# Patient Record
Sex: MALE | Race: BLACK OR AFRICAN AMERICAN | NOT HISPANIC OR LATINO | Employment: OTHER | URBAN - METROPOLITAN AREA
[De-identification: names, ages, dates, MRNs, and addresses within clinical notes are randomized per-mention and may not be internally consistent; named-entity substitution may affect disease eponyms.]

---

## 2017-01-30 ENCOUNTER — HOSPITAL ENCOUNTER (EMERGENCY)
Facility: OTHER | Age: 82
Discharge: HOME | End: 2017-01-30
Attending: EMERGENCY MEDICINE
Payer: MEDICARE

## 2017-01-30 VITALS
OXYGEN SATURATION: 98 % | RESPIRATION RATE: 18 BRPM | HEIGHT: 64 IN | WEIGHT: 156 LBS | HEART RATE: 89 BPM | TEMPERATURE: 99 F | BODY MASS INDEX: 26.63 KG/M2 | SYSTOLIC BLOOD PRESSURE: 144 MMHG | DIASTOLIC BLOOD PRESSURE: 72 MMHG

## 2017-01-30 DIAGNOSIS — J10.1 INFLUENZA A: Primary | ICD-10-CM

## 2017-01-30 LAB
INFLUENZA A ANTIGEN, POC: POSITIVE
INFLUENZA B ANTIGEN, POC: NEGATIVE
POCT GLUCOSE: 135 MG/DL (ref 70–110)

## 2017-01-30 PROCEDURE — 82947 ASSAY GLUCOSE BLOOD QUANT: CPT

## 2017-01-30 PROCEDURE — 87804 INFLUENZA ASSAY W/OPTIC: CPT

## 2017-01-30 PROCEDURE — 99283 EMERGENCY DEPT VISIT LOW MDM: CPT

## 2017-01-30 RX ORDER — OSELTAMIVIR PHOSPHATE 75 MG/1
75 CAPSULE ORAL 2 TIMES DAILY
Qty: 10 CAPSULE | Refills: 0 | Status: ON HOLD | OUTPATIENT
Start: 2017-01-30 | End: 2017-02-05 | Stop reason: HOSPADM

## 2017-01-30 NOTE — ED AVS SNAPSHOT
[facility] EMERGENCY DEPARTMENT  [address] [facility] [address]               Destin Barber   2017  9:41 AM   ED    Description:  Male : 1934   Department:  [facility] Emergency Department           Your Care was Coordinated By:     Provider Role From To    Rachel Howard MD Attending Provider 17 1005 --      Reason for Visit     URI           Diagnoses this Visit        Comments    Influenza A    -  Primary       ED Disposition     None           To Do List           Follow-up Information     Schedule an appointment as soon as possible for a visit with Piter Bernardo MD.    Specialty:  Internal Medicine    Contact information:    [address]  [address]  [place] [address]  [phone]          Follow up with [facility] Emergency Department.    Specialty:  Emergency Medicine    Why:  If symptoms worsen    Contact information:    [address] [facility] [address]  [phone]       These Medications        Disp Refills Start End    oseltamivir (TAMIFLU) 75 MG capsule 10 capsule 0 2017    Take 1 capsule (75 mg total) by mouth 2 (two) times daily. - Oral    Pharmacy: [facility] - [address] Ph #: [phone]         [facility] On Call     [facility] On Call Nurse Care Line -  Assistance  Registered nurses in the [facility] On Call Center provide clinical advisement, health education, appointment booking, and other advisory services.  Call for this free service at [phone].             Medications           Message regarding Medications     Verify the changes and/or additions to your medication regime listed below are the same as discussed with your clinician today.  If any of these changes or additions are incorrect, please notify your healthcare provider.        START taking these NEW medications        Refills    oseltamivir (TAMIFLU) 75 MG capsule 0    Sig: Take 1 capsule (75 mg total) by  mouth 2 (two) times daily.    Class: Print    Route: Oral           Verify that the below list of medications is an accurate representation of the medications you are currently taking.  If none reported, the list may be blank. If incorrect, please contact your healthcare provider. Carry this list with you in case of emergency.           Current Medications     allopurinol (ZYLOPRIM) 100 MG tablet Take 1 tablet (100 mg total) by mouth 2 (two) times daily.    amlodipine (NORVASC) 10 MG tablet Take 1 tablet (10 mg total) by mouth once daily.    ASPIR-LOW 81 mg EC tablet TAKE 1 TABLET BY MOUTH DAILY    atorvastatin (LIPITOR) 80 MG tablet Take 1 tablet (80 mg total) by mouth once daily.    blood sugar diagnostic (TRUE METRIX GLUCOSE TEST STRIP) Strp To test once daily    blood-glucose meter (TRUE METRIX GLUCOSE METER) Misc To test once daily    clopidogrel (PLAVIX) 75 mg tablet Take 1 tablet (75 mg total) by mouth once daily.    diclofenac (VOLTAREN) 50 MG EC tablet One tab twice per day for three days then once per day until pain has resolved    docusate sodium (COLACE) 100 MG capsule Take 1 capsule (100 mg total) by mouth 2 (two) times daily as needed for Constipation.    lancets Misc To test once daily TRUE METRIX METER    metformin (GLUCOPHAGE-XR) 500 MG 24 hr tablet Take 1 tablet (500 mg total) by mouth daily with breakfast.    metoprolol tartrate (LOPRESSOR) 25 MG tablet Take 1 tablet (25 mg total) by mouth 2 (two) times daily.    oseltamivir (TAMIFLU) 75 MG capsule Take 1 capsule (75 mg total) by mouth 2 (two) times daily.    pantoprazole (PROTONIX) 40 MG tablet Take 1 tablet (40 mg total) by mouth once daily.    tamsulosin (FLOMAX) 0.4 mg Cp24 Take 1 capsule (0.4 mg total) by mouth once daily.    trazodone (DESYREL) 100 MG tablet Take 1 tablet (100 mg total) by mouth every evening.    TRUEPLUS LANCETS 30 gauge Misc test EVERY DAY           Clinical Reference Information           Your Vitals Were     BP Pulse  "Temp Resp Height Weight    144/72 89 98.6 °F (37 °C) (Temporal) 18 5' 4" (1.626 m) 70.8 kg (156 lb)    SpO2 BMI             98% 26.78 kg/m2         Allergies as of 1/30/2017     No Known Allergies      Immunizations Administered on Date of Encounter - 1/30/2017     None      ED Micro, Lab, POCT     Start Ordered       Status Ordering Provider    01/30/17 1034 01/30/17 1034  POCT Influenza A/B  Once      Final result     01/30/17 1012 01/30/17 1012  POCT glucose  Once      Final result     01/30/17 1007 01/30/17 1006  POCT glucose  Once      Completed     01/30/17 1007 01/30/17 1006  POCT Influenza A/B  Once      Acknowledged       ED Imaging Orders     None        Discharge Instructions         Influenza  Influenza (the flu) is an infection that affects your respiratory tract (the mouth, nose, and lungs, and the passages between them). Unlike a cold, the flu can make you very ill. And it can lead to pneumonia, a serious lung infection. For some people, especially older adults, young children, and people with certain chronic conditions, the flu can have serious complications and even be fatal.  What Are the Risk Factors for the Flu?     Viruses that cause influenza spread through the air in droplets when someone who has the flu coughs, sneezes, laughs, or talks.   Anyone can get the flu. But youre more likely to become infected if you:  · Have a weakened immune system.  · Work in a health care setting where you may be exposed to flu germs.  · Live or work with someone who has the flu.  · Havent received an annual flu shot.  How Does the Flu Spread?  The flu is caused by viruses. The viruses spread through the air in droplets when someone who has the flu coughs, sneezes, laughs, or talks. You can become infected when you inhale these viruses directly. You can also become infected when you touch a surface on which the droplets have landed and then transfer the germs to your eyes, nose, or mouth. Touching used " tissues, or sharing utensils, drinking glasses, or a toothbrush with an infected person can expose you to flu viruses, too.  What Are the Symptoms of the Flu?  Flu symptoms tend to come on quickly and may last a few days to a few weeks. They include:  · Fever usually higher than 101°F  (38.3°C) and chills  · Sore throat and headache  · Dry cough  · Runny nose  · Tiredness and weakness  · Muscle aches  Factors That Can Make Flu Worse  For some people, the flu can be very serious. The risk of complications is greater for:  · Children under age 5.  · Adults 65 years of age and older.  · People with a chronic illness, such as diabetes or heart, kidney, or lung disease.  · People who live in a nursing home or long-term care facility.   How Is the Flu Treated?  Influenza usually improves after 7 days or so. In some cases, your health care provider may prescribe an antiviral medication. This may help you get well sooner. For the medication to help, you need to take it as soon as possible (ideally within 48 hours) after your symptoms start. If you develop pneumonia or other serious illness, hospital care may be needed.  Easing Flu Symptoms  · Drink lots of fluids such as water, juice, and warm soup. A good rule is to drink enough so that you urinate your normal amount.  · Get plenty of rest.  · Ask your health care provider what to take for fever and pain.  · Call your provider if your fever rises over 101°F (38.3°C) or you become dizzy, lightheaded, or short of breath.  Taking Steps to Protect Others  · Wash your hands often, especially after coughing or sneezing. Or, clean your hands with an alcohol-based hand  containing at least 60 percent alcohol.  · Cough or sneeze into a tissue. Then throw the tissue away and wash your hands. If you dont have a tissue, cough and sneeze into the crook of your elbow.  · Stay home until at least 24 hours after you no longer have a fever or chills. Be sure the fever isnt being  hidden by fever-reducing medication.  · Dont share food, utensils, drinking glasses, or a toothbrush with others.  · Ask your health care provider if others in your household should receive antiviral medication to help them avoid infection.  How Can the Flu Be Prevented?  · One of the best ways to avoid the flu is to get a flu vaccination each year. Viruses that cause the flu change from year to year. For that reason, doctors recommend getting the flu vaccine each year, as soon as it's available in your area. The vaccine may be given as a shot or as a nasal spray. Your health care provider can tell you which vaccine is right for you.  · Wash your hands often. Frequent handwashing is a proven way to help prevent infection.  · Carry an alcohol-based hand gel containing at least 60 percent alcohol. Use it when you dont have access to soap and water. Then wash your hands as soon as you can.  · Avoid touching your eyes, nose, and mouth.  · At home and work, clean phones, computer keyboards, and toys often with disinfectant wipes.  · If possible, avoid close contact with others who have the flu or symptoms of the flu.  Handwashing Tips  Handwashing is one of the best ways to prevent many common infections. If youre caring for or visiting someone with the flu, wash your hands each time you enter and leave the room. Follow these steps:  · Use warm water and plenty of soap. Rub your hands together well.  · Clean the whole hand, under your nails, between your fingers, and up the wrists.  · Wash for at least 15 seconds.  · Rinse, letting the water run down your fingers, not up your wrists.  · Dry your hands well. Use a paper towel to turn off the faucet and open the door.  Using Alcohol-Based Hand   Alcohol-based hand  are also a good choice. Use them when you dont have access to soap and water. Follow these steps:  · Squeeze about a tablespoon of gel into the palm of one hand.  · Rub your hands together  briskly, cleaning the backs of your hands, the palms, between your fingers, and up the wrists.  · Rub until the gel is gone and your hands are completely dry.  Preventing Influenza in Healthcare Settings  The flu is a special concern for people in hospitals and long-term care facilities. To help prevent the spread of flu, many hospitals and nursing homes take these steps:  · Health care providers wash their hands or use an alcohol-based hand  before and after treating each patient.  · People with the flu have private rooms and bathrooms or share a room with someone with the same infection.  · High-risk patients who dont have the flu are encouraged to get the flu and pneumonia vaccines.  · All health care workers are encouraged or required to get flu shots.      © 9147-9184 [employer]. [address] [place] [address], [place] [address]. All rights reserved. This information is not intended as a substitute for professional medical care. Always follow your healthcare professional's instructions.          [facility] Sign-Up     Activating your [facility] account is as easy as 1-2-3!     1) Visit [email or web address], select Sign Up Now, enter this activation code and your date of birth, then select Next.  [email or web address]  Expires: 3/16/2017 10:51 AM      2) Create a username and password to use when you visit [facility] in the future and select a security question in case you lose your password and select Next.    3) Enter your e-mail address and click Sign Up!    Additional Information  If you have questions, please e-mail [email or web address] or call [phone] to talk to our [facility] staff. Remember, [facility] is NOT to be used for urgent needs. For medical emergencies, dial 911.         Smoking Cessation     If you would like to quit smoking:   You may be eligible for free services if you are a [place] resident and started smoking cigarettes before September 1, 1988.  Call the Smoking Cessation Trust  (SCT) toll free at (082) 729-7140 or (919) 247-1041.   Call 1-800-QUIT-NOW if you do not meet the above criteria.             [facility] complies with applicable Federal civil rights laws and does not discriminate on the basis of race, color, national origin, age, disability, or sex.        Language Assistance Services     ATTENTION: Language assistance services are available, free of charge. Please call [phone].      ATENCIÓN: Si habla kerline, tiene a ro disposición servicios gratuitos de asistencia lingüística. Llame al [phone].     CHÚ Ý: N?u b?n nói Ti?ng Vi?t, có các d?ch v? h? tr? ngôn ng? mi?n phí dành cho b?n. G?i s? [phone].

## 2017-01-30 NOTE — DISCHARGE INSTRUCTIONS
Influenza  Influenza (the flu) is an infection that affects your respiratory tract (the mouth, nose, and lungs, and the passages between them). Unlike a cold, the flu can make you very ill. And it can lead to pneumonia, a serious lung infection. For some people, especially older adults, young children, and people with certain chronic conditions, the flu can have serious complications and even be fatal.  What Are the Risk Factors for the Flu?     Viruses that cause influenza spread through the air in droplets when someone who has the flu coughs, sneezes, laughs, or talks.   Anyone can get the flu. But youre more likely to become infected if you:  · Have a weakened immune system.  · Work in a health care setting where you may be exposed to flu germs.  · Live or work with someone who has the flu.  · Havent received an annual flu shot.  How Does the Flu Spread?  The flu is caused by viruses. The viruses spread through the air in droplets when someone who has the flu coughs, sneezes, laughs, or talks. You can become infected when you inhale these viruses directly. You can also become infected when you touch a surface on which the droplets have landed and then transfer the germs to your eyes, nose, or mouth. Touching used tissues, or sharing utensils, drinking glasses, or a toothbrush with an infected person can expose you to flu viruses, too.  What Are the Symptoms of the Flu?  Flu symptoms tend to come on quickly and may last a few days to a few weeks. They include:  · Fever usually higher than 101°F  (38.3°C) and chills  · Sore throat and headache  · Dry cough  · Runny nose  · Tiredness and weakness  · Muscle aches  Factors That Can Make Flu Worse  For some people, the flu can be very serious. The risk of complications is greater for:  · Children under age 5.  · Adults 65 years of age and older.  · People with a chronic illness, such as diabetes or heart, kidney, or lung disease.  · People who live in a nursing  home or long-term care facility.   How Is the Flu Treated?  Influenza usually improves after 7 days or so. In some cases, your health care provider may prescribe an antiviral medication. This may help you get well sooner. For the medication to help, you need to take it as soon as possible (ideally within 48 hours) after your symptoms start. If you develop pneumonia or other serious illness, hospital care may be needed.  Easing Flu Symptoms  · Drink lots of fluids such as water, juice, and warm soup. A good rule is to drink enough so that you urinate your normal amount.  · Get plenty of rest.  · Ask your health care provider what to take for fever and pain.  · Call your provider if your fever rises over 101°F (38.3°C) or you become dizzy, lightheaded, or short of breath.  Taking Steps to Protect Others  · Wash your hands often, especially after coughing or sneezing. Or, clean your hands with an alcohol-based hand  containing at least 60 percent alcohol.  · Cough or sneeze into a tissue. Then throw the tissue away and wash your hands. If you dont have a tissue, cough and sneeze into the crook of your elbow.  · Stay home until at least 24 hours after you no longer have a fever or chills. Be sure the fever isnt being hidden by fever-reducing medication.  · Dont share food, utensils, drinking glasses, or a toothbrush with others.  · Ask your health care provider if others in your household should receive antiviral medication to help them avoid infection.  How Can the Flu Be Prevented?  · One of the best ways to avoid the flu is to get a flu vaccination each year. Viruses that cause the flu change from year to year. For that reason, doctors recommend getting the flu vaccine each year, as soon as it's available in your area. The vaccine may be given as a shot or as a nasal spray. Your health care provider can tell you which vaccine is right for you.  · Wash your hands often. Frequent handwashing is a proven way  to help prevent infection.  · Carry an alcohol-based hand gel containing at least 60 percent alcohol. Use it when you dont have access to soap and water. Then wash your hands as soon as you can.  · Avoid touching your eyes, nose, and mouth.  · At home and work, clean phones, computer keyboards, and toys often with disinfectant wipes.  · If possible, avoid close contact with others who have the flu or symptoms of the flu.  Handwashing Tips  Handwashing is one of the best ways to prevent many common infections. If youre caring for or visiting someone with the flu, wash your hands each time you enter and leave the room. Follow these steps:  · Use warm water and plenty of soap. Rub your hands together well.  · Clean the whole hand, under your nails, between your fingers, and up the wrists.  · Wash for at least 15 seconds.  · Rinse, letting the water run down your fingers, not up your wrists.  · Dry your hands well. Use a paper towel to turn off the faucet and open the door.  Using Alcohol-Based Hand   Alcohol-based hand  are also a good choice. Use them when you dont have access to soap and water. Follow these steps:  · Squeeze about a tablespoon of gel into the palm of one hand.  · Rub your hands together briskly, cleaning the backs of your hands, the palms, between your fingers, and up the wrists.  · Rub until the gel is gone and your hands are completely dry.  Preventing Influenza in Healthcare Settings  The flu is a special concern for people in hospitals and long-term care facilities. To help prevent the spread of flu, many hospitals and nursing homes take these steps:  · Health care providers wash their hands or use an alcohol-based hand  before and after treating each patient.  · People with the flu have private rooms and bathrooms or share a room with someone with the same infection.  · High-risk patients who dont have the flu are encouraged to get the flu and pneumonia  vaccines.  · All health care workers are encouraged or required to get flu shots.      © 1068-8426 [employer]. [address] [place] [address], [place] [address]. All rights reserved. This information is not intended as a substitute for professional medical care. Always follow your healthcare professional's instructions.

## 2017-01-30 NOTE — ED PROVIDER NOTES
Encounter Date: 1/30/2017       History     Chief Complaint   Patient presents with    URI     non productive cough onset three days     Review of patient's allergies indicates:  No Known Allergies  The history is provided by the patient.    an 82-year-old who complains of a cough.  Patient has had a cough productive of white sputum.  No fever.  He has had nasal congestion as well.He denies shortness of breath.  Patient's wife is ill with similar symptoms.  He has not taken anything for the symptoms.  No chest pain.  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  Past Surgical History   Procedure Laterality Date    Cataract extraction w/ anterior vitrectomy      Hemorrhoid surgery      Coronary artery bypass graft  07/06/2016     PAIZ-LAD, SVG-Ramus, SVG-PDA     Family History   Problem Relation Age of Onset    Cancer Father      colon    Hypertension Mother     Heart disease Mother     Heart disease Sister      Social History   Substance Use Topics    Smoking status: Former Smoker     Types: Cigarettes     Quit date: 6/23/1985    Smokeless tobacco: None    Alcohol use No     Review of Systems   Constitutional: Negative for fever.   HENT: Positive for congestion. Negative for sore throat.    Eyes: Negative for pain.   Respiratory: Positive for cough. Negative for shortness of breath.    Cardiovascular: Negative for chest pain.   Gastrointestinal: Negative for abdominal pain.   Genitourinary: Negative for dysuria.   Musculoskeletal: Negative for back pain.   Skin: Negative for rash.   Neurological: Negative for headaches.       Physical Exam   Initial Vitals   BP Pulse Resp Temp SpO2   01/30/17 0949 01/30/17 0949 01/30/17 0949 01/30/17  0949 01/30/17 0949   144/72 89 18 98.6 °F (37 °C) 98 %     Physical Exam    Nursing note and vitals reviewed.  Constitutional: He appears well-developed and well-nourished.   HENT:   Head: Normocephalic and atraumatic.   Right Ear: Tympanic membrane normal.   Left Ear: Tympanic membrane normal.   Nose: Mucosal edema present.   Eyes: EOM are normal. Pupils are equal, round, and reactive to light.   Neck: Normal range of motion. Neck supple.   Cardiovascular: Normal rate and regular rhythm.   Pulmonary/Chest: Breath sounds normal. No respiratory distress.   Abdominal: Soft. There is no rebound and no guarding.   Musculoskeletal: Normal range of motion. He exhibits no edema or tenderness.   Neurological: He is alert and oriented to person, place, and time. No cranial nerve deficit.   Skin: Skin is warm and dry.   Psychiatric: He has a normal mood and affect.         ED Course   Procedures  Labs Reviewed   POCT GLUCOSE - Abnormal; Notable for the following:        Result Value    POCT Glucose 135 (*)     All other components within normal limits   POCT INFLUENZA A/B   POCT GLUCOSE MONITORING CONTINUOUS             Medical Decision Making:   Initial Assessment:   82-year-old who complains of URI type symptoms.  Patient's lungs are clear.  He is afebrile and nontoxic appearing  ED Management:  Patient will be checked for influenza.  Accu-Chek 135.  Patient was positive for influenza A.  He will be discharged on Tamiflu.  He appears well                   ED Course     Clinical Impression:   The encounter diagnosis was Influenza A.          Rachel Howard MD  01/30/17 1211

## 2017-02-02 ENCOUNTER — OFFICE VISIT (OUTPATIENT)
Dept: FAMILY MEDICINE | Facility: CLINIC | Age: 82
DRG: 871 | End: 2017-02-02
Payer: MEDICARE

## 2017-02-02 ENCOUNTER — TELEPHONE (OUTPATIENT)
Dept: FAMILY MEDICINE | Facility: CLINIC | Age: 82
End: 2017-02-02

## 2017-02-02 ENCOUNTER — HOSPITAL ENCOUNTER (INPATIENT)
Facility: HOSPITAL | Age: 82
LOS: 3 days | Discharge: HOME | DRG: 871 | End: 2017-02-05
Attending: EMERGENCY MEDICINE | Admitting: INTERNAL MEDICINE
Payer: MEDICARE

## 2017-02-02 VITALS
BODY MASS INDEX: 23.18 KG/M2 | RESPIRATION RATE: 16 BRPM | HEIGHT: 67 IN | HEART RATE: 126 BPM | OXYGEN SATURATION: 88 % | TEMPERATURE: 100 F | SYSTOLIC BLOOD PRESSURE: 160 MMHG | DIASTOLIC BLOOD PRESSURE: 60 MMHG | WEIGHT: 147.69 LBS

## 2017-02-02 DIAGNOSIS — E11.9 TYPE II OR UNSPECIFIED TYPE DIABETES MELLITUS WITHOUT MENTION OF COMPLICATION, NOT STATED AS UNCONTROLLED: Primary | ICD-10-CM

## 2017-02-02 DIAGNOSIS — A41.9 SEPSIS, DUE TO UNSPECIFIED ORGANISM: Primary | ICD-10-CM

## 2017-02-02 DIAGNOSIS — J10.1 INFLUENZA A: ICD-10-CM

## 2017-02-02 DIAGNOSIS — R06.03 RESPIRATORY DISTRESS: Primary | ICD-10-CM

## 2017-02-02 DIAGNOSIS — I25.810 CORONARY ARTERY DISEASE INVOLVING CORONARY BYPASS GRAFT WITHOUT ANGINA PECTORIS, UNSPECIFIED WHETHER NATIVE OR TRANSPLANTED HEART: ICD-10-CM

## 2017-02-02 DIAGNOSIS — Z95.1 S/P CABG (CORONARY ARTERY BYPASS GRAFT): ICD-10-CM

## 2017-02-02 DIAGNOSIS — I25.810 CORONARY ARTERY DISEASE INVOLVING CORONARY BYPASS GRAFT OF NATIVE HEART WITHOUT ANGINA PECTORIS: ICD-10-CM

## 2017-02-02 DIAGNOSIS — J18.9 PNEUMONIA DUE TO INFECTIOUS ORGANISM, UNSPECIFIED LATERALITY, UNSPECIFIED PART OF LUNG: ICD-10-CM

## 2017-02-02 DIAGNOSIS — R06.02 SHORTNESS OF BREATH: ICD-10-CM

## 2017-02-02 LAB
ALBUMIN SERPL BCP-MCNC: 2.9 G/DL
ALP SERPL-CCNC: 77 U/L
ALT SERPL W/O P-5'-P-CCNC: 12 U/L
ANION GAP SERPL CALC-SCNC: 8 MMOL/L
ANISOCYTOSIS BLD QL SMEAR: SLIGHT
AST SERPL-CCNC: 23 U/L
BASOPHILS # BLD AUTO: 0.01 K/UL
BASOPHILS NFR BLD: 0.1 %
BILIRUB SERPL-MCNC: 1 MG/DL
BNP SERPL-MCNC: 113 PG/ML
BUN SERPL-MCNC: 14 MG/DL
BURR CELLS BLD QL SMEAR: ABNORMAL
CALCIUM SERPL-MCNC: 8.3 MG/DL
CHLORIDE SERPL-SCNC: 105 MMOL/L
CO2 SERPL-SCNC: 20 MMOL/L
CREAT SERPL-MCNC: 1.1 MG/DL
DIFFERENTIAL METHOD: ABNORMAL
EOSINOPHIL # BLD AUTO: 0 K/UL
EOSINOPHIL NFR BLD: 0 %
ERYTHROCYTE [DISTWIDTH] IN BLOOD BY AUTOMATED COUNT: 13.7 %
EST. GFR  (AFRICAN AMERICAN): >60 ML/MIN/1.73 M^2
EST. GFR  (NON AFRICAN AMERICAN): >60 ML/MIN/1.73 M^2
GLUCOSE SERPL-MCNC: 158 MG/DL
HCT VFR BLD AUTO: 28.3 %
HGB BLD-MCNC: 10 G/DL
LACTATE SERPL-SCNC: 1.1 MMOL/L
LYMPHOCYTES # BLD AUTO: 0.8 K/UL
LYMPHOCYTES NFR BLD: 11.8 %
MCH RBC QN AUTO: 31.1 PG
MCHC RBC AUTO-ENTMCNC: 35.3 %
MCV RBC AUTO: 88 FL
MONOCYTES # BLD AUTO: 0.4 K/UL
MONOCYTES NFR BLD: 5.1 %
NEUTROPHILS # BLD AUTO: 5.9 K/UL
NEUTROPHILS NFR BLD: 83.4 %
OVALOCYTES BLD QL SMEAR: ABNORMAL
PLATELET # BLD AUTO: 137 K/UL
PMV BLD AUTO: 9.1 FL
POCT GLUCOSE: 121 MG/DL (ref 70–110)
POTASSIUM SERPL-SCNC: 2.9 MMOL/L
PROT SERPL-MCNC: 6.4 G/DL
RBC # BLD AUTO: 3.22 M/UL
SODIUM SERPL-SCNC: 133 MMOL/L
TROPONIN I SERPL DL<=0.01 NG/ML-MCNC: 0.03 NG/ML
WBC # BLD AUTO: 7.09 K/UL

## 2017-02-02 PROCEDURE — 93005 ELECTROCARDIOGRAM TRACING: CPT

## 2017-02-02 PROCEDURE — 96361 HYDRATE IV INFUSION ADD-ON: CPT

## 2017-02-02 PROCEDURE — 63600175 PHARM REV CODE 636 W HCPCS: Performed by: EMERGENCY MEDICINE

## 2017-02-02 PROCEDURE — 80053 COMPREHEN METABOLIC PANEL: CPT

## 2017-02-02 PROCEDURE — 99285 EMERGENCY DEPT VISIT HI MDM: CPT | Mod: 25,27

## 2017-02-02 PROCEDURE — 83880 ASSAY OF NATRIURETIC PEPTIDE: CPT

## 2017-02-02 PROCEDURE — 25000003 PHARM REV CODE 250: Performed by: EMERGENCY MEDICINE

## 2017-02-02 PROCEDURE — 99215 OFFICE O/P EST HI 40 MIN: CPT | Mod: S$PBB,,, | Performed by: NURSE PRACTITIONER

## 2017-02-02 PROCEDURE — 96365 THER/PROPH/DIAG IV INF INIT: CPT

## 2017-02-02 PROCEDURE — 87040 BLOOD CULTURE FOR BACTERIA: CPT | Mod: 59

## 2017-02-02 PROCEDURE — 84484 ASSAY OF TROPONIN QUANT: CPT

## 2017-02-02 PROCEDURE — 20000000 HC ICU ROOM

## 2017-02-02 PROCEDURE — 99999 PR PBB SHADOW E&M-EST. PATIENT-LVL III: CPT | Mod: PBBFAC,,, | Performed by: NURSE PRACTITIONER

## 2017-02-02 PROCEDURE — 82962 GLUCOSE BLOOD TEST: CPT

## 2017-02-02 PROCEDURE — 96367 TX/PROPH/DG ADDL SEQ IV INF: CPT

## 2017-02-02 PROCEDURE — 83605 ASSAY OF LACTIC ACID: CPT

## 2017-02-02 PROCEDURE — 85025 COMPLETE CBC W/AUTO DIFF WBC: CPT

## 2017-02-02 RX ORDER — TAMSULOSIN HYDROCHLORIDE 0.4 MG/1
0.4 CAPSULE ORAL DAILY
Status: DISCONTINUED | OUTPATIENT
Start: 2017-02-03 | End: 2017-02-05 | Stop reason: HOSPADM

## 2017-02-02 RX ORDER — SODIUM CHLORIDE 9 MG/ML
500 INJECTION, SOLUTION INTRAVENOUS
Status: COMPLETED | OUTPATIENT
Start: 2017-02-02 | End: 2017-02-02

## 2017-02-02 RX ORDER — ACETAMINOPHEN 325 MG/1
650 TABLET ORAL EVERY 6 HOURS PRN
Status: DISCONTINUED | OUTPATIENT
Start: 2017-02-02 | End: 2017-02-05 | Stop reason: HOSPADM

## 2017-02-02 RX ORDER — SODIUM CHLORIDE 9 MG/ML
INJECTION, SOLUTION INTRAVENOUS CONTINUOUS
Status: DISCONTINUED | OUTPATIENT
Start: 2017-02-02 | End: 2017-02-04

## 2017-02-02 RX ORDER — ONDANSETRON 2 MG/ML
4 INJECTION INTRAMUSCULAR; INTRAVENOUS EVERY 12 HOURS PRN
Status: DISCONTINUED | OUTPATIENT
Start: 2017-02-02 | End: 2017-02-03

## 2017-02-02 RX ORDER — ASPIRIN 81 MG/1
81 TABLET ORAL DAILY
Status: DISCONTINUED | OUTPATIENT
Start: 2017-02-03 | End: 2017-02-05 | Stop reason: HOSPADM

## 2017-02-02 RX ORDER — ACETAMINOPHEN 500 MG
1000 TABLET ORAL
Status: COMPLETED | OUTPATIENT
Start: 2017-02-02 | End: 2017-02-02

## 2017-02-02 RX ORDER — ATORVASTATIN CALCIUM 40 MG/1
80 TABLET, FILM COATED ORAL DAILY
Status: DISCONTINUED | OUTPATIENT
Start: 2017-02-03 | End: 2017-02-05 | Stop reason: HOSPADM

## 2017-02-02 RX ORDER — SODIUM CHLORIDE 9 MG/ML
1000 INJECTION, SOLUTION INTRAVENOUS
Status: DISCONTINUED | OUTPATIENT
Start: 2017-02-02 | End: 2017-02-02

## 2017-02-02 RX ORDER — ALLOPURINOL 100 MG/1
100 TABLET ORAL 2 TIMES DAILY
Status: DISCONTINUED | OUTPATIENT
Start: 2017-02-02 | End: 2017-02-05 | Stop reason: HOSPADM

## 2017-02-02 RX ORDER — IBUPROFEN 600 MG/1
600 TABLET ORAL
Status: COMPLETED | OUTPATIENT
Start: 2017-02-02 | End: 2017-02-02

## 2017-02-02 RX ORDER — TRAZODONE HYDROCHLORIDE 50 MG/1
100 TABLET ORAL NIGHTLY
Status: DISCONTINUED | OUTPATIENT
Start: 2017-02-02 | End: 2017-02-05 | Stop reason: HOSPADM

## 2017-02-02 RX ORDER — PANTOPRAZOLE SODIUM 40 MG/1
40 TABLET, DELAYED RELEASE ORAL DAILY
Status: DISCONTINUED | OUTPATIENT
Start: 2017-02-03 | End: 2017-02-05 | Stop reason: HOSPADM

## 2017-02-02 RX ORDER — DOCUSATE SODIUM 100 MG/1
100 CAPSULE, LIQUID FILLED ORAL 2 TIMES DAILY PRN
Status: DISCONTINUED | OUTPATIENT
Start: 2017-02-02 | End: 2017-02-05 | Stop reason: HOSPADM

## 2017-02-02 RX ORDER — METOPROLOL TARTRATE 25 MG/1
25 TABLET, FILM COATED ORAL 2 TIMES DAILY
Status: DISCONTINUED | OUTPATIENT
Start: 2017-02-02 | End: 2017-02-03

## 2017-02-02 RX ORDER — AMLODIPINE BESYLATE 5 MG/1
10 TABLET ORAL DAILY
Status: DISCONTINUED | OUTPATIENT
Start: 2017-02-03 | End: 2017-02-03

## 2017-02-02 RX ORDER — CLOPIDOGREL BISULFATE 75 MG/1
75 TABLET ORAL DAILY
Status: DISCONTINUED | OUTPATIENT
Start: 2017-02-03 | End: 2017-02-05 | Stop reason: HOSPADM

## 2017-02-02 RX ORDER — ENOXAPARIN SODIUM 100 MG/ML
40 INJECTION SUBCUTANEOUS EVERY 24 HOURS
Status: DISCONTINUED | OUTPATIENT
Start: 2017-02-03 | End: 2017-02-05 | Stop reason: HOSPADM

## 2017-02-02 RX ADMIN — SODIUM CHLORIDE 500 ML: 0.9 INJECTION, SOLUTION INTRAVENOUS at 06:02

## 2017-02-02 RX ADMIN — SODIUM CHLORIDE: 0.9 INJECTION, SOLUTION INTRAVENOUS at 08:02

## 2017-02-02 RX ADMIN — ALLOPURINOL 100 MG: 100 TABLET ORAL at 09:02

## 2017-02-02 RX ADMIN — ACETAMINOPHEN 1000 MG: 500 TABLET ORAL at 05:02

## 2017-02-02 RX ADMIN — TRAZODONE HYDROCHLORIDE 100 MG: 50 TABLET ORAL at 09:02

## 2017-02-02 RX ADMIN — IBUPROFEN 600 MG: 600 TABLET, FILM COATED ORAL at 06:02

## 2017-02-02 RX ADMIN — METOPROLOL TARTRATE 25 MG: 25 TABLET ORAL at 09:02

## 2017-02-02 RX ADMIN — CEFTRIAXONE 1 G: 1 INJECTION, SOLUTION INTRAVENOUS at 05:02

## 2017-02-02 RX ADMIN — AZITHROMYCIN MONOHYDRATE 500 MG: 500 INJECTION, POWDER, LYOPHILIZED, FOR SOLUTION INTRAVENOUS at 07:02

## 2017-02-02 RX ADMIN — SODIUM CHLORIDE 500 ML: 0.9 INJECTION, SOLUTION INTRAVENOUS at 05:02

## 2017-02-02 NOTE — PROGRESS NOTES
Subjective:       Patient ID: Destin Barber is a 82 y.o. male.    Chief Complaint: Diabetes and Follow-up    HPI Mr Barber is here for URI, upon getting into the room, he is tachypneic, he looks uncomfortable. He states he is SOB.  He was seen in [place] ED 5 days ago and treated with tamiflu.  His daughter is here with him.  He states he does not smoke, but people smoke around him.   Review of Systems   Constitutional: Positive for fever.   Respiratory: Positive for cough and shortness of breath.        Objective:      Physical Exam   Pulmonary/Chest: He is in respiratory distress. He has decreased breath sounds in the right lower field and the left lower field.   2LNC placed, O2 sat up to 96%       Assessment:       No diagnosis found.    Plan:       Report called to [facility] at [facility], 911 called, EMS dispatched.

## 2017-02-02 NOTE — ED NOTES
Pt taken to radiology for imaging, awaiting pt's return to medicate and draw his blood, family present and aware, will continue to monitor.

## 2017-02-02 NOTE — PROGRESS NOTES
Subjective:       Patient ID: Destin Barber is a 82 y.o. male.    Chief Complaint: Diabetes and Follow-up    HPI  Review of Systems    Objective:     Vitals:    02/02/17 1454   Resp: 16   Weight: 67 kg (147 lb 11.3 oz)          Physical Exam    Assessment:       No diagnosis found.    Plan:       ***

## 2017-02-02 NOTE — IP AVS SNAPSHOT
[place] [address] [place] [address]  Phone: [phone]           Patient Discharge Instructions     Our goal is to set you up for success. This packet includes information on your condition, medications, and your home care. It will help you to care for yourself so you don't get sicker and need to go back to the hospital.     Please ask your nurse if you have any questions.        There are many details to remember when preparing to leave the hospital. Here is what you will need to do:    1. Take your medicine. If you are prescribed medications, review your Medication List in the following pages. You may have new medications to  at the pharmacy and others that you'll need to stop taking. Review the instructions for how and when to take your medications. Talk with your doctor or nurses if you are unsure of what to do.     2. Go to your follow-up appointments. Specific follow-up information is listed in the following pages. Your may be contacted by a transition nurse or clinical provider about future appointments. Be sure we have all of the phone numbers to reach you, if needed. Please contact your provider's office if you are unable to make an appointment.     3. Watch for warning signs. Your doctor or nurse will give you detailed warning signs to watch for and when to call for assistance. These instructions may also include educational information about your condition. If you experience any of warning signs to your health, call your doctor.               ** Verify the list of medication(s) below is accurate and up to date. Carry this with you in case of emergency. If your medications have changed, please notify your healthcare provider.             Medication List      START taking these medications        Additional Info                      azithromycin 250 MG tablet   Commonly known as:  Z-CISCO   Quantity:  3 tablet   Refills:  0   Dose:  250 mg   Indications:   Pneumonia    Last time this was given:  250 mg on 2/5/2017  8:07 AM   Instructions:  Take 1 tablet (250 mg total) by mouth once daily.     Begin Date    AM    Noon    PM    Bedtime         CONTINUE taking these medications        Additional Info                      allopurinol 100 MG tablet   Commonly known as:  ZYLOPRIM   Quantity:  90 tablet   Refills:  3   Dose:  100 mg    Last time this was given:  100 mg on 2/5/2017  8:07 AM   Instructions:  Take 1 tablet (100 mg total) by mouth 2 (two) times daily.     Begin Date    AM    Noon    PM    Bedtime       amlodipine 10 MG tablet   Commonly known as:  NORVASC   Quantity:  90 tablet   Refills:  3   Dose:  10 mg    Instructions:  Take 1 tablet (10 mg total) by mouth once daily.     Begin Date    AM    Noon    PM    Bedtime       ASPIR-LOW 81 MG EC tablet   Quantity:  30 tablet   Refills:  5   Generic drug:  aspirin    Last time this was given:  81 mg on 2/5/2017  8:06 AM   Instructions:  TAKE 1 TABLET BY MOUTH DAILY     Begin Date    AM    Noon    PM    Bedtime       atorvastatin 80 MG tablet   Commonly known as:  LIPITOR   Quantity:  30 tablet   Refills:  5   Dose:  80 mg    Last time this was given:  80 mg on 2/5/2017  8:06 AM   Instructions:  Take 1 tablet (80 mg total) by mouth once daily.     Begin Date    AM    Noon    PM    Bedtime       clopidogrel 75 mg tablet   Commonly known as:  PLAVIX   Quantity:  30 tablet   Refills:  5   Dose:  75 mg    Last time this was given:  75 mg on 2/5/2017  8:06 AM   Instructions:  Take 1 tablet (75 mg total) by mouth once daily.     Begin Date    AM    Noon    PM    Bedtime       diclofenac 50 MG EC tablet   Commonly known as:  VOLTAREN   Quantity:  30 tablet   Refills:  2    Instructions:  One tab twice per day for three days then once per day until pain has resolved     Begin Date    AM    Noon    PM    Bedtime       docusate sodium 100 MG capsule   Commonly known as:  COLACE   Refills:  0   Dose:  100 mg    Instructions:   Take 1 capsule (100 mg total) by mouth 2 (two) times daily as needed for Constipation.     Begin Date    AM    Noon    PM    Bedtime       metformin 500 MG 24 hr tablet   Commonly known as:  GLUCOPHAGE-XR   Quantity:  30 tablet   Refills:  5   Dose:  500 mg    Instructions:  Take 1 tablet (500 mg total) by mouth daily with breakfast.     Begin Date    AM    Noon    PM    Bedtime       metoprolol tartrate 25 MG tablet   Commonly known as:  LOPRESSOR   Quantity:  60 tablet   Refills:  5   Dose:  25 mg    Last time this was given:  25 mg on 2/2/2017  9:28 PM   Instructions:  Take 1 tablet (25 mg total) by mouth 2 (two) times daily.     Begin Date    AM    Noon    PM    Bedtime       pantoprazole 40 MG tablet   Commonly known as:  PROTONIX   Quantity:  30 tablet   Refills:  5   Dose:  40 mg    Last time this was given:  40 mg on 2/5/2017  8:06 AM   Instructions:  Take 1 tablet (40 mg total) by mouth once daily.     Begin Date    AM    Noon    PM    Bedtime       tamsulosin 0.4 mg Cp24   Commonly known as:  FLOMAX   Quantity:  30 capsule   Refills:  5   Dose:  0.4 mg    Last time this was given:  0.4 mg on 2/5/2017  8:06 AM   Instructions:  Take 1 capsule (0.4 mg total) by mouth once daily.     Begin Date    AM    Noon    PM    Bedtime       trazodone 100 MG tablet   Commonly known as:  DESYREL   Quantity:  30 tablet   Refills:  5   Dose:  100 mg    Last time this was given:  100 mg on 2/4/2017  9:01 PM   Instructions:  Take 1 tablet (100 mg total) by mouth every evening.     Begin Date    AM    Noon    PM    Bedtime         STOP taking these medications     blood sugar diagnostic Strp   Commonly known as:  TRUE METRIX GLUCOSE TEST STRIP       blood-glucose meter Misc   Commonly known as:  TRUE METRIX GLUCOSE METER       lancets Misc       oseltamivir 75 MG capsule   Commonly known as:  TAMIFLU       TRUEPLUS LANCETS 30 gauge Misc   Generic drug:  lancets            Where to Get Your Medications      These medications  "were sent to [facility] [place], [place] - [address] [facility]  [address] [facility] [address]     Phone:  [phone]     azithromycin 250 MG tablet                  Please bring to all follow up appointments:    1. A copy of your discharge instructions.  2. All medicines you are currently taking in their original bottles.  3. Identification and insurance card.    Please arrive 15 minutes ahead of scheduled appointment time.    Please call 24 hours in advance if you must reschedule your appointment and/or time.        Follow-up Information     Follow up with Piter Bernardo MD In 3 days.    Specialty:  Internal Medicine    Contact information:    [address] [place], [place] [id number] [phone]          Discharge Instructions     Future Orders    Activity as tolerated     Call MD for:  difficulty breathing or increased cough     Call MD for:  increased confusion or weakness     Call MD for:  persistent dizziness, light-headedness, or visual disturbances     Call MD for:  persistent nausea and vomiting or diarrhea     Call MD for:  redness, tenderness, or signs of infection (pain, swelling, redness, odor or green/yellow discharge around incision site)     Call MD for:  severe persistent headache     Call MD for:  severe uncontrolled pain     Call MD for:  temperature >100.4     Call MD for:  worsening rash     Diet Cardiac         Primary Diagnosis     Your primary diagnosis was:  Infection In Bloodstream      Admission Information     Date & Time Provider Department [facility]    2/2/2017  7:12 PM Melisa Brown MD [facility] [id number]      Care Providers     Provider Role Specialty Primary office phone    Melisa Brown MD Attending Provider Hospitalist [phone]    Zoya Infante MD Consulting Physician  Infectious Diseases [phone]      Your Vitals Were     BP Pulse Temp Resp Height Weight    150/82 68 98.8 °F (37.1 °C) (Oral) 18 5' 6" (1.676 m) 65.2 kg (143 " lb 11.8 oz)    SpO2 BMI             94% 23.2 kg/m2         Recent Lab Values        4/4/2011 8/1/2011 12/22/2011 6/25/2016 7/5/2016               4:45 AM  7:59 AM  9:47 AM  5:52 AM  1:05 PM       A1C 6.5 (H) 6.1 6.0 6.0 5.9               Pending Labs     Order Current Status    Blood Culture #1 **CANNOT BE ORDERED STAT** Preliminary result    Blood Culture #2 **CANNOT BE ORDERED STAT** Preliminary result      Allergies as of 2/5/2017        Reactions    Penicillins Nausea And Vomiting      [facility]     [facility] Nurse Care Line - 24/7 Assistance  Unless otherwise directed by your provider, please contact [facility], our nurse care line that is available for 24/7 assistance.     Registered nurses in the [facility] provide clinical advisement, health education, appointment booking, and other advisory services.  Call for this free service at [phone].        Advance Directives     An advance directive is a document which, in the event you are no longer able to make decisions for yourself, tells your healthcare team what kind of treatment you do or do not want to receive, or who you would like to make those decisions for you.  If you do not currently have an advance directive, [facility] encourages you to create one.  For more information call:  [phone]), [phone] ([phone]),  or log on to [email or web address].        Smoking Cessation     If you would like to quit smoking:   You may be eligible for free services if you are a [place] resident and started smoking cigarettes before September 1, 1988.  Call the Smoking Cessation Trust (SCT) toll free at (064) 630-0542 or (151) 154-8725.   Call 5-439-QUIT-NOW if you do not meet the above criteria.            Language Assistance Services     ATTENTION: Language assistance services are available, free of charge. Please call [phone].      ATENCIÓN: Si habla español, tiene a ro disposición servicios  reyos de asistencia lingüística. Kvng graham [phone].     TRES Ý: N?u b?n nói Ti?ng Vi?t, có các d?ch v? h? tr? ngôn ng? mi?n phí dành cho b?n. G?i s? [phone].        Diabetes Discharge Instructions                                    [facility] complies with applicable Federal civil rights laws and does not discriminate on the basis of race, color, national origin, age, disability, or sex.

## 2017-02-02 NOTE — MR AVS SNAPSHOT
[facility]  [address] [place] [address]  Phone: [phone]                  Destin Barber   2017 3:40 PM   Office Visit    Description:  Male : 1934   Provider:  Stephanie Maria NP-C   Department:  [facility]           Reason for Visit     Diabetes     Follow-up           Diagnoses this Visit        Comments    Respiratory distress    -  Primary     Coronary artery disease involving coronary bypass graft of native heart without angina pectoris         S/P CABG (coronary artery bypass graft)                To Do List           Future Appointments        Provider Department Dept Phone    2017 3:40 PM Stephanie Maria NP-C [facility] [phone]      Goals (5 Years of Data)     None      [facility]     [facility] Care Line -  Assistance  Registered nurses in the [facility] provide clinical advisement, health education, appointment booking, and other advisory services.  Call for this free service at [phone].             Medications           Message regarding Medications     Verify the changes and/or additions to your medication regime listed below are the same as discussed with your clinician today.  If any of these changes or additions are incorrect, please notify your healthcare provider.             Verify that the below list of medications is an accurate representation of the medications you are currently taking.  If none reported, the list may be blank. If incorrect, please contact your healthcare provider. Carry this list with you in case of emergency.           Current Medications     allopurinol (ZYLOPRIM) 100 MG tablet Take 1 tablet (100 mg total) by mouth 2 (two) times daily.    amlodipine (NORVASC) 10 MG tablet Take 1 tablet (10 mg total) by mouth once daily.    ASPIR-LOW 81 mg EC tablet TAKE 1 TABLET BY MOUTH DAILY    atorvastatin (LIPITOR) 80 MG tablet Take 1 tablet (80 mg  "total) by mouth once daily.    blood sugar diagnostic (TRUE METRIX GLUCOSE TEST STRIP) Strp To test once daily    blood-glucose meter (TRUE METRIX GLUCOSE METER) Misc To test once daily    clopidogrel (PLAVIX) 75 mg tablet Take 1 tablet (75 mg total) by mouth once daily.    diclofenac (VOLTAREN) 50 MG EC tablet One tab twice per day for three days then once per day until pain has resolved    docusate sodium (COLACE) 100 MG capsule Take 1 capsule (100 mg total) by mouth 2 (two) times daily as needed for Constipation.    lancets Misc To test once daily TRUE METRIX METER    metformin (GLUCOPHAGE-XR) 500 MG 24 hr tablet Take 1 tablet (500 mg total) by mouth daily with breakfast.    metoprolol tartrate (LOPRESSOR) 25 MG tablet Take 1 tablet (25 mg total) by mouth 2 (two) times daily.    oseltamivir (TAMIFLU) 75 MG capsule Take 1 capsule (75 mg total) by mouth 2 (two) times daily.    pantoprazole (PROTONIX) 40 MG tablet Take 1 tablet (40 mg total) by mouth once daily.    tamsulosin (FLOMAX) 0.4 mg Cp24 Take 1 capsule (0.4 mg total) by mouth once daily.    trazodone (DESYREL) 100 MG tablet Take 1 tablet (100 mg total) by mouth every evening.    TRUEPLUS LANCETS 30 gauge Misc test EVERY DAY           Clinical Reference Information           Your Vitals Were     BP Pulse Temp Resp Height Weight    160/60 (BP Location: Left arm, Patient Position: Sitting, BP Method: Manual) 126 100.2 °F (37.9 °C) (Oral) 16 5' 6.5" (1.689 m) 67 kg (147 lb 11.3 oz)    SpO2 BMI             88% 23.48 kg/m2         Blood Pressure          Most Recent Value    BP  (!)  160/60      Allergies as of 2/2/2017     No Known Allergies      Immunizations Administered on Date of Encounter - 2/2/2017     None      Appthoritycandace Sign-Up     Activating your [facility] account is as easy as 1-2-3!     1) Visit [email or web address], select Sign Up Now, enter this activation code and your date of birth, then select Next.  [email or web address]  Expires: 3/16/2017 10:51 AM  "     2) Create a username and password to use when you visit [facility] in the future and select a security question in case you lose your password and select Next.    3) Enter your e-mail address and click Sign Up!    Additional Information  If you have questions, please e-mail [email or web address] or call [phone] to talk to our [facility] staff. Remember, [facility] is NOT to be used for urgent needs. For medical emergencies, dial 911.         Language Assistance Services     ATTENTION: Language assistance services are available, free of charge. Please call [phone].      ATENCIÓN: Si habla español, tiene a ro disposición servicios gratuitos de asistencia lingüística. Llame al [phone].     CHÚ Ý: N?u b?n nói Ti?ng Vi?t, có các d?ch v? h? tr? ngôn ng? mi?n phí dành cho b?n. G?i s? [phone].         [facility] complies with applicable Federal civil rights laws and does not discriminate on the basis of race, color, national origin, age, disability, or sex.

## 2017-02-03 PROBLEM — R06.02 SOB (SHORTNESS OF BREATH): Status: ACTIVE | Noted: 2017-02-03

## 2017-02-03 PROBLEM — J10.1 INFLUENZA A: Status: ACTIVE | Noted: 2017-02-03

## 2017-02-03 PROBLEM — E87.6 HYPOKALEMIA: Status: ACTIVE | Noted: 2017-02-03

## 2017-02-03 PROBLEM — E44.0 MALNUTRITION OF MODERATE DEGREE: Status: ACTIVE | Noted: 2017-02-03

## 2017-02-03 PROBLEM — E11.9 DIABETES MELLITUS TYPE II, CONTROLLED: Status: ACTIVE | Noted: 2017-02-03

## 2017-02-03 LAB
ANION GAP SERPL CALC-SCNC: 9 MMOL/L
BASOPHILS # BLD AUTO: 0.01 K/UL
BASOPHILS NFR BLD: 0.1 %
BUN SERPL-MCNC: 13 MG/DL
CALCIUM SERPL-MCNC: 7.8 MG/DL
CHLORIDE SERPL-SCNC: 108 MMOL/L
CO2 SERPL-SCNC: 20 MMOL/L
CREAT SERPL-MCNC: 1 MG/DL
DIFFERENTIAL METHOD: ABNORMAL
EOSINOPHIL # BLD AUTO: 0 K/UL
EOSINOPHIL NFR BLD: 0 %
ERYTHROCYTE [DISTWIDTH] IN BLOOD BY AUTOMATED COUNT: 13.7 %
EST. GFR  (AFRICAN AMERICAN): >60 ML/MIN/1.73 M^2
EST. GFR  (NON AFRICAN AMERICAN): >60 ML/MIN/1.73 M^2
GLUCOSE SERPL-MCNC: 129 MG/DL
HCT VFR BLD AUTO: 26.4 %
HGB BLD-MCNC: 9.1 G/DL
LYMPHOCYTES # BLD AUTO: 1.2 K/UL
LYMPHOCYTES NFR BLD: 14.9 %
MCH RBC QN AUTO: 30.3 PG
MCHC RBC AUTO-ENTMCNC: 34.5 %
MCV RBC AUTO: 88 FL
MONOCYTES # BLD AUTO: 0.6 K/UL
MONOCYTES NFR BLD: 7 %
NEUTROPHILS # BLD AUTO: 6.2 K/UL
NEUTROPHILS NFR BLD: 78 %
PLATELET # BLD AUTO: 130 K/UL
PMV BLD AUTO: 9.7 FL
POCT GLUCOSE: 112 MG/DL (ref 70–110)
POCT GLUCOSE: 143 MG/DL (ref 70–110)
POCT GLUCOSE: 146 MG/DL (ref 70–110)
POCT GLUCOSE: 149 MG/DL (ref 70–110)
POTASSIUM SERPL-SCNC: 3.3 MMOL/L
RBC # BLD AUTO: 3 M/UL
SODIUM SERPL-SCNC: 137 MMOL/L
WBC # BLD AUTO: 7.9 K/UL

## 2017-02-03 PROCEDURE — 36415 COLL VENOUS BLD VENIPUNCTURE: CPT

## 2017-02-03 PROCEDURE — 25000003 PHARM REV CODE 250: Performed by: EMERGENCY MEDICINE

## 2017-02-03 PROCEDURE — 27000221 HC OXYGEN, UP TO 24 HOURS

## 2017-02-03 PROCEDURE — 85025 COMPLETE CBC W/AUTO DIFF WBC: CPT

## 2017-02-03 PROCEDURE — 25000003 PHARM REV CODE 250: Performed by: INTERNAL MEDICINE

## 2017-02-03 PROCEDURE — 11000001 HC ACUTE MED/SURG PRIVATE ROOM

## 2017-02-03 PROCEDURE — 80048 BASIC METABOLIC PNL TOTAL CA: CPT

## 2017-02-03 PROCEDURE — 63600175 PHARM REV CODE 636 W HCPCS: Performed by: EMERGENCY MEDICINE

## 2017-02-03 RX ORDER — INSULIN ASPART 100 [IU]/ML
1-10 INJECTION, SOLUTION INTRAVENOUS; SUBCUTANEOUS EVERY 6 HOURS PRN
Status: DISCONTINUED | OUTPATIENT
Start: 2017-02-03 | End: 2017-02-05 | Stop reason: HOSPADM

## 2017-02-03 RX ORDER — ONDANSETRON 2 MG/ML
8 INJECTION INTRAMUSCULAR; INTRAVENOUS EVERY 8 HOURS PRN
Status: DISCONTINUED | OUTPATIENT
Start: 2017-02-03 | End: 2017-02-05 | Stop reason: HOSPADM

## 2017-02-03 RX ORDER — GLUCAGON 1 MG
1 KIT INJECTION
Status: DISCONTINUED | OUTPATIENT
Start: 2017-02-03 | End: 2017-02-05 | Stop reason: HOSPADM

## 2017-02-03 RX ORDER — OSELTAMIVIR PHOSPHATE 75 MG/1
75 CAPSULE ORAL 2 TIMES DAILY
Status: DISCONTINUED | OUTPATIENT
Start: 2017-02-03 | End: 2017-02-05 | Stop reason: HOSPADM

## 2017-02-03 RX ADMIN — ALLOPURINOL 100 MG: 100 TABLET ORAL at 08:02

## 2017-02-03 RX ADMIN — TRAZODONE HYDROCHLORIDE 100 MG: 50 TABLET ORAL at 09:02

## 2017-02-03 RX ADMIN — ALLOPURINOL 100 MG: 100 TABLET ORAL at 09:02

## 2017-02-03 RX ADMIN — CLOPIDOGREL 75 MG: 75 TABLET, FILM COATED ORAL at 08:02

## 2017-02-03 RX ADMIN — OSELTAMIVIR PHOSPHATE 75 MG: 75 CAPSULE ORAL at 08:02

## 2017-02-03 RX ADMIN — PANTOPRAZOLE SODIUM 40 MG: 40 TABLET, DELAYED RELEASE ORAL at 08:02

## 2017-02-03 RX ADMIN — ATORVASTATIN CALCIUM 80 MG: 40 TABLET, FILM COATED ORAL at 08:02

## 2017-02-03 RX ADMIN — AZITHROMYCIN MONOHYDRATE 500 MG: 500 INJECTION, POWDER, LYOPHILIZED, FOR SOLUTION INTRAVENOUS at 04:02

## 2017-02-03 RX ADMIN — TAMSULOSIN HYDROCHLORIDE 0.4 MG: 0.4 CAPSULE ORAL at 08:02

## 2017-02-03 RX ADMIN — ASPIRIN 81 MG: 81 TABLET, COATED ORAL at 08:02

## 2017-02-03 RX ADMIN — CEFTRIAXONE 1 G: 1 INJECTION, SOLUTION INTRAVENOUS at 04:02

## 2017-02-03 RX ADMIN — OSELTAMIVIR PHOSPHATE 75 MG: 75 CAPSULE ORAL at 09:02

## 2017-02-03 RX ADMIN — ENOXAPARIN SODIUM 40 MG: 100 INJECTION SUBCUTANEOUS at 12:02

## 2017-02-03 NOTE — H&P
"[facility] Hospital Medicine  History & Physical    Patient Name: Destin Barber  MRN: [id number]  Admission Date: 2/2/2017  Attending Physician: Mauricio Ramirez MD   Primary Care Provider: Piter Bernardo MD         Patient information was obtained from patient and ER records.     Subjective:     Principal Problem:Sepsis    Chief Complaint: cough/sob   Chief Complaint   Patient presents with    Shortness of Breath     pt c/o cough and congestion for the past couple of weeks. SOB is exertional.         HPI: Mr. Barber is an 83 yo  M from home.  He was just diagnosed with influenza A on 1/30 at an outside clinic. He presents with a CC of coughing and some associated SOB. The patient notes that he has been coughing up white "cold."  He reports temps at home and was found to have a temp of 102.9. The patient currently is resting very comfortably in the room. He is non-ill appearing and is not on 02 and is very comfortable.  The patient was admitted to the hospital with sepsis and concerns of secondary bacterial lung infection. The patient currently is on an anti-viral, Rocephin and Zithromax. The patient lives at home with his wife and states he does not use a walker or cane. He states he does not have H/H.     Past Medical History   Diagnosis Date    Acute coronary syndrome 06/24/2016     s/p 3V CABG 7/2016    Coronary artery disease     Diastolic dysfunction     Gout, chronic     HTN (hypertension)     Hyperlipidemia     Type 2 diabetes mellitus      diet controlled       Past Surgical History   Procedure Laterality Date    Cataract extraction w/ anterior vitrectomy      Hemorrhoid surgery      Coronary artery bypass graft  07/06/2016     PAIZ-LAD, SVG-Ramus, SVG-PDA       Review of patient's allergies indicates:   Allergen Reactions    Penicillins Nausea And Vomiting       No current facility-administered medications on file prior to encounter.      Current Outpatient Prescriptions " on File Prior to Encounter   Medication Sig    allopurinol (ZYLOPRIM) 100 MG tablet Take 1 tablet (100 mg total) by mouth 2 (two) times daily.    amlodipine (NORVASC) 10 MG tablet Take 1 tablet (10 mg total) by mouth once daily.    ASPIR-LOW 81 mg EC tablet TAKE 1 TABLET BY MOUTH DAILY    atorvastatin (LIPITOR) 80 MG tablet Take 1 tablet (80 mg total) by mouth once daily.    clopidogrel (PLAVIX) 75 mg tablet Take 1 tablet (75 mg total) by mouth once daily.    diclofenac (VOLTAREN) 50 MG EC tablet One tab twice per day for three days then once per day until pain has resolved    docusate sodium (COLACE) 100 MG capsule Take 1 capsule (100 mg total) by mouth 2 (two) times daily as needed for Constipation.    metformin (GLUCOPHAGE-XR) 500 MG 24 hr tablet Take 1 tablet (500 mg total) by mouth daily with breakfast.    metoprolol tartrate (LOPRESSOR) 25 MG tablet Take 1 tablet (25 mg total) by mouth 2 (two) times daily.    pantoprazole (PROTONIX) 40 MG tablet Take 1 tablet (40 mg total) by mouth once daily.    tamsulosin (FLOMAX) 0.4 mg Cp24 Take 1 capsule (0.4 mg total) by mouth once daily.    trazodone (DESYREL) 100 MG tablet Take 1 tablet (100 mg total) by mouth every evening.    blood sugar diagnostic (TRUE METRIX GLUCOSE TEST STRIP) Strp To test once daily    blood-glucose meter (TRUE METRIX GLUCOSE METER) Misc To test once daily    lancets Misc To test once daily TRUE METRIX METER    oseltamivir (TAMIFLU) 75 MG capsule Take 1 capsule (75 mg total) by mouth 2 (two) times daily.    TRUEPLUS LANCETS 30 gauge Misc test EVERY DAY     Family History     Problem Relation (Age of Onset)    Cancer Father    Heart disease Mother, Sister    Hypertension Mother        Social History Main Topics    Smoking status: Former Smoker     Types: Cigarettes     Quit date: 6/23/1985    Smokeless tobacco: Not on file    Alcohol use No    Drug use: No    Sexual activity: Not on file     Review of Systems    Constitutional: Positive for fever. Negative for activity change, appetite change and chills.   HENT: Positive for congestion.    Eyes: Negative for discharge.   Respiratory: Positive for cough and shortness of breath. Negative for apnea, choking, chest tightness and stridor.    Cardiovascular: Negative for chest pain, palpitations and leg swelling.   Gastrointestinal: Positive for diarrhea. Negative for abdominal distention and abdominal pain.   Genitourinary: Negative for dysuria.   Musculoskeletal: Negative for arthralgias.   Skin: Negative for color change.   Neurological: Negative for light-headedness.   Psychiatric/Behavioral: Negative for agitation and behavioral problems.     Objective:     Vital Signs (Most Recent):  Temp: 97.6 °F (36.4 °C) (02/03/17 0308)  Pulse: 90 (02/02/17 2128)  Resp: 18 (02/02/17 2033)  BP: 119/63 (02/03/17 0308)  SpO2: 96 % (02/03/17 0715) Vital Signs (24h Range):  Temp:  [97.6 °F (36.4 °C)-103 °F (39.4 °C)] 97.6 °F (36.4 °C)  Pulse:  [] 90  Resp:  [16-20] 18  SpO2:  [88 %-96 %] 96 %  BP: ()/(55-80) 119/63     Weight: 65.2 kg (143 lb 11.8 oz)  Body mass index is 23.2 kg/(m^2).    Physical Exam   Constitutional: He is oriented to person, place, and time. He appears well-developed and well-nourished.   HENT:   Head: Normocephalic and atraumatic.   Cardiovascular: Normal rate and regular rhythm.  Exam reveals no gallop and no friction rub.    Pulmonary/Chest: Effort normal and breath sounds normal. No respiratory distress. He has no wheezes. He has no rales.   Abdominal: Soft. Bowel sounds are normal. He exhibits no distension. There is no tenderness. There is no rebound and no guarding.   Neurological: He is alert and oriented to person, place, and time.   Skin: Skin is warm and dry.   Psychiatric: He has a normal mood and affect. His behavior is normal.   Nursing note and vitals reviewed.       Significant Labs:   BMP:   Recent Labs  Lab 02/03/17  9345   *   NA  137   K 3.3*      CO2 20*   BUN 13   CREATININE 1.0   CALCIUM 7.8*     CBC:   Recent Labs  Lab 02/02/17  1715 02/03/17  0427   WBC 7.09 7.90   HGB 10.0* 9.1*   HCT 28.3* 26.4*   * 130*       Significant Imaging:  CXR:   Increased attenuation of the pulmonary parenchyma.  The findings may represent pulmonary edema or viral pneumonitis    Assessment/Plan:     * Sepsis  From influenza A and possible secondary bacterial infection.  Resolved now. Continue anti-viral and Rocephin and Zithro for now. Blood cultures pending. Will consult ID. Clinically very stable.       Essential hypertension  Stable.       Gout, chronic  Continue home meds.       Hyperlipidemia  Continue home meds.       Diastolic dysfunction- chronic   Stable. No acute issues.   (negative stress test 11/16 with preserved LV function)     Anemia of chronic disease  Stable       Coronary artery disease involving coronary bypass graft of native heart without angina pectoris  Stable. No acute issues.       Malnutrition of moderate degree  Will discuss with patient.       Hypokalemia  Will replace.       Diabetes mellitus type II, controlled  Well controlled. No other noted manifestations       Influenza A  On Anti-viral.  Will consult ID to follow.       SOB (shortness of breath)  Very minimal from my interview. He is resting comfortably in his room on RA.  May have viral/bacterial lung infection.  No obvious pneumonia on CXR. No respiratory distress or failure.       VTE Risk Mitigation         Ordered     enoxaparin injection 40 mg  Daily     Route:  Subcutaneous        02/02/17 2026     Medium Risk of VTE  Once      02/02/17 2026     Place sequential compression device  Until discontinued      02/02/17 2026     Place IGOR hose  Until discontinued      02/02/17 2026          ID consult. Follow blood cultures.  Likely home in next 1-2 days.       Mauricio Morgan MD  Department of Hospital Medicine   [facility]

## 2017-02-03 NOTE — SUBJECTIVE & OBJECTIVE
Past Medical History   Diagnosis Date    Acute coronary syndrome 06/24/2016     s/p 3V CABG 7/2016    Coronary artery disease     Diastolic dysfunction     Gout, chronic     HTN (hypertension)     Hyperlipidemia     Type 2 diabetes mellitus      diet controlled       Past Surgical History   Procedure Laterality Date    Cataract extraction w/ anterior vitrectomy      Hemorrhoid surgery      Coronary artery bypass graft  07/06/2016     PAIZ-LAD, SVG-Ramus, SVG-PDA       Review of patient's allergies indicates:   Allergen Reactions    Penicillins Nausea And Vomiting       No current facility-administered medications on file prior to encounter.      Current Outpatient Prescriptions on File Prior to Encounter   Medication Sig    allopurinol (ZYLOPRIM) 100 MG tablet Take 1 tablet (100 mg total) by mouth 2 (two) times daily.    amlodipine (NORVASC) 10 MG tablet Take 1 tablet (10 mg total) by mouth once daily.    ASPIR-LOW 81 mg EC tablet TAKE 1 TABLET BY MOUTH DAILY    atorvastatin (LIPITOR) 80 MG tablet Take 1 tablet (80 mg total) by mouth once daily.    clopidogrel (PLAVIX) 75 mg tablet Take 1 tablet (75 mg total) by mouth once daily.    diclofenac (VOLTAREN) 50 MG EC tablet One tab twice per day for three days then once per day until pain has resolved    docusate sodium (COLACE) 100 MG capsule Take 1 capsule (100 mg total) by mouth 2 (two) times daily as needed for Constipation.    metformin (GLUCOPHAGE-XR) 500 MG 24 hr tablet Take 1 tablet (500 mg total) by mouth daily with breakfast.    metoprolol tartrate (LOPRESSOR) 25 MG tablet Take 1 tablet (25 mg total) by mouth 2 (two) times daily.    pantoprazole (PROTONIX) 40 MG tablet Take 1 tablet (40 mg total) by mouth once daily.    tamsulosin (FLOMAX) 0.4 mg Cp24 Take 1 capsule (0.4 mg total) by mouth once daily.    trazodone (DESYREL) 100 MG tablet Take 1 tablet (100 mg total) by mouth every evening.    blood sugar diagnostic (TRUE METRIX GLUCOSE  TEST STRIP) Strp To test once daily    blood-glucose meter (TRUE METRIX GLUCOSE METER) Misc To test once daily    lancets Misc To test once daily TRUE METRIX METER    oseltamivir (TAMIFLU) 75 MG capsule Take 1 capsule (75 mg total) by mouth 2 (two) times daily.    TRUEPLUS LANCETS 30 gauge Misc test EVERY DAY     Family History     Problem Relation (Age of Onset)    Cancer Father    Heart disease Mother, Sister    Hypertension Mother        Social History Main Topics    Smoking status: Former Smoker     Types: Cigarettes     Quit date: 6/23/1985    Smokeless tobacco: Not on file    Alcohol use No    Drug use: No    Sexual activity: Not on file     Review of Systems   Constitutional: Positive for fever. Negative for activity change, appetite change and chills.   HENT: Positive for congestion.    Eyes: Negative for discharge.   Respiratory: Positive for cough and shortness of breath. Negative for apnea, choking, chest tightness and stridor.    Cardiovascular: Negative for chest pain, palpitations and leg swelling.   Gastrointestinal: Positive for diarrhea. Negative for abdominal distention and abdominal pain.   Genitourinary: Negative for dysuria.   Musculoskeletal: Negative for arthralgias.   Skin: Negative for color change.   Neurological: Negative for light-headedness.   Psychiatric/Behavioral: Negative for agitation and behavioral problems.     Objective:     Vital Signs (Most Recent):  Temp: 97.6 °F (36.4 °C) (02/03/17 0308)  Pulse: 90 (02/02/17 2128)  Resp: 18 (02/02/17 2033)  BP: 119/63 (02/03/17 0308)  SpO2: 96 % (02/03/17 0715) Vital Signs (24h Range):  Temp:  [97.6 °F (36.4 °C)-103 °F (39.4 °C)] 97.6 °F (36.4 °C)  Pulse:  [] 90  Resp:  [16-20] 18  SpO2:  [88 %-96 %] 96 %  BP: ()/(55-80) 119/63     Weight: 65.2 kg (143 lb 11.8 oz)  Body mass index is 23.2 kg/(m^2).    Physical Exam   Constitutional: He is oriented to person, place, and time. He appears well-developed and well-nourished.    HENT:   Head: Normocephalic and atraumatic.   Cardiovascular: Normal rate and regular rhythm.  Exam reveals no gallop and no friction rub.    Pulmonary/Chest: Effort normal and breath sounds normal. No respiratory distress. He has no wheezes. He has no rales.   Abdominal: Soft. Bowel sounds are normal. He exhibits no distension. There is no tenderness. There is no rebound and no guarding.   Neurological: He is alert and oriented to person, place, and time.   Skin: Skin is warm and dry.   Psychiatric: He has a normal mood and affect. His behavior is normal.   Nursing note and vitals reviewed.       Significant Labs:   BMP:   Recent Labs  Lab 02/03/17  0427   *      K 3.3*      CO2 20*   BUN 13   CREATININE 1.0   CALCIUM 7.8*     CBC:   Recent Labs  Lab 02/02/17  1715 02/03/17  0427   WBC 7.09 7.90   HGB 10.0* 9.1*   HCT 28.3* 26.4*   * 130*       Significant Imaging:  CXR:   Increased attenuation of the pulmonary parenchyma.  The findings may represent pulmonary edema or viral pneumonitis

## 2017-02-03 NOTE — PLAN OF CARE
Problem: Patient Care Overview  Goal: Plan of Care Review  Outcome: Ongoing (interventions implemented as appropriate)  Consult placed for ID r/t influenza A & lung infection, bg monitoring and SSI prn, IVF & IV abx per order, able to address needs, contact precautions maintained, ambulates per self, safety maintained, bed low locked and in position, will cont plan of care

## 2017-02-03 NOTE — ASSESSMENT & PLAN NOTE
Very minimal from my interview. He is resting comfortably in his room on RA.  May have viral/bacterial lung infection.  No obvious pneumonia on CXR.

## 2017-02-03 NOTE — ASSESSMENT & PLAN NOTE
From influenza A and possible secondary bacterial infection.  Resolved now. Continue anti-viral and Rocephin and Zithro for now. Blood cultures pending. Will consult ID. Clinically very stable.

## 2017-02-03 NOTE — PROGRESS NOTES
Pt arrived to [facility] rm 414 w/IVF infusing, contact precautions maintained, pt  AAO VS see doc flow sheet, orders reviewed and resumed, will cont care

## 2017-02-04 LAB
ANION GAP SERPL CALC-SCNC: 8 MMOL/L
BASOPHILS # BLD AUTO: 0.01 K/UL
BASOPHILS NFR BLD: 0.2 %
BUN SERPL-MCNC: 10 MG/DL
CALCIUM SERPL-MCNC: 7.9 MG/DL
CHLORIDE SERPL-SCNC: 112 MMOL/L
CO2 SERPL-SCNC: 21 MMOL/L
CREAT SERPL-MCNC: 0.9 MG/DL
DIFFERENTIAL METHOD: ABNORMAL
EOSINOPHIL # BLD AUTO: 0 K/UL
EOSINOPHIL NFR BLD: 0.3 %
ERYTHROCYTE [DISTWIDTH] IN BLOOD BY AUTOMATED COUNT: 13.2 %
EST. GFR  (AFRICAN AMERICAN): >60 ML/MIN/1.73 M^2
EST. GFR  (NON AFRICAN AMERICAN): >60 ML/MIN/1.73 M^2
GLUCOSE SERPL-MCNC: 112 MG/DL
HCT VFR BLD AUTO: 25.2 %
HGB BLD-MCNC: 8.6 G/DL
LYMPHOCYTES # BLD AUTO: 1.6 K/UL
LYMPHOCYTES NFR BLD: 24.7 %
MCH RBC QN AUTO: 30.5 PG
MCHC RBC AUTO-ENTMCNC: 34.1 %
MCV RBC AUTO: 89 FL
MONOCYTES # BLD AUTO: 0.9 K/UL
MONOCYTES NFR BLD: 13.8 %
NEUTROPHILS # BLD AUTO: 3.8 K/UL
NEUTROPHILS NFR BLD: 60.7 %
PLATELET # BLD AUTO: 188 K/UL
PMV BLD AUTO: 9.9 FL
POCT GLUCOSE: 110 MG/DL (ref 70–110)
POCT GLUCOSE: 137 MG/DL (ref 70–110)
POCT GLUCOSE: 142 MG/DL (ref 70–110)
POCT GLUCOSE: 143 MG/DL (ref 70–110)
POTASSIUM SERPL-SCNC: 3.1 MMOL/L
RBC # BLD AUTO: 2.82 M/UL
SODIUM SERPL-SCNC: 141 MMOL/L
WBC # BLD AUTO: 6.31 K/UL

## 2017-02-04 PROCEDURE — G8987 SELF CARE CURRENT STATUS: HCPCS | Mod: CH | Performed by: SPECIALIST

## 2017-02-04 PROCEDURE — 25000003 PHARM REV CODE 250: Performed by: EMERGENCY MEDICINE

## 2017-02-04 PROCEDURE — 80048 BASIC METABOLIC PNL TOTAL CA: CPT

## 2017-02-04 PROCEDURE — G8980 MOBILITY D/C STATUS: HCPCS | Mod: CH

## 2017-02-04 PROCEDURE — 11000001 HC ACUTE MED/SURG PRIVATE ROOM

## 2017-02-04 PROCEDURE — G8978 MOBILITY CURRENT STATUS: HCPCS | Mod: CH

## 2017-02-04 PROCEDURE — 97165 OT EVAL LOW COMPLEX 30 MIN: CPT | Performed by: SPECIALIST

## 2017-02-04 PROCEDURE — 97162 PT EVAL MOD COMPLEX 30 MIN: CPT

## 2017-02-04 PROCEDURE — G8988 SELF CARE GOAL STATUS: HCPCS | Mod: CH | Performed by: SPECIALIST

## 2017-02-04 PROCEDURE — G8989 SELF CARE D/C STATUS: HCPCS | Mod: CH | Performed by: SPECIALIST

## 2017-02-04 PROCEDURE — 36415 COLL VENOUS BLD VENIPUNCTURE: CPT

## 2017-02-04 PROCEDURE — 94761 N-INVAS EAR/PLS OXIMETRY MLT: CPT

## 2017-02-04 PROCEDURE — 27000221 HC OXYGEN, UP TO 24 HOURS

## 2017-02-04 PROCEDURE — 25000003 PHARM REV CODE 250: Performed by: INTERNAL MEDICINE

## 2017-02-04 PROCEDURE — 63600175 PHARM REV CODE 636 W HCPCS: Performed by: EMERGENCY MEDICINE

## 2017-02-04 PROCEDURE — G8979 MOBILITY GOAL STATUS: HCPCS | Mod: CH

## 2017-02-04 PROCEDURE — 85025 COMPLETE CBC W/AUTO DIFF WBC: CPT

## 2017-02-04 RX ORDER — LANOLIN ALCOHOL/MO/W.PET/CERES
400 CREAM (GRAM) TOPICAL DAILY
Status: DISCONTINUED | OUTPATIENT
Start: 2017-02-04 | End: 2017-02-05 | Stop reason: HOSPADM

## 2017-02-04 RX ORDER — AZITHROMYCIN 250 MG/1
250 TABLET, FILM COATED ORAL DAILY
Status: DISCONTINUED | OUTPATIENT
Start: 2017-02-04 | End: 2017-02-05 | Stop reason: HOSPADM

## 2017-02-04 RX ORDER — POTASSIUM CHLORIDE 20 MEQ/1
40 TABLET, EXTENDED RELEASE ORAL EVERY 4 HOURS
Status: COMPLETED | OUTPATIENT
Start: 2017-02-04 | End: 2017-02-04

## 2017-02-04 RX ADMIN — OSELTAMIVIR PHOSPHATE 75 MG: 75 CAPSULE ORAL at 08:02

## 2017-02-04 RX ADMIN — PANTOPRAZOLE SODIUM 40 MG: 40 TABLET, DELAYED RELEASE ORAL at 08:02

## 2017-02-04 RX ADMIN — TRAZODONE HYDROCHLORIDE 100 MG: 50 TABLET ORAL at 09:02

## 2017-02-04 RX ADMIN — ASPIRIN 81 MG: 81 TABLET, COATED ORAL at 08:02

## 2017-02-04 RX ADMIN — CLOPIDOGREL 75 MG: 75 TABLET, FILM COATED ORAL at 08:02

## 2017-02-04 RX ADMIN — AZITHROMYCIN 250 MG: 250 TABLET, FILM COATED ORAL at 09:02

## 2017-02-04 RX ADMIN — POTASSIUM CHLORIDE 40 MEQ: 1500 TABLET, EXTENDED RELEASE ORAL at 05:02

## 2017-02-04 RX ADMIN — ALLOPURINOL 100 MG: 100 TABLET ORAL at 09:02

## 2017-02-04 RX ADMIN — SODIUM CHLORIDE: 0.9 INJECTION, SOLUTION INTRAVENOUS at 02:02

## 2017-02-04 RX ADMIN — ENOXAPARIN SODIUM 40 MG: 100 INJECTION SUBCUTANEOUS at 01:02

## 2017-02-04 RX ADMIN — OSELTAMIVIR PHOSPHATE 75 MG: 75 CAPSULE ORAL at 09:02

## 2017-02-04 RX ADMIN — MAGNESIUM OXIDE TAB 400 MG (241.3 MG ELEMENTAL MG) 400 MG: 400 (241.3 MG) TAB at 09:02

## 2017-02-04 RX ADMIN — TAMSULOSIN HYDROCHLORIDE 0.4 MG: 0.4 CAPSULE ORAL at 08:02

## 2017-02-04 RX ADMIN — ALLOPURINOL 100 MG: 100 TABLET ORAL at 08:02

## 2017-02-04 RX ADMIN — POTASSIUM CHLORIDE 40 MEQ: 1500 TABLET, EXTENDED RELEASE ORAL at 09:02

## 2017-02-04 RX ADMIN — ATORVASTATIN CALCIUM 80 MG: 40 TABLET, FILM COATED ORAL at 08:02

## 2017-02-04 RX ADMIN — POTASSIUM CHLORIDE 40 MEQ: 1500 TABLET, EXTENDED RELEASE ORAL at 01:02

## 2017-02-04 NOTE — PLAN OF CARE
[facility]  HOME  HEALTH ORDERS     02/04/2017    Admit to Home Health    Diagnoses:  Active Hospital Problems    Diagnosis  POA    *Sepsis [A41.9]  Yes     Priority: 1 - High    Influenza A [J10.1]  Yes     Priority: 2     SOB (shortness of breath) [R06.02]  Yes     Priority: 2     Malnutrition of moderate degree [E44.0]  Yes    Hypokalemia [E87.6]  Yes    Diabetes mellitus type II, controlled [E11.9]  Yes    Coronary artery disease involving coronary bypass graft of native heart without angina pectoris [I25.810]  Yes    Anemia of chronic disease [D63.8]  Yes    Hyperlipidemia [E78.5]  Yes    Gout, chronic [M1A.9XX0]  Yes    Essential hypertension [I10]  Yes    Diastolic dysfunction [I51.9]  Yes      Resolved Hospital Problems    Diagnosis Date Resolved POA    Hyperglycemia [R73.9] 02/03/2017 Yes       Patient is homebound due to:  Sepsis    Allergies:  Review of patient's allergies indicates:   Allergen Reactions    Penicillins Nausea And Vomiting       Diet: Heart Healthy    Acitivities: As tolerated    Nursing:   SN to complete comprehensive assessment including routine vital signs. Instruct on disease process and s/s of complications to report to MD. Review/verify medication list sent home with the patient at time of discharge  and instruct patient/caregiver as needed. Frequency may be adjusted depending on start of care date.    Notify MD if SBP > 160 or < 90; DBP > 90 or < 50; HR > 120 or < 50; Temp > 101; O2 sat <88%        _________________________________  Melisa Brown MD  02/04/2017

## 2017-02-04 NOTE — PT/OT/SLP EVAL
Physical Therapy  Evaluation/Discharge    Destin Barber   MRN: [id number]   Admitting Diagnosis: Sepsis    PT Received On: 17  PT Start Time: 1149     PT Stop Time: 1210    PT Total Time (min): 21 min       Billable Minutes:  Evaluation 10 and Therapeutic Activity 11    Diagnosis: Sepsis      Past Medical History   Diagnosis Date    Acute coronary syndrome 2016     s/p 3V CABG 2016    Coronary artery disease     Diastolic dysfunction     Gout, chronic     HTN (hypertension)     Hyperlipidemia     Type 2 diabetes mellitus      diet controlled      Past Surgical History   Procedure Laterality Date    Cataract extraction w/ anterior vitrectomy      Hemorrhoid surgery      Coronary artery bypass graft  2016     PAIZ-LAD, SVG-Ramus, SVG-PDA           General Precautions: Standard, fall  Orthopedic Precautions: N/A   Braces: N/A       Do you have any cultural, spiritual, Evangelical conflicts, given your current situation?: none    Patient History:  Lives With: spouse  Living Arrangements: house  Home Accessibility:  (no concerns)  Transportation Available: family or friend will provide, car  Equipment Currently Used at Home: none  DME owned (not currently used): rolling walker    Previous Level of Function:  Ambulation Skills: independent  Transfer Skills: independent  ADL Skills: independent  Work/Leisure Activity: independent    Subjective:  Communicated with nsg prior to session.  Pt agreeable to eval  Chief Complaint: SOB  Patient goals: to go home    Pain Ratin/10               Pain Rating Post-Intervention: 0/10    Objective:   Patient found with: oxygen     Cognitive Exam:  Oriented to: Person, Place, Time and Situation    Follows Commands/attention: Follows one-step commands  Communication: clear/fluent  Safety awareness/insight to disability: impaired    Physical Exam:  Postural examination/scapula alignment: Rounded shoulder and Head forward    Skin integrity: Visible skin  intact  Edema: None noted     Sensation:   Intact  light/touch B LE's    Upper Extremity Range of Motion:  Right Upper Extremity: WFL  Left Upper Extremity: WFL    Upper Extremity Strength:  Right Upper Extremity: WFL  Left Upper Extremity: WFL    Lower Extremity Range of Motion:  Right Lower Extremity: WFL  Left Lower Extremity: WFL    Lower Extremity Strength:  Right Lower Extremity: WFL  Left Lower Extremity: WFL     Fine motor coordination:  Intact    Gross motor coordination: WFL    Functional Mobility:  Bed Mobility:  Rolling/Turning to Left:  (Did not occur)    Transfers:  Sit <> Stand Assistance: Modified Independent  Sit <> Stand Assistive Device: No Assistive Device  Bed <> Chair Assistance: Modified Independent  Bed <> Chair Assistive Device: No Assistive Device  Toilet Transfer Technique: Stand Pivot  Toilet Transfer Assistance: Modified Independent  Toilet Transfer Assistive Device: No Assistive Device    Gait:   Gait Distance: 80'   Assistance 1: Modified Independent  Gait Assistive Device: No device    Stairs:  N/T    Balance:   Static Sit: GOOD+: Takes MAXIMAL challenges from all directions.    Dynamic Sit: GOOD+: Maintains balance through MAXIMAL excursions of active trunk motion  Static Stand: GOOD+: Takes MAXIMAL challenges from all directions  Dynamic stand: GOOD+: Independent gait (with or without assistive device)    Therapeutic Activities and Exercises:  SPO2 on 2L O2 96%, on RA, 92%, on RA with exertion, 86%, on 2LO2 with exertion 92%.  Provided O2 tubing extension and nstructed pt to leave O2 on at all times.  Pt verbalized/demosntrted understanding.  Pt performed toilet t/f and toileting Mod Indep and stood at sink Mod Indep for hand hygiene.     AM-PAC 6 CLICK MOBILITY  How much help from another person does this patient currently need?   1 = Unable, Total/Dependent Assistance  2 = A lot, Maximum/Moderate Assistance  3 = A little, Minimum/Contact Guard/Supervision  4 = None, Modified  [place]/Independent    Turning over in bed (including adjusting bedclothes, sheets and blankets)?: 4  Sitting down on and standing up from a chair with arms (e.g., wheelchair, bedside commode, etc.): 4  Moving from lying on back to sitting on the side of the bed?: 4  Moving to and from a bed to a chair (including a wheelchair)?: 4  Need to walk in hospital room?: 4  Climbing 3-5 steps with a railing?: 4  Total Score: 24     AM-PAC Raw Score CMS G-Code Modifier Level of Impairment Assistance   6 % Total / Unable   7 - 9 CM 80 - 100% Maximal Assist   10 - 14 CL 60 - 80% Moderate Assist   15 - 19 CK 40 - 60% Moderate Assist   20 - 22 CJ 20 - 40% Minimal Assist   23 CI 1-20% SBA / CGA   24 CH 0% Independent/ Mod I     Patient left up in chair with all lines intact, call button in reach and nsg notified.    Assessment:   Destin Barber is a 82 y.o. male with a medical diagnosis of Sepsis and presents with no significant deficits warranting skilled Pt services at this level of care.    Rehab identified problem list/impairments: Rehab identified problem list/impairments: impaired cardiopulmonary response to activity, impaired endurance    Rehab potential is good.    Activity tolerance: Good    Discharge recommendations: Discharge Facility/Level Of Care Needs: home     Barriers to discharge:  none    Equipment recommendations: Equipment Needed After Discharge: none     GOALS:   Physical Therapy Goals        Problem: Physical Therapy Goal    Goal Priority Disciplines Outcome Goal Variances Interventions   Physical Therapy Goal     PT/OT, PT Ongoing (interventions implemented as appropriate)               PLAN:  D/C PT, up ad [place] in room with O2   Patient to be seen  (N/A) to address the above listed problems via  (N/A)  Plan of Care expires:  (N/A)  Plan of Care reviewed with: patient    Functional Assessment Tool Used: AM-PAC  Score: 24  Functional Limitation: Mobility: Walking and moving around  Mobility:  Walking and Moving Around Current Status ():   Mobility: Walking and Moving Around Goal Status ():   Mobility: Walking and Moving Around Discharge Status ():      Evelyn Alvarado, PT  02/04/2017

## 2017-02-04 NOTE — PLAN OF CARE
Problem: Fall Risk (Adult)  Goal: Identify Related Risk Factors and Signs and Symptoms  Related risk factors and signs and symptoms are identified upon initiation of Human Response Clinical Practice Guideline (CPG)   Outcome: Ongoing (interventions implemented as appropriate)  Pt remains free of falls/ injury this shift. Educated on fall risk, instructed to call for asistance if needed.

## 2017-02-04 NOTE — PLAN OF CARE
Problem: Physical Therapy Goal  Goal: Physical Therapy Goal  Outcome: Ongoing (interventions implemented as appropriate)  Pt OK for D/C home from PT standpoint.  No skilled PT or DME needs.  May need O2.

## 2017-02-04 NOTE — SUBJECTIVE & OBJECTIVE
Review of Systems   Constitutional: Positive for fever. Negative for activity change, appetite change and chills.   HENT: Positive for congestion.    Eyes: Negative for discharge.   Respiratory: Positive for cough and shortness of breath. Negative for apnea, choking, chest tightness and stridor.    Cardiovascular: Negative for chest pain, palpitations and leg swelling.   Gastrointestinal: Positive for diarrhea. Negative for abdominal distention and abdominal pain.   Genitourinary: Negative for dysuria.   Musculoskeletal: Negative for arthralgias.   Skin: Negative for color change.   Neurological: Negative for light-headedness.   Psychiatric/Behavioral: Negative for agitation and behavioral problems.     Objective:     Vital Signs (Most Recent):  Temp: 98 °F (36.7 °C) (02/04/17 0715)  Pulse: 77 (02/04/17 0715)  Resp: 17 (02/04/17 0715)  BP: (!) 154/77 (02/04/17 0715)  SpO2: 98 % (02/04/17 0715) Vital Signs (24h Range):  Temp:  [97.9 °F (36.6 °C)-99 °F (37.2 °C)] 98 °F (36.7 °C)  Pulse:  [77-91] 77  Resp:  [17-20] 17  SpO2:  [95 %-98 %] 98 %  BP: (114-154)/(58-80) 154/77     Weight: 65.2 kg (143 lb 11.8 oz)  Body mass index is 23.2 kg/(m^2).    Intake/Output Summary (Last 24 hours) at 02/04/17 0819  Last data filed at 02/04/17 0600   Gross per 24 hour   Intake             1140 ml   Output             1150 ml   Net              -10 ml      Physical Exam   Constitutional: He is oriented to person, place, and time. He appears well-developed and well-nourished. No distress.   HENT:   Head: Normocephalic and atraumatic.   Mouth/Throat: Oropharynx is clear and moist.   Neck: Normal range of motion. Neck supple.   Cardiovascular: Normal rate and regular rhythm.  Exam reveals no gallop and no friction rub.    Pulmonary/Chest: Effort normal and breath sounds normal. No respiratory distress. He has no wheezes. He has no rales.   Abdominal: Soft. Bowel sounds are normal. He exhibits no distension. There is no tenderness. There  is no rebound and no guarding.   Lymphadenopathy:     He has no cervical adenopathy.   Neurological: He is alert and oriented to person, place, and time.   Skin: Skin is warm and dry. No rash noted.   Psychiatric: He has a normal mood and affect. His behavior is normal.   Vitals reviewed.      Significant Labs:   Blood Culture:   Recent Labs  Lab 02/02/17 1715 02/02/17  1730   LABBLOO No growth to date  No Growth to date No growth to date  No Growth to date     CBC:   Recent Labs  Lab 02/02/17 1715 02/03/17 0427 02/04/17  0512   WBC 7.09 7.90 6.31   HGB 10.0* 9.1* 8.6*   HCT 28.3* 26.4* 25.2*   * 130* 188     CMP:   Recent Labs  Lab 02/02/17 1715 02/03/17 0427 02/04/17  0512   * 137 141   K 2.9* 3.3* 3.1*    108 112*   CO2 20* 20* 21*   * 129* 112*   BUN 14 13 10   CREATININE 1.1 1.0 0.9   CALCIUM 8.3* 7.8* 7.9*   PROT 6.4  --   --    ALBUMIN 2.9*  --   --    BILITOT 1.0  --   --    ALKPHOS 77  --   --    AST 23  --   --    ALT 12  --   --    ANIONGAP 8 9 8   EGFRNONAA >60 >60 >60     Lactic Acid:   Recent Labs  Lab 02/02/17  1715   LACTATE 1.1     POCT Glucose:   Recent Labs  Lab 02/03/17  1711 02/03/17  2059 02/04/17  0718   POCTGLUCOSE 112* 149* 110       Significant Imaging: CXR reviewed

## 2017-02-04 NOTE — CONSULTS
Consult Note  Infectious Disease    Consult Requested By: Melisa Brown MD    Reason for Consult: influenza a     SUBJECTIVE:     History of Present Illness:  Patient is a 82 y.o. male presents with fever , cold and a cough. He never gets the flu shot. He and his 51 year old wife fell illl and visited the er 1/30/17. Apparently he was discharged with tamiflu after a diagnosis of influenza a was made. i am unclear as to whether he took all his pills. He returned because he was no better. He was febrile on admit and is now better. cxr shows bilateral pvc but no focal infiltrate. Wbc is normal.    Past Medical History   Diagnosis Date    Acute coronary syndrome 06/24/2016     s/p 3V CABG 7/2016    Coronary artery disease     Diastolic dysfunction     Gout, chronic     HTN (hypertension)     Hyperlipidemia     Type 2 diabetes mellitus      diet controlled     Past Surgical History   Procedure Laterality Date    Cataract extraction w/ anterior vitrectomy      Hemorrhoid surgery      Coronary artery bypass graft  07/06/2016     PAIZ-LAD, SVG-Ramus, SVG-PDA     Family History   Problem Relation Age of Onset    Cancer Father      colon    Hypertension Mother     Heart disease Mother     Heart disease Sister      Social History   Substance Use Topics    Smoking status: Former Smoker     Types: Cigarettes     Quit date: 6/23/1985    Smokeless tobacco: None    Alcohol use No       Review of patient's allergies indicates:   Allergen Reactions    Penicillins Nausea And Vomiting        Antibiotics     Start     Stop Route Frequency Ordered    02/02/17 1730  cefTRIAXone (ROCEPHIN) 1 g in dextrose 5 % 50 mL IVPB      -- IV Every 24 hours (non-standard times) 02/02/17 1727    02/04/17 0930  azithromycin tablet 250 mg      02/08 0859 Oral Daily 02/04/17 0825          Review of Systems:  Constitutional: positive for fatigue and fevers  Eyes: no visual changes  ENT: positive for nasal congestion  Respiratory:  positive for cough  Cardiovascular: no chest pain or palpitations  Gastrointestinal: no nausea or vomiting, no abdominal pain or change in bowel habits  Genitourinary: no hematuria or dysuria  Hematologic/Lymphatic: no easy bruising or lymphadenopathy  Musculoskeletal: no arthralgias or myalgias    OBJECTIVE:     Vital Signs (Most Recent)  Temp: 98 °F (36.7 °C) (02/04/17 0715)  Pulse: 77 (02/04/17 0715)  Resp: 17 (02/04/17 0715)  BP: (!) 154/77 (02/04/17 0715)  SpO2: 98 % (02/04/17 0715)    Temperature Range Min/Max (Last 24H):  Temp:  [97.9 °F (36.6 °C)-99 °F (37.2 °C)]     Physical Exam:  General: well developed, well nourished  HENT: Head:normocephalic, atraumatic. Ears:not examined. Nose: Nares normal. Septum midline. Mucosa normal. No drainage or sinus tenderness., no discharge. Throat: lips, mucosa, and tongue normal; teeth and gums normal and no throat erythema.  Eyes: conjunctivae/corneas clear. PERRL.   Neck: supple, symmetrical, trachea midline, no JVD and thyroid not enlarged, symmetric, no tenderness/mass/nodules  Lungs:  clear to auscultation bilaterally and normal respiratory effort  Cardiovascular: Heart: regular rate and rhythm, S1, S2 normal, no murmur, click, rub or gallop. Chest Wall: no tenderness. Extremities: no cyanosis or edema, or clubbing. Pulses: 2+ and symmetric.  Abdomen/Rectal: Abdomen: soft, non-tender non-distented; bowel sounds normal; no masses,  no organomegaly. Rectal: not examined  Skin: Skin color, texture, turgor normal. No rashes or lesions  Musculoskeletal:no clubbing, cyanosis    Laboratory:  CBC    Recent Labs  Lab 02/04/17 0512   WBC 6.31   RBC 2.82*   HGB 8.6*   HCT 25.2*        BMP    Recent Labs  Lab 02/04/17 0512   CO2 21*   BUN 10   CREATININE 0.9   CALCIUM 7.9*     No results for input(s): COLORU, CLARITYU, SPECGRAV, PHUR, PROTEINUA, GLUCOSEU, BILIRUBINCON, BLOODU, WBCU, RBCU, BACTERIA, MUCUS, NITRITE, LEUKOCYTESUR, UROBILINOGEN, HYALINECASTS in the last 168  hours.  Microbiology Results (last 7 days)     Procedure Component Value Units Date/Time    Blood Culture #2 **CANNOT BE ORDERED STAT** [[id number]] Collected:  02/02/17 1730    Order Status:  Completed Specimen:  Blood from Peripheral, Antecubital, Right Updated:  02/03/17 1903     Blood Culture, Routine No growth to date     Blood Culture, Routine No Growth to date    Blood Culture #1 **CANNOT BE ORDERED STAT** [[id number]] Collected:  02/02/17 1715    Order Status:  Completed Specimen:  Blood from Peripheral, Antecubital, Right Updated:  02/03/17 1903     Blood Culture, Routine No growth to date     Blood Culture, Routine No Growth to date          Diagnostic Results:  Labs: Reviewed  X-Ray: Reviewed    ASSESSMENT/PLAN:     Active Hospital Problems    Diagnosis  POA    *Sepsis [A41.9]  Yes    Malnutrition of moderate degree [E44.0]  Yes    Hypokalemia [E87.6]  Yes    Diabetes mellitus type II, controlled [E11.9]  Yes    Influenza A [J10.1]  Yes    SOB (shortness of breath) [R06.02]  Yes    Coronary artery disease involving coronary bypass graft of native heart without angina pectoris [I25.810]  Yes    Anemia of chronic disease [D63.8]  Yes    Hyperlipidemia [E78.5]  Yes    Gout, chronic [M1A.9XX0]  Yes    Essential hypertension [I10]  Yes    Diastolic dysfunction [I51.9]  Yes      Resolved Hospital Problems    Diagnosis Date Resolved POA    Hyperglycemia [R73.9] 02/03/2017 Yes       1.influenza a  tamiflu to finish 5 day course  Empirical z pack  ? Dc home

## 2017-02-04 NOTE — PLAN OF CARE
02/03/17 1802   Discharge Assessment   Assessment Type Discharge Planning Assessment   Confirmed/corrected address and phone number on facesheet? Yes   Assessment information obtained from? Patient   Prior to hospitilization cognitive status: Alert/Oriented   Prior to hospitalization functional status: Independent   Current cognitive status: Alert/Oriented   Current Functional Status: Independent   Arrived From admitted as an inpatient;home or self-care   Lives With spouse   Able to Return to Prior Arrangements yes   Is patient able to care for self after discharge? Unable to determine at this time (comments)   How many people do you have in your home that can help with your care after discharge? 1   Who are your caregiver(s) and their phone number(s)? Spouse   Patient's perception of discharge disposition admitted as an inpatient;home or selfcare   Readmission Within The Last 30 Days no previous admission in last 30 days   Patient currently being followed by outpatient case management? No   Patient currently receives home health services? No   Does the patient currently use HME? No   Patient currently receives private duty nursing? No   Patient currently receives any other outside agency services? No   Equipment Currently Used at Home none   Do you have any problems affording any of your prescribed medications? No   Is the patient taking medications as prescribed? yes   Do you have any financial concerns preventing you from receiving the healthcare you need? No   Does the patient have transportation to healthcare appointments? Yes   Transportation Available car;family or friend will provide   On Dialysis? No   Does the patient receive services at the [facility]? No   Are there any open cases? No   Discharge Plan A Home with family   Patient/Family In Agreement With Plan yes

## 2017-02-04 NOTE — PLAN OF CARE
Problem: Patient Care Overview  Goal: Plan of Care Review  Outcome: Ongoing (interventions implemented as appropriate)  ID following pt  r/t influenza A & lung infection, bg monitoring and SSI prn, IVF & IV abx per order, able to address needs, contact precautions maintained, ambulates per self, safety maintained, bed low locked and in position, will cont plan of care

## 2017-02-04 NOTE — PLAN OF CARE
Problem: Sepsis/Septic Shock (Adult)  Goal: Signs and Symptoms of Listed Potential Problems Will be Absent, Minimized or Managed (Sepsis/Septic Shock)  Signs and symptoms of listed potential problems will be absent, minimized or managed by discharge/transition of care (reference Sepsis/Septic Shock (Adult) CPG).   Outcome: Ongoing (interventions implemented as appropriate)  Pt improving, IV abx administered per orders. Will cont to monitor.

## 2017-02-04 NOTE — PLAN OF CARE
Problem: Occupational Therapy Goal  Goal: Occupational Therapy Goal  Outcome: Outcome(s) achieved Date Met:  02/04/17  OT evaluation complete.  Pt independent with ADLs.  No skilled OT services recommended.

## 2017-02-04 NOTE — ASSESSMENT & PLAN NOTE
From influenza A and possible secondary bacterial infection.  Resolved now. Continue anti-viral and Rocephin and Zithro for now. Blood cultures NGTD. ID consulted. Clinically very stable.

## 2017-02-04 NOTE — ASSESSMENT & PLAN NOTE
Very minimal. He is resting comfortably in his room on RA.  May have viral/bacterial lung infection.  No obvious pneumonia on CXR.

## 2017-02-04 NOTE — PT/OT/SLP EVAL
Occupational Therapy  Evaluation    Destin Barber   MRN: [id number]   Admitting Diagnosis: Sepsis    OT Date of Treatment: 17   OT Start Time: 1604  OT Stop Time: 1612  OT Total Time (min): 8 min    Billable Minutes:  Evaluation 8    Diagnosis: Sepsis       Past Medical History   Diagnosis Date    Acute coronary syndrome 2016     s/p 3V CABG 2016    Coronary artery disease     Diastolic dysfunction     Gout, chronic     HTN (hypertension)     Hyperlipidemia     Type 2 diabetes mellitus      diet controlled      Past Surgical History   Procedure Laterality Date    Cataract extraction w/ anterior vitrectomy      Hemorrhoid surgery      Coronary artery bypass graft  2016     PAIZ-LAD, SVG-Ramus, SVG-PDA       Referring physician: Gayatrix  Date referred to OT: 17    General Precautions: Standard,    Orthopedic Precautions: N/A  Braces: N/A          Patient History:  Living Environment  Lives With: spouse  Living Arrangements: house  Home Accessibility:  (no concerns)  Equipment Currently Used at Home: none    Prior level of function:   Bed Mobility/Transfers: independent  Grooming: independent  Bathing: independent  Upper Body Dressing: independent  Lower Body Dressing: independent  Toileting: independent  Home Management Skills: independent        Dominant hand: right    Subjective:  Communicated with RN prior to session.    Chief Complaint: SOB  Patient/Family stated goals: To go home    Pain Ratin/10              Pain Rating Post-Intervention: 0/10    Objective:  Patient found with: oxygen    Cognitive Exam:  Oriented to: Person, Place, Time and Situation  Follows Commands/attention: Follows multistep  commands  Communication: clear/fluent  Memory:  No Deficits noted  Safety awareness/insight to disability: intact  Coping skills/emotional control: Appropriate to situation    Visual/perceptual:  Intact    Physical Exam:  Postural examination/scapula alignment: No postural  abnormalities identified      Sensation:   Intact    Upper Extremity Range of Motion:  Right Upper Extremity: WFL  Left Upper Extremity: WFL    Upper Extremity Strength:  Right Upper Extremity: WFL  Left Upper Extremity: WFL      Fine motor coordination:   Intact    Gross motor coordination: WFL    Functional Mobility:  Bed Mobility:  Supine to Sit: Independent  Sit to Supine: Independent    Transfers:  Sit <> Stand Assistance: Independent  Sit <> Stand Assistive Device: No Assistive Device  Toilet Transfer Assistance: Independent  Toilet Transfer Assistive Device: No Assistive Device    Functional Ambulation: Pt ambulated bed > toilet > sink > bed independently.     Activities of Daily Living:  Feeding Level of Assistance: Activity did not occur    UE Dressing Level of Assistance: Independent    LE Dressing Level of Assistance: Independent    Grooming Position: Standing at sink  Grooming Level of Assistance: Independent  Toileting Where Assessed: Toilet  Toileting Level of Assistance: Independent     Bathing Level of Assistance: Activity did not occur      Balance:   Static Sit: GOOD+: Takes MAXIMAL challenges from all directions.    Dynamic Sit: GOOD+: Maintains balance through MAXIMAL excursions of active trunk motion  Static Stand: GOOD+: Takes MAXIMAL challenges from all directions  Dynamic stand: GOOD+: Independent gait (with or without assistive device)        AM-PAC 6 CLICK ADL  How much help from another person does this patient currently need?  1 = Unable, Total/Dependent Assistance  2 = A lot, Maximum/Moderate Assistance  3 = A little, Minimum/Contact Guard/Supervision  4 = None, Modified Jewell/Independent    Putting on and taking off regular lower body clothing? : 4  Bathing (including washing, rinsing, drying)?: 4  Toileting, which includes using toilet, bedpan, or urinal? : 4  Putting on and taking off regular upper body clothing?: 4  Taking care of personal grooming such as brushing teeth?:  "4  Eating meals?: 4  Total Score: 24    AM-PAC Raw Score CMS "G-Code Modifier Level of Impairment Assistance   6 % Total / Unable   7 - 9 CM 80 - 100% Maximal Assist   10 - 14 CL 60 - 80% Moderate Assist   15 - 19 CK 40 - 60% Moderate Assist   20 - 22 CJ 20 - 40% Minimal Assist   23 CI 1-20% SBA / CGA   24 CH 0% Independent/ Mod I       Patient left sitting EOB with all lines intact, call button in reach and RN (Alexandra) notified    Assessment:  Destin Barber is a 82 y.o. male with a medical diagnosis of Sepsis and presents with mild endurance deficits.  Pt is independent with ADLs.  No skilled OT services recommended.    Rehab identified problem list/impairments: Rehab identified problem list/impairments: impaired endurance, impaired cardiopulmonary response to activity      Discharge recommendations: Discharge Facility/Level Of Care Needs: home     Barriers to discharge: Barriers to Discharge: None    Equipment recommendations: none     GOALS:   Occupational Therapy Goals     Not on file          PLAN:  No skilled OT services recommended.   Plan of Care reviewed with: patient    OT G-codes  Functional Assessment Tool Used: AM-PAC  Score: 24  Functional Limitation: Self care  Self Care Current Status ():   Self Care Goal Status ():   Self Care Discharge Status (): KATHERINE Saavedra OT  02/04/2017  "

## 2017-02-04 NOTE — PROGRESS NOTES
[facility] Hospital Medicine  Progress Note    Patient Name: Destin Barber  MRN: [id number]  Patient Class: IP- Inpatient   Admission Date: 2/2/2017  Length of Stay: 2 days  Attending Physician: Melisa Brown MD  Primary Care Provider: Piter Bernardo MD        Subjective:     Principal Problem:Sepsis    HPI:  Mr. Barber is an 83 yo man with CAD s/p CABG 7/7/16 (LIMA-LAD, SVG-Ramus, SVG-PDA), diet-controlled DM, HTN, HLD, and gout who presented 2/2/2017 for fever and the flu. He was diagnosed with influenza A on 1/30 by his PCP but felt stable for treatment at home. He continued to deteriorate and thus presented to the ER. He was admitted to the hospital with sepsis and concerns of secondary bacterial lung infection and placed on an anti-viral, Rocephin, and Zithromax.     Hospital Course:  Improved on antibiotics. CXR with increased attenuation of the pulmonary parenchyma (possible PNA?). BCx NGTD.    Review of Systems   Constitutional: Positive for fever. Negative for activity change, appetite change and chills.   HENT: Positive for congestion.    Eyes: Negative for discharge.   Respiratory: Positive for cough and shortness of breath. Negative for apnea, choking, chest tightness and stridor.    Cardiovascular: Negative for chest pain, palpitations and leg swelling.   Gastrointestinal: Positive for diarrhea. Negative for abdominal distention and abdominal pain.   Genitourinary: Negative for dysuria.   Musculoskeletal: Negative for arthralgias.   Skin: Negative for color change.   Neurological: Negative for light-headedness.   Psychiatric/Behavioral: Negative for agitation and behavioral problems.     Objective:     Vital Signs (Most Recent):  Temp: 98 °F (36.7 °C) (02/04/17 0715)  Pulse: 77 (02/04/17 0715)  Resp: 17 (02/04/17 0715)  BP: (!) 154/77 (02/04/17 0715)  SpO2: 98 % (02/04/17 0715) Vital Signs (24h Range):  Temp:  [97.9 °F (36.6 °C)-99 °F (37.2 °C)] 98 °F (36.7 °C)  Pulse:  [77-91]  77  Resp:  [17-20] 17  SpO2:  [95 %-98 %] 98 %  BP: (114-154)/(58-80) 154/77     Weight: 65.2 kg (143 lb 11.8 oz)  Body mass index is 23.2 kg/(m^2).    Intake/Output Summary (Last 24 hours) at 02/04/17 0819  Last data filed at 02/04/17 0600   Gross per 24 hour   Intake             1140 ml   Output             1150 ml   Net              -10 ml      Physical Exam   Constitutional: He is oriented to person, place, and time. He appears well-developed and well-nourished. No distress.   HENT:   Head: Normocephalic and atraumatic.   Mouth/Throat: Oropharynx is clear and moist.   Neck: Normal range of motion. Neck supple.   Cardiovascular: Normal rate and regular rhythm.  Exam reveals no gallop and no friction rub.    Pulmonary/Chest: Effort normal and breath sounds normal. No respiratory distress. He has no wheezes. He has no rales.   Abdominal: Soft. Bowel sounds are normal. He exhibits no distension. There is no tenderness. There is no rebound and no guarding.   Lymphadenopathy:     He has no cervical adenopathy.   Neurological: He is alert and oriented to person, place, and time.   Skin: Skin is warm and dry. No rash noted.   Psychiatric: He has a normal mood and affect. His behavior is normal.   Vitals reviewed.      Significant Labs:   Blood Culture:   Recent Labs  Lab 02/02/17 1715 02/02/17  1730   LABBLOO No growth to date  No Growth to date No growth to date  No Growth to date     CBC:   Recent Labs  Lab 02/02/17 1715 02/03/17 0427 02/04/17  0512   WBC 7.09 7.90 6.31   HGB 10.0* 9.1* 8.6*   HCT 28.3* 26.4* 25.2*   * 130* 188     CMP:   Recent Labs  Lab 02/02/17 1715 02/03/17  0427 02/04/17  0512   * 137 141   K 2.9* 3.3* 3.1*    108 112*   CO2 20* 20* 21*   * 129* 112*   BUN 14 13 10   CREATININE 1.1 1.0 0.9   CALCIUM 8.3* 7.8* 7.9*   PROT 6.4  --   --    ALBUMIN 2.9*  --   --    BILITOT 1.0  --   --    ALKPHOS 77  --   --    AST 23  --   --    ALT 12  --   --    ANIONGAP 8 9 8    EGFRNONAA >60 >60 >60     Lactic Acid:   Recent Labs  Lab 02/02/17  1715   LACTATE 1.1     POCT Glucose:   Recent Labs  Lab 02/03/17  1711 02/03/17  2059 02/04/17  0718   POCTGLUCOSE 112* 149* 110       Significant Imaging: CXR reviewed    Assessment/Plan:      * Sepsis  From influenza A and possible secondary bacterial infection.  Resolved now. Continue anti-viral and Rocephin and Zithro for now. Blood cultures NGTD. ID consulted. Clinically very stable.     Influenza A  On Anti-viral.  ID consulted.    SOB (shortness of breath)  Very minimal. He is resting comfortably in his room on RA.  May have viral/bacterial lung infection.  No obvious pneumonia on CXR.     Essential hypertension  Stable. Controlled on home meds.    Gout, chronic  Continue home meds.     Hyperlipidemia  Continue home meds.     Diastolic dysfunction  Stable. No acute issues.     Anemia of chronic disease  Stable     Coronary artery disease involving coronary bypass graft of native heart without angina pectoris  Stable. No acute issues.     Malnutrition of moderate degree  Glucerna with meals    Hypokalemia  Replete and monitor    Diabetes mellitus type II, controlled  Well controlled. No other noted manifestations     VTE Risk Mitigation         Ordered     enoxaparin injection 40 mg  Daily     Route:  Subcutaneous        02/02/17 2026     Medium Risk of VTE  Once      02/02/17 2026     Place sequential compression device  Until discontinued      02/02/17 2026     Place IGOR hose  Until discontinued      02/02/17 2026          Melisa Brown MD  Department of Hospital Medicine   [facility]

## 2017-02-05 VITALS
SYSTOLIC BLOOD PRESSURE: 150 MMHG | HEIGHT: 66 IN | DIASTOLIC BLOOD PRESSURE: 82 MMHG | WEIGHT: 143.75 LBS | BODY MASS INDEX: 23.1 KG/M2 | OXYGEN SATURATION: 94 % | RESPIRATION RATE: 18 BRPM | TEMPERATURE: 99 F | HEART RATE: 68 BPM

## 2017-02-05 PROBLEM — R06.02 SOB (SHORTNESS OF BREATH): Status: RESOLVED | Noted: 2017-02-03 | Resolved: 2017-02-05

## 2017-02-05 PROBLEM — A41.9 SEPSIS: Status: RESOLVED | Noted: 2017-02-02 | Resolved: 2017-02-05

## 2017-02-05 LAB
ANION GAP SERPL CALC-SCNC: 8 MMOL/L
BASOPHILS # BLD AUTO: 0.01 K/UL
BASOPHILS NFR BLD: 0.2 %
BUN SERPL-MCNC: 12 MG/DL
CALCIUM SERPL-MCNC: 8.3 MG/DL
CHLORIDE SERPL-SCNC: 111 MMOL/L
CO2 SERPL-SCNC: 21 MMOL/L
CREAT SERPL-MCNC: 0.9 MG/DL
DIFFERENTIAL METHOD: ABNORMAL
EOSINOPHIL # BLD AUTO: 0.1 K/UL
EOSINOPHIL NFR BLD: 0.9 %
ERYTHROCYTE [DISTWIDTH] IN BLOOD BY AUTOMATED COUNT: 13.6 %
EST. GFR  (AFRICAN AMERICAN): >60 ML/MIN/1.73 M^2
EST. GFR  (NON AFRICAN AMERICAN): >60 ML/MIN/1.73 M^2
GLUCOSE SERPL-MCNC: 110 MG/DL
HCT VFR BLD AUTO: 26.9 %
HGB BLD-MCNC: 9.2 G/DL
LYMPHOCYTES # BLD AUTO: 1.8 K/UL
LYMPHOCYTES NFR BLD: 27.8 %
MAGNESIUM SERPL-MCNC: 1.9 MG/DL
MCH RBC QN AUTO: 30.7 PG
MCHC RBC AUTO-ENTMCNC: 34.2 %
MCV RBC AUTO: 90 FL
MONOCYTES # BLD AUTO: 1 K/UL
MONOCYTES NFR BLD: 15 %
NEUTROPHILS # BLD AUTO: 3.6 K/UL
NEUTROPHILS NFR BLD: 56.1 %
PLATELET # BLD AUTO: 211 K/UL
PMV BLD AUTO: 9.4 FL
POCT GLUCOSE: 119 MG/DL (ref 70–110)
POTASSIUM SERPL-SCNC: 3.9 MMOL/L
RBC # BLD AUTO: 3 M/UL
SODIUM SERPL-SCNC: 140 MMOL/L
WBC # BLD AUTO: 6.4 K/UL

## 2017-02-05 PROCEDURE — 36415 COLL VENOUS BLD VENIPUNCTURE: CPT

## 2017-02-05 PROCEDURE — 25000003 PHARM REV CODE 250: Performed by: INTERNAL MEDICINE

## 2017-02-05 PROCEDURE — 85025 COMPLETE CBC W/AUTO DIFF WBC: CPT

## 2017-02-05 PROCEDURE — 25000003 PHARM REV CODE 250: Performed by: EMERGENCY MEDICINE

## 2017-02-05 PROCEDURE — 83735 ASSAY OF MAGNESIUM: CPT

## 2017-02-05 PROCEDURE — 80048 BASIC METABOLIC PNL TOTAL CA: CPT

## 2017-02-05 RX ORDER — AZITHROMYCIN 250 MG/1
250 TABLET, FILM COATED ORAL DAILY
Qty: 3 TABLET | Refills: 0 | Status: SHIPPED | OUTPATIENT
Start: 2017-02-05 | End: 2017-02-08

## 2017-02-05 RX ADMIN — CLOPIDOGREL 75 MG: 75 TABLET, FILM COATED ORAL at 08:02

## 2017-02-05 RX ADMIN — PANTOPRAZOLE SODIUM 40 MG: 40 TABLET, DELAYED RELEASE ORAL at 08:02

## 2017-02-05 RX ADMIN — OSELTAMIVIR PHOSPHATE 75 MG: 75 CAPSULE ORAL at 08:02

## 2017-02-05 RX ADMIN — TAMSULOSIN HYDROCHLORIDE 0.4 MG: 0.4 CAPSULE ORAL at 08:02

## 2017-02-05 RX ADMIN — ASPIRIN 81 MG: 81 TABLET, COATED ORAL at 08:02

## 2017-02-05 RX ADMIN — AZITHROMYCIN 250 MG: 250 TABLET, FILM COATED ORAL at 08:02

## 2017-02-05 RX ADMIN — MAGNESIUM OXIDE TAB 400 MG (241.3 MG ELEMENTAL MG) 400 MG: 400 (241.3 MG) TAB at 08:02

## 2017-02-05 RX ADMIN — ALLOPURINOL 100 MG: 100 TABLET ORAL at 08:02

## 2017-02-05 RX ADMIN — ATORVASTATIN CALCIUM 80 MG: 40 TABLET, FILM COATED ORAL at 08:02

## 2017-02-05 NOTE — PLAN OF CARE
Problem: Patient Care Overview  Goal: Plan of Care Review  Outcome: Ongoing (interventions implemented as appropriate)  Pt remains free of falls and trauma, ambulates and turns in bed independently. Pt has no c/o pain overnight. Pt remains afebrile. Contact and droplet precautions maintained. , no sliding scale coverage required. Pt on 2L NC with o2 sats >95%. Pt sleeping well overnight.

## 2017-02-05 NOTE — PROGRESS NOTES
Pt stating he does not need HH and that he is ready to be discharged, spoke w/Dr Brown and she stated pt clear to discharge w/o HH set after pt refused

## 2017-02-05 NOTE — DISCHARGE SUMMARY
[facility] Hospital Medicine  Discharge Summary      Patient Name: Destin Barber  MRN: [id number]  Admission Date: 2/2/2017  Hospital Length of Stay: 3 days  Discharge Date and Time: No discharge date for patient encounter.  Attending Physician: Melisa Brown MD   Discharging Provider: Melisa Brown MD  Primary Care Provider: Piter Bernardo MD      HPI:   Mr. Barber is an 81 yo man with CAD s/p CABG 7/7/16 (LIMA-LAD, SVG-Ramus, SVG-PDA), diet-controlled DM, HTN, HLD, and gout who presented 2/2/2017 for fever and the flu. He was diagnosed with influenza A on 1/30 by his PCP but felt stable for treatment at home. He continued to deteriorate and thus presented to the ER. He was admitted to the hospital with sepsis and concerns of secondary bacterial lung infection and placed on an anti-viral, Rocephin, and Zithromax.     Hospital Course:   Improved on antibiotics.  CXR with increased attenuation of the pulmonary parenchyma (possible PNA?).  BCx NGTD. De-escalated to Zpack to complete after discharge.  Complete course of Tamiflu on 2/4/17. PT/OT evaluated and not requiring home.  Planned to discharg with  skilled nurse but he refused.     Consults:   Consults         Status Ordering Provider     Inpatient consult to Infectious Diseases  Once     Provider:  Zoya Infante MD    Completed KAYDEN OSEI        Final Active Diagnoses:    Diagnosis Date Noted POA    Influenza A [J10.1] 02/03/2017 Yes    Malnutrition of moderate degree [E44.0] 02/03/2017 Yes    Hypokalemia [E87.6] 02/03/2017 Yes    Diabetes mellitus type II, controlled [E11.9] 02/03/2017 Yes    Coronary artery disease involving coronary bypass graft of native heart without angina pectoris [I25.810]  Yes    Anemia of chronic disease [D63.8] 06/23/2016 Yes    Hyperlipidemia [E78.5]  Yes    Gout, chronic [M1A.9XX0]  Yes    Essential hypertension [I10]  Yes    Diastolic dysfunction [I51.9]  Yes      Problems Resolved  During this Admission:    Diagnosis Date Noted Date Resolved POA    PRINCIPAL PROBLEM:  Sepsis [A41.9] 02/02/2017 02/05/2017 Yes    SOB (shortness of breath) [R06.02] 02/03/2017 02/05/2017 Yes    Hyperglycemia [R73.9] 07/07/2016 02/03/2017 Yes      Discharged Condition: stable    Disposition: Home-Health Care [facility]    Follow Up:  Follow-up Information     Follow up with Piter Bernardo MD In 3 days.    Specialty:  Internal Medicine    Contact information:    [address] [place] [address] [id number] [phone]          Patient Instructions:     Diet Cardiac     Activity as tolerated     Call MD for:  temperature >100.4     Call MD for:  persistent nausea and vomiting or diarrhea     Call MD for:  redness, tenderness, or signs of infection (pain, swelling, redness, odor or green/yellow discharge around incision site)     Call MD for:  severe uncontrolled pain     Call MD for:  difficulty breathing or increased cough     Call MD for:  severe persistent headache     Call MD for:  worsening rash     Call MD for:  persistent dizziness, light-headedness, or visual disturbances     Call MD for:  increased confusion or weakness       Medications:  Reconciled Home Medications:   Current Discharge Medication List      START taking these medications    Details   azithromycin (Z-CISCO) 250 MG tablet Take 1 tablet (250 mg total) by mouth once daily.  Qty: 3 tablet, Refills: 0         CONTINUE these medications which have NOT CHANGED    Details   allopurinol (ZYLOPRIM) 100 MG tablet Take 1 tablet (100 mg total) by mouth 2 (two) times daily.  Qty: 90 tablet, Refills: 3    Associated Diagnoses: Idiopathic chronic gout of right foot without tophus      amlodipine (NORVASC) 10 MG tablet Take 1 tablet (10 mg total) by mouth once daily.  Qty: 90 tablet, Refills: 3    Associated Diagnoses: Essential hypertension      ASPIR-LOW 81 mg EC tablet TAKE 1 TABLET BY MOUTH DAILY  Qty: 30 tablet, Refills: 5      atorvastatin (LIPITOR) 80  MG tablet Take 1 tablet (80 mg total) by mouth once daily.  Qty: 30 tablet, Refills: 5    Associated Diagnoses: Coronary artery disease involving native coronary artery of native heart without angina pectoris      clopidogrel (PLAVIX) 75 mg tablet Take 1 tablet (75 mg total) by mouth once daily.  Qty: 30 tablet, Refills: 5    Associated Diagnoses: Coronary artery disease involving native coronary artery of native heart without angina pectoris      diclofenac (VOLTAREN) 50 MG EC tablet One tab twice per day for three days then once per day until pain has resolved  Qty: 30 tablet, Refills: 2    Associated Diagnoses: Acute gout of right foot, unspecified cause      docusate sodium (COLACE) 100 MG capsule Take 1 capsule (100 mg total) by mouth 2 (two) times daily as needed for Constipation.  Refills: 0      metformin (GLUCOPHAGE-XR) 500 MG 24 hr tablet Take 1 tablet (500 mg total) by mouth daily with breakfast.  Qty: 30 tablet, Refills: 5    Associated Diagnoses: Type 2 diabetes mellitus with diabetic polyneuropathy      metoprolol tartrate (LOPRESSOR) 25 MG tablet Take 1 tablet (25 mg total) by mouth 2 (two) times daily.  Qty: 60 tablet, Refills: 5    Associated Diagnoses: Coronary artery disease involving native coronary artery of native heart without angina pectoris; Essential hypertension      pantoprazole (PROTONIX) 40 MG tablet Take 1 tablet (40 mg total) by mouth once daily.  Qty: 30 tablet, Refills: 5    Associated Diagnoses: Gastroesophageal reflux disease without esophagitis      tamsulosin (FLOMAX) 0.4 mg Cp24 Take 1 capsule (0.4 mg total) by mouth once daily.  Qty: 30 capsule, Refills: 5      trazodone (DESYREL) 100 MG tablet Take 1 tablet (100 mg total) by mouth every evening.  Qty: 30 tablet, Refills: 5    Associated Diagnoses: Other insomnia         STOP taking these medications       blood sugar diagnostic (TRUE METRIX GLUCOSE TEST STRIP) Strp Comments:   Reason for Stopping:         blood-glucose meter  (TRUE METRIX GLUCOSE METER) Misc Comments:   Reason for Stopping:         lancets Misc Comments:   Reason for Stopping:         oseltamivir (TAMIFLU) 75 MG capsule Comments:   Reason for Stopping:         TRUEPLUS LANCETS 30 gauge Misc Comments:   Reason for Stopping:             Time spent on the discharge of patient: 50 minutes    Melisa Brown MD  Department of Hospital Medicine  [facility]

## 2017-02-05 NOTE — PROGRESS NOTES
Pt IV d/c w/tip intact 2x2 & tape applied, pt given discharge f/u & prescription info, pt verbalized understanding and all questions addressed, pt getting dressed and awaiting ride home

## 2017-02-05 NOTE — PLAN OF CARE
02/05/17 1125   Final Note   Assessment Type Final Discharge Note   Discharge planning education complete? Yes   What phone number can be called within the next 1-3 days to see how you are doing after discharge? ([phone])   Hospital Follow Up  Appt(s) scheduled? No  (Unable to schedule, Sunday.  patient instructed to call for appointment.)   Discharge plans and expectations educations in teach back method with documentation complete? Yes   Offered [facility] -- Bedside Delivery? n/a   Discharge/Hospital Encounter Summary to ([facility]) PCP n/a   Referral to Outpatient Case Management complete? n/a   Referral to / orders for Home Health Complete? n/a   30 day supply of medicines given at discharge, if documented non-compliance / non-adherence? n/a   Any social issues identified prior to discharge? n/a   Did you assess the readiness or willingness of the family or caregiver to support self management of care? Yes   Right Care Referral Info   Post Acute Recommendation No Care   Taylor CROWE notified that all CM needs are met.

## 2017-02-07 ENCOUNTER — TELEPHONE (OUTPATIENT)
Dept: FAMILY MEDICINE | Facility: CLINIC | Age: 82
End: 2017-02-07

## 2017-02-07 LAB
BACTERIA BLD CULT: NORMAL
BACTERIA BLD CULT: NORMAL

## 2017-02-09 ENCOUNTER — OFFICE VISIT (OUTPATIENT)
Dept: FAMILY MEDICINE | Facility: CLINIC | Age: 82
End: 2017-02-09
Payer: MEDICARE

## 2017-02-09 VITALS
HEIGHT: 66 IN | BODY MASS INDEX: 23.49 KG/M2 | RESPIRATION RATE: 18 BRPM | DIASTOLIC BLOOD PRESSURE: 76 MMHG | TEMPERATURE: 98 F | SYSTOLIC BLOOD PRESSURE: 124 MMHG | HEART RATE: 92 BPM | OXYGEN SATURATION: 96 % | WEIGHT: 146.19 LBS

## 2017-02-09 DIAGNOSIS — E11.9 CONTROLLED TYPE 2 DIABETES MELLITUS WITHOUT COMPLICATION, WITHOUT LONG-TERM CURRENT USE OF INSULIN: ICD-10-CM

## 2017-02-09 DIAGNOSIS — J30.9 ALLERGIC RHINITIS, UNSPECIFIED ALLERGIC RHINITIS TRIGGER, UNSPECIFIED RHINITIS SEASONALITY: Primary | ICD-10-CM

## 2017-02-09 PROCEDURE — 99213 OFFICE O/P EST LOW 20 MIN: CPT | Mod: PBBFAC,PN | Performed by: NURSE PRACTITIONER

## 2017-02-09 PROCEDURE — 99214 OFFICE O/P EST MOD 30 MIN: CPT | Mod: S$PBB,,, | Performed by: NURSE PRACTITIONER

## 2017-02-09 PROCEDURE — 99999 PR PBB SHADOW E&M-EST. PATIENT-LVL III: CPT | Mod: PBBFAC,,, | Performed by: NURSE PRACTITIONER

## 2017-02-09 RX ORDER — FLUTICASONE PROPIONATE 50 MCG
2 SPRAY, SUSPENSION (ML) NASAL DAILY
Qty: 1 BOTTLE | Refills: 2 | Status: SHIPPED | OUTPATIENT
Start: 2017-02-09 | End: 2017-03-11

## 2017-02-09 RX ORDER — OSELTAMIVIR PHOSPHATE 75 MG/1
CAPSULE ORAL
Refills: 0 | COMMUNITY
Start: 2017-01-30 | End: 2017-02-09 | Stop reason: ALTCHOICE

## 2017-02-09 NOTE — PATIENT INSTRUCTIONS
Follow up in 6 months  Go to ER for new worse or concerning symptoms    Understanding Nasal Allergies  Nasal allergies (also called allergic rhinitis) are a common health problem. They may be seasonal. This means they cause symptoms only at certain times of the year. Or they may be perennial. This means they cause symptoms all year long. Other health problems, such as asthma, often occur along with allergies as well.    What is an allergic reaction?  An allergy is a reaction to a substance called an allergen. Common allergens include:  · Wind-borne pollen  · Mold  · Dust mites  · Furry and feathered animals  · Cockroaches  Normally, allergens are harmless. But when a person has allergies, the body thinks they are harmful. The body then attacks allergens with antibodies. Antibodies are attached to special cells called mast cells. Allergens stick to the antibodies. This makes the mast cells release histamine and other chemicals. This is an allergic reaction. The chemicals irritate nearby nasal tissue. This causes nasal allergy symptoms.  Common nasal allergy symptoms  Allergies can cause nasal tissue to swell. This makes the air passages smaller. The nose may feel stuffed up. The nose may also make extra mucus, which can plug the nasal passages or drip out of the nose. Mucus can drip down the back of the throat (postnasal drip) as well. Sinus tissue can swell. This may cause pain and headache. Common allergy symptoms include:  · Runny nose with clear, watery discharge  · Stuffy nose (nasal congestion)  · Drainage down your throat (postnasal drip)  · Sneezing  · Red, watery eyes  · Itchy nose, eyes, ears, and throat  · Plugged-up ears (ear congestion)  · Sore throat  · Coughing  · Sinus pain and swelling  · Headache  It may not be allergies  Other health problems can cause symptoms like those of nasal allergies. These include:  · Nonallergic rhinitis and viruses such as colds  · Irritants and pollutants, such as strong  odors or smoke  · Certain medicines  · Changes in the weather   Treatment  Your healthcare provider will evaluate you to find the cause of your symptoms then recommend treatment. If your symptoms are due to nasal allergies, your healthcare provider may prescribe nasal steroid sprays or oral antihistamines to help reduce symptoms. Avoidance of the allergen will also be suggested. You may also be referred to an allergist.   Date Last Reviewed: 10/1/2016  © 6374-0903 [employer]. [address] [place] [address], [place] [address]. All rights reserved. This information is not intended as a substitute for professional medical care. Always follow your healthcare professional's instructions.

## 2017-02-09 NOTE — PROGRESS NOTES
"Subjective:       Patient ID: Destin Barber is a 82 y.o. male.    Chief Complaint: Hospital Follow Up and Gout (left foot )    HPI Mr Barber is here for a follow up for a hospitalization for sepsis, he feels well today, complains of a "cold" but no fever, sore throat, only a mild productive cough. Denies CP, SOB.  Review of Systems   Constitutional: Negative for fever.   Cardiovascular: Negative.        Objective:      Physical Exam   Constitutional: He is oriented to person, place, and time. He appears well-developed and well-nourished. He does not appear ill. No distress.   HENT:   Right Ear: A middle ear effusion is present.   Left Ear: A middle ear effusion is present.   Nose: No mucosal edema or rhinorrhea. Right sinus exhibits no maxillary sinus tenderness and no frontal sinus tenderness. Left sinus exhibits no maxillary sinus tenderness and no frontal sinus tenderness.   Mouth/Throat: Uvula is midline, oropharynx is clear and moist and mucous membranes are normal.   Cardiovascular: Normal rate, regular rhythm and normal heart sounds.  Exam reveals no friction rub.    No murmur heard.  Pulmonary/Chest: Effort normal. No respiratory distress. He has decreased breath sounds in the right lower field and the left lower field. He has no wheezes. He has no rhonchi. He has no rales.   Musculoskeletal: Normal range of motion. He exhibits no edema.   Neurological: He is alert and oriented to person, place, and time.   Skin: Skin is warm and dry. No erythema.   Psychiatric: He has a normal mood and affect. His behavior is normal.   Vitals reviewed.      Assessment:       1. Allergic rhinitis, unspecified allergic rhinitis trigger, unspecified rhinitis seasonality    2. Controlled type 2 diabetes mellitus without complication, without long-term current use of insulin        Plan:       Allergic rhinitis, unspecified allergic rhinitis trigger, unspecified rhinitis seasonality  -     fluticasone (FLONASE) 50 " mcg/actuation nasal spray; 2 sprays by Each Nare route once daily.  Dispense: 1 Bottle; Refill: 2    Controlled type 2 diabetes mellitus without complication, without long-term current use of insulin    We reviewed his labs, he will have his lipids and A1c drawn tomorrow because he is not fasting today.  F/u 6 months, sooner if necessary  Er for new worse or concerning symptoms    Verbalized understanding

## 2017-02-09 NOTE — MR AVS SNAPSHOT
[facility]  [address] [facility]  [place] [address]  Phone: [phone]                  Destin Barber   2017 10:00 AM   Office Visit    Description:  Male : 1934   Provider:  Stephanie Maria, NP-C   Department:  [facility]           Reason for Visit     Hospital Follow Up     Gout           Diagnoses this Visit        Comments    Allergic rhinitis, unspecified allergic rhinitis trigger, unspecified rhinitis seasonality    -  Primary     Controlled type 2 diabetes mellitus without complication, without long-term current use of insulin                To Do List           Goals (5 Years of Data)     None       These Medications        Disp Refills Start End    fluticasone (FLONASE) 50 mcg/actuation nasal spray 1 Bottle 2 2017 3/11/2017    2 sprays by Each Nare route once daily. - Each Nare    Pharmacy: [facility] - [address] Ph #: [phone]         [facility]     [facility] Care [facility] -  Assistance  Registered nurses in the [facility] Center provide clinical advisement, health education, appointment booking, and other advisory services.  Call for this free service at [phone].             Medications           Message regarding Medications     Verify the changes and/or additions to your medication regime listed below are the same as discussed with your clinician today.  If any of these changes or additions are incorrect, please notify your healthcare provider.        START taking these NEW medications        Refills    fluticasone (FLONASE) 50 mcg/actuation nasal spray 2    Si sprays by Each Nare route once daily.    Class: Normal    Route: Each Nare      STOP taking these medications     oseltamivir (TAMIFLU) 75 MG capsule TAKE 1 CAPSULE BY MOUTH TWICE DAILY           Verify that the below list of medications is an accurate representation of the medications you are currently  "taking.  If none reported, the list may be blank. If incorrect, please contact your healthcare provider. Carry this list with you in case of emergency.           Current Medications     allopurinol (ZYLOPRIM) 100 MG tablet Take 1 tablet (100 mg total) by mouth 2 (two) times daily.    ASPIR-LOW 81 mg EC tablet TAKE 1 TABLET BY MOUTH DAILY    atorvastatin (LIPITOR) 80 MG tablet Take 1 tablet (80 mg total) by mouth once daily.    clopidogrel (PLAVIX) 75 mg tablet Take 1 tablet (75 mg total) by mouth once daily.    docusate sodium (COLACE) 100 MG capsule Take 1 capsule (100 mg total) by mouth 2 (two) times daily as needed for Constipation.    metformin (GLUCOPHAGE-XR) 500 MG 24 hr tablet Take 1 tablet (500 mg total) by mouth daily with breakfast.    metoprolol tartrate (LOPRESSOR) 25 MG tablet Take 1 tablet (25 mg total) by mouth 2 (two) times daily.    pantoprazole (PROTONIX) 40 MG tablet Take 1 tablet (40 mg total) by mouth once daily.    tamsulosin (FLOMAX) 0.4 mg Cp24 Take 1 capsule (0.4 mg total) by mouth once daily.    trazodone (DESYREL) 100 MG tablet Take 1 tablet (100 mg total) by mouth every evening.    amlodipine (NORVASC) 10 MG tablet Take 1 tablet (10 mg total) by mouth once daily.    diclofenac (VOLTAREN) 50 MG EC tablet One tab twice per day for three days then once per day until pain has resolved    fluticasone (FLONASE) 50 mcg/actuation nasal spray 2 sprays by Each Nare route once daily.           Clinical Reference Information           Your Vitals Were     BP Pulse Temp Resp Height Weight    124/76 (BP Location: Left arm, Patient Position: Sitting, BP Method: Manual) 92 97.8 °F (36.6 °C) (Oral) 18 5' 6" (1.676 m) 66.3 kg (146 lb 2.6 oz)    SpO2 BMI             96% 23.59 kg/m2         Blood Pressure          Most Recent Value    BP  124/76      Allergies as of 2/9/2017     Penicillins      Immunizations Administered on Date of Encounter - 2/9/2017     None      Instructions    Follow up in 6 months  Go " to ER for new worse or concerning symptoms    Understanding Nasal Allergies  Nasal allergies (also called allergic rhinitis) are a common health problem. They may be seasonal. This means they cause symptoms only at certain times of the year. Or they may be perennial. This means they cause symptoms all year long. Other health problems, such as asthma, often occur along with allergies as well.    What is an allergic reaction?  An allergy is a reaction to a substance called an allergen. Common allergens include:  · Wind-borne pollen  · Mold  · Dust mites  · Furry and feathered animals  · Cockroaches  Normally, allergens are harmless. But when a person has allergies, the body thinks they are harmful. The body then attacks allergens with antibodies. Antibodies are attached to special cells called mast cells. Allergens stick to the antibodies. This makes the mast cells release histamine and other chemicals. This is an allergic reaction. The chemicals irritate nearby nasal tissue. This causes nasal allergy symptoms.  Common nasal allergy symptoms  Allergies can cause nasal tissue to swell. This makes the air passages smaller. The nose may feel stuffed up. The nose may also make extra mucus, which can plug the nasal passages or drip out of the nose. Mucus can drip down the back of the throat (postnasal drip) as well. Sinus tissue can swell. This may cause pain and headache. Common allergy symptoms include:  · Runny nose with clear, watery discharge  · Stuffy nose (nasal congestion)  · Drainage down your throat (postnasal drip)  · Sneezing  · Red, watery eyes  · Itchy nose, eyes, ears, and throat  · Plugged-up ears (ear congestion)  · Sore throat  · Coughing  · Sinus pain and swelling  · Headache  It may not be allergies  Other health problems can cause symptoms like those of nasal allergies. These include:  · Nonallergic rhinitis and viruses such as colds  · Irritants and pollutants, such as strong odors or smoke  · Certain  medicines  · Changes in the weather   Treatment  Your healthcare provider will evaluate you to find the cause of your symptoms then recommend treatment. If your symptoms are due to nasal allergies, your healthcare provider may prescribe nasal steroid sprays or oral antihistamines to help reduce symptoms. Avoidance of the allergen will also be suggested. You may also be referred to an allergist.   Date Last Reviewed: 10/1/2016  © 0558-5518 [employer]. [address] [place] [address], [place] [address]. All rights reserved. This information is not intended as a substitute for professional medical care. Always follow your healthcare professional's instructions.             Language Assistance Services     ATTENTION: Language assistance services are available, free of charge. Please call [phone].      ATENCIÓN: Si bhavesh churchill, tiene a ro disposición servicios gratuitos de asistencia lingüística. Llame al [phone].     CHÚ Ý: N?u b?n nói Ti?ng Vi?t, có các d?ch v? h? tr? ngôn ng? mi?n phí dành cho b?n. G?i s? [phone].         [facility] complies with applicable Federal civil rights laws and does not discriminate on the basis of race, color, national origin, age, disability, or sex.

## 2017-02-21 ENCOUNTER — LAB VISIT (OUTPATIENT)
Dept: LAB | Facility: HOSPITAL | Age: 82
End: 2017-02-21
Payer: MEDICARE

## 2017-02-21 DIAGNOSIS — E11.9 TYPE II OR UNSPECIFIED TYPE DIABETES MELLITUS WITHOUT MENTION OF COMPLICATION, NOT STATED AS UNCONTROLLED: ICD-10-CM

## 2017-02-21 DIAGNOSIS — I25.810 CORONARY ARTERY DISEASE INVOLVING CORONARY BYPASS GRAFT WITHOUT ANGINA PECTORIS, UNSPECIFIED WHETHER NATIVE OR TRANSPLANTED HEART: ICD-10-CM

## 2017-02-21 LAB
ALBUMIN SERPL BCP-MCNC: 3 G/DL
ALP SERPL-CCNC: 101 U/L
ALT SERPL W/O P-5'-P-CCNC: 18 U/L
ANION GAP SERPL CALC-SCNC: 9 MMOL/L
AST SERPL-CCNC: 15 U/L
BASOPHILS # BLD AUTO: 0.01 K/UL
BASOPHILS NFR BLD: 0.2 %
BILIRUB SERPL-MCNC: 0.2 MG/DL
BUN SERPL-MCNC: 23 MG/DL
CALCIUM SERPL-MCNC: 8.8 MG/DL
CHLORIDE SERPL-SCNC: 108 MMOL/L
CHOLEST/HDLC SERPL: 4.1 {RATIO}
CO2 SERPL-SCNC: 24 MMOL/L
CREAT SERPL-MCNC: 1 MG/DL
DIFFERENTIAL METHOD: ABNORMAL
EOSINOPHIL # BLD AUTO: 0.1 K/UL
EOSINOPHIL NFR BLD: 2.7 %
ERYTHROCYTE [DISTWIDTH] IN BLOOD BY AUTOMATED COUNT: 13.5 %
EST. GFR  (AFRICAN AMERICAN): >60 ML/MIN/1.73 M^2
EST. GFR  (NON AFRICAN AMERICAN): >60 ML/MIN/1.73 M^2
GLUCOSE SERPL-MCNC: 91 MG/DL
HCT VFR BLD AUTO: 31.7 %
HDL/CHOLESTEROL RATIO: 24.6 %
HDLC SERPL-MCNC: 126 MG/DL
HDLC SERPL-MCNC: 31 MG/DL
HGB BLD-MCNC: 10.6 G/DL
LDLC SERPL CALC-MCNC: 67.2 MG/DL
LYMPHOCYTES # BLD AUTO: 2.5 K/UL
LYMPHOCYTES NFR BLD: 62.2 %
MCH RBC QN AUTO: 30.8 PG
MCHC RBC AUTO-ENTMCNC: 33.4 %
MCV RBC AUTO: 92 FL
MONOCYTES # BLD AUTO: 0.6 K/UL
MONOCYTES NFR BLD: 13.6 %
NEUTROPHILS # BLD AUTO: 0.9 K/UL
NEUTROPHILS NFR BLD: 21.3 %
NONHDLC SERPL-MCNC: 95 MG/DL
PLATELET # BLD AUTO: 299 K/UL
PMV BLD AUTO: 9.3 FL
POTASSIUM SERPL-SCNC: 4.1 MMOL/L
PROT SERPL-MCNC: 7.1 G/DL
RBC # BLD AUTO: 3.44 M/UL
SODIUM SERPL-SCNC: 141 MMOL/L
TRIGL SERPL-MCNC: 139 MG/DL
WBC # BLD AUTO: 4.05 K/UL

## 2017-02-21 PROCEDURE — 83036 HEMOGLOBIN GLYCOSYLATED A1C: CPT

## 2017-02-21 PROCEDURE — 80061 LIPID PANEL: CPT

## 2017-02-21 PROCEDURE — 80053 COMPREHEN METABOLIC PANEL: CPT

## 2017-02-21 PROCEDURE — 85025 COMPLETE CBC W/AUTO DIFF WBC: CPT

## 2017-02-21 PROCEDURE — 36415 COLL VENOUS BLD VENIPUNCTURE: CPT

## 2017-02-21 RX ORDER — METFORMIN HYDROCHLORIDE 500 MG/1
500 TABLET, EXTENDED RELEASE ORAL
Qty: 30 TABLET | Refills: 5 | Status: SHIPPED | OUTPATIENT
Start: 2017-02-21 | End: 2017-05-15 | Stop reason: SDUPTHER

## 2017-02-22 LAB
ESTIMATED AVG GLUCOSE: 137 MG/DL
HBA1C MFR BLD HPLC: 6.4 %

## 2017-02-27 DIAGNOSIS — K21.9 GASTROESOPHAGEAL REFLUX DISEASE WITHOUT ESOPHAGITIS: ICD-10-CM

## 2017-02-27 RX ORDER — PANTOPRAZOLE SODIUM 40 MG/1
40 TABLET, DELAYED RELEASE ORAL DAILY
Qty: 30 TABLET | Refills: 5 | Status: SHIPPED | OUTPATIENT
Start: 2017-02-27 | End: 2017-05-15 | Stop reason: SDUPTHER

## 2017-03-13 RX ORDER — TRAZODONE HYDROCHLORIDE 100 MG/1
100 TABLET ORAL NIGHTLY
Qty: 30 TABLET | Refills: 5 | Status: SHIPPED | OUTPATIENT
Start: 2017-03-13 | End: 2017-07-14 | Stop reason: SDUPTHER

## 2017-04-18 DIAGNOSIS — M10.9 ACUTE GOUT OF RIGHT FOOT, UNSPECIFIED CAUSE: ICD-10-CM

## 2017-04-18 RX ORDER — DICLOFENAC SODIUM 50 MG/1
50 TABLET, DELAYED RELEASE ORAL DAILY
Qty: 30 TABLET | Refills: 0 | Status: SHIPPED | OUTPATIENT
Start: 2017-04-18 | End: 2017-08-24 | Stop reason: SDUPTHER

## 2017-04-18 NOTE — TELEPHONE ENCOUNTER
Informed pt if having the same issues he would have to come to the office for a follow-up appointment. Pt understands

## 2017-04-28 ENCOUNTER — HOSPITAL ENCOUNTER (EMERGENCY)
Facility: OTHER | Age: 82
Discharge: HOME | End: 2017-04-28
Attending: EMERGENCY MEDICINE
Payer: MEDICARE

## 2017-04-28 VITALS
DIASTOLIC BLOOD PRESSURE: 84 MMHG | RESPIRATION RATE: 20 BRPM | HEART RATE: 70 BPM | BODY MASS INDEX: 23.73 KG/M2 | WEIGHT: 147 LBS | TEMPERATURE: 98 F | SYSTOLIC BLOOD PRESSURE: 159 MMHG | OXYGEN SATURATION: 99 %

## 2017-04-28 DIAGNOSIS — J40 BRONCHITIS: Primary | ICD-10-CM

## 2017-04-28 LAB
BILIRUBIN, POC UA: NEGATIVE
BLOOD, POC UA: NEGATIVE
CLARITY, POC UA: CLEAR
COLOR, POC UA: YELLOW
GLUCOSE, POC UA: NEGATIVE
KETONES, POC UA: NEGATIVE
LEUKOCYTE EST, POC UA: NEGATIVE
NITRITE, POC UA: NEGATIVE
PH UR STRIP: 6 [PH]
POCT GLUCOSE: 136 MG/DL (ref 70–110)
PROTEIN, POC UA: ABNORMAL
SPECIFIC GRAVITY, POC UA: 1.02
UROBILINOGEN, POC UA: 0.2 E.U./DL

## 2017-04-28 PROCEDURE — 99284 EMERGENCY DEPT VISIT MOD MDM: CPT

## 2017-04-28 RX ORDER — GUAIFENESIN/DEXTROMETHORPHAN 100-10MG/5
5 SYRUP ORAL EVERY 4 HOURS PRN
Qty: 120 ML | Refills: 0
Start: 2017-04-28 | End: 2017-05-08

## 2017-04-28 RX ORDER — AZITHROMYCIN 250 MG/1
250 TABLET, FILM COATED ORAL DAILY
Qty: 6 TABLET | Refills: 0 | Status: SHIPPED | OUTPATIENT
Start: 2017-04-28 | End: 2017-05-15

## 2017-04-28 NOTE — ED PROVIDER NOTES
Encounter Date: 4/28/2017       History     Chief Complaint   Patient presents with    URI     + cough, sore throat and hoarseness since last week that has progressively gotten worse.      Review of patient's allergies indicates:   Allergen Reactions    Penicillins Nausea And Vomiting     Patient is a 83 y.o. male presenting with the following complaint: cough. The history is provided by the patient.   Cough   This is a new problem. The current episode started several days ago. The problem occurs constantly. The problem has been unchanged. The cough is productive of sputum. The maximum temperature recorded prior to his arrival was 100 - 100.9 F. The fever has been present for less than 1 day. Associated symptoms include wheezing (Sometimes). Pertinent negatives include no chest pain, no chills, no sweats, no weight loss, no sore throat and no shortness of breath. He has tried nothing for the symptoms. He is not a smoker. His past medical history is significant for bronchitis.     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    Coronary artery disease     Diastolic dysfunction     Gout, chronic     HTN (hypertension)     Hyperlipidemia     Type 2 diabetes mellitus     diet controlled     Past Surgical History:   Procedure Laterality Date    CATARACT EXTRACTION W/ ANTERIOR VITRECTOMY      CORONARY ARTERY BYPASS GRAFT  07/06/2016    PAIZ-LAD, SVG-Ramus, SVG-PDA    HEMORRHOID SURGERY       Family History   Problem Relation Age of Onset    Cancer Father      colon    Hypertension Mother     Heart disease Mother     Heart disease Sister      Social History   Substance Use Topics    Smoking status: Former Smoker     Types: Cigarettes     Quit date: 6/23/1985    Smokeless tobacco: None    Alcohol use No     Review of Systems   Constitutional: Negative for chills and weight loss.   HENT: Negative for sore throat.    Respiratory: Positive for cough and  wheezing (Sometimes). Negative for shortness of breath.    Cardiovascular: Negative for chest pain.   Genitourinary: Positive for frequency (at night).       Physical Exam   Initial Vitals   BP Pulse Resp Temp SpO2   04/28/17 1050 04/28/17 1050 04/28/17 1050 04/28/17 1050 04/28/17 1050   160/85 80 18 98.2 °F (36.8 °C) 98 %     Physical Exam    Nursing note and vitals reviewed.  Constitutional: Vital signs are normal. He appears well-developed. He is active and cooperative.   HENT:   Head: Normocephalic and atraumatic.   Nose: Mucosal edema and rhinorrhea present.   Eyes: Conjunctivae, EOM and lids are normal. Pupils are equal, round, and reactive to light.   Neck: Trachea normal and full passive range of motion without pain. Neck supple. No thyroid mass present.   Cardiovascular: Normal rate, regular rhythm, S1 normal, S2 normal, normal heart sounds, intact distal pulses and normal pulses.   Pulmonary/Chest:           Abdominal: Soft. Normal appearance, normal aorta and bowel sounds are normal.   Musculoskeletal: Normal range of motion.   Lymphadenopathy:     He has no axillary adenopathy.   Neurological: He is alert and oriented to person, place, and time.   Skin: Skin is warm, dry and intact.   Psychiatric: He has a normal mood and affect. His speech is normal and behavior is normal. Judgment and thought content normal. Cognition and memory are normal.         ED Course   Procedures  Labs Reviewed   POCT GLUCOSE   POCT URINALYSIS W/O SCOPE                    Admission on 04/28/2017   Component Date Value Ref Range Status    POCT Glucose 04/28/2017 136* 70 - 110 mg/dL Final    Glucose, UA 04/28/2017 Negative*  Final    Bilirubin, UA 04/28/2017 Negative*  Final    Ketones, UA 04/28/2017 Negative*  Final    Spec Grav UA 04/28/2017 1.020   Final    Blood, UA 04/28/2017 Negative*  Final    PH, UA 04/28/2017 6.0   Final    Protein, UA 04/28/2017 Trace   Final    Urobilinogen, UA 04/28/2017 0.2  E.U./dL Final     Nitrite, UA 04/28/2017 Negative*  Final    Leukocytes, UA 04/28/2017 Negative*  Final    Color, UA 04/28/2017 Yellow   Final    Clarity, UA 04/28/2017 Clear   Final                   ED Course     Clinical Impression:   The encounter diagnosis was Bronchitis.          Carrillo Mclean MD  04/28/17 6048

## 2017-04-28 NOTE — ED AVS SNAPSHOT
[facility] EMERGENCY DEPARTMENT  [address] [place] [address]               Destin Barber   2017 10:50 AM   ED    Description:  Male : 1934   Department:  [facility] Emergency Department           Your Care was Coordinated By:     Provider Role From To    Carrillo Mclean MD Attending Provider 17 1104 --      Reason for Visit     URI           Diagnoses this Visit        Comments    Bronchitis    -  Primary       ED Disposition     ED Disposition Condition Comment    Discharge             To Do List           Follow-up Information     Follow up with Piter Bernardo MD In 3 days.    Specialty:  Internal Medicine    Contact information:    [address] [place] [address]  [phone]         These Medications        Disp Refills Start End    dextromethorphan-guaifenesin  mg/5 ml (ROBITUSSIN-DM)  mg/5 mL liquid 120 mL 0 2017    Take 5 mLs by mouth every 4 (four) hours as needed. - Oral    Pharmacy: [facility] - [facility] [address] Ph #: [phone]       albuterol sulfate 90 mcg/actuation AePB 1 each 1 2017     Inhale 1 puff into the lungs every 6 (six) hours as needed. Rescue - Inhalation    Pharmacy: [facility] - [address] Ph #: [phone]       azithromycin (ZITHROMAX Z-CISCO) 250 MG tablet 6 tablet 0 2017     Take 1 tablet (250 mg total) by mouth once daily. Take 500 mg initially then 250 mg daily. - Oral    Pharmacy: [facility] - [address] Ph #: [phone]         [facility]     [facility] Nurse Care Line -  Assistance  Unless otherwise directed by your provider, please contact [facility], our nurse care line that is available for  assistance.     Registered nurses in the [facility] provide: appointment scheduling, clinical advisement, health education, and other advisory  services.  Call: [phone] (toll free)               Medications           Message regarding Medications     Verify the changes and/or additions to your medication regime listed below are the same as discussed with your clinician today.  If any of these changes or additions are incorrect, please notify your healthcare provider.        START taking these NEW medications        Refills    dextromethorphan-guaifenesin  mg/5 ml (ROBITUSSIN-DM)  mg/5 mL liquid 0    Sig: Take 5 mLs by mouth every 4 (four) hours as needed.    Class: No Print    Route: Oral    albuterol sulfate 90 mcg/actuation AePB 1    Sig: Inhale 1 puff into the lungs every 6 (six) hours as needed. Rescue    Class: Print    Route: Inhalation    azithromycin (ZITHROMAX Z-CISCO) 250 MG tablet 0    Sig: Take 1 tablet (250 mg total) by mouth once daily. Take 500 mg initially then 250 mg daily.    Class: Print    Route: Oral           Verify that the below list of medications is an accurate representation of the medications you are currently taking.  If none reported, the list may be blank. If incorrect, please contact your healthcare provider. Carry this list with you in case of emergency.           Current Medications     albuterol sulfate 90 mcg/actuation AePB Inhale 1 puff into the lungs every 6 (six) hours as needed. Rescue    allopurinol (ZYLOPRIM) 100 MG tablet Take 1 tablet (100 mg total) by mouth 2 (two) times daily.    amlodipine (NORVASC) 10 MG tablet Take 1 tablet (10 mg total) by mouth once daily.    ASPIR-LOW 81 mg EC tablet TAKE 1 TABLET BY MOUTH DAILY    atorvastatin (LIPITOR) 80 MG tablet Take 1 tablet (80 mg total) by mouth once daily.    azithromycin (ZITHROMAX Z-CISCO) 250 MG tablet Take 1 tablet (250 mg total) by mouth once daily. Take 500 mg initially then 250 mg daily.    clopidogrel (PLAVIX) 75 mg tablet Take 1 tablet (75 mg total) by mouth once daily.    dextromethorphan-guaifenesin  mg/5 ml (ROBITUSSIN-DM)   mg/5 mL liquid Take 5 mLs by mouth every 4 (four) hours as needed.    diclofenac (VOLTAREN) 50 MG EC tablet Take 1 tablet (50 mg total) by mouth once daily. One tab twice per day for three days then once per day until pain has resolved    docusate sodium (COLACE) 100 MG capsule Take 1 capsule (100 mg total) by mouth 2 (two) times daily as needed for Constipation.    metformin (GLUCOPHAGE-XR) 500 MG 24 hr tablet Take 1 tablet (500 mg total) by mouth daily with breakfast.    metoprolol tartrate (LOPRESSOR) 25 MG tablet Take 1 tablet (25 mg total) by mouth 2 (two) times daily.    pantoprazole (PROTONIX) 40 MG tablet Take 1 tablet (40 mg total) by mouth once daily.    tamsulosin (FLOMAX) 0.4 mg Cp24 Take 1 capsule (0.4 mg total) by mouth once daily.    trazodone (DESYREL) 100 MG tablet Take 1 tablet (100 mg total) by mouth every evening.           Clinical Reference Information           Your Vitals Were     BP Pulse Temp Resp Weight SpO2    160/85 (BP Location: Left arm, Patient Position: Sitting, BP Method: Automatic) 80 98.2 °F (36.8 °C) (Tympanic) 18 66.7 kg (147 lb) 98%    BMI                23.73 kg/m2          Allergies as of 4/28/2017        Reactions    Penicillins Nausea And Vomiting      Immunizations Administered on Date of Encounter - 4/28/2017     None      ED Micro, Lab, POCT     Start Ordered       Status Ordering Provider    04/28/17 1222 04/28/17 1222  POCT URINALYSIS W/O SCOPE  Once      Final result     04/28/17 1112 04/28/17 1112  POCT glucose  Once      Final result     04/28/17 1108 04/28/17 1107  POCT glucose  Once      Acknowledged     04/28/17 1108 04/28/17 1107  POCT URINALYSIS W/O SCOPE  Once      Completed       ED Imaging Orders     None        Discharge Instructions           Acute Bronchitis  Your healthcare provider has told you that you have acute bronchitis. Bronchitis is infection or inflammation of the bronchial tubes (airways in the lungs). Normally, air moves easily in and out of  the airways. Bronchitis narrows the airways, making it harder for air to flow in and out of the lungs. This causes symptoms such as shortness of breath, coughing, and wheezing. Bronchitis can be acute or chronic. Acute means the condition comes on quickly and goes away in a short time. Chronic means a condition lasts a long time and often comes back. Read on to learn more about acute bronchitis.    What causes acute bronchitis?  Acute bronchitis almost always starts as a viral respiratory infection, such as a cold or the flu. Certain factors make it more likely for a cold or flu to turn into bronchitis. These include being very young or very old or having a heart or lung problem. Cigarette smoking also makes bronchitis more likely.  When bronchitis develops, the airways become swollen. The airways may also become infected with bacteria. This is known as a secondary infection.  Diagnosing acute bronchitis  Your healthcare provider will examine you and ask about your symptoms and health history. You may also have a sputum culture to test the fluid in your lungs. Chest X-rays may be done to look for infection in the lungs.  Treating acute bronchitis  Bronchitis usually clears up as the cold or flu goes away. You can help feel better faster by doing the following:  · Take medicine as directed. You may be told to take ibuprofen or other over-the-counter medicines. These help relieve inflammation in your bronchial tubes. Your doctor may prescribe an inhaler to help open up the bronchial tubes. Most of the time, acute bronchitis is caused by a viral infection. Antibiotics are usually not prescribed for viral infections.  · Drink plenty of fluids, such as water, juice, or warm soup. Fluids loosen mucus so that you can cough it up. This helps you breathe more easily. Fluids also prevent dehydration.  · Make sure you get plenty of rest.  · Do not smoke. Do not allow anyone else to smoke in your home.  Recovery and  follow-up  Follow up with your doctor as you are told. You will likely feel better in a week or two. But a dry cough can linger beyond that time. Let your doctor know if you still have symptoms (other than a dry cough) after 2 weeks. If youre prone to getting bronchial infections, let your doctor know. And take steps to protect yourself from future infections. These steps include stopping smoking and avoiding tobacco smoke, washing your hands often, and getting a yearly flu shot.  When to call the doctor  Call the doctor if you have any of the following:  · Fever of 100.4°F (38.0°C) higher  · Symptoms that get worse, or new symptoms  · Trouble breathing  · Symptoms that dont start to improve within a week, or within 3 days of taking antibiotics   Date Last Reviewed: 8/4/2014  © 3068-2404 [employer]. [address] [place] [address]. All rights reserved. This information is not intended as a substitute for professional medical care. Always follow your healthcare professional's instructions.          Bronchitis, Viral (Adult)    You have a viral bronchitis. Bronchitis is inflammation and swelling of the lining of the lungs. This is often caused by an infection. Symptoms include a dry, hacking cough that is worse at night. The cough may bring up yellow-green mucus. You may also feel short of breath or wheeze. Other symptoms may include tiredness, chest discomfort, and chills.  Bronchitis that is caused by a virus is not treated with antibiotics. Instead, medicines may be given to help relieve symptoms. Symptoms can last up to 2 weeks, although the cough may last much longer.  This illness is contagious during the first few days and is spread through the air by coughing and sneezing, or by direct contact (touching the sick person and then touching your own eyes, nose, or mouth).  Most viral illnesses resolve within 10 to 14 days with rest and simple home remedies, although they may sometimes  last for several weeks.  Home care  · If symptoms are severe, rest at home for the first 2 to 3 days. When you go back to your usual activities, don't let yourself get too tired.  · Do not smoke. Also avoid being exposed to secondhand smoke.  · You may use over-the-counter medicine to control fever or pain, unless another pain medicine was prescribed. (Note: If you have chronic liver or kidney disease or have ever had a stomach ulcer or gastrointestinal bleeding, talk with your healthcare provider before using these medicines. Also talk to your provider if you are taking medicine to prevent blood clots.) Aspirin should never be given to anyone younger than 18 years of age who is ill with a viral infection or fever. It may cause severe liver or brain damage.  · Your appetite may be poor, so a light diet is fine. Avoid dehydration by drinking 6 to 8 glasses of fluids per day (such as water, soft drinks, sports drinks, juices, tea, or soup). Extra fluids will help loosen secretions in the nose and lungs.  · Over-the-counter cough, cold, and sore-throat medicines will not shorten the length of the illness, but they may help to reduce symptoms. (Note: Do not use decongestants if you have high blood pressure.)  Follow-up care  Follow up with your healthcare provider, or as advised. If you had an X-ray or ECG (electrocardiogram), a specialist will review it. You will be notified of any new findings that may affect your care.  Note: If you are age 65 or older, or if you have a chronic lung disease or condition that affects your immune system, or you smoke, talk to your healthcare provider about having pneumococcal vaccinations and a yearly influenza vaccination (flu shot).  When to seek medical advice  Call your healthcare provider right away if any of these occur:  · Fever of 100.4°F (38°C) or higher  · Coughing up increased amounts of colored sputum  · Weakness, drowsiness, headache, facial pain, ear pain, or a stiff  neck  Call 911, or get immediate medical care  Contact emergency services right away if any of these occur:  · Coughing up blood  · Worsening weakness, drowsiness, headache, or stiff neck  · Trouble breathing, wheezing, or pain with breathing  Date Last Reviewed: 9/13/2015 © 2000-2016 [facility]. [address] [place] [address], [place] [address]. All rights reserved. This information is not intended as a substitute for professional medical care. Always follow your healthcare professional's instructions.          Smoking Cessation     If you would like to quit smoking:   You may be eligible for free services if you are a [place] resident and started smoking cigarettes before September 1, 1988.  Call the Smoking Cessation Trust ([facility]) toll free at (850) 435-2652 or (240) 401-8484.   Call 0-800-QUIT-NOW if you do not meet the above criteria.   Contact us via email: [email or web address]   View our website for more information: [email or web address]         [facility] complies with applicable Federal civil rights laws and does not discriminate on the basis of race, color, national origin, age, disability, or sex.        Language Assistance Services     ATTENTION: Language assistance services are available, free of charge. Please call [phone].      ATENCIÓN: Si habla español, tiene a ro disposición servicios gratuitos de asistencia lingüística. Llame al [phone].     CHÚ Ý: N?u b?n nói Ti?ng Vi?t, có các d?ch v? h? tr? ngôn ng? mi?n phí dành cho b?n. G?i s? [phone].

## 2017-04-28 NOTE — DISCHARGE INSTRUCTIONS
Acute Bronchitis  Your healthcare provider has told you that you have acute bronchitis. Bronchitis is infection or inflammation of the bronchial tubes (airways in the lungs). Normally, air moves easily in and out of the airways. Bronchitis narrows the airways, making it harder for air to flow in and out of the lungs. This causes symptoms such as shortness of breath, coughing, and wheezing. Bronchitis can be acute or chronic. Acute means the condition comes on quickly and goes away in a short time. Chronic means a condition lasts a long time and often comes back. Read on to learn more about acute bronchitis.    What causes acute bronchitis?  Acute bronchitis almost always starts as a viral respiratory infection, such as a cold or the flu. Certain factors make it more likely for a cold or flu to turn into bronchitis. These include being very young or very old or having a heart or lung problem. Cigarette smoking also makes bronchitis more likely.  When bronchitis develops, the airways become swollen. The airways may also become infected with bacteria. This is known as a secondary infection.  Diagnosing acute bronchitis  Your healthcare provider will examine you and ask about your symptoms and health history. You may also have a sputum culture to test the fluid in your lungs. Chest X-rays may be done to look for infection in the lungs.  Treating acute bronchitis  Bronchitis usually clears up as the cold or flu goes away. You can help feel better faster by doing the following:  · Take medicine as directed. You may be told to take ibuprofen or other over-the-counter medicines. These help relieve inflammation in your bronchial tubes. Your doctor may prescribe an inhaler to help open up the bronchial tubes. Most of the time, acute bronchitis is caused by a viral infection. Antibiotics are usually not prescribed for viral infections.  · Drink plenty of fluids, such as water, juice, or warm soup. Fluids loosen mucus  so that you can cough it up. This helps you breathe more easily. Fluids also prevent dehydration.  · Make sure you get plenty of rest.  · Do not smoke. Do not allow anyone else to smoke in your home.  Recovery and follow-up  Follow up with your doctor as you are told. You will likely feel better in a week or two. But a dry cough can linger beyond that time. Let your doctor know if you still have symptoms (other than a dry cough) after 2 weeks. If youre prone to getting bronchial infections, let your doctor know. And take steps to protect yourself from future infections. These steps include stopping smoking and avoiding tobacco smoke, washing your hands often, and getting a yearly flu shot.  When to call the doctor  Call the doctor if you have any of the following:  · Fever of 100.4°F (38.0°C) higher  · Symptoms that get worse, or new symptoms  · Trouble breathing  · Symptoms that dont start to improve within a week, or within 3 days of taking antibiotics   Date Last Reviewed: 8/4/2014  © 8104-1617 [employer]. [address] [place] [address]. All rights reserved. This information is not intended as a substitute for professional medical care. Always follow your healthcare professional's instructions.          Bronchitis, Viral (Adult)    You have a viral bronchitis. Bronchitis is inflammation and swelling of the lining of the lungs. This is often caused by an infection. Symptoms include a dry, hacking cough that is worse at night. The cough may bring up yellow-green mucus. You may also feel short of breath or wheeze. Other symptoms may include tiredness, chest discomfort, and chills.  Bronchitis that is caused by a virus is not treated with antibiotics. Instead, medicines may be given to help relieve symptoms. Symptoms can last up to 2 weeks, although the cough may last much longer.  This illness is contagious during the first few days and is spread through the air by coughing and sneezing,  or by direct contact (touching the sick person and then touching your own eyes, nose, or mouth).  Most viral illnesses resolve within 10 to 14 days with rest and simple home remedies, although they may sometimes last for several weeks.  Home care  · If symptoms are severe, rest at home for the first 2 to 3 days. When you go back to your usual activities, don't let yourself get too tired.  · Do not smoke. Also avoid being exposed to secondhand smoke.  · You may use over-the-counter medicine to control fever or pain, unless another pain medicine was prescribed. (Note: If you have chronic liver or kidney disease or have ever had a stomach ulcer or gastrointestinal bleeding, talk with your healthcare provider before using these medicines. Also talk to your provider if you are taking medicine to prevent blood clots.) Aspirin should never be given to anyone younger than 18 years of age who is ill with a viral infection or fever. It may cause severe liver or brain damage.  · Your appetite may be poor, so a light diet is fine. Avoid dehydration by drinking 6 to 8 glasses of fluids per day (such as water, soft drinks, sports drinks, juices, tea, or soup). Extra fluids will help loosen secretions in the nose and lungs.  · Over-the-counter cough, cold, and sore-throat medicines will not shorten the length of the illness, but they may help to reduce symptoms. (Note: Do not use decongestants if you have high blood pressure.)  Follow-up care  Follow up with your healthcare provider, or as advised. If you had an X-ray or ECG (electrocardiogram), a specialist will review it. You will be notified of any new findings that may affect your care.  Note: If you are age 65 or older, or if you have a chronic lung disease or condition that affects your immune system, or you smoke, talk to your healthcare provider about having pneumococcal vaccinations and a yearly influenza vaccination (flu shot).  When to seek medical advice  Call your  healthcare provider right away if any of these occur:  · Fever of 100.4°F (38°C) or higher  · Coughing up increased amounts of colored sputum  · Weakness, drowsiness, headache, facial pain, ear pain, or a stiff neck  Call 911, or get immediate medical care  Contact emergency services right away if any of these occur:  · Coughing up blood  · Worsening weakness, drowsiness, headache, or stiff neck  · Trouble breathing, wheezing, or pain with breathing  Date Last Reviewed: 9/13/2015 © 2000-2016 [employer]. [address] [place] [address]. All rights reserved. This information is not intended as a substitute for professional medical care. Always follow your healthcare professional's instructions.

## 2017-04-28 NOTE — ED NOTES
84 y/o male presents to the ER with the cc of cold cough and congestion since last week that has progressively gotten worse. Patient reports he has now sore throat and hoarseness and a productive sputum.

## 2017-05-15 ENCOUNTER — OFFICE VISIT (OUTPATIENT)
Dept: FAMILY MEDICINE | Facility: CLINIC | Age: 82
End: 2017-05-15
Payer: MEDICARE

## 2017-05-15 VITALS
TEMPERATURE: 98 F | SYSTOLIC BLOOD PRESSURE: 148 MMHG | HEART RATE: 59 BPM | HEIGHT: 66 IN | DIASTOLIC BLOOD PRESSURE: 90 MMHG | BODY MASS INDEX: 24.03 KG/M2 | WEIGHT: 149.5 LBS | OXYGEN SATURATION: 97 %

## 2017-05-15 DIAGNOSIS — I25.10 CORONARY ARTERY DISEASE INVOLVING NATIVE CORONARY ARTERY OF NATIVE HEART WITHOUT ANGINA PECTORIS: ICD-10-CM

## 2017-05-15 DIAGNOSIS — K21.9 GASTROESOPHAGEAL REFLUX DISEASE WITHOUT ESOPHAGITIS: ICD-10-CM

## 2017-05-15 DIAGNOSIS — J40 BRONCHITIS: ICD-10-CM

## 2017-05-15 DIAGNOSIS — I10 ESSENTIAL HYPERTENSION: Primary | ICD-10-CM

## 2017-05-15 DIAGNOSIS — M1A.0710 IDIOPATHIC CHRONIC GOUT OF RIGHT FOOT WITHOUT TOPHUS: ICD-10-CM

## 2017-05-15 DIAGNOSIS — E11.9 CONTROLLED TYPE 2 DIABETES MELLITUS WITHOUT COMPLICATION, WITHOUT LONG-TERM CURRENT USE OF INSULIN: ICD-10-CM

## 2017-05-15 DIAGNOSIS — Z23 NEED FOR PNEUMOCOCCAL VACCINATION: ICD-10-CM

## 2017-05-15 DIAGNOSIS — M10.9 ACUTE GOUT OF RIGHT FOOT, UNSPECIFIED CAUSE: ICD-10-CM

## 2017-05-15 PROCEDURE — 99214 OFFICE O/P EST MOD 30 MIN: CPT | Mod: S$PBB,,, | Performed by: INTERNAL MEDICINE

## 2017-05-15 PROCEDURE — 82570 ASSAY OF URINE CREATININE: CPT

## 2017-05-15 PROCEDURE — 99213 OFFICE O/P EST LOW 20 MIN: CPT | Mod: PBBFAC,PN,25 | Performed by: INTERNAL MEDICINE

## 2017-05-15 PROCEDURE — 90670 PCV13 VACCINE IM: CPT | Mod: PBBFAC,PN | Performed by: INTERNAL MEDICINE

## 2017-05-15 PROCEDURE — 99999 PR PBB SHADOW E&M-EST. PATIENT-LVL III: CPT | Mod: PBBFAC,,, | Performed by: INTERNAL MEDICINE

## 2017-05-15 RX ORDER — METOPROLOL TARTRATE 25 MG/1
25 TABLET, FILM COATED ORAL 2 TIMES DAILY
Qty: 60 TABLET | Refills: 5 | Status: SHIPPED | OUTPATIENT
Start: 2017-05-15 | End: 2017-09-13 | Stop reason: SDUPTHER

## 2017-05-15 RX ORDER — TRAZODONE HYDROCHLORIDE 50 MG/1
TABLET ORAL
COMMUNITY
Start: 2017-02-20 | End: 2017-07-14 | Stop reason: DRUGHIGH

## 2017-05-15 RX ORDER — METFORMIN HYDROCHLORIDE 500 MG/1
500 TABLET, EXTENDED RELEASE ORAL
Qty: 30 TABLET | Refills: 5 | Status: SHIPPED | OUTPATIENT
Start: 2017-05-15 | End: 2018-02-09 | Stop reason: SDUPTHER

## 2017-05-15 RX ORDER — AMLODIPINE BESYLATE 10 MG/1
10 TABLET ORAL DAILY
Qty: 90 TABLET | Refills: 3 | Status: SHIPPED | OUTPATIENT
Start: 2017-05-15 | End: 2017-09-13 | Stop reason: SDUPTHER

## 2017-05-15 RX ORDER — ATORVASTATIN CALCIUM 80 MG/1
80 TABLET, FILM COATED ORAL DAILY
Qty: 30 TABLET | Refills: 5 | Status: SHIPPED | OUTPATIENT
Start: 2017-05-15 | End: 2017-09-13 | Stop reason: SDUPTHER

## 2017-05-15 RX ORDER — CLOPIDOGREL BISULFATE 75 MG/1
75 TABLET ORAL DAILY
Qty: 30 TABLET | Refills: 5 | Status: SHIPPED | OUTPATIENT
Start: 2017-05-15 | End: 2017-09-13 | Stop reason: SDUPTHER

## 2017-05-15 RX ORDER — TRAZODONE HYDROCHLORIDE 100 MG/1
100 TABLET ORAL NIGHTLY
Qty: 30 TABLET | Refills: 5 | Status: CANCELLED | OUTPATIENT
Start: 2017-05-15

## 2017-05-15 RX ORDER — DICLOFENAC SODIUM 50 MG/1
50 TABLET, DELAYED RELEASE ORAL DAILY
Qty: 30 TABLET | Refills: 0 | Status: CANCELLED | OUTPATIENT
Start: 2017-05-15

## 2017-05-15 RX ORDER — PANTOPRAZOLE SODIUM 40 MG/1
40 TABLET, DELAYED RELEASE ORAL DAILY
Qty: 30 TABLET | Refills: 5 | Status: SHIPPED | OUTPATIENT
Start: 2017-05-15 | End: 2017-09-27 | Stop reason: ALTCHOICE

## 2017-05-15 RX ORDER — ALBUTEROL SULFATE 90 UG/1
AEROSOL, METERED RESPIRATORY (INHALATION)
Refills: 1 | Status: CANCELLED | OUTPATIENT
Start: 2017-05-15

## 2017-05-15 RX ORDER — TAMSULOSIN HYDROCHLORIDE 0.4 MG/1
0.4 CAPSULE ORAL DAILY
Qty: 30 CAPSULE | Refills: 5 | Status: SHIPPED | OUTPATIENT
Start: 2017-05-15 | End: 2018-02-09 | Stop reason: SDUPTHER

## 2017-05-15 RX ORDER — ALBUTEROL SULFATE 90 UG/1
AEROSOL, METERED RESPIRATORY (INHALATION)
Refills: 1 | COMMUNITY
Start: 2017-04-28 | End: 2019-05-01 | Stop reason: SDUPTHER

## 2017-05-15 RX ORDER — DOCUSATE SODIUM 100 MG/1
100 CAPSULE, LIQUID FILLED ORAL 2 TIMES DAILY PRN
Refills: 0 | Status: CANCELLED | COMMUNITY
Start: 2017-05-15

## 2017-05-15 RX ORDER — ALLOPURINOL 100 MG/1
100 TABLET ORAL 2 TIMES DAILY
Qty: 90 TABLET | Refills: 3 | Status: SHIPPED | OUTPATIENT
Start: 2017-05-15 | End: 2017-09-13 | Stop reason: SDUPTHER

## 2017-05-15 NOTE — PROGRESS NOTES
Subjective:       Patient ID: Destin Barber is a 83 y.o. male.    Chief Complaint: Medication Refill    HPI Comments: He presents for refills.  He is out of all of his medications.  Fills well otherwise with the exception of cough and hoarseness.     Review of Systems   Constitutional: Negative for fever.   HENT: Positive for voice change.    Respiratory: Positive for cough. Negative for shortness of breath.        Objective:      Physical Exam   Constitutional: He is oriented to person, place, and time. He appears well-developed and well-nourished. No distress.   HENT:   Head: Normocephalic and atraumatic.   Right Ear: External ear normal.   Left Ear: External ear normal.   Mouth/Throat: Oropharynx is clear and moist. No oropharyngeal exudate.   Eyes: Conjunctivae and EOM are normal. Pupils are equal, round, and reactive to light. No scleral icterus.   Cardiovascular: Normal rate, regular rhythm and normal heart sounds.  Exam reveals no gallop and no friction rub.    No murmur heard.  Pulmonary/Chest: Effort normal and breath sounds normal. No respiratory distress. He has no wheezes. He has no rales.   Musculoskeletal: He exhibits no edema or tenderness.   Neurological: He is alert and oriented to person, place, and time. No cranial nerve deficit.   Skin: Skin is warm and dry. No rash noted.   Psychiatric: He has a normal mood and affect.   Vitals reviewed.      Assessment:       1. Essential hypertension    2. Bronchitis    3. Idiopathic chronic gout of right foot without tophus    4. Coronary artery disease involving native coronary artery of native heart without angina pectoris    5. Acute gout of right foot, unspecified cause    6. Gastroesophageal reflux disease without esophagitis    7. Need for pneumococcal vaccination    8. Controlled type 2 diabetes mellitus without complication, without long-term current use of insulin        Plan:       Destin was seen today for medication refill.    Diagnoses and all  orders for this visit:    Essential hypertension - BP slightly elevated.  He has been out of medication.  Reassess next visit  -     amlodipine (NORVASC) 10 MG tablet; Take 1 tablet (10 mg total) by mouth once daily.  -     metoprolol tartrate (LOPRESSOR) 25 MG tablet; Take 1 tablet (25 mg total) by mouth 2 (two) times daily.    Bronchitis - c/o continued cough.  Clear on exam.  Continue inhaler prn and follow up if symptoms worsen  -     albuterol sulfate 90 mcg/actuation AePB; Inhale 1 puff into the lungs every 6 (six) hours as needed. Rescue    Idiopathic chronic gout of right foot without tophus  -     allopurinol (ZYLOPRIM) 100 MG tablet; Take 1 tablet (100 mg total) by mouth 2 (two) times daily.    Coronary artery disease involving native coronary artery of native heart without angina pectoris - Due for follow up with cardiology.  No acute symptoms today  -     atorvastatin (LIPITOR) 80 MG tablet; Take 1 tablet (80 mg total) by mouth once daily.  -     clopidogrel (PLAVIX) 75 mg tablet; Take 1 tablet (75 mg total) by mouth once daily.  -     metoprolol tartrate (LOPRESSOR) 25 MG tablet; Take 1 tablet (25 mg total) by mouth 2 (two) times daily.    Acute gout of right foot, unspecified cause    Gastroesophageal reflux disease without esophagitis  -     pantoprazole (PROTONIX) 40 MG tablet; Take 1 tablet (40 mg total) by mouth once daily.    Need for pneumococcal vaccination  -     Pneumococcal Conjugate Vaccine (13 Valent) (IM)    Controlled type 2 diabetes mellitus without complication, without long-term current use of insulin  -     metformin (GLUCOPHAGE-XR) 500 MG 24 hr tablet; Take 1 tablet (500 mg total) by mouth daily with breakfast.  -     Microalbumin/creatinine urine ratio; Future    Other orders  -     tamsulosin (FLOMAX) 0.4 mg Cp24; Take 1 capsule (0.4 mg total) by mouth once daily.       F/u 3 months.

## 2017-05-16 ENCOUNTER — TELEPHONE (OUTPATIENT)
Dept: FAMILY MEDICINE | Facility: CLINIC | Age: 82
End: 2017-05-16

## 2017-05-16 DIAGNOSIS — E11.29 MICROALBUMINURIA DUE TO TYPE 2 DIABETES MELLITUS: Primary | ICD-10-CM

## 2017-05-16 DIAGNOSIS — R80.9 MICROALBUMINURIA DUE TO TYPE 2 DIABETES MELLITUS: Primary | ICD-10-CM

## 2017-05-16 LAB
CREAT UR-MCNC: 70 MG/DL
MICROALBUMIN UR DL<=1MG/L-MCNC: 218 UG/ML
MICROALBUMIN/CREATININE RATIO: 311.4 UG/MG

## 2017-05-16 RX ORDER — LISINOPRIL 10 MG/1
10 TABLET ORAL DAILY
Qty: 90 TABLET | Refills: 0 | Status: SHIPPED | OUTPATIENT
Start: 2017-05-16 | End: 2017-09-13 | Stop reason: SDUPTHER

## 2017-05-16 NOTE — TELEPHONE ENCOUNTER
Noted elevated microalbumin.  Per discharge summary 7/12/2016, lisinopril discontinued due to concern for hypotension.  This is no longer a concern.  No other contraindication noted on review of chart.  Will restart at 10 mg and increase if needed.      Please notify the patient.  BMP 1 week after starting.

## 2017-07-14 ENCOUNTER — OFFICE VISIT (OUTPATIENT)
Dept: FAMILY MEDICINE | Facility: CLINIC | Age: 82
End: 2017-07-14
Payer: MEDICARE

## 2017-07-14 VITALS
WEIGHT: 146.81 LBS | HEIGHT: 66 IN | RESPIRATION RATE: 17 BRPM | SYSTOLIC BLOOD PRESSURE: 148 MMHG | OXYGEN SATURATION: 98 % | TEMPERATURE: 98 F | BODY MASS INDEX: 23.59 KG/M2 | HEART RATE: 68 BPM | DIASTOLIC BLOOD PRESSURE: 78 MMHG

## 2017-07-14 DIAGNOSIS — E11.9 CONTROLLED TYPE 2 DIABETES MELLITUS WITHOUT COMPLICATION, WITHOUT LONG-TERM CURRENT USE OF INSULIN: ICD-10-CM

## 2017-07-14 DIAGNOSIS — G47.00 INSOMNIA, UNSPECIFIED TYPE: ICD-10-CM

## 2017-07-14 DIAGNOSIS — I25.810 CORONARY ARTERY DISEASE INVOLVING CORONARY BYPASS GRAFT OF NATIVE HEART WITHOUT ANGINA PECTORIS: ICD-10-CM

## 2017-07-14 DIAGNOSIS — I10 ESSENTIAL HYPERTENSION: ICD-10-CM

## 2017-07-14 DIAGNOSIS — I51.89 DIASTOLIC DYSFUNCTION: Primary | ICD-10-CM

## 2017-07-14 PROCEDURE — 1157F ADVNC CARE PLAN IN RCRD: CPT | Mod: ,,, | Performed by: INTERNAL MEDICINE

## 2017-07-14 PROCEDURE — 1126F AMNT PAIN NOTED NONE PRSNT: CPT | Mod: ,,, | Performed by: INTERNAL MEDICINE

## 2017-07-14 PROCEDURE — 99999 PR PBB SHADOW E&M-EST. PATIENT-LVL IV: CPT | Mod: PBBFAC,,, | Performed by: INTERNAL MEDICINE

## 2017-07-14 PROCEDURE — 1159F MED LIST DOCD IN RCRD: CPT | Mod: ,,, | Performed by: INTERNAL MEDICINE

## 2017-07-14 PROCEDURE — 99214 OFFICE O/P EST MOD 30 MIN: CPT | Mod: S$PBB,,, | Performed by: INTERNAL MEDICINE

## 2017-07-14 PROCEDURE — 99214 OFFICE O/P EST MOD 30 MIN: CPT | Mod: PBBFAC,PN | Performed by: INTERNAL MEDICINE

## 2017-07-14 RX ORDER — TRAZODONE HYDROCHLORIDE 100 MG/1
200 TABLET ORAL NIGHTLY
Qty: 60 TABLET | Refills: 3 | Status: SHIPPED | OUTPATIENT
Start: 2017-07-14 | End: 2017-12-27 | Stop reason: SDUPTHER

## 2017-07-14 NOTE — PROGRESS NOTES
"Subjective:       Patient ID: Destin Barber is a 83 y.o. male.    Chief Complaint: Hypertension    F/u chronic conditions    HPI :84 y/o w/ HTN CAD s/p CABG, diastolic HF here for scheduled follow up. Reports feeling well no pain, sleeping "okay" will take second trazodone in middle of night if awakens early. No CP orhtopnea or PND no LE swelling.       Review of Systems   Constitutional: Negative for activity change, appetite change, fatigue, fever and unexpected weight change.   HENT: Negative for ear pain, rhinorrhea and sore throat.    Eyes: Negative for discharge and visual disturbance.   Respiratory: Negative for chest tightness, shortness of breath and wheezing.    Cardiovascular: Negative for chest pain, palpitations and leg swelling.   Gastrointestinal: Negative for abdominal pain, constipation and diarrhea.   Endocrine: Negative for cold intolerance and heat intolerance.   Genitourinary: Negative for dysuria and hematuria.   Musculoskeletal: Negative for joint swelling and neck stiffness.   Skin: Negative for rash.   Neurological: Negative for dizziness, syncope, weakness and headaches.   Psychiatric/Behavioral: Negative for suicidal ideas.       Objective:     Vitals:    07/14/17 1004   BP: (!) 148/78   BP Location: Left arm   Patient Position: Sitting   BP Method: Manual   Pulse: 68   Resp: 17   Temp: 98.4 °F (36.9 °C)   TempSrc: Oral   SpO2: 98%   Weight: 66.6 kg (146 lb 13.2 oz)   Height: 5' 6" (1.676 m)          Physical Exam   Constitutional: He is oriented to person, place, and time. He appears well-developed and well-nourished.   HENT:   Head: Normocephalic and atraumatic.   Eyes: Conjunctivae are normal. Pupils are equal, round, and reactive to light.   Neck: Normal range of motion.   Cardiovascular: Normal rate and regular rhythm.  Exam reveals no gallop and no friction rub.    No murmur heard.  No LE edema   Pulmonary/Chest: Effort normal and breath sounds normal. He has no wheezes. He has no " rales.   Abdominal: Soft. Bowel sounds are normal. There is no tenderness. There is no rebound and no guarding.   Musculoskeletal: Normal range of motion. He exhibits no edema or tenderness.   Neurological: He is alert and oriented to person, place, and time. No cranial nerve deficit.   Protective Sensation (w/ 10 gram monofilament):  Right: Intact  Left: Intact    Visual Inspection:  Normal -  Bilateral    Pedal Pulses:   Right: Present  Left: Present    Posterior tibialis:   Right:Present  Left: Present     Skin: Skin is warm and dry.   Psychiatric: He has a normal mood and affect.       Assessment:       1. Diastolic dysfunction    2. Controlled type 2 diabetes mellitus without complication, without long-term current use of insulin    3. Coronary artery disease involving coronary bypass graft of native heart without angina pectoris    4. Essential hypertension    5. Insomnia, unspecified type        Plan:    1/3/4. BP just above goal will check renal function and potassium if in normal range increase lisinopril. contineu high dose statin and DAPT.     2. Repeat a1c today. Continue metformin other labs as above    5. Increase trazodone to 200mg nightly

## 2017-08-24 DIAGNOSIS — M10.9 ACUTE GOUT OF RIGHT FOOT, UNSPECIFIED CAUSE: ICD-10-CM

## 2017-08-24 RX ORDER — DICLOFENAC SODIUM 50 MG/1
50 TABLET, DELAYED RELEASE ORAL DAILY
Qty: 30 TABLET | Refills: 1 | Status: SHIPPED | OUTPATIENT
Start: 2017-08-24 | End: 2017-09-27 | Stop reason: ALTCHOICE

## 2017-09-13 ENCOUNTER — LAB VISIT (OUTPATIENT)
Dept: LAB | Facility: HOSPITAL | Age: 82
End: 2017-09-13
Attending: INTERNAL MEDICINE
Payer: MEDICARE

## 2017-09-13 ENCOUNTER — OFFICE VISIT (OUTPATIENT)
Dept: FAMILY MEDICINE | Facility: CLINIC | Age: 82
End: 2017-09-13
Payer: MEDICARE

## 2017-09-13 VITALS
RESPIRATION RATE: 16 BRPM | TEMPERATURE: 98 F | HEIGHT: 66 IN | DIASTOLIC BLOOD PRESSURE: 98 MMHG | SYSTOLIC BLOOD PRESSURE: 184 MMHG | WEIGHT: 147.25 LBS | HEART RATE: 65 BPM | BODY MASS INDEX: 23.66 KG/M2 | OXYGEN SATURATION: 98 %

## 2017-09-13 DIAGNOSIS — I10 ESSENTIAL HYPERTENSION: ICD-10-CM

## 2017-09-13 DIAGNOSIS — M1A.0710 IDIOPATHIC CHRONIC GOUT OF RIGHT FOOT WITHOUT TOPHUS: ICD-10-CM

## 2017-09-13 DIAGNOSIS — E11.29 MICROALBUMINURIA DUE TO TYPE 2 DIABETES MELLITUS: ICD-10-CM

## 2017-09-13 DIAGNOSIS — I25.10 CORONARY ARTERY DISEASE INVOLVING NATIVE CORONARY ARTERY OF NATIVE HEART WITHOUT ANGINA PECTORIS: ICD-10-CM

## 2017-09-13 DIAGNOSIS — I51.89 DIASTOLIC DYSFUNCTION: ICD-10-CM

## 2017-09-13 DIAGNOSIS — R80.9 MICROALBUMINURIA DUE TO TYPE 2 DIABETES MELLITUS: ICD-10-CM

## 2017-09-13 DIAGNOSIS — E11.9 CONTROLLED TYPE 2 DIABETES MELLITUS WITHOUT COMPLICATION, WITHOUT LONG-TERM CURRENT USE OF INSULIN: ICD-10-CM

## 2017-09-13 DIAGNOSIS — H81.11 BPPV (BENIGN PAROXYSMAL POSITIONAL VERTIGO), RIGHT: Primary | ICD-10-CM

## 2017-09-13 LAB
ALBUMIN SERPL BCP-MCNC: 3.4 G/DL
ALP SERPL-CCNC: 74 U/L
ALT SERPL W/O P-5'-P-CCNC: 10 U/L
ANION GAP SERPL CALC-SCNC: 6 MMOL/L
AST SERPL-CCNC: 15 U/L
BASOPHILS # BLD AUTO: 0.02 K/UL
BASOPHILS NFR BLD: 0.4 %
BILIRUB SERPL-MCNC: 0.3 MG/DL
BUN SERPL-MCNC: 15 MG/DL
CALCIUM SERPL-MCNC: 8.7 MG/DL
CHLORIDE SERPL-SCNC: 108 MMOL/L
CO2 SERPL-SCNC: 28 MMOL/L
CREAT SERPL-MCNC: 1.2 MG/DL
DIFFERENTIAL METHOD: ABNORMAL
EOSINOPHIL # BLD AUTO: 0.2 K/UL
EOSINOPHIL NFR BLD: 2.6 %
ERYTHROCYTE [DISTWIDTH] IN BLOOD BY AUTOMATED COUNT: 13 %
EST. GFR  (AFRICAN AMERICAN): >60 ML/MIN/1.73 M^2
EST. GFR  (NON AFRICAN AMERICAN): 56 ML/MIN/1.73 M^2
GLUCOSE SERPL-MCNC: 99 MG/DL
HCT VFR BLD AUTO: 35.2 %
HGB BLD-MCNC: 12 G/DL
LYMPHOCYTES # BLD AUTO: 2.8 K/UL
LYMPHOCYTES NFR BLD: 50 %
MCH RBC QN AUTO: 30.8 PG
MCHC RBC AUTO-ENTMCNC: 34.1 G/DL
MCV RBC AUTO: 91 FL
MONOCYTES # BLD AUTO: 0.6 K/UL
MONOCYTES NFR BLD: 10.7 %
NEUTROPHILS # BLD AUTO: 2.1 K/UL
NEUTROPHILS NFR BLD: 36.3 %
PLATELET # BLD AUTO: 214 K/UL
PMV BLD AUTO: 9.4 FL
POTASSIUM SERPL-SCNC: 4 MMOL/L
PROT SERPL-MCNC: 6.8 G/DL
RBC # BLD AUTO: 3.89 M/UL
SODIUM SERPL-SCNC: 142 MMOL/L
WBC # BLD AUTO: 5.68 K/UL

## 2017-09-13 PROCEDURE — 99999 PR PBB SHADOW E&M-EST. PATIENT-LVL III: CPT | Mod: PBBFAC,,, | Performed by: INTERNAL MEDICINE

## 2017-09-13 PROCEDURE — 83036 HEMOGLOBIN GLYCOSYLATED A1C: CPT

## 2017-09-13 PROCEDURE — 36415 COLL VENOUS BLD VENIPUNCTURE: CPT | Mod: PN

## 2017-09-13 PROCEDURE — 99213 OFFICE O/P EST LOW 20 MIN: CPT | Mod: PBBFAC,PN | Performed by: INTERNAL MEDICINE

## 2017-09-13 PROCEDURE — 99214 OFFICE O/P EST MOD 30 MIN: CPT | Mod: S$PBB,,, | Performed by: INTERNAL MEDICINE

## 2017-09-13 PROCEDURE — 3080F DIAST BP >= 90 MM HG: CPT | Mod: ,,, | Performed by: INTERNAL MEDICINE

## 2017-09-13 PROCEDURE — 1126F AMNT PAIN NOTED NONE PRSNT: CPT | Mod: ,,, | Performed by: INTERNAL MEDICINE

## 2017-09-13 PROCEDURE — 80053 COMPREHEN METABOLIC PANEL: CPT

## 2017-09-13 PROCEDURE — 85025 COMPLETE CBC W/AUTO DIFF WBC: CPT

## 2017-09-13 PROCEDURE — 1157F ADVNC CARE PLAN IN RCRD: CPT | Mod: ,,, | Performed by: INTERNAL MEDICINE

## 2017-09-13 PROCEDURE — 1159F MED LIST DOCD IN RCRD: CPT | Mod: ,,, | Performed by: INTERNAL MEDICINE

## 2017-09-13 PROCEDURE — 3077F SYST BP >= 140 MM HG: CPT | Mod: ,,, | Performed by: INTERNAL MEDICINE

## 2017-09-13 RX ORDER — ASPIRIN 81 MG/1
81 TABLET ORAL DAILY
Qty: 30 TABLET | Refills: 5 | Status: SHIPPED | OUTPATIENT
Start: 2017-09-13 | End: 2018-05-30 | Stop reason: SDUPTHER

## 2017-09-13 RX ORDER — ALLOPURINOL 100 MG/1
100 TABLET ORAL 2 TIMES DAILY
Qty: 60 TABLET | Refills: 5 | Status: SHIPPED | OUTPATIENT
Start: 2017-09-13 | End: 2019-04-23 | Stop reason: SDUPTHER

## 2017-09-13 RX ORDER — METOPROLOL TARTRATE 25 MG/1
25 TABLET, FILM COATED ORAL 2 TIMES DAILY
Qty: 60 TABLET | Refills: 5 | Status: SHIPPED | OUTPATIENT
Start: 2017-09-13 | End: 2018-06-27 | Stop reason: SDUPTHER

## 2017-09-13 RX ORDER — ALBUTEROL SULFATE 90 UG/1
AEROSOL, METERED RESPIRATORY (INHALATION)
Refills: 1 | Status: CANCELLED | OUTPATIENT
Start: 2017-09-13

## 2017-09-13 RX ORDER — AMLODIPINE BESYLATE 10 MG/1
10 TABLET ORAL DAILY
Qty: 30 TABLET | Refills: 5 | Status: SHIPPED | OUTPATIENT
Start: 2017-09-13 | End: 2018-06-27 | Stop reason: SDUPTHER

## 2017-09-13 RX ORDER — MECLIZINE HCL 12.5 MG 12.5 MG/1
12.5 TABLET ORAL 3 TIMES DAILY PRN
Qty: 30 TABLET | Refills: 0 | Status: SHIPPED | OUTPATIENT
Start: 2017-09-13 | End: 2018-06-27

## 2017-09-13 RX ORDER — LISINOPRIL 10 MG/1
10 TABLET ORAL DAILY
Qty: 30 TABLET | Refills: 5 | Status: SHIPPED | OUTPATIENT
Start: 2017-09-13 | End: 2018-02-27 | Stop reason: SDUPTHER

## 2017-09-13 RX ORDER — ATORVASTATIN CALCIUM 80 MG/1
80 TABLET, FILM COATED ORAL DAILY
Qty: 30 TABLET | Refills: 5 | Status: SHIPPED | OUTPATIENT
Start: 2017-09-13 | End: 2018-05-30 | Stop reason: SDUPTHER

## 2017-09-13 RX ORDER — CLOPIDOGREL BISULFATE 75 MG/1
75 TABLET ORAL DAILY
Qty: 30 TABLET | Refills: 5 | Status: SHIPPED | OUTPATIENT
Start: 2017-09-13 | End: 2018-05-30 | Stop reason: SDUPTHER

## 2017-09-13 NOTE — PROGRESS NOTES
"Subjective:       Patient ID: Destin Barber is a 83 y.o. male.    Chief Complaint: Extremity Weakness    Dizziness x one year    HPI: 84 y/o w/ HTN CAD s/p CABG presents to discuss chronic dizziness. Symptoms have been intermittent for at least the last year. Reports sensation of room spinning when turning in bed or going from lying to standing. No palpitations no loss of consciousness. Symptoms last for few seconds resolve with standing still     He also reports being out of blood pressure medications x two weeks. No CP orhtopnea or PND      Review of Systems   Constitutional: Negative for activity change, appetite change, fatigue, fever and unexpected weight change.   HENT: Negative for ear pain, rhinorrhea and sore throat.    Eyes: Negative for discharge and visual disturbance.   Respiratory: Negative for chest tightness, shortness of breath and wheezing.    Cardiovascular: Negative for chest pain, palpitations and leg swelling.   Gastrointestinal: Negative for abdominal pain, constipation and diarrhea.   Endocrine: Negative for cold intolerance and heat intolerance.   Genitourinary: Negative for dysuria and hematuria.   Musculoskeletal: Negative for joint swelling and neck stiffness.   Skin: Negative for rash.   Neurological: Negative for dizziness, syncope, weakness and headaches.   Psychiatric/Behavioral: Negative for suicidal ideas.       Objective:     Vitals:    09/13/17 1439   BP: (!) 184/98   BP Location: Left arm   Patient Position: Sitting   BP Method: Small (Manual)   Pulse: 65   Resp: 16   Temp: 97.9 °F (36.6 °C)   TempSrc: Oral   SpO2: 98%   Weight: 66.8 kg (147 lb 4.3 oz)   Height: 5' 6" (1.676 m)          Physical Exam   Constitutional: He is oriented to person, place, and time. He appears well-developed and well-nourished.   HENT:   Head: Normocephalic and atraumatic.   Eyes: Conjunctivae are normal. Pupils are equal, round, and reactive to light.   Neck: Normal range of motion. "   Cardiovascular: Normal rate and regular rhythm.  Exam reveals no gallop and no friction rub.    No murmur heard.  Pulmonary/Chest: Effort normal and breath sounds normal. He has no wheezes. He has no rales.   Abdominal: Soft. Bowel sounds are normal. There is no tenderness. There is no rebound and no guarding.   Musculoskeletal: Normal range of motion. He exhibits no edema or tenderness.   Neurological: He is alert and oriented to person, place, and time. No cranial nerve deficit.   Right side carlita-hallpike shows five beats of horizontal nystagmus   Skin: Skin is warm and dry.   Psychiatric: He has a normal mood and affect.       Assessment:       1. BPPV (benign paroxysmal positional vertigo), right    2. Essential hypertension    3. Coronary artery disease involving native coronary artery of native heart without angina pectoris    4. Microalbuminuria due to type 2 diabetes mellitus    5. Idiopathic chronic gout of right foot without tophus        Plan:    1. epley manuver handout given to perform exercise twice per day, meclizine prior to first attempt to limit symptoms    2. Above goal off medications new scripts sent labs today    3. Continue DAPT stressed importance of medication compliance    4. Repeat a1c today continue metformin    5. Stable on allopurinol    F/u two weeks to monitor symptoms and check blood pressur dalia medication

## 2017-09-14 LAB
ESTIMATED AVG GLUCOSE: 123 MG/DL
HBA1C MFR BLD HPLC: 5.9 %

## 2017-09-27 ENCOUNTER — OFFICE VISIT (OUTPATIENT)
Dept: FAMILY MEDICINE | Facility: CLINIC | Age: 82
End: 2017-09-27
Payer: MEDICARE

## 2017-09-27 VITALS
HEART RATE: 72 BPM | SYSTOLIC BLOOD PRESSURE: 144 MMHG | RESPIRATION RATE: 17 BRPM | WEIGHT: 151.44 LBS | OXYGEN SATURATION: 99 % | HEIGHT: 66 IN | TEMPERATURE: 97 F | DIASTOLIC BLOOD PRESSURE: 82 MMHG | BODY MASS INDEX: 24.34 KG/M2

## 2017-09-27 DIAGNOSIS — E11.22 TYPE 2 DIABETES MELLITUS WITH STAGE 3 CHRONIC KIDNEY DISEASE, WITHOUT LONG-TERM CURRENT USE OF INSULIN: Primary | ICD-10-CM

## 2017-09-27 DIAGNOSIS — N18.30 CKD (CHRONIC KIDNEY DISEASE), STAGE III: ICD-10-CM

## 2017-09-27 DIAGNOSIS — I10 ESSENTIAL HYPERTENSION: ICD-10-CM

## 2017-09-27 DIAGNOSIS — I25.810 CORONARY ARTERY DISEASE INVOLVING CORONARY BYPASS GRAFT OF NATIVE HEART WITHOUT ANGINA PECTORIS: ICD-10-CM

## 2017-09-27 DIAGNOSIS — Z23 NEED FOR INFLUENZA VACCINATION: ICD-10-CM

## 2017-09-27 DIAGNOSIS — N18.30 TYPE 2 DIABETES MELLITUS WITH STAGE 3 CHRONIC KIDNEY DISEASE, WITHOUT LONG-TERM CURRENT USE OF INSULIN: Primary | ICD-10-CM

## 2017-09-27 PROCEDURE — 99215 OFFICE O/P EST HI 40 MIN: CPT | Mod: PBBFAC,PN | Performed by: INTERNAL MEDICINE

## 2017-09-27 PROCEDURE — 99214 OFFICE O/P EST MOD 30 MIN: CPT | Mod: S$PBB,,, | Performed by: INTERNAL MEDICINE

## 2017-09-27 PROCEDURE — 3079F DIAST BP 80-89 MM HG: CPT | Mod: ,,, | Performed by: INTERNAL MEDICINE

## 2017-09-27 PROCEDURE — 3077F SYST BP >= 140 MM HG: CPT | Mod: ,,, | Performed by: INTERNAL MEDICINE

## 2017-09-27 PROCEDURE — 1157F ADVNC CARE PLAN IN RCRD: CPT | Mod: ,,, | Performed by: INTERNAL MEDICINE

## 2017-09-27 PROCEDURE — G0008 ADMIN INFLUENZA VIRUS VAC: HCPCS | Mod: PBBFAC,PN

## 2017-09-27 PROCEDURE — 1125F AMNT PAIN NOTED PAIN PRSNT: CPT | Mod: ,,, | Performed by: INTERNAL MEDICINE

## 2017-09-27 PROCEDURE — 99999 PR PBB SHADOW E&M-EST. PATIENT-LVL V: CPT | Mod: PBBFAC,,, | Performed by: INTERNAL MEDICINE

## 2017-09-27 PROCEDURE — 1159F MED LIST DOCD IN RCRD: CPT | Mod: ,,, | Performed by: INTERNAL MEDICINE

## 2017-09-27 NOTE — PROGRESS NOTES
Influenza vaccine Given to the pt as ordered by the MD. The patient tolerated well, I advised the patient to wait 15 minuets to observe for any vaccine reactions. The pt. Expressed an understanding.

## 2017-09-27 NOTE — PROGRESS NOTES
"Subjective:       Patient ID: Destin Barber is a 83 y.o. male.    Chief Complaint: Hypertension; Follow-up; and Flu Vaccine    F/u dizziness and labs    HPI: 82 y/o w/ HTN, DM CAD s/p CABG on DAPT presents for follow up for positional vertigo. Reports complete resolution of vertigo symptoms with exercises still doing them once per day but no longer feels dizzy when doing them. Denies CP, dyspnea LE swelling or orthopnea. Overall feels well. Has resumed BP medications since last visit as well       Review of Systems   Constitutional: Negative for activity change, fever and unexpected weight change.   HENT: Negative for congestion, rhinorrhea, sore throat and trouble swallowing.    Eyes: Negative for photophobia and redness.   Respiratory: Negative for cough, chest tightness, shortness of breath and wheezing.    Cardiovascular: Negative for chest pain, palpitations and leg swelling.   Gastrointestinal: Negative for abdominal pain, blood in stool, constipation, diarrhea, nausea and vomiting.   Endocrine: Negative for cold intolerance, heat intolerance and polyuria.   Genitourinary: Negative for decreased urine volume, difficulty urinating, dysuria and urgency.   Musculoskeletal: Negative for arthralgias and back pain.   Skin: Negative for rash.   Neurological: Negative for dizziness, syncope, weakness and headaches.   Psychiatric/Behavioral: Negative for dysphoric mood, sleep disturbance and suicidal ideas.       Objective:     Vitals:    09/27/17 0813 09/27/17 0833   BP: (!) 148/78 (!) 144/82   BP Location: Right arm    Patient Position: Sitting    BP Method: Medium (Manual)    Pulse: 81 72   Resp: 17    Temp: 97.4 °F (36.3 °C)    TempSrc: Oral    SpO2: 99%    Weight: 68.7 kg (151 lb 7.3 oz)    Height: 5' 6" (1.676 m)           Physical Exam   Constitutional: He is oriented to person, place, and time. He appears well-developed and well-nourished.   HENT:   Head: Normocephalic and atraumatic.   Eyes: Conjunctivae are " normal.   Neck: Normal range of motion.   Cardiovascular: Normal rate and regular rhythm.  Exam reveals no gallop and no friction rub.    No murmur heard.  No LE edema   Pulmonary/Chest: Effort normal and breath sounds normal. He has no wheezes. He has no rales.   Abdominal: Soft. Bowel sounds are normal. There is no tenderness. There is no rebound and no guarding.   Musculoskeletal: Normal range of motion. He exhibits no edema or tenderness.   Neurological: He is alert and oriented to person, place, and time. No cranial nerve deficit.   Skin: Skin is warm and dry.   Psychiatric: He has a normal mood and affect.       Assessment:       1. Type 2 diabetes mellitus with stage 3 chronic kidney disease, without long-term current use of insulin    2. Essential hypertension    3. CKD (chronic kidney disease), stage III    4. Need for influenza vaccination    5. Coronary artery disease involving coronary bypass graft of native heart without angina pectoris        Plan:     1.  a1c at goal renal dose of metformin referal fro DFE    2. bp significatnly improved continue current medicatiosn    3. Likely related to #1 and 2 along with age related declined stressed importance of avoiding NSAIDs, not taking PPI (taken off medication list). Okay to use APAP for pain. Recommend good oral hydration    4. Flu vaccine today    5. On DAPT overdue for cards follow up without exertional symptoms

## 2017-10-17 ENCOUNTER — OFFICE VISIT (OUTPATIENT)
Dept: CARDIOLOGY | Facility: CLINIC | Age: 82
End: 2017-10-17
Payer: MEDICARE

## 2017-10-17 VITALS
SYSTOLIC BLOOD PRESSURE: 158 MMHG | OXYGEN SATURATION: 96 % | WEIGHT: 152.13 LBS | HEIGHT: 66 IN | HEART RATE: 95 BPM | DIASTOLIC BLOOD PRESSURE: 8 MMHG | BODY MASS INDEX: 24.45 KG/M2

## 2017-10-17 DIAGNOSIS — E78.5 HYPERLIPIDEMIA, UNSPECIFIED HYPERLIPIDEMIA TYPE: ICD-10-CM

## 2017-10-17 DIAGNOSIS — Z95.1 S/P CABG (CORONARY ARTERY BYPASS GRAFT): Primary | ICD-10-CM

## 2017-10-17 DIAGNOSIS — I10 ESSENTIAL HYPERTENSION: ICD-10-CM

## 2017-10-17 DIAGNOSIS — I10 HTN (HYPERTENSION): ICD-10-CM

## 2017-10-17 DIAGNOSIS — E11.9 CONTROLLED TYPE 2 DIABETES MELLITUS WITHOUT COMPLICATION, WITHOUT LONG-TERM CURRENT USE OF INSULIN: ICD-10-CM

## 2017-10-17 PROCEDURE — 99999 PR PBB SHADOW E&M-EST. PATIENT-LVL IV: CPT | Mod: PBBFAC,,, | Performed by: INTERNAL MEDICINE

## 2017-10-17 PROCEDURE — 99214 OFFICE O/P EST MOD 30 MIN: CPT | Mod: PBBFAC,25 | Performed by: INTERNAL MEDICINE

## 2017-10-17 PROCEDURE — 93005 ELECTROCARDIOGRAM TRACING: CPT | Mod: PBBFAC | Performed by: INTERNAL MEDICINE

## 2017-10-17 PROCEDURE — 99214 OFFICE O/P EST MOD 30 MIN: CPT | Mod: S$PBB,,, | Performed by: INTERNAL MEDICINE

## 2017-10-17 PROCEDURE — 93010 ELECTROCARDIOGRAM REPORT: CPT | Mod: S$PBB,,, | Performed by: INTERNAL MEDICINE

## 2017-10-17 RX ORDER — PANTOPRAZOLE SODIUM 40 MG/1
TABLET, DELAYED RELEASE ORAL
COMMUNITY
Start: 2017-10-09 | End: 2018-03-22

## 2017-10-17 RX ORDER — DOXYCYCLINE HYCLATE 100 MG
100 TABLET ORAL 2 TIMES DAILY
Qty: 14 TABLET | Refills: 0 | Status: SHIPPED | OUTPATIENT
Start: 2017-10-17 | End: 2017-12-27 | Stop reason: ALTCHOICE

## 2017-10-17 NOTE — LETTER
October 17, 2017      Harry Velazco MD  [address]  [place] [address]           [facility] [place] [facility]  [address] [facility] [address]  [facility] [address]  Phone: [phone]          Patient: Destin Barber   MR Number: [id number]   YOB: 1934   Date of Visit: 10/17/2017       Dear Dr. Harry Velazco:    Thank you for referring Destin Barber to me for evaluation. Attached you will find relevant portions of my assessment and plan of care.    If you have questions, please do not hesitate to call me. I look forward to following Destin Barber along with you.    Sincerely,    Sreedhar Candelaria MD    Enclosure  CC:  No Recipients    If you would like to receive this communication electronically, please contact [email or web address] or [phone] to request more information on [facility] Link access.    For providers and/or their staff who would like to refer a patient to [facility], please contact us through our one-stop-shop provider referral line, [facility], at [phone].    If you feel you have received this communication in error or would no longer like to receive these types of communications, please e-mail [email or web address]

## 2017-10-17 NOTE — PROGRESS NOTES
Subjective:    Patient ID:  Destin Barber is a 83 y.o. male who presents for follow-up of Coronary Artery Disease      HPI     Previously saw Dr Piedra 12/2016  # CAD/ACS s/p 3V CABG 7/2016.  Appears his LCx/OM was not bypassed due to poor available vein conduit.  No LCx ischemia noted on MPI.  # HTN, uncontrolled in the setting of med noncompliance  # HLP  # DM  # abnl EKG      CAD/ACS s/p CABG 7/7/16: LIMA-LAD, SVG-Ramus, SVG-PDA (Dr. Hallman).  LCx/OM branch not bypassed due to poor quality venous conduit available    Ex MPI 11/29/16 (images pers rev)  The patient exercised for 4.45 minutes on a High Ramp protocol, achieving a peak heart rate of 121 bpm, which is 86% of the age predicted maximum heart rate. -CP/EKG.   Nuclear Quantitative Functional Analysis:   LVEF: 60 %  Impression: ABNORMAL MYOCARDIAL PERFUSION  1. The perfusion scan is free of evidence for myocardial ischemia.   2. There is mild intensity fixed defect in the anteroapical wall of the left ventricle, consistent with myocardial injury.   3. There is a mild intensity fixed defect in the inferior wall of the left ventricle, secondary to diaphragm attenuation.   4. Resting wall motion is physiologic.   5. Resting LV function is normal.   6. The ventricular volumes are normal at rest and stress.   7. The extracardiac distribution of radioactivity is normal.      Echo 6/25/16    1 - Normal left ventricular systolic function (EF 60-65%).     2 - Mild left atrial enlargement.     3 - Left ventricular diastolic dysfunction.     4 - Normal right ventricular systolic function .     5 - Trivial to mild aortic regurgitation.     6 - Trivial to mild mitral regurgitation.     7 - Trivial to mild tricuspid regurgitation.     8 - Pulmonary hypertension. The estimated PA systolic pressure is 44 mmHg.      Cath 6/24/16  LVEDP: 7mmHg  LVEF: 55%  Wall Motion: normal  Dominance: Right  LM: normal  LAD: ost/prox eccentric 70-80%, prox 50%, excellent mid LAD  "target.  Ramus: prox 50-70%, mid 90%, bifurcating distal vessel (both excellent targets)  LCx: prox 95% at bifurcation of OM1.  OM1 ost/prox 80%, bifurcating vessel, excellent target.  RCA: dominant, anterior takeoff with "blandon's crook" prox vessel.Prox 70%, mid 70%. Excellent PDA target.  Hemostasis:  R Radial band  Impression:  NSTEMI  4V CAD, normal LV fxn  R radial vasband for hemostasis     Carotid US 7/6/16  RIGHT SIDE:   1 - 39 % right ICA stenosis.   Heterogeneous plaque in the right internal carotid artery.   Antegrade flow in the right vertebral artery.   LEFT SIDE:  1 - 39% left ICA stenosis.   Homogeneous plaque in the left internal carotid artery.   Antegrade flow in the left vertebral artery.     Overall doing well. Denies CP or SOB  EKG NSR TWI V1 and V2 - similar to prior EKG  Suffering with an URI      Review of Systems   Constitution: Negative for decreased appetite.   HENT: Negative for ear discharge.    Eyes: Negative for blurred vision.   Endocrine: Negative for polyphagia.   Skin: Negative for nail changes.   Neurological: Negative for aphonia.   Psychiatric/Behavioral: Negative for hallucinations.        Objective:    Physical Exam   Constitutional: He is oriented to person, place, and time. He appears well-developed and well-nourished.   HENT:   Head: Normocephalic and atraumatic.   Eyes: Conjunctivae are normal. Pupils are equal, round, and reactive to light.   Neck: Normal range of motion. Neck supple.   Cardiovascular: Normal rate, normal heart sounds and intact distal pulses.    Pulmonary/Chest: Effort normal and breath sounds normal.   Abdominal: Soft. Bowel sounds are normal.   Musculoskeletal: Normal range of motion.   Neurological: He is alert and oriented to person, place, and time.   Skin: Skin is warm and dry.         Assessment:       1. S/P CABG (coronary artery bypass graft)    2. Hyperlipidemia, unspecified hyperlipidemia type    3. Essential hypertension    4. Controlled " type 2 diabetes mellitus without complication, without long-term current use of insulin         Plan:       Cardiac stable  Doxycycline for URI  OV 6 months with Dr Piedra

## 2017-12-27 ENCOUNTER — LAB VISIT (OUTPATIENT)
Dept: LAB | Facility: HOSPITAL | Age: 82
End: 2017-12-27
Attending: INTERNAL MEDICINE
Payer: MEDICARE

## 2017-12-27 ENCOUNTER — OFFICE VISIT (OUTPATIENT)
Dept: FAMILY MEDICINE | Facility: CLINIC | Age: 82
End: 2017-12-27
Payer: MEDICARE

## 2017-12-27 VITALS
SYSTOLIC BLOOD PRESSURE: 144 MMHG | BODY MASS INDEX: 24.73 KG/M2 | DIASTOLIC BLOOD PRESSURE: 82 MMHG | HEIGHT: 66 IN | HEART RATE: 80 BPM | RESPIRATION RATE: 17 BRPM | WEIGHT: 153.88 LBS | OXYGEN SATURATION: 98 % | TEMPERATURE: 98 F

## 2017-12-27 DIAGNOSIS — I10 ESSENTIAL HYPERTENSION: Primary | ICD-10-CM

## 2017-12-27 DIAGNOSIS — M79.622 PAIN IN LEFT AXILLA: ICD-10-CM

## 2017-12-27 DIAGNOSIS — G47.00 INSOMNIA, UNSPECIFIED TYPE: ICD-10-CM

## 2017-12-27 DIAGNOSIS — I51.89 DIASTOLIC DYSFUNCTION: ICD-10-CM

## 2017-12-27 DIAGNOSIS — N18.30 CONTROLLED TYPE 2 DIABETES MELLITUS WITH STAGE 3 CHRONIC KIDNEY DISEASE, WITHOUT LONG-TERM CURRENT USE OF INSULIN: ICD-10-CM

## 2017-12-27 DIAGNOSIS — E11.22 CONTROLLED TYPE 2 DIABETES MELLITUS WITH STAGE 3 CHRONIC KIDNEY DISEASE, WITHOUT LONG-TERM CURRENT USE OF INSULIN: ICD-10-CM

## 2017-12-27 LAB
ALBUMIN SERPL BCP-MCNC: 3.4 G/DL
ALP SERPL-CCNC: 84 U/L
ALT SERPL W/O P-5'-P-CCNC: 7 U/L
ANION GAP SERPL CALC-SCNC: 7 MMOL/L
AST SERPL-CCNC: 16 U/L
BASOPHILS # BLD AUTO: 0.01 K/UL
BASOPHILS NFR BLD: 0.2 %
BILIRUB SERPL-MCNC: 0.3 MG/DL
BUN SERPL-MCNC: 28 MG/DL
CALCIUM SERPL-MCNC: 9.1 MG/DL
CHLORIDE SERPL-SCNC: 108 MMOL/L
CHOLEST SERPL-MCNC: 190 MG/DL
CHOLEST/HDLC SERPL: 5.9 {RATIO}
CO2 SERPL-SCNC: 24 MMOL/L
CREAT SERPL-MCNC: 1.4 MG/DL
DIFFERENTIAL METHOD: ABNORMAL
EOSINOPHIL # BLD AUTO: 0.1 K/UL
EOSINOPHIL NFR BLD: 2.1 %
ERYTHROCYTE [DISTWIDTH] IN BLOOD BY AUTOMATED COUNT: 13.5 %
EST. GFR  (AFRICAN AMERICAN): 54 ML/MIN/1.73 M^2
EST. GFR  (NON AFRICAN AMERICAN): 47 ML/MIN/1.73 M^2
ESTIMATED AVG GLUCOSE: 117 MG/DL
GLUCOSE SERPL-MCNC: 100 MG/DL
HBA1C MFR BLD HPLC: 5.7 %
HCT VFR BLD AUTO: 35.3 %
HDLC SERPL-MCNC: 32 MG/DL
HDLC SERPL: 16.8 %
HGB BLD-MCNC: 12.2 G/DL
LDLC SERPL CALC-MCNC: 96.2 MG/DL
LYMPHOCYTES # BLD AUTO: 2.6 K/UL
LYMPHOCYTES NFR BLD: 48.8 %
MCH RBC QN AUTO: 31 PG
MCHC RBC AUTO-ENTMCNC: 34.6 G/DL
MCV RBC AUTO: 90 FL
MONOCYTES # BLD AUTO: 0.5 K/UL
MONOCYTES NFR BLD: 9.6 %
NEUTROPHILS # BLD AUTO: 2.1 K/UL
NEUTROPHILS NFR BLD: 39.3 %
NONHDLC SERPL-MCNC: 158 MG/DL
PLATELET # BLD AUTO: 193 K/UL
PMV BLD AUTO: 9.6 FL
POTASSIUM SERPL-SCNC: 3.7 MMOL/L
PROT SERPL-MCNC: 6.9 G/DL
RBC # BLD AUTO: 3.94 M/UL
SODIUM SERPL-SCNC: 139 MMOL/L
TRIGL SERPL-MCNC: 309 MG/DL
WBC # BLD AUTO: 5.31 K/UL

## 2017-12-27 PROCEDURE — 80053 COMPREHEN METABOLIC PANEL: CPT

## 2017-12-27 PROCEDURE — 83036 HEMOGLOBIN GLYCOSYLATED A1C: CPT

## 2017-12-27 PROCEDURE — 36415 COLL VENOUS BLD VENIPUNCTURE: CPT | Mod: PN

## 2017-12-27 PROCEDURE — 99999 PR PBB SHADOW E&M-EST. PATIENT-LVL IV: CPT | Mod: PBBFAC,,, | Performed by: INTERNAL MEDICINE

## 2017-12-27 PROCEDURE — 85025 COMPLETE CBC W/AUTO DIFF WBC: CPT

## 2017-12-27 PROCEDURE — 80061 LIPID PANEL: CPT

## 2017-12-27 PROCEDURE — 99214 OFFICE O/P EST MOD 30 MIN: CPT | Mod: S$PBB,,, | Performed by: INTERNAL MEDICINE

## 2017-12-27 PROCEDURE — 99214 OFFICE O/P EST MOD 30 MIN: CPT | Mod: PBBFAC,25,PN | Performed by: INTERNAL MEDICINE

## 2017-12-27 RX ORDER — TIZANIDINE 4 MG/1
4 TABLET ORAL EVERY 8 HOURS PRN
Qty: 30 TABLET | Refills: 0 | Status: SHIPPED | OUTPATIENT
Start: 2017-12-27 | End: 2018-01-06

## 2017-12-27 RX ORDER — TRAZODONE HYDROCHLORIDE 100 MG/1
200 TABLET ORAL NIGHTLY
Qty: 60 TABLET | Refills: 5 | Status: SHIPPED | OUTPATIENT
Start: 2017-12-27 | End: 2018-06-27 | Stop reason: SDUPTHER

## 2017-12-27 RX ORDER — DICLOFENAC SODIUM 50 MG/1
50 TABLET, DELAYED RELEASE ORAL DAILY
COMMUNITY
Start: 2017-11-27 | End: 2019-04-29 | Stop reason: SDUPTHER

## 2017-12-27 NOTE — PROGRESS NOTES
"Subjective:       Patient ID: Destin Barber is a 83 y.o. male.    Chief Complaint: Hypertension; Hyperlipidemia; and Follow-up (3 month)    F/u chronic conditions    HPI: 84 y/o w/ HTN CAD s/p CABG, DM, CKD III presents for scheduled follow up. Primary complaint today is left flank pain for a couple of months. Pain is intermittent exacerbated by cold weather no change with deep inspiration or PO intake. Some "tightness with overhead activities. From under left costal angle to left axilla feels "tight" and "sharp" no numbness no overlying skin changes no motor weakness. Has not been taking any medication for pain      Review of Systems   Constitutional: Negative for activity change, appetite change, fatigue, fever and unexpected weight change.   HENT: Negative for ear pain, rhinorrhea and sore throat.    Eyes: Negative for discharge and visual disturbance.   Respiratory: Negative for chest tightness, shortness of breath and wheezing.    Cardiovascular: Negative for chest pain, palpitations and leg swelling.   Gastrointestinal: Negative for abdominal pain, constipation and diarrhea.   Endocrine: Negative for cold intolerance and heat intolerance.   Genitourinary: Negative for dysuria and hematuria.   Musculoskeletal: Negative for joint swelling and neck stiffness.   Skin: Negative for rash.   Neurological: Negative for dizziness, syncope, weakness and headaches.   Psychiatric/Behavioral: Negative for suicidal ideas.       Objective:     Vitals:    12/27/17 0756 12/27/17 1246   BP: (!) 150/80 (!) 144/82   BP Location: Left arm    Patient Position: Sitting    BP Method: Medium (Manual)    Pulse: 82 80   Resp: 17    Temp: 97.9 °F (36.6 °C)    TempSrc: Oral    SpO2: 98%    Weight: 69.8 kg (153 lb 14.1 oz)    Height: 5' 6" (1.676 m)           Physical Exam   Constitutional: He is oriented to person, place, and time. He appears well-developed and well-nourished.   HENT:   Head: Normocephalic and atraumatic.   Eyes: " Conjunctivae are normal. Pupils are equal, round, and reactive to light.   Neck: Normal range of motion.   Cardiovascular: Normal rate and regular rhythm.  Exam reveals no gallop and no friction rub.    No murmur heard.  No LE edema   Pulmonary/Chest: Effort normal and breath sounds normal. He has no wheezes. He has no rales.   Good air movement to bases bilaterally no crackles   Abdominal: Soft. Bowel sounds are normal. There is no tenderness. There is no rebound and no guarding.   No CVA tenderness   Musculoskeletal: Normal range of motion. He exhibits no edema or tenderness.   Neurological: He is alert and oriented to person, place, and time. No cranial nerve deficit.   Skin: Skin is warm and dry.   Left axilla and entire back examined no rashes or vesicles no tenderness to palaption of area no crepitus   Psychiatric: He has a normal mood and affect.       Assessment:       1. Essential hypertension    2. Diastolic dysfunction    3. Controlled type 2 diabetes mellitus with stage 3 chronic kidney disease, without long-term current use of insulin    4. Insomnia, unspecified type    5. Pain in left axilla        Plan:    1/2. bp at goal for age continue current medications clinically euvolemic check renal function in light of known ckd conintue limiting nsaids (see bleow)    3. Repeat a1c utd on eye exam continue metformin    4. Improved with trazodone continue    5. Check chest xray to eval for pulmonary parachymal abnormality trial muscle relaxer tid

## 2018-01-04 RX ORDER — DICLOFENAC SODIUM 50 MG/1
50 TABLET, DELAYED RELEASE ORAL DAILY
OUTPATIENT
Start: 2018-01-04

## 2018-01-09 ENCOUNTER — OFFICE VISIT (OUTPATIENT)
Dept: FAMILY MEDICINE | Facility: CLINIC | Age: 83
End: 2018-01-09
Payer: MEDICARE

## 2018-01-09 ENCOUNTER — LAB VISIT (OUTPATIENT)
Dept: LAB | Facility: HOSPITAL | Age: 83
End: 2018-01-09
Attending: FAMILY MEDICINE
Payer: MEDICARE

## 2018-01-09 VITALS
HEART RATE: 77 BPM | SYSTOLIC BLOOD PRESSURE: 128 MMHG | HEIGHT: 65 IN | DIASTOLIC BLOOD PRESSURE: 60 MMHG | BODY MASS INDEX: 25.75 KG/M2 | WEIGHT: 154.56 LBS | OXYGEN SATURATION: 99 % | TEMPERATURE: 98 F

## 2018-01-09 DIAGNOSIS — R20.2 PARESTHESIA: ICD-10-CM

## 2018-01-09 DIAGNOSIS — I10 ESSENTIAL HYPERTENSION: Primary | ICD-10-CM

## 2018-01-09 DIAGNOSIS — R79.9 ABNORMAL FINDING OF BLOOD CHEMISTRY: ICD-10-CM

## 2018-01-09 DIAGNOSIS — E11.22 CONTROLLED TYPE 2 DIABETES MELLITUS WITH STAGE 3 CHRONIC KIDNEY DISEASE, WITHOUT LONG-TERM CURRENT USE OF INSULIN: ICD-10-CM

## 2018-01-09 DIAGNOSIS — M1A.9XX0 CHRONIC GOUT WITHOUT TOPHUS, UNSPECIFIED CAUSE, UNSPECIFIED SITE: ICD-10-CM

## 2018-01-09 DIAGNOSIS — D64.9 ANEMIA, UNSPECIFIED TYPE: ICD-10-CM

## 2018-01-09 DIAGNOSIS — E87.6 HYPOKALEMIA: ICD-10-CM

## 2018-01-09 DIAGNOSIS — R07.81 RIB PAIN ON LEFT SIDE: ICD-10-CM

## 2018-01-09 DIAGNOSIS — N18.30 CHRONIC KIDNEY DISEASE, STAGE III (MODERATE): ICD-10-CM

## 2018-01-09 DIAGNOSIS — N18.30 CONTROLLED TYPE 2 DIABETES MELLITUS WITH STAGE 3 CHRONIC KIDNEY DISEASE, WITHOUT LONG-TERM CURRENT USE OF INSULIN: ICD-10-CM

## 2018-01-09 LAB
25(OH)D3+25(OH)D2 SERPL-MCNC: 30 NG/ML
ALBUMIN SERPL BCP-MCNC: 3.7 G/DL
ALP SERPL-CCNC: 99 U/L
ALT SERPL W/O P-5'-P-CCNC: 9 U/L
ANION GAP SERPL CALC-SCNC: 7 MMOL/L
AST SERPL-CCNC: 17 U/L
BASOPHILS # BLD AUTO: 0.02 K/UL
BASOPHILS NFR BLD: 0.4 %
BILIRUB SERPL-MCNC: 0.2 MG/DL
BUN SERPL-MCNC: 17 MG/DL
CALCIUM SERPL-MCNC: 9.1 MG/DL
CHLORIDE SERPL-SCNC: 107 MMOL/L
CO2 SERPL-SCNC: 25 MMOL/L
CREAT SERPL-MCNC: 1.2 MG/DL
DIFFERENTIAL METHOD: ABNORMAL
EOSINOPHIL # BLD AUTO: 0.1 K/UL
EOSINOPHIL NFR BLD: 2 %
ERYTHROCYTE [DISTWIDTH] IN BLOOD BY AUTOMATED COUNT: 12.9 %
EST. GFR  (AFRICAN AMERICAN): >60 ML/MIN/1.73 M^2
EST. GFR  (NON AFRICAN AMERICAN): 56.4 ML/MIN/1.73 M^2
FERRITIN SERPL-MCNC: 221 NG/ML
GLUCOSE SERPL-MCNC: 106 MG/DL
HCT VFR BLD AUTO: 34.2 %
HGB BLD-MCNC: 12 G/DL
IMM GRANULOCYTES # BLD AUTO: 0 K/UL
IMM GRANULOCYTES NFR BLD AUTO: 0 %
IRON SERPL-MCNC: 57 UG/DL
LYMPHOCYTES # BLD AUTO: 2.8 K/UL
LYMPHOCYTES NFR BLD: 54.1 %
MCH RBC QN AUTO: 31.3 PG
MCHC RBC AUTO-ENTMCNC: 35.1 G/DL
MCV RBC AUTO: 89 FL
MONOCYTES # BLD AUTO: 0.5 K/UL
MONOCYTES NFR BLD: 9.8 %
NEUTROPHILS # BLD AUTO: 1.7 K/UL
NEUTROPHILS NFR BLD: 33.7 %
NRBC BLD-RTO: 0 /100 WBC
PLATELET # BLD AUTO: 202 K/UL
PMV BLD AUTO: 10.2 FL
POTASSIUM SERPL-SCNC: 3.9 MMOL/L
PROT SERPL-MCNC: 7.3 G/DL
RBC # BLD AUTO: 3.84 M/UL
SATURATED IRON: 21 %
SODIUM SERPL-SCNC: 139 MMOL/L
TOTAL IRON BINDING CAPACITY: 271 UG/DL
TRANSFERRIN SERPL-MCNC: 183 MG/DL
WBC # BLD AUTO: 5.08 K/UL

## 2018-01-09 PROCEDURE — 99214 OFFICE O/P EST MOD 30 MIN: CPT | Mod: S$PBB,,, | Performed by: FAMILY MEDICINE

## 2018-01-09 PROCEDURE — 99999 PR PBB SHADOW E&M-EST. PATIENT-LVL III: CPT | Mod: PBBFAC,,, | Performed by: FAMILY MEDICINE

## 2018-01-09 PROCEDURE — 86787 VARICELLA-ZOSTER ANTIBODY: CPT

## 2018-01-09 PROCEDURE — 99213 OFFICE O/P EST LOW 20 MIN: CPT | Mod: PBBFAC,PO | Performed by: FAMILY MEDICINE

## 2018-01-09 PROCEDURE — 83540 ASSAY OF IRON: CPT

## 2018-01-09 PROCEDURE — 82306 VITAMIN D 25 HYDROXY: CPT

## 2018-01-09 PROCEDURE — 36415 COLL VENOUS BLD VENIPUNCTURE: CPT | Mod: PO

## 2018-01-09 PROCEDURE — 82728 ASSAY OF FERRITIN: CPT

## 2018-01-09 PROCEDURE — 80053 COMPREHEN METABOLIC PANEL: CPT

## 2018-01-09 PROCEDURE — 85025 COMPLETE CBC W/AUTO DIFF WBC: CPT

## 2018-01-09 PROCEDURE — 86787 VARICELLA-ZOSTER ANTIBODY: CPT | Mod: 91

## 2018-01-09 RX ORDER — GABAPENTIN 100 MG/1
100 CAPSULE ORAL 3 TIMES DAILY
Qty: 90 CAPSULE | Refills: 0 | Status: SHIPPED | OUTPATIENT
Start: 2018-01-09 | End: 2018-01-16

## 2018-01-09 NOTE — PROGRESS NOTES
Chief Complaint   Patient presents with    Back Pain    Rib Injury     rib pain       HPI  Destin Barber is a 83 y.o. male with multiple medical diagnoses as listed in the medical history and problem list that presents for evaluation for one month of pain in his left lower back radiating to his rib. No pain with respiration. Described as ants biting him. He has pain when laying on it at night. No numbness or tingling, but does itch. Pain scale is 10, no improvement with muscle relaxers. He was seen on 12/27 and given this medication . No known injury. He has not had any recent illness or sick contacts.     He is diabetic and his sugars have run around 130 fasting.     PAST MEDICAL HISTORY:  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    Coronary artery disease     Diastolic dysfunction     Gout, chronic     HTN (hypertension)     Hyperlipidemia     Type 2 diabetes mellitus     diet controlled       PAST SURGICAL HISTORY:  Past Surgical History:   Procedure Laterality Date    CATARACT EXTRACTION W/ ANTERIOR VITRECTOMY      CORONARY ARTERY BYPASS GRAFT  07/06/2016    PAIZ-LAD, SVG-Ramus, SVG-PDA    HEMORRHOID SURGERY         SOCIAL HISTORY:  Social History     Social History    Marital status:      Spouse name: N/A    Number of children: N/A    Years of education: N/A     Occupational History    Not on file.     Social History Main Topics    Smoking status: Former Smoker     Types: Cigarettes     Quit date: 6/23/1985    Smokeless tobacco: Never Used    Alcohol use No    Drug use: No    Sexual activity: Not on file     Other Topics Concern    Not on file     Social History Narrative    No narrative on file       FAMILY HISTORY:  Family History   Problem Relation Age of Onset    Cancer Father      colon    Hypertension Mother     Heart disease Mother     Heart disease Sister        ALLERGIES AND MEDICATIONS: updated and  reviewed.  Review of patient's allergies indicates:   Allergen Reactions    Penicillins Nausea And Vomiting     Current Outpatient Prescriptions   Medication Sig Dispense Refill    albuterol sulfate 90 mcg/actuation AePB Inhale 1 puff into the lungs every 6 (six) hours as needed. Rescue 1 each 5    allopurinol (ZYLOPRIM) 100 MG tablet Take 1 tablet (100 mg total) by mouth 2 (two) times daily. 60 tablet 5    amlodipine (NORVASC) 10 MG tablet Take 1 tablet (10 mg total) by mouth once daily. 30 tablet 5    aspirin (ASPIR-LOW) 81 MG EC tablet Take 1 tablet (81 mg total) by mouth once daily. 30 tablet 5    atorvastatin (LIPITOR) 80 MG tablet Take 1 tablet (80 mg total) by mouth once daily. 30 tablet 5    clopidogrel (PLAVIX) 75 mg tablet Take 1 tablet (75 mg total) by mouth once daily. 30 tablet 5    diclofenac (VOLTAREN) 50 MG EC tablet Take 50 mg by mouth once daily. Until pain is resolved      docusate sodium (COLACE) 100 MG capsule Take 1 capsule (100 mg total) by mouth 2 (two) times daily as needed for Constipation.  0    lisinopril 10 MG tablet Take 1 tablet (10 mg total) by mouth once daily. 30 tablet 5    meclizine (ANTIVERT) 12.5 mg tablet Take 1 tablet (12.5 mg total) by mouth 3 (three) times daily as needed. 30 tablet 0    metformin (GLUCOPHAGE-XR) 500 MG 24 hr tablet Take 1 tablet (500 mg total) by mouth daily with breakfast. 30 tablet 5    metoprolol tartrate (LOPRESSOR) 25 MG tablet Take 1 tablet (25 mg total) by mouth 2 (two) times daily. 60 tablet 5    pantoprazole (PROTONIX) 40 MG tablet       tamsulosin (FLOMAX) 0.4 mg Cp24 Take 1 capsule (0.4 mg total) by mouth once daily. 30 capsule 5    traZODone (DESYREL) 100 MG tablet Take 2 tablets (200 mg total) by mouth every evening. 60 tablet 5    VENTOLIN HFA 90 mcg/actuation inhaler inahle ONE puff EVERY 6 HOURS AS NEEDED  1    gabapentin (NEURONTIN) 100 MG capsule Take 1 capsule (100 mg total) by mouth 3 (three) times daily. 90 capsule 0  "    No current facility-administered medications for this visit.        ROS  Review of Systems   Constitutional: Negative for chills, fatigue, fever and unexpected weight change.   HENT: Negative for ear pain, postnasal drip, rhinorrhea, sinus pressure and sore throat.    Eyes: Negative for photophobia and visual disturbance.   Respiratory: Negative for apnea, cough, chest tightness, shortness of breath and wheezing.    Cardiovascular: Negative for chest pain and palpitations.   Gastrointestinal: Negative for abdominal pain, blood in stool, constipation, diarrhea, nausea and vomiting.   Genitourinary: Negative for difficulty urinating.   Musculoskeletal: Positive for arthralgias and back pain. Negative for joint swelling.   Skin: Negative for rash.   Neurological: Negative for facial asymmetry, speech difficulty, weakness, numbness and headaches.   Psychiatric/Behavioral: Negative for dysphoric mood.       Physical Exam  Vitals:    01/09/18 1134   BP: 128/60   Pulse: 77   Temp: 98.3 °F (36.8 °C)   TempSrc: Oral   SpO2: 99%   Weight: 70.1 kg (154 lb 8.7 oz)   Height: 5' 5" (1.651 m)    Body mass index is 25.72 kg/m².  Weight: 70.1 kg (154 lb 8.7 oz)   Height: 5' 5" (165.1 cm)     Physical Exam   Constitutional: He is oriented to person, place, and time. He appears well-developed and well-nourished.   HENT:   Head: Normocephalic and atraumatic.   Mouth/Throat: No oropharyngeal exudate.   Eyes: EOM are normal.   Cardiovascular: Normal rate, regular rhythm and normal heart sounds.  Exam reveals no gallop and no friction rub.    No murmur heard.  Pulmonary/Chest: Effort normal and breath sounds normal. No respiratory distress. He has no wheezes. He has no rales. He exhibits no tenderness.   Musculoskeletal:        Arms:  No vesicles present in area of pain    Negative straight leg raise bilaterally   Lymphadenopathy:     He has no cervical adenopathy.   Neurological: He is alert and oriented to person, place, and time. "   Reflex Scores:       Bicep reflexes are 2+ on the right side and 2+ on the left side.       Brachioradialis reflexes are 2+ on the right side and 2+ on the left side.  Skin: Skin is warm and dry.   Psychiatric: He has a normal mood and affect. His behavior is normal.   Nursing note and vitals reviewed.      Health Maintenance       Date Due Completion Date    TETANUS VACCINE 02/21/1954 ---    Zoster Vaccine 02/21/1996 ---    Eye Exam 09/20/2017 9/20/2016    Pneumococcal (65+) (2 of 2 - PPSV23) 05/15/2018 5/15/2017    Hemoglobin A1c 06/27/2018 12/27/2017    Foot Exam 07/14/2018 7/14/2017    Lipid Panel 12/27/2018 12/27/2017            ASSESSMENT     1. Essential hypertension    2. Paresthesia    3. Chronic gout without tophus, unspecified cause, unspecified site    4. Hypokalemia    5. Controlled type 2 diabetes mellitus with stage 3 chronic kidney disease, without long-term current use of insulin    6. Rib pain on left side    7. Anemia, unspecified type    8. Chronic kidney disease, stage III (moderate)     9. Abnormal finding of blood chemistry         PLAN:     Problem List Items Addressed This Visit        Cardiac/Vascular    Essential hypertension - Primary  -This is a chronic medical condition that is stable under the current regimen. Continue to monitor and we will make medication adjustments as needed.          Renal/    Hypokalemia  -will check lytes to make sure this is not an issue       Endocrine    Diabetes mellitus type II, controlled  -This is a chronic medical condition that is stable under the current regimen. Continue to monitor and we will make medication adjustments as needed.          Orthopedic    Gout, chronic  -This is a chronic medical condition that is stable under the current regimen. Continue to monitor and we will make medication adjustments as needed.         Other Visit Diagnoses     Paresthesia      -shingles, vs lyte issue, vs vitamin deficiency  -did not improve with muscle  relaxers, states he is having 10/10 pain  -consider neuro consult  -discussed side effects of neurontin which includes drowsiness, may take up to three pills    Relevant Medications    gabapentin (NEURONTIN) 100 MG capsule    Other Relevant Orders    Comprehensive metabolic panel    Vitamin D    Iron and TIBC    Ferritin    VARICELLA ZOSTER ANTIBODY, IGG    VARICELLA ZOSTER ANTIBODY, IGM    Rib pain on left side  -rule out rib fracture        Relevant Orders    X-Ray Ribs 2 View Left    Anemia, unspecified type      -check for anemia and iron deficiency    Relevant Orders    CBC auto differential    Chronic kidney disease, stage III (moderate)       This is a chronic medical condition that is stable under the current regimen. Continue to monitor and we will make medication adjustments as needed.       Relevant Orders    Vitamin D    Abnormal finding of blood chemistry       -check iron levels and vitamin D    Relevant Orders    Iron and TIBC    Ferritin            Renetta Costa MD  01/09/2018 11:57 AM        Return in about 1 week (around 1/16/2018) for Follow up.

## 2018-01-09 NOTE — MEDICAL/APP STUDENT
"Office Visit Note  LUZ Baca Student note    Chief Complaint   Patient presents with    Back Pain    Rib Injury     rib pain       HPI  Destin Barber is a 83 y.o. male with multiple medical diagnoses as listed in the medical history and problem list that presents for evaluation of back pain. Pt was recently evaluated (12/27/17) for the same complaint and a trial of muscle relaxer was started. This resulted in no relief for the pt.    Pt describes the pain as a waxing/waning pain that feels like "ants biting him" and is associated with pruritis. Pt says that the pain starts as high as the left lower costovertebral angle and radiates around the front to midline. The pain is worst in the middle of the day but it has occasionally woke him up from sleep. The pt has not identified any aggravating or alleviating factors. It is not constantly tender to touch, but it becomes very tender when it's at it's worst.     Pain is not pleuritic or associated with any additional symptoms. Pt denies a h/o trauma.     PAST MEDICAL HISTORY:  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    Coronary artery disease     Diastolic dysfunction     Gout, chronic     HTN (hypertension)     Hyperlipidemia     Type 2 diabetes mellitus     diet controlled       PAST SURGICAL HISTORY:  Past Surgical History:   Procedure Laterality Date    CATARACT EXTRACTION W/ ANTERIOR VITRECTOMY      CORONARY ARTERY BYPASS GRAFT  07/06/2016    PAIZ-LAD, SVG-Ramus, SVG-PDA    HEMORRHOID SURGERY         SOCIAL HISTORY:  Social History     Social History    Marital status:      Spouse name: N/A    Number of children: N/A    Years of education: N/A     Occupational History    Not on file.     Social History Main Topics    Smoking status: Former Smoker     Types: Cigarettes     Quit date: 6/23/1985    Smokeless tobacco: Never Used    Alcohol use No    Drug use: No    Sexual activity: " Not on file     Other Topics Concern    Not on file     Social History Narrative    No narrative on file       FAMILY HISTORY:  Family History   Problem Relation Age of Onset    Cancer Father      colon    Hypertension Mother     Heart disease Mother     Heart disease Sister        ALLERGIES AND MEDICATIONS: updated and reviewed.  Review of patient's allergies indicates:   Allergen Reactions    Penicillins Nausea And Vomiting     Current Outpatient Prescriptions   Medication Sig Dispense Refill    albuterol sulfate 90 mcg/actuation AePB Inhale 1 puff into the lungs every 6 (six) hours as needed. Rescue 1 each 5    allopurinol (ZYLOPRIM) 100 MG tablet Take 1 tablet (100 mg total) by mouth 2 (two) times daily. 60 tablet 5    amlodipine (NORVASC) 10 MG tablet Take 1 tablet (10 mg total) by mouth once daily. 30 tablet 5    aspirin (ASPIR-LOW) 81 MG EC tablet Take 1 tablet (81 mg total) by mouth once daily. 30 tablet 5    atorvastatin (LIPITOR) 80 MG tablet Take 1 tablet (80 mg total) by mouth once daily. 30 tablet 5    clopidogrel (PLAVIX) 75 mg tablet Take 1 tablet (75 mg total) by mouth once daily. 30 tablet 5    diclofenac (VOLTAREN) 50 MG EC tablet Take 50 mg by mouth once daily. Until pain is resolved      docusate sodium (COLACE) 100 MG capsule Take 1 capsule (100 mg total) by mouth 2 (two) times daily as needed for Constipation.  0    lisinopril 10 MG tablet Take 1 tablet (10 mg total) by mouth once daily. 30 tablet 5    meclizine (ANTIVERT) 12.5 mg tablet Take 1 tablet (12.5 mg total) by mouth 3 (three) times daily as needed. 30 tablet 0    metformin (GLUCOPHAGE-XR) 500 MG 24 hr tablet Take 1 tablet (500 mg total) by mouth daily with breakfast. 30 tablet 5    metoprolol tartrate (LOPRESSOR) 25 MG tablet Take 1 tablet (25 mg total) by mouth 2 (two) times daily. 60 tablet 5    pantoprazole (PROTONIX) 40 MG tablet       tamsulosin (FLOMAX) 0.4 mg Cp24 Take 1 capsule (0.4 mg total) by mouth once  "daily. 30 capsule 5    traZODone (DESYREL) 100 MG tablet Take 2 tablets (200 mg total) by mouth every evening. 60 tablet 5    VENTOLIN HFA 90 mcg/actuation inhaler inahle ONE puff EVERY 6 HOURS AS NEEDED  1    gabapentin (NEURONTIN) 100 MG capsule Take 1 capsule (100 mg total) by mouth 3 (three) times daily. 90 capsule 0     No current facility-administered medications for this visit.        ROS  Review of Systems   Constitutional: Negative for fatigue and fever.   Respiratory: Negative for cough, chest tightness and shortness of breath.    Cardiovascular: Negative for chest pain, palpitations and leg swelling.   Gastrointestinal: Negative for abdominal distention, abdominal pain, blood in stool, constipation, diarrhea, nausea and vomiting.   Genitourinary: Positive for flank pain. Negative for decreased urine volume, difficulty urinating, dysuria, frequency and urgency.   Musculoskeletal: Positive for back pain.   Skin: Negative for color change and rash.   Neurological: Negative for dizziness (but reports he has recently run out of his medication that he takes for dizziness)), light-headedness, numbness and headaches.       Physical Exam  Vitals:    01/09/18 1134   BP: 128/60   Pulse: 77   Temp: 98.3 °F (36.8 °C)   TempSrc: Oral   SpO2: 99%   Weight: 70.1 kg (154 lb 8.7 oz)   Height: 5' 5" (1.651 m)    Body mass index is 25.72 kg/m².  Weight: 70.1 kg (154 lb 8.7 oz)   Height: 5' 5" (165.1 cm)     Physical Exam   Constitutional: He is oriented to person, place, and time. Vital signs are normal. No distress.   Pt appears mildly malnourished   Cardiovascular: Normal rate, regular rhythm and normal heart sounds.  Exam reveals no gallop and no friction rub.    No murmur heard.  Pulmonary/Chest: Effort normal and breath sounds normal. No respiratory distress. He has no wheezes. He has no rales.   Abdominal: Soft. Bowel sounds are normal. He exhibits no distension and no mass. There is no tenderness. There is no " guarding.   Musculoskeletal: He exhibits no edema or tenderness.   Neurological: He is alert and oriented to person, place, and time.   Skin: Skin is warm and dry. No rash noted. He is not diaphoretic. No erythema.       Health Maintenance       Date Due Completion Date    TETANUS VACCINE [birth year]/21/1954 ---    Zoster Vaccine 02/21/1996 ---    Eye Exam 09/20/2017 9/20/2016    Pneumococcal (65+) (2 of 2 - PPSV23) 05/15/2018 5/15/2017    Hemoglobin A1c 06/27/2018 12/27/2017    Foot Exam 07/14/2018 7/14/2017    Lipid Panel 12/27/2018 12/27/2017          ASSESSMENT     1. Essential hypertension    2. Paresthesia    3. Chronic gout without tophus, unspecified cause, unspecified site    4. Hypokalemia    5. Controlled type 2 diabetes mellitus with stage 3 chronic kidney disease, without long-term current use of insulin    6. Rib pain on left side    7. Anemia, unspecified type    8. Chronic kidney disease, stage III (moderate)     9. Abnormal finding of blood chemistry         PLAN:     2/6. -Trial gabapentin   - Rib Xray   - VZV IgG/IgM     7. - CMP/CBC     F/u in 1 week    Jv Baca, MS4  [facility]  01/09/2018 12:15 PM

## 2018-01-10 ENCOUNTER — HOSPITAL ENCOUNTER (OUTPATIENT)
Dept: RADIOLOGY | Facility: HOSPITAL | Age: 83
Discharge: HOME | End: 2018-01-10
Attending: FAMILY MEDICINE
Payer: MEDICARE

## 2018-01-10 DIAGNOSIS — R07.81 RIB PAIN ON LEFT SIDE: ICD-10-CM

## 2018-01-10 PROCEDURE — 71100 X-RAY EXAM RIBS UNI 2 VIEWS: CPT | Mod: 26,,, | Performed by: RADIOLOGY

## 2018-01-10 PROCEDURE — 71100 X-RAY EXAM RIBS UNI 2 VIEWS: CPT | Mod: TC,FY,PO

## 2018-01-11 LAB
VARICELLA INTERPRETATION: POSITIVE
VARICELLA ZOSTER IGG: 2.83 ISR

## 2018-01-12 ENCOUNTER — TELEPHONE (OUTPATIENT)
Dept: FAMILY MEDICINE | Facility: CLINIC | Age: 83
End: 2018-01-12

## 2018-01-16 ENCOUNTER — OFFICE VISIT (OUTPATIENT)
Dept: FAMILY MEDICINE | Facility: CLINIC | Age: 83
End: 2018-01-16
Payer: MEDICARE

## 2018-01-16 VITALS
OXYGEN SATURATION: 99 % | SYSTOLIC BLOOD PRESSURE: 126 MMHG | DIASTOLIC BLOOD PRESSURE: 66 MMHG | BODY MASS INDEX: 25.22 KG/M2 | WEIGHT: 156.94 LBS | HEIGHT: 66 IN | TEMPERATURE: 97 F | RESPIRATION RATE: 14 BRPM | HEART RATE: 68 BPM

## 2018-01-16 DIAGNOSIS — E11.22 CONTROLLED TYPE 2 DIABETES MELLITUS WITH STAGE 3 CHRONIC KIDNEY DISEASE, WITHOUT LONG-TERM CURRENT USE OF INSULIN: ICD-10-CM

## 2018-01-16 DIAGNOSIS — R20.2 PARESTHESIA: Primary | ICD-10-CM

## 2018-01-16 DIAGNOSIS — E44.0 MALNUTRITION OF MODERATE DEGREE: ICD-10-CM

## 2018-01-16 DIAGNOSIS — I51.89 DIASTOLIC DYSFUNCTION: ICD-10-CM

## 2018-01-16 DIAGNOSIS — N18.30 CONTROLLED TYPE 2 DIABETES MELLITUS WITH STAGE 3 CHRONIC KIDNEY DISEASE, WITHOUT LONG-TERM CURRENT USE OF INSULIN: ICD-10-CM

## 2018-01-16 LAB — VZV IGG SER IA-ACNC: <0.91 INDEX

## 2018-01-16 PROCEDURE — 99214 OFFICE O/P EST MOD 30 MIN: CPT | Mod: PBBFAC,PO | Performed by: FAMILY MEDICINE

## 2018-01-16 PROCEDURE — 99999 PR PBB SHADOW E&M-EST. PATIENT-LVL IV: CPT | Mod: PBBFAC,,, | Performed by: FAMILY MEDICINE

## 2018-01-16 PROCEDURE — 99214 OFFICE O/P EST MOD 30 MIN: CPT | Mod: S$PBB,,, | Performed by: FAMILY MEDICINE

## 2018-01-16 RX ORDER — GABAPENTIN 300 MG/1
300 CAPSULE ORAL 3 TIMES DAILY
Qty: 90 CAPSULE | Refills: 1 | Status: SHIPPED | OUTPATIENT
Start: 2018-01-16 | End: 2018-02-27 | Stop reason: SDUPTHER

## 2018-01-16 NOTE — PROGRESS NOTES
Chief Complaint   Patient presents with    Chronic Pain    Follow-up       HPI  Destin Barber is a 83 y.o. male with multiple medical diagnoses as listed in the medical history and problem list that presents for follow-up for left rib pain described as pins and needles feeling in his mid back. His wife is with him today. She reports he has had nerve issues all his life and has a hx of back pains and pins and needles sensations. He has had some improvement with the neurontin but it is not long lasting. He feels the pain is more intense now.    PAST MEDICAL HISTORY:  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    Coronary artery disease     Diastolic dysfunction     Gout, chronic     HTN (hypertension)     Hyperlipidemia     Type 2 diabetes mellitus     diet controlled       PAST SURGICAL HISTORY:  Past Surgical History:   Procedure Laterality Date    CATARACT EXTRACTION W/ ANTERIOR VITRECTOMY      CORONARY ARTERY BYPASS GRAFT  07/06/2016    PAIZ-LAD, SVG-Ramus, SVG-PDA    HEMORRHOID SURGERY         SOCIAL HISTORY:  Social History     Social History    Marital status:      Spouse name: N/A    Number of children: N/A    Years of education: N/A     Occupational History    Not on file.     Social History Main Topics    Smoking status: Former Smoker     Types: Cigarettes     Quit date: 6/23/1985    Smokeless tobacco: Never Used    Alcohol use No    Drug use: No    Sexual activity: Not on file     Other Topics Concern    Not on file     Social History Narrative    No narrative on file       FAMILY HISTORY:  Family History   Problem Relation Age of Onset    Cancer Father      colon    Hypertension Mother     Heart disease Mother     Heart disease Sister        ALLERGIES AND MEDICATIONS: updated and reviewed.  Review of patient's allergies indicates:   Allergen Reactions    Penicillins Nausea And Vomiting     Current Outpatient  Prescriptions   Medication Sig Dispense Refill    albuterol sulfate 90 mcg/actuation AePB Inhale 1 puff into the lungs every 6 (six) hours as needed. Rescue 1 each 5    allopurinol (ZYLOPRIM) 100 MG tablet Take 1 tablet (100 mg total) by mouth 2 (two) times daily. 60 tablet 5    amlodipine (NORVASC) 10 MG tablet Take 1 tablet (10 mg total) by mouth once daily. 30 tablet 5    aspirin (ASPIR-LOW) 81 MG EC tablet Take 1 tablet (81 mg total) by mouth once daily. 30 tablet 5    atorvastatin (LIPITOR) 80 MG tablet Take 1 tablet (80 mg total) by mouth once daily. 30 tablet 5    clopidogrel (PLAVIX) 75 mg tablet Take 1 tablet (75 mg total) by mouth once daily. 30 tablet 5    diclofenac (VOLTAREN) 50 MG EC tablet Take 50 mg by mouth once daily. Until pain is resolved      docusate sodium (COLACE) 100 MG capsule Take 1 capsule (100 mg total) by mouth 2 (two) times daily as needed for Constipation.  0    lisinopril 10 MG tablet Take 1 tablet (10 mg total) by mouth once daily. 30 tablet 5    meclizine (ANTIVERT) 12.5 mg tablet Take 1 tablet (12.5 mg total) by mouth 3 (three) times daily as needed. 30 tablet 0    metformin (GLUCOPHAGE-XR) 500 MG 24 hr tablet Take 1 tablet (500 mg total) by mouth daily with breakfast. 30 tablet 5    metoprolol tartrate (LOPRESSOR) 25 MG tablet Take 1 tablet (25 mg total) by mouth 2 (two) times daily. 60 tablet 5    pantoprazole (PROTONIX) 40 MG tablet       tamsulosin (FLOMAX) 0.4 mg Cp24 Take 1 capsule (0.4 mg total) by mouth once daily. 30 capsule 5    traZODone (DESYREL) 100 MG tablet Take 2 tablets (200 mg total) by mouth every evening. 60 tablet 5    VENTOLIN HFA 90 mcg/actuation inhaler inahle ONE puff EVERY 6 HOURS AS NEEDED  1    gabapentin (NEURONTIN) 300 MG capsule Take 1 capsule (300 mg total) by mouth 3 (three) times daily. 90 capsule 1     No current facility-administered medications for this visit.        ROS  Review of Systems   Constitutional: Negative for chills,  "fatigue, fever and unexpected weight change.   HENT: Negative for ear pain, postnasal drip, rhinorrhea, sinus pressure and sore throat.    Eyes: Negative for photophobia and visual disturbance.   Respiratory: Negative for apnea, cough, chest tightness, shortness of breath and wheezing.    Cardiovascular: Negative for chest pain and palpitations.   Gastrointestinal: Negative for abdominal pain, blood in stool, constipation, diarrhea, nausea and vomiting.   Genitourinary: Negative for difficulty urinating.   Musculoskeletal: Negative for arthralgias and joint swelling.   Skin: Negative for rash.   Neurological: Positive for numbness. Negative for facial asymmetry, speech difficulty, weakness and headaches.   Psychiatric/Behavioral: Negative for dysphoric mood.       Physical Exam  Vitals:    01/16/18 1236   BP: 126/66   Pulse: 68   Resp: 14   Temp: 97.3 °F (36.3 °C)   TempSrc: Oral   SpO2: 99%   Weight: 71.2 kg (156 lb 15.5 oz)   Height: 5' 6" (1.676 m)    Body mass index is 25.34 kg/m².  Weight: 71.2 kg (156 lb 15.5 oz)   Height: 5' 6" (167.6 cm)     Physical Exam   Constitutional: He is oriented to person, place, and time. He appears well-developed and well-nourished.   HENT:   Head: Normocephalic and atraumatic.   Eyes: EOM are normal.   Neurological: He is alert and oriented to person, place, and time.   Skin: Skin is warm and dry. No rash noted.   Psychiatric: He has a normal mood and affect. His behavior is normal.   Nursing note and vitals reviewed.      Health Maintenance       Date Due Completion Date    Eye Exam 09/20/2017 9/20/2016    Zoster Vaccine 01/31/2025 (Originally 2/21/1996) ---    TETANUS VACCINE 01/31/2025 (Originally 2/21/1952) ---    Pneumococcal (65+) (2 of 2 - PPSV23) 05/15/2018 5/15/2017    Hemoglobin A1c 06/27/2018 12/27/2017    Foot Exam 07/14/2018 7/14/2017    Lipid Panel 12/27/2018 12/27/2017            ASSESSMENT     1. Paresthesia    2. Controlled type 2 diabetes mellitus with stage 3 " chronic kidney disease, without long-term current use of insulin    3. Malnutrition of moderate degree    4. Diastolic dysfunction        PLAN:     Problem List Items Addressed This Visit        Cardiac/Vascular    Diastolic dysfunction  -This is a chronic medical condition that is stable under the current regimen. Continue to monitor and we will make medication adjustments as needed.          Endocrine    Malnutrition of moderate degree  -This is a chronic medical condition that is stable under the current regimen. Continue to monitor and we will make medication adjustments as needed.       Controlled type 2 diabetes mellitus with stage 3 chronic kidney disease, without long-term current use of insulin  -due for his eye exam, has hx of polyneuropathy    Relevant Orders    Ambulatory referral to Optometry      Other Visit Diagnoses     Paresthesia    -  Primary  -increase neurontin, negative work up for fracture, no iron deficiency, no acute shingles  -consult neuro as he may benefit from EMG    Relevant Medications    gabapentin (NEURONTIN) 300 MG capsule    Other Relevant Orders    Ambulatory consult to Neurology            Renetta Costa MD  01/16/2018 2:16 PM        Follow-up in about 6 weeks (around 2/27/2018) for Follow up.

## 2018-01-17 ENCOUNTER — TELEPHONE (OUTPATIENT)
Dept: OPTOMETRY | Facility: CLINIC | Age: 83
End: 2018-01-17

## 2018-01-18 ENCOUNTER — TELEPHONE (OUTPATIENT)
Dept: OPTOMETRY | Facility: CLINIC | Age: 83
End: 2018-01-18

## 2018-02-05 ENCOUNTER — OFFICE VISIT (OUTPATIENT)
Dept: OPTOMETRY | Facility: CLINIC | Age: 83
End: 2018-02-05
Payer: MEDICARE

## 2018-02-05 DIAGNOSIS — I10 ESSENTIAL HYPERTENSION: ICD-10-CM

## 2018-02-05 DIAGNOSIS — H43.811 POSTERIOR VITREOUS DETACHMENT OF RIGHT EYE: ICD-10-CM

## 2018-02-05 DIAGNOSIS — E11.9 TYPE 2 DIABETES MELLITUS WITHOUT RETINOPATHY: Primary | ICD-10-CM

## 2018-02-05 DIAGNOSIS — Z13.5 GLAUCOMA SCREENING: ICD-10-CM

## 2018-02-05 DIAGNOSIS — H52.7 REFRACTIVE ERROR: ICD-10-CM

## 2018-02-05 DIAGNOSIS — Z96.1 PSEUDOPHAKIA OF BOTH EYES: ICD-10-CM

## 2018-02-05 PROCEDURE — 92014 COMPRE OPH EXAM EST PT 1/>: CPT | Mod: S$PBB,,, | Performed by: OPTOMETRIST

## 2018-02-05 PROCEDURE — 92015 DETERMINE REFRACTIVE STATE: CPT | Mod: ,,, | Performed by: OPTOMETRIST

## 2018-02-05 PROCEDURE — 99999 PR PBB SHADOW E&M-EST. PATIENT-LVL I: CPT | Mod: PBBFAC,,, | Performed by: OPTOMETRIST

## 2018-02-05 PROCEDURE — 99211 OFF/OP EST MAY X REQ PHY/QHP: CPT | Mod: PBBFAC,PO | Performed by: OPTOMETRIST

## 2018-02-05 NOTE — PROGRESS NOTES
Subjective:       Patient ID: Destin Barber is a 83 y.o. male      Chief Complaint   Patient presents with    Hypertensive Eye Exam    Diabetic Eye Exam     History of Present Illness  Dls: 9/20/16 Dr. Paula     Pt here for diabetic eye exam.  Pt states no changes in vision. Pt does not wear any glasses. Pt states no tearing no itching no burning no pain no ha's no floaters.     Eye meds:  None    Hemoglobin A1C       Date                     Value               Ref Range           Status                12/27/2017               5.7 (H)             4.0 - 5.6 %         Final                  09/13/2017               5.9 (H)             4.0 - 5.6 %         Final                 02/21/2017               6.4 (H)             4.5 - 6.2 %         Final             ----------     Assessment/Plan:     1. Type 2 diabetes mellitus without retinopathy  No diabetic retinopathy. Educated patient on importance of good blood sugar control, compliance with meds, and follow up care with PCP. Return in 1 year for dilated eye exam, sooner PRN.    2. Essential hypertension  No hypertensive retinopathy. Continue BP control. RTC 1 year for DFE.    3. Posterior vitreous detachment of right eye  Stable. Monitor.    4. Pseudophakia of both eyes  Well-centered. Clear.     5. Glaucoma screening  No changes related to glaucoma. Monitor yearly.    6. Refractive error  Educated patient on refractive error and discussed lens options. Dispensed updated spectacle Rx. Educated about adaptation period to new specs.    Eyeglass Final Rx     Eyeglass Final Rx       Sphere Cylinder Axis Add    Right -1.00 +0.75 140 +2.50    Left -0.50 Sphere  +2.50    Expiration Date:  2/6/2019                Follow-up in about 1 year (around 2/5/2019) for Diabetic Eye Exam.

## 2018-02-09 DIAGNOSIS — E11.9 CONTROLLED TYPE 2 DIABETES MELLITUS WITHOUT COMPLICATION, WITHOUT LONG-TERM CURRENT USE OF INSULIN: ICD-10-CM

## 2018-02-09 RX ORDER — TAMSULOSIN HYDROCHLORIDE 0.4 MG/1
0.4 CAPSULE ORAL DAILY
Qty: 30 CAPSULE | Refills: 5 | Status: SHIPPED | OUTPATIENT
Start: 2018-02-09 | End: 2018-09-11 | Stop reason: SDUPTHER

## 2018-02-09 RX ORDER — METFORMIN HYDROCHLORIDE 500 MG/1
500 TABLET, EXTENDED RELEASE ORAL
Qty: 30 TABLET | Refills: 5 | Status: SHIPPED | OUTPATIENT
Start: 2018-02-09 | End: 2018-06-27 | Stop reason: SDUPTHER

## 2018-02-09 RX ORDER — DICLOFENAC SODIUM 50 MG/1
50 TABLET, DELAYED RELEASE ORAL DAILY
OUTPATIENT
Start: 2018-02-09

## 2018-02-09 NOTE — TELEPHONE ENCOUNTER
Last office visit 1/16/18 with Dr Costa. Has follow up appointment with primary doctor on 2/27/18.

## 2018-02-20 RX ORDER — TRAMADOL HYDROCHLORIDE 50 MG/1
TABLET ORAL
Qty: 20 TABLET | OUTPATIENT
Start: 2018-02-20

## 2018-02-20 NOTE — TELEPHONE ENCOUNTER
Request for tramadol. I have never prescribed this for him. He needs appointment prior to starting this medication

## 2018-02-20 NOTE — TELEPHONE ENCOUNTER
Left message on answering machine to return call to clinic. Need to know which medications need to be refilled.

## 2018-02-27 ENCOUNTER — OFFICE VISIT (OUTPATIENT)
Dept: FAMILY MEDICINE | Facility: CLINIC | Age: 83
End: 2018-02-27
Payer: MEDICARE

## 2018-02-27 VITALS
BODY MASS INDEX: 25.15 KG/M2 | DIASTOLIC BLOOD PRESSURE: 74 MMHG | SYSTOLIC BLOOD PRESSURE: 124 MMHG | RESPIRATION RATE: 16 BRPM | HEIGHT: 66 IN | WEIGHT: 156.5 LBS | TEMPERATURE: 98 F | OXYGEN SATURATION: 98 % | HEART RATE: 65 BPM

## 2018-02-27 DIAGNOSIS — N18.30 CKD (CHRONIC KIDNEY DISEASE), STAGE III: ICD-10-CM

## 2018-02-27 DIAGNOSIS — E11.29 MICROALBUMINURIA DUE TO TYPE 2 DIABETES MELLITUS: ICD-10-CM

## 2018-02-27 DIAGNOSIS — R20.2 PARESTHESIA: Primary | ICD-10-CM

## 2018-02-27 DIAGNOSIS — R80.9 MICROALBUMINURIA DUE TO TYPE 2 DIABETES MELLITUS: ICD-10-CM

## 2018-02-27 PROCEDURE — 1125F AMNT PAIN NOTED PAIN PRSNT: CPT | Mod: ,,, | Performed by: INTERNAL MEDICINE

## 2018-02-27 PROCEDURE — 99999 PR PBB SHADOW E&M-EST. PATIENT-LVL III: CPT | Mod: PBBFAC,,, | Performed by: INTERNAL MEDICINE

## 2018-02-27 PROCEDURE — 99214 OFFICE O/P EST MOD 30 MIN: CPT | Mod: S$PBB,,, | Performed by: INTERNAL MEDICINE

## 2018-02-27 PROCEDURE — 1159F MED LIST DOCD IN RCRD: CPT | Mod: ,,, | Performed by: INTERNAL MEDICINE

## 2018-02-27 PROCEDURE — 99213 OFFICE O/P EST LOW 20 MIN: CPT | Mod: PBBFAC,PN | Performed by: INTERNAL MEDICINE

## 2018-02-27 RX ORDER — GABAPENTIN 300 MG/1
300 CAPSULE ORAL 3 TIMES DAILY
Qty: 90 CAPSULE | Refills: 1 | Status: SHIPPED | OUTPATIENT
Start: 2018-02-27 | End: 2018-06-27 | Stop reason: SDUPTHER

## 2018-02-27 RX ORDER — LISINOPRIL 10 MG/1
10 TABLET ORAL DAILY
Qty: 90 TABLET | Refills: 2 | Status: SHIPPED | OUTPATIENT
Start: 2018-02-27 | End: 2018-06-27 | Stop reason: SDUPTHER

## 2018-02-27 RX ORDER — TRAMADOL HYDROCHLORIDE 50 MG/1
TABLET ORAL
Refills: 0 | Status: CANCELLED | OUTPATIENT
Start: 2018-02-27

## 2018-02-27 RX ORDER — TRAMADOL HYDROCHLORIDE 50 MG/1
TABLET ORAL
Refills: 0 | COMMUNITY
Start: 2018-01-24 | End: 2018-03-27

## 2018-02-27 NOTE — PROGRESS NOTES
"Subjective:       Patient ID: Destin Barber is a 84 y.o. male.    Chief Complaint: Back Pain and Medication Refill    F/u left sided pain    HPI: 83 y/o w/ CAD s/p CABG, DM presents for follow up of left side "tingling" and pain. Has started gabapentin with significant relief. Symptoms exacerbated by cold weather. No motor weakness no falls. No right sided symptoms did not feel muscle relaxer was helpful. Not using any topical medications or heat/ice      Review of Systems   Constitutional: Negative for activity change, fever and unexpected weight change.   HENT: Negative for congestion, rhinorrhea, sore throat and trouble swallowing.    Eyes: Negative for photophobia and redness.   Respiratory: Negative for cough, chest tightness, shortness of breath and wheezing.    Cardiovascular: Negative for chest pain, palpitations and leg swelling.   Gastrointestinal: Negative for abdominal pain, blood in stool, constipation, diarrhea, nausea and vomiting.   Endocrine: Negative for cold intolerance, heat intolerance and polyuria.   Genitourinary: Negative for decreased urine volume, difficulty urinating, dysuria and urgency.   Musculoskeletal: Positive for back pain. Negative for arthralgias.   Skin: Negative for rash.   Neurological: Negative for dizziness, syncope, weakness and headaches.   Psychiatric/Behavioral: Negative for dysphoric mood, sleep disturbance and suicidal ideas.       Objective:     Vitals:    02/27/18 0905   BP: 124/74   BP Location: Left arm   Patient Position: Sitting   BP Method: Medium (Manual)   Pulse: 65   Resp: 16   Temp: 98.1 °F (36.7 °C)   TempSrc: Oral   SpO2: 98%   Weight: 71 kg (156 lb 8.4 oz)   Height: 5' 6" (1.676 m)          Physical Exam   Constitutional: He is oriented to person, place, and time. He appears well-developed and well-nourished.   HENT:   Head: Normocephalic and atraumatic.   Eyes: Conjunctivae are normal. No scleral icterus.   Neck: Normal range of motion. "   Cardiovascular: Normal rate and regular rhythm.  Exam reveals no gallop and no friction rub.    No murmur heard.  Pulmonary/Chest: Effort normal and breath sounds normal. He has no wheezes. He has no rales.   Abdominal: Soft. Bowel sounds are normal. There is no tenderness. There is no rebound and no guarding.   Musculoskeletal: Normal range of motion. He exhibits no edema or tenderness.   Full AROM bilateral shoulders, normal gait observed   Neurological: He is alert and oriented to person, place, and time. No cranial nerve deficit.   Skin: Skin is warm and dry.   Psychiatric: He has a normal mood and affect.       Assessment:       1. Paresthesia    2. Microalbuminuria due to type 2 diabetes mellitus    3. CKD (chronic kidney disease), stage III        Plan:    1. Improved with gabapentin trial topical heat 10 mintues at a time twice per day  2/3. BP and blood glucose at goal on ACEi discussed importance of avoidance of all NSAIDs with patient and his daughter, okay to use APAP as needed for minor aches/pains.

## 2018-03-12 ENCOUNTER — OFFICE VISIT (OUTPATIENT)
Dept: NEUROLOGY | Facility: CLINIC | Age: 83
End: 2018-03-12
Payer: MEDICARE

## 2018-03-12 VITALS
HEART RATE: 77 BPM | SYSTOLIC BLOOD PRESSURE: 130 MMHG | HEIGHT: 66 IN | DIASTOLIC BLOOD PRESSURE: 78 MMHG | BODY MASS INDEX: 24.24 KG/M2 | WEIGHT: 150.81 LBS

## 2018-03-12 DIAGNOSIS — R20.2 PARESTHESIA: Primary | ICD-10-CM

## 2018-03-12 DIAGNOSIS — R29.818 NEUROLOGICAL DEFICIT PRESENT: ICD-10-CM

## 2018-03-12 PROCEDURE — 99204 OFFICE O/P NEW MOD 45 MIN: CPT | Mod: S$PBB,,, | Performed by: NEUROLOGICAL SURGERY

## 2018-03-12 PROCEDURE — 99999 PR PBB SHADOW E&M-EST. PATIENT-LVL III: CPT | Mod: PBBFAC,,, | Performed by: NEUROLOGICAL SURGERY

## 2018-03-12 PROCEDURE — 99213 OFFICE O/P EST LOW 20 MIN: CPT | Mod: PBBFAC,PO | Performed by: NEUROLOGICAL SURGERY

## 2018-03-12 NOTE — LETTER
March 12, 2018      Renetta Costa MD  [address]  [place] [address]           [facility]  [address] [place] [address]  Phone: [phone]  Fax: [phone]          Patient: Destin Barber   MR Number: [id number]   YOB: 1934   Date of Visit: 3/12/2018       Dear Dr. Renetta Costa:    Thank you for referring Destin Barber to me for evaluation. Attached you will find relevant portions of my assessment and plan of care.    If you have questions, please do not hesitate to call me. I look forward to following Destin Barber along with you.    Sincerely,    José Banegas MD    Enclosure  CC:  No Recipients    If you would like to receive this communication electronically, please contact [email or web address] or [phone] to request more information on [facility] access.    For providers and/or their staff who would like to refer a patient to [facility], please contact us through our one-stop-shop provider referral line, [facility], at [phone].    If you feel you have received this communication in error or would no longer like to receive these types of communications, please e-mail [email or web address]

## 2018-03-12 NOTE — PROGRESS NOTES
Chief Complaint   Patient presents with    Numbness        Destin Barber is a 84 y.o. male with a history of multiple medical diagnoses as listed below that presents for evaluation of numbness and tingling.  He says that he has been having numbness along the chest wall on the left side from about the level of the umbilicus to the midline of his back.  He says that the symptoms began suddenly and he denies any trauma to the area or to the back.  He denies any rashes or any lesions.  He says that the tingling sensation occurs constantly throughout the day.  He has not had any symptoms on the right side of his body.  He has seen his primary care doctor and has tried various treatments without much relief until he began to take gabapentin.  Since she has began to take gabapentin he has noticed that the symptoms are much less intense and the paresthesias associated with the area have began to mack.    PAST MEDICAL HISTORY:  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    Coronary artery disease     Diastolic dysfunction     Gout, chronic     HTN (hypertension)     Hyperlipidemia     Type 2 diabetes mellitus     diet controlled       PAST SURGICAL HISTORY:  Past Surgical History:   Procedure Laterality Date    CATARACT EXTRACTION Bilateral     CATARACT EXTRACTION W/ ANTERIOR VITRECTOMY      CORONARY ARTERY BYPASS GRAFT  07/06/2016    PAIZ-LAD, SVG-Ramus, SVG-PDA    HEMORRHOID SURGERY         SOCIAL HISTORY:  Social History     Social History    Marital status:      Spouse name: N/A    Number of children: N/A    Years of education: N/A     Occupational History    Not on file.     Social History Main Topics    Smoking status: Former Smoker     Types: Cigarettes     Quit date: 6/23/1985    Smokeless tobacco: Never Used    Alcohol use No    Drug use: No    Sexual activity: Not on file     Other Topics Concern    Not on file     Social  History Narrative    No narrative on file       FAMILY HISTORY:  Family History   Problem Relation Age of Onset    Cancer Father      colon    Hypertension Mother     Heart disease Mother     Heart disease Sister     No Known Problems Brother     No Known Problems Maternal Aunt     No Known Problems Maternal Uncle     No Known Problems Paternal Aunt     No Known Problems Paternal Uncle     No Known Problems Maternal Grandmother     No Known Problems Maternal Grandfather     No Known Problems Paternal Grandmother     No Known Problems Paternal Grandfather     Amblyopia Neg Hx     Blindness Neg Hx     Cataracts Neg Hx     Diabetes Neg Hx     Glaucoma Neg Hx     Macular degeneration Neg Hx     Retinal detachment Neg Hx     Strabismus Neg Hx     Stroke Neg Hx     Thyroid disease Neg Hx        ALLERGIES AND MEDICATIONS: updated and reviewed.  Review of patient's allergies indicates:   Allergen Reactions    Penicillins Nausea And Vomiting     Current Outpatient Prescriptions   Medication Sig Dispense Refill    albuterol sulfate 90 mcg/actuation AePB Inhale 1 puff into the lungs every 6 (six) hours as needed. Rescue 1 each 5    allopurinol (ZYLOPRIM) 100 MG tablet Take 1 tablet (100 mg total) by mouth 2 (two) times daily. 60 tablet 5    amlodipine (NORVASC) 10 MG tablet Take 1 tablet (10 mg total) by mouth once daily. 30 tablet 5    aspirin (ASPIR-LOW) 81 MG EC tablet Take 1 tablet (81 mg total) by mouth once daily. 30 tablet 5    atorvastatin (LIPITOR) 80 MG tablet Take 1 tablet (80 mg total) by mouth once daily. 30 tablet 5    clopidogrel (PLAVIX) 75 mg tablet Take 1 tablet (75 mg total) by mouth once daily. 30 tablet 5    diclofenac (VOLTAREN) 50 MG EC tablet Take 50 mg by mouth once daily. Until pain is resolved      docusate sodium (COLACE) 100 MG capsule Take 1 capsule (100 mg total) by mouth 2 (two) times daily as needed for Constipation.  0    gabapentin (NEURONTIN) 300 MG capsule  Take 1 capsule (300 mg total) by mouth 3 (three) times daily. 90 capsule 1    lisinopril 10 MG tablet Take 1 tablet (10 mg total) by mouth once daily. 90 tablet 2    meclizine (ANTIVERT) 12.5 mg tablet Take 1 tablet (12.5 mg total) by mouth 3 (three) times daily as needed. 30 tablet 0    metFORMIN (GLUCOPHAGE-XR) 500 MG 24 hr tablet Take 1 tablet (500 mg total) by mouth daily with breakfast. 30 tablet 5    metoprolol tartrate (LOPRESSOR) 25 MG tablet Take 1 tablet (25 mg total) by mouth 2 (two) times daily. 60 tablet 5    pantoprazole (PROTONIX) 40 MG tablet       tamsulosin (FLOMAX) 0.4 mg Cp24 Take 1 capsule (0.4 mg total) by mouth once daily. 30 capsule 5    traMADol (ULTRAM) 50 mg tablet 1 TABLET BY MOUTH EVERY 6 HOURS AS NEEDED FOR PAIN  0    traZODone (DESYREL) 100 MG tablet Take 2 tablets (200 mg total) by mouth every evening. 60 tablet 5    VENTOLIN HFA 90 mcg/actuation inhaler inahle ONE puff EVERY 6 HOURS AS NEEDED  1     No current facility-administered medications for this visit.        Review of Systems   Neurological: Positive for numbness.       Neurologic Exam     Mental Status   Oriented to person, place, and time.   Registration: recalls 3 of 3 objects.   Attention: normal. Concentration: normal.   Speech: speech is normal   Level of consciousness: alert  Knowledge: good.     Cranial Nerves     CN II   Visual fields full to confrontation.   Right visual field deficit: none  Left visual field deficit: none     CN III, IV, VI   Pupils are equal, round, and reactive to light.  Extraocular motions are normal.   Right pupil: Size: 3 mm. Shape: regular. Accommodation: intact.   Left pupil: Size: 3 mm. Shape: regular. Accommodation: intact.   CN III: no CN III palsy  CN VI: no CN VI palsy  Nystagmus: none   Diplopia: none  Ophthalmoparesis: none  Upgaze: normal  Downgaze: normal  Conjugate gaze: present    CN V   Facial sensation intact.   Right facial sensation deficit: none  Left facial  sensation deficit: none    CN VII   Facial expression full, symmetric.   Right facial weakness: none  Left facial weakness: none    CN VIII   CN VIII normal.     CN IX, X   CN IX normal.   CN X normal.   Palate: symmetric    CN XI   CN XI normal.   Right sternocleidomastoid strength: normal  Left sternocleidomastoid strength: normal  Right trapezius strength: normal  Left trapezius strength: normal    CN XII   CN XII normal.   Tongue deviation: none    Motor Exam   Muscle bulk: normal  Overall muscle tone: normal  Right arm tone: normal  Left arm tone: normal  Right leg tone: normal  Left leg tone: normal    Strength   Strength 5/5 throughout.     Sensory Exam   Right arm light touch: normal  Left arm light touch: normal  Right leg light touch: normal  Left leg light touch: normal  Right arm vibration: normal  Left arm vibration: normal  Right leg vibration: normal  Left leg vibration: normal  Right arm proprioception: normal  Left arm proprioception: normal  Right leg proprioception: normal  Left leg proprioception: normal  Right arm pinprick: normal  Left arm pinprick: normal  Right leg pinprick: normal  Left leg pinprick: normal  Lowest sensation to pinprick on left: T8    Gait, Coordination, and Reflexes     Gait  Gait: normal    Coordination   Romberg: negative  Finger to nose coordination: normal  Heel to shin coordination: normal  Tandem walking coordination: normal    Tremor   Resting tremor: absent    Reflexes   Right brachioradialis: 2+  Left brachioradialis: 2+  Right biceps: 2+  Left biceps: 2+  Right triceps: 2+  Left triceps: 2+  Right patellar: 2+  Left patellar: 2+  Right achilles: 2+  Left achilles: 2+  Right plantar: normal  Left plantar: normal      Physical Exam   Constitutional: He is oriented to person, place, and time. He appears well-developed and well-nourished.   HENT:   Head: Normocephalic and atraumatic.   Eyes: EOM are normal. Pupils are equal, round, and reactive to light.  "  Cardiovascular: Intact distal pulses.    Pulmonary/Chest: Effort normal. No respiratory distress.   Musculoskeletal: Normal range of motion.   Neurological: He is alert and oriented to person, place, and time. He has normal strength. He has a normal Finger-Nose-Finger Test, a normal Heel to Shin Test, a normal Romberg Test and a normal Tandem Gait Test. Gait normal.   Reflex Scores:       Tricep reflexes are 2+ on the right side and 2+ on the left side.       Bicep reflexes are 2+ on the right side and 2+ on the left side.       Brachioradialis reflexes are 2+ on the right side and 2+ on the left side.       Patellar reflexes are 2+ on the right side and 2+ on the left side.       Achilles reflexes are 2+ on the right side and 2+ on the left side.  Skin: Skin is warm and dry.   Psychiatric: He has a normal mood and affect. His speech is normal and behavior is normal.       Vitals:    03/12/18 0832   BP: 130/78   BP Location: Right arm   Patient Position: Sitting   BP Method: Large (Automatic)   Pulse: 77   Weight: 68.4 kg (150 lb 12.7 oz)   Height: 5' 6" (1.676 m)       Assessment & Plan:    Problem List Items Addressed This Visit     Paresthesia - Primary    Relevant Orders    MRI Brain W WO Contrast    MRI Thoracic Spine W WO Cont    Creatinine, serum      Other Visit Diagnoses     Neurological deficit present        Relevant Orders    MRI Brain W WO Contrast          Follow-up: Follow-up in about 1 month (around 4/12/2018).  More than 50% of this 45 minute encounter was spent in counseling and coordinating care.    "

## 2018-03-19 ENCOUNTER — HOSPITAL ENCOUNTER (OUTPATIENT)
Dept: RADIOLOGY | Facility: HOSPITAL | Age: 83
Discharge: HOME | End: 2018-03-19
Attending: NEUROLOGICAL SURGERY
Payer: MEDICARE

## 2018-03-19 DIAGNOSIS — R29.818 NEUROLOGICAL DEFICIT PRESENT: ICD-10-CM

## 2018-03-19 DIAGNOSIS — R20.2 PARESTHESIA: ICD-10-CM

## 2018-03-19 PROCEDURE — 70553 MRI BRAIN STEM W/O & W/DYE: CPT | Mod: TC

## 2018-03-19 PROCEDURE — A9585 GADOBUTROL INJECTION: HCPCS | Performed by: NEUROLOGICAL SURGERY

## 2018-03-19 PROCEDURE — 70553 MRI BRAIN STEM W/O & W/DYE: CPT | Mod: 26,,, | Performed by: INTERNAL MEDICINE

## 2018-03-19 PROCEDURE — 72157 MRI CHEST SPINE W/O & W/DYE: CPT | Mod: TC

## 2018-03-19 PROCEDURE — 25500020 PHARM REV CODE 255: Performed by: NEUROLOGICAL SURGERY

## 2018-03-19 PROCEDURE — 72157 MRI CHEST SPINE W/O & W/DYE: CPT | Mod: 26,,, | Performed by: INTERNAL MEDICINE

## 2018-03-19 RX ORDER — GADOBUTROL 604.72 MG/ML
7 INJECTION INTRAVENOUS
Status: COMPLETED | OUTPATIENT
Start: 2018-03-19 | End: 2018-03-19

## 2018-03-19 RX ADMIN — GADOBUTROL 7 ML: 604.72 INJECTION INTRAVENOUS at 10:03

## 2018-03-22 DIAGNOSIS — K21.9 GASTROESOPHAGEAL REFLUX DISEASE WITHOUT ESOPHAGITIS: ICD-10-CM

## 2018-03-22 RX ORDER — PANTOPRAZOLE SODIUM 40 MG/1
TABLET, DELAYED RELEASE ORAL
Qty: 30 TABLET | Refills: 2 | Status: SHIPPED | OUTPATIENT
Start: 2018-03-22 | End: 2018-07-10 | Stop reason: SDUPTHER

## 2018-03-27 ENCOUNTER — OFFICE VISIT (OUTPATIENT)
Dept: FAMILY MEDICINE | Facility: CLINIC | Age: 83
End: 2018-03-27
Payer: MEDICARE

## 2018-03-27 VITALS
DIASTOLIC BLOOD PRESSURE: 78 MMHG | RESPIRATION RATE: 20 BRPM | BODY MASS INDEX: 22.58 KG/M2 | HEART RATE: 53 BPM | OXYGEN SATURATION: 97 % | WEIGHT: 149 LBS | HEIGHT: 68 IN | TEMPERATURE: 98 F | SYSTOLIC BLOOD PRESSURE: 122 MMHG

## 2018-03-27 DIAGNOSIS — N18.30 CONTROLLED TYPE 2 DIABETES MELLITUS WITH STAGE 3 CHRONIC KIDNEY DISEASE, WITHOUT LONG-TERM CURRENT USE OF INSULIN: Primary | ICD-10-CM

## 2018-03-27 DIAGNOSIS — E11.22 CONTROLLED TYPE 2 DIABETES MELLITUS WITH STAGE 3 CHRONIC KIDNEY DISEASE, WITHOUT LONG-TERM CURRENT USE OF INSULIN: Primary | ICD-10-CM

## 2018-03-27 DIAGNOSIS — I25.810 CORONARY ARTERY DISEASE INVOLVING CORONARY BYPASS GRAFT OF NATIVE HEART WITHOUT ANGINA PECTORIS: ICD-10-CM

## 2018-03-27 PROBLEM — R20.2 PARESTHESIA: Status: RESOLVED | Noted: 2018-03-12 | Resolved: 2018-03-27

## 2018-03-27 PROCEDURE — 99213 OFFICE O/P EST LOW 20 MIN: CPT | Mod: PBBFAC,PN | Performed by: INTERNAL MEDICINE

## 2018-03-27 PROCEDURE — 99999 PR PBB SHADOW E&M-EST. PATIENT-LVL III: CPT | Mod: PBBFAC,,, | Performed by: INTERNAL MEDICINE

## 2018-03-27 PROCEDURE — 99214 OFFICE O/P EST MOD 30 MIN: CPT | Mod: S$PBB,,, | Performed by: INTERNAL MEDICINE

## 2018-03-27 NOTE — PROGRESS NOTES
"Subjective:       Patient ID: Destin Barber is a 84 y.o. male.    Chief Complaint: Nephropathy (3 months f/u for left side pain)    F/u chronic conditions    HPI: 83 y/o w/ HTN CKD III and DM presents for scheduled follow up. Feels well no longer taking gabapentin daily (only as needed for left sided "tingling") no CP orthopnea or PND. Was evaluated by neurology for these prior parathesia had MRI of brain showing chronic microvascular disease and MRI of thoracic spine with stable left renal cyst (since June 2016). Denies LE swelling not using NSAIDs      Review of Systems   Constitutional: Negative for activity change, fever and unexpected weight change.   HENT: Negative for congestion, rhinorrhea, sore throat and trouble swallowing.    Eyes: Negative for photophobia and redness.   Respiratory: Negative for cough, chest tightness, shortness of breath and wheezing.    Cardiovascular: Negative for chest pain, palpitations and leg swelling.   Gastrointestinal: Negative for abdominal pain, blood in stool, constipation, diarrhea, nausea and vomiting.   Endocrine: Negative for cold intolerance, heat intolerance and polyuria.   Genitourinary: Negative for decreased urine volume, difficulty urinating, dysuria and urgency.   Musculoskeletal: Negative for arthralgias and back pain.   Skin: Negative for rash.   Neurological: Negative for dizziness, syncope, weakness and headaches.   Psychiatric/Behavioral: Negative for dysphoric mood, sleep disturbance and suicidal ideas.       Objective:     Vitals:    03/27/18 0853   BP: 122/78   BP Location: Right arm   Patient Position: Sitting   BP Method: Medium (Manual)   Pulse: (!) 53   Resp: 20   Temp: 97.6 °F (36.4 °C)   SpO2: 97%   Weight: 67.6 kg (149 lb)   Height: 5' 8" (1.727 m)          Physical Exam   Constitutional: He is oriented to person, place, and time. He appears well-developed and well-nourished.   HENT:   Head: Normocephalic and atraumatic.   Eyes: Conjunctivae are " normal. No scleral icterus.   Neck: Normal range of motion.   Cardiovascular: Normal rate and regular rhythm.  Exam reveals no gallop and no friction rub.    No murmur heard.  No LE edema   Pulmonary/Chest: Effort normal and breath sounds normal. He has no wheezes. He has no rales.   Abdominal: Soft. Bowel sounds are normal. There is no tenderness. There is no rebound and no guarding.   Musculoskeletal: Normal range of motion. He exhibits no edema or tenderness.   Neurological: He is alert and oriented to person, place, and time. No cranial nerve deficit.   Skin: Skin is warm and dry.   Psychiatric: He has a normal mood and affect.       Assessment:       1. Controlled type 2 diabetes mellitus with stage 3 chronic kidney disease, without long-term current use of insulin    2. Coronary artery disease involving coronary bypass graft of native heart without angina pectoris        Plan:    1. bp at goal continue current medications again reviewed avoidance of NSAIDs and good oral hydration    2. Stable on statin and DAPT

## 2018-04-20 ENCOUNTER — HOSPITAL ENCOUNTER (EMERGENCY)
Facility: HOSPITAL | Age: 83
Discharge: HOME | End: 2018-04-20
Attending: EMERGENCY MEDICINE
Payer: MEDICARE

## 2018-04-20 VITALS
BODY MASS INDEX: 27.48 KG/M2 | WEIGHT: 140 LBS | RESPIRATION RATE: 17 BRPM | OXYGEN SATURATION: 98 % | SYSTOLIC BLOOD PRESSURE: 141 MMHG | DIASTOLIC BLOOD PRESSURE: 80 MMHG | HEART RATE: 81 BPM | TEMPERATURE: 98 F | HEIGHT: 60 IN

## 2018-04-20 DIAGNOSIS — H05.232 PERIORBITAL HEMATOMA OF LEFT EYE: ICD-10-CM

## 2018-04-20 DIAGNOSIS — S00.81XA ABRASION OF PERIORBITAL REGION OF FACE, INITIAL ENCOUNTER: ICD-10-CM

## 2018-04-20 DIAGNOSIS — S09.93XA FACIAL INJURY, INITIAL ENCOUNTER: ICD-10-CM

## 2018-04-20 DIAGNOSIS — S06.0X0A CONCUSSION WITHOUT LOSS OF CONSCIOUSNESS, INITIAL ENCOUNTER: Primary | ICD-10-CM
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
## 2018-04-20 LAB — POCT GLUCOSE: 117 MG/DL (ref 70–110)

## 2018-04-20 PROCEDURE — 90471 IMMUNIZATION ADMIN: CPT | Performed by: EMERGENCY MEDICINE

## 2018-04-20 PROCEDURE — 82962 GLUCOSE BLOOD TEST: CPT

## 2018-04-20 PROCEDURE — 99284 EMERGENCY DEPT VISIT MOD MDM: CPT | Mod: 25

## 2018-04-20 PROCEDURE — 90715 TDAP VACCINE 7 YRS/> IM: CPT | Performed by: EMERGENCY MEDICINE

## 2018-04-20 PROCEDURE — 63600175 PHARM REV CODE 636 W HCPCS: Performed by: EMERGENCY MEDICINE

## 2018-04-20 RX ADMIN — CLOSTRIDIUM TETANI TOXOID ANTIGEN (FORMALDEHYDE INACTIVATED), CORYNEBACTERIUM DIPHTHERIAE TOXOID ANTIGEN (FORMALDEHYDE INACTIVATED), BORDETELLA PERTUSSIS TOXOID ANTIGEN (GLUTARALDEHYDE INACTIVATED), BORDETELLA PERTUSSIS FILAMENTOUS HEMAGGLUTININ ANTIGEN (FORMALDEHYDE INACTIVATED), BORDETELLA PERTUSSIS PERTACTIN ANTIGEN, AND BORDETELLA PERTUSSIS FIMBRIAE 2/3 ANTIGEN 0.5 ML: 5; 2; 2.5; 5; 3; 5 INJECTION, SUSPENSION INTRAMUSCULAR at 03:04

## 2018-04-20 NOTE — LETTER
[address] [facility]  [place] [address]  Phone: [phone]  Fax: [phone] April 20, 2018     Harry Velazco MD  [address]  [place] [address]    Patient: Destin Barber   Patient ID: [id number]   YOB: 1934   Date of Visit: 4/20/2018        Dear Harry Velazco:    Your patient, Destin Barber, was recently seen and treated in our emergency department.  You're patient tripped and fell and came to our emergency department for evaluation.  Imaging was nondiagnostic although the patient does have a large amount of abrasion and soft tissue swelling to the left side of the face.  We've given him head injury precautions and have asked him to follow up with you early next week.  Attached to this letter is a summary of that visit.    Sincerely,        Demetrio Tinoco MD     Enclosure

## 2018-04-20 NOTE — ED PROVIDER NOTES
EM PHYSICIAN NOTE    HPI  This patient presents with a complaint of   Chief Complaint   Patient presents with    Eye Injury     reports falling and injuring L eye this morning.  PT unable to open L eye for acuity.     History of present illness: This is a 84-year-old gentleman who tripped and fell this morning catching himself on his left arm but striking his left face.  There was no loss of consciousness.  Patient is taking aspirin daily and presented to the emergency department due to marked soft tissue swelling in the periorbital region of the left eye.  Last tetanus is unknown.    REVIEW of PMH, SOC History and Family History:  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    Coronary artery disease     Diastolic dysfunction     Gout, chronic     HTN (hypertension)     Hyperlipidemia     Type 2 diabetes mellitus     diet controlled     Past Surgical History:   Procedure Laterality Date    CATARACT EXTRACTION Bilateral     CATARACT EXTRACTION W/ ANTERIOR VITRECTOMY      CORONARY ARTERY BYPASS GRAFT  07/06/2016    PAIZ-LAD, SVG-Ramus, SVG-PDA    HEMORRHOID SURGERY       Social History     Social History    Marital status:      Spouse name: N/A    Number of children: N/A    Years of education: N/A     Social History Main Topics    Smoking status: Former Smoker     Types: Cigarettes     Quit date: 6/23/1985    Smokeless tobacco: Never Used    Alcohol use No    Drug use: No    Sexual activity: Not Asked     Other Topics Concern    None     Social History Narrative    None     Patient Active Problem List   Diagnosis    Essential hypertension    Gout, chronic    Hyperlipidemia    Diastolic dysfunction    Anemia of chronic disease    Coronary artery disease involving coronary bypass graft of native heart without angina pectoris    Pre-operative cardiovascular examination    S/P CABG (coronary artery bypass graft)    Chronic gout  without tophus    Malnutrition of moderate degree    Hypokalemia    Controlled type 2 diabetes mellitus with stage 3 chronic kidney disease, without long-term current use of insulin    Influenza A     No current facility-administered medications for this encounter.      Current Outpatient Prescriptions   Medication Sig Dispense Refill    albuterol sulfate 90 mcg/actuation AePB Inhale 1 puff into the lungs every 6 (six) hours as needed. Rescue 1 each 5    allopurinol (ZYLOPRIM) 100 MG tablet Take 1 tablet (100 mg total) by mouth 2 (two) times daily. 60 tablet 5    amlodipine (NORVASC) 10 MG tablet Take 1 tablet (10 mg total) by mouth once daily. 30 tablet 5    aspirin (ASPIR-LOW) 81 MG EC tablet Take 1 tablet (81 mg total) by mouth once daily. 30 tablet 5    atorvastatin (LIPITOR) 80 MG tablet Take 1 tablet (80 mg total) by mouth once daily. 30 tablet 5    clopidogrel (PLAVIX) 75 mg tablet Take 1 tablet (75 mg total) by mouth once daily. 30 tablet 5    diclofenac (VOLTAREN) 50 MG EC tablet Take 50 mg by mouth once daily. Until pain is resolved      docusate sodium (COLACE) 100 MG capsule Take 1 capsule (100 mg total) by mouth 2 (two) times daily as needed for Constipation.  0    gabapentin (NEURONTIN) 300 MG capsule Take 1 capsule (300 mg total) by mouth 3 (three) times daily. 90 capsule 1    lisinopril 10 MG tablet Take 1 tablet (10 mg total) by mouth once daily. 90 tablet 2    meclizine (ANTIVERT) 12.5 mg tablet Take 1 tablet (12.5 mg total) by mouth 3 (three) times daily as needed. 30 tablet 0    metFORMIN (GLUCOPHAGE-XR) 500 MG 24 hr tablet Take 1 tablet (500 mg total) by mouth daily with breakfast. 30 tablet 5    metoprolol tartrate (LOPRESSOR) 25 MG tablet Take 1 tablet (25 mg total) by mouth 2 (two) times daily. 60 tablet 5    pantoprazole (PROTONIX) 40 MG tablet TAKE 1 TABLET BY MOUTH EVERY DAY 30 tablet 2    tamsulosin (FLOMAX) 0.4 mg Cp24 Take 1 capsule (0.4 mg total) by mouth once daily.  30 capsule 5    traZODone (DESYREL) 100 MG tablet Take 2 tablets (200 mg total) by mouth every evening. 60 tablet 5    VENTOLIN HFA 90 mcg/actuation inhaler inahle ONE puff EVERY 6 HOURS AS NEEDED  1     Review of patient's allergies indicates:   Allergen Reactions    Penicillins Nausea And Vomiting     Immunization History   Administered Date(s) Administered    Influenza - High Dose 09/27/2017    Pneumococcal Conjugate - 13 Valent 05/15/2017     Family History   Problem Relation Age of Onset    Cancer Father      colon    Hypertension Mother     Heart disease Mother     Heart disease Sister     No Known Problems Brother     No Known Problems Maternal Aunt     No Known Problems Maternal Uncle     No Known Problems Paternal Aunt     No Known Problems Paternal Uncle     No Known Problems Maternal Grandmother     No Known Problems Maternal Grandfather     No Known Problems Paternal Grandmother     No Known Problems Paternal Grandfather     Amblyopia Neg Hx     Blindness Neg Hx     Cataracts Neg Hx     Diabetes Neg Hx     Glaucoma Neg Hx     Macular degeneration Neg Hx     Retinal detachment Neg Hx     Strabismus Neg Hx     Stroke Neg Hx     Thyroid disease Neg Hx        REVIEW of SYSTEMS  Source: patient and relative(s)  GENERAL/CONSTITUTIONAL: There is no report of fever, fatigue, weakness, or unexplained weight loss.  CARDIOVASCULAR: There is no report of chest pain   RESPIRATORY: There is no report of cough or SOB  GASTROINTESTINAL: There is no report of nausea, vomiting, diarrhea  MUSCULOSKELETAL: There is no report of joint or muscle pain. No muscle weakness or tenderness.  SKIN AND BREASTS: See history of present illness   HEMATOLOGIC/LYMPHATIC: There is no report of anemia, bleeding or clotting defects. There is no report of anticoagulant use.  The remainder of the ROS is negative.    PHYSICAL EXAMINATION    ED Triage Vitals [04/20/18 1354]   Enc Vitals Group      BP (!) 141/80       Pulse 81      Resp 17      Temp 97.5 °F (36.4 °C)      Temp src       SpO2 98 %      Weight 140 lb      Height 5'      Head Circumference       Peak Flow       Pain Score       Pain Loc       Pain Edu?       Excl. in GC?      Vital signs and Pulse Ox reviewed in clinical context. Abnormalities noted: Hypertension noted and the patient will be referred back to her primary care physician for follow-up within the next week.    Pt's level of consciousness is alert, and the patient is in mild distress.  Skin: warm, pink and dry  Mucosa:moist  Head and Neck: WNL; neck is supple, no midline tenderness.  The patient denies pain with range of motion.  There is significant soft tissue swelling of the periorbital region of the left eye, without laceration.  Patient has normal extraocular motion there does not appear to be any entrapment.  Patient has bilateral cataracts.  Cardiac exam: RRR  Pulmonary exam: unlabored, clear  Abd Exam: Soft and nontender  Musculoskeletal: no joint tenderness, deformity or swelling   Neurologic: 5 over 5 strength, normal gait, cranial nerves intact, neck supple     Medical decision making: Nursing notes reviewed and incorporated  Impression: face and head trauma in elderly patient  Plan: Td; Ct; reassess  Demetrio Tinoco MD, 2:24 PM 4/20/2018    This note was created using Dragon Dictation Software.  This program may occasionally misinterpret certain words and phrases.      Demetrio Tinoco MD  04/20/18 1525      3:44 PM    ED Course:    Labs Reviewed   POCT GLUCOSE - Abnormal; Notable for the following:        Result Value    POCT Glucose 117 (*)     All other components within normal limits   POCT GLUCOSE       Imaging Results          CT Maxillofacial Without Contrast (Final result)  Result time 04/20/18 15:16:30    Final result by Jose Salazar MD (04/20/18 15:16:30)                 Impression:      1. Markedly limited examination secondary to extensive patient motion, particularly  involving the images of the maxillofacial region.  No definite acute displaced fracture or dislocation however correlation for any focal tenderness is advised.  2. Soft tissue induration/hematoma overlying the left frontal region and left periorbital soft tissues, without convincing postseptal involvement.  3. Grossly stable sequela of chronic microvascular ischemic change and senescent change, no convincing acute intracranial abnormality.      Electronically signed by: Jose Salazar MD  Date:    04/20/2018  Time:    15:16             Narrative:    EXAMINATION:  CT HEAD WITHOUT CONTRAST; CT MAXILLOFACIAL WITHOUT CONTRAST    CLINICAL HISTORY:  Head trauma, minor, GCS>=13, NOC/NEXUS/CCR plosive, first study;; Facial fracture(s);    TECHNIQUE:  Low dose axial images were obtained through the head.  Coronal and sagittal reformations were also performed. Contrast was not administered.  Axial images of the maxillofacial region were obtained at 2.5 mm intervals without administration of IV contrast.  Coronal and sagittal reformatted images were reviewed.    COMPARISON:  03/19/2018    FINDINGS:  Please note, examination is markedly limited secondary to extensive patient motion.    Macro old brain there is no evidence of acute major vascular territory infarct, hemorrhage, or mass.  There is no hydrocephalus.  There are no abnormal extra-axial fluid collections.  No convincing displaced calvarial fracture.    Images of the maxillofacial region are severely limited by motion artifact.  No convincing acute displaced fracture of the maxillofacial region.  There is soft tissue induration/hematoma overlying the left frontal calvarium and left periorbital soft tissues.  The globes and orbital content are grossly unremarkable, severely limited by motion.  The bilateral mandibular condyles appear to be in grossly appropriate orientation.  No significant sinus disease.  Multilevel degenerative changes are noted of the cervical  spine, no convincing acute displaced fracture..                               CT Head Without Contrast (Final result)  Result time 04/20/18 15:16:30    Final result by Jose Salazar MD (04/20/18 15:16:30)                 Impression:      1. Markedly limited examination secondary to extensive patient motion, particularly involving the images of the maxillofacial region.  No definite acute displaced fracture or dislocation however correlation for any focal tenderness is advised.  2. Soft tissue induration/hematoma overlying the left frontal region and left periorbital soft tissues, without convincing postseptal involvement.  3. Grossly stable sequela of chronic microvascular ischemic change and senescent change, no convincing acute intracranial abnormality.      Electronically signed by: Jose Salazar MD  Date:    04/20/2018  Time:    15:16             Narrative:    EXAMINATION:  CT HEAD WITHOUT CONTRAST; CT MAXILLOFACIAL WITHOUT CONTRAST    CLINICAL HISTORY:  Head trauma, minor, GCS>=13, NOC/NEXUS/CCR plosive, first study;; Facial fracture(s);    TECHNIQUE:  Low dose axial images were obtained through the head.  Coronal and sagittal reformations were also performed. Contrast was not administered.  Axial images of the maxillofacial region were obtained at 2.5 mm intervals without administration of IV contrast.  Coronal and sagittal reformatted images were reviewed.    COMPARISON:  03/19/2018    FINDINGS:  Please note, examination is markedly limited secondary to extensive patient motion.    Macro old brain there is no evidence of acute major vascular territory infarct, hemorrhage, or mass.  There is no hydrocephalus.  There are no abnormal extra-axial fluid collections.  No convincing displaced calvarial fracture.    Images of the maxillofacial region are severely limited by motion artifact.  No convincing acute displaced fracture of the maxillofacial region.  There is soft tissue induration/hematoma overlying  the left frontal calvarium and left periorbital soft tissues.  The globes and orbital content are grossly unremarkable, severely limited by motion.  The bilateral mandibular condyles appear to be in grossly appropriate orientation.  No significant sinus disease.  Multilevel degenerative changes are noted of the cervical spine, no convincing acute displaced fracture..                                  Medications   Tdap vaccine injection 0.5 mL (0.5 mLs Intramuscular Given 4/20/18 1531)       Temp:  [97.5 °F (36.4 °C)] 97.5 °F (36.4 °C)  Pulse:  [81] 81  Resp:  [17] 17  SpO2:  [98 %] 98 %  BP: (141)/(80) 141/80    REASSESSMENT:  Improved    IMP: blunt trauma to face; periorbital ecchymosis      PLAN: At injury precautions, Tylenol, follow-up with PCP         Demetrio Tinoco MD  04/20/18 5147

## 2018-04-23 ENCOUNTER — TELEPHONE (OUTPATIENT)
Dept: FAMILY MEDICINE | Facility: CLINIC | Age: 83
End: 2018-04-23

## 2018-04-23 NOTE — TELEPHONE ENCOUNTER
----- Message from Harry Velazco MD sent at 4/20/2018  4:04 PM CDT -----  ED follow up next week any provider

## 2018-04-27 ENCOUNTER — OFFICE VISIT (OUTPATIENT)
Dept: FAMILY MEDICINE | Facility: CLINIC | Age: 83
End: 2018-04-27
Payer: MEDICARE

## 2018-04-27 VITALS
RESPIRATION RATE: 17 BRPM | BODY MASS INDEX: 29.26 KG/M2 | WEIGHT: 149.06 LBS | OXYGEN SATURATION: 98 % | HEART RATE: 60 BPM | HEIGHT: 60 IN | DIASTOLIC BLOOD PRESSURE: 70 MMHG | TEMPERATURE: 98 F | SYSTOLIC BLOOD PRESSURE: 128 MMHG

## 2018-04-27 DIAGNOSIS — J06.9 VIRAL UPPER RESPIRATORY INFECTION: ICD-10-CM
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
## 2018-04-27 PROCEDURE — 99999 PR PBB SHADOW E&M-EST. PATIENT-LVL III: CPT | Mod: PBBFAC,,, | Performed by: FAMILY MEDICINE

## 2018-04-27 PROCEDURE — 99214 OFFICE O/P EST MOD 30 MIN: CPT | Mod: S$PBB,,, | Performed by: FAMILY MEDICINE

## 2018-04-27 PROCEDURE — 99213 OFFICE O/P EST LOW 20 MIN: CPT | Mod: PBBFAC,PN | Performed by: FAMILY MEDICINE

## 2018-04-28 NOTE — PROGRESS NOTES
Routine Office Visit    Patient Name: Destin Barber    : 1934  MRN: [id number]    Subjective:  Destin is a 84 y.o. male who presents today for:   Chief Complaint   Patient presents with    [facility] f/u     84 year old male comes in for follow up from emergency room visit after falling on an uneven pavement on 18. He had imaging studies done in the emergency room and states that he was told everything was fine and he was discharged home. He states that since then he feels ok other than some cold symptoms. He reports a runny nose, nasal congestion and a cough. No sneezing. No chest pain or shortness of breath. No fever or chills.      Past Medical History  Past Medical History:   Diagnosis Date    Acute coronary syndrome 2016    s/p 3V CABG 2016    Coronary artery disease     Diastolic dysfunction     Gout, chronic     HTN (hypertension)     Hyperlipidemia     Type 2 diabetes mellitus     diet controlled       Past Surgical History  Past Surgical History:   Procedure Laterality Date    CATARACT EXTRACTION Bilateral     CATARACT EXTRACTION W/ ANTERIOR VITRECTOMY      CORONARY ARTERY BYPASS GRAFT  2016    PAIZ-LAD, SVG-Ramus, SVG-PDA    HEMORRHOID SURGERY          Family History  Family History   Problem Relation Age of Onset    Cancer Father      colon    Hypertension Mother     Heart disease Mother     Heart disease Sister     No Known Problems Brother     No Known Problems Maternal Aunt     No Known Problems Maternal Uncle     No Known Problems Paternal Aunt     No Known Problems Paternal Uncle     No Known Problems Maternal Grandmother     No Known Problems Maternal Grandfather     No Known Problems Paternal Grandmother     No Known Problems Paternal Grandfather     Amblyopia Neg Hx     Blindness Neg Hx     Cataracts Neg Hx     Diabetes Neg Hx     Glaucoma Neg Hx     Macular degeneration Neg Hx     Retinal detachment Neg Hx     Strabismus Neg Hx      Stroke Neg Hx     Thyroid disease Neg Hx        Social History  Social History     Social History    Marital status:      Spouse name: N/A    Number of children: N/A    Years of education: N/A     Occupational History    Not on file.     Social History Main Topics    Smoking status: Former Smoker     Types: Cigarettes     Quit date: 6/23/1985    Smokeless tobacco: Never Used    Alcohol use No    Drug use: No    Sexual activity: Not on file     Other Topics Concern    Not on file     Social History Narrative    No narrative on file       Current Medications  Current Outpatient Prescriptions on File Prior to Visit   Medication Sig Dispense Refill    albuterol sulfate 90 mcg/actuation AePB Inhale 1 puff into the lungs every 6 (six) hours as needed. Rescue 1 each 5    allopurinol (ZYLOPRIM) 100 MG tablet Take 1 tablet (100 mg total) by mouth 2 (two) times daily. 60 tablet 5    amlodipine (NORVASC) 10 MG tablet Take 1 tablet (10 mg total) by mouth once daily. 30 tablet 5    aspirin (ASPIR-LOW) 81 MG EC tablet Take 1 tablet (81 mg total) by mouth once daily. 30 tablet 5    atorvastatin (LIPITOR) 80 MG tablet Take 1 tablet (80 mg total) by mouth once daily. 30 tablet 5    clopidogrel (PLAVIX) 75 mg tablet Take 1 tablet (75 mg total) by mouth once daily. 30 tablet 5    docusate sodium (COLACE) 100 MG capsule Take 1 capsule (100 mg total) by mouth 2 (two) times daily as needed for Constipation.  0    gabapentin (NEURONTIN) 300 MG capsule Take 1 capsule (300 mg total) by mouth 3 (three) times daily. 90 capsule 1    lisinopril 10 MG tablet Take 1 tablet (10 mg total) by mouth once daily. 90 tablet 2    meclizine (ANTIVERT) 12.5 mg tablet Take 1 tablet (12.5 mg total) by mouth 3 (three) times daily as needed. 30 tablet 0    metFORMIN (GLUCOPHAGE-XR) 500 MG 24 hr tablet Take 1 tablet (500 mg total) by mouth daily with breakfast. 30 tablet 5    metoprolol tartrate (LOPRESSOR) 25 MG tablet Take 1  tablet (25 mg total) by mouth 2 (two) times daily. 60 tablet 5    pantoprazole (PROTONIX) 40 MG tablet TAKE 1 TABLET BY MOUTH EVERY DAY 30 tablet 2    tamsulosin (FLOMAX) 0.4 mg Cp24 Take 1 capsule (0.4 mg total) by mouth once daily. 30 capsule 5    traZODone (DESYREL) 100 MG tablet Take 2 tablets (200 mg total) by mouth every evening. 60 tablet 5    VENTOLIN HFA 90 mcg/actuation inhaler inahle ONE puff EVERY 6 HOURS AS NEEDED  1    diclofenac (VOLTAREN) 50 MG EC tablet Take 50 mg by mouth once daily. Until pain is resolved       No current facility-administered medications on file prior to visit.        Allergies   Review of patient's allergies indicates:   Allergen Reactions    Penicillins Nausea And Vomiting     Review of Systems   Constitutional: Negative for chills, fatigue and fever.   HENT: Positive for congestion and rhinorrhea. Negative for facial swelling, sinus pressure and sneezing.    Respiratory: Positive for cough. Negative for shortness of breath and wheezing.    Cardiovascular: Negative for chest pain.   Neurological: Negative for tremors, seizures, speech difficulty, light-headedness and headaches.       /70 (BP Location: Left arm, Patient Position: Sitting, BP Method: Medium (Manual))   Pulse 60   Temp 98.2 °F (36.8 °C) (Oral)   Resp 17   Ht 5' (1.524 m)   Wt 67.6 kg (149 lb 0.5 oz)   SpO2 98%   BMI 29.11 kg/m²     Physical Exam   Constitutional: He appears well-developed. He appears ill. No distress.   HENT:   Head: Normocephalic. Head is without right periorbital erythema and without left periorbital erythema.       Right Ear: Tympanic membrane, external ear and ear canal normal.   Left Ear: Tympanic membrane, external ear and ear canal normal.   Nose: Mucosal edema and rhinorrhea present.  No foreign bodies. Right sinus exhibits no maxillary sinus tenderness. Left sinus exhibits no maxillary sinus tenderness.   Mouth/Throat: Posterior oropharyngeal erythema present.   Eyes:  EOM and lids are normal. Pupils are equal, round, and reactive to light.   Neck: Trachea normal and normal range of motion. No tracheal tenderness present. No thyroid mass present.   Cardiovascular: Normal rate, regular rhythm, normal heart sounds and intact distal pulses.    Pulmonary/Chest: Effort normal and breath sounds normal. No respiratory distress. He has no wheezes. He has no rales.   Lymphadenopathy:     He has no cervical adenopathy.   Neurological: He has normal strength. No cranial nerve deficit or sensory deficit.   Psychiatric: He has a normal mood and affect. His behavior is normal.   Vitals reviewed.      Assessment/Plan:  Destin was seen today for fall.    Diagnoses and all orders for this visit:    Fall (on)(from) sidewalk curb, subsequent encounter  Patient doing well. No concerning symptoms/signs at present.    Viral upper respiratory infection  -     dextromethorphan-guaifenesin (CORICIDIN HBP)  mg Cap; Take 2 capsules by mouth every 6 (six) hours as needed.  Advised good hydration and rest.  Coricidin HBP for symptom relief, since patient has HTN.

## 2018-05-30 DIAGNOSIS — I25.10 CORONARY ARTERY DISEASE INVOLVING NATIVE CORONARY ARTERY OF NATIVE HEART WITHOUT ANGINA PECTORIS: ICD-10-CM

## 2018-05-30 RX ORDER — ASPIRIN 81 MG/1
81 TABLET ORAL DAILY
Qty: 30 TABLET | Refills: 1 | Status: SHIPPED | OUTPATIENT
Start: 2018-05-30 | End: 2018-10-24 | Stop reason: SDUPTHER

## 2018-05-30 RX ORDER — CLOPIDOGREL BISULFATE 75 MG/1
75 TABLET ORAL DAILY
Qty: 30 TABLET | Refills: 1 | Status: SHIPPED | OUTPATIENT
Start: 2018-05-30 | End: 2018-06-27 | Stop reason: SDUPTHER

## 2018-05-30 RX ORDER — ATORVASTATIN CALCIUM 80 MG/1
80 TABLET, FILM COATED ORAL DAILY
Qty: 30 TABLET | Refills: 1 | Status: SHIPPED | OUTPATIENT
Start: 2018-05-30 | End: 2018-06-27 | Stop reason: SDUPTHER

## 2018-06-27 ENCOUNTER — OFFICE VISIT (OUTPATIENT)
Dept: FAMILY MEDICINE | Facility: CLINIC | Age: 83
End: 2018-06-27
Payer: MEDICARE

## 2018-06-27 ENCOUNTER — LAB VISIT (OUTPATIENT)
Dept: LAB | Facility: HOSPITAL | Age: 83
End: 2018-06-27
Attending: INTERNAL MEDICINE
Payer: MEDICARE

## 2018-06-27 VITALS
BODY MASS INDEX: 23.08 KG/M2 | RESPIRATION RATE: 17 BRPM | DIASTOLIC BLOOD PRESSURE: 72 MMHG | WEIGHT: 147.06 LBS | TEMPERATURE: 98 F | HEART RATE: 70 BPM | OXYGEN SATURATION: 98 % | HEIGHT: 67 IN | SYSTOLIC BLOOD PRESSURE: 122 MMHG

## 2018-06-27 DIAGNOSIS — E11.9 CONTROLLED TYPE 2 DIABETES MELLITUS WITHOUT COMPLICATION, WITHOUT LONG-TERM CURRENT USE OF INSULIN: ICD-10-CM

## 2018-06-27 DIAGNOSIS — I25.10 CORONARY ARTERY DISEASE INVOLVING NATIVE CORONARY ARTERY OF NATIVE HEART WITHOUT ANGINA PECTORIS: ICD-10-CM

## 2018-06-27 DIAGNOSIS — E11.22 CONTROLLED TYPE 2 DIABETES MELLITUS WITH STAGE 3 CHRONIC KIDNEY DISEASE, WITHOUT LONG-TERM CURRENT USE OF INSULIN: Primary | ICD-10-CM

## 2018-06-27 DIAGNOSIS — E11.40 TYPE 2 DIABETES MELLITUS WITH DIABETIC NEUROPATHY, WITHOUT LONG-TERM CURRENT USE OF INSULIN: ICD-10-CM

## 2018-06-27 DIAGNOSIS — J84.10 LUNG GRANULOMA: ICD-10-CM

## 2018-06-27 DIAGNOSIS — R35.0 URINARY FREQUENCY: ICD-10-CM

## 2018-06-27 DIAGNOSIS — G47.00 INSOMNIA, UNSPECIFIED TYPE: ICD-10-CM

## 2018-06-27 DIAGNOSIS — N18.30 CONTROLLED TYPE 2 DIABETES MELLITUS WITH STAGE 3 CHRONIC KIDNEY DISEASE, WITHOUT LONG-TERM CURRENT USE OF INSULIN: ICD-10-CM

## 2018-06-27 DIAGNOSIS — N18.30 CONTROLLED TYPE 2 DIABETES MELLITUS WITH STAGE 3 CHRONIC KIDNEY DISEASE, WITHOUT LONG-TERM CURRENT USE OF INSULIN: Primary | ICD-10-CM

## 2018-06-27 DIAGNOSIS — E11.22 CONTROLLED TYPE 2 DIABETES MELLITUS WITH STAGE 3 CHRONIC KIDNEY DISEASE, WITHOUT LONG-TERM CURRENT USE OF INSULIN: ICD-10-CM

## 2018-06-27 DIAGNOSIS — I10 ESSENTIAL HYPERTENSION: ICD-10-CM

## 2018-06-27 LAB
ALBUMIN SERPL BCP-MCNC: 4 G/DL
ALP SERPL-CCNC: 81 U/L
ALT SERPL W/O P-5'-P-CCNC: 11 U/L
ANION GAP SERPL CALC-SCNC: 5 MMOL/L
AST SERPL-CCNC: 16 U/L
BASOPHILS # BLD AUTO: 0.01 K/UL
BASOPHILS NFR BLD: 0.2 %
BILIRUB SERPL-MCNC: 0.3 MG/DL
BILIRUB SERPL-MCNC: NORMAL MG/DL
BLOOD URINE, POC: NORMAL
BUN SERPL-MCNC: 19 MG/DL
CALCIUM SERPL-MCNC: 9.8 MG/DL
CHLORIDE SERPL-SCNC: 106 MMOL/L
CO2 SERPL-SCNC: 28 MMOL/L
COLOR, POC UA: CLEAR
CREAT SERPL-MCNC: 1.3 MG/DL
DIFFERENTIAL METHOD: ABNORMAL
EOSINOPHIL # BLD AUTO: 0.1 K/UL
EOSINOPHIL NFR BLD: 2.2 %
ERYTHROCYTE [DISTWIDTH] IN BLOOD BY AUTOMATED COUNT: 12.5 %
EST. GFR  (AFRICAN AMERICAN): 59 ML/MIN/1.73 M^2
EST. GFR  (NON AFRICAN AMERICAN): 51 ML/MIN/1.73 M^2
ESTIMATED AVG GLUCOSE: 123 MG/DL
GLUCOSE SERPL-MCNC: 96 MG/DL
GLUCOSE UR QL STRIP: NORMAL
HBA1C MFR BLD HPLC: 5.9 %
HCT VFR BLD AUTO: 36.7 %
HGB BLD-MCNC: 13.2 G/DL
KETONES UR QL STRIP: NORMAL
LEUKOCYTE ESTERASE URINE, POC: NORMAL
LYMPHOCYTES # BLD AUTO: 2.7 K/UL
LYMPHOCYTES NFR BLD: 49.8 %
MCH RBC QN AUTO: 31.4 PG
MCHC RBC AUTO-ENTMCNC: 36 G/DL
MCV RBC AUTO: 87 FL
MONOCYTES # BLD AUTO: 0.5 K/UL
MONOCYTES NFR BLD: 9.1 %
NEUTROPHILS # BLD AUTO: 2.1 K/UL
NEUTROPHILS NFR BLD: 38.5 %
NITRITE, POC UA: NORMAL
PH, POC UA: 5
PLATELET # BLD AUTO: 230 K/UL
PMV BLD AUTO: 9.7 FL
POTASSIUM SERPL-SCNC: 4.2 MMOL/L
PROT SERPL-MCNC: 7.3 G/DL
PROTEIN, POC: NORMAL
RBC # BLD AUTO: 4.2 M/UL
SODIUM SERPL-SCNC: 139 MMOL/L
SPECIFIC GRAVITY, POC UA: 1015
UROBILINOGEN, POC UA: NORMAL
WBC # BLD AUTO: 5.48 K/UL

## 2018-06-27 PROCEDURE — 80053 COMPREHEN METABOLIC PANEL: CPT

## 2018-06-27 PROCEDURE — 83036 HEMOGLOBIN GLYCOSYLATED A1C: CPT

## 2018-06-27 PROCEDURE — 85025 COMPLETE CBC W/AUTO DIFF WBC: CPT

## 2018-06-27 PROCEDURE — 36415 COLL VENOUS BLD VENIPUNCTURE: CPT | Mod: PN

## 2018-06-27 PROCEDURE — 99213 OFFICE O/P EST LOW 20 MIN: CPT | Mod: PBBFAC,PN | Performed by: INTERNAL MEDICINE

## 2018-06-27 PROCEDURE — 81002 URINALYSIS NONAUTO W/O SCOPE: CPT | Mod: PBBFAC,PN | Performed by: INTERNAL MEDICINE

## 2018-06-27 PROCEDURE — 99999 PR PBB SHADOW E&M-EST. PATIENT-LVL III: CPT | Mod: PBBFAC,,, | Performed by: INTERNAL MEDICINE

## 2018-06-27 PROCEDURE — 99214 OFFICE O/P EST MOD 30 MIN: CPT | Mod: S$PBB,,, | Performed by: INTERNAL MEDICINE

## 2018-06-27 RX ORDER — METFORMIN HYDROCHLORIDE 500 MG/1
500 TABLET, EXTENDED RELEASE ORAL
Qty: 90 TABLET | Refills: 3 | Status: SHIPPED | OUTPATIENT
Start: 2018-06-27 | End: 2019-04-23 | Stop reason: SDUPTHER

## 2018-06-27 RX ORDER — LISINOPRIL 10 MG/1
10 TABLET ORAL DAILY
Qty: 90 TABLET | Refills: 2 | Status: SHIPPED | OUTPATIENT
Start: 2018-06-27 | End: 2019-04-23 | Stop reason: SDUPTHER

## 2018-06-27 RX ORDER — TRAZODONE HYDROCHLORIDE 100 MG/1
100 TABLET ORAL NIGHTLY
Qty: 90 TABLET | Refills: 3 | Status: SHIPPED | OUTPATIENT
Start: 2018-06-27 | End: 2018-12-28

## 2018-06-27 RX ORDER — AMLODIPINE BESYLATE 10 MG/1
10 TABLET ORAL DAILY
Qty: 90 TABLET | Refills: 3 | Status: SHIPPED | OUTPATIENT
Start: 2018-06-27 | End: 2019-04-23 | Stop reason: SDUPTHER

## 2018-06-27 RX ORDER — CLOPIDOGREL BISULFATE 75 MG/1
75 TABLET ORAL DAILY
Qty: 90 TABLET | Refills: 3 | Status: SHIPPED | OUTPATIENT
Start: 2018-06-27 | End: 2019-04-23 | Stop reason: SDUPTHER

## 2018-06-27 RX ORDER — GABAPENTIN 300 MG/1
300 CAPSULE ORAL NIGHTLY
Qty: 90 CAPSULE | Refills: 3 | Status: SHIPPED | OUTPATIENT
Start: 2018-06-27 | End: 2019-05-01

## 2018-06-27 RX ORDER — ATORVASTATIN CALCIUM 80 MG/1
80 TABLET, FILM COATED ORAL DAILY
Qty: 90 TABLET | Refills: 3 | Status: SHIPPED | OUTPATIENT
Start: 2018-06-27 | End: 2018-12-28 | Stop reason: SINTOL

## 2018-06-27 RX ORDER — METOPROLOL TARTRATE 25 MG/1
25 TABLET, FILM COATED ORAL 2 TIMES DAILY
Qty: 180 TABLET | Refills: 3 | Status: SHIPPED | OUTPATIENT
Start: 2018-06-27 | End: 2019-04-23 | Stop reason: SDUPTHER

## 2018-07-10 DIAGNOSIS — K21.9 GASTROESOPHAGEAL REFLUX DISEASE WITHOUT ESOPHAGITIS: ICD-10-CM

## 2018-07-10 RX ORDER — PANTOPRAZOLE SODIUM 40 MG/1
40 TABLET, DELAYED RELEASE ORAL DAILY
Qty: 30 TABLET | Refills: 2 | Status: SHIPPED | OUTPATIENT
Start: 2018-07-10 | End: 2018-10-24 | Stop reason: SDUPTHER

## 2018-09-11 RX ORDER — TAMSULOSIN HYDROCHLORIDE 0.4 MG/1
0.4 CAPSULE ORAL DAILY
Qty: 30 CAPSULE | Refills: 5 | Status: SHIPPED | OUTPATIENT
Start: 2018-09-11 | End: 2019-04-23 | Stop reason: SDUPTHER

## 2018-09-28 ENCOUNTER — OFFICE VISIT (OUTPATIENT)
Dept: FAMILY MEDICINE | Facility: CLINIC | Age: 83
End: 2018-09-28
Payer: MEDICARE

## 2018-09-28 VITALS
DIASTOLIC BLOOD PRESSURE: 78 MMHG | SYSTOLIC BLOOD PRESSURE: 138 MMHG | TEMPERATURE: 99 F | HEIGHT: 67 IN | OXYGEN SATURATION: 99 % | BODY MASS INDEX: 22.94 KG/M2 | WEIGHT: 146.19 LBS | HEART RATE: 63 BPM | RESPIRATION RATE: 17 BRPM

## 2018-09-28 DIAGNOSIS — Z23 NEED FOR INFLUENZA VACCINATION: ICD-10-CM

## 2018-09-28 DIAGNOSIS — I25.810 CORONARY ARTERY DISEASE INVOLVING CORONARY BYPASS GRAFT OF NATIVE HEART WITHOUT ANGINA PECTORIS: ICD-10-CM

## 2018-09-28 DIAGNOSIS — Z23 NEED FOR PNEUMOCOCCAL VACCINATION: ICD-10-CM

## 2018-09-28 DIAGNOSIS — E11.22 CONTROLLED TYPE 2 DIABETES MELLITUS WITH STAGE 3 CHRONIC KIDNEY DISEASE, WITHOUT LONG-TERM CURRENT USE OF INSULIN: ICD-10-CM

## 2018-09-28 DIAGNOSIS — I10 ESSENTIAL HYPERTENSION: Primary | ICD-10-CM

## 2018-09-28 DIAGNOSIS — N18.30 CONTROLLED TYPE 2 DIABETES MELLITUS WITH STAGE 3 CHRONIC KIDNEY DISEASE, WITHOUT LONG-TERM CURRENT USE OF INSULIN: ICD-10-CM

## 2018-09-28 PROCEDURE — G0009 ADMIN PNEUMOCOCCAL VACCINE: HCPCS | Mod: PBBFAC,PN

## 2018-09-28 PROCEDURE — 99214 OFFICE O/P EST MOD 30 MIN: CPT | Mod: S$PBB,,, | Performed by: INTERNAL MEDICINE

## 2018-09-28 PROCEDURE — 99999 PR PBB SHADOW E&M-EST. PATIENT-LVL III: CPT | Mod: PBBFAC,,, | Performed by: INTERNAL MEDICINE

## 2018-09-28 PROCEDURE — 99213 OFFICE O/P EST LOW 20 MIN: CPT | Mod: PBBFAC,PN,25 | Performed by: INTERNAL MEDICINE

## 2018-09-28 PROCEDURE — 90662 IIV NO PRSV INCREASED AG IM: CPT | Mod: PBBFAC,PN

## 2018-09-28 NOTE — PROGRESS NOTES
"Subjective:       Patient ID: Destin Barber is a 84 y.o. male.    Chief Complaint: Diabetes; Hypertension; and Follow-up (3 month)    F/u chronic conditions    HPI: 85 y/o w/ HTN CAD (s/p CABG) DM presents alone for scheduled follow up. Reports feeling well cutting grass without exertional symptoms denies LE swelling orthopnea or PND. Reports compliance with medications he is due for flu vaccine and pneumococcal #2. At last visit was complaining of bilateral night time foot pain which sounded like neuropathy he is taking gabapentin at night and feels this has helped significantly      Review of Systems   Constitutional: Negative for activity change, appetite change, fatigue, fever and unexpected weight change.   HENT: Negative for ear pain, rhinorrhea and sore throat.    Eyes: Negative for discharge and visual disturbance.   Respiratory: Negative for chest tightness, shortness of breath and wheezing.    Cardiovascular: Negative for chest pain, palpitations and leg swelling.   Gastrointestinal: Negative for abdominal pain, constipation and diarrhea.   Endocrine: Negative for cold intolerance and heat intolerance.   Genitourinary: Negative for dysuria and hematuria.   Musculoskeletal: Negative for joint swelling and neck stiffness.   Skin: Negative for rash.   Neurological: Negative for dizziness, syncope, weakness and headaches.   Psychiatric/Behavioral: Negative for suicidal ideas.       Objective:     Vitals:    09/28/18 0840   BP: 138/78   BP Location: Left arm   Patient Position: Sitting   BP Method: Medium (Manual)   Pulse: 63   Resp: 17   Temp: 99.4 °F (37.4 °C)   TempSrc: Oral   SpO2: 99%   Weight: 66.3 kg (146 lb 2.6 oz)   Height: 5' 7" (1.702 m)          Physical Exam   Constitutional: He is oriented to person, place, and time. He appears well-developed and well-nourished.   HENT:   Head: Normocephalic and atraumatic.   Eyes: Conjunctivae are normal.   Neck: Normal range of motion.   Cardiovascular: Normal " rate and regular rhythm. Exam reveals no gallop and no friction rub.   No murmur heard.  No LE edema   Pulmonary/Chest: Effort normal and breath sounds normal. He has no wheezes. He has no rales.   Abdominal: Soft. Bowel sounds are normal. There is no tenderness. There is no rebound and no guarding.   Musculoskeletal: Normal range of motion. He exhibits no edema or tenderness.   Neurological: He is alert and oriented to person, place, and time. No cranial nerve deficit.   Skin: Skin is warm and dry.   Psychiatric: He has a normal mood and affect.       Assessment and Plan   1. Need for pneumococcal vaccination  pcv23 #2 today  - (In Office Administered) Pneumococcal Polysaccharide Vaccine (23 Valent) (SQ/IM)    2. Essential hypertension  At goal continue current medications labs prior to followu  - CBC auto differential; Future  - Comprehensive metabolic panel; Future    3. Coronary artery disease involving coronary bypass graft of native heart without angina pectoris  On DAPT and statin continue  - Lipid panel; Future    4. Controlled type 2 diabetes mellitus with stage 3 chronic kidney disease, without long-term current use of insulin  Last two a1c at goal for age plan to repeat at follow up  - Comprehensive metabolic panel; Future  - Hemoglobin A1c; Future  - Lipid panel; Future    5. Need for influenza vaccination  Flu vaccine today  - Influenza - High Dose (65+) (PF) (IM)

## 2018-10-24 DIAGNOSIS — I25.810 CORONARY ARTERY DISEASE INVOLVING CORONARY BYPASS GRAFT OF NATIVE HEART WITHOUT ANGINA PECTORIS: Primary | ICD-10-CM

## 2018-10-24 DIAGNOSIS — K21.9 GASTROESOPHAGEAL REFLUX DISEASE WITHOUT ESOPHAGITIS: ICD-10-CM

## 2018-10-24 RX ORDER — PANTOPRAZOLE SODIUM 40 MG/1
40 TABLET, DELAYED RELEASE ORAL DAILY
Qty: 30 TABLET | Refills: 2 | Status: SHIPPED | OUTPATIENT
Start: 2018-10-24 | End: 2019-03-15 | Stop reason: SDUPTHER

## 2018-10-24 RX ORDER — ASPIRIN 81 MG/1
81 TABLET ORAL DAILY
Qty: 30 TABLET | Refills: 11 | Status: SHIPPED | OUTPATIENT
Start: 2018-10-24 | End: 2019-05-01

## 2018-12-07 ENCOUNTER — LAB VISIT (OUTPATIENT)
Dept: LAB | Facility: HOSPITAL | Age: 83
End: 2018-12-07
Attending: NURSE PRACTITIONER
Payer: MEDICARE

## 2018-12-07 ENCOUNTER — OFFICE VISIT (OUTPATIENT)
Dept: FAMILY MEDICINE | Facility: CLINIC | Age: 83
End: 2018-12-07
Payer: MEDICARE

## 2018-12-07 VITALS
DIASTOLIC BLOOD PRESSURE: 88 MMHG | TEMPERATURE: 97 F | HEIGHT: 67 IN | SYSTOLIC BLOOD PRESSURE: 142 MMHG | HEART RATE: 76 BPM | OXYGEN SATURATION: 99 % | WEIGHT: 147.06 LBS | BODY MASS INDEX: 23.08 KG/M2

## 2018-12-07 DIAGNOSIS — B34.9 VIRAL SYNDROME: ICD-10-CM

## 2018-12-07 DIAGNOSIS — M79.10 MYALGIA: ICD-10-CM

## 2018-12-07 DIAGNOSIS — R49.0 HOARSENESS OF VOICE: ICD-10-CM

## 2018-12-07 DIAGNOSIS — J18.9 PNEUMONIA OF LEFT LOWER LOBE DUE TO INFECTIOUS ORGANISM: Primary | ICD-10-CM

## 2018-12-07 DIAGNOSIS — R06.02 SHORTNESS OF BREATH: ICD-10-CM

## 2018-12-07 LAB
ALBUMIN SERPL BCP-MCNC: 3.7 G/DL
ALP SERPL-CCNC: 83 U/L
ALT SERPL W/O P-5'-P-CCNC: 14 U/L
ANION GAP SERPL CALC-SCNC: 9 MMOL/L
AST SERPL-CCNC: 20 U/L
BASOPHILS # BLD AUTO: 0.01 K/UL
BASOPHILS NFR BLD: 0.2 %
BILIRUB SERPL-MCNC: 0.3 MG/DL
BUN SERPL-MCNC: 21 MG/DL
CALCIUM SERPL-MCNC: 8.9 MG/DL
CHLORIDE SERPL-SCNC: 104 MMOL/L
CK SERPL-CCNC: 868 U/L
CO2 SERPL-SCNC: 25 MMOL/L
CREAT SERPL-MCNC: 1.2 MG/DL
DIFFERENTIAL METHOD: ABNORMAL
EOSINOPHIL # BLD AUTO: 0.1 K/UL
EOSINOPHIL NFR BLD: 2.5 %
ERYTHROCYTE [DISTWIDTH] IN BLOOD BY AUTOMATED COUNT: 13.1 %
EST. GFR  (AFRICAN AMERICAN): >60 ML/MIN/1.73 M^2
EST. GFR  (NON AFRICAN AMERICAN): 56 ML/MIN/1.73 M^2
GLUCOSE SERPL-MCNC: 93 MG/DL
HCT VFR BLD AUTO: 34 %
HGB BLD-MCNC: 11.7 G/DL
LYMPHOCYTES # BLD AUTO: 2.8 K/UL
LYMPHOCYTES NFR BLD: 52.7 %
MCH RBC QN AUTO: 31.4 PG
MCHC RBC AUTO-ENTMCNC: 34.4 G/DL
MCV RBC AUTO: 91 FL
MONOCYTES # BLD AUTO: 0.5 K/UL
MONOCYTES NFR BLD: 9.5 %
NEUTROPHILS # BLD AUTO: 1.9 K/UL
NEUTROPHILS NFR BLD: 35.1 %
PLATELET # BLD AUTO: 179 K/UL
PMV BLD AUTO: 10.3 FL
POTASSIUM SERPL-SCNC: 4 MMOL/L
PROT SERPL-MCNC: 6.5 G/DL
RBC # BLD AUTO: 3.73 M/UL
SODIUM SERPL-SCNC: 138 MMOL/L
WBC # BLD AUTO: 5.29 K/UL

## 2018-12-07 PROCEDURE — 99215 OFFICE O/P EST HI 40 MIN: CPT | Mod: PBBFAC,PN | Performed by: NURSE PRACTITIONER

## 2018-12-07 PROCEDURE — 99999 PR PBB SHADOW E&M-EST. PATIENT-LVL V: CPT | Mod: PBBFAC,,, | Performed by: NURSE PRACTITIONER

## 2018-12-07 PROCEDURE — 99214 OFFICE O/P EST MOD 30 MIN: CPT | Mod: S$PBB,,, | Performed by: NURSE PRACTITIONER

## 2018-12-07 PROCEDURE — 85025 COMPLETE CBC W/AUTO DIFF WBC: CPT

## 2018-12-07 PROCEDURE — 36415 COLL VENOUS BLD VENIPUNCTURE: CPT | Mod: PN

## 2018-12-07 PROCEDURE — 80053 COMPREHEN METABOLIC PANEL: CPT

## 2018-12-07 PROCEDURE — 82550 ASSAY OF CK (CPK): CPT

## 2018-12-07 RX ORDER — DOXYCYCLINE HYCLATE 100 MG
100 TABLET ORAL 2 TIMES DAILY
Qty: 20 TABLET | Refills: 0 | Status: SHIPPED | OUTPATIENT
Start: 2018-12-07 | End: 2018-12-28 | Stop reason: ALTCHOICE

## 2018-12-07 RX ORDER — CEFDINIR 300 MG/1
300 CAPSULE ORAL 2 TIMES DAILY
Qty: 20 CAPSULE | Refills: 0 | Status: SHIPPED | OUTPATIENT
Start: 2018-12-07 | End: 2018-12-17

## 2018-12-07 RX ORDER — FLUTICASONE PROPIONATE 50 MCG
2 SPRAY, SUSPENSION (ML) NASAL DAILY
Qty: 15.8 ML | Refills: 0 | Status: SHIPPED | OUTPATIENT
Start: 2018-12-07 | End: 2018-12-21 | Stop reason: SDUPTHER

## 2018-12-07 NOTE — PROGRESS NOTES
Patient's CPK resulted in >800. I called the aptient verified by name and  - instructed him to hold the lipitor over the weekend, rest, and drink plenty of fluids. I educated him about the possible muscular effects of the medication. I asked the patient to call me on Monday to see how he is feeling. Patient agreeable to this plan.

## 2018-12-07 NOTE — PROGRESS NOTES
"Routine Office Visit    Patient Name: Destin Barber    : 1934  MRN: [id number]    Chief Complaint:  Body Aches    Subjective:  Destin is a 84 y.o. male who presents today for:    1. Three days of vague body aches on bilateral arms and legs and lower back. He states that the body aches are accompanied by SOB "when getting up." He does not describe shortness of breath with any other exertional activities. He also complains of some hoarseness but denies cough, congestion, f/c/n/v/d. He has tried nothing for the symptoms. He has a history of CAD with a CABG and has not seen a cardiologist in > 1 year. He states that his aches/pains and SOB happen "every time the weather gets cold." No sick contacts. He has gotten the flu shot. He has not taken his BP medications today.    Past Medical History  Past Medical History:   Diagnosis Date    Acute coronary syndrome 2016    s/p 3V CABG 2016    Coronary artery disease     Diastolic dysfunction     Gout, chronic     HTN (hypertension)     Hyperlipidemia     Type 2 diabetes mellitus     diet controlled       Past Surgical History  Past Surgical History:   Procedure Laterality Date    AORTOCORONARY BYPASS-CABG N/A 2016    Performed by En [facility]man [facility] at [facility] OR [facility]    CATARACT EXTRACTION Bilateral     CATARACT EXTRACTION W/ ANTERIOR VITRECTOMY      CORONARY ARTERY BYPASS GRAFT  2016    PAIZ-LAD, SVG-Ramus, SVG-PDA    HEMORRHOID SURGERY         Family History  Family History   Problem Relation Age of Onset    Cancer Father         colon    Hypertension Mother     Heart disease Mother     Heart disease Sister     No Known Problems Brother     No Known Problems Maternal Aunt     No Known Problems Maternal Uncle     No Known Problems Paternal Aunt     No Known Problems Paternal Uncle     No Known Problems Maternal Grandmother     No Known Problems Maternal Grandfather     No Known Problems Paternal Grandmother     No Known Problems " Paternal Grandfather     Amblyopia Neg Hx     Blindness Neg Hx     Cataracts Neg Hx     Diabetes Neg Hx     Glaucoma Neg Hx     Macular degeneration Neg Hx     Retinal detachment Neg Hx     Strabismus Neg Hx     Stroke Neg Hx     Thyroid disease Neg Hx        Social History  Social History     Socioeconomic History    Marital status:      Spouse name: Not on file    Number of children: Not on file    Years of education: Not on file    Highest education level: Not on file   Social Needs    Financial resource strain: Not on file    Food insecurity - worry: Not on file    Food insecurity - inability: Not on file    Transportation needs - medical: Not on file    Transportation needs - non-medical: Not on file   Occupational History    Not on file   Tobacco Use    Smoking status: Former Smoker     Types: Cigarettes     Last attempt to quit: 1985     Years since quittin.4    Smokeless tobacco: Never Used   Substance and Sexual Activity    Alcohol use: No    Drug use: No    Sexual activity: Not on file   Other Topics Concern    Not on file   Social History Narrative    Not on file       Current Medications  Current Outpatient Medications on File Prior to Visit   Medication Sig Dispense Refill    allopurinol (ZYLOPRIM) 100 MG tablet Take 1 tablet (100 mg total) by mouth 2 (two) times daily. 60 tablet 5    amLODIPine (NORVASC) 10 MG tablet Take 1 tablet (10 mg total) by mouth once daily. 90 tablet 3    aspirin (ASPIR-LOW) 81 MG EC tablet Take 1 tablet (81 mg total) by mouth once daily. 30 tablet 11    atorvastatin (LIPITOR) 80 MG tablet Take 1 tablet (80 mg total) by mouth once daily. 90 tablet 3    clopidogrel (PLAVIX) 75 mg tablet Take 1 tablet (75 mg total) by mouth once daily. 90 tablet 3    diclofenac (VOLTAREN) 50 MG EC tablet Take 50 mg by mouth once daily. Until pain is resolved      docusate sodium (COLACE) 100 MG capsule Take 1 capsule (100 mg total) by mouth 2  (two) times daily as needed for Constipation.  0    gabapentin (NEURONTIN) 300 MG capsule Take 1 capsule (300 mg total) by mouth every evening. 90 capsule 3    lisinopril 10 MG tablet Take 1 tablet (10 mg total) by mouth once daily. 90 tablet 2    metFORMIN (GLUCOPHAGE-XR) 500 MG 24 hr tablet Take 1 tablet (500 mg total) by mouth daily with breakfast. 90 tablet 3    metoprolol tartrate (LOPRESSOR) 25 MG tablet Take 1 tablet (25 mg total) by mouth 2 (two) times daily. 180 tablet 3    pantoprazole (PROTONIX) 40 MG tablet Take 1 tablet (40 mg total) by mouth once daily. 30 tablet 2    tamsulosin (FLOMAX) 0.4 mg Cap Take 1 capsule (0.4 mg total) by mouth once daily. 30 capsule 5    traZODone (DESYREL) 100 MG tablet Take 1 tablet (100 mg total) by mouth every evening. 90 tablet 3    VENTOLIN HFA 90 mcg/actuation inhaler inahle ONE puff EVERY 6 HOURS AS NEEDED  1    albuterol sulfate 90 mcg/actuation AePB Inhale 1 puff into the lungs every 6 (six) hours as needed. Rescue 1 each 5     No current facility-administered medications on file prior to visit.        Allergies   Review of patient's allergies indicates:   Allergen Reactions    Penicillins Nausea And Vomiting       Review of Systems (Pertinent positives)  Review of Systems   Constitutional: Positive for malaise/fatigue. Negative for chills, diaphoresis, fever and weight loss.   HENT: Negative for congestion, hearing loss, nosebleeds, sinus pain and sore throat.    Eyes: Negative for blurred vision, double vision, photophobia, pain, discharge and redness.   Respiratory: Positive for shortness of breath. Negative for cough, hemoptysis, sputum production, wheezing and stridor.    Cardiovascular: Negative for chest pain, palpitations, orthopnea, claudication, leg swelling and PND.   Gastrointestinal: Negative for abdominal pain, diarrhea, heartburn, nausea and vomiting.   Genitourinary: Negative.    Musculoskeletal: Positive for myalgias. Negative for back  "pain, falls, joint pain and neck pain.   Skin: Negative for itching and rash.   Neurological: Negative for dizziness, tingling, tremors, sensory change, weakness and headaches.   Endo/Heme/Allergies: Negative.    Psychiatric/Behavioral: Negative.      BP (!) 142/88 (BP Location: Left arm, Patient Position: Sitting, BP Method: Medium (Manual))   Pulse 76   Temp 97.4 °F (36.3 °C) (Oral)   Ht 5' 7" (1.702 m)   Wt 66.7 kg (147 lb 0.8 oz)   SpO2 99%   BMI 23.03 kg/m²     Physical Exam   Constitutional: He is oriented to person, place, and time. He appears well-developed and well-nourished. No distress.   HENT:   Head: Normocephalic and atraumatic.   Right Ear: Tympanic membrane, external ear and ear canal normal.   Left Ear: Tympanic membrane, external ear and ear canal normal.   Nose: Nose normal. No mucosal edema or rhinorrhea. Right sinus exhibits no maxillary sinus tenderness and no frontal sinus tenderness. Left sinus exhibits no maxillary sinus tenderness and no frontal sinus tenderness.   Mouth/Throat: Uvula is midline, oropharynx is clear and moist and mucous membranes are normal.   Eyes: Conjunctivae are normal. Pupils are equal, round, and reactive to light.   Neck: Normal range of motion. Neck supple.   Cardiovascular: Normal rate, regular rhythm, normal heart sounds and intact distal pulses.   Pulmonary/Chest: Effort normal. He has decreased breath sounds in the left upper field and the left middle field.   Abdominal: Soft. Bowel sounds are normal.   Musculoskeletal: Normal range of motion.   Neurological: He is alert and oriented to person, place, and time.   Skin: Skin is warm and dry. Capillary refill takes less than 2 seconds. He is not diaphoretic.         Assessment/Plan:  Destin Barber is a 84 y.o. male who presents today for :    Destin was seen today for generalized body aches.    Diagnoses and all orders for this visit:    Shortness of breath  - CXR    Pneumonia of left lower lobe due to " infectious organism  -     CBC auto differential; Future  -     Comprehensive metabolic panel; Future  -     CK; Future  -     fluticasone (FLONASE) 50 mcg/actuation nasal spray; 2 sprays (100 mcg total) by Each Nare route once daily.    Hoarseness of voice  -     fluticasone (FLONASE) 50 mcg/actuation nasal spray; 2 sprays (100 mcg total) by Each Nare route once daily.    - Will draw CBC to check blood count, CMP to evaluate kidney function, and CK to evaluate for statin-induced myopathy.  - Will check CXR to evaluate decreased air movement on L lung.   - CXR positive for LLL infiltrate/atelectasis - will treat for PNA and schedule prompt f/u after abx regimen. Patient is to f/u sooner if abx don't start to help in 4-5 days.  - Will also set up patient to see cardiologist to evaluate post-CABG.  - Instructed him to take his BP medications when he gets home.  - Will refer to ENT if hoarseness persists over 2 weeks.         My collaborating physician is Dr. Kenny Stephens.

## 2018-12-10 NOTE — PROGRESS NOTES
Patient called and ID verified by name and .  I asked the patient how his muscles were feeling since I last saw him. He states that he is feeling a little better. I explained to him the possible effects of lipitor on his muscles.  I instructed him to not take the lipitor for the next two weeks until I see him again in clinic on , and that we need to draw the Ck level again on  before seeing me again in clinic.  Patient agreeable to this plan.  Lab scheduled for .  Will f/u with result.

## 2018-12-20 ENCOUNTER — LAB VISIT (OUTPATIENT)
Dept: LAB | Facility: HOSPITAL | Age: 83
End: 2018-12-20
Payer: MEDICARE

## 2018-12-20 DIAGNOSIS — M79.10 MYALGIA: ICD-10-CM

## 2018-12-20 LAB — CK SERPL-CCNC: 751 U/L

## 2018-12-20 PROCEDURE — 36415 COLL VENOUS BLD VENIPUNCTURE: CPT | Mod: PN

## 2018-12-20 PROCEDURE — 82550 ASSAY OF CK (CPK): CPT

## 2018-12-21 ENCOUNTER — OFFICE VISIT (OUTPATIENT)
Dept: FAMILY MEDICINE | Facility: CLINIC | Age: 83
End: 2018-12-21
Payer: MEDICARE

## 2018-12-21 ENCOUNTER — LAB VISIT (OUTPATIENT)
Dept: LAB | Facility: HOSPITAL | Age: 83
End: 2018-12-21
Attending: INTERNAL MEDICINE
Payer: MEDICARE

## 2018-12-21 VITALS
HEIGHT: 67 IN | WEIGHT: 146.63 LBS | SYSTOLIC BLOOD PRESSURE: 138 MMHG | BODY MASS INDEX: 23.01 KG/M2 | HEART RATE: 66 BPM | TEMPERATURE: 99 F | RESPIRATION RATE: 17 BRPM | DIASTOLIC BLOOD PRESSURE: 70 MMHG | OXYGEN SATURATION: 98 %

## 2018-12-21 DIAGNOSIS — M54.2 NECK PAIN: ICD-10-CM

## 2018-12-21 DIAGNOSIS — E11.22 CONTROLLED TYPE 2 DIABETES MELLITUS WITH STAGE 3 CHRONIC KIDNEY DISEASE, WITHOUT LONG-TERM CURRENT USE OF INSULIN: ICD-10-CM

## 2018-12-21 DIAGNOSIS — I10 ESSENTIAL HYPERTENSION: ICD-10-CM

## 2018-12-21 DIAGNOSIS — I25.810 CORONARY ARTERY DISEASE INVOLVING CORONARY BYPASS GRAFT OF NATIVE HEART WITHOUT ANGINA PECTORIS: ICD-10-CM

## 2018-12-21 DIAGNOSIS — R74.8 ELEVATED CK: Primary | ICD-10-CM

## 2018-12-21 DIAGNOSIS — N18.30 CONTROLLED TYPE 2 DIABETES MELLITUS WITH STAGE 3 CHRONIC KIDNEY DISEASE, WITHOUT LONG-TERM CURRENT USE OF INSULIN: ICD-10-CM

## 2018-12-21 DIAGNOSIS — B34.9 VIRAL SYNDROME: ICD-10-CM

## 2018-12-21 LAB
ALBUMIN SERPL BCP-MCNC: 3.6 G/DL
ALP SERPL-CCNC: 83 U/L
ALT SERPL W/O P-5'-P-CCNC: 14 U/L
ANION GAP SERPL CALC-SCNC: 7 MMOL/L
AST SERPL-CCNC: 17 U/L
BASOPHILS # BLD AUTO: 0.01 K/UL
BASOPHILS NFR BLD: 0.2 %
BILIRUB SERPL-MCNC: 0.3 MG/DL
BUN SERPL-MCNC: 16 MG/DL
CALCIUM SERPL-MCNC: 9 MG/DL
CHLORIDE SERPL-SCNC: 108 MMOL/L
CHOLEST SERPL-MCNC: 99 MG/DL
CHOLEST/HDLC SERPL: 3 {RATIO}
CO2 SERPL-SCNC: 25 MMOL/L
CREAT SERPL-MCNC: 1.2 MG/DL
DIFFERENTIAL METHOD: ABNORMAL
EOSINOPHIL # BLD AUTO: 0.1 K/UL
EOSINOPHIL NFR BLD: 2.7 %
ERYTHROCYTE [DISTWIDTH] IN BLOOD BY AUTOMATED COUNT: 13.1 %
EST. GFR  (AFRICAN AMERICAN): >60 ML/MIN/1.73 M^2
EST. GFR  (NON AFRICAN AMERICAN): 56 ML/MIN/1.73 M^2
ESTIMATED AVG GLUCOSE: 126 MG/DL
GLUCOSE SERPL-MCNC: 82 MG/DL
HBA1C MFR BLD HPLC: 6 %
HCT VFR BLD AUTO: 34.2 %
HDLC SERPL-MCNC: 33 MG/DL
HDLC SERPL: 33.3 %
HGB BLD-MCNC: 12.1 G/DL
LDLC SERPL CALC-MCNC: 48.8 MG/DL
LYMPHOCYTES # BLD AUTO: 2.4 K/UL
LYMPHOCYTES NFR BLD: 46.8 %
MCH RBC QN AUTO: 31.8 PG
MCHC RBC AUTO-ENTMCNC: 35.4 G/DL
MCV RBC AUTO: 90 FL
MONOCYTES # BLD AUTO: 0.5 K/UL
MONOCYTES NFR BLD: 9.1 %
NEUTROPHILS # BLD AUTO: 2.1 K/UL
NEUTROPHILS NFR BLD: 41.2 %
NONHDLC SERPL-MCNC: 66 MG/DL
PLATELET # BLD AUTO: 184 K/UL
PMV BLD AUTO: 9.9 FL
POTASSIUM SERPL-SCNC: 3.8 MMOL/L
PROT SERPL-MCNC: 6.6 G/DL
RBC # BLD AUTO: 3.8 M/UL
SODIUM SERPL-SCNC: 140 MMOL/L
TRIGL SERPL-MCNC: 86 MG/DL
WBC # BLD AUTO: 5.17 K/UL

## 2018-12-21 PROCEDURE — 99214 OFFICE O/P EST MOD 30 MIN: CPT | Mod: PBBFAC,PN | Performed by: NURSE PRACTITIONER

## 2018-12-21 PROCEDURE — 36415 COLL VENOUS BLD VENIPUNCTURE: CPT | Mod: PN

## 2018-12-21 PROCEDURE — 99214 OFFICE O/P EST MOD 30 MIN: CPT | Mod: S$PBB,,, | Performed by: NURSE PRACTITIONER

## 2018-12-21 PROCEDURE — 99999 PR PBB SHADOW E&M-EST. PATIENT-LVL IV: CPT | Mod: PBBFAC,,, | Performed by: NURSE PRACTITIONER

## 2018-12-21 PROCEDURE — 80053 COMPREHEN METABOLIC PANEL: CPT

## 2018-12-21 PROCEDURE — 85025 COMPLETE CBC W/AUTO DIFF WBC: CPT

## 2018-12-21 PROCEDURE — 80061 LIPID PANEL: CPT

## 2018-12-21 PROCEDURE — 83036 HEMOGLOBIN GLYCOSYLATED A1C: CPT

## 2018-12-21 RX ORDER — FLUTICASONE PROPIONATE 50 MCG
2 SPRAY, SUSPENSION (ML) NASAL DAILY
Qty: 15.8 ML | Refills: 0 | Status: SHIPPED | OUTPATIENT
Start: 2018-12-21 | End: 2019-05-01 | Stop reason: SDUPTHER

## 2018-12-21 NOTE — PROGRESS NOTES
Routine Office Visit    Patient Name: Destin Barber    : 1934  MRN: [id number]    Chief Complaint:  F/U    Subjective:  Destin is a 84 y.o. male who presents today for:    1. F/u of shortness of breath and elevated CK.     SOB - CXR last visit showed possible LLL pneumonia - was treated with cefdinir and doxy- SOB is improved, he denies any SOB at rest or with activities. He finished all antibiotics without any issues. Denies f/c/wheezing/chest pain/palpitations. He would like a refill of his flonase today because he states that helped him.     Elevated CK -  Repeat CK after holding lipitor for 2 weeks was > 700. He has not taken his lipitor in the past 2 weeks per my recommendation - he states his muscle aches are improved and he is not having any more in his legs. He does have some L sided neck pain that radiates down to his L hand. This pain happens at certain times of the day and when he is lying down. Denies numbness, tingling, loss of strength or loss of sensation to the left hand. He has tried nothing for these symptoms.    Past Medical History  Past Medical History:   Diagnosis Date    Acute coronary syndrome 2016    s/p 3V CABG 2016    Coronary artery disease     Diastolic dysfunction     Gout, chronic     HTN (hypertension)     Hyperlipidemia     Type 2 diabetes mellitus     diet controlled       Past Surgical History  Past Surgical History:   Procedure Laterality Date    AORTOCORONARY BYPASS-CABG N/A 2016    Performed by [facility] at [facility]    CATARACT EXTRACTION Bilateral     CATARACT EXTRACTION W/ ANTERIOR VITRECTOMY      CORONARY ARTERY BYPASS GRAFT  2016    PAIZ-LAD, SVG-Ramus, SVG-PDA    HEMORRHOID SURGERY         Family History  Family History   Problem Relation Age of Onset    Cancer Father         colon    Hypertension Mother     Heart disease Mother     Heart disease Sister     No Known Problems Brother     No Known Problems Maternal Aunt      No Known Problems Maternal Uncle     No Known Problems Paternal Aunt     No Known Problems Paternal Uncle     No Known Problems Maternal Grandmother     No Known Problems Maternal Grandfather     No Known Problems Paternal Grandmother     No Known Problems Paternal Grandfather     Amblyopia Neg Hx     Blindness Neg Hx     Cataracts Neg Hx     Diabetes Neg Hx     Glaucoma Neg Hx     Macular degeneration Neg Hx     Retinal detachment Neg Hx     Strabismus Neg Hx     Stroke Neg Hx     Thyroid disease Neg Hx        Social History  Social History     Socioeconomic History    Marital status:      Spouse name: Not on file    Number of children: Not on file    Years of education: Not on file    Highest education level: Not on file   Social Needs    Financial resource strain: Not on file    Food insecurity - worry: Not on file    Food insecurity - inability: Not on file    Transportation needs - medical: Not on file    Transportation needs - non-medical: Not on file   Occupational History    Not on file   Tobacco Use    Smoking status: Former Smoker     Types: Cigarettes     Last attempt to quit: 1985     Years since quittin.5    Smokeless tobacco: Never Used   Substance and Sexual Activity    Alcohol use: No    Drug use: No    Sexual activity: Not on file   Other Topics Concern    Not on file   Social History Narrative    Not on file       Current Medications  Current Outpatient Medications on File Prior to Visit   Medication Sig Dispense Refill    albuterol sulfate 90 mcg/actuation AePB Inhale 1 puff into the lungs every 6 (six) hours as needed. Rescue 1 each 5    allopurinol (ZYLOPRIM) 100 MG tablet Take 1 tablet (100 mg total) by mouth 2 (two) times daily. 60 tablet 5    amLODIPine (NORVASC) 10 MG tablet Take 1 tablet (10 mg total) by mouth once daily. 90 tablet 3    aspirin (ASPIR-LOW) 81 MG EC tablet Take 1 tablet (81 mg total) by mouth once daily. 30 tablet 11     atorvastatin (LIPITOR) 80 MG tablet Take 1 tablet (80 mg total) by mouth once daily. 90 tablet 3    clopidogrel (PLAVIX) 75 mg tablet Take 1 tablet (75 mg total) by mouth once daily. 90 tablet 3    diclofenac (VOLTAREN) 50 MG EC tablet Take 50 mg by mouth once daily. Until pain is resolved      docusate sodium (COLACE) 100 MG capsule Take 1 capsule (100 mg total) by mouth 2 (two) times daily as needed for Constipation.  0    gabapentin (NEURONTIN) 300 MG capsule Take 1 capsule (300 mg total) by mouth every evening. 90 capsule 3    lisinopril 10 MG tablet Take 1 tablet (10 mg total) by mouth once daily. 90 tablet 2    metFORMIN (GLUCOPHAGE-XR) 500 MG 24 hr tablet Take 1 tablet (500 mg total) by mouth daily with breakfast. 90 tablet 3    metoprolol tartrate (LOPRESSOR) 25 MG tablet Take 1 tablet (25 mg total) by mouth 2 (two) times daily. 180 tablet 3    pantoprazole (PROTONIX) 40 MG tablet Take 1 tablet (40 mg total) by mouth once daily. 30 tablet 2    tamsulosin (FLOMAX) 0.4 mg Cap Take 1 capsule (0.4 mg total) by mouth once daily. 30 capsule 5    traZODone (DESYREL) 100 MG tablet Take 1 tablet (100 mg total) by mouth every evening. 90 tablet 3    VENTOLIN HFA 90 mcg/actuation inhaler inahle ONE puff EVERY 6 HOURS AS NEEDED  1    [DISCONTINUED] fluticasone (FLONASE) 50 mcg/actuation nasal spray 2 sprays (100 mcg total) by Each Nare route once daily. 15.8 mL 0    doxycycline (VIBRA-TABS) 100 MG tablet Take 1 tablet (100 mg total) by mouth 2 (two) times daily. 20 tablet 0     No current facility-administered medications on file prior to visit.        Allergies   Review of patient's allergies indicates:   Allergen Reactions    Penicillins Nausea And Vomiting       Review of Systems (Pertinent positives)  Review of Systems   Constitutional: Negative for chills, diaphoresis, fever, malaise/fatigue and weight loss.   HENT: Negative.    Eyes: Negative.    Respiratory: Negative for cough, hemoptysis,  "sputum production, shortness of breath and wheezing.    Cardiovascular: Negative for chest pain, palpitations, orthopnea, claudication, leg swelling and PND.   Gastrointestinal: Negative.    Genitourinary: Negative.    Musculoskeletal: Positive for neck pain. Negative for back pain, falls, joint pain and myalgias.        L neck pain radiating to L arm   Skin: Negative for itching and rash.   Neurological: Negative.  Negative for weakness.   Endo/Heme/Allergies: Negative.    Psychiatric/Behavioral: Negative.        /70 (BP Location: Right arm, Patient Position: Sitting, BP Method: Medium (Manual))   Pulse 66   Temp 98.8 °F (37.1 °C) (Oral)   Resp 17   Ht 5' 7" (1.702 m)   Wt 66.5 kg (146 lb 9.7 oz)   SpO2 98%   BMI 22.96 kg/m²     Physical Exam   Constitutional: He appears well-developed and well-nourished. No distress.   HENT:   Head: Normocephalic and atraumatic.   Eyes: Conjunctivae and EOM are normal. Pupils are equal, round, and reactive to light.   Neck: Normal range of motion. Neck supple.   Cardiovascular: Normal rate, regular rhythm, normal heart sounds and intact distal pulses. Exam reveals no gallop and no friction rub.   No murmur heard.  Pulmonary/Chest: Effort normal and breath sounds normal. No stridor. No respiratory distress. He has no wheezes. He has no rales. He exhibits no tenderness.   Abdominal: Soft. Bowel sounds are normal.   Musculoskeletal: Normal range of motion.        Arms:  Normal sensation and strength to LUE and all joints of LUE.   Skin: He is not diaphoretic.          Assessment/Plan:  Destin Barber is a 84 y.o. male who presents today for :    Destin was seen today for follow-up and fatigue.    Diagnoses and all orders for this visit:    Elevated CK    Neck pain  -     X-Ray Cervical Spine AP And Lateral; Future    Viral syndrome  -     fluticasone (FLONASE) 50 mcg/actuation nasal spray; 2 sprays (100 mcg total) by Each Nare route once daily.    PNA is likely resolved " given improvement of symptoms and lack of fever/chills/etc.  Will get x-ray of cervical spine to check for cervical OA. I emphasized the need for conservative treatment with tylenol at this time. Patient in agreement with this plan.   Will draw aldolase, ABBY, and CCP with scheduled labs today to further evaluate elevated CK. I educated him regarding the muscular effects of lipitor and told him to hold the medicine further since his muscles are feeling better off the medication.  He is to f/u with his PCP and with cardiology next week as scheduled to discuss the risks/benefits of statin therapy and possibly try a statin with less MSK effects.  Patient in agreement with this plan.         My collaborating physician is Dr. Kenny Stephens.

## 2018-12-28 ENCOUNTER — OFFICE VISIT (OUTPATIENT)
Dept: FAMILY MEDICINE | Facility: CLINIC | Age: 83
End: 2018-12-28
Payer: MEDICARE

## 2018-12-28 ENCOUNTER — PES CALL (OUTPATIENT)
Dept: ADMINISTRATIVE | Facility: CLINIC | Age: 83
End: 2018-12-28

## 2018-12-28 VITALS
HEIGHT: 67 IN | SYSTOLIC BLOOD PRESSURE: 142 MMHG | TEMPERATURE: 96 F | BODY MASS INDEX: 23.53 KG/M2 | RESPIRATION RATE: 17 BRPM | OXYGEN SATURATION: 99 % | DIASTOLIC BLOOD PRESSURE: 88 MMHG | WEIGHT: 149.94 LBS | HEART RATE: 66 BPM

## 2018-12-28 DIAGNOSIS — E78.5 HYPERLIPIDEMIA, UNSPECIFIED HYPERLIPIDEMIA TYPE: ICD-10-CM

## 2018-12-28 DIAGNOSIS — I25.810 CORONARY ARTERY DISEASE INVOLVING CORONARY BYPASS GRAFT OF NATIVE HEART WITHOUT ANGINA PECTORIS: Primary | ICD-10-CM

## 2018-12-28 DIAGNOSIS — G47.00 INSOMNIA, UNSPECIFIED TYPE: ICD-10-CM

## 2018-12-28 DIAGNOSIS — I10 ESSENTIAL HYPERTENSION: ICD-10-CM

## 2018-12-28 DIAGNOSIS — R74.8 ELEVATED CK: ICD-10-CM

## 2018-12-28 PROCEDURE — 99214 OFFICE O/P EST MOD 30 MIN: CPT | Mod: PBBFAC,PN | Performed by: INTERNAL MEDICINE

## 2018-12-28 PROCEDURE — 99214 OFFICE O/P EST MOD 30 MIN: CPT | Mod: S$PBB,,, | Performed by: INTERNAL MEDICINE

## 2018-12-28 PROCEDURE — 99999 PR PBB SHADOW E&M-EST. PATIENT-LVL IV: CPT | Mod: PBBFAC,,, | Performed by: INTERNAL MEDICINE

## 2018-12-28 RX ORDER — TRAZODONE HYDROCHLORIDE 150 MG/1
150 TABLET ORAL NIGHTLY
Qty: 90 TABLET | Refills: 0 | Status: SHIPPED | OUTPATIENT
Start: 2018-12-28 | End: 2019-04-23 | Stop reason: SDUPTHER

## 2018-12-28 RX ORDER — PRAVASTATIN SODIUM 40 MG/1
40 TABLET ORAL DAILY
Qty: 90 TABLET | Refills: 3 | Status: SHIPPED | OUTPATIENT
Start: 2018-12-28 | End: 2019-04-23 | Stop reason: SDUPTHER

## 2018-12-28 NOTE — PROGRESS NOTES
"Subjective:       Patient ID: Destin Barber is a 84 y.o. male.    Chief Complaint: Follow-up and Results (labs)    F/u labs    HPI: 83 y/o w/ CAD (s/p CABG and ROCKY) presents with daughter for follow up of recent labs. Daughter organizes his medications and tells me she has still been giving him atorvastatin in his pill organizer. He was complaining of joint and neck pains prompting CK check and noted to be elevated. He was also recently treated for pneumonia. His cough has resolved since that therapy. Otherwise he feels well       Review of Systems   Constitutional: Negative for activity change, fever and unexpected weight change.   HENT: Negative for congestion, rhinorrhea, sore throat and trouble swallowing.    Eyes: Negative for photophobia and redness.   Respiratory: Negative for cough, chest tightness, shortness of breath and wheezing.    Cardiovascular: Negative for chest pain, palpitations and leg swelling.   Gastrointestinal: Negative for abdominal pain, blood in stool, constipation, diarrhea, nausea and vomiting.   Endocrine: Negative for cold intolerance, heat intolerance and polyuria.   Genitourinary: Negative for decreased urine volume, difficulty urinating, dysuria and urgency.   Musculoskeletal: Positive for myalgias. Negative for arthralgias and back pain.   Skin: Negative for rash.   Neurological: Negative for dizziness, syncope, weakness and headaches.   Psychiatric/Behavioral: Negative for dysphoric mood, sleep disturbance and suicidal ideas.       Objective:     Vitals:    12/28/18 0901 12/28/18 0911   BP: (!) 160/97 (!) 142/88   BP Location: Right arm    Patient Position: Sitting    Pulse: 69 66   Resp: 17    Temp: 96 °F (35.6 °C)    TempSrc: Oral    SpO2: 99%    Weight: 68 kg (149 lb 14.6 oz)    Height: 5' 7" (1.702 m)           Physical Exam   Constitutional: He is oriented to person, place, and time. He appears well-developed and well-nourished.   HENT:   Head: Normocephalic and atraumatic. "   Eyes: Conjunctivae are normal. Pupils are equal, round, and reactive to light.   Neck: Normal range of motion.   Cardiovascular: Normal rate and regular rhythm. Exam reveals no gallop and no friction rub.   No murmur heard.  Pulmonary/Chest: Effort normal and breath sounds normal. He has no wheezes. He has no rales.   Abdominal: Soft. Bowel sounds are normal. There is no tenderness. There is no rebound and no guarding.   Musculoskeletal: Normal range of motion. He exhibits tenderness. He exhibits no edema.   Pain with compressio no bilateral upper arms no pain with compression of thighs   Neurological: He is alert and oriented to person, place, and time. No cranial nerve deficit.   Skin: Skin is warm and dry.   Psychiatric: He has a normal mood and affect.       Assessment and Plan   1. Coronary artery disease involving coronary bypass graft of native heart without angina pectoris  Given risk factor would benefit from statint herapy swithc to pravastatin repeat lfts' lipid and ck in three months print out with medication name given to daughter  - pravastatin (PRAVACHOL) 40 MG tablet; Take 1 tablet (40 mg total) by mouth once daily.  Dispense: 90 tablet; Refill: 3    2. Insomnia, unspecified type  Increase trazodone as below  - traZODone (DESYREL) 150 MG tablet; Take 1 tablet (150 mg total) by mouth every evening.  Dispense: 90 tablet; Refill: 0    3. Essential hypertension  Just at goal continue current medications    4. Hyperlipidemia, unspecified hyperlipidemia type  As above  - pravastatin (PRAVACHOL) 40 MG tablet; Take 1 tablet (40 mg total) by mouth once daily.  Dispense: 90 tablet; Refill: 3  - CK; Future  - Comprehensive metabolic panel; Future  - Lipid panel; Future    5. Elevated CK  Will repeat ck in three months off high intensity statin

## 2018-12-28 NOTE — PATIENT INSTRUCTIONS
Stop atorvastatin    Start pravastatin take at night    Will get repeat cholesterol blood work in March 2018    For sleep can increase trazodone to 150mg at night (one and half tablets of 100mg tablets) I've sent new prescription to pharmacy for 150mg tablets when getting this filled only take one at night

## 2019-01-21 ENCOUNTER — HOSPITAL ENCOUNTER (EMERGENCY)
Facility: HOSPITAL | Age: 84
Discharge: HOME | End: 2019-01-21
Attending: EMERGENCY MEDICINE
Payer: MEDICARE

## 2019-01-21 VITALS
HEIGHT: 67 IN | HEART RATE: 75 BPM | OXYGEN SATURATION: 99 % | TEMPERATURE: 98 F | RESPIRATION RATE: 16 BRPM | WEIGHT: 150 LBS | SYSTOLIC BLOOD PRESSURE: 185 MMHG | BODY MASS INDEX: 23.54 KG/M2 | DIASTOLIC BLOOD PRESSURE: 96 MMHG

## 2019-01-21 DIAGNOSIS — M1A.9XX0 CHRONIC GOUT INVOLVING TOE OF LEFT FOOT WITHOUT TOPHUS, UNSPECIFIED CAUSE: ICD-10-CM

## 2019-01-21 DIAGNOSIS — J06.9 UPPER RESPIRATORY TRACT INFECTION, UNSPECIFIED TYPE: ICD-10-CM

## 2019-01-21 DIAGNOSIS — I10 HYPERTENSION, UNSPECIFIED TYPE: Primary | ICD-10-CM

## 2019-01-21 PROCEDURE — 25000003 PHARM REV CODE 250: Mod: ER | Performed by: EMERGENCY MEDICINE

## 2019-01-21 PROCEDURE — 99283 EMERGENCY DEPT VISIT LOW MDM: CPT | Mod: 25,ER

## 2019-01-21 PROCEDURE — 82962 GLUCOSE BLOOD TEST: CPT | Mod: ER

## 2019-01-21 RX ORDER — FLUTICASONE PROPIONATE 50 MCG
2 SPRAY, SUSPENSION (ML) NASAL DAILY
Qty: 15 G | Refills: 0 | Status: SHIPPED | OUTPATIENT
Start: 2019-01-21 | End: 2019-05-01

## 2019-01-21 RX ORDER — ALLOPURINOL 100 MG/1
100 TABLET ORAL 2 TIMES DAILY
Qty: 60 TABLET | Refills: 0 | Status: SHIPPED | OUTPATIENT
Start: 2019-01-21 | End: 2019-04-23 | Stop reason: SDUPTHER

## 2019-01-21 RX ORDER — ACETAMINOPHEN 325 MG/1
650 TABLET ORAL
Status: COMPLETED | OUTPATIENT
Start: 2019-01-21 | End: 2019-01-21

## 2019-01-21 RX ORDER — BENZONATATE 100 MG/1
100 CAPSULE ORAL 3 TIMES DAILY PRN
Qty: 20 CAPSULE | Refills: 0 | Status: SHIPPED | OUTPATIENT
Start: 2019-01-21 | End: 2019-01-31

## 2019-01-21 RX ADMIN — ACETAMINOPHEN 650 MG: 325 TABLET ORAL at 09:01

## 2019-01-21 NOTE — ED PROVIDER NOTES
Encounter Date: 1/21/2019    SCRIBE #1 NOTE: I, Stephie Johnson, am scribing for, and in the presence of,  Dr. Howard. I have scribed the following portions of the note - Other sections scribed: HPI,ROS,PE .       History     Chief Complaint   Patient presents with    Gout     Left foot: onset x 2 days ago.  Patient says he ran out of his medication for his gout    Nasal Congestion     onset x 2 weeks: Pt    Sore Throat     onset x 1 weeks     Shortness of Breath     off and on for 2 weeks; Pt reports he ran out of his inhaler  for his sob     85 y/o/m with a hx of GOUT and HTN presents with left 1st toe gout for 3 days. He states he ran out of his medication,allopurinol . He states right now his gout is not painful. Also complaining of mild white productive cough and sore throat.  He ran out of his inhaler. Denies CP, SOB, headache, abdominal pain, visual changes, N/V/D or fever. He states he was late taking his blood pressure medicine around 730 today.       The history is provided by the patient. No  was used.     Review of patient's allergies indicates:   Allergen Reactions    Penicillins Nausea And Vomiting     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    Coronary artery disease     Diastolic dysfunction     Gout, chronic     HTN (hypertension)     Hyperlipidemia     Type 2 diabetes mellitus     diet controlled     Past Surgical History:   Procedure Laterality Date    AORTOCORONARY BYPASS-CABG N/A 7/7/2016    Performed by En [facility]man [facility] at [facility]    CATARACT EXTRACTION Bilateral     CATARACT EXTRACTION W/ ANTERIOR VITRECTOMY      CORONARY ARTERY BYPASS GRAFT  07/06/2016    PAIZ-LAD, SVG-Ramus, SVG-PDA    HEMORRHOID SURGERY       Family History   Problem Relation Age of Onset    Cancer Father         colon    Hypertension Mother     Heart disease Mother     Heart disease Sister     No Known Problems  Brother     No Known Problems Maternal Aunt     No Known Problems Maternal Uncle     No Known Problems Paternal Aunt     No Known Problems Paternal Uncle     No Known Problems Maternal Grandmother     No Known Problems Maternal Grandfather     No Known Problems Paternal Grandmother     No Known Problems Paternal Grandfather     Amblyopia Neg Hx     Blindness Neg Hx     Cataracts Neg Hx     Diabetes Neg Hx     Glaucoma Neg Hx     Macular degeneration Neg Hx     Retinal detachment Neg Hx     Strabismus Neg Hx     Stroke Neg Hx     Thyroid disease Neg Hx      Social History     Tobacco Use    Smoking status: Former Smoker     Types: Cigarettes     Last attempt to quit: 1985     Years since quittin.6    Smokeless tobacco: Never Used   Substance Use Topics    Alcohol use: No    Drug use: No     Review of Systems   Constitutional: Positive for fever.   HENT: Positive for sore throat.    Respiratory: Positive for cough. Negative for shortness of breath.    Cardiovascular: Negative for chest pain.   Gastrointestinal: Negative for abdominal pain, diarrhea, nausea and vomiting.   Musculoskeletal:        1st toe gout pain.   Neurological: Negative for headaches.       Physical Exam     Initial Vitals [19 0858]   BP Pulse Resp Temp SpO2   (!) 204/106 74 16 97.6 °F (36.4 °C) 99 %      MAP       --         Physical Exam    Nursing note and vitals reviewed.  Constitutional: He appears well-developed and well-nourished.   HENT:   Head: Normocephalic and atraumatic.   Mouth/Throat: Oropharynx is clear and moist. No oropharyngeal exudate.   Eyes: Conjunctivae are normal.   Neck: Normal range of motion. Neck supple.   Cardiovascular: Normal rate, regular rhythm, normal heart sounds and intact distal pulses. Exam reveals no gallop and no friction rub.    No murmur heard.  Pulmonary/Chest: No respiratory distress. He has no wheezes. He has no rhonchi. He has no rales.   Musculoskeletal: Normal range  of motion. He exhibits no edema or tenderness.   No warmth, erythema or edema to toe   Neurological: He is alert and oriented to person, place, and time.   Skin: Skin is warm and dry. No erythema.   Psychiatric: He has a normal mood and affect. His behavior is normal.         ED Course   Procedures  Labs Reviewed   POCT GLUCOSE MONITORING CONTINUOUS          Imaging Results    None          Medical Decision Making:   Initial Assessment:   85 y/o/m presents with left foot gout pain, nasal congestion and cough for 2 days. Denies fever. Exam benign.  Clinical Tests:   Lab Tests: Reviewed and Ordered  ED Management:  Treating with tylenol. Accucheck ordered.  Patient will be given a prescription for allopurinol and colchicine.            Scribe Attestation:   Scribe #1: I performed the above scribed service and the documentation accurately describes the services I performed. I attest to the accuracy of the note.       I, Dr. Rachel Howard, personally performed the services described in this documentation. All medical record entries made by the scribe were at my direction and in my presence.  I have reviewed the chart and agree that the record reflects my personal performance and is accurate and complete. Rachel Howard MD.  11:13 AM 01/21/2019             Clinical Impression:     1. Hypertension, unspecified type    2. Chronic gout involving toe of left foot without tophus, unspecified cause    3. Upper respiratory tract infection, unspecified type                                   Rachel Howard MD  01/21/19 1113

## 2019-01-22 LAB — POCT GLUCOSE: 124 MG/DL (ref 70–110)

## 2019-03-15 DIAGNOSIS — K21.9 GASTROESOPHAGEAL REFLUX DISEASE WITHOUT ESOPHAGITIS: ICD-10-CM

## 2019-03-15 RX ORDER — PANTOPRAZOLE SODIUM 40 MG/1
40 TABLET, DELAYED RELEASE ORAL DAILY
Qty: 30 TABLET | Refills: 2 | Status: SHIPPED | OUTPATIENT
Start: 2019-03-15 | End: 2019-05-01

## 2019-03-22 ENCOUNTER — PATIENT OUTREACH (OUTPATIENT)
Dept: ADMINISTRATIVE | Facility: HOSPITAL | Age: 84
End: 2019-03-22

## 2019-03-22 DIAGNOSIS — N18.30 CONTROLLED TYPE 2 DIABETES MELLITUS WITH STAGE 3 CHRONIC KIDNEY DISEASE, WITHOUT LONG-TERM CURRENT USE OF INSULIN: Primary | ICD-10-CM

## 2019-03-22 DIAGNOSIS — E11.22 CONTROLLED TYPE 2 DIABETES MELLITUS WITH STAGE 3 CHRONIC KIDNEY DISEASE, WITHOUT LONG-TERM CURRENT USE OF INSULIN: Primary | ICD-10-CM

## 2019-03-29 ENCOUNTER — LAB VISIT (OUTPATIENT)
Dept: LAB | Facility: HOSPITAL | Age: 84
End: 2019-03-29
Attending: INTERNAL MEDICINE
Payer: MEDICARE

## 2019-03-29 DIAGNOSIS — N18.30 CONTROLLED TYPE 2 DIABETES MELLITUS WITH STAGE 3 CHRONIC KIDNEY DISEASE, WITHOUT LONG-TERM CURRENT USE OF INSULIN: ICD-10-CM

## 2019-03-29 DIAGNOSIS — E78.5 HYPERLIPIDEMIA, UNSPECIFIED HYPERLIPIDEMIA TYPE: ICD-10-CM

## 2019-03-29 DIAGNOSIS — E11.22 CONTROLLED TYPE 2 DIABETES MELLITUS WITH STAGE 3 CHRONIC KIDNEY DISEASE, WITHOUT LONG-TERM CURRENT USE OF INSULIN: ICD-10-CM

## 2019-03-29 LAB
ALBUMIN SERPL BCP-MCNC: 3.9 G/DL (ref 3.5–5.2)
ALP SERPL-CCNC: 80 U/L (ref 55–135)
ALT SERPL W/O P-5'-P-CCNC: 8 U/L (ref 10–44)
ANION GAP SERPL CALC-SCNC: 7 MMOL/L (ref 8–16)
AST SERPL-CCNC: 17 U/L (ref 10–40)
BILIRUB SERPL-MCNC: 0.3 MG/DL (ref 0.1–1)
BUN SERPL-MCNC: 14 MG/DL (ref 8–23)
CALCIUM SERPL-MCNC: 9.4 MG/DL (ref 8.7–10.5)
CHLORIDE SERPL-SCNC: 106 MMOL/L (ref 95–110)
CHOLEST SERPL-MCNC: 195 MG/DL (ref 120–199)
CHOLEST/HDLC SERPL: 6.1 {RATIO} (ref 2–5)
CK SERPL-CCNC: 626 U/L (ref 20–200)
CO2 SERPL-SCNC: 28 MMOL/L (ref 23–29)
CREAT SERPL-MCNC: 1.3 MG/DL (ref 0.5–1.4)
EST. GFR  (AFRICAN AMERICAN): 58 ML/MIN/1.73 M^2
EST. GFR  (NON AFRICAN AMERICAN): 50 ML/MIN/1.73 M^2
ESTIMATED AVG GLUCOSE: 126 MG/DL (ref 68–131)
GLUCOSE SERPL-MCNC: 117 MG/DL (ref 70–110)
HBA1C MFR BLD HPLC: 6 % (ref 4–5.6)
HDLC SERPL-MCNC: 32 MG/DL (ref 40–75)
HDLC SERPL: 16.4 % (ref 20–50)
LDLC SERPL CALC-MCNC: 114.6 MG/DL (ref 63–159)
NONHDLC SERPL-MCNC: 163 MG/DL
POTASSIUM SERPL-SCNC: 3.4 MMOL/L (ref 3.5–5.1)
PROT SERPL-MCNC: 7 G/DL (ref 6–8.4)
SODIUM SERPL-SCNC: 141 MMOL/L (ref 136–145)
TRIGL SERPL-MCNC: 242 MG/DL (ref 30–150)

## 2019-03-29 PROCEDURE — 82550 ASSAY OF CK (CPK): CPT

## 2019-03-29 PROCEDURE — 36415 COLL VENOUS BLD VENIPUNCTURE: CPT | Mod: PN

## 2019-03-29 PROCEDURE — 80053 COMPREHEN METABOLIC PANEL: CPT

## 2019-03-29 PROCEDURE — 83036 HEMOGLOBIN GLYCOSYLATED A1C: CPT

## 2019-03-29 PROCEDURE — 80061 LIPID PANEL: CPT

## 2019-04-23 ENCOUNTER — OFFICE VISIT (OUTPATIENT)
Dept: FAMILY MEDICINE | Facility: CLINIC | Age: 84
End: 2019-04-23
Payer: MEDICARE

## 2019-04-23 VITALS
TEMPERATURE: 97 F | BODY MASS INDEX: 23.91 KG/M2 | OXYGEN SATURATION: 99 % | HEIGHT: 67 IN | HEART RATE: 61 BPM | WEIGHT: 152.31 LBS | RESPIRATION RATE: 17 BRPM | SYSTOLIC BLOOD PRESSURE: 139 MMHG | DIASTOLIC BLOOD PRESSURE: 98 MMHG

## 2019-04-23 DIAGNOSIS — N18.30 CONTROLLED TYPE 2 DIABETES MELLITUS WITH STAGE 3 CHRONIC KIDNEY DISEASE, WITHOUT LONG-TERM CURRENT USE OF INSULIN: ICD-10-CM

## 2019-04-23 DIAGNOSIS — E78.5 HYPERLIPIDEMIA, UNSPECIFIED HYPERLIPIDEMIA TYPE: ICD-10-CM

## 2019-04-23 DIAGNOSIS — M75.82 TENDINITIS OF LEFT ROTATOR CUFF: ICD-10-CM

## 2019-04-23 DIAGNOSIS — G47.00 INSOMNIA, UNSPECIFIED TYPE: ICD-10-CM

## 2019-04-23 DIAGNOSIS — I10 ESSENTIAL HYPERTENSION: ICD-10-CM

## 2019-04-23 DIAGNOSIS — I25.810 CORONARY ARTERY DISEASE INVOLVING CORONARY BYPASS GRAFT OF NATIVE HEART WITHOUT ANGINA PECTORIS: Primary | ICD-10-CM

## 2019-04-23 DIAGNOSIS — I25.10 CORONARY ARTERY DISEASE INVOLVING NATIVE CORONARY ARTERY OF NATIVE HEART WITHOUT ANGINA PECTORIS: ICD-10-CM

## 2019-04-23 DIAGNOSIS — E11.40 TYPE 2 DIABETES MELLITUS WITH DIABETIC NEUROPATHY, WITHOUT LONG-TERM CURRENT USE OF INSULIN: ICD-10-CM

## 2019-04-23 DIAGNOSIS — E11.22 CONTROLLED TYPE 2 DIABETES MELLITUS WITH STAGE 3 CHRONIC KIDNEY DISEASE, WITHOUT LONG-TERM CURRENT USE OF INSULIN: ICD-10-CM

## 2019-04-23 PROCEDURE — 99999 PR PBB SHADOW E&M-EST. PATIENT-LVL III: ICD-10-PCS | Mod: PBBFAC,,, | Performed by: INTERNAL MEDICINE

## 2019-04-23 PROCEDURE — 99213 OFFICE O/P EST LOW 20 MIN: CPT | Mod: PBBFAC,PN | Performed by: INTERNAL MEDICINE

## 2019-04-23 PROCEDURE — 99214 OFFICE O/P EST MOD 30 MIN: CPT | Mod: S$PBB,,, | Performed by: INTERNAL MEDICINE

## 2019-04-23 PROCEDURE — 99214 PR OFFICE/OUTPT VISIT, EST, LEVL IV, 30-39 MIN: ICD-10-PCS | Mod: S$PBB,,, | Performed by: INTERNAL MEDICINE

## 2019-04-23 PROCEDURE — 99999 PR PBB SHADOW E&M-EST. PATIENT-LVL III: CPT | Mod: PBBFAC,,, | Performed by: INTERNAL MEDICINE

## 2019-04-23 RX ORDER — PRAVASTATIN SODIUM 40 MG/1
40 TABLET ORAL DAILY
Qty: 90 TABLET | Refills: 3 | Status: SHIPPED | OUTPATIENT
Start: 2019-04-23 | End: 2019-05-01

## 2019-04-23 RX ORDER — LISINOPRIL 10 MG/1
10 TABLET ORAL DAILY
Qty: 90 TABLET | Refills: 3 | Status: SHIPPED | OUTPATIENT
Start: 2019-04-23 | End: 2019-05-01

## 2019-04-23 RX ORDER — CLOPIDOGREL BISULFATE 75 MG/1
75 TABLET ORAL DAILY
Qty: 90 TABLET | Refills: 3 | Status: SHIPPED | OUTPATIENT
Start: 2019-04-23 | End: 2019-05-01

## 2019-04-23 RX ORDER — ALLOPURINOL 100 MG/1
100 TABLET ORAL 2 TIMES DAILY
Qty: 180 TABLET | Refills: 0 | Status: SHIPPED | OUTPATIENT
Start: 2019-04-23 | End: 2019-05-01

## 2019-04-23 RX ORDER — TAMSULOSIN HYDROCHLORIDE 0.4 MG/1
0.4 CAPSULE ORAL DAILY
Qty: 90 CAPSULE | Refills: 3 | Status: SHIPPED | OUTPATIENT
Start: 2019-04-23 | End: 2019-05-01

## 2019-04-23 RX ORDER — TIZANIDINE 4 MG/1
4 TABLET ORAL EVERY 8 HOURS PRN
Qty: 60 TABLET | Refills: 0 | Status: SHIPPED | OUTPATIENT
Start: 2019-04-23 | End: 2019-04-29 | Stop reason: SDUPTHER

## 2019-04-23 RX ORDER — METFORMIN HYDROCHLORIDE 500 MG/1
500 TABLET, EXTENDED RELEASE ORAL
Qty: 90 TABLET | Refills: 3 | Status: SHIPPED | OUTPATIENT
Start: 2019-04-23 | End: 2019-05-01

## 2019-04-23 RX ORDER — AMLODIPINE BESYLATE 10 MG/1
10 TABLET ORAL DAILY
Qty: 90 TABLET | Refills: 3 | Status: SHIPPED | OUTPATIENT
Start: 2019-04-23 | End: 2019-05-01

## 2019-04-23 RX ORDER — TRAZODONE HYDROCHLORIDE 150 MG/1
150 TABLET ORAL NIGHTLY
Qty: 90 TABLET | Refills: 0 | Status: SHIPPED | OUTPATIENT
Start: 2019-04-23 | End: 2019-05-01

## 2019-04-23 RX ORDER — METOPROLOL TARTRATE 25 MG/1
25 TABLET, FILM COATED ORAL 2 TIMES DAILY
Qty: 180 TABLET | Refills: 3 | Status: SHIPPED | OUTPATIENT
Start: 2019-04-23 | End: 2019-05-01

## 2019-04-23 NOTE — PROGRESS NOTES
"Subjective:       Patient ID: Destin Barber is a 85 y.o. male.    Chief Complaint: Establish Care; Follow-up; Shoulder Pain (R side shoulder / 2 weeks ); and Hand Pain (R hand pain /2 weeks )    Shoulder pain    HPI: 86 y/o w/ HTN HDL CAD (s/p CABG) DM presents with daughter to discuss intermittent shoulder pain. Pain worse when turning steering wheel. No pain at rest no pain to palpation occasionally when turning steering wheel will have radiation to anterior forearm. Not taking gabapentin regular out of some of his blood pressure medications. No dyspnea no LE swelling has tried APAP with minimal relief.     Review of Systems   Constitutional: Negative for activity change, appetite change, fatigue, fever and unexpected weight change.   HENT: Negative for ear pain, rhinorrhea and sore throat.    Eyes: Negative for discharge and visual disturbance.   Respiratory: Negative for chest tightness, shortness of breath and wheezing.    Cardiovascular: Negative for chest pain, palpitations and leg swelling.   Gastrointestinal: Negative for abdominal pain, constipation and diarrhea.   Endocrine: Negative for cold intolerance and heat intolerance.   Genitourinary: Negative for dysuria and hematuria.   Musculoskeletal: Positive for arthralgias and neck pain. Negative for joint swelling and neck stiffness.   Skin: Negative for rash.   Neurological: Negative for dizziness, syncope, weakness and headaches.   Psychiatric/Behavioral: Negative for suicidal ideas.       Objective:     Vitals:    04/23/19 1459   BP: (!) 139/98   BP Location: Right arm   Patient Position: Sitting   Pulse: 61   Resp: 17   Temp: 97 °F (36.1 °C)   TempSrc: Oral   SpO2: 99%   Weight: 69.1 kg (152 lb 5.4 oz)   Height: 5' 7" (1.702 m)          Physical Exam   Constitutional: He is oriented to person, place, and time. He appears well-developed and well-nourished.   HENT:   Head: Normocephalic and atraumatic.   Eyes: Conjunctivae are normal. No scleral " icterus.   Neck: Normal range of motion.   Cardiovascular: Normal rate and regular rhythm. Exam reveals no gallop and no friction rub.   No murmur heard.  Pulmonary/Chest: Effort normal and breath sounds normal. He has no wheezes. He has no rales.   Abdominal: Soft. Bowel sounds are normal. There is no tenderness. There is no rebound and no guarding.   Musculoskeletal: Normal range of motion. He exhibits no edema or tenderness.   Abduction to 175 degrees on right no tenderness to bicep tendon/humeral head no posterior tenderness no tenderness to compression of thighs bilaterally no skin changes of fingers or nail beds.    Neurological: He is alert and oriented to person, place, and time. No cranial nerve deficit.   5/5 distal  strength bilaterally   Skin: Skin is warm and dry.   Psychiatric: He has a normal mood and affect.       Assessment and Plan   1. Coronary artery disease involving coronary bypass graft of native heart without angina pectoris  Continue DAPT and statin therapy  - pravastatin (PRAVACHOL) 40 MG tablet; Take 1 tablet (40 mg total) by mouth once daily.  Dispense: 90 tablet; Refill: 3    2. Essential hypertension  Just above goal out of amlodipine resume  - amLODIPine (NORVASC) 10 MG tablet; Take 1 tablet (10 mg total) by mouth once daily.  Dispense: 90 tablet; Refill: 3  - metoprolol tartrate (LOPRESSOR) 25 MG tablet; Take 1 tablet (25 mg total) by mouth 2 (two) times daily.  Dispense: 180 tablet; Refill: 3    3. Controlled type 2 diabetes mellitus with stage 3 chronic kidney disease, without long-term current use of insulin  At goal for age continue metformin  - lisinopril 10 MG tablet; Take 1 tablet (10 mg total) by mouth once daily.  Dispense: 90 tablet; Refill: 3  - metFORMIN (GLUCOPHAGE-XR) 500 MG 24 hr tablet; Take 1 tablet (500 mg total) by mouth daily with breakfast.  Dispense: 90 tablet; Refill: 3    4. Tendinitis of left rotator cuff  Trial topical heat bid and muscle relaxer qhs  -  tiZANidine (ZANAFLEX) 4 MG tablet; Take 1 tablet (4 mg total) by mouth every 8 (eight) hours as needed (neck/shoulder pain).  Dispense: 60 tablet; Refill: 0    5. Insomnia, unspecified type  Stable on trazodone continue  - traZODone (DESYREL) 150 MG tablet; Take 1 tablet (150 mg total) by mouth every evening.  Dispense: 90 tablet; Refill: 0    6. Coronary artery disease involving native coronary artery of native heart without angina pectoris  As above  - metoprolol tartrate (LOPRESSOR) 25 MG tablet; Take 1 tablet (25 mg total) by mouth 2 (two) times daily.  Dispense: 180 tablet; Refill: 3  - clopidogrel (PLAVIX) 75 mg tablet; Take 1 tablet (75 mg total) by mouth once daily.  Dispense: 90 tablet; Refill: 3    7. Hyperlipidemia, unspecified hyperlipidemia type  As above in light of decreasing CK and no appreciable muscle tenderness continue statin  - pravastatin (PRAVACHOL) 40 MG tablet; Take 1 tablet (40 mg total) by mouth once daily.  Dispense: 90 tablet; Refill: 3    8. Type 2 diabetes mellitus with diabetic neuropathy, without long-term current use of insulin  As above  - metFORMIN (GLUCOPHAGE-XR) 500 MG 24 hr tablet; Take 1 tablet (500 mg total) by mouth daily with breakfast.  Dispense: 90 tablet; Refill: 3

## 2019-04-29 DIAGNOSIS — E11.22 CONTROLLED TYPE 2 DIABETES MELLITUS WITH STAGE 3 CHRONIC KIDNEY DISEASE, WITHOUT LONG-TERM CURRENT USE OF INSULIN: ICD-10-CM

## 2019-04-29 DIAGNOSIS — N18.30 CONTROLLED TYPE 2 DIABETES MELLITUS WITH STAGE 3 CHRONIC KIDNEY DISEASE, WITHOUT LONG-TERM CURRENT USE OF INSULIN: ICD-10-CM

## 2019-04-29 DIAGNOSIS — E11.40 TYPE 2 DIABETES MELLITUS WITH DIABETIC NEUROPATHY, WITHOUT LONG-TERM CURRENT USE OF INSULIN: ICD-10-CM

## 2019-04-29 DIAGNOSIS — G47.00 INSOMNIA, UNSPECIFIED TYPE: ICD-10-CM

## 2019-04-29 DIAGNOSIS — I10 ESSENTIAL HYPERTENSION: ICD-10-CM

## 2019-04-29 DIAGNOSIS — I25.10 CORONARY ARTERY DISEASE INVOLVING NATIVE CORONARY ARTERY OF NATIVE HEART WITHOUT ANGINA PECTORIS: ICD-10-CM

## 2019-04-29 DIAGNOSIS — E78.5 HYPERLIPIDEMIA, UNSPECIFIED HYPERLIPIDEMIA TYPE: ICD-10-CM

## 2019-04-29 DIAGNOSIS — M75.82 TENDINITIS OF LEFT ROTATOR CUFF: ICD-10-CM

## 2019-04-29 DIAGNOSIS — K21.9 GASTROESOPHAGEAL REFLUX DISEASE WITHOUT ESOPHAGITIS: ICD-10-CM

## 2019-04-29 DIAGNOSIS — I25.810 CORONARY ARTERY DISEASE INVOLVING CORONARY BYPASS GRAFT OF NATIVE HEART WITHOUT ANGINA PECTORIS: ICD-10-CM

## 2019-04-29 RX ORDER — TAMSULOSIN HYDROCHLORIDE 0.4 MG/1
0.4 CAPSULE ORAL DAILY
Qty: 90 CAPSULE | Refills: 3 | Status: CANCELLED | OUTPATIENT
Start: 2019-04-29 | End: 2020-04-28

## 2019-04-29 RX ORDER — METFORMIN HYDROCHLORIDE 500 MG/1
500 TABLET, EXTENDED RELEASE ORAL
Qty: 90 TABLET | Refills: 3 | Status: CANCELLED | OUTPATIENT
Start: 2019-04-29 | End: 2020-04-28

## 2019-04-29 RX ORDER — ASPIRIN 81 MG/1
81 TABLET ORAL DAILY
Qty: 30 TABLET | Refills: 11 | Status: CANCELLED | OUTPATIENT
Start: 2019-04-29

## 2019-04-29 RX ORDER — PRAVASTATIN SODIUM 40 MG/1
40 TABLET ORAL DAILY
Qty: 90 TABLET | Refills: 3 | Status: CANCELLED | OUTPATIENT
Start: 2019-04-29 | End: 2020-04-28

## 2019-04-29 RX ORDER — GABAPENTIN 300 MG/1
300 CAPSULE ORAL NIGHTLY
Qty: 90 CAPSULE | Refills: 3 | Status: CANCELLED | OUTPATIENT
Start: 2019-04-29 | End: 2020-04-28

## 2019-04-29 RX ORDER — LISINOPRIL 10 MG/1
10 TABLET ORAL DAILY
Qty: 90 TABLET | Refills: 3 | Status: CANCELLED | OUTPATIENT
Start: 2019-04-29 | End: 2020-04-28

## 2019-04-29 RX ORDER — TRAZODONE HYDROCHLORIDE 150 MG/1
150 TABLET ORAL NIGHTLY
Qty: 90 TABLET | Refills: 0 | Status: CANCELLED | OUTPATIENT
Start: 2019-04-29

## 2019-04-29 RX ORDER — METOPROLOL TARTRATE 25 MG/1
25 TABLET, FILM COATED ORAL 2 TIMES DAILY
Qty: 180 TABLET | Refills: 3 | Status: CANCELLED | OUTPATIENT
Start: 2019-04-29 | End: 2020-04-28

## 2019-04-29 RX ORDER — AMLODIPINE BESYLATE 10 MG/1
10 TABLET ORAL DAILY
Qty: 90 TABLET | Refills: 3 | Status: CANCELLED | OUTPATIENT
Start: 2019-04-29

## 2019-04-29 RX ORDER — PANTOPRAZOLE SODIUM 40 MG/1
40 TABLET, DELAYED RELEASE ORAL DAILY
Qty: 30 TABLET | Refills: 2 | Status: CANCELLED | OUTPATIENT
Start: 2019-04-29

## 2019-04-29 NOTE — TELEPHONE ENCOUNTER
----- Message from Rosa Ellsadajean sent at 4/29/2019  2:42 PM CDT -----  Contact: Wife-    Type: RX Refill Request    Who Called: Self     Refill or New Rx:Refill     RX Name and Strength: All medications     How is the patient currently taking it? (ex. 1XDay):     Is this a 30 day or 90 day RX: 90    Preferred Pharmacy with phone number: [facility] [place] [address] [phone] (Phone)  [phone] (Fax)            Local or Mail Order:Local     Ordering Provider: Juan A     Would the patient rather a call back or a response via [facility]?  Call     Best Call Back Number: [phone]    Additional Information: Patientwould like all medication to be sent to the new pharmacy due to pricing

## 2019-05-01 ENCOUNTER — TELEPHONE (OUTPATIENT)
Dept: FAMILY MEDICINE | Facility: CLINIC | Age: 84
End: 2019-05-01

## 2019-05-01 DIAGNOSIS — I10 ESSENTIAL HYPERTENSION: ICD-10-CM

## 2019-05-01 DIAGNOSIS — E11.40 TYPE 2 DIABETES MELLITUS WITH DIABETIC NEUROPATHY, WITHOUT LONG-TERM CURRENT USE OF INSULIN: ICD-10-CM

## 2019-05-01 DIAGNOSIS — I25.10 CORONARY ARTERY DISEASE INVOLVING NATIVE CORONARY ARTERY OF NATIVE HEART WITHOUT ANGINA PECTORIS: ICD-10-CM

## 2019-05-01 DIAGNOSIS — N18.30 CONTROLLED TYPE 2 DIABETES MELLITUS WITH STAGE 3 CHRONIC KIDNEY DISEASE, WITHOUT LONG-TERM CURRENT USE OF INSULIN: ICD-10-CM

## 2019-05-01 DIAGNOSIS — E11.22 CONTROLLED TYPE 2 DIABETES MELLITUS WITH STAGE 3 CHRONIC KIDNEY DISEASE, WITHOUT LONG-TERM CURRENT USE OF INSULIN: ICD-10-CM

## 2019-05-01 DIAGNOSIS — I25.810 CORONARY ARTERY DISEASE INVOLVING CORONARY BYPASS GRAFT OF NATIVE HEART WITHOUT ANGINA PECTORIS: ICD-10-CM

## 2019-05-01 DIAGNOSIS — K21.9 GASTROESOPHAGEAL REFLUX DISEASE WITHOUT ESOPHAGITIS: ICD-10-CM

## 2019-05-01 DIAGNOSIS — E78.5 HYPERLIPIDEMIA, UNSPECIFIED HYPERLIPIDEMIA TYPE: ICD-10-CM

## 2019-05-01 DIAGNOSIS — G47.00 INSOMNIA, UNSPECIFIED TYPE: ICD-10-CM

## 2019-05-01 RX ORDER — PRAVASTATIN SODIUM 40 MG/1
40 TABLET ORAL DAILY
Qty: 90 TABLET | Refills: 3 | Status: SHIPPED | OUTPATIENT
Start: 2019-05-01 | End: 2020-05-26 | Stop reason: SDUPTHER

## 2019-05-01 RX ORDER — CLOPIDOGREL BISULFATE 75 MG/1
75 TABLET ORAL DAILY
Qty: 90 TABLET | Refills: 3 | Status: SHIPPED | OUTPATIENT
Start: 2019-05-01 | End: 2021-01-25 | Stop reason: ALTCHOICE

## 2019-05-01 RX ORDER — TRAZODONE HYDROCHLORIDE 150 MG/1
150 TABLET ORAL NIGHTLY
Qty: 90 TABLET | Refills: 3 | Status: SHIPPED | OUTPATIENT
Start: 2019-05-01 | End: 2020-06-09 | Stop reason: SDUPTHER

## 2019-05-01 RX ORDER — AMLODIPINE BESYLATE 10 MG/1
10 TABLET ORAL DAILY
Qty: 90 TABLET | Refills: 3 | Status: SHIPPED | OUTPATIENT
Start: 2019-05-01 | End: 2020-05-26 | Stop reason: SDUPTHER

## 2019-05-01 RX ORDER — FLUTICASONE PROPIONATE 50 MCG
2 SPRAY, SUSPENSION (ML) NASAL DAILY
Qty: 15 G | Refills: 3 | OUTPATIENT
Start: 2019-05-01 | End: 2020-03-21

## 2019-05-01 RX ORDER — METOPROLOL TARTRATE 25 MG/1
25 TABLET, FILM COATED ORAL 2 TIMES DAILY
Qty: 180 TABLET | Refills: 3 | Status: SHIPPED | OUTPATIENT
Start: 2019-05-01 | End: 2020-05-26 | Stop reason: SDUPTHER

## 2019-05-01 RX ORDER — GABAPENTIN 300 MG/1
300 CAPSULE ORAL NIGHTLY
Qty: 90 CAPSULE | Refills: 3 | Status: SHIPPED | OUTPATIENT
Start: 2019-05-01 | End: 2020-09-25

## 2019-05-01 RX ORDER — METFORMIN HYDROCHLORIDE 500 MG/1
500 TABLET, EXTENDED RELEASE ORAL
Qty: 90 TABLET | Refills: 3 | Status: SHIPPED | OUTPATIENT
Start: 2019-05-01 | End: 2020-05-26 | Stop reason: SDUPTHER

## 2019-05-01 RX ORDER — ASPIRIN 81 MG/1
81 TABLET ORAL DAILY
Qty: 90 TABLET | Refills: 3 | Status: SHIPPED | OUTPATIENT
Start: 2019-05-01 | End: 2020-05-26 | Stop reason: SDUPTHER

## 2019-05-01 RX ORDER — ALLOPURINOL 100 MG/1
100 TABLET ORAL 2 TIMES DAILY
Qty: 180 TABLET | Refills: 3 | Status: SHIPPED | OUTPATIENT
Start: 2019-05-01 | End: 2019-07-02

## 2019-05-01 RX ORDER — TAMSULOSIN HYDROCHLORIDE 0.4 MG/1
0.4 CAPSULE ORAL DAILY
Qty: 90 CAPSULE | Refills: 3 | Status: SHIPPED | OUTPATIENT
Start: 2019-05-01 | End: 2020-05-26 | Stop reason: SDUPTHER

## 2019-05-01 RX ORDER — PANTOPRAZOLE SODIUM 40 MG/1
40 TABLET, DELAYED RELEASE ORAL DAILY
Qty: 90 TABLET | Refills: 3 | Status: SHIPPED | OUTPATIENT
Start: 2019-05-01 | End: 2020-09-25

## 2019-05-01 RX ORDER — LISINOPRIL 10 MG/1
10 TABLET ORAL DAILY
Qty: 90 TABLET | Refills: 3 | Status: SHIPPED | OUTPATIENT
Start: 2019-05-01 | End: 2019-11-06

## 2019-05-01 NOTE — TELEPHONE ENCOUNTER
"----- Message from Nichole Silas sent at 5/1/2019  9:59 AM CDT -----  Contact: Nory nolan [phone]  .Type: Patient Call Back    Who called: "wife" Nory    What is the request in detail: Pt is requesting to have all Rx's changed over to [facility].    Can the clinic reply by [facility]? Call back     Would the patient rather a call back or a response via [facility]? Call back     Best call back number: [phone]    Additional Information:      "

## 2019-05-03 RX ORDER — DICLOFENAC SODIUM 50 MG/1
50 TABLET, DELAYED RELEASE ORAL DAILY
Qty: 90 TABLET | Refills: 0 | Status: SHIPPED | OUTPATIENT
Start: 2019-05-03 | End: 2019-07-02 | Stop reason: ALTCHOICE

## 2019-05-03 RX ORDER — TIZANIDINE 4 MG/1
4 TABLET ORAL EVERY 8 HOURS PRN
Qty: 60 TABLET | Refills: 2 | Status: SHIPPED | OUTPATIENT
Start: 2019-05-03 | End: 2019-05-13

## 2019-05-11 ENCOUNTER — HOSPITAL ENCOUNTER (EMERGENCY)
Facility: HOSPITAL | Age: 84
Discharge: HOME | End: 2019-05-11
Attending: EMERGENCY MEDICINE
Payer: MEDICARE

## 2019-05-11 VITALS
SYSTOLIC BLOOD PRESSURE: 223 MMHG | BODY MASS INDEX: 25.61 KG/M2 | HEART RATE: 65 BPM | OXYGEN SATURATION: 100 % | WEIGHT: 150 LBS | TEMPERATURE: 98 F | RESPIRATION RATE: 20 BRPM | DIASTOLIC BLOOD PRESSURE: 112 MMHG | HEIGHT: 64 IN

## 2019-05-11 DIAGNOSIS — S09.90XA CLOSED HEAD INJURY, INITIAL ENCOUNTER: ICD-10-CM
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
## 2019-05-11 DIAGNOSIS — M25.512 LEFT SHOULDER PAIN: ICD-10-CM

## 2019-05-11 PROCEDURE — 25000003 PHARM REV CODE 250: Performed by: PHYSICIAN ASSISTANT

## 2019-05-11 PROCEDURE — 99285 EMERGENCY DEPT VISIT HI MDM: CPT

## 2019-05-11 RX ORDER — ACETAMINOPHEN 325 MG/1
650 TABLET ORAL
Status: COMPLETED | OUTPATIENT
Start: 2019-05-11 | End: 2019-05-11

## 2019-05-11 RX ADMIN — ACETAMINOPHEN 650 MG: 325 TABLET, FILM COATED ORAL at 02:05

## 2019-05-11 NOTE — ED PROVIDER NOTES
Encounter Date: 5/11/2019    SCRIBE #1 NOTE: ILaura, am scribing for, and in the presence of,  Amparo Cabral PA-C . I have scribed the following portions of the note - Other sections scribed: HPI, ROS.       History     Chief Complaint   Patient presents with    Fall     fell on escalator after tripping over wife who fell in front of him. c/ left shoulder pain and left sided head pain, no bleeding or deformities noted     CC: Fall    HPI: This 85 y/o male with a medical history of Acute coronary syndrome, CAD, Diastolic dysfunction, Gout, chronic, HTN, HLD, and DM presents to the ED via EMS for emergent evaluation after fall onto L side at 1030AM today. Patient reports he fell on escalator after tripping over wife who fell in front of him and now complains of L sided headache, L arm pain, and L shoulder pain, with no LOC. Patient is currently on Aspirin and Plavix. Patient denies chest pain, abdomnal pain, leg pain, fever, chills, congestion, or N/V/D. No alleviating or exacerbating factors reported.     The history is provided by the patient. No  was used.     Review of patient's allergies indicates:   Allergen Reactions    Penicillins Nausea And Vomiting     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    Coronary artery disease     Diastolic dysfunction     Gout, chronic     HTN (hypertension)     Hyperlipidemia     Type 2 diabetes mellitus     diet controlled     Past Surgical History:   Procedure Laterality Date    AORTOCORONARY BYPASS-CABG N/A 7/7/2016    Performed by En Hallman MD at [facility] OR [address] [place] [address]    CATARACT EXTRACTION Bilateral     CATARACT EXTRACTION W/ ANTERIOR VITRECTOMY      CORONARY ARTERY BYPASS GRAFT  07/06/2016    PAIZ-LAD, SVG-Ramus, SVG-PDA    HEMORRHOID SURGERY       Family History   Problem Relation Age of Onset    Cancer Father         colon    Hypertension Mother     Heart disease  Mother     Heart disease Sister     No Known Problems Brother     No Known Problems Maternal Aunt     No Known Problems Maternal Uncle     No Known Problems Paternal Aunt     No Known Problems Paternal Uncle     No Known Problems Maternal Grandmother     No Known Problems Maternal Grandfather     No Known Problems Paternal Grandmother     No Known Problems Paternal Grandfather     Amblyopia Neg Hx     Blindness Neg Hx     Cataracts Neg Hx     Diabetes Neg Hx     Glaucoma Neg Hx     Macular degeneration Neg Hx     Retinal detachment Neg Hx     Strabismus Neg Hx     Stroke Neg Hx     Thyroid disease Neg Hx      Social History     Tobacco Use    Smoking status: Former Smoker     Types: Cigarettes     Last attempt to quit: 1985     Years since quittin.9    Smokeless tobacco: Never Used   Substance Use Topics    Alcohol use: No    Drug use: No     Review of Systems   Constitutional: Negative for chills and fever.   HENT: Negative for congestion, ear discharge, ear pain, nosebleeds, rhinorrhea and sore throat.    Eyes: Negative for pain.   Respiratory: Negative for cough and chest tightness.    Cardiovascular: Negative for chest pain.   Gastrointestinal: Negative for abdominal pain, diarrhea, nausea and vomiting.   Genitourinary: Negative for dysuria, flank pain, hematuria and urgency.   Musculoskeletal: Negative for back pain, myalgias and neck pain.        (+) L shoulder pain  (+) L arm pain     Skin: Negative for rash.   Neurological: Positive for headaches (L sided). Negative for dizziness, weakness, light-headedness and numbness.   Psychiatric/Behavioral: Negative for agitation.       Physical Exam     Initial Vitals [19 1220]   BP Pulse Resp Temp SpO2   (!) 128/94 69 16 98.3 °F (36.8 °C) 95 %      MAP       --         Physical Exam    Nursing note and vitals reviewed.  Constitutional: Vital signs are normal. He appears well-developed and well-nourished. He is not diaphoretic.  He is cooperative.  Non-toxic appearance. He does not have a sickly appearance. He does not appear ill. No distress.   HENT:   Head: Normocephalic. Head is with contusion. Head is without raccoon's eyes, without Barber's sign, without abrasion, without laceration, without right periorbital erythema and without left periorbital erythema. Hair is normal.       Right Ear: Tympanic membrane, external ear and ear canal normal.   Left Ear: Tympanic membrane, external ear and ear canal normal.   Nose: Nose normal.   Mouth/Throat: Uvula is midline, oropharynx is clear and moist and mucous membranes are normal.   There is a contusion of the left parietal scalp. No hematoma. No laceration. No bony instability.    Eyes: Conjunctivae, EOM and lids are normal. Pupils are equal, round, and reactive to light.   Neck: Trachea normal, normal range of motion, full passive range of motion without pain and phonation normal. Neck supple. No spinous process tenderness and no muscular tenderness present. Normal range of motion present. No neck rigidity.   Cardiovascular: Normal rate, regular rhythm, normal heart sounds and intact distal pulses. Exam reveals no gallop and no friction rub.    No murmur heard.  Pulmonary/Chest: Effort normal and breath sounds normal. No respiratory distress. He has no decreased breath sounds. He has no wheezes. He has no rhonchi. He has no rales.   Abdominal: Soft. Normal appearance and bowel sounds are normal. He exhibits no distension. There is no tenderness. There is no rigidity, no rebound, no guarding and no CVA tenderness.   Musculoskeletal: Normal range of motion.        Right shoulder: Normal.        Left shoulder: He exhibits pain. He exhibits normal range of motion, no tenderness, no bony tenderness, no swelling, no effusion, no crepitus, no deformity, no laceration, no spasm, normal pulse and normal strength.        Right hip: Normal.        Left hip: Normal.        Cervical back: Normal.         Thoracic back: Normal.        Lumbar back: Normal.        Right upper arm: Normal.        Left upper arm: Normal.        Right upper leg: Normal.        Left upper leg: Normal.   There is pain of the left shoulder.  No tenderness to palpation or obvious deformity.  Full range of motion of all extremities and spine.  Peripheral pulses intact.  Peripheral strength and sensation intact. No midline tenderness of the spine.  No obvious deformity or step-off of the spine.   Neurological: He is alert and oriented to person, place, and time. He has normal strength. No cranial nerve deficit or sensory deficit. He exhibits normal muscle tone. He displays a negative Romberg sign. Coordination and gait normal. GCS eye subscore is 4. GCS verbal subscore is 5. GCS motor subscore is 6.   Skin: Skin is warm and dry. Capillary refill takes less than 2 seconds. No rash noted.   Psychiatric: He has a normal mood and affect. His speech is normal and behavior is normal. Judgment and thought content normal. Cognition and memory are normal.         ED Course   Procedures  Labs Reviewed - No data to display       Imaging Results           CT Head Without Contrast (Final result)  Result time 05/11/19 14:08:54    Final result by Jason Gutierrez MD (05/11/19 14:08:54)                 Impression:      Examination mildly degraded by patient motion artifact.    No evidence of acute intracranial pathology.    Chronic microvascular ischemic change in the supratentorial white matter.    No evidence of acute fracture or traumatic malalignment within the cervical spine.    Moderate cervical spondylosis with multilevel neural foraminal narrowing.    5 mm rounded peripherally calcified focus in the right ventral aspect of the spinal canal just below the C4-5 level.  Lesion could reflect eccentric disc protrusion, but unusual configuration and other etiology (such as meningioma) not excluded.  Correlation with a contrast enhanced MR suggested on  nonemergent basis.    Asymmetric soft tissue swelling about the partially visualized left sternoclavicular joint.  Inflammatory arthritis be considered.  Clinical correlation advised.    Fibrotic changes in the partially visualized lung apices.    This report was flagged in Epic as abnormal.      Electronically signed by: Jason Gutierrez MD  Date:    05/11/2019  Time:    14:08             Narrative:    EXAMINATION:  CT CERVICAL SPINE WITHOUT CONTRAST; CT HEAD WITHOUT CONTRAST    CLINICAL HISTORY:  C-spine trauma, NEXUS/CCR negative, low risk;C-spine trauma, NEXUS/CCR positive, neg initially, collar for pain, f/u eval;84 y.o. male fall c/o headache and neck pain, on asa and plavix;; Headache, acute, norm neuro exam;84 y.o. male who fell with head injury, on ASA and plavix;    TECHNIQUE:  Low dose axial CT images obtained throughout the head and cervical spine without intravenous contrast.  Axial, sagittal and coronal reconstructions were performed.    Examination mildly degraded by patient motion artifact.    COMPARISON:  Head CT 04/20/2018    FINDINGS:  Head:    Ventricles and sulci are normal in size for age without evidence of hydrocephalus.    No extra-axial blood or fluid collections.    Chronic microvascular ischemic change in the supratentorial white matter, similar to prior exam.  No new parenchymal mass, hemorrhage, edema or major vascular distribution infarct.    No fracture. Mastoid air cells and paranasal sinuses are essentially clear.    Spine:    The vertebral bodies are normal in height and morphology without evidence of fracture.    Normal sagittal alignment.  No spondylolisthesis.    Moderate degenerative change with loss of disc height at C2-3, C3-4, feet C5-6 and C6-7.  No overt bony spinal canal stenosis but there is multilevel neural foraminal narrowing from uncovertebral and facet hypertrophy.    5 mm rounded peripherally calcified focus in the right ventral aspect of the spinal canal just below  the C4-5 level.    Asymmetric soft tissue swelling about the partially visualized left sternoclavicular joint.    Scattered vascular calcification.    The paraspinal soft tissue structures exhibit no acute abnormalities.    Fibrotic changes in the partially visualized lung apices.                                CT Cervical Spine Without Contrast (Final result)  Result time 05/11/19 14:08:54    Final result by Jason Gutierrez MD (05/11/19 14:08:54)                 Impression:      Examination mildly degraded by patient motion artifact.    No evidence of acute intracranial pathology.    Chronic microvascular ischemic change in the supratentorial white matter.    No evidence of acute fracture or traumatic malalignment within the cervical spine.    Moderate cervical spondylosis with multilevel neural foraminal narrowing.    5 mm rounded peripherally calcified focus in the right ventral aspect of the spinal canal just below the C4-5 level.  Lesion could reflect eccentric disc protrusion, but unusual configuration and other etiology (such as meningioma) not excluded.  Correlation with a contrast enhanced MR suggested on nonemergent basis.    Asymmetric soft tissue swelling about the partially visualized left sternoclavicular joint.  Inflammatory arthritis be considered.  Clinical correlation advised.    Fibrotic changes in the partially visualized lung apices.    This report was flagged in Epic as abnormal.      Electronically signed by: Jason Gutierrez MD  Date:    05/11/2019  Time:    14:08             Narrative:    EXAMINATION:  CT CERVICAL SPINE WITHOUT CONTRAST; CT HEAD WITHOUT CONTRAST    CLINICAL HISTORY:  C-spine trauma, NEXUS/CCR negative, low risk;C-spine trauma, NEXUS/CCR positive, neg initially, collar for pain, f/u eval;84 y.o. male fall c/o headache and neck pain, on asa and plavix;; Headache, acute, norm neuro exam;84 y.o. male who fell with head injury, on ASA and plavix;    TECHNIQUE:  Low dose axial CT  images obtained throughout the head and cervical spine without intravenous contrast.  Axial, sagittal and coronal reconstructions were performed.    Examination mildly degraded by patient motion artifact.    COMPARISON:  Head CT 04/20/2018    FINDINGS:  Head:    Ventricles and sulci are normal in size for age without evidence of hydrocephalus.    No extra-axial blood or fluid collections.    Chronic microvascular ischemic change in the supratentorial white matter, similar to prior exam.  No new parenchymal mass, hemorrhage, edema or major vascular distribution infarct.    No fracture. Mastoid air cells and paranasal sinuses are essentially clear.    Spine:    The vertebral bodies are normal in height and morphology without evidence of fracture.    Normal sagittal alignment.  No spondylolisthesis.    Moderate degenerative change with loss of disc height at C2-3, C3-4, feet C5-6 and C6-7.  No overt bony spinal canal stenosis but there is multilevel neural foraminal narrowing from uncovertebral and facet hypertrophy.    5 mm rounded peripherally calcified focus in the right ventral aspect of the spinal canal just below the C4-5 level.    Asymmetric soft tissue swelling about the partially visualized left sternoclavicular joint.    Scattered vascular calcification.    The paraspinal soft tissue structures exhibit no acute abnormalities.    Fibrotic changes in the partially visualized lung apices.                               X-Ray Shoulder Trauma Left (Final result)  Result time 05/11/19 14:03:25    Final result by Nandini Zambrano MD (05/11/19 14:03:25)                 Impression:      1.  No evidence of acute traumatic injury    2.  Small well-corticated ossific fragments projecting lateral to the humeral head on internal rotation may reflect intra-articular loose bodies.      Electronically signed by: Nandini Zambrano MD  Date:    05/11/2019  Time:    14:03             Narrative:    EXAMINATION:  XR SHOULDER  TRAUMA 3 VIEW LEFT    CLINICAL HISTORY:  Pain in left shoulder    TECHNIQUE:  Three views of the left shoulder were performed.    COMPARISON  None    FINDINGS:  There is no evidence of acute fracture, dislocation, or bone destruction.  There are degenerative changes of the glenohumeral and acromioclavicular joints.  2 small ossific fragments projecting lateral to the humeral head on internal rotation may reflect intra-articular loose bodies.    Median sternotomy wires are partially seen.                                        APC / Resident Notes:   This is an evaluation of a 84 y.o. male with PMHx Acute coronary syndrome, CAD, Diastolic dysfunction, Gout, chronic, HTN, HLD, and DM that presents to the Emergency Department for fall. Patient tripped while stepping off of elevator today at his grand daughter's graduation ceremony, causing him to hit his left frontal scalp on the tile floor. He also reports left shoulder pain since the fall. He denies LOC, confusion, nausea, emesis, headache or further symptoms. He takes ASA and Plavix daily. He was seen by medic at the [facility] and placed in a c-collar. He admits to not taking any of his home medications, including his HTN medications.     Exam findings: Patient is non-toxic, afebrile and well appearing.  Normocephalic. There is a contusion of the left parietal scalp. No hematoma. No laceration. No bony instability.  No Barber sign or raccoon eyes.  There is pain of the left shoulder.  No tenderness to palpation or obvious deformity.  Full range of motion of all extremities and spine.  Peripheral pulses intact.  Peripheral strength and sensation intact. No midline tenderness of the spine.  No obvious deformity or step-off of the spine. GCS 15. No neuro deficits.    [place] Head CT rule indicates for head CT due to visible trauma above clavicle.    If available, past records have been reviewed.  Vitals are reassuring.  Results:   CT head without contrast  unrevealing for acute intracranial abnormality.  CT cervical spine unrevealing for acute fracture or dislocation.  Incidental findings:  Degenerative changes and 5 mm rounded peripherally calcified focus in the right ventral aspect of the spinal canal just below the C4 disc 5 level.   Xray left shoulder shows degenerative changes without evidence of acute fracture or dislocation.    My overall impression: fall, closed head injury, left shoulder pain  DDx: fall, contusion, fracture, dislocation, strain, sprain, other    ED course:  Ice pack and Tylenol given for symptomatic care in the emergency department.  I will encourage patient to go home and take his hypertension medications due to his elevated blood pressure 2/2 not taking his meds today. He is accompanied by his wife. He denies chest pain, SOB, confusion, headache or further symptoms. Patient remained stable throughout ED course. I will also encourage patient to follow-up with primary care or neurosurgery regarding abnormal CT findings to consider MRI. I feel this patient is stable for discharge.  ED return precautions given.    The diagnosis and treatment plan have been discussed with the patient. All questions and concerns have been addressed. Patient expressed understanding. An educational information sheet was given to the patient prior to discharge.     This case has been discussed with Dr. Malin and he is in agreement of the diagnosis and treatment plan.       Amparo Potter PA-C         Scribe Attestation:   Scribe #1: I performed the above scribed service and the documentation accurately describes the services I performed. I attest to the accuracy of the note.    Attending Attestation:     Physician Attestation Statement for NP/PA:   I discussed this assessment and plan of this patient with the NP/PA, but I did not personally examine the patient. The face to face encounter was performed by the NP/PA.    Other NP/PA Attestation Additions:       Medical Decision Making: Agree with assessment and plan       Physician Attestation for Scribe:  Physician Attestation Statement for Scribe #1: I, Amparo Cabral PA-C , reviewed documentation, as scribed by Laura Diego in my presence, and it is both accurate and complete.                    Clinical Impression:       ICD-10-CM ICD-9-CM   1. Fall, initial encounter W19.XXXA E888.9   2. Left shoulder pain M25.512 719.41   3. Closed head injury, initial encounter S09.90XA 959.01         Disposition:   Disposition: Discharged  Condition: Stable                        Amparo Cabral PA-C  05/11/19 1927       Amparo Cabral PA-C  05/11/19 1928       Amparo Cabral PA-C  05/11/19 1930       Prudencio Malin MD  05/11/19 1932

## 2019-05-11 NOTE — ED TRIAGE NOTES
Reports tripping and falling onto left arm, and hitting head. Reports taking ASA. Neck collar in place. Denies LOC

## 2019-05-11 NOTE — DISCHARGE INSTRUCTIONS
You can take Tylenol every 4 hours for pain.    Rest and apply ice or heating pads to your left shoulder intermittently to reduce the pain.    You will need to follow up with primary care or neuro surgery regarding the abnormalities found in your neck CT.  You may need an MRI.    Return to the emergency room for any concerns.

## 2019-05-11 NOTE — ED NOTES
Provider informed of blood pressure reading of 240/115 right arm and 223/112 in right arm, patient informed nurse and provider that blood pressure medications was not taken prior to ER visit, provide spoke with spouse to informed to make sure patient take blood pressure medication once home patient Navi s/s dizziness, blurred vision, chest pain or headache some symptoms associated with elevated blood pressure

## 2019-06-18 ENCOUNTER — PATIENT OUTREACH (OUTPATIENT)
Dept: ADMINISTRATIVE | Facility: HOSPITAL | Age: 84
End: 2019-06-18

## 2019-06-18 NOTE — PROGRESS NOTES
Reached out to pt in regards to overdue HM pt unavailable, left voicemail. Portal message also sent, will follow up in 1 week if message unread. Letter also mailed out in regards to overdue HM. Please discuss overdue eye exam.

## 2019-07-02 ENCOUNTER — OFFICE VISIT (OUTPATIENT)
Dept: FAMILY MEDICINE | Facility: CLINIC | Age: 84
End: 2019-07-02
Payer: MEDICARE

## 2019-07-02 VITALS
BODY MASS INDEX: 16.14 KG/M2 | WEIGHT: 94.56 LBS | OXYGEN SATURATION: 99 % | HEIGHT: 64 IN | SYSTOLIC BLOOD PRESSURE: 120 MMHG | RESPIRATION RATE: 17 BRPM | HEART RATE: 61 BPM | TEMPERATURE: 98 F | DIASTOLIC BLOOD PRESSURE: 74 MMHG

## 2019-07-02 DIAGNOSIS — S46.812D STRAIN OF LEFT TRAPEZIUS MUSCLE, SUBSEQUENT ENCOUNTER: Primary | ICD-10-CM

## 2019-07-02 DIAGNOSIS — E11.22 CONTROLLED TYPE 2 DIABETES MELLITUS WITH STAGE 3 CHRONIC KIDNEY DISEASE, WITHOUT LONG-TERM CURRENT USE OF INSULIN: ICD-10-CM

## 2019-07-02 DIAGNOSIS — N18.30 CONTROLLED TYPE 2 DIABETES MELLITUS WITH STAGE 3 CHRONIC KIDNEY DISEASE, WITHOUT LONG-TERM CURRENT USE OF INSULIN: ICD-10-CM

## 2019-07-02 DIAGNOSIS — I25.810 CORONARY ARTERY DISEASE INVOLVING CORONARY BYPASS GRAFT OF NATIVE HEART WITHOUT ANGINA PECTORIS: ICD-10-CM

## 2019-07-02 PROCEDURE — 99999 PR PBB SHADOW E&M-EST. PATIENT-LVL V: ICD-10-PCS | Mod: PBBFAC,,, | Performed by: INTERNAL MEDICINE

## 2019-07-02 PROCEDURE — 99999 PR PBB SHADOW E&M-EST. PATIENT-LVL V: CPT | Mod: PBBFAC,,, | Performed by: INTERNAL MEDICINE

## 2019-07-02 PROCEDURE — 99214 PR OFFICE/OUTPT VISIT, EST, LEVL IV, 30-39 MIN: ICD-10-PCS | Mod: S$PBB,,, | Performed by: INTERNAL MEDICINE

## 2019-07-02 PROCEDURE — 99215 OFFICE O/P EST HI 40 MIN: CPT | Mod: PBBFAC,PN | Performed by: INTERNAL MEDICINE

## 2019-07-02 PROCEDURE — 99214 OFFICE O/P EST MOD 30 MIN: CPT | Mod: S$PBB,,, | Performed by: INTERNAL MEDICINE

## 2019-07-02 RX ORDER — ALLOPURINOL 100 MG/1
100 TABLET ORAL 2 TIMES DAILY
Qty: 180 TABLET | Refills: 3 | Status: SHIPPED | OUTPATIENT
Start: 2019-07-02 | End: 2020-05-26 | Stop reason: SDUPTHER

## 2019-07-02 RX ORDER — TIZANIDINE 4 MG/1
4 TABLET ORAL EVERY 6 HOURS PRN
Qty: 90 TABLET | Refills: 1 | Status: SHIPPED | OUTPATIENT
Start: 2019-07-02 | End: 2019-07-12

## 2019-07-02 NOTE — PROGRESS NOTES
"Subjective:       Patient ID: Destin Barber is a 84 y.o. male.    Chief Complaint: Establish Care and Shoulder Pain (R side pain 2 months ago)    F/u chronic conditions    HPI: 85 yo w/ HTN CAD CKD III presents for follow up. Two months ago had mechanical fall onto left side. Still with left lateral shoulder raghu xacerbated by abduction no radiation below elbow no parathesia no difficulty with fine motor activies. No further falls.     No LE swelling breathign "fine"     Review of Systems   Constitutional: Negative for activity change, appetite change, fatigue, fever and unexpected weight change.   HENT: Negative for ear pain, rhinorrhea and sore throat.    Eyes: Negative for discharge and visual disturbance.   Respiratory: Negative for chest tightness, shortness of breath and wheezing.    Cardiovascular: Negative for chest pain, palpitations and leg swelling.   Gastrointestinal: Negative for abdominal pain, constipation and diarrhea.   Endocrine: Negative for cold intolerance and heat intolerance.   Genitourinary: Negative for dysuria and hematuria.   Musculoskeletal: Positive for arthralgias. Negative for joint swelling and neck stiffness.   Skin: Negative for rash.   Neurological: Negative for dizziness, syncope, weakness and headaches.   Psychiatric/Behavioral: Negative for suicidal ideas.       Objective:     Vitals:    07/02/19 1519   BP: 120/74   BP Location: Right arm   Patient Position: Sitting   Pulse: 61   Resp: 17   Temp: 97.7 °F (36.5 °C)   TempSrc: Oral   SpO2: 99%   Weight: 42.9 kg (94 lb 9.2 oz)   Height: 5' 4" (1.626 m)          Physical Exam   Constitutional: He is oriented to person, place, and time. He appears well-developed and well-nourished.   HENT:   Head: Normocephalic and atraumatic.   Eyes: Conjunctivae are normal. No scleral icterus.   Neck: Normal range of motion.   Cardiovascular: Normal rate and regular rhythm. Exam reveals no gallop and no friction rub.   No murmur " heard.  Pulmonary/Chest: Effort normal and breath sounds normal. He has no wheezes. He has no rales.   Abdominal: Soft. Bowel sounds are normal. There is no tenderness. There is no rebound and no guarding.   Musculoskeletal: He exhibits tenderness. He exhibits no edema.   Left shoulder AROM abduction to 160 degrees decrease external rotation 5/5 bicep/tricep and  strength bilaterally   Neurological: He is alert and oriented to person, place, and time. No cranial nerve deficit.   Protective Sensation (w/ 10 gram monofilament):  Right: Decreased  Left: Decreased    Visual Inspection:  Dry Skin -  Bilateral    Pedal Pulses:   Right: Present  Left: Present    Posterior tibialis:   Right:Present  Left: Present     Skin: Skin is warm and dry.   Psychiatric: He has a normal mood and affect.       Assessment and Plan   1. Strain of left trapezius muscle, subsequent encounter  Muscle relaxer prn topical heating cream daily  - tiZANidine (ZANAFLEX) 4 MG tablet; Take 1 tablet (4 mg total) by mouth every 6 (six) hours as needed.  Dispense: 90 tablet; Refill: 1    2. Controlled type 2 diabetes mellitus with stage 3 chronic kidney disease, without long-term current use of insulin  Referral for DFE repeat labs in three more months  - Ambulatory referral to Optometry  - Comprehensive metabolic panel; Future  - Hemoglobin A1c; Future  - Lipid panel; Future    3. Coronary artery disease involving coronary bypass graft of native heart without angina pectoris  Continue DAPT and statin   - CBC auto differential; Future

## 2019-07-09 ENCOUNTER — OFFICE VISIT (OUTPATIENT)
Dept: FAMILY MEDICINE | Facility: CLINIC | Age: 84
End: 2019-07-09
Payer: MEDICARE

## 2019-07-09 VITALS
SYSTOLIC BLOOD PRESSURE: 146 MMHG | HEIGHT: 64 IN | BODY MASS INDEX: 25.41 KG/M2 | DIASTOLIC BLOOD PRESSURE: 88 MMHG | TEMPERATURE: 98 F | OXYGEN SATURATION: 99 % | HEART RATE: 64 BPM | RESPIRATION RATE: 17 BRPM | WEIGHT: 148.81 LBS

## 2019-07-09 DIAGNOSIS — M25.512 CHRONIC LEFT SHOULDER PAIN: Primary | ICD-10-CM

## 2019-07-09 DIAGNOSIS — G89.29 CHRONIC LEFT SHOULDER PAIN: Primary | ICD-10-CM

## 2019-07-09 PROCEDURE — 99999 PR PBB SHADOW E&M-EST. PATIENT-LVL V: ICD-10-PCS | Mod: PBBFAC,,, | Performed by: INTERNAL MEDICINE

## 2019-07-09 PROCEDURE — 99214 OFFICE O/P EST MOD 30 MIN: CPT | Mod: S$PBB,,, | Performed by: INTERNAL MEDICINE

## 2019-07-09 PROCEDURE — 99214 PR OFFICE/OUTPT VISIT, EST, LEVL IV, 30-39 MIN: ICD-10-PCS | Mod: S$PBB,,, | Performed by: INTERNAL MEDICINE

## 2019-07-09 PROCEDURE — 99215 OFFICE O/P EST HI 40 MIN: CPT | Mod: PBBFAC,PN | Performed by: INTERNAL MEDICINE

## 2019-07-09 PROCEDURE — 99999 PR PBB SHADOW E&M-EST. PATIENT-LVL V: CPT | Mod: PBBFAC,,, | Performed by: INTERNAL MEDICINE

## 2019-07-09 NOTE — PROGRESS NOTES
"Subjective:       Patient ID: Destin Barber is a 84 y.o. male.    Chief Complaint: Establish Care and Follow-up    F/u left shoulder pain    HPI: 83 y/o HTN CKD III DM presents for follow up of posterior/lateral left shoulder pain. Pain present since fall May 2019. Had negative xrays at that time. Pain exacerbate by abduction > 90 degrees no radiation down arm. Seen last week prescribed muscle relaxer and recommended topical heating cream. reprots helped with sleep but no change in day time pain no difficulty with distal motor strength no parathesia    Review of Systems   Constitutional: Negative for activity change, appetite change, fatigue, fever and unexpected weight change.   HENT: Negative for ear pain, rhinorrhea and sore throat.    Eyes: Negative for discharge and visual disturbance.   Respiratory: Negative for chest tightness, shortness of breath and wheezing.    Cardiovascular: Negative for chest pain, palpitations and leg swelling.   Gastrointestinal: Negative for abdominal pain, constipation and diarrhea.   Endocrine: Negative for cold intolerance and heat intolerance.   Genitourinary: Negative for dysuria and hematuria.   Musculoskeletal: Positive for arthralgias. Negative for joint swelling and neck stiffness.   Skin: Negative for rash.   Neurological: Negative for dizziness, syncope, weakness and headaches.   Psychiatric/Behavioral: Negative for suicidal ideas.       Objective:     Vitals:    07/09/19 1545 07/09/19 1601   BP: (!) 152/92 (!) 146/88   BP Location: Right arm    Patient Position: Sitting    Pulse: 65 64   Resp: 17    Temp: 97.5 °F (36.4 °C)    TempSrc: Oral    SpO2: 99%    Weight: 67.5 kg (148 lb 13 oz)    Height: 5' 4" (1.626 m)           Physical Exam   Constitutional: He is oriented to person, place, and time. He appears well-developed and well-nourished.   HENT:   Head: Normocephalic and atraumatic.   Neck: Normal range of motion.   Cardiovascular: Normal rate and regular rhythm. " Exam reveals no gallop and no friction rub.   No murmur heard.  Pulmonary/Chest: Effort normal and breath sounds normal. He has no wheezes. He has no rales.   Abdominal: Soft. Bowel sounds are normal. There is no tenderness. There is no rebound and no guarding.   Musculoskeletal: He exhibits no edema or tenderness.   AROM of left shoulder abduction to 150 degrees no bicep tendon tenderness   Neurological: He is alert and oriented to person, place, and time. No cranial nerve deficit.   Skin: Skin is warm and dry.   Psychiatric: He has a normal mood and affect.       Assessment and Plan   1. Chronic left shoulder pain  Ortho evaluation for considration for advanced imaging  - Ambulatory referral to Orthopedics

## 2019-07-22 ENCOUNTER — PATIENT OUTREACH (OUTPATIENT)
Dept: ADMINISTRATIVE | Facility: OTHER | Age: 84
End: 2019-07-22

## 2019-07-25 ENCOUNTER — OFFICE VISIT (OUTPATIENT)
Dept: ORTHOPEDICS | Facility: CLINIC | Age: 84
End: 2019-07-25
Payer: MEDICARE

## 2019-07-25 VITALS
HEART RATE: 81 BPM | WEIGHT: 147.69 LBS | DIASTOLIC BLOOD PRESSURE: 80 MMHG | BODY MASS INDEX: 25.21 KG/M2 | SYSTOLIC BLOOD PRESSURE: 150 MMHG | HEIGHT: 64 IN

## 2019-07-25 DIAGNOSIS — M19.012 PRIMARY OSTEOARTHRITIS OF LEFT SHOULDER: Primary | ICD-10-CM

## 2019-07-25 PROCEDURE — 99999 PR PBB SHADOW E&M-EST. PATIENT-LVL IV: ICD-10-PCS | Mod: PBBFAC,,, | Performed by: ORTHOPAEDIC SURGERY

## 2019-07-25 PROCEDURE — 99999 PR PBB SHADOW E&M-EST. PATIENT-LVL IV: CPT | Mod: PBBFAC,,, | Performed by: ORTHOPAEDIC SURGERY

## 2019-07-25 PROCEDURE — 99214 OFFICE O/P EST MOD 30 MIN: CPT | Mod: PBBFAC,PN | Performed by: ORTHOPAEDIC SURGERY

## 2019-07-25 PROCEDURE — 99204 PR OFFICE/OUTPT VISIT, NEW, LEVL IV, 45-59 MIN: ICD-10-PCS | Mod: 25,S$PBB,, | Performed by: ORTHOPAEDIC SURGERY

## 2019-07-25 PROCEDURE — 99204 OFFICE O/P NEW MOD 45 MIN: CPT | Mod: 25,S$PBB,, | Performed by: ORTHOPAEDIC SURGERY

## 2019-07-25 PROCEDURE — 20610 DRAIN/INJ JOINT/BURSA W/O US: CPT | Mod: PBBFAC,PN | Performed by: ORTHOPAEDIC SURGERY

## 2019-07-25 PROCEDURE — 20610 LARGE JOINT ASPIRATION/INJECTION: L GLENOHUMERAL: ICD-10-PCS | Mod: S$PBB,LT,, | Performed by: ORTHOPAEDIC SURGERY

## 2019-07-25 RX ORDER — TRIAMCINOLONE ACETONIDE 40 MG/ML
40 INJECTION, SUSPENSION INTRA-ARTICULAR; INTRAMUSCULAR
Status: DISCONTINUED | OUTPATIENT
Start: 2019-07-25 | End: 2019-07-25 | Stop reason: HOSPADM

## 2019-07-25 RX ADMIN — TRIAMCINOLONE ACETONIDE 40 MG: 40 INJECTION, SUSPENSION INTRA-ARTICULAR; INTRAMUSCULAR at 10:07

## 2019-07-25 NOTE — PROGRESS NOTES
Chief Complaint   Patient presents with    Left Shoulder - Injury, Pain       This patient was seen in consultation at the request of Dr. Harry Velazco     HPI: Destin Barber is a 84 y.o. male who presents today complaining of left shoulder pain since a fall in may 2019   Pain is constant   Night pain: Yes, wakes him up  Aggravating factors: lifting, reaching overhead/behind  Relieving factors: rest   Radicular symptoms: no numbness, paresthesias, + neck pain   Associated symptoms:  limited range of motion.    Prior treatment:  tylenol  without improvement in pain.   Cannot take NSAIDs due to CKD3  Pain does interfere with sleep and activities of daily living .    This is the extent of the patient's complaints at this time.     Hand dominance: Right     Occupation: Retired, worked in shipyard    Review of Systems   Constitutional: Negative.    HENT: Negative.    Eyes: Negative.    Respiratory: Positive for shortness of breath.    Cardiovascular: Negative.    Gastrointestinal: Negative.    Genitourinary: Negative.    Skin: Negative.    Neurological: Negative.    Endo/Heme/Allergies: Negative.    Psychiatric/Behavioral: Negative.          Review of patient's allergies indicates:   Allergen Reactions    Penicillins Nausea And Vomiting         Current Outpatient Medications:     albuterol sulfate (PROAIR RESPICLICK) 90 mcg/actuation AePB, Inhale 1 puff into the lungs every 6 (six) hours as needed. Rescue, Disp: 1 each, Rfl: 5    allopurinol (ZYLOPRIM) 100 MG tablet, Take 1 tablet (100 mg total) by mouth 2 (two) times daily., Disp: 180 tablet, Rfl: 3    amLODIPine (NORVASC) 10 MG tablet, Take 1 tablet (10 mg total) by mouth once daily., Disp: 90 tablet, Rfl: 3    aspirin (ASPIR-LOW) 81 MG EC tablet, Take 1 tablet (81 mg total) by mouth once daily., Disp: 90 tablet, Rfl: 3    clopidogrel (PLAVIX) 75 mg tablet, Take 1 tablet (75 mg total) by mouth once daily., Disp: 90 tablet, Rfl: 3    docusate sodium  (COLACE) 100 MG capsule, Take 1 capsule (100 mg total) by mouth 2 (two) times daily as needed for Constipation., Disp: , Rfl: 0    fluticasone propionate (FLONASE) 50 mcg/actuation nasal spray, 2 sprays (100 mcg total) by Each Nare route once daily., Disp: 15 g, Rfl: 3    gabapentin (NEURONTIN) 300 MG capsule, Take 1 capsule (300 mg total) by mouth every evening., Disp: 90 capsule, Rfl: 3    lisinopril 10 MG tablet, Take 1 tablet (10 mg total) by mouth once daily., Disp: 90 tablet, Rfl: 3    metFORMIN (GLUCOPHAGE-XR) 500 MG 24 hr tablet, Take 1 tablet (500 mg total) by mouth daily with breakfast., Disp: 90 tablet, Rfl: 3    metoprolol tartrate (LOPRESSOR) 25 MG tablet, Take 1 tablet (25 mg total) by mouth 2 (two) times daily., Disp: 180 tablet, Rfl: 3    pantoprazole (PROTONIX) 40 MG tablet, Take 1 tablet (40 mg total) by mouth once daily., Disp: 90 tablet, Rfl: 3    pravastatin (PRAVACHOL) 40 MG tablet, Take 1 tablet (40 mg total) by mouth once daily., Disp: 90 tablet, Rfl: 3    tamsulosin (FLOMAX) 0.4 mg Cap, Take 1 capsule (0.4 mg total) by mouth once daily., Disp: 90 capsule, Rfl: 3    traZODone (DESYREL) 150 MG tablet, Take 1 tablet (150 mg total) by mouth every evening., Disp: 90 tablet, Rfl: 3    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    Coronary artery disease     Diastolic dysfunction     Gout, chronic     HTN (hypertension)     Hyperlipidemia     Type 2 diabetes mellitus     diet controlled       Patient Active Problem List   Diagnosis    Essential hypertension    Gout, chronic    Hyperlipidemia    Diastolic dysfunction    Anemia of chronic disease    Coronary artery disease involving coronary bypass graft of native heart without angina pectoris    Pre-operative cardiovascular examination    S/P CABG (coronary artery bypass graft)    Chronic gout without tophus    Malnutrition of moderate degree    Hypokalemia     Controlled type 2 diabetes mellitus with stage 3 chronic kidney disease, without long-term current use of insulin    Influenza A       Past Surgical History:   Procedure Laterality Date    AORTOCORONARY BYPASS-CABG N/A 2016    [facility]    CATARACT EXTRACTION Bilateral     CATARACT EXTRACTION W/ ANTERIOR VITRECTOMY      CORONARY ARTERY BYPASS GRAFT  2016    PAIZ-LAD, SVG-Ramus, SVG-PDA    HEMORRHOID SURGERY         Social History     Tobacco Use    Smoking status: Former Smoker     Types: Cigarettes     Last attempt to quit: 1985     Years since quittin.1    Smokeless tobacco: Never Used   Substance Use Topics    Alcohol use: No    Drug use: No       Family History   Problem Relation Age of Onset    Cancer Father         colon    Hypertension Mother     Heart disease Mother     Heart disease Sister     No Known Problems Brother     No Known Problems Maternal Aunt     No Known Problems Maternal Uncle     No Known Problems Paternal Aunt     No Known Problems Paternal Uncle     No Known Problems Maternal Grandmother     No Known Problems Maternal Grandfather     No Known Problems Paternal Grandmother     No Known Problems Paternal Grandfather     Amblyopia Neg Hx     Blindness Neg Hx     Cataracts Neg Hx     Diabetes Neg Hx     Glaucoma Neg Hx     Macular degeneration Neg Hx     Retinal detachment Neg Hx     Strabismus Neg Hx     Stroke Neg Hx     Thyroid disease Neg Hx        Physical Exam:     Vitals:    19 1003   BP: (!) 150/80   Pulse: 81           General: Weight: 67 kg (147 lb 11.3 oz) Body mass index is 25.35 kg/m².  Patient is alert, awake and oriented to time, place and person. Mood and affect are appropriate.  Patient does not appear to be in any distress, denies any constitutional symptoms and appears stated age.   HEENT: Pupils are equal and round, sclera are not injected. External examination of ears and nose  reveals no abnormalities. Cranial nerves II-X are grossly intact  Neck: examination demonstrates painless limited active range of motion. Spurling's sign is negative  Skin: no rashes, abrasions or open wounds on the affected extremity   Resp: No respiratory distress or audible wheezing   CV: 2+  pulses, all extremities warm and well perfused   Left Shoulder     Shoulder Range of Motion    Right     Left   (Active/Passive)       Forward Elevation     140/145            130/140  External rotation (arm at side)  30/30             30/30   Internal rotation behind the back  L5             L5     Range of motion is painful     Scapular winging no   Scapular dyskinesia yes    Tender over lateral border of scapula on left, non tender on right     Examination of the back shows no atrophy     Acromioclavicular joint is not tender  Crossbody test: negative    Neer's positive  Hawkin's positive    Ramses's positive - pain no weakness   Drop arm negative  Belly press negative    Cuff Strength     Right     Left   Supraspinatus        5/5    5/5  Infraspinatus     5/5    5/5  Subscapularis     5/5    5/5    Deltoid testing            5/5    5/5    Speeds negative  Yergasons negative    Elbow examination demonstrates no tenderness to palpation and has normal range of motion.     ltsi C5-T1  + epl, io, fds, fdp   2+ RP      Imaging: 3 views of the left shoulder:  decreased glenohumeral joint space, humeral head osteophytes, subchondral sclerosis and cyst formation. The acromiohumeral interval is normal. The humeral head is centered on the AP view.  No axillary view available for review      Assessment: 84 y.o. male with left glenohumeral arthritis associated scapular dyskinesia with pain over the lateral border of the scapula    I explained my diagnostic impression and the reasoning behind it in detail, using layman's terms.  Models and/or pictures were used to help in the explanation.      Plan:   - Injection to left glenohumeral   joint performed, please see procedure note for details.  We discussed the risks and benefits of injection including pain, infection, bleeding, skin color changes, fat atrophy at injection site, failure to improve pain, temporary increase in pain, steroid flare, and damage to surrounding structures including nerves, blood vessels, tendons and muscles. I discussed the risk of increased blood glucose following steroid injection in the setting of diabetes- the patient will monitor closely for the next 24 hours and call her primary care physician with any questions or concerns regarding blood glucose control.  His last A1c was 6 and I do not anticipate any significant problems  - physical therapy referral sent  - Return to clinic in 3 months. Return sooner if symptoms worsen or fail to improve.    All questions were answered in detail. The patient is in full agreement with the treatment plan and will proceed accordingly.    A note notifying Dr. Harry Velazco of my findings was sent via the electronic medical record     This note was created by combination of typed  and M-Modal dictation. Transcription and phonetic errors may be present.  If there are any questions, please contact me.

## 2019-07-25 NOTE — LETTER
July 25, 2019      Harry Velazco MD  [address]  [place] [address]           [facility]  [address]  [place] [address]  Phone: [phone]          Patient: Destin Barber   MR Number: [id number]   YOB: 1935   Date of Visit: 7/25/2019       Dear Dr. Harry Velazco:    Thank you for referring Destin Barber to me for evaluation. Attached you will find relevant portions of my assessment and plan of care.    If you have questions, please do not hesitate to call me. I look forward to following Destin Barber along with you.    Sincerely,    Christal Todd MD    Enclosure  CC:  No Recipients    If you would like to receive this communication electronically, please contact [email or web address] or [phone] to request more information on [facility] [facility] Link access.    For providers and/or their staff who would like to refer a patient to [facility], please contact us through our one-stop-shop provider referral line, [facility], at [phone].    If you feel you have received this communication in error or would no longer like to receive these types of communications, please e-mail [email or web address]

## 2019-07-25 NOTE — PROCEDURES
Large Joint Aspiration/Injection: L glenohumeral  Date/Time: 7/25/2019 10:45 AM  Performed by: Christal Todd MD  Authorized by: Christal Todd MD     Consent Done?:  Yes (Verbal)  Indications:  Pain  Timeout: Prior to procedure the correct patient, procedure, and site was verified      Location:  Shoulder  Site:  L glenohumeral  Prep: Patient was prepped and draped in usual sterile fashion    Needle size:  22 G  Medications:  40 mg triamcinolone acetonide 40 mg/mL  Patient tolerance:  Patient tolerated the procedure well with no immediate complications

## 2019-07-31 ENCOUNTER — OFFICE VISIT (OUTPATIENT)
Dept: FAMILY MEDICINE | Facility: CLINIC | Age: 84
End: 2019-07-31
Payer: MEDICARE

## 2019-07-31 VITALS
TEMPERATURE: 98 F | OXYGEN SATURATION: 98 % | WEIGHT: 143.06 LBS | HEIGHT: 64 IN | DIASTOLIC BLOOD PRESSURE: 90 MMHG | SYSTOLIC BLOOD PRESSURE: 130 MMHG | BODY MASS INDEX: 24.42 KG/M2 | RESPIRATION RATE: 16 BRPM | HEART RATE: 56 BPM

## 2019-07-31 DIAGNOSIS — H69.93 EUSTACHIAN TUBE DYSFUNCTION, BILATERAL: ICD-10-CM

## 2019-07-31 DIAGNOSIS — H61.23 BILATERAL IMPACTED CERUMEN: Primary | ICD-10-CM

## 2019-07-31 PROCEDURE — 99214 PR OFFICE/OUTPT VISIT, EST, LEVL IV, 30-39 MIN: ICD-10-PCS | Mod: S$PBB,,, | Performed by: NURSE PRACTITIONER

## 2019-07-31 PROCEDURE — 99999 PR PBB SHADOW E&M-EST. PATIENT-LVL III: CPT | Mod: PBBFAC,,, | Performed by: NURSE PRACTITIONER

## 2019-07-31 PROCEDURE — 99999 PR PBB SHADOW E&M-EST. PATIENT-LVL III: ICD-10-PCS | Mod: PBBFAC,,, | Performed by: NURSE PRACTITIONER

## 2019-07-31 PROCEDURE — 99213 OFFICE O/P EST LOW 20 MIN: CPT | Mod: PBBFAC,PN | Performed by: NURSE PRACTITIONER

## 2019-07-31 PROCEDURE — 99214 OFFICE O/P EST MOD 30 MIN: CPT | Mod: S$PBB,,, | Performed by: NURSE PRACTITIONER

## 2019-07-31 RX ORDER — CETIRIZINE HYDROCHLORIDE 5 MG/1
5 TABLET ORAL DAILY
Qty: 30 TABLET | Refills: 2 | COMMUNITY
Start: 2019-07-31 | End: 2020-03-21

## 2019-07-31 RX ORDER — ALBUTEROL SULFATE 90 UG/1
AEROSOL, METERED RESPIRATORY (INHALATION)
Refills: 5 | COMMUNITY
Start: 2019-05-01 | End: 2023-03-07 | Stop reason: SDUPTHER

## 2019-07-31 NOTE — PROGRESS NOTES
Routine Office Visit    Patient Name: Destin Barber    : 1935  MRN: [id number]    Chief Complaint:  Ear pain    Subjective:  Destin is a 84 y.o. male who presents today for:    1.  Ear pain - patient reports today for bilateral ear pain and fullness.  He states that overall the symptoms have been going on for the last 20+ years.  Denies any associated nasal congestion or runny nose but does endorse a postnasal drip as well as coughing up some mucus.  He also endorses tinnitus in his ears and Hearing a sensation of paper crumbling in his ears.  He thinks his productive cough is from the ear drainage. Denies any recent sick contacts.  Bilateral ears are painful but denies any discharge from the ears denies any fevers or chills.  Denies any chest pain, wheezing, palpitations, or worsening shortness of breath from baseline.  He has tried nothing for the symptoms.    Past Medical History  Past Medical History:   Diagnosis Date    Acute coronary syndrome 2016    s/p 3V CABG 2016    Coronary artery disease     Diastolic dysfunction     Gout, chronic     HTN (hypertension)     Hyperlipidemia     Type 2 diabetes mellitus     diet controlled       Past Surgical History  Past Surgical History:   Procedure Laterality Date    AORTOCORONARY BYPASS-CABG N/A 2016    Performed by [facility] at [facility] OR [address] [place] [address]    CATARACT EXTRACTION Bilateral     CATARACT EXTRACTION W/ ANTERIOR VITRECTOMY      CORONARY ARTERY BYPASS GRAFT  2016    PAIZ-LAD, SVG-Ramus, SVG-PDA    HEMORRHOID SURGERY         Family History  Family History   Problem Relation Age of Onset    Cancer Father         colon    Hypertension Mother     Heart disease Mother     Heart disease Sister     No Known Problems Brother     No Known Problems Maternal Aunt     No Known Problems Maternal Uncle     No Known Problems Paternal Aunt     No Known Problems Paternal Uncle     No Known Problems Maternal Grandmother     No Known  Problems Maternal Grandfather     No Known Problems Paternal Grandmother     No Known Problems Paternal Grandfather     Amblyopia Neg Hx     Blindness Neg Hx     Cataracts Neg Hx     Diabetes Neg Hx     Glaucoma Neg Hx     Macular degeneration Neg Hx     Retinal detachment Neg Hx     Strabismus Neg Hx     Stroke Neg Hx     Thyroid disease Neg Hx        Social History  Social History     Socioeconomic History    Marital status:      Spouse name: Not on file    Number of children: Not on file    Years of education: Not on file    Highest education level: Not on file   Occupational History    Not on file   Social Needs    Financial resource strain: Not on file    Food insecurity:     Worry: Not on file     Inability: Not on file    Transportation needs:     Medical: Not on file     Non-medical: Not on file   Tobacco Use    Smoking status: Former Smoker     Types: Cigarettes     Last attempt to quit: 1985     Years since quittin.1    Smokeless tobacco: Never Used   Substance and Sexual Activity    Alcohol use: No    Drug use: No    Sexual activity: Not on file   Lifestyle    Physical activity:     Days per week: Not on file     Minutes per session: Not on file    Stress: Not on file   Relationships    Social connections:     Talks on phone: Not on file     Gets together: Not on file     Attends Dru[employer] service: Not on file     Active member of club or organization: Not on file     Attends meetings of clubs or organizations: Not on file     Relationship status: Not on file   Other Topics Concern    Not on file   Social History Narrative    Not on file       Current Medications  Current Outpatient Medications on File Prior to Visit   Medication Sig Dispense Refill    albuterol (PROVENTIL/VENTOLIN HFA) 90 mcg/actuation inhaler Inhale 1 puff into the lungs every 6 (six) hours as needed. Rescue  5    albuterol sulfate (PROAIR RESPICLICK) 90 mcg/actuation AePB Inhale 1 puff  into the lungs every 6 (six) hours as needed. Rescue 1 each 5    allopurinol (ZYLOPRIM) 100 MG tablet Take 1 tablet (100 mg total) by mouth 2 (two) times daily. 180 tablet 3    amLODIPine (NORVASC) 10 MG tablet Take 1 tablet (10 mg total) by mouth once daily. 90 tablet 3    aspirin (ASPIR-LOW) 81 MG EC tablet Take 1 tablet (81 mg total) by mouth once daily. 90 tablet 3    clopidogrel (PLAVIX) 75 mg tablet Take 1 tablet (75 mg total) by mouth once daily. 90 tablet 3    docusate sodium (COLACE) 100 MG capsule Take 1 capsule (100 mg total) by mouth 2 (two) times daily as needed for Constipation.  0    fluticasone propionate (FLONASE) 50 mcg/actuation nasal spray 2 sprays (100 mcg total) by Each Nare route once daily. 15 g 3    gabapentin (NEURONTIN) 300 MG capsule Take 1 capsule (300 mg total) by mouth every evening. 90 capsule 3    lisinopril 10 MG tablet Take 1 tablet (10 mg total) by mouth once daily. 90 tablet 3    metFORMIN (GLUCOPHAGE-XR) 500 MG 24 hr tablet Take 1 tablet (500 mg total) by mouth daily with breakfast. 90 tablet 3    metoprolol tartrate (LOPRESSOR) 25 MG tablet Take 1 tablet (25 mg total) by mouth 2 (two) times daily. 180 tablet 3    pantoprazole (PROTONIX) 40 MG tablet Take 1 tablet (40 mg total) by mouth once daily. 90 tablet 3    pravastatin (PRAVACHOL) 40 MG tablet Take 1 tablet (40 mg total) by mouth once daily. 90 tablet 3    tamsulosin (FLOMAX) 0.4 mg Cap Take 1 capsule (0.4 mg total) by mouth once daily. 90 capsule 3    traZODone (DESYREL) 150 MG tablet Take 1 tablet (150 mg total) by mouth every evening. 90 tablet 3     No current facility-administered medications on file prior to visit.        Allergies   Review of patient's allergies indicates:   Allergen Reactions    Penicillins Nausea And Vomiting       Review of Systems (Pertinent positives)  Review of Systems   Constitutional: Negative for chills, diaphoresis, fever, malaise/fatigue and weight loss.   HENT: Positive  "for ear pain. Negative for congestion, ear discharge, nosebleeds, sinus pain and sore throat.         Postnasal drip   Eyes: Negative for blurred vision, double vision, photophobia, pain, discharge and redness.   Respiratory: Positive for cough. Negative for hemoptysis, sputum production, shortness of breath, wheezing and stridor.    Cardiovascular: Negative for chest pain, palpitations, orthopnea, claudication, leg swelling and PND.   Gastrointestinal: Negative.    Genitourinary: Negative.    Musculoskeletal: Negative.    Neurological: Negative.    Endo/Heme/Allergies: Negative.    Psychiatric/Behavioral: Negative.        BP (!) 130/90 (BP Location: Left arm, Patient Position: Sitting, BP Method: Small (Manual))   Pulse (!) 56   Temp 98.1 °F (36.7 °C) (Oral)   Resp 16   Ht 5' 4" (1.626 m)   Wt 64.9 kg (143 lb 1.3 oz)   SpO2 98%   BMI 24.56 kg/m²     Physical Exam   Constitutional: He is oriented to person, place, and time. He appears well-developed and well-nourished. No distress.   HENT:   Head: Normocephalic and atraumatic.   Right Ear: External ear and ear canal normal.   Left Ear: External ear and ear canal normal.   Ears:    Nose: Mucosal edema present. No rhinorrhea. Right sinus exhibits no maxillary sinus tenderness and no frontal sinus tenderness. Left sinus exhibits no maxillary sinus tenderness and no frontal sinus tenderness.   Eyes: Pupils are equal, round, and reactive to light. Conjunctivae and EOM are normal.   Cardiovascular: Normal rate, regular rhythm, normal heart sounds and intact distal pulses. Exam reveals no gallop and no friction rub.   No murmur heard.  Pulmonary/Chest: Effort normal and breath sounds normal. No stridor. No respiratory distress. He has no wheezes. He has no rales. He exhibits no tenderness.   Abdominal: Soft. Bowel sounds are normal. He exhibits no distension and no mass. There is no tenderness. There is no rebound and no guarding. No hernia.   Musculoskeletal: " Normal range of motion.   Neurological: He is alert and oriented to person, place, and time.   Skin: Skin is warm and dry. Capillary refill takes less than 2 seconds. He is not diaphoretic.        Assessment/Plan:  Destin Barber is a 84 y.o. male who presents today for :    Destin was seen today for shoulder pain, cough, headache and ear fullness.    Diagnoses and all orders for this visit:    Bilateral impacted cerumen  -     cetirizine (ZYRTEC) 5 MG tablet; Take 1 tablet (5 mg total) by mouth once daily.  -     Ambulatory referral to ENT  -     Ambulatory referral to ENT    Eustachian tube dysfunction, bilateral  -     cetirizine (ZYRTEC) 5 MG tablet; Take 1 tablet (5 mg total) by mouth once daily.  -     Ambulatory referral to ENT  -     Ambulatory referral to ENT     The patient's right ear was flushed with a mixture of warm water and peroxide and the cerumen impaction was removed from the right ear.  The right tympanic membrane was exhibited and was normal without bulging, erythema, or effusion.  The patient's left ear was flushed as well, however after a few flushes the patient reported worsened pain in the left ear.  Some wax was expelled from the left ear but there was a small amount of bleeding noted from the friable ear canal after the initial flush so I stopped flushing at that point.  He did still have wax obstructing view of than tympanic membrane, but I did not proceed with the flush because of the blood.  Overall, the bleeding did stop for the patient left the clinic.  I did refer the patient to ENT with a stat internal ENT referral.  I also referred him to an outside ENT and encouraged the patient to follow up with whoever he can see him sooner.  Given chronic ear pain he likely has some component of eustachian tube dysfunction.  I will refer him to ENT for this as well.  He already takes Flonase every day so I will add Zyrtec on to this to be taken daily.  Patient is to follow up or go to the  emergency room if symptoms worsen or bleeding continues and does not stop.  Patient verbalized understanding of instructions.        This office note has been dictated.  This dictation has been generated using M-Modal Fluency Direct dictation; some phonetic errors may occur.   My collaborating physician is Dr. Kenny Stephens.

## 2019-08-07 ENCOUNTER — PES CALL (OUTPATIENT)
Dept: ADMINISTRATIVE | Facility: CLINIC | Age: 84
End: 2019-08-07

## 2019-08-09 ENCOUNTER — PATIENT OUTREACH (OUTPATIENT)
Dept: ADMINISTRATIVE | Facility: OTHER | Age: 84
End: 2019-08-09

## 2019-10-23 ENCOUNTER — PATIENT OUTREACH (OUTPATIENT)
Dept: ADMINISTRATIVE | Facility: HOSPITAL | Age: 84
End: 2019-10-23

## 2019-11-06 ENCOUNTER — OFFICE VISIT (OUTPATIENT)
Dept: FAMILY MEDICINE | Facility: CLINIC | Age: 84
End: 2019-11-06
Payer: MEDICARE

## 2019-11-06 VITALS
SYSTOLIC BLOOD PRESSURE: 170 MMHG | RESPIRATION RATE: 17 BRPM | DIASTOLIC BLOOD PRESSURE: 90 MMHG | BODY MASS INDEX: 24.88 KG/M2 | OXYGEN SATURATION: 100 % | HEART RATE: 68 BPM | HEIGHT: 64 IN | TEMPERATURE: 98 F | WEIGHT: 145.75 LBS

## 2019-11-06 DIAGNOSIS — E11.22 CONTROLLED TYPE 2 DIABETES MELLITUS WITH STAGE 3 CHRONIC KIDNEY DISEASE, WITHOUT LONG-TERM CURRENT USE OF INSULIN: ICD-10-CM

## 2019-11-06 DIAGNOSIS — N18.30 CONTROLLED TYPE 2 DIABETES MELLITUS WITH STAGE 3 CHRONIC KIDNEY DISEASE, WITHOUT LONG-TERM CURRENT USE OF INSULIN: ICD-10-CM

## 2019-11-06 DIAGNOSIS — Z23 NEED FOR INFLUENZA VACCINATION: Primary | ICD-10-CM

## 2019-11-06 PROCEDURE — 99999 PR PBB SHADOW E&M-EST. PATIENT-LVL IV: CPT | Mod: PBBFAC,,, | Performed by: INTERNAL MEDICINE

## 2019-11-06 PROCEDURE — 99214 PR OFFICE/OUTPT VISIT, EST, LEVL IV, 30-39 MIN: ICD-10-PCS | Mod: S$PBB,,, | Performed by: INTERNAL MEDICINE

## 2019-11-06 PROCEDURE — 99999 PR PBB SHADOW E&M-EST. PATIENT-LVL IV: ICD-10-PCS | Mod: PBBFAC,,, | Performed by: INTERNAL MEDICINE

## 2019-11-06 PROCEDURE — 99214 OFFICE O/P EST MOD 30 MIN: CPT | Mod: PBBFAC,PN | Performed by: INTERNAL MEDICINE

## 2019-11-06 PROCEDURE — 99214 OFFICE O/P EST MOD 30 MIN: CPT | Mod: S$PBB,,, | Performed by: INTERNAL MEDICINE

## 2019-11-06 RX ORDER — LISINOPRIL 20 MG/1
20 TABLET ORAL DAILY
Qty: 90 TABLET | Refills: 3 | Status: SHIPPED | OUTPATIENT
Start: 2019-11-06 | End: 2020-05-26 | Stop reason: SDUPTHER

## 2019-11-06 NOTE — PROGRESS NOTES
"Subjective:       Patient ID: Destin Barber is a 84 y.o. male.    Chief Complaint: Establish Care; Follow-up (BP ); and Foot Pain (one week ago)    F/u chronic conditions    HPI: 85 y/o w/ HTN CAD presents for scheduled follow up by himself. Reports feeling well reports one week ago right plantar foot pain x two days spontaneously resolved no LE swelling breathing "fine" reports taking one of his blood pressure medications this morning (unsure of which one). No chest pain no orthopnea or PND.     Review of Systems   Constitutional: Negative for activity change, appetite change, fatigue, fever and unexpected weight change.   HENT: Negative for ear pain, rhinorrhea and sore throat.    Eyes: Negative for discharge and visual disturbance.   Respiratory: Negative for chest tightness, shortness of breath and wheezing.    Cardiovascular: Negative for chest pain, palpitations and leg swelling.   Gastrointestinal: Negative for abdominal pain, constipation and diarrhea.   Endocrine: Negative for cold intolerance and heat intolerance.   Genitourinary: Negative for dysuria and hematuria.   Musculoskeletal: Negative for joint swelling and neck stiffness.   Skin: Negative for rash.   Neurological: Negative for dizziness, syncope, weakness and headaches.   Psychiatric/Behavioral: Negative for suicidal ideas.       Objective:     Vitals:    11/06/19 0816 11/06/19 0821   BP: (!) 172/98 (!) 170/90   BP Location: Right arm    Patient Position: Sitting    Pulse: 68    Resp: 17    Temp: 97.9 °F (36.6 °C)    TempSrc: Oral    SpO2: 100%    Weight: 66.1 kg (145 lb 11.6 oz)    Height: 5' 4" (1.626 m)           Physical Exam   Constitutional: He is oriented to person, place, and time. He appears well-developed and well-nourished.   HENT:   Head: Normocephalic and atraumatic.   Neck: Normal range of motion.   Cardiovascular: Normal rate and regular rhythm. Exam reveals no gallop and no friction rub.   No murmur heard.  No LE edema "   Pulmonary/Chest: Effort normal and breath sounds normal. He has no wheezes. He has no rales.   Abdominal: Soft. Bowel sounds are normal. There is no tenderness. There is no rebound and no guarding.   Musculoskeletal: Normal range of motion. He exhibits no edema or tenderness.   Neurological: He is alert and oriented to person, place, and time. No cranial nerve deficit.   Skin: Skin is warm and dry.   Psychiatric: He has a normal mood and affect.       Assessment and Plan   1. Controlled type 2 diabetes mellitus with stage 3 chronic kidney disease, without long-term current use of insulin  Increase acei to 20mg daily labs next week with BP check  - lisinopril (PRINIVIL,ZESTRIL) 20 MG tablet; Take 1 tablet (20 mg total) by mouth once daily.  Dispense: 90 tablet; Refill: 3    2. Need for influenza vaccination  Unable to stay today for vaccine will get flu shot next week with blood pressure check  - Influenza - High Dose (65+) (PF) (IM); Future

## 2019-11-06 NOTE — PATIENT INSTRUCTIONS
Take two 10mg lisinopril (blood pressure pills once per day) a new prescription for 20mg pills was sent to [facility]. Once you get these just take one of the 20mg pills    Blood pressure check, labs and flu shot in one week

## 2020-01-06 ENCOUNTER — OFFICE VISIT (OUTPATIENT)
Dept: FAMILY MEDICINE | Facility: CLINIC | Age: 85
End: 2020-01-06
Payer: MEDICARE

## 2020-01-06 VITALS
SYSTOLIC BLOOD PRESSURE: 167 MMHG | BODY MASS INDEX: 26.24 KG/M2 | TEMPERATURE: 97 F | DIASTOLIC BLOOD PRESSURE: 87 MMHG | WEIGHT: 153.69 LBS | HEART RATE: 61 BPM | HEIGHT: 64 IN | RESPIRATION RATE: 17 BRPM | OXYGEN SATURATION: 99 %

## 2020-01-06 DIAGNOSIS — I10 HYPERTENSION, ESSENTIAL: ICD-10-CM

## 2020-01-06 DIAGNOSIS — I25.810 CORONARY ARTERY DISEASE INVOLVING CORONARY BYPASS GRAFT OF NATIVE HEART WITHOUT ANGINA PECTORIS: Primary | ICD-10-CM

## 2020-01-06 DIAGNOSIS — H90.5 SENSORINEURAL HEARING LOSS (SNHL) OF LEFT EAR, UNSPECIFIED HEARING STATUS ON CONTRALATERAL SIDE: ICD-10-CM

## 2020-01-06 DIAGNOSIS — E11.22 CONTROLLED TYPE 2 DIABETES MELLITUS WITH STAGE 3 CHRONIC KIDNEY DISEASE, WITHOUT LONG-TERM CURRENT USE OF INSULIN: ICD-10-CM

## 2020-01-06 DIAGNOSIS — Z23 NEED FOR INFLUENZA VACCINATION: ICD-10-CM

## 2020-01-06 DIAGNOSIS — N18.30 CONTROLLED TYPE 2 DIABETES MELLITUS WITH STAGE 3 CHRONIC KIDNEY DISEASE, WITHOUT LONG-TERM CURRENT USE OF INSULIN: ICD-10-CM

## 2020-01-06 PROBLEM — J10.1 INFLUENZA A: Status: RESOLVED | Noted: 2017-02-03 | Resolved: 2020-01-06

## 2020-01-06 PROCEDURE — 99214 OFFICE O/P EST MOD 30 MIN: CPT | Mod: PBBFAC,PN,25 | Performed by: INTERNAL MEDICINE

## 2020-01-06 PROCEDURE — G0008 ADMIN INFLUENZA VIRUS VAC: HCPCS | Mod: PBBFAC,PN

## 2020-01-06 PROCEDURE — 1159F MED LIST DOCD IN RCRD: CPT | Mod: ,,, | Performed by: INTERNAL MEDICINE

## 2020-01-06 PROCEDURE — 1126F AMNT PAIN NOTED NONE PRSNT: CPT | Mod: ,,, | Performed by: INTERNAL MEDICINE

## 2020-01-06 PROCEDURE — 99999 PR PBB SHADOW E&M-EST. PATIENT-LVL IV: ICD-10-PCS | Mod: PBBFAC,,, | Performed by: INTERNAL MEDICINE

## 2020-01-06 PROCEDURE — 1159F PR MEDICATION LIST DOCUMENTED IN MEDICAL RECORD: ICD-10-PCS | Mod: ,,, | Performed by: INTERNAL MEDICINE

## 2020-01-06 PROCEDURE — 1126F PR PAIN SEVERITY QUANTIFIED, NO PAIN PRESENT: ICD-10-PCS | Mod: ,,, | Performed by: INTERNAL MEDICINE

## 2020-01-06 PROCEDURE — 99214 OFFICE O/P EST MOD 30 MIN: CPT | Mod: S$PBB,,, | Performed by: INTERNAL MEDICINE

## 2020-01-06 PROCEDURE — 99999 PR PBB SHADOW E&M-EST. PATIENT-LVL IV: CPT | Mod: PBBFAC,,, | Performed by: INTERNAL MEDICINE

## 2020-01-06 PROCEDURE — 99214 PR OFFICE/OUTPT VISIT, EST, LEVL IV, 30-39 MIN: ICD-10-PCS | Mod: S$PBB,,, | Performed by: INTERNAL MEDICINE

## 2020-01-06 NOTE — PROGRESS NOTES
Pt tolerated Flu injection well. Instructed to wait in the clinic for 15 minutes and report any adverse effects immediately to the nurse. Verbalized understanding.   ambulatory

## 2020-01-06 NOTE — PROGRESS NOTES
"Subjective:       Patient ID: Destin Barber is a 84 y.o. male.    Chief Complaint: Establish Care; Follow-up; Flu Vaccine; and Medication Refill    F/u chronic conditions    HPI: 85 y/o w/ HTN CAD DM presents with daughter for follow up. Missed BP check last month. Reports taking medications "as they are given to me" not sure of dosages. No medication taken this monring. Denies chest pain or dyspnea. Admits to increase dietary indiscretion eating more sweets over the holiday season. No LE swelling     Review of Systems   Constitutional: Negative for activity change, appetite change, fatigue, fever and unexpected weight change.   HENT: Negative for ear pain, rhinorrhea and sore throat.    Eyes: Negative for discharge and visual disturbance.   Respiratory: Negative for chest tightness, shortness of breath and wheezing.    Cardiovascular: Negative for chest pain, palpitations and leg swelling.   Gastrointestinal: Negative for abdominal pain, constipation and diarrhea.   Endocrine: Negative for cold intolerance and heat intolerance.   Genitourinary: Negative for dysuria and hematuria.   Musculoskeletal: Negative for joint swelling and neck stiffness.   Skin: Negative for rash.   Neurological: Negative for dizziness, syncope, weakness and headaches.   Psychiatric/Behavioral: Negative for suicidal ideas.       Objective:     Vitals:    01/06/20 0906   BP: (!) 167/87   BP Location: Right arm   Patient Position: Sitting   Pulse: 61   Resp: 17   Temp: 97 °F (36.1 °C)   TempSrc: Oral   SpO2: 99%   Weight: 69.7 kg (153 lb 10.6 oz)   Height: 5' 4" (1.626 m)          Physical Exam   Constitutional: He is oriented to person, place, and time. He appears well-developed and well-nourished.   HENT:   Head: Normocephalic and atraumatic.   Eyes: Conjunctivae are normal. No scleral icterus.   Neck: Normal range of motion.   Cardiovascular: Normal rate and regular rhythm. Exam reveals no gallop and no friction rub.   No murmur " heard.  No LE edema   Pulmonary/Chest: Effort normal and breath sounds normal. He has no wheezes. He has no rales.   Abdominal: Soft. Bowel sounds are normal. There is no tenderness. There is no rebound and no guarding.   Musculoskeletal: Normal range of motion. He exhibits no edema or tenderness.   Neurological: He is alert and oriented to person, place, and time. No cranial nerve deficit.   Skin: Skin is warm and dry.   Psychiatric: He has a normal mood and affect.       Assessment and Plan   1. Coronary artery disease involving coronary bypass graft of native heart without angina pectoris  Given risk factors life time DAPT, on statin     2. Controlled type 2 diabetes mellitus with stage 3 chronic kidney disease, without long-term current use of insulin  On metformin repeat a1c and renal function today utd on DFE    3. Hypertension, essential  Above goal off meds, printed medication list given to daughter to confirm home lisinopril doseage    4. Need for influenza vaccination  Flu vaccine   - Influenza - High Dose (65+) (PF) (IM)    5. Sensorineural hearing loss (SNHL) of left ear, unspecified hearing status on contralateral side  Daughter requesting audiometric screening due to difficulty in hearing conversations at home referral for ENT evaluation entered  - Ambulatory referral to ENT

## 2020-02-15 ENCOUNTER — HOSPITAL ENCOUNTER (EMERGENCY)
Facility: HOSPITAL | Age: 85
Discharge: HOME | End: 2020-02-15
Attending: EMERGENCY MEDICINE
Payer: MEDICARE

## 2020-02-15 VITALS
RESPIRATION RATE: 20 BRPM | BODY MASS INDEX: 22.5 KG/M2 | DIASTOLIC BLOOD PRESSURE: 101 MMHG | OXYGEN SATURATION: 98 % | HEART RATE: 99 BPM | WEIGHT: 140 LBS | TEMPERATURE: 99 F | HEIGHT: 66 IN | SYSTOLIC BLOOD PRESSURE: 197 MMHG

## 2020-02-15 DIAGNOSIS — M79.604 RIGHT LEG PAIN: Primary | ICD-10-CM
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
## 2020-02-15 PROCEDURE — 99285 EMERGENCY DEPT VISIT HI MDM: CPT | Mod: 25,ER

## 2020-02-15 PROCEDURE — 63600175 PHARM REV CODE 636 W HCPCS: Mod: ER | Performed by: EMERGENCY MEDICINE

## 2020-02-15 PROCEDURE — 96372 THER/PROPH/DIAG INJ SC/IM: CPT | Mod: ER

## 2020-02-15 RX ORDER — FAMOTIDINE 20 MG/1
20 TABLET, FILM COATED ORAL 2 TIMES DAILY
Qty: 60 TABLET | Refills: 0 | Status: SHIPPED | OUTPATIENT
Start: 2020-02-15 | End: 2020-09-25

## 2020-02-15 RX ORDER — KETOROLAC TROMETHAMINE 30 MG/ML
30 INJECTION, SOLUTION INTRAMUSCULAR; INTRAVENOUS
Status: COMPLETED | OUTPATIENT
Start: 2020-02-15 | End: 2020-02-15

## 2020-02-15 RX ORDER — BACLOFEN 10 MG/1
10 TABLET ORAL 3 TIMES DAILY
Qty: 30 TABLET | Refills: 0 | Status: SHIPPED | OUTPATIENT
Start: 2020-02-15 | End: 2020-09-25 | Stop reason: ALTCHOICE

## 2020-02-15 RX ORDER — MELOXICAM 15 MG/1
15 TABLET ORAL DAILY
Qty: 30 TABLET | Refills: 0 | Status: SHIPPED | OUTPATIENT
Start: 2020-02-15 | End: 2020-05-06

## 2020-02-15 RX ADMIN — KETOROLAC TROMETHAMINE 30 MG: 30 INJECTION, SOLUTION INTRAMUSCULAR at 10:02

## 2020-02-15 NOTE — ED PROVIDER NOTES
Encounter Date: 2/15/2020       History     Chief Complaint   Patient presents with    Leg Pain     Right hip pain that radiates to right leg after trip and fall x3 days ago.     84 y.o. male Past Medical History:  06/24/2016: Acute coronary syndrome      Comment:  s/p 3V CABG 7/2016  No date: Coronary artery disease  No date: Diastolic dysfunction  No date: Gout, chronic  No date: HTN (hypertension)  No date: Hyperlipidemia  No date: Type 2 diabetes mellitus      Comment:  diet controlled     Notes R hip pain after fall 3 days ago. Pt notes mechanical slip/fall, did not hit head, no loc, notes that he ambulates at baseline with cane. Notes R hip pain, pain with ambulation since the fall.         Review of patient's allergies indicates:   Allergen Reactions    Penicillins Nausea And Vomiting     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    Coronary artery disease     Diastolic dysfunction     Gout, chronic     HTN (hypertension)     Hyperlipidemia     Type 2 diabetes mellitus     diet controlled     Past Surgical History:   Procedure Laterality Date    CATARACT EXTRACTION Bilateral     CATARACT EXTRACTION W/ ANTERIOR VITRECTOMY      CORONARY ARTERY BYPASS GRAFT  07/06/2016    PAIZ-LAD, SVG-Ramus, SVG-PDA    HEMORRHOID SURGERY       Family History   Problem Relation Age of Onset    Cancer Father         colon    Hypertension Mother     Heart disease Mother     Heart disease Sister     No Known Problems Brother     No Known Problems Maternal Aunt     No Known Problems Maternal Uncle     No Known Problems Paternal Aunt     No Known Problems Paternal Uncle     No Known Problems Maternal Grandmother     No Known Problems Maternal Grandfather     No Known Problems Paternal Grandmother     No Known Problems Paternal Grandfather     Amblyopia Neg Hx     Blindness Neg Hx     Cataracts Neg Hx     Diabetes Neg Hx     Glaucoma Neg Hx      Macular degeneration Neg Hx     Retinal detachment Neg Hx     Strabismus Neg Hx     Stroke Neg Hx     Thyroid disease Neg Hx      Social History     Tobacco Use    Smoking status: Former Smoker     Types: Cigarettes     Last attempt to quit: 1985     Years since quittin.6    Smokeless tobacco: Never Used   Substance Use Topics    Alcohol use: No    Drug use: No     Review of Systems   Constitutional: Negative for fever.   HENT: Negative for sore throat.    Respiratory: Negative for shortness of breath.    Cardiovascular: Negative for chest pain.   Gastrointestinal: Negative for nausea.   Genitourinary: Negative for dysuria.   Musculoskeletal: Negative for back pain.   Skin: Negative for rash.   Neurological: Negative for weakness.   Hematological: Does not bruise/bleed easily.   All other systems reviewed and are negative.      Physical Exam     Initial Vitals [02/15/20 0911]   BP Pulse Resp Temp SpO2   (!) 197/101 99 20 98.8 °F (37.1 °C) 98 %      MAP       --         Physical Exam    Nursing note and vitals reviewed.  Constitutional: He appears well-developed and well-nourished.   HENT:   Head: Normocephalic and atraumatic.   Eyes: EOM are normal. Pupils are equal, round, and reactive to light.   Cardiovascular: Normal rate and regular rhythm.   Pulmonary/Chest: Effort normal.   Abdominal: He exhibits no distension.   Musculoskeletal: He exhibits no edema or tenderness.   Neurological: He is alert and oriented to person, place, and time. No cranial nerve deficit.   Skin: Skin is warm and dry.   Psychiatric: He has a normal mood and affect.     R hip ttp    ED Course   Procedures  Labs Reviewed - No data to display       Imaging Results    None                                      I have ordered xrays of R hip, pelvis and lumbar spine.    X-Ray Hips Bilateral 2 View Incl AP Pelvis   Final Result      1. No acute fracture.   2. Degenerative change of the hips and knees.   3. Hemostasis clips in the  soft tissues of the left thigh.         Electronically signed by: Nandini Zambrano MD   Date:    02/15/2020   Time:    10:01      X-Ray Femur Ap/Lat Bilateral   Final Result      1. No acute fracture.   2. Degenerative change of the hips and knees.   3. Hemostasis clips in the soft tissues of the left thigh.         Electronically signed by: Nandini Zambrano MD   Date:    02/15/2020   Time:    10:01      X-Ray Lumbar Spine Ap And Lateral   Final Result      Degenerative changes.  No acute fracture.         Electronically signed by: Javier Pelayo MD   Date:    02/15/2020   Time:    09:59          Bp noted. Pt on meds. Will have him f/u with pmd.  Clinical Impression:       ICD-10-CM ICD-9-CM   1. Right leg pain M79.604 729.5   2. Fall W19.XXXA E888.9                             Soraya Sung MD  02/15/20 1012

## 2020-02-15 NOTE — DISCHARGE INSTRUCTIONS
If no improvement you may need an MRI.    Thank you for coming to our Emergency Department today. It is important to remember that some problems are difficult to diagnose and may not be found during your first visit. Be sure to follow up with your primary care doctor and review any labs/imaging that was performed with them. If you do not have a primary care doctor, you may contact the one listed on your discharge paperwork or you may also call the [facility] Appointment Desk at [phone] to schedule an appointment with one.     All medications may potentially have side effects and it is impossible to predict which medications may give you side effects. If you feel that you are having a negative effect of any medication you should immediately stop taking them and seek medical attention.    Return to the ER with any questions/concerns, new/concerning symptoms, worsening or failure to improve. Do not drive or make any important decisions for 24 hours if you have received any pain medications, sedatives or mood altering drugs during your ER visit.

## 2020-03-21 ENCOUNTER — HOSPITAL ENCOUNTER (EMERGENCY)
Facility: HOSPITAL | Age: 85
Discharge: HOME | End: 2020-03-21
Attending: EMERGENCY MEDICINE
Payer: MEDICARE

## 2020-03-21 VITALS
SYSTOLIC BLOOD PRESSURE: 162 MMHG | RESPIRATION RATE: 18 BRPM | TEMPERATURE: 99 F | HEART RATE: 94 BPM | OXYGEN SATURATION: 99 % | BODY MASS INDEX: 27.44 KG/M2 | WEIGHT: 170 LBS | DIASTOLIC BLOOD PRESSURE: 96 MMHG

## 2020-03-21 DIAGNOSIS — J06.9 UPPER RESPIRATORY TRACT INFECTION, UNSPECIFIED TYPE: Primary | ICD-10-CM

## 2020-03-21 DIAGNOSIS — R05.9 COUGH: ICD-10-CM

## 2020-03-21 PROCEDURE — 99284 EMERGENCY DEPT VISIT MOD MDM: CPT | Mod: 25,ER

## 2020-03-21 RX ORDER — LEVOCETIRIZINE DIHYDROCHLORIDE 5 MG/1
5 TABLET, FILM COATED ORAL NIGHTLY
Qty: 30 TABLET | Refills: 0 | Status: SHIPPED | OUTPATIENT
Start: 2020-03-21 | End: 2020-09-25

## 2020-03-21 RX ORDER — AZELASTINE 1 MG/ML
1 SPRAY, METERED NASAL 2 TIMES DAILY PRN
Qty: 30 ML | Refills: 0 | Status: SHIPPED | OUTPATIENT
Start: 2020-03-21 | End: 2020-09-25

## 2020-03-22 NOTE — ED PROVIDER NOTES
"Encounter Date: 3/21/2020    SCRIBE #1 NOTE: I, Anton Marsh, am scribing for, and in the presence of,  Toussaint Battley, FNP. I have scribed the following portions of the note - Other sections scribed: HPI, ROS, PE.       History     Chief Complaint   Patient presents with    Otalgia     Pt states, "I have a cold and my left ear hurts me sometimes."     85 year old male with recurrent intermittent pain to left ear onset a couple day. Patient also complains of having a cold. States he is supposed to have surgery soon for his left ear. Denies any coughing.    The history is provided by the patient. No  was used.   Otalgia   This is a recurrent problem. There is pain in the left ear. The problem occurs intermittently. The problem has been unchanged. The pain is at a severity of 5/10. Associated symptoms include rhinorrhea and cough. Pertinent negatives include no headaches, no sore throat, no abdominal pain, no diarrhea, no vomiting, no neck pain and no rash. His past medical history does not include hearing loss.     Review of patient's allergies indicates:   Allergen Reactions    Penicillins Nausea And Vomiting     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    Coronary artery disease     Diastolic dysfunction     Gout, chronic     HTN (hypertension)     Hyperlipidemia     Type 2 diabetes mellitus     diet controlled     Past Surgical History:   Procedure Laterality Date    CATARACT EXTRACTION Bilateral     CATARACT EXTRACTION W/ ANTERIOR VITRECTOMY      CORONARY ARTERY BYPASS GRAFT  07/06/2016    PAIZ-LAD, SVG-Ramus, SVG-PDA    HEMORRHOID SURGERY       Family History   Problem Relation Age of Onset    Cancer Father         colon    Hypertension Mother     Heart disease Mother     Heart disease Sister     No Known Problems Brother     No Known Problems Maternal Aunt     No Known Problems Maternal Uncle     No Known " Problems Paternal Aunt     No Known Problems Paternal Uncle     No Known Problems Maternal Grandmother     No Known Problems Maternal Grandfather     No Known Problems Paternal Grandmother     No Known Problems Paternal Grandfather     Amblyopia Neg Hx     Blindness Neg Hx     Cataracts Neg Hx     Diabetes Neg Hx     Glaucoma Neg Hx     Macular degeneration Neg Hx     Retinal detachment Neg Hx     Strabismus Neg Hx     Stroke Neg Hx     Thyroid disease Neg Hx      Social History     Tobacco Use    Smoking status: Former Smoker     Types: Cigarettes     Last attempt to quit: 1985     Years since quittin.7    Smokeless tobacco: Never Used   Substance Use Topics    Alcohol use: No    Drug use: No     Review of Systems   Constitutional: Negative.  Negative for activity change, chills, diaphoresis and fever.   HENT: Positive for ear pain and rhinorrhea. Negative for congestion, nosebleeds, sinus pressure, sinus pain, sore throat and trouble swallowing.    Eyes: Negative.  Negative for pain, discharge, redness and itching.   Respiratory: Positive for cough. Negative for chest tightness, shortness of breath, wheezing and stridor.    Cardiovascular: Negative.  Negative for chest pain, palpitations and leg swelling.   Gastrointestinal: Negative.  Negative for abdominal pain, constipation, diarrhea, nausea and vomiting.   Endocrine: Negative.  Negative for cold intolerance, heat intolerance, polydipsia, polyphagia and polyuria.   Genitourinary: Negative.  Negative for difficulty urinating, discharge, dysuria, frequency, genital sores, penile pain, scrotal swelling and testicular pain.   Musculoskeletal: Negative.  Negative for back pain, gait problem, joint swelling and neck pain.   Skin: Negative.  Negative for color change, rash and wound.   Allergic/Immunologic: Negative.    Neurological: Negative.  Negative for dizziness, tremors, seizures, weakness, light-headedness and headaches.    Hematological: Negative.    Psychiatric/Behavioral: Negative.  Negative for agitation, behavioral problems, confusion, hallucinations, self-injury and suicidal ideas. The patient is not nervous/anxious and is not hyperactive.    All other systems reviewed and are negative.      Physical Exam     Initial Vitals [03/21/20 1834]   BP Pulse Resp Temp SpO2   (!) 179/99 100 16 99.3 °F (37.4 °C) 98 %      MAP       --         Physical Exam    Nursing note and vitals reviewed.  Constitutional: He appears well-developed and well-nourished.   HENT:   Head: Normocephalic and atraumatic.   Right Ear: Hearing, tympanic membrane, external ear and ear canal normal. No drainage, swelling or tenderness. No mastoid tenderness. Tympanic membrane is not erythematous. No middle ear effusion.   Left Ear: Hearing, tympanic membrane, external ear and ear canal normal. No drainage, swelling or tenderness. No mastoid tenderness. Tympanic membrane is not erythematous.  No middle ear effusion.   Nose: Mucosal edema and rhinorrhea present.   Mouth/Throat: Oropharynx is clear and moist. No oropharyngeal exudate.   Eyes: Conjunctivae and EOM are normal. Pupils are equal, round, and reactive to light.   Neck: Normal range of motion. Neck supple.   Cardiovascular: Normal rate, regular rhythm, normal heart sounds and intact distal pulses. Exam reveals no gallop and no friction rub.    No murmur heard.  Pulmonary/Chest: Breath sounds normal. No stridor. No respiratory distress. He has no wheezes. He has no rhonchi. He has no rales. He exhibits no tenderness.   Abdominal: There is no rigidity.   Musculoskeletal: Normal range of motion.   Neurological: He is alert and oriented to person, place, and time. He displays normal reflexes. No cranial nerve deficit or sensory deficit. GCS score is 15. GCS eye subscore is 4. GCS verbal subscore is 5. GCS motor subscore is 6.   Skin: Skin is warm and dry. Capillary refill takes less than 2 seconds. No rash  noted.   Psychiatric: He has a normal mood and affect. His behavior is normal.         ED Course   Procedures  Labs Reviewed - No data to display       Imaging Results          X-Ray Chest AP Portable (Final result)  Result time 03/21/20 19:01:10    Final result by Christopher Tim MD (03/21/20 19:01:10)                 Impression:      No acute process.      Electronically signed by: Christopher Tim MD  Date:    03/21/2020  Time:    19:01             Narrative:    EXAMINATION:  XR CHEST AP PORTABLE    CLINICAL HISTORY:  Cough    TECHNIQUE:  Single frontal view of the chest was performed.    COMPARISON:  12/07/2018.    FINDINGS:  There are postoperative changes of median sternotomy.  The trachea is unremarkable.  The cardiomediastinal silhouette is within normal limits.  The hilar structures are unremarkable.  The hemidiaphragms are within normal limits.  There is no evidence of free air beneath the hemidiaphragms.  There are no pleural effusions.  There is no evidence of a pneumothorax.  There is no evidence of pneumomediastinum.  No airspace opacity is present.  There is a calcified granuloma in the right midlung zone.  There are degenerative changes in the osseous structures.                                 Medical Decision Making:   History:   Old Medical Records: I decided to obtain old medical records.  Initial Assessment:   URI, cough  Differential Diagnosis:   Pneumonia  Independently Interpreted Test(s):   I have ordered and independently interpreted X-rays - see prior notes.  Clinical Tests:   Radiological Study: Ordered and Reviewed  ED Management:  Chest x-ray unremarkable.    1) Pt will be discharged home on Astelin nasal spray and Xyzal  2) Pt instructed to drink plenty of fluids to loosen secretions  3) Pt instructed that he may take over-the-counter Mucinex (NOT DM) as needed  4) Pt instructed to take over-the-counter Tylenol or Motrin for fever/body aches   5) Pt instructed to return to ER as needed if  symptoms worsen/fail to improve  6) Pt instructed to follow-up with primary care provider tomorrow  7) Pt verbalized understanding of discharge instructions and treatment plan            Scribe Attestation:   Scribe #1: I performed the above scribed service and the documentation accurately describes the services I performed. I attest to the accuracy of the note.                          Clinical Impression:     1. Upper respiratory tract infection, unspecified type    2. Cough                ED Disposition Condition    Discharge Stable        ED Prescriptions     Medication Sig Dispense Start Date End Date Auth. Provider    levocetirizine (XYZAL) 5 MG tablet Take 1 tablet (5 mg total) by mouth every evening. 30 tablet 3/21/2020  Toussaint Battley III, FNP    azelastine (ASTELIN) 137 mcg (0.1 %) nasal spray 1 spray (137 mcg total) by Nasal route 2 (two) times daily as needed for Rhinitis. 30 mL 3/21/2020  Toussaint Battley III, FNP        Follow-up Information     Follow up With Specialties Details Why Contact Info    Harry Velazco MD Internal Medicine, Wound Care Go in 2 days for follow-up [address] [facility] [address]  [phone]      [facility] Emergency Department Emergency Medicine Go to  As needed, If symptoms worsen [address] [facility] [address] [phone]                                     Toussaint Battley III, FNP  03/21/20 8530

## 2020-03-30 ENCOUNTER — TELEPHONE (OUTPATIENT)
Dept: OTOLARYNGOLOGY | Facility: CLINIC | Age: 85
End: 2020-03-30

## 2020-03-30 NOTE — TELEPHONE ENCOUNTER
Tried calling Patient's Son and no answer, having to cancel his appointment that is scheduled in April due to Covid.

## 2020-04-08 RX ORDER — ALBUTEROL SULFATE 90 UG/1
AEROSOL, METERED RESPIRATORY (INHALATION)
Qty: 18 G | Refills: 5 | Status: SHIPPED | OUTPATIENT
Start: 2020-04-08 | End: 2020-09-25

## 2020-05-06 ENCOUNTER — OFFICE VISIT (OUTPATIENT)
Dept: FAMILY MEDICINE | Facility: CLINIC | Age: 85
End: 2020-05-06
Payer: MEDICARE

## 2020-05-06 DIAGNOSIS — E44.0 MALNUTRITION OF MODERATE DEGREE: ICD-10-CM

## 2020-05-06 DIAGNOSIS — M25.421 PAIN AND SWELLING OF RIGHT ELBOW: Primary | ICD-10-CM

## 2020-05-06 DIAGNOSIS — M25.521 PAIN AND SWELLING OF RIGHT ELBOW: Primary | ICD-10-CM

## 2020-05-06 DIAGNOSIS — N18.30 CKD (CHRONIC KIDNEY DISEASE) STAGE 3, GFR 30-59 ML/MIN: ICD-10-CM

## 2020-05-06 PROCEDURE — 99441 PR PHYSICIAN TELEPHONE EVALUATION 5-10 MIN: ICD-10-PCS | Mod: 95,,, | Performed by: FAMILY MEDICINE

## 2020-05-06 PROCEDURE — 99441 PR PHYSICIAN TELEPHONE EVALUATION 5-10 MIN: CPT | Mod: 95,,, | Performed by: FAMILY MEDICINE

## 2020-05-06 RX ORDER — MELOXICAM 7.5 MG/1
7.5 TABLET ORAL DAILY
Qty: 30 TABLET | Refills: 0 | Status: SHIPPED | OUTPATIENT
Start: 2020-05-06 | End: 2020-09-25

## 2020-05-06 NOTE — PROGRESS NOTES
Established Patient - Audio Only Telehealth Visit     The patient location is: [place]  The chief complaint leading to consultation is: elbow pain/swelling  Visit type: Virtual visit with audio only (telephone)  Total time spent with patient: 8 minutes       The reason for the audio only service rather than synchronous audio and video virtual visit was related to technical difficulties or patient preference/necessity.     Each patient to whom I provide medical services by telemedicine is:  (1) informed of the relationship between the physician and patient and the respective role of any other health care provider with respect to management of the patient; and (2) notified that they may decline to receive medical services by telemedicine and may withdraw from such care at any time. Patient verbally consented to receive this service via voice-only telephone call.    Routine Office Visit    Patient Name: Destin Barber    : 1935  MRN: [id number]    Subjective:  Destin is a 85 y.o. male who presents today for:   Chief Complaint   Patient presents with    Joint Swelling       Elbow Pain: Patient complains of right elbow pain. Onset of the symptoms was several years ago, but gradual recent worsening with swelling over last few days. Inciting event: none known. Current symptoms include swelling. Pain is aggravated by: grasping, supination/pronation as when opening doors. Patient's overall course: gradually worsening. Patient has had prior elbow problems. Evaluation to date: none reported by patient. Treatment to date: nothing specific. He reports no redness of tenderness to touch. He reports no increased warmth.    Past Medical History  Past Medical History:   Diagnosis Date    Acute coronary syndrome 2016    s/p 3V CABG 2016    Coronary artery disease     Diastolic dysfunction     Gout, chronic     HTN (hypertension)     Hyperlipidemia     Type 2 diabetes mellitus     diet controlled       Past  Surgical History  Past Surgical History:   Procedure Laterality Date    CATARACT EXTRACTION Bilateral     CATARACT EXTRACTION W/ ANTERIOR VITRECTOMY      CORONARY ARTERY BYPASS GRAFT  2016    PAIZ-LAD, SVG-Ramus, SVG-PDA    HEMORRHOID SURGERY          Family History  Family History   Problem Relation Age of Onset    Cancer Father         colon    Hypertension Mother     Heart disease Mother     Heart disease Sister     No Known Problems Brother     No Known Problems Maternal Aunt     No Known Problems Maternal Uncle     No Known Problems Paternal Aunt     No Known Problems Paternal Uncle     No Known Problems Maternal Grandmother     No Known Problems Maternal Grandfather     No Known Problems Paternal Grandmother     No Known Problems Paternal Grandfather     Amblyopia Neg Hx     Blindness Neg Hx     Cataracts Neg Hx     Diabetes Neg Hx     Glaucoma Neg Hx     Macular degeneration Neg Hx     Retinal detachment Neg Hx     Strabismus Neg Hx     Stroke Neg Hx     Thyroid disease Neg Hx        Social History  Social History     Socioeconomic History    Marital status:      Spouse name: Not on file    Number of children: Not on file    Years of education: Not on file    Highest education level: Not on file   Occupational History    Not on file   Social Needs    Financial resource strain: Not on file    Food insecurity:     Worry: Not on file     Inability: Not on file    Transportation needs:     Medical: Not on file     Non-medical: Not on file   Tobacco Use    Smoking status: Former Smoker     Types: Cigarettes     Last attempt to quit: 1985     Years since quittin.8    Smokeless tobacco: Never Used   Substance and Sexual Activity    Alcohol use: No    Drug use: No    Sexual activity: Not on file   Lifestyle    Physical activity:     Days per week: Not on file     Minutes per session: Not on file    Stress: Not on file   Relationships    Social  connections:     Talks on phone: Not on file     Gets together: Not on file     Attends Protestant service: Not on file     Active member of club or organization: Not on file     Attends meetings of clubs or organizations: Not on file     Relationship status: Not on file   Other Topics Concern    Not on file   Social History Narrative    Not on file       Current Medications  Current Outpatient Medications on File Prior to Visit   Medication Sig Dispense Refill    albuterol (PROVENTIL/VENTOLIN HFA) 90 mcg/actuation inhaler Inhale 1 puff into the lungs every 6 (six) hours as needed. Rescue  5    albuterol (PROVENTIL/VENTOLIN HFA) 90 mcg/actuation inhaler Inhale 1 puff into the lungs every 6 (six) hours as needed. Rescue 18 g 5    allopurinol (ZYLOPRIM) 100 MG tablet Take 1 tablet (100 mg total) by mouth 2 (two) times daily. 180 tablet 3    amLODIPine (NORVASC) 10 MG tablet Take 1 tablet (10 mg total) by mouth once daily. 90 tablet 3    aspirin (ASPIR-LOW) 81 MG EC tablet Take 1 tablet (81 mg total) by mouth once daily. 90 tablet 3    azelastine (ASTELIN) 137 mcg (0.1 %) nasal spray 1 spray (137 mcg total) by Nasal route 2 (two) times daily as needed for Rhinitis. 30 mL 0    baclofen (LIORESAL) 10 MG tablet Take 1 tablet (10 mg total) by mouth 3 (three) times daily. 30 tablet 0    clopidogrel (PLAVIX) 75 mg tablet Take 1 tablet (75 mg total) by mouth once daily. 90 tablet 3    docusate sodium (COLACE) 100 MG capsule Take 1 capsule (100 mg total) by mouth 2 (two) times daily as needed for Constipation.  0    famotidine (PEPCID) 20 MG tablet Take 1 tablet (20 mg total) by mouth 2 (two) times daily. 60 tablet 0    gabapentin (NEURONTIN) 300 MG capsule Take 1 capsule (300 mg total) by mouth every evening. 90 capsule 3    levocetirizine (XYZAL) 5 MG tablet Take 1 tablet (5 mg total) by mouth every evening. 30 tablet 0    lisinopril (PRINIVIL,ZESTRIL) 20 MG tablet Take 1 tablet (20 mg total) by mouth once  daily. 90 tablet 3    metFORMIN (GLUCOPHAGE-XR) 500 MG 24 hr tablet Take 1 tablet (500 mg total) by mouth daily with breakfast. 90 tablet 3    metoprolol tartrate (LOPRESSOR) 25 MG tablet Take 1 tablet (25 mg total) by mouth 2 (two) times daily. 180 tablet 3    pantoprazole (PROTONIX) 40 MG tablet Take 1 tablet (40 mg total) by mouth once daily. 90 tablet 3    pravastatin (PRAVACHOL) 40 MG tablet Take 1 tablet (40 mg total) by mouth once daily. 90 tablet 3    tamsulosin (FLOMAX) 0.4 mg Cap Take 1 capsule (0.4 mg total) by mouth once daily. 90 capsule 3    traZODone (DESYREL) 150 MG tablet Take 1 tablet (150 mg total) by mouth every evening. 90 tablet 3    [DISCONTINUED] meloxicam (MOBIC) 15 MG tablet Take 1 tablet (15 mg total) by mouth once daily. 30 tablet 0     No current facility-administered medications on file prior to visit.        Allergies   Review of patient's allergies indicates:   Allergen Reactions    Penicillins Nausea And Vomiting       Review of Systems   Constitutional: Negative for chills and fever.   Musculoskeletal: Positive for arthralgias and joint swelling.   Skin: Negative for color change and rash.     There were no vitals taken for this visit.    PHYS    Assessment/Plan:  Destin was seen today for joint swelling.    Diagnoses and all orders for this visit:    Pain and swelling of right elbow  -     meloxicam (MOBIC) 7.5 MG tablet; Take 1 tablet (7.5 mg total) by mouth once daily.  Patient with DM and CKD-3 having acute worsening of right elbow pain and swelling. Advised icing multiple times a day.  Advised short course of NSAID. Will do low dose of meloxicam given CKD-3.  Revaluate within 2 weeks, sooner if any worsening or new symptoms.     Malnutrition of moderate degree  CMP checked this year and albumin improved    CKD (chronic kidney disease) stage 3, GFR 30-59 ml/min  Kidney function stable on CMP done earlier this year compared to last year               -Melquiades Kennedy Jr.,  MD, AAHIVS          This office note has been dictated.  This dictation has been generated using M-Modal Fluency Direct dictation; some phonetic errors may occur.             This service was not originating from a related E/M service provided within the previous 7 days nor will  to an E/M service or procedure within the next 24 hours or my soonest available appointment.  Prevailing standard of care was able to be met in this audio-only visit.

## 2020-05-26 ENCOUNTER — OFFICE VISIT (OUTPATIENT)
Dept: FAMILY MEDICINE | Facility: CLINIC | Age: 85
End: 2020-05-26
Payer: MEDICARE

## 2020-05-26 ENCOUNTER — LAB VISIT (OUTPATIENT)
Dept: LAB | Facility: HOSPITAL | Age: 85
End: 2020-05-26
Attending: INTERNAL MEDICINE
Payer: MEDICARE

## 2020-05-26 VITALS
HEART RATE: 66 BPM | BODY MASS INDEX: 24.59 KG/M2 | OXYGEN SATURATION: 98 % | WEIGHT: 153 LBS | HEIGHT: 66 IN | SYSTOLIC BLOOD PRESSURE: 125 MMHG | RESPIRATION RATE: 17 BRPM | DIASTOLIC BLOOD PRESSURE: 78 MMHG | TEMPERATURE: 98 F

## 2020-05-26 DIAGNOSIS — Z12.5 ENCOUNTER FOR SCREENING FOR MALIGNANT NEOPLASM OF PROSTATE: ICD-10-CM

## 2020-05-26 DIAGNOSIS — N40.1 BENIGN PROSTATIC HYPERPLASIA WITH NOCTURIA: ICD-10-CM

## 2020-05-26 DIAGNOSIS — E78.5 HYPERLIPIDEMIA, UNSPECIFIED HYPERLIPIDEMIA TYPE: ICD-10-CM

## 2020-05-26 DIAGNOSIS — N18.30 CONTROLLED TYPE 2 DIABETES MELLITUS WITH STAGE 3 CHRONIC KIDNEY DISEASE, WITHOUT LONG-TERM CURRENT USE OF INSULIN: ICD-10-CM

## 2020-05-26 DIAGNOSIS — R35.1 BENIGN PROSTATIC HYPERPLASIA WITH NOCTURIA: ICD-10-CM

## 2020-05-26 DIAGNOSIS — E11.40 TYPE 2 DIABETES MELLITUS WITH DIABETIC NEUROPATHY, WITHOUT LONG-TERM CURRENT USE OF INSULIN: ICD-10-CM

## 2020-05-26 DIAGNOSIS — I10 ESSENTIAL HYPERTENSION: ICD-10-CM

## 2020-05-26 DIAGNOSIS — R30.0 DYSURIA: Primary | ICD-10-CM

## 2020-05-26 DIAGNOSIS — I25.10 CORONARY ARTERY DISEASE INVOLVING NATIVE CORONARY ARTERY OF NATIVE HEART WITHOUT ANGINA PECTORIS: ICD-10-CM

## 2020-05-26 DIAGNOSIS — E11.22 CONTROLLED TYPE 2 DIABETES MELLITUS WITH STAGE 3 CHRONIC KIDNEY DISEASE, WITHOUT LONG-TERM CURRENT USE OF INSULIN: ICD-10-CM

## 2020-05-26 DIAGNOSIS — I25.810 CORONARY ARTERY DISEASE INVOLVING CORONARY BYPASS GRAFT OF NATIVE HEART WITHOUT ANGINA PECTORIS: ICD-10-CM

## 2020-05-26 LAB
ALBUMIN SERPL BCP-MCNC: 3.8 G/DL (ref 3.5–5.2)
ALP SERPL-CCNC: 77 U/L (ref 55–135)
ALT SERPL W/O P-5'-P-CCNC: 9 U/L (ref 10–44)
ANION GAP SERPL CALC-SCNC: 12 MMOL/L (ref 8–16)
AST SERPL-CCNC: 18 U/L (ref 10–40)
BACTERIA #/AREA URNS HPF: ABNORMAL /HPF
BASOPHILS # BLD AUTO: 0.02 K/UL (ref 0–0.2)
BASOPHILS NFR BLD: 0.4 % (ref 0–1.9)
BILIRUB SERPL-MCNC: 0.2 MG/DL (ref 0.1–1)
BILIRUB UR QL STRIP: NEGATIVE
BUN SERPL-MCNC: 29 MG/DL (ref 8–23)
CALCIUM SERPL-MCNC: 9.1 MG/DL (ref 8.7–10.5)
CHLORIDE SERPL-SCNC: 105 MMOL/L (ref 95–110)
CLARITY UR: ABNORMAL
CO2 SERPL-SCNC: 21 MMOL/L (ref 23–29)
COLOR UR: YELLOW
CREAT SERPL-MCNC: 1.5 MG/DL (ref 0.5–1.4)
DIFFERENTIAL METHOD: ABNORMAL
EOSINOPHIL # BLD AUTO: 0.1 K/UL (ref 0–0.5)
EOSINOPHIL NFR BLD: 2.4 % (ref 0–8)
ERYTHROCYTE [DISTWIDTH] IN BLOOD BY AUTOMATED COUNT: 12.7 % (ref 11.5–14.5)
EST. GFR  (AFRICAN AMERICAN): 49 ML/MIN/1.73 M^2
EST. GFR  (NON AFRICAN AMERICAN): 42 ML/MIN/1.73 M^2
ESTIMATED AVG GLUCOSE: 137 MG/DL (ref 68–131)
GLUCOSE SERPL-MCNC: 93 MG/DL (ref 70–110)
GLUCOSE UR QL STRIP: NEGATIVE
HBA1C MFR BLD HPLC: 6.4 % (ref 4–5.6)
HCT VFR BLD AUTO: 35.2 % (ref 40–54)
HGB BLD-MCNC: 11.8 G/DL (ref 14–18)
HGB UR QL STRIP: ABNORMAL
HYALINE CASTS #/AREA URNS LPF: 0 /LPF
IMM GRANULOCYTES # BLD AUTO: 0.02 K/UL (ref 0–0.04)
IMM GRANULOCYTES NFR BLD AUTO: 0.4 % (ref 0–0.5)
KETONES UR QL STRIP: NEGATIVE
LEUKOCYTE ESTERASE UR QL STRIP: ABNORMAL
LYMPHOCYTES # BLD AUTO: 2.1 K/UL (ref 1–4.8)
LYMPHOCYTES NFR BLD: 39.5 % (ref 18–48)
MCH RBC QN AUTO: 30.3 PG (ref 27–31)
MCHC RBC AUTO-ENTMCNC: 33.5 G/DL (ref 32–36)
MCV RBC AUTO: 91 FL (ref 82–98)
MICROSCOPIC COMMENT: ABNORMAL
MONOCYTES # BLD AUTO: 0.6 K/UL (ref 0.3–1)
MONOCYTES NFR BLD: 12 % (ref 4–15)
NEUTROPHILS # BLD AUTO: 2.4 K/UL (ref 1.8–7.7)
NEUTROPHILS NFR BLD: 45.3 % (ref 38–73)
NITRITE UR QL STRIP: NEGATIVE
NRBC BLD-RTO: 0 /100 WBC
PH UR STRIP: 5 [PH] (ref 5–8)
PLATELET # BLD AUTO: 192 K/UL (ref 150–350)
PMV BLD AUTO: 9.3 FL (ref 9.2–12.9)
POTASSIUM SERPL-SCNC: 4.1 MMOL/L (ref 3.5–5.1)
PROT SERPL-MCNC: 6.9 G/DL (ref 6–8.4)
PROT UR QL STRIP: ABNORMAL
RBC # BLD AUTO: 3.89 M/UL (ref 4.6–6.2)
RBC #/AREA URNS HPF: 0 /HPF (ref 0–4)
SODIUM SERPL-SCNC: 138 MMOL/L (ref 136–145)
SP GR UR STRIP: 1.01 (ref 1–1.03)
URN SPEC COLLECT METH UR: ABNORMAL
UROBILINOGEN UR STRIP-ACNC: NEGATIVE EU/DL
WBC # BLD AUTO: 5.34 K/UL (ref 3.9–12.7)
WBC #/AREA URNS HPF: 70 /HPF (ref 0–5)

## 2020-05-26 PROCEDURE — 80053 COMPREHEN METABOLIC PANEL: CPT

## 2020-05-26 PROCEDURE — 99214 OFFICE O/P EST MOD 30 MIN: CPT | Mod: S$PBB,,, | Performed by: INTERNAL MEDICINE

## 2020-05-26 PROCEDURE — 99214 OFFICE O/P EST MOD 30 MIN: CPT | Mod: PBBFAC,PN | Performed by: INTERNAL MEDICINE

## 2020-05-26 PROCEDURE — 83036 HEMOGLOBIN GLYCOSYLATED A1C: CPT

## 2020-05-26 PROCEDURE — 84153 ASSAY OF PSA TOTAL: CPT

## 2020-05-26 PROCEDURE — 99999 PR PBB SHADOW E&M-EST. PATIENT-LVL IV: ICD-10-PCS | Mod: PBBFAC,,, | Performed by: INTERNAL MEDICINE

## 2020-05-26 PROCEDURE — 36415 COLL VENOUS BLD VENIPUNCTURE: CPT | Mod: PN

## 2020-05-26 PROCEDURE — 99214 PR OFFICE/OUTPT VISIT, EST, LEVL IV, 30-39 MIN: ICD-10-PCS | Mod: S$PBB,,, | Performed by: INTERNAL MEDICINE

## 2020-05-26 PROCEDURE — 87086 URINE CULTURE/COLONY COUNT: CPT

## 2020-05-26 PROCEDURE — 85025 COMPLETE CBC W/AUTO DIFF WBC: CPT

## 2020-05-26 PROCEDURE — 81000 URINALYSIS NONAUTO W/SCOPE: CPT

## 2020-05-26 PROCEDURE — 99999 PR PBB SHADOW E&M-EST. PATIENT-LVL IV: CPT | Mod: PBBFAC,,, | Performed by: INTERNAL MEDICINE

## 2020-05-26 RX ORDER — ALLOPURINOL 100 MG/1
100 TABLET ORAL 2 TIMES DAILY
Qty: 180 TABLET | Refills: 3 | Status: SHIPPED | OUTPATIENT
Start: 2020-05-26 | End: 2020-09-25

## 2020-05-26 RX ORDER — TAMSULOSIN HYDROCHLORIDE 0.4 MG/1
0.4 CAPSULE ORAL DAILY
Qty: 90 CAPSULE | Refills: 3 | Status: SHIPPED | OUTPATIENT
Start: 2020-05-26 | End: 2020-07-27 | Stop reason: SDUPTHER

## 2020-05-26 RX ORDER — METOPROLOL TARTRATE 25 MG/1
25 TABLET, FILM COATED ORAL 2 TIMES DAILY
Qty: 180 TABLET | Refills: 3 | Status: SHIPPED | OUTPATIENT
Start: 2020-05-26 | End: 2021-04-28 | Stop reason: SDUPTHER

## 2020-05-26 RX ORDER — ASPIRIN 81 MG/1
81 TABLET ORAL DAILY
Qty: 90 TABLET | Refills: 3 | Status: SHIPPED | OUTPATIENT
Start: 2020-05-26 | End: 2021-07-30 | Stop reason: SDUPTHER

## 2020-05-26 RX ORDER — AMLODIPINE BESYLATE 10 MG/1
10 TABLET ORAL DAILY
Qty: 90 TABLET | Refills: 3 | Status: SHIPPED | OUTPATIENT
Start: 2020-05-26 | End: 2021-04-28 | Stop reason: SDUPTHER

## 2020-05-26 RX ORDER — LISINOPRIL 20 MG/1
20 TABLET ORAL DAILY
Qty: 90 TABLET | Refills: 3 | Status: SHIPPED | OUTPATIENT
Start: 2020-05-26 | End: 2020-09-25 | Stop reason: SDUPTHER

## 2020-05-26 RX ORDER — PRAVASTATIN SODIUM 40 MG/1
40 TABLET ORAL DAILY
Qty: 90 TABLET | Refills: 3 | Status: SHIPPED | OUTPATIENT
Start: 2020-05-26 | End: 2021-04-28 | Stop reason: SDUPTHER

## 2020-05-26 RX ORDER — METFORMIN HYDROCHLORIDE 500 MG/1
500 TABLET, EXTENDED RELEASE ORAL
Qty: 90 TABLET | Refills: 3 | Status: SHIPPED | OUTPATIENT
Start: 2020-05-26 | End: 2021-06-01 | Stop reason: SDUPTHER

## 2020-05-26 NOTE — PROGRESS NOTES
"Subjective:       Patient ID: Destin Barber is a 85 y.o. male.    Chief Complaint: Annual Exam; Establish Care; and Joint Swelling (L side / 3 weeks )    Well exam    HPI: 86 y/o w/ HTN DM CAD s/p CABG BPH presents for well exam. Two weeks ago developed swelling to left elbow initially painful some relief with NSAID no further pain still feels swollen to him not limiting phyiscal activity. Reports taking asa daily no LE swelling no chest pain no dyspnea. Reports one week ago noted red blood in urine no clots no dysuria no chagne in urinary frequency. Breathing "fine"    Review of Systems   Constitutional: Negative for activity change, appetite change, fatigue, fever and unexpected weight change.   HENT: Negative for ear pain, rhinorrhea and sore throat.    Eyes: Negative for discharge and visual disturbance.   Respiratory: Negative for chest tightness, shortness of breath and wheezing.    Cardiovascular: Negative for chest pain, palpitations and leg swelling.   Gastrointestinal: Negative for abdominal pain, constipation and diarrhea.   Endocrine: Negative for cold intolerance and heat intolerance.   Genitourinary: Positive for hematuria (one week ago). Negative for dysuria.   Musculoskeletal: Negative for joint swelling and neck stiffness.   Skin: Negative for rash.   Neurological: Negative for dizziness, syncope, weakness and headaches.   Psychiatric/Behavioral: Negative for suicidal ideas.       Objective:     Vitals:    05/26/20 0850   BP: 125/78   BP Location: Right arm   Patient Position: Sitting   BP Method: Medium (Automatic)   Pulse: 66   Resp: 17   Temp: 97.6 °F (36.4 °C)   TempSrc: Oral   SpO2: 98%   Weight: 69.4 kg (153 lb)   Height: 5' 6" (1.676 m)          Physical Exam   Constitutional: He is oriented to person, place, and time. He appears well-developed and well-nourished.   HENT:   Head: Normocephalic and atraumatic.   Eyes: Conjunctivae are normal. No scleral icterus.   Neck: Normal range of " motion. No JVD present.   Cardiovascular: Normal rate and regular rhythm. Exam reveals no gallop and no friction rub.   No murmur heard.  No LE edema   Pulmonary/Chest: Effort normal and breath sounds normal. He has no wheezes. He has no rales.   Abdominal: Soft. Bowel sounds are normal. There is no tenderness. There is no rebound and no guarding.   No CVA tenderness   Musculoskeletal: Normal range of motion. He exhibits no edema or tenderness.   Neurological: He is alert and oriented to person, place, and time. No cranial nerve deficit.   Skin: Skin is warm and dry.   Psychiatric: He has a normal mood and affect.       Assessment and Plan   1. Essential hypertension  At goal continue current medications labs for end organ damage  - CBC auto differential; Future  - Comprehensive metabolic panel; Future  - amLODIPine (NORVASC) 10 MG tablet; Take 1 tablet (10 mg total) by mouth once daily.  Dispense: 90 tablet; Refill: 3  - metoprolol tartrate (LOPRESSOR) 25 MG tablet; Take 1 tablet (25 mg total) by mouth 2 (two) times daily.  Dispense: 180 tablet; Refill: 3    2. Controlled type 2 diabetes mellitus with stage 3 chronic kidney disease, without long-term current use of insulin  On metformin and statin repeat a1c today for quality of control   - CBC auto differential; Future  - Comprehensive metabolic panel; Future  - Hemoglobin A1C; Future  - lisinopriL (PRINIVIL,ZESTRIL) 20 MG tablet; Take 1 tablet (20 mg total) by mouth once daily.  Dispense: 90 tablet; Refill: 3  - metFORMIN (GLUCOPHAGE-XR) 500 MG XR 24hr tablet; Take 1 tablet (500 mg total) by mouth daily with breakfast.  Dispense: 90 tablet; Refill: 3    3. Coronary artery disease involving native coronary artery of native heart without angina pectoris  Life long asa   - metoprolol tartrate (LOPRESSOR) 25 MG tablet; Take 1 tablet (25 mg total) by mouth 2 (two) times daily.  Dispense: 180 tablet; Refill: 3    4. Coronary artery disease involving coronary bypass  graft of native heart without angina pectoris  As above  - pravastatin (PRAVACHOL) 40 MG tablet; Take 1 tablet (40 mg total) by mouth once daily.  Dispense: 90 tablet; Refill: 3  - aspirin (ASPIR-LOW) 81 MG EC tablet; Take 1 tablet (81 mg total) by mouth once daily.  Dispense: 90 tablet; Refill: 3    5. Hyperlipidemia, unspecified hyperlipidemia type  As above  - pravastatin (PRAVACHOL) 40 MG tablet; Take 1 tablet (40 mg total) by mouth once daily.  Dispense: 90 tablet; Refill: 3    6. Type 2 diabetes mellitus with diabetic neuropathy, without long-term current use of insulin  Minimal symptoms of neuropathy continue to monitor  - metFORMIN (GLUCOPHAGE-XR) 500 MG XR 24hr tablet; Take 1 tablet (500 mg total) by mouth daily with breakfast.  Dispense: 90 tablet; Refill: 3    7. Dysuria  U/a with culture today   - Urinalysis  - Urine culture    8. Benign prostatic hyperplasia with nocturia  psa screening  - PSA, Screening; Future    9. Encounter for screening for malignant neoplasm of prostate   As above  - PSA, Screening; Future

## 2020-05-27 LAB — COMPLEXED PSA SERPL-MCNC: 4.5 NG/ML (ref 0–4)

## 2020-05-28 ENCOUNTER — TELEPHONE (OUTPATIENT)
Dept: FAMILY MEDICINE | Facility: CLINIC | Age: 85
End: 2020-05-28

## 2020-05-28 DIAGNOSIS — N40.1 BENIGN PROSTATIC HYPERPLASIA WITH NOCTURIA: Primary | ICD-10-CM

## 2020-05-28 DIAGNOSIS — R31.29 MICROSCOPIC HEMATURIA: ICD-10-CM

## 2020-05-28 DIAGNOSIS — R35.1 BENIGN PROSTATIC HYPERPLASIA WITH NOCTURIA: Primary | ICD-10-CM

## 2020-05-28 LAB — BACTERIA UR CULT: NORMAL

## 2020-05-28 NOTE — TELEPHONE ENCOUNTER
I called and spoke to the pt and advised of results from PCP message. I advised he will be contacted by our referral team to assist him with his referral to Urology. He verbalizes understanding.

## 2020-06-01 ENCOUNTER — TELEPHONE (OUTPATIENT)
Dept: ADMINISTRATIVE | Facility: HOSPITAL | Age: 85
End: 2020-06-01

## 2020-06-09 DIAGNOSIS — G47.00 INSOMNIA, UNSPECIFIED TYPE: ICD-10-CM

## 2020-06-09 RX ORDER — TRAZODONE HYDROCHLORIDE 150 MG/1
150 TABLET ORAL NIGHTLY
Qty: 90 TABLET | Refills: 3 | Status: SHIPPED | OUTPATIENT
Start: 2020-06-09 | End: 2020-09-25

## 2020-06-09 NOTE — TELEPHONE ENCOUNTER
----- Message from Carly marypromisethea sent at 6/9/2020  3:49 PM CDT -----  Type: RX Refill Request    Who Called: spouse/ Gelandra    Have you contacted your pharmacy: yes    Refill or New Rx: refill     RX Name and Strength: traZODone (DESYREL) 150 MG tablet    How is the patient currently taking it? (ex. 1XDay):    Is this a 30 day or 90 day RX:    Preferred Pharmacy with phone number: [facility] [place]  [place] - [address] [facility] [address]  Phone: [phone] Fax: [phone]    [facility] - [place] - [address]  [address]  [facility] [address]  Phone: [phone] Fax: [phone]    Local or Mail Order: local    Ordering Provider:    Would the patient rather a call back or a response via My [facility]? Call back     Best Call Back Number: [phone]    Additional Information:  Pt is out of meds

## 2020-06-17 ENCOUNTER — PES CALL (OUTPATIENT)
Dept: ADMINISTRATIVE | Facility: CLINIC | Age: 85
End: 2020-06-17

## 2020-07-15 DIAGNOSIS — Z71.89 COMPLEX CARE COORDINATION: ICD-10-CM

## 2020-07-27 ENCOUNTER — OFFICE VISIT (OUTPATIENT)
Dept: FAMILY MEDICINE | Facility: CLINIC | Age: 85
End: 2020-07-27
Payer: MEDICARE

## 2020-07-27 VITALS
BODY MASS INDEX: 24.45 KG/M2 | HEIGHT: 66 IN | TEMPERATURE: 97 F | RESPIRATION RATE: 18 BRPM | OXYGEN SATURATION: 97 % | DIASTOLIC BLOOD PRESSURE: 78 MMHG | HEART RATE: 91 BPM | SYSTOLIC BLOOD PRESSURE: 122 MMHG | WEIGHT: 152.13 LBS

## 2020-07-27 DIAGNOSIS — R31.29 MICROSCOPIC HEMATURIA: ICD-10-CM

## 2020-07-27 DIAGNOSIS — R35.0 URINARY FREQUENCY: Primary | ICD-10-CM

## 2020-07-27 PROCEDURE — 99214 OFFICE O/P EST MOD 30 MIN: CPT | Mod: S$PBB,,, | Performed by: INTERNAL MEDICINE

## 2020-07-27 PROCEDURE — 99999 PR PBB SHADOW E&M-EST. PATIENT-LVL III: ICD-10-PCS | Mod: PBBFAC,,, | Performed by: INTERNAL MEDICINE

## 2020-07-27 PROCEDURE — 99999 PR PBB SHADOW E&M-EST. PATIENT-LVL III: CPT | Mod: PBBFAC,,, | Performed by: INTERNAL MEDICINE

## 2020-07-27 PROCEDURE — 99213 OFFICE O/P EST LOW 20 MIN: CPT | Mod: PBBFAC,PN | Performed by: INTERNAL MEDICINE

## 2020-07-27 PROCEDURE — 99214 PR OFFICE/OUTPT VISIT, EST, LEVL IV, 30-39 MIN: ICD-10-PCS | Mod: S$PBB,,, | Performed by: INTERNAL MEDICINE

## 2020-07-27 RX ORDER — TAMSULOSIN HYDROCHLORIDE 0.4 MG/1
0.4 CAPSULE ORAL DAILY
Qty: 90 CAPSULE | Refills: 3 | Status: SHIPPED | OUTPATIENT
Start: 2020-07-27 | End: 2020-10-27

## 2020-07-27 NOTE — PROGRESS NOTES
"Subjective:       Patient ID: Destin Barber is a 85 y.o. male.    Chief Complaint: Urinary Retention (onset a week ago ) and Medication Refill (Inhaler )    F/u urinary frequency    HPI: 86 y/o w/ HTN CAD seen two months ago because of urinary frequency. At taht time noted u/a with microscopic hematuria w/o bacteria. Symptoms unchanged awakens three to four times per night to urinate. Not using tamsulosin. Does endorse sensation of incomplete bladder emptying no flank pain no nausea no LE swelling.     Review of Systems   Constitutional: Negative for activity change, appetite change, fatigue, fever and unexpected weight change.   HENT: Negative for ear pain, rhinorrhea and sore throat.    Eyes: Negative for discharge and visual disturbance.   Respiratory: Negative for chest tightness, shortness of breath and wheezing.    Cardiovascular: Negative for chest pain, palpitations and leg swelling.   Gastrointestinal: Negative for abdominal pain, constipation and diarrhea.   Endocrine: Negative for cold intolerance and heat intolerance.   Genitourinary: Positive for frequency and urgency. Negative for decreased urine volume, dysuria, hematuria, scrotal swelling and testicular pain.   Musculoskeletal: Negative for joint swelling and neck stiffness.   Skin: Negative for rash.   Neurological: Negative for dizziness, syncope, weakness and headaches.   Psychiatric/Behavioral: Negative for suicidal ideas.       Objective:     Vitals:    07/27/20 1500   BP: 122/78   BP Location: Right arm   Patient Position: Sitting   BP Method: Medium (Manual)   Pulse: 91   Resp: 18   Temp: 97.1 °F (36.2 °C)   TempSrc: Oral   SpO2: 97%   Weight: 69 kg (152 lb 1.9 oz)   Height: 5' 6" (1.676 m)          Physical Exam  Constitutional:       Appearance: He is well-developed.   HENT:      Head: Normocephalic and atraumatic.   Eyes:      Conjunctiva/sclera: Conjunctivae normal.      Pupils: Pupils are equal, round, and reactive to light.   Neck:     "  Musculoskeletal: Normal range of motion.   Cardiovascular:      Rate and Rhythm: Normal rate and regular rhythm.      Heart sounds: No murmur. No friction rub. No gallop.    Pulmonary:      Effort: Pulmonary effort is normal.      Breath sounds: Normal breath sounds. No wheezing or rales.   Abdominal:      Palpations: Abdomen is soft.      Tenderness: There is no abdominal tenderness. There is no right CVA tenderness, left CVA tenderness, guarding or rebound.   Musculoskeletal: Normal range of motion.         General: No tenderness.      Right lower leg: No edema.      Left lower leg: No edema.   Skin:     General: Skin is warm and dry.   Neurological:      Mental Status: He is alert and oriented to person, place, and time.      Cranial Nerves: No cranial nerve deficit.   Psychiatric:         Mood and Affect: Mood normal.         Behavior: Behavior normal.         Assessment and Plan   1. Urinary frequency  Resume flomax  - tamsulosin (FLOMAX) 0.4 mg Cap; Take 1 capsule (0.4 mg total) by mouth once daily.  Dispense: 90 capsule; Refill: 3    2. Microscopic hematuria  Pt to schedule with urologist to discuss advanced imaging +/- cystoscopy. Unable to provide urine sample today due to just urinating

## 2020-07-31 ENCOUNTER — OFFICE VISIT (OUTPATIENT)
Dept: UROLOGY | Facility: CLINIC | Age: 85
End: 2020-07-31
Payer: MEDICARE

## 2020-07-31 VITALS — WEIGHT: 150 LBS | HEIGHT: 66 IN | BODY MASS INDEX: 24.11 KG/M2

## 2020-07-31 DIAGNOSIS — R30.0 DYSURIA: ICD-10-CM

## 2020-07-31 DIAGNOSIS — R31.0 GROSS HEMATURIA: ICD-10-CM

## 2020-07-31 DIAGNOSIS — N13.8 BPH WITH OBSTRUCTION/LOWER URINARY TRACT SYMPTOMS: Primary | ICD-10-CM

## 2020-07-31 DIAGNOSIS — N40.1 BPH WITH OBSTRUCTION/LOWER URINARY TRACT SYMPTOMS: Primary | ICD-10-CM

## 2020-07-31 DIAGNOSIS — R33.9 URINARY RETENTION: ICD-10-CM

## 2020-07-31 DIAGNOSIS — R35.1 NOCTURIA: ICD-10-CM

## 2020-07-31 LAB
BILIRUB SERPL-MCNC: ABNORMAL MG/DL
BLOOD URINE, POC: ABNORMAL
COLOR, POC UA: YELLOW
GLUCOSE UR QL STRIP: 100
KETONES UR QL STRIP: ABNORMAL
LEUKOCYTE ESTERASE URINE, POC: ABNORMAL
NITRITE, POC UA: ABNORMAL
PH, POC UA: 6
POC RESIDUAL URINE VOLUME: 349 ML (ref 0–100)
PROTEIN, POC: ABNORMAL
SPECIFIC GRAVITY, POC UA: 1010
UROBILINOGEN, POC UA: ABNORMAL

## 2020-07-31 PROCEDURE — 99214 OFFICE O/P EST MOD 30 MIN: CPT | Mod: PBBFAC | Performed by: NURSE PRACTITIONER

## 2020-07-31 PROCEDURE — 99999 PR PBB SHADOW E&M-EST. PATIENT-LVL IV: ICD-10-PCS | Mod: PBBFAC,,, | Performed by: NURSE PRACTITIONER

## 2020-07-31 PROCEDURE — 87086 URINE CULTURE/COLONY COUNT: CPT

## 2020-07-31 PROCEDURE — 99214 PR OFFICE/OUTPT VISIT, EST, LEVL IV, 30-39 MIN: ICD-10-PCS | Mod: S$PBB,25,ICN, | Performed by: NURSE PRACTITIONER

## 2020-07-31 PROCEDURE — 81001 URINALYSIS AUTO W/SCOPE: CPT | Mod: PBBFAC | Performed by: NURSE PRACTITIONER

## 2020-07-31 PROCEDURE — 51798 US URINE CAPACITY MEASURE: CPT | Mod: PBBFAC | Performed by: NURSE PRACTITIONER

## 2020-07-31 PROCEDURE — 51700 IRRIGATION OF BLADDER: CPT | Mod: PBBFAC | Performed by: NURSE PRACTITIONER

## 2020-07-31 PROCEDURE — 51700 IRRIGATION OF BLADDER: CPT | Mod: S$PBB,ICN,, | Performed by: NURSE PRACTITIONER

## 2020-07-31 PROCEDURE — 51700 PR IRRIGATION, BLADDER: ICD-10-PCS | Mod: S$PBB,ICN,, | Performed by: NURSE PRACTITIONER

## 2020-07-31 PROCEDURE — 51702 INSERT TEMP BLADDER CATH: CPT | Mod: PBBFAC | Performed by: NURSE PRACTITIONER

## 2020-07-31 PROCEDURE — 99214 OFFICE O/P EST MOD 30 MIN: CPT | Mod: S$PBB,25,ICN, | Performed by: NURSE PRACTITIONER

## 2020-07-31 PROCEDURE — 99999 PR PBB SHADOW E&M-EST. PATIENT-LVL IV: CPT | Mod: PBBFAC,,, | Performed by: NURSE PRACTITIONER

## 2020-07-31 RX ORDER — CIPROFLOXACIN 500 MG/1
500 TABLET ORAL 2 TIMES DAILY
Qty: 14 TABLET | Refills: 0 | Status: SHIPPED | OUTPATIENT
Start: 2020-07-31 | End: 2020-08-07

## 2020-07-31 NOTE — LETTER
July 31, 2020      Harry Velazco MD  [address] [facility]  [place] [address]           [facility]  [address]  [facility] [place] [address]  Phone: [phone]  Fax: [phone]          Patient: Destin Barber   MR Number: [id number]   YOB: 1935   Date of Visit: 7/31/2020       Dear Dr. Harry Velazco:    Thank you for referring Destin Barber to me for evaluation. Attached you will find relevant portions of my assessment and plan of care.    If you have questions, please do not hesitate to call me. I look forward to following Destin Barber along with you.    Sincerely,    Chayo Montiel, SHIVA    Enclosure  CC:  No Recipients    If you would like to receive this communication electronically, please contact [email or web address] or [phone] to request more information on [employer] Link access.    For providers and/or their staff who would like to refer a patient to [facility], please contact us through our one-stop-shop provider referral line, [facility], at [phone].    If you feel you have received this communication in error or would no longer like to receive these types of communications, please e-mail [email or web address]

## 2020-07-31 NOTE — PROGRESS NOTES
"Subjective:       Patient ID: Destin Barber is a 85 y.o. male who is a new patient was referred by Harry Velazco MD for BPH/nocturia and microscopic hematuria    Chief Complaint:   Chief Complaint   Patient presents with    Urinary Frequency     new pt referred by Dr. Velazco, pt states that he cannot hold his urine, pt has to urinate about every 20 minutes, experiencing leakage        Benign Prostatic Hypertrophy  Patient complains of lower urinary tract symptoms. He reports frequency, incomplete emptying, nocturia three times a night and urgency. He denies intermittency, straining and weak stream. Patient states symptoms are of moderate severity. Onset of symptoms was a few weeks ago and was gradual in onset.  He has no personal history and no family history of prostate cancer. He reports a history of no complicating symptoms. He denies flank pain, gross hematuria, kidney stones and recurrent UTI.    Recommended to resume Flomax 4 days ago by PCP which he is currently taking    PVR (bladder scan) today - 349 ml    Hematuria  Patient also referred to urology for "microscopic hematuria" though PCP note on 5/26/20 mentions gross hematuria. Patient does report previous episode of gross hematuria x 1 week in 5/20. There is not a history of nephrolithiasis. There is not a history of urologic trauma. Other urologic symptoms include sense of residual urine, dysuria, urinary frequency. Patient admits to history of tobacco use. Former smoker--quit about 20+ years ago. Patient denies history of Agent Orange exposure, chronic Lee catheter,  surgeries, sexually transmitted diseases, trauma and urolithiasis. Prior workup has been UA'S, UCx. Denies known history of  malignancy    Micro UA 5/26/20 --0 RBCs  UCX 5/26/20--negative      ACTIVE MEDICAL ISSUES:  Patient Active Problem List   Diagnosis    Essential hypertension    Gout, chronic    Hyperlipidemia    Diastolic dysfunction    Anemia of chronic disease "    Coronary artery disease involving coronary bypass graft of native heart without angina pectoris    Pre-operative cardiovascular examination    S/P CABG (coronary artery bypass graft)    Chronic gout without tophus    Malnutrition of moderate degree    Hypokalemia    Controlled type 2 diabetes mellitus with stage 3 chronic kidney disease, without long-term current use of insulin       PAST MEDICAL HISTORY  Past Medical History:   Diagnosis Date    Acute coronary syndrome 2016    s/p 3V CABG 2016    Coronary artery disease     Diastolic dysfunction     Gout, chronic     HTN (hypertension)     Hyperlipidemia     Type 2 diabetes mellitus     diet controlled       PAST SURGICAL HISTORY:  Past Surgical History:   Procedure Laterality Date    CATARACT EXTRACTION Bilateral     CATARACT EXTRACTION W/ ANTERIOR VITRECTOMY      CORONARY ARTERY BYPASS GRAFT  2016    PAIZ-LAD, SVG-Ramus, SVG-PDA    HEMORRHOID SURGERY         SOCIAL HISTORY:  Social History     Tobacco Use    Smoking status: Former Smoker     Types: Cigarettes     Quit date: 1985     Years since quittin.1    Smokeless tobacco: Never Used   Substance Use Topics    Alcohol use: No    Drug use: No       FAMILY HISTORY:  Family History   Problem Relation Age of Onset    Cancer Father         colon    Hypertension Mother     Heart disease Mother     Heart disease Sister     No Known Problems Brother     No Known Problems Maternal Aunt     No Known Problems Maternal Uncle     No Known Problems Paternal Aunt     No Known Problems Paternal Uncle     No Known Problems Maternal Grandmother     No Known Problems Maternal Grandfather     No Known Problems Paternal Grandmother     No Known Problems Paternal Grandfather     Amblyopia Neg Hx     Blindness Neg Hx     Cataracts Neg Hx     Diabetes Neg Hx     Glaucoma Neg Hx     Macular degeneration Neg Hx     Retinal detachment Neg Hx     Strabismus Neg Hx      Stroke Neg Hx     Thyroid disease Neg Hx        ALLERGIES AND MEDICATIONS: updated and reviewed.  Review of patient's allergies indicates:   Allergen Reactions    Penicillins Nausea And Vomiting     Current Outpatient Medications   Medication Sig    albuterol (PROVENTIL/VENTOLIN HFA) 90 mcg/actuation inhaler Inhale 1 puff into the lungs every 6 (six) hours as needed. Rescue    albuterol (PROVENTIL/VENTOLIN HFA) 90 mcg/actuation inhaler Inhale 1 puff into the lungs every 6 (six) hours as needed. Rescue    allopurinoL (ZYLOPRIM) 100 MG tablet Take 1 tablet (100 mg total) by mouth 2 (two) times daily.    amLODIPine (NORVASC) 10 MG tablet Take 1 tablet (10 mg total) by mouth once daily.    aspirin (ASPIR-LOW) 81 MG EC tablet Take 1 tablet (81 mg total) by mouth once daily.    azelastine (ASTELIN) 137 mcg (0.1 %) nasal spray 1 spray (137 mcg total) by Nasal route 2 (two) times daily as needed for Rhinitis.    baclofen (LIORESAL) 10 MG tablet Take 1 tablet (10 mg total) by mouth 3 (three) times daily.    docusate sodium (COLACE) 100 MG capsule Take 1 capsule (100 mg total) by mouth 2 (two) times daily as needed for Constipation.    famotidine (PEPCID) 20 MG tablet Take 1 tablet (20 mg total) by mouth 2 (two) times daily.    levocetirizine (XYZAL) 5 MG tablet Take 1 tablet (5 mg total) by mouth every evening.    lisinopriL (PRINIVIL,ZESTRIL) 20 MG tablet Take 1 tablet (20 mg total) by mouth once daily.    meloxicam (MOBIC) 7.5 MG tablet Take 1 tablet (7.5 mg total) by mouth once daily.    metFORMIN (GLUCOPHAGE-XR) 500 MG XR 24hr tablet Take 1 tablet (500 mg total) by mouth daily with breakfast.    metoprolol tartrate (LOPRESSOR) 25 MG tablet Take 1 tablet (25 mg total) by mouth 2 (two) times daily.    pantoprazole (PROTONIX) 40 MG tablet Take 1 tablet (40 mg total) by mouth once daily.    pravastatin (PRAVACHOL) 40 MG tablet Take 1 tablet (40 mg total) by mouth once daily.    tamsulosin (FLOMAX) 0.4 mg  "Cap Take 1 capsule (0.4 mg total) by mouth once daily.    traZODone (DESYREL) 150 MG tablet Take 1 tablet (150 mg total) by mouth every evening.    ciprofloxacin HCl (CIPRO) 500 MG tablet Take 1 tablet (500 mg total) by mouth 2 (two) times daily. for 7 days    clopidogrel (PLAVIX) 75 mg tablet Take 1 tablet (75 mg total) by mouth once daily.    gabapentin (NEURONTIN) 300 MG capsule Take 1 capsule (300 mg total) by mouth every evening.     No current facility-administered medications for this visit.        Review of Systems   Constitutional: Negative for activity change, chills, fatigue, fever and unexpected weight change.   Eyes: Negative for discharge, redness and visual disturbance.   Respiratory: Negative for cough, shortness of breath and wheezing.    Cardiovascular: Negative for chest pain and leg swelling.   Gastrointestinal: Negative for abdominal distention, abdominal pain, constipation, diarrhea, nausea and vomiting.   Genitourinary: Positive for difficulty urinating, dysuria and frequency. Negative for discharge, flank pain, hematuria, scrotal swelling, testicular pain and urgency.   Musculoskeletal: Negative for arthralgias, joint swelling and myalgias.   Skin: Negative for color change and rash.   Neurological: Negative for dizziness and light-headedness.   Psychiatric/Behavioral: Negative for behavioral problems and confusion. The patient is not nervous/anxious.        Objective:      Vitals:    07/31/20 0944   Weight: 68 kg (150 lb)   Height: 5' 6" (1.676 m)     Physical Exam   Constitutional: He is oriented to person, place, and time. He appears well-developed.   HENT:   Head: Normocephalic and atraumatic.   Nose: Nose normal.   Eyes: Conjunctivae are normal. Right eye exhibits no discharge. Left eye exhibits no discharge.   Neck: Normal range of motion. Neck supple. No tracheal deviation present. No thyromegaly present.   Cardiovascular: Normal rate and regular rhythm.    Pulmonary/Chest: Effort " normal. No respiratory distress. He has no wheezes.   Abdominal: Soft. He exhibits no distension. There is abdominal tenderness in the suprapubic area. No hernia.   Genitourinary:    Genitourinary Comments: Patient declined exam     Musculoskeletal: Normal range of motion.   Neurological: He is alert and oriented to person, place, and time.   Skin: Skin is warm and dry. No rash noted. No erythema.     Psychiatric: His behavior is normal. Judgment normal.       Urine dipstick shows ++LE, ++protein, 100 glucose, 5-10 RBCs.      16 Fr seo inserted using sterile technique. Patient tolerated procedure without any immediate complications    Assessment:       1. BPH with obstruction/lower urinary tract symptoms    2. Nocturia    3. Urinary retention    4. Dysuria    5. Gross hematuria          Plan:       1. BPH with obstruction/lower urinary tract symptoms  -Continue Flomax  - POCT urinalysis, dipstick or tablet reag    2. Nocturia  -May be related to BPH/ANGELIQUE  - POCT Bladder Scan    3. Urinary retention  -PVR--349 ml  -Discussed options with patient. He agrees to seo placement  -Stay on Flomax  -Adequate hydration  - POCT Bladder Scan  - Insert seo catheter    4. Dysuria  -? UTI  -Empiric Cipro today  -UCx    5. Gross hematuria   - Discussed etiology and workup of hematuria   - +hx of tobacco use   - Cytology - not indicated   - Office cystoscopy  - POCT urinalysis, dipstick or tablet reag  - Basic metabolic panel; Future  - CT Urogram Abd Pelvis W WO; Future  - Urine culture            Follow up for 7-10 days for voiding trial and review CT urogram.

## 2020-08-02 LAB — BACTERIA UR CULT: NORMAL

## 2020-08-03 ENCOUNTER — TELEPHONE (OUTPATIENT)
Dept: UROLOGY | Facility: CLINIC | Age: 85
End: 2020-08-03

## 2020-08-03 NOTE — TELEPHONE ENCOUNTER
Spoke to the pt.'s wife and let her know from our stand point he doesn't have an infection .. she states that he went to [facility] also for a culture and I let her know that she would have to call [facility] for those results ..

## 2020-08-03 NOTE — TELEPHONE ENCOUNTER
Recent urine culture was negative for a UTI. No need for antibiotics at this time, he should stop taking Cipro given at previous visit

## 2020-08-03 NOTE — TELEPHONE ENCOUNTER
----- Message from Mayra Santamaria MA sent at 8/3/2020  1:53 PM CDT -----  Contact: Patient Wife Heavenly [phone]    ----- Message -----  From: Corry Bird  Sent: 8/3/2020   1:42 PM CDT  To: Satish Belcher Staff    Type: Patient Call Back    Who called: Wife Heavenly    What is the request in detail: Wife states that she spoke to the nurse earlier and was told to have  stop taking the medication. After she spoke to Dr Covarrubias's nurse she received a call from [facility], telling her to have her  to continue taking the medication. She's asking to speak back to the nurse to find out what to do.  was seen at [place] over the weekend for a UTI. And he's still in pain. Need to know what to do about the catheter. Please call ASAP!    Would the patient rather a call back or a response via [facility]? Call back    Best call back number: [phone]

## 2020-08-03 NOTE — TELEPHONE ENCOUNTER
Pt saw Chay[facility] for seo placement and is planned to see her again next Monday for voiding trial.     I don't see any records for urine culture from [facility] so if he had testing there, it is not available to us. The urine culture sent from Chayo's visit did not show a UTI.    No abnormal movements

## 2020-08-05 ENCOUNTER — HOSPITAL ENCOUNTER (OUTPATIENT)
Dept: RADIOLOGY | Facility: HOSPITAL | Age: 85
Discharge: HOME | End: 2020-08-05
Attending: NURSE PRACTITIONER
Payer: MEDICARE

## 2020-08-05 DIAGNOSIS — R31.0 GROSS HEMATURIA: ICD-10-CM

## 2020-08-05 PROCEDURE — 74178 CT ABD&PLV WO CNTR FLWD CNTR: CPT | Mod: TC

## 2020-08-05 PROCEDURE — 74178 CT ABD&PLV WO CNTR FLWD CNTR: CPT | Mod: 26,,, | Performed by: RADIOLOGY

## 2020-08-05 PROCEDURE — 74178 CT UROGRAM ABD PELVIS W WO: ICD-10-PCS | Mod: 26,,, | Performed by: RADIOLOGY

## 2020-08-05 PROCEDURE — 25500020 PHARM REV CODE 255: Performed by: NURSE PRACTITIONER

## 2020-08-05 RX ADMIN — IOHEXOL 125 ML: 350 INJECTION, SOLUTION INTRAVENOUS at 02:08

## 2020-08-10 ENCOUNTER — OFFICE VISIT (OUTPATIENT)
Dept: UROLOGY | Facility: CLINIC | Age: 85
End: 2020-08-10
Payer: MEDICARE

## 2020-08-10 VITALS
SYSTOLIC BLOOD PRESSURE: 160 MMHG | HEIGHT: 66 IN | DIASTOLIC BLOOD PRESSURE: 100 MMHG | BODY MASS INDEX: 24.11 KG/M2 | WEIGHT: 150 LBS

## 2020-08-10 DIAGNOSIS — N40.1 BPH WITH OBSTRUCTION/LOWER URINARY TRACT SYMPTOMS: ICD-10-CM

## 2020-08-10 DIAGNOSIS — N28.1 BILATERAL RENAL CYSTS: ICD-10-CM

## 2020-08-10 DIAGNOSIS — R33.9 URINARY RETENTION: Primary | ICD-10-CM

## 2020-08-10 DIAGNOSIS — N13.8 BPH WITH OBSTRUCTION/LOWER URINARY TRACT SYMPTOMS: ICD-10-CM

## 2020-08-10 DIAGNOSIS — R31.0 GROSS HEMATURIA: ICD-10-CM

## 2020-08-10 PROCEDURE — 51700 IRRIGATION OF BLADDER: CPT | Mod: PBBFAC | Performed by: NURSE PRACTITIONER

## 2020-08-10 PROCEDURE — 51700 PR IRRIGATION, BLADDER: ICD-10-PCS | Mod: S$PBB,,, | Performed by: NURSE PRACTITIONER

## 2020-08-10 PROCEDURE — 99214 OFFICE O/P EST MOD 30 MIN: CPT | Mod: PBBFAC,25 | Performed by: NURSE PRACTITIONER

## 2020-08-10 PROCEDURE — 51700 IRRIGATION OF BLADDER: CPT | Mod: S$PBB,,, | Performed by: NURSE PRACTITIONER

## 2020-08-10 PROCEDURE — 99999 PR PBB SHADOW E&M-EST. PATIENT-LVL IV: CPT | Mod: PBBFAC,,, | Performed by: NURSE PRACTITIONER

## 2020-08-10 PROCEDURE — 99999 PR PBB SHADOW E&M-EST. PATIENT-LVL IV: ICD-10-PCS | Mod: PBBFAC,,, | Performed by: NURSE PRACTITIONER

## 2020-08-10 NOTE — PROGRESS NOTES
"Subjective:       Patient ID: Destin Barber is a 85 y.o. male who was last seen in this office 7/31/2020    Chief Complaint:   Chief Complaint   Patient presents with    Follow-up     Pt. is here for a VOT the process was explained to him and he understands...       Benign Prostatic Hypertrophy  Patient complains of lower urinary tract symptoms. He reports frequency, incomplete emptying, nocturia three times a night and urgency. He denies intermittency, straining and weak stream. Patient states symptoms are of moderate severity. Onset of symptoms was a few weeks ago and was gradual in onset.  He has no personal history and no family history of prostate cancer. He reports a history of no complicating symptoms. He denies flank pain, gross hematuria, kidney stones and recurrent UTI.    Recommended to resume Flomax 4 days ago by PCP which he is currently taking    PVR (bladder scan) today - 349 ml    Hematuria  Patient also referred to urology for "microscopic hematuria" though PCP note on 5/26/20 mentions gross hematuria. Patient does report previous episode of gross hematuria x 1 week in 5/20. There is not a history of nephrolithiasis. There is not a history of urologic trauma. Other urologic symptoms include sense of residual urine, dysuria, urinary frequency. Patient admits to history of tobacco use. Former smoker--quit about 20+ years ago. Patient denies history of Agent Orange exposure, chronic Lee catheter,  surgeries, sexually transmitted diseases, trauma and urolithiasis. Prior workup has been UA'S, UCx. Denies known history of  malignancy    Micro UA 5/26/20 --0 RBCs  UCX 5/26/20--negative    8/10/20  Lee placed at previous visit due to urinary retention. He returns today with a CT urogram and for a voiding trial. No new complaints    UCx 7/31/20--negative    ACTIVE MEDICAL ISSUES:  Patient Active Problem List   Diagnosis    Essential hypertension    Gout, chronic    Hyperlipidemia    Diastolic " dysfunction    Anemia of chronic disease    Coronary artery disease involving coronary bypass graft of native heart without angina pectoris    Pre-operative cardiovascular examination    S/P CABG (coronary artery bypass graft)    Chronic gout without tophus    Malnutrition of moderate degree    Hypokalemia    Controlled type 2 diabetes mellitus with stage 3 chronic kidney disease, without long-term current use of insulin       ALLERGIES AND MEDICATIONS: updated and reviewed.  Review of patient's allergies indicates:   Allergen Reactions    Penicillins Nausea And Vomiting     Current Outpatient Medications   Medication Sig    albuterol (PROVENTIL/VENTOLIN HFA) 90 mcg/actuation inhaler Inhale 1 puff into the lungs every 6 (six) hours as needed. Rescue    albuterol (PROVENTIL/VENTOLIN HFA) 90 mcg/actuation inhaler Inhale 1 puff into the lungs every 6 (six) hours as needed. Rescue    allopurinoL (ZYLOPRIM) 100 MG tablet Take 1 tablet (100 mg total) by mouth 2 (two) times daily.    amLODIPine (NORVASC) 10 MG tablet Take 1 tablet (10 mg total) by mouth once daily.    aspirin (ASPIR-LOW) 81 MG EC tablet Take 1 tablet (81 mg total) by mouth once daily.    azelastine (ASTELIN) 137 mcg (0.1 %) nasal spray 1 spray (137 mcg total) by Nasal route 2 (two) times daily as needed for Rhinitis.    baclofen (LIORESAL) 10 MG tablet Take 1 tablet (10 mg total) by mouth 3 (three) times daily.    docusate sodium (COLACE) 100 MG capsule Take 1 capsule (100 mg total) by mouth 2 (two) times daily as needed for Constipation.    famotidine (PEPCID) 20 MG tablet Take 1 tablet (20 mg total) by mouth 2 (two) times daily.    levocetirizine (XYZAL) 5 MG tablet Take 1 tablet (5 mg total) by mouth every evening.    lisinopriL (PRINIVIL,ZESTRIL) 20 MG tablet Take 1 tablet (20 mg total) by mouth once daily.    meloxicam (MOBIC) 7.5 MG tablet Take 1 tablet (7.5 mg total) by mouth once daily.    metFORMIN (GLUCOPHAGE-XR) 500 MG XR  "24hr tablet Take 1 tablet (500 mg total) by mouth daily with breakfast.    metoprolol tartrate (LOPRESSOR) 25 MG tablet Take 1 tablet (25 mg total) by mouth 2 (two) times daily.    pantoprazole (PROTONIX) 40 MG tablet Take 1 tablet (40 mg total) by mouth once daily.    pravastatin (PRAVACHOL) 40 MG tablet Take 1 tablet (40 mg total) by mouth once daily.    tamsulosin (FLOMAX) 0.4 mg Cap Take 1 capsule (0.4 mg total) by mouth once daily.    traZODone (DESYREL) 150 MG tablet Take 1 tablet (150 mg total) by mouth every evening.    clopidogrel (PLAVIX) 75 mg tablet Take 1 tablet (75 mg total) by mouth once daily.    gabapentin (NEURONTIN) 300 MG capsule Take 1 capsule (300 mg total) by mouth every evening.     No current facility-administered medications for this visit.        Review of Systems   Constitutional: Negative for activity change, chills, fatigue, fever and unexpected weight change.   Eyes: Negative for discharge, redness and visual disturbance.   Respiratory: Negative for cough, shortness of breath and wheezing.    Cardiovascular: Negative for chest pain and leg swelling.   Gastrointestinal: Negative for abdominal distention, abdominal pain, constipation, diarrhea, nausea and vomiting.   Genitourinary: Negative for decreased urine volume, discharge, dysuria, flank pain, frequency, hematuria, scrotal swelling, testicular pain and urgency.   Musculoskeletal: Negative for arthralgias, joint swelling and myalgias.   Skin: Negative for color change and rash.   Neurological: Negative for dizziness and light-headedness.   Psychiatric/Behavioral: Negative for behavioral problems and confusion. The patient is not nervous/anxious.        Objective:      Vitals:    08/10/20 0931   BP: (!) 160/100   Weight: 68 kg (150 lb)   Height: 5' 6" (1.676 m)     Physical Exam   Constitutional: He is oriented to person, place, and time. He appears well-developed.   HENT:   Head: Normocephalic and atraumatic.   Nose: Nose " normal.   Eyes: Conjunctivae are normal. Right eye exhibits no discharge. Left eye exhibits no discharge.   Neck: Normal range of motion. Neck supple. No tracheal deviation present. No thyromegaly present.   Cardiovascular: Normal rate and regular rhythm.    Pulmonary/Chest: Effort normal. No respiratory distress. He has no wheezes.   Abdominal: Soft. He exhibits no distension. There is no abdominal tenderness. No hernia.   Genitourinary:    Genitourinary Comments: Lee draining pyridium stained urine     Musculoskeletal: Normal range of motion.   Neurological: He is alert and oriented to person, place, and time.   Skin: Skin is warm and dry. No rash noted. No erythema.     Psychiatric: His behavior is normal. Judgment normal.       Urine dipstick shows not done.     Narrative & Impression    EXAMINATION:  CT UROGRAM ABD PELVIS W WO     CLINICAL HISTORY:  hematuria; Gross hematuria     TECHNIQUE:  Low dose axial, sagittal and coronal reformations were obtained from the lung bases to the pubic symphysis before and following the IV administration of CTA chest abdomen pelvis dated 06/23/2016 mL of Omnipaque 350.  Timing was optimized for nephrogram and excretory renal phases.     COMPARISON:  CTA chest abdomen pelvis dated 06/23/2016     FINDINGS:  Limited images through the lower chest are unremarkable.  Prior median sternotomy.     Abdomen and pelvis:     Redemonstration of several intrahepatic hypoenhancing, low-attenuation foci, too small for definitive characterization and possible cysts.  The gallbladder, spleen, pancreas, and adrenal glands appear normal.     There are low-density left cortical renal lesions, too small for definitive characterization though possibly representing cyst at the upper pole.  More indeterminate right upper pole lesion measuring 1.0 cm, though appears smaller from 2016.  No renal calculus or hydronephrosis.  Urinary bladder is mostly decompressed about a Lee catheter with probable  left-sided diverticulum.  Prostate is mildly enlarged measuring 5.0 cm.     The GI tract is normal in caliber.  Appendix is normal.  Possible small hiatal hernia.  No free fluid or adenopathy.  Scattered aortoiliac atherosclerosis.  No aggressive osseous lesion.     Impression:     No renal calculus or hydronephrosis.  Bladder decompressed about a Seo catheter with suboptimal contrast distension which limits evaluation.  Further correlation with dedicated cystoscopy could be performed as warranted.     Mildly enlarged prostate.     Additional findings as above.        Electronically signed by: Javier Child  Date:                                            08/05/2020  Time:                                           16:19    Encounter    View Encounter        Reviewed with patient    Voiding Trial: 240cc instilled, 0cc voided    Assessment:       1. Urinary retention    2. BPH with obstruction/lower urinary tract symptoms    3. Gross hematuria    4. Bilateral renal cysts          Plan:       1. Urinary retention  -Seo placed in office 7/31/20  - Voiding Trial--unsuccessful  -Patient declined replacement of seo at this time. Discussed risks and potential complications of UR with patient. He verbalized an understanding  -ER precautions discussed with patient  - Continue Flomax    2. BPH with obstruction/lower urinary tract symptoms  -Flomax    3. Gross hematuria   - Discussed etiology and workup of hematuria   - +hx of tobacco use   - Cytology - not indicated   -UCx 7/31/20--neg   -CT uro--no renal stones or hydronephrosis. No enhancing renal masses  -Plan for office cystoscopy next month to complete hematuria workup    4. Bilateral renal cysts  -CT urogram-- indeterminate 1.0 cm RUP lesion. CT reviewed with Dr Covarrubias. No enhancement noted to lesion. Repeat imaging with EZEQUIEL in 6 months for surveillance  - US Retroperitoneal Complete (Kidney and; Future              Follow up in about 3 days (around 8/13/2020)  for Follow up PVR.

## 2020-08-20 ENCOUNTER — OFFICE VISIT (OUTPATIENT)
Dept: UROLOGY | Facility: CLINIC | Age: 85
End: 2020-08-20
Payer: MEDICARE

## 2020-08-20 VITALS
WEIGHT: 150 LBS | HEIGHT: 66 IN | DIASTOLIC BLOOD PRESSURE: 100 MMHG | SYSTOLIC BLOOD PRESSURE: 160 MMHG | BODY MASS INDEX: 24.11 KG/M2

## 2020-08-20 DIAGNOSIS — R33.9 URINARY RETENTION: Primary | ICD-10-CM

## 2020-08-20 DIAGNOSIS — N13.8 BPH WITH OBSTRUCTION/LOWER URINARY TRACT SYMPTOMS: ICD-10-CM

## 2020-08-20 DIAGNOSIS — N40.1 BPH WITH OBSTRUCTION/LOWER URINARY TRACT SYMPTOMS: ICD-10-CM

## 2020-08-20 DIAGNOSIS — N28.1 BILATERAL RENAL CYSTS: ICD-10-CM

## 2020-08-20 DIAGNOSIS — R31.0 GROSS HEMATURIA: ICD-10-CM

## 2020-08-20 LAB
BILIRUB SERPL-MCNC: NORMAL MG/DL
BLOOD URINE, POC: NORMAL
COLOR, POC UA: YELLOW
GLUCOSE UR QL STRIP: NORMAL
KETONES UR QL STRIP: NORMAL
LEUKOCYTE ESTERASE URINE, POC: NORMAL
NITRITE, POC UA: NORMAL
PH, POC UA: 6
PROTEIN, POC: NORMAL
SPECIFIC GRAVITY, POC UA: 1025
UROBILINOGEN, POC UA: NORMAL

## 2020-08-20 PROCEDURE — 99999 PR PBB SHADOW E&M-EST. PATIENT-LVL IV: ICD-10-PCS | Mod: PBBFAC,,, | Performed by: NURSE PRACTITIONER

## 2020-08-20 PROCEDURE — 99214 OFFICE O/P EST MOD 30 MIN: CPT | Mod: S$PBB,,, | Performed by: NURSE PRACTITIONER

## 2020-08-20 PROCEDURE — 81001 URINALYSIS AUTO W/SCOPE: CPT | Mod: PBBFAC | Performed by: NURSE PRACTITIONER

## 2020-08-20 PROCEDURE — 99999 PR PBB SHADOW E&M-EST. PATIENT-LVL IV: CPT | Mod: PBBFAC,,, | Performed by: NURSE PRACTITIONER

## 2020-08-20 PROCEDURE — 99214 PR OFFICE/OUTPT VISIT, EST, LEVL IV, 30-39 MIN: ICD-10-PCS | Mod: S$PBB,,, | Performed by: NURSE PRACTITIONER

## 2020-08-20 PROCEDURE — 99214 OFFICE O/P EST MOD 30 MIN: CPT | Mod: PBBFAC | Performed by: NURSE PRACTITIONER

## 2020-08-20 PROCEDURE — 51798 US URINE CAPACITY MEASURE: CPT | Mod: PBBFAC | Performed by: NURSE PRACTITIONER

## 2020-08-20 RX ORDER — FINASTERIDE 5 MG/1
5 TABLET, FILM COATED ORAL DAILY
Qty: 30 TABLET | Refills: 11 | Status: SHIPPED | OUTPATIENT
Start: 2020-08-20 | End: 2021-04-28 | Stop reason: SDUPTHER

## 2020-08-20 NOTE — ED NOTES
Report given to ILIANA Torres    Rhombic Flap Text: The defect edges were debeveled with a #15c scalpel blade.  Given the location of the defect and the proximity to free margins a rhombic flap was deemed most appropriate.  Using a sterile surgical marker, an appropriate rhombic flap was drawn incorporating the defect.    The area thus outlined was incised deep to adipose tissue with a #15 scalpel blade.  The skin margins were undermined to an appropriate distance in all directions utilizing iris scissors.

## 2020-08-31 ENCOUNTER — OFFICE VISIT (OUTPATIENT)
Dept: FAMILY MEDICINE | Facility: CLINIC | Age: 85
End: 2020-08-31
Payer: MEDICARE

## 2020-08-31 VITALS
BODY MASS INDEX: 24.63 KG/M2 | OXYGEN SATURATION: 98 % | HEART RATE: 73 BPM | WEIGHT: 153.25 LBS | DIASTOLIC BLOOD PRESSURE: 88 MMHG | HEIGHT: 66 IN | RESPIRATION RATE: 18 BRPM | SYSTOLIC BLOOD PRESSURE: 144 MMHG | TEMPERATURE: 99 F

## 2020-08-31 DIAGNOSIS — E11.22 CONTROLLED TYPE 2 DIABETES MELLITUS WITH STAGE 3 CHRONIC KIDNEY DISEASE, WITHOUT LONG-TERM CURRENT USE OF INSULIN: ICD-10-CM

## 2020-08-31 DIAGNOSIS — I70.0 AORTIC ATHEROSCLEROSIS: ICD-10-CM

## 2020-08-31 DIAGNOSIS — I10 ESSENTIAL HYPERTENSION: ICD-10-CM

## 2020-08-31 DIAGNOSIS — Z00.00 ENCOUNTER FOR PREVENTIVE HEALTH EXAMINATION: Primary | ICD-10-CM

## 2020-08-31 DIAGNOSIS — N18.30 CONTROLLED TYPE 2 DIABETES MELLITUS WITH STAGE 3 CHRONIC KIDNEY DISEASE, WITHOUT LONG-TERM CURRENT USE OF INSULIN: ICD-10-CM

## 2020-08-31 DIAGNOSIS — K59.00 CONSTIPATION, UNSPECIFIED CONSTIPATION TYPE: ICD-10-CM

## 2020-08-31 PROCEDURE — 99999 PR PBB SHADOW E&M-EST. PATIENT-LVL V: CPT | Mod: PBBFAC,,, | Performed by: NURSE PRACTITIONER

## 2020-08-31 PROCEDURE — G0439 PR MEDICARE ANNUAL WELLNESS SUBSEQUENT VISIT: ICD-10-PCS | Mod: ,,, | Performed by: NURSE PRACTITIONER

## 2020-08-31 PROCEDURE — 99215 OFFICE O/P EST HI 40 MIN: CPT | Mod: PBBFAC,PN | Performed by: NURSE PRACTITIONER

## 2020-08-31 PROCEDURE — 99999 PR PBB SHADOW E&M-EST. PATIENT-LVL V: ICD-10-PCS | Mod: PBBFAC,,, | Performed by: NURSE PRACTITIONER

## 2020-08-31 PROCEDURE — G0439 PPPS, SUBSEQ VISIT: HCPCS | Mod: ,,, | Performed by: NURSE PRACTITIONER

## 2020-08-31 RX ORDER — DOCUSATE SODIUM 100 MG/1
100 CAPSULE, LIQUID FILLED ORAL DAILY
Qty: 60 CAPSULE | Refills: 1 | COMMUNITY
Start: 2020-08-31 | End: 2021-08-19 | Stop reason: SDUPTHER

## 2020-08-31 NOTE — PROGRESS NOTES
"Destin Barber presented for a  Medicare AWV and comprehensive Health Risk Assessment today. The following components were reviewed and updated:    · Medical history  · Family History  · Social history  · Allergies and Current Medications  · Health Risk Assessment  · Health Maintenance  · Care Team     ** See Completed Assessments for Annual Wellness Visit within the encounter summary.**         The following assessments were completed:  · Living Situation  · CAGE  · Depression Screening  · Timed Get Up and Go  · Whisper Test  · Cognitive Function Screening  · Nutrition Screening  · ADL Screening  · PAQ Screening        Vitals:    08/31/20 1001 08/31/20 1040   BP: (!) 148/82 (!) 144/88   BP Location: Right arm Right arm   Patient Position: Sitting Sitting   BP Method: Medium (Manual) Medium (Manual)   Pulse: 73    Resp: 18    Temp: 98.6 °F (37 °C)    TempSrc: Oral    SpO2: 98%    Weight: 69.5 kg (153 lb 3.5 oz)    Height: 5' 6" (1.676 m)      Body mass index is 24.73 kg/m².  Physical Exam  Vitals signs reviewed.   Constitutional:       General: He is not in acute distress.     Appearance: Normal appearance. He is not ill-appearing, toxic-appearing or diaphoretic.   Cardiovascular:      Rate and Rhythm: Normal rate and regular rhythm.      Pulses: Normal pulses.      Heart sounds: Normal heart sounds. No murmur. No friction rub. No gallop.    Pulmonary:      Effort: Pulmonary effort is normal. No respiratory distress.      Breath sounds: Normal breath sounds. No stridor. No wheezing, rhonchi or rales.   Chest:      Chest wall: No tenderness.   Skin:     General: Skin is warm and dry.      Capillary Refill: Capillary refill takes less than 2 seconds.   Neurological:      General: No focal deficit present.      Mental Status: He is alert and oriented to person, place, and time.   Psychiatric:         Mood and Affect: Mood normal.                 Diagnoses and health risks identified today and associated " recommendations/orders:    1. Encounter for preventive health examination  The patient was seen today for an annual Medicare wellness exam.  Health maintenance and screening topics were discussed.  Proper diet and exercise recommendations were reviewed.    2. Controlled type 2 diabetes mellitus with stage 3 chronic kidney disease, without long-term current use of insulin  Followed by PCP, controlled    3. Aortic atherosclerosis  From CT scan this month, continue statin    4. Essential hypertension  Elevated today I have patient follow-up in 2 weeks for nurse blood pressure visit if still elevated may need to increase lisinopril or metoprolol    5. Constipation, unspecified constipation type  Patient requesting refill of docusate will provide  - docusate sodium (COLACE) 100 MG capsule; Take 1 capsule (100 mg total) by mouth once daily.  Dispense: 60 capsule; Refill: 1    Patient denies shingles vaccine.    Patient technically had abnormal mini cognitive assessment.  He however denies any problems with memory.  Denies any short-term or long-term memory loss.  He still works and functions normally per his report.  No need for further intervention or assessment.    Provided Destin with a 5-10 year written screening schedule and personal prevention plan. Recommendations were developed using the USPSTF age appropriate recommendations. Education, counseling, and referrals were provided as needed. After Visit Summary printed and given to patient which includes a list of additional screenings\tests needed.    Follow up in about 1 year (around 8/31/2021) for AWV.    Jorge Tadeo NP  I offered to discuss end of life issues, including information on how to make advance directives that the patient could use to name someone who would make medical decisions on their behalf if they became too ill to make themselves.    ___Patient declined  _X_Patient is interested, I provided paper work and offered to discuss.

## 2020-08-31 NOTE — PATIENT INSTRUCTIONS
Counseling and Referral of Other Preventative  (Italic type indicates deductible and co-insurance are waived)    Patient Name: Destin Barber  Today's Date: 8/31/2020    Health Maintenance       Date Due Completion Date    Shingles Vaccine (1 of 2) 02/21/1987 ---    Influenza Vaccine (1) 09/01/2020 1/6/2020    Aspirin/Antiplatelet Therapy 08/20/2021 8/20/2020    TETANUS VACCINE 04/20/2028 4/20/2018        No orders of the defined types were placed in this encounter.    The following information is provided to all patients.  This information is to help you find resources for any of the problems found today that may be affecting your health:                Living healthy guide: www.[facility].[place].gov      Understanding Diabetes: www.diabetes.org      Eating healthy: www.cdc.gov/healthyweight      CDC home safety checklist: www.cdc.gov/steadi/patient.html      Agency on Aging: www.goea.[place].[facility]      Alcoholics anonymous (AA): www.aa.org      Physical Activity: www.matt.nih.gov/ox2nswz      Tobacco use: www.quitwithusla.org

## 2020-09-23 ENCOUNTER — PROCEDURE VISIT (OUTPATIENT)
Dept: UROLOGY | Facility: CLINIC | Age: 85
End: 2020-09-23
Payer: MEDICARE

## 2020-09-23 ENCOUNTER — TELEPHONE (OUTPATIENT)
Dept: UROLOGY | Facility: CLINIC | Age: 85
End: 2020-09-23

## 2020-09-23 DIAGNOSIS — R31.0 GROSS HEMATURIA: ICD-10-CM

## 2020-09-23 DIAGNOSIS — K59.00 CONSTIPATION, UNSPECIFIED CONSTIPATION TYPE: Primary | ICD-10-CM

## 2020-09-23 PROCEDURE — 52000 CYSTOSCOPY: ICD-10-PCS | Mod: S$PBB,,, | Performed by: STUDENT IN AN ORGANIZED HEALTH CARE EDUCATION/TRAINING PROGRAM

## 2020-09-23 PROCEDURE — 52000 CYSTOURETHROSCOPY: CPT | Mod: PBBFAC | Performed by: STUDENT IN AN ORGANIZED HEALTH CARE EDUCATION/TRAINING PROGRAM

## 2020-09-23 PROCEDURE — 52000 CYSTOURETHROSCOPY: CPT | Mod: S$PBB,,, | Performed by: STUDENT IN AN ORGANIZED HEALTH CARE EDUCATION/TRAINING PROGRAM

## 2020-09-23 RX ORDER — POLYETHYLENE GLYCOL 3350 17 G/17G
17 POWDER, FOR SOLUTION ORAL DAILY
Qty: 30 PACKET | Refills: 4 | Status: SHIPPED | OUTPATIENT
Start: 2020-09-23 | End: 2023-03-07

## 2020-09-23 NOTE — PROCEDURES
"Cystoscopy    Date/Time: 9/23/2020 9:40 AM  Performed by: Raji Mcmullen MD  Authorized by: Chayo Montiel NP     Consent Done?:  Yes (Verbal)  Time out: Immediately prior to procedure a "time out" was called to verify the correct patient, procedure, equipment, support staff and site/side marked as required.    Indications: hematuria and BPH    Position:  Supine  Anesthesia:  Lidocaine jelly  Patient sedated?: No    Preparation: Patient was prepped and draped in usual sterile fashion      Scope type:  Flexible cystoscope  External exam normal: Yes    Urethra normal: Yes (No anterior or posterior urethral bladder tumors)    Prostate normal: Yes (Lateral lobe hypertrophy)  Bladder neck normal: Bladder neck normal   Bladder normal: No (R small cellulue, L diverticulum. Bilateral ureteral orifices visualized. Anterior bladder wall, posterior baldder wall, lateral walls without evidence of bladder tumor or mucosal irregularity. Diverticulum without evidence of bladder tumor. )      Patient tolerance:  Patient tolerated the procedure well with no immediate complications     Uncircumcised phallus  Presence of 2-3 mm bladder stone, discussed with patient it will need treatment. Discussed with patient option to treat bladder outlet obstruction, patient would like to defer at this time.          "

## 2020-09-23 NOTE — TELEPHONE ENCOUNTER
----- Message from Nara Hernandez sent at 9/23/2020 10:16 AM CDT -----  Regarding: Patient  Contact: Wife-Mrs. Barber  Type: Patient Call Back    Who called: WifeGilson Barber     What is the request in detail: Prakash Barber is requesting a call back to discuss the health concerns regarding the pt .     Can the clinic reply by [facility]?    Would the patient rather a call back or a response via [facility]? Call back     Best call back number: [phone]

## 2020-09-25 ENCOUNTER — OFFICE VISIT (OUTPATIENT)
Dept: FAMILY MEDICINE | Facility: CLINIC | Age: 85
End: 2020-09-25
Payer: MEDICARE

## 2020-09-25 ENCOUNTER — LAB VISIT (OUTPATIENT)
Dept: LAB | Facility: HOSPITAL | Age: 85
End: 2020-09-25
Attending: INTERNAL MEDICINE
Payer: MEDICARE

## 2020-09-25 VITALS
BODY MASS INDEX: 24.91 KG/M2 | TEMPERATURE: 98 F | HEIGHT: 66 IN | OXYGEN SATURATION: 99 % | RESPIRATION RATE: 18 BRPM | HEART RATE: 77 BPM | WEIGHT: 155 LBS | DIASTOLIC BLOOD PRESSURE: 70 MMHG | SYSTOLIC BLOOD PRESSURE: 143 MMHG

## 2020-09-25 DIAGNOSIS — N18.30 CONTROLLED TYPE 2 DIABETES MELLITUS WITH STAGE 3 CHRONIC KIDNEY DISEASE, WITHOUT LONG-TERM CURRENT USE OF INSULIN: ICD-10-CM

## 2020-09-25 DIAGNOSIS — R73.01 IMPAIRED FASTING GLUCOSE: ICD-10-CM

## 2020-09-25 DIAGNOSIS — E11.22 CONTROLLED TYPE 2 DIABETES MELLITUS WITH STAGE 3 CHRONIC KIDNEY DISEASE, WITHOUT LONG-TERM CURRENT USE OF INSULIN: ICD-10-CM

## 2020-09-25 DIAGNOSIS — I25.10 CORONARY ARTERY DISEASE INVOLVING NATIVE CORONARY ARTERY OF NATIVE HEART WITHOUT ANGINA PECTORIS: ICD-10-CM

## 2020-09-25 DIAGNOSIS — I10 ESSENTIAL HYPERTENSION: ICD-10-CM

## 2020-09-25 DIAGNOSIS — N18.30 CKD (CHRONIC KIDNEY DISEASE) STAGE 3, GFR 30-59 ML/MIN: ICD-10-CM

## 2020-09-25 DIAGNOSIS — Z23 NEED FOR INFLUENZA VACCINATION: ICD-10-CM

## 2020-09-25 DIAGNOSIS — I10 ESSENTIAL HYPERTENSION: Primary | ICD-10-CM

## 2020-09-25 LAB
ALBUMIN SERPL BCP-MCNC: 4.2 G/DL (ref 3.5–5.2)
ALP SERPL-CCNC: 79 U/L (ref 55–135)
ALT SERPL W/O P-5'-P-CCNC: 9 U/L (ref 10–44)
ANION GAP SERPL CALC-SCNC: 11 MMOL/L (ref 8–16)
AST SERPL-CCNC: 20 U/L (ref 10–40)
BASOPHILS # BLD AUTO: 0.05 K/UL (ref 0–0.2)
BASOPHILS NFR BLD: 0.7 % (ref 0–1.9)
BILIRUB SERPL-MCNC: 0.2 MG/DL (ref 0.1–1)
BUN SERPL-MCNC: 19 MG/DL (ref 8–23)
CALCIUM SERPL-MCNC: 9.2 MG/DL (ref 8.7–10.5)
CHLORIDE SERPL-SCNC: 105 MMOL/L (ref 95–110)
CO2 SERPL-SCNC: 24 MMOL/L (ref 23–29)
CREAT SERPL-MCNC: 1.2 MG/DL (ref 0.5–1.4)
DIFFERENTIAL METHOD: ABNORMAL
EOSINOPHIL # BLD AUTO: 0.2 K/UL (ref 0–0.5)
EOSINOPHIL NFR BLD: 2.1 % (ref 0–8)
ERYTHROCYTE [DISTWIDTH] IN BLOOD BY AUTOMATED COUNT: 12.5 % (ref 11.5–14.5)
EST. GFR  (AFRICAN AMERICAN): >60 ML/MIN/1.73 M^2
EST. GFR  (NON AFRICAN AMERICAN): 56 ML/MIN/1.73 M^2
GLUCOSE SERPL-MCNC: 78 MG/DL (ref 70–110)
HCT VFR BLD AUTO: 36.1 % (ref 40–54)
HGB BLD-MCNC: 12.1 G/DL (ref 14–18)
IMM GRANULOCYTES # BLD AUTO: 0.01 K/UL (ref 0–0.04)
IMM GRANULOCYTES NFR BLD AUTO: 0.1 % (ref 0–0.5)
LYMPHOCYTES # BLD AUTO: 4.4 K/UL (ref 1–4.8)
LYMPHOCYTES NFR BLD: 58.7 % (ref 18–48)
MCH RBC QN AUTO: 31.2 PG (ref 27–31)
MCHC RBC AUTO-ENTMCNC: 33.5 G/DL (ref 32–36)
MCV RBC AUTO: 93 FL (ref 82–98)
MONOCYTES # BLD AUTO: 0.7 K/UL (ref 0.3–1)
MONOCYTES NFR BLD: 8.6 % (ref 4–15)
NEUTROPHILS # BLD AUTO: 2.3 K/UL (ref 1.8–7.7)
NEUTROPHILS NFR BLD: 29.8 % (ref 38–73)
NRBC BLD-RTO: 0 /100 WBC
PLATELET # BLD AUTO: 197 K/UL (ref 150–350)
PMV BLD AUTO: 9.7 FL (ref 9.2–12.9)
POTASSIUM SERPL-SCNC: 3.6 MMOL/L (ref 3.5–5.1)
PROT SERPL-MCNC: 7.7 G/DL (ref 6–8.4)
RBC # BLD AUTO: 3.88 M/UL (ref 4.6–6.2)
SODIUM SERPL-SCNC: 140 MMOL/L (ref 136–145)
WBC # BLD AUTO: 7.57 K/UL (ref 3.9–12.7)

## 2020-09-25 PROCEDURE — G0008 ADMIN INFLUENZA VIRUS VAC: HCPCS | Mod: PBBFAC

## 2020-09-25 PROCEDURE — 99214 PR OFFICE/OUTPT VISIT, EST, LEVL IV, 30-39 MIN: ICD-10-PCS | Mod: S$PBB,,, | Performed by: INTERNAL MEDICINE

## 2020-09-25 PROCEDURE — 85025 COMPLETE CBC W/AUTO DIFF WBC: CPT

## 2020-09-25 PROCEDURE — 99999 PR PBB SHADOW E&M-EST. PATIENT-LVL V: CPT | Mod: PBBFAC,,, | Performed by: INTERNAL MEDICINE

## 2020-09-25 PROCEDURE — 90694 VACC AIIV4 NO PRSRV 0.5ML IM: CPT | Mod: PBBFAC,PN

## 2020-09-25 PROCEDURE — 99999 PR PBB SHADOW E&M-EST. PATIENT-LVL V: ICD-10-PCS | Mod: PBBFAC,,, | Performed by: INTERNAL MEDICINE

## 2020-09-25 PROCEDURE — 99214 OFFICE O/P EST MOD 30 MIN: CPT | Mod: S$PBB,,, | Performed by: INTERNAL MEDICINE

## 2020-09-25 PROCEDURE — 99215 OFFICE O/P EST HI 40 MIN: CPT | Mod: PBBFAC,PN | Performed by: INTERNAL MEDICINE

## 2020-09-25 PROCEDURE — 36415 COLL VENOUS BLD VENIPUNCTURE: CPT | Mod: PN

## 2020-09-25 PROCEDURE — 83036 HEMOGLOBIN GLYCOSYLATED A1C: CPT

## 2020-09-25 PROCEDURE — 80053 COMPREHEN METABOLIC PANEL: CPT

## 2020-09-25 RX ORDER — AZELASTINE 1 MG/ML
1 SPRAY, METERED NASAL 2 TIMES DAILY PRN
Qty: 30 ML | Refills: 0 | Status: SHIPPED | OUTPATIENT
Start: 2020-09-25 | End: 2023-03-07

## 2020-09-25 RX ORDER — LISINOPRIL 20 MG/1
20 TABLET ORAL DAILY
Qty: 90 TABLET | Refills: 3 | Status: SHIPPED | OUTPATIENT
Start: 2020-09-25 | End: 2021-12-23 | Stop reason: SDUPTHER

## 2020-09-25 NOTE — PROGRESS NOTES
Pt in clinic for flu vaccine. Name and  verified. Allergies reviewed. Administered flu vaccine to pt in right deltoid. Pt tolerated well.

## 2020-09-25 NOTE — PROGRESS NOTES
"Subjective:       Patient ID: Destin Barber is a 85 y.o. male.    Chief Complaint: Follow-up (discuss medication ) and Flu Vaccine    F/u chronic conditions    HPI: 84 y/o w/ HTN DM CKD III CAD (s/p CABG) BPH presents alone for scheduled follow up. Reports feeling well no further issues with urinary retention or frequency. He has brought all his medication bottles from home which I did review with him. He has numerous duplicate bottles some from early 2019. His medication list was updated to reflect what he is currently taking. Of note he did not have lisinopril in his medication bag. We discussed today getting a pill organizer to fill one time per week to better optimize medication adherence.     Review of Systems   Constitutional: Negative for activity change, appetite change, fatigue, fever and unexpected weight change.   HENT: Negative for ear pain, rhinorrhea and sore throat.    Eyes: Negative for discharge and visual disturbance.   Respiratory: Negative for chest tightness, shortness of breath and wheezing.    Cardiovascular: Negative for chest pain, palpitations and leg swelling.   Gastrointestinal: Negative for abdominal pain, constipation and diarrhea.   Endocrine: Negative for cold intolerance and heat intolerance.   Genitourinary: Negative for dysuria and hematuria.   Musculoskeletal: Negative for joint swelling and neck stiffness.   Skin: Negative for rash.   Neurological: Negative for dizziness, syncope, weakness and headaches.   Psychiatric/Behavioral: Negative for suicidal ideas.       Objective:     Vitals:    09/25/20 1040   BP: (!) 143/70   BP Location: Right arm   Patient Position: Sitting   BP Method: Medium (Automatic)   Pulse: 77   Resp: 18   Temp: 97.6 °F (36.4 °C)   TempSrc: Oral   SpO2: 99%   Weight: 70.3 kg (154 lb 15.7 oz)   Height: 5' 6" (1.676 m)          Physical Exam  Constitutional:       Appearance: He is well-developed.   HENT:      Head: Normocephalic and atraumatic.   Eyes:      " Conjunctiva/sclera: Conjunctivae normal.      Pupils: Pupils are equal, round, and reactive to light.   Neck:      Musculoskeletal: Normal range of motion.   Cardiovascular:      Rate and Rhythm: Normal rate and regular rhythm.      Heart sounds: No murmur. No friction rub. No gallop.    Pulmonary:      Effort: Pulmonary effort is normal.      Breath sounds: Normal breath sounds. No wheezing or rales.   Abdominal:      General: Bowel sounds are normal.      Palpations: Abdomen is soft.      Tenderness: There is no abdominal tenderness. There is no guarding or rebound.   Musculoskeletal: Normal range of motion.         General: No tenderness.   Skin:     General: Skin is warm and dry.   Neurological:      Mental Status: He is alert and oriented to person, place, and time.      Cranial Nerves: No cranial nerve deficit.         Assessment and Plan   1. Essential hypertension  Above goalr esume acei repeat electrolytes andr enal function  - CBC auto differential; Future  - Comprehensive metabolic panel; Future  - lisinopriL (PRINIVIL,ZESTRIL) 20 MG tablet; Take 1 tablet (20 mg total) by mouth once daily.  Dispense: 90 tablet; Refill: 3    2. CKD (chronic kidney disease) stage 3, GFR 30-59 ml/min  Avoid nsaids  - Comprehensive metabolic panel; Future    3. Coronary artery disease involving native coronary artery of native heart without angina pectoris  dapt and statin     4. Impaired fasting glucose  See below    5. Need for influenza vaccination  Flu vaccine today  - Influenza (FLUAD) - Quadrivalent (Adjuvanted) *Preferred* (65+) (PF)    6. Controlled type 2 diabetes mellitus with stage 3 chronic kidney disease, without long-term current use of insulin  On low dose metformin repeat a1c UTD on DFE   - Hemoglobin A1C; Future  - lisinopriL (PRINIVIL,ZESTRIL) 20 MG tablet; Take 1 tablet (20 mg total) by mouth once daily.  Dispense: 90 tablet; Refill: 3

## 2020-09-26 LAB
ESTIMATED AVG GLUCOSE: 126 MG/DL (ref 68–131)
HBA1C MFR BLD HPLC: 6 % (ref 4–5.6)

## 2020-10-12 ENCOUNTER — OFFICE VISIT (OUTPATIENT)
Dept: OTOLARYNGOLOGY | Facility: CLINIC | Age: 85
End: 2020-10-12
Payer: MEDICARE

## 2020-10-12 ENCOUNTER — CLINICAL SUPPORT (OUTPATIENT)
Dept: AUDIOLOGY | Facility: CLINIC | Age: 85
End: 2020-10-12
Payer: MEDICARE

## 2020-10-12 VITALS
DIASTOLIC BLOOD PRESSURE: 86 MMHG | TEMPERATURE: 98 F | SYSTOLIC BLOOD PRESSURE: 148 MMHG | HEIGHT: 66 IN | WEIGHT: 155 LBS | BODY MASS INDEX: 24.91 KG/M2

## 2020-10-12 DIAGNOSIS — H61.23 BILATERAL IMPACTED CERUMEN: ICD-10-CM

## 2020-10-12 DIAGNOSIS — H90.3 SENSORINEURAL HEARING LOSS, BILATERAL: Primary | ICD-10-CM

## 2020-10-12 DIAGNOSIS — H90.3 SENSORINEURAL HEARING LOSS (SNHL) OF BOTH EARS: Primary | ICD-10-CM

## 2020-10-12 PROCEDURE — 92550 TYMPANOMETRY & REFLEX THRESH: CPT | Mod: S$GLB,,, | Performed by: AUDIOLOGIST

## 2020-10-12 PROCEDURE — 92550 PR TYMPANOMETRY AND REFLEX THRESHOLD MEASUREMENTS: ICD-10-PCS | Mod: S$GLB,,, | Performed by: AUDIOLOGIST

## 2020-10-12 PROCEDURE — 99203 PR OFFICE/OUTPT VISIT, NEW, LEVL III, 30-44 MIN: ICD-10-PCS | Mod: 25,S$GLB,, | Performed by: OTOLARYNGOLOGY

## 2020-10-12 PROCEDURE — G0268 PR REMOVAL OF IMPACTED WAX MD: ICD-10-PCS | Mod: S$GLB,,, | Performed by: OTOLARYNGOLOGY

## 2020-10-12 PROCEDURE — 99203 OFFICE O/P NEW LOW 30 MIN: CPT | Mod: 25,S$GLB,, | Performed by: OTOLARYNGOLOGY

## 2020-10-12 PROCEDURE — G0268 REMOVAL OF IMPACTED WAX MD: HCPCS | Mod: S$GLB,,, | Performed by: OTOLARYNGOLOGY

## 2020-10-12 PROCEDURE — 92557 PR COMPREHENSIVE HEARING TEST: ICD-10-PCS | Mod: S$GLB,,, | Performed by: AUDIOLOGIST

## 2020-10-12 PROCEDURE — 92557 COMPREHENSIVE HEARING TEST: CPT | Mod: S$GLB,,, | Performed by: AUDIOLOGIST

## 2020-10-12 NOTE — PROGRESS NOTES
Click on link to view Audiogram  Document on 10/12/2020 10:20 AM by Yas Brewer MA CCC-A: Audiogram     Destin Barber was seen today for an audiologic evaluation for hearing loss.  He reported a gradual decrease in hearing sensitivity bilaterally.    Tympanometry revealed a Type A tympanogram for both ears.  Audiogram results revealed a mild sloping to severe sensorineural hearing loss bilaterally.  Speech audiometry revealed a speech reception threshold at 35dBHL with a word recognition score of 72% for both ears.    Recommendations:  1. Otologic evaluation  2. Hearing aid consult  3. Annual evaluation to monitor hearing sensitivity

## 2020-10-12 NOTE — PROGRESS NOTES
OTOLARYNGOLOGY CLINIC NOTE  Date:  10/12/2020     Chief complaint:  Chief Complaint   Patient presents with    Other     sensorineural hearing loss       History of Present Illness  Destin Barber is a 85 y.o. male  presenting today for a new evaluation and treatment of hearing loss. He has had gradual hearing loss. He has not noticed that one side is worse than the other. Denies history of unilateral noise exposure. No prior ear surgeries.  He also has tinnitus which is  bilateral, nonpulsatile. There are no other hearing tests available in [facility] system for evaluation.   Ears cleaned last about 2 years ago but he does report frequent ear wax impactions- he is not sure how often it takes for him to get impacted. Denies qtip usage.   No ear popping no ear pain, no drainage.     Past Medical History  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    Anemia     Coronary artery disease     Diastolic dysfunction     Gout, chronic     Heart failure     HTN (hypertension)     Hyperlipidemia     Myocardial infarction     Pneumonia     Pneumonia due to other staphylococcus     Pressure ulcer     Renal manifestation of secondary diabetes mellitus     Type 2 diabetes mellitus     diet controlled        Past Surgical History  Past Surgical History:   Procedure Laterality Date    CATARACT EXTRACTION Bilateral     CATARACT EXTRACTION W/ ANTERIOR VITRECTOMY      CORONARY ARTERY BYPASS GRAFT  07/06/2016    PAIZ-LAD, SVG-Ramus, SVG-PDA    HEMORRHOID SURGERY          Medications  Current Outpatient Medications on File Prior to Visit   Medication Sig Dispense Refill    albuterol (PROVENTIL/VENTOLIN HFA) 90 mcg/actuation inhaler Inhale 1 puff into the lungs every 6 (six) hours as needed. Rescue  5    amLODIPine (NORVASC) 10 MG tablet Take 1 tablet (10 mg total) by mouth once daily. 90 tablet 3    aspirin (ASPIR-LOW) 81 MG EC tablet Take 1 tablet (81 mg total)  by mouth once daily. 90 tablet 3    azelastine (ASTELIN) 137 mcg (0.1 %) nasal spray 1 spray (137 mcg total) by Nasal route 2 (two) times daily as needed for Rhinitis. 30 mL 0    docusate sodium (COLACE) 100 MG capsule Take 1 capsule (100 mg total) by mouth once daily. 60 capsule 1    finasteride (PROSCAR) 5 mg tablet Take 1 tablet (5 mg total) by mouth once daily. 30 tablet 11    lisinopriL (PRINIVIL,ZESTRIL) 20 MG tablet Take 1 tablet (20 mg total) by mouth once daily. 90 tablet 3    metFORMIN (GLUCOPHAGE-XR) 500 MG XR 24hr tablet Take 1 tablet (500 mg total) by mouth daily with breakfast. 90 tablet 3    metoprolol tartrate (LOPRESSOR) 25 MG tablet Take 1 tablet (25 mg total) by mouth 2 (two) times daily. 180 tablet 3    polyethylene glycol (GLYCOLAX) 17 gram PwPk Take 17 g by mouth once daily. 30 packet 4    pravastatin (PRAVACHOL) 40 MG tablet Take 1 tablet (40 mg total) by mouth once daily. 90 tablet 3    tamsulosin (FLOMAX) 0.4 mg Cap Take 1 capsule (0.4 mg total) by mouth once daily. 90 capsule 3    clopidogrel (PLAVIX) 75 mg tablet Take 1 tablet (75 mg total) by mouth once daily. 90 tablet 3     No current facility-administered medications on file prior to visit.        Review of Systems  Review of Systems   Constitutional: Negative for fever.   HENT: Positive for hearing loss. Negative for congestion and sore throat.         No ear popping   Eyes: Negative for double vision.   Respiratory: Negative for shortness of breath.    Gastrointestinal: Negative for heartburn.   Musculoskeletal: Negative for neck pain.   Skin: Negative for itching.   Neurological: Negative for dizziness.   Endo/Heme/Allergies: Negative for environmental allergies.        Social History   reports that he quit smoking about 35 years ago. His smoking use included cigarettes. He has never used smokeless tobacco. He reports that he does not drink alcohol or use drugs.     Family History  Family History   Problem Relation Age of  "Onset    Cancer Father         colon    Hypertension Mother     Heart disease Mother     Heart disease Sister     No Known Problems Brother     No Known Problems Maternal Aunt     No Known Problems Maternal Uncle     No Known Problems Paternal Aunt     No Known Problems Paternal Uncle     No Known Problems Maternal Grandmother     No Known Problems Maternal Grandfather     No Known Problems Paternal Grandmother     No Known Problems Paternal Grandfather     Amblyopia Neg Hx     Blindness Neg Hx     Cataracts Neg Hx     Diabetes Neg Hx     Glaucoma Neg Hx     Macular degeneration Neg Hx     Retinal detachment Neg Hx     Strabismus Neg Hx     Stroke Neg Hx     Thyroid disease Neg Hx         Physical Exam   Vitals:    10/12/20 0936   BP: (!) 148/86   Temp: 98.1 °F (36.7 °C)    Body mass index is 25.01 kg/m².  Weight: 70.3 kg (154 lb 15.7 oz)   Height: 5' 6" (167.6 cm)     GENERAL: no acute distress.  HEAD: normocephalic.   SKIN: no lesions of skin of head and neck area.  EYES: lids and lashes normal. Pupils equal  No proptosis  EARS: external ear without lesion, normal pinna shape and position.  See micro note  NOSE: external nose without abnormality  ORAL CAVITY/OROPHARYNX:  tongue midline and mobile Symmetric palate rise. Uvula midline.   NECK: trachea midline.   LYMPH NODES:No cervical lymphadenopathy.  RESPIRATORY: no stridor, no stertor. Voice normal. Respirations nonlabored.  NEURO: alert, responds to questions appropriately. Facial movement symmetric with good eye closure and symmetric smile.   PSYCH:mood appropriate    Procedure Note   Procedure performed:microscopic examination of ears with cerumen disimpaction    Indication for procedure: bilateral cerumen impaction     Description of procedure:  After verbal consent was obtained, the patient was positioned in semi recumbent position and speculum was placed in the right ear and microscope brought into the field.  The microscope was " positioned and magnification adjusted for appropriate visualization. Otologic instruments including various size otologic suctions and curette were used to remove the cerumen from the right external auditory canals under visualization with the operating microscope. After cleaning, visualization was again performed and at various levels of higher magnification to optimize views of the ear canal, tympanic membrane, ossicles and middle ear space. The same procedure was then repeated for the left ear. Findings as indicated below. All portions of the procedure and examination by otomicroscopy were tolerated well without complication.     Findings:  Right ear: Complete cerumen impaction removed entirely revealing normal external auditory canal; tympanic membrane without bulging, retraction, or perforation; no evidence of middle ear fluid or effusion.   Left ear: Complete cerumen impaction removed entirely revealing normal external auditory canal; tympanic membrane without bulging, retraction, or perforation; no evidence of middle ear fluid or effusion.       AUDIOLOGY RESULTS  Audiometric evaluation including audiogram, tympanometry, acoustic reflexes, and speech discrimination which was performed 10-12-20 was personally reviewed and interpreted.  Notable findings on the audiogram were bilateral mild sloping to severe sensorineural hearing loss (SNHL).  Tympanometry revealed Type A tympanogram on the left and Type A tympanogram on the right. Speech discrimination was 72%  on the left, and 72% on the right.       Report of the audiologist performing this audiometric testing was also reviewed     Imaging:  The patient does not have any  recent imaging of the head and neck.     Labs:  CBC  Recent Labs   Lab 01/06/20  0930 05/26/20  0931 09/25/20  1120   WBC 5.99 5.34 7.57   Hemoglobin 11.8 L 11.8 L 12.1 L   Hematocrit 34.9 L 35.2 L 36.1 L   Mean Corpuscular Volume 93 91 93   Platelets 163 192 197     BMP  Recent Labs   Lab  05/26/20  0931 08/05/20  1353 09/25/20  1120   Glucose 93 112 H 78   Sodium 138 141 140   Potassium 4.1 4.3 3.6   Chloride 105 110 105   CO2 21 L 25 24   BUN, Bld 29 H 23 19   Creatinine 1.5 H 1.6 H 1.2   Calcium 9.1 8.6 L 9.2     COAGS        Assessment  1. Sensorineural hearing loss (SNHL) of both ears    2. Bilateral impacted cerumen       Plan:  Discussed plan of care with patient in detail and all questions answered. Patient reported understanding of plan of care.   Bilateral sensorineural hearing loss: medically cleared for hearing amplification if desired. 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 Pt should wear noise protection when in an environment with loud noise exposure to protect from future hearing loss.     Cerumen impactions: removed, avoid qtips and ear buds. Debrox prn , can use hairdryer on low setting to dry ears after shower if water sensation is bothersome and leads to qtip usage    Follow up 1 year for hearing test and to check for cerumen impactions. Prn if any issues

## 2020-10-12 NOTE — LETTER
October 18, 2020      Harry Velazco MD  [address] [facility]  [place] [address]           [facility]  [address]  [facility] [place] [address]  Phone: [phone]          Patient: Destin Barber   MR Number: [id number]   YOB: 1935   Date of Visit: 10/12/2020       Dear Dr. Harry Velazco:    Thank you for referring Destin Barber to me for evaluation. Attached you will find relevant portions of my assessment and plan of care.    If you have questions, please do not hesitate to call me. I look forward to following Destin Barber along with you.    Sincerely,    Nandini Youssef MD    Enclosure  CC:  No Recipients    If you would like to receive this communication electronically, please contact [email or web address] or [phone] to request more information on [employer] access.    For providers and/or their staff who would like to refer a patient to [facility], please contact us through our one-stop-shop provider referral line, [facility], at [phone].    If you feel you have received this communication in error or would no longer like to receive these types of communications, please e-mail [email or web address]

## 2020-10-27 ENCOUNTER — OFFICE VISIT (OUTPATIENT)
Dept: UROLOGY | Facility: CLINIC | Age: 85
End: 2020-10-27
Payer: MEDICARE

## 2020-10-27 VITALS
HEIGHT: 66 IN | WEIGHT: 154 LBS | BODY MASS INDEX: 24.75 KG/M2 | SYSTOLIC BLOOD PRESSURE: 138 MMHG | DIASTOLIC BLOOD PRESSURE: 80 MMHG

## 2020-10-27 DIAGNOSIS — N40.1 BPH WITH OBSTRUCTION/LOWER URINARY TRACT SYMPTOMS: Primary | ICD-10-CM

## 2020-10-27 DIAGNOSIS — R33.9 URINARY RETENTION: ICD-10-CM

## 2020-10-27 DIAGNOSIS — N28.1 BILATERAL RENAL CYSTS: ICD-10-CM

## 2020-10-27 DIAGNOSIS — R31.0 GROSS HEMATURIA: ICD-10-CM

## 2020-10-27 DIAGNOSIS — R35.0 URINARY FREQUENCY: ICD-10-CM

## 2020-10-27 DIAGNOSIS — N13.8 BPH WITH OBSTRUCTION/LOWER URINARY TRACT SYMPTOMS: Primary | ICD-10-CM

## 2020-10-27 PROCEDURE — 99214 PR OFFICE/OUTPT VISIT, EST, LEVL IV, 30-39 MIN: ICD-10-PCS | Mod: S$PBB,,, | Performed by: NURSE PRACTITIONER

## 2020-10-27 PROCEDURE — 51798 US URINE CAPACITY MEASURE: CPT | Mod: PBBFAC | Performed by: NURSE PRACTITIONER

## 2020-10-27 PROCEDURE — 99213 OFFICE O/P EST LOW 20 MIN: CPT | Mod: PBBFAC | Performed by: NURSE PRACTITIONER

## 2020-10-27 PROCEDURE — 99999 PR PBB SHADOW E&M-EST. PATIENT-LVL III: ICD-10-PCS | Mod: PBBFAC,,, | Performed by: NURSE PRACTITIONER

## 2020-10-27 PROCEDURE — 99214 OFFICE O/P EST MOD 30 MIN: CPT | Mod: S$PBB,,, | Performed by: NURSE PRACTITIONER

## 2020-10-27 PROCEDURE — 99999 PR PBB SHADOW E&M-EST. PATIENT-LVL III: CPT | Mod: PBBFAC,,, | Performed by: NURSE PRACTITIONER

## 2020-10-27 PROCEDURE — 81001 URINALYSIS AUTO W/SCOPE: CPT | Mod: PBBFAC | Performed by: NURSE PRACTITIONER

## 2020-10-27 RX ORDER — TAMSULOSIN HYDROCHLORIDE 0.4 MG/1
0.4 CAPSULE ORAL 2 TIMES DAILY
Qty: 180 CAPSULE | Refills: 3 | Status: SHIPPED | OUTPATIENT
Start: 2020-10-27 | End: 2021-04-28 | Stop reason: SDUPTHER

## 2020-10-27 NOTE — PROGRESS NOTES
"Subjective:       Patient ID: Destin Barber is a 85 y.o. male who was last seen in this office 9/23/2020    Chief Complaint:   Chief Complaint   Patient presents with    Follow-up     Pt. is here stating he has no new issues and is feeling fine        Benign Prostatic Hypertrophy  Patient complains of lower urinary tract symptoms. He reports frequency, incomplete emptying, nocturia three times a night and urgency. He denies intermittency, straining and weak stream. Patient states symptoms are of moderate severity. Onset of symptoms was a few weeks ago and was gradual in onset.  He has no personal history and no family history of prostate cancer. He reports a history of no complicating symptoms. He denies flank pain, gross hematuria, kidney stones and recurrent UTI.    Recommended to resume Flomax 4 days ago by PCP which he is currently taking    PVR (bladder scan) today - 349 ml    Hematuria  Patient also referred to urology for "microscopic hematuria" though PCP note on 5/26/20 mentions gross hematuria. Patient does report previous episode of gross hematuria x 1 week in 5/20. There is not a history of nephrolithiasis. There is not a history of urologic trauma. Other urologic symptoms include sense of residual urine, dysuria, urinary frequency. Patient admits to history of tobacco use. Former smoker--quit about 20+ years ago. Patient denies history of Agent Orange exposure, chronic Seo catheter,  surgeries, sexually transmitted diseases, trauma and urolithiasis. Prior workup has been UA'S, UCx. Denies known history of  malignancy    Micro UA 5/26/20 --0 RBCs  UCx 7/31/20--negative    CT uro 8/20--no renal stones or hydronephrosis. No enhancing renal masses    Seo placed at initial visit due to urinary retention. He failed his VOT in this office on 8/10/20. He declined replacement of seo at that time. He is here today for a PVR check. He remains on Flomax. He is not straining to void. Denies sensation of " ANGELIQUE or pelvic pressure/pain. He feels he is voiding well.  Denies dysuria, gross hematuria or flank pain.    PVR (bladder scan) today - 244 ml      10/27/20  Office cystoscopy in 9/20 showed R small cellulue, L diverticulum, hypertrophy of L lateral lobe prostate, bladder stone (2-3 mm). No tumors. Treatment of bladder stones and bladder outlet obstruction discussed with patient by Dr Mcmullen. Patient declined treatment at that time.     He is here today for follow up. He is currently taking Flomax and Proscar. He feels he is emptying his bladder. He is not straining to void. Denies dysuria, pain or fever. He has not had any further occurrences of hematuria    PVR (bladder scan) today - 366 ml    ACTIVE MEDICAL ISSUES:  Patient Active Problem List   Diagnosis    Essential hypertension    Gout, chronic    Hyperlipidemia    Diastolic dysfunction    Anemia of chronic disease    Coronary artery disease involving coronary bypass graft of native heart without angina pectoris    Pre-operative cardiovascular examination    S/P CABG (coronary artery bypass graft)    Chronic gout without tophus    Malnutrition of moderate degree    Hypokalemia    Controlled type 2 diabetes mellitus with stage 3 chronic kidney disease, without long-term current use of insulin    Aortic atherosclerosis       ALLERGIES AND MEDICATIONS: updated and reviewed.  Review of patient's allergies indicates:   Allergen Reactions    Penicillins Nausea And Vomiting     Current Outpatient Medications   Medication Sig    albuterol (PROVENTIL/VENTOLIN HFA) 90 mcg/actuation inhaler Inhale 1 puff into the lungs every 6 (six) hours as needed. Rescue    amLODIPine (NORVASC) 10 MG tablet Take 1 tablet (10 mg total) by mouth once daily.    aspirin (ASPIR-LOW) 81 MG EC tablet Take 1 tablet (81 mg total) by mouth once daily.    azelastine (ASTELIN) 137 mcg (0.1 %) nasal spray 1 spray (137 mcg total) by Nasal route 2 (two) times daily as needed for  "Rhinitis.    docusate sodium (COLACE) 100 MG capsule Take 1 capsule (100 mg total) by mouth once daily.    finasteride (PROSCAR) 5 mg tablet Take 1 tablet (5 mg total) by mouth once daily.    lisinopriL (PRINIVIL,ZESTRIL) 20 MG tablet Take 1 tablet (20 mg total) by mouth once daily.    metFORMIN (GLUCOPHAGE-XR) 500 MG XR 24hr tablet Take 1 tablet (500 mg total) by mouth daily with breakfast.    metoprolol tartrate (LOPRESSOR) 25 MG tablet Take 1 tablet (25 mg total) by mouth 2 (two) times daily.    polyethylene glycol (GLYCOLAX) 17 gram PwPk Take 17 g by mouth once daily.    pravastatin (PRAVACHOL) 40 MG tablet Take 1 tablet (40 mg total) by mouth once daily.    tamsulosin (FLOMAX) 0.4 mg Cap Take 1 capsule (0.4 mg total) by mouth 2 (two) times daily.    clopidogrel (PLAVIX) 75 mg tablet Take 1 tablet (75 mg total) by mouth once daily.     No current facility-administered medications for this visit.        Review of Systems   Constitutional: Negative for activity change, chills, fatigue, fever and unexpected weight change.   Eyes: Negative for discharge, redness and visual disturbance.   Respiratory: Negative for cough, shortness of breath and wheezing.    Cardiovascular: Negative for chest pain and leg swelling.   Gastrointestinal: Negative for abdominal distention, abdominal pain, constipation, diarrhea, nausea and vomiting.   Genitourinary: Negative for decreased urine volume, difficulty urinating, discharge, dysuria, flank pain, frequency, hematuria, penile pain, testicular pain and urgency.   Musculoskeletal: Negative for arthralgias, joint swelling and myalgias.   Skin: Negative for color change and rash.   Neurological: Negative for dizziness and light-headedness.   Psychiatric/Behavioral: Negative for behavioral problems and confusion. The patient is not nervous/anxious.        Objective:      Vitals:    10/27/20 0959   BP: 138/80   Weight: 69.9 kg (154 lb)   Height: 5' 6" (1.676 m)     Physical " Exam  Constitutional:       Appearance: He is well-developed.   HENT:      Head: Normocephalic and atraumatic.      Nose: Nose normal.   Eyes:      General:         Right eye: No discharge.         Left eye: No discharge.      Conjunctiva/sclera: Conjunctivae normal.   Neck:      Musculoskeletal: Normal range of motion and neck supple.      Thyroid: No thyromegaly.      Trachea: No tracheal deviation.   Cardiovascular:      Rate and Rhythm: Normal rate and regular rhythm.   Pulmonary:      Effort: Pulmonary effort is normal. No respiratory distress.      Breath sounds: No wheezing.   Abdominal:      General: There is no distension.      Palpations: Abdomen is soft.      Tenderness: There is no abdominal tenderness.      Hernia: No hernia is present.   Genitourinary:     Comments: Patient declined exam  Musculoskeletal: Normal range of motion.   Skin:     General: Skin is warm and dry.      Findings: No erythema or rash.   Neurological:      Mental Status: He is alert and oriented to person, place, and time.   Psychiatric:         Behavior: Behavior normal.         Judgment: Judgment normal.         Urine dipstick shows ++LE.      Assessment:       1. BPH with obstruction/lower urinary tract symptoms    2. Gross hematuria    3. Urinary retention    4. Bilateral renal cysts    5. Urinary frequency          Plan:       1. BPH with obstruction/lower urinary tract symptoms  -Declines surgical treatment of BPH  -Increase Flomax BID  -Continue Proscar  - POCT urinalysis, dipstick or tablet reag    2. Gross hematuria   - Discussed etiology and workup of hematuria   - +hx of tobacco use   - Cytology - not indicated   -UCx 7/31/20--neg   -CT uro--no renal stones or hydronephrosis. No enhancing renal masses  -Office cystoscopy-- R small cellulue, L diverticulum, hypertrophy of L lateral lobe prostate, bladder stone (2-3 mm). No tumors  -Hematuria likely related to bladder stones--he continues to decline treatment. Risks and  potential complications of not clearing bladder stones discussed with patient  - POCT urinalysis, dipstick or tablet reag    3. Urinary retention  -Seo placed in office 7/31/20  - Previous Voiding Trial--unsuccessful. Declined replacement of seo  -PVRs remain elevated. Patient continues to decline seo placement  -Again patient cautioned concerning risks complications of UR.  -Increase Flomax BID. Rx sent to pharmacy  -Proscar  - POCT Bladder Scan    4. Bilateral renal cysts  -CT urogram-- indeterminate 1.0 cm RUP lesion. CT reviewed with Dr Covarrubias. No enhancement noted to lesion. Repeat imaging with EZEQUIEL in Feb 2021        Follow up in about 3 months (around 2/10/2021) for Follow up  with renal ultrasound, Follow up PVR.

## 2020-10-31 ENCOUNTER — EXTERNAL CHRONIC CARE MANAGEMENT (OUTPATIENT)
Dept: PRIMARY CARE CLINIC | Facility: CLINIC | Age: 85
End: 2020-10-31
Payer: MEDICARE

## 2020-10-31 PROCEDURE — 99490 CHRNC CARE MGMT STAFF 1ST 20: CPT | Mod: S$PBB,,, | Performed by: INTERNAL MEDICINE

## 2020-10-31 PROCEDURE — 99490 PR CHRONIC CARE MGMT, 1ST 20 MIN: ICD-10-PCS | Mod: S$PBB,,, | Performed by: INTERNAL MEDICINE

## 2020-10-31 PROCEDURE — 99490 CHRNC CARE MGMT STAFF 1ST 20: CPT | Mod: PBBFAC,PN | Performed by: INTERNAL MEDICINE

## 2020-11-30 ENCOUNTER — EXTERNAL CHRONIC CARE MANAGEMENT (OUTPATIENT)
Dept: PRIMARY CARE CLINIC | Facility: CLINIC | Age: 85
End: 2020-11-30
Payer: MEDICARE

## 2020-11-30 PROCEDURE — 99490 CHRNC CARE MGMT STAFF 1ST 20: CPT | Mod: PBBFAC,PN | Performed by: INTERNAL MEDICINE

## 2020-11-30 PROCEDURE — 99490 PR CHRONIC CARE MGMT, 1ST 20 MIN: ICD-10-PCS | Mod: S$PBB,,, | Performed by: INTERNAL MEDICINE

## 2020-11-30 PROCEDURE — 99490 CHRNC CARE MGMT STAFF 1ST 20: CPT | Mod: S$PBB,,, | Performed by: INTERNAL MEDICINE

## 2020-12-11 ENCOUNTER — PATIENT MESSAGE (OUTPATIENT)
Dept: OTHER | Facility: OTHER | Age: 85
End: 2020-12-11

## 2020-12-31 ENCOUNTER — EXTERNAL CHRONIC CARE MANAGEMENT (OUTPATIENT)
Dept: PRIMARY CARE CLINIC | Facility: CLINIC | Age: 85
End: 2020-12-31
Payer: MEDICARE

## 2020-12-31 PROCEDURE — 99490 PR CHRONIC CARE MGMT, 1ST 20 MIN: ICD-10-PCS | Mod: S$PBB,,, | Performed by: INTERNAL MEDICINE

## 2020-12-31 PROCEDURE — 99490 CHRNC CARE MGMT STAFF 1ST 20: CPT | Mod: PBBFAC,PN | Performed by: INTERNAL MEDICINE

## 2020-12-31 PROCEDURE — 99490 CHRNC CARE MGMT STAFF 1ST 20: CPT | Mod: S$PBB,,, | Performed by: INTERNAL MEDICINE

## 2021-01-25 ENCOUNTER — OFFICE VISIT (OUTPATIENT)
Dept: FAMILY MEDICINE | Facility: CLINIC | Age: 86
End: 2021-01-25
Payer: MEDICARE

## 2021-01-25 VITALS
HEART RATE: 80 BPM | SYSTOLIC BLOOD PRESSURE: 134 MMHG | TEMPERATURE: 98 F | DIASTOLIC BLOOD PRESSURE: 82 MMHG | WEIGHT: 152 LBS | HEIGHT: 66 IN | OXYGEN SATURATION: 96 % | RESPIRATION RATE: 18 BRPM | BODY MASS INDEX: 24.43 KG/M2

## 2021-01-25 DIAGNOSIS — I25.10 CORONARY ARTERY DISEASE INVOLVING NATIVE CORONARY ARTERY OF NATIVE HEART WITHOUT ANGINA PECTORIS: ICD-10-CM

## 2021-01-25 DIAGNOSIS — E11.22 CONTROLLED TYPE 2 DIABETES MELLITUS WITH STAGE 3 CHRONIC KIDNEY DISEASE, WITHOUT LONG-TERM CURRENT USE OF INSULIN: ICD-10-CM

## 2021-01-25 DIAGNOSIS — N18.30 CONTROLLED TYPE 2 DIABETES MELLITUS WITH STAGE 3 CHRONIC KIDNEY DISEASE, WITHOUT LONG-TERM CURRENT USE OF INSULIN: ICD-10-CM

## 2021-01-25 DIAGNOSIS — I10 ESSENTIAL HYPERTENSION: Primary | ICD-10-CM

## 2021-01-25 PROCEDURE — 99214 OFFICE O/P EST MOD 30 MIN: CPT | Mod: PBBFAC,PN | Performed by: INTERNAL MEDICINE

## 2021-01-25 PROCEDURE — 99999 PR PBB SHADOW E&M-EST. PATIENT-LVL IV: CPT | Mod: PBBFAC,,, | Performed by: INTERNAL MEDICINE

## 2021-01-25 PROCEDURE — 99214 OFFICE O/P EST MOD 30 MIN: CPT | Mod: S$PBB,,, | Performed by: INTERNAL MEDICINE

## 2021-01-25 PROCEDURE — 99999 PR PBB SHADOW E&M-EST. PATIENT-LVL IV: ICD-10-PCS | Mod: PBBFAC,,, | Performed by: INTERNAL MEDICINE

## 2021-01-25 PROCEDURE — 99214 PR OFFICE/OUTPT VISIT, EST, LEVL IV, 30-39 MIN: ICD-10-PCS | Mod: S$PBB,,, | Performed by: INTERNAL MEDICINE

## 2021-01-31 ENCOUNTER — EXTERNAL CHRONIC CARE MANAGEMENT (OUTPATIENT)
Dept: PRIMARY CARE CLINIC | Facility: CLINIC | Age: 86
End: 2021-01-31
Payer: MEDICARE

## 2021-01-31 PROCEDURE — 99490 CHRNC CARE MGMT STAFF 1ST 20: CPT | Mod: PBBFAC,PN | Performed by: INTERNAL MEDICINE

## 2021-01-31 PROCEDURE — 99490 CHRNC CARE MGMT STAFF 1ST 20: CPT | Mod: S$PBB,,, | Performed by: INTERNAL MEDICINE

## 2021-01-31 PROCEDURE — 99490 PR CHRONIC CARE MGMT, 1ST 20 MIN: ICD-10-PCS | Mod: S$PBB,,, | Performed by: INTERNAL MEDICINE

## 2021-02-26 ENCOUNTER — OFFICE VISIT (OUTPATIENT)
Dept: UROLOGY | Facility: CLINIC | Age: 86
End: 2021-02-26
Payer: MEDICARE

## 2021-02-26 VITALS
WEIGHT: 152 LBS | BODY MASS INDEX: 24.43 KG/M2 | HEIGHT: 66 IN | SYSTOLIC BLOOD PRESSURE: 141 MMHG | DIASTOLIC BLOOD PRESSURE: 90 MMHG

## 2021-02-26 DIAGNOSIS — R31.0 GROSS HEMATURIA: ICD-10-CM

## 2021-02-26 DIAGNOSIS — N13.8 BPH WITH OBSTRUCTION/LOWER URINARY TRACT SYMPTOMS: ICD-10-CM

## 2021-02-26 DIAGNOSIS — R33.9 URINARY RETENTION: Primary | ICD-10-CM

## 2021-02-26 DIAGNOSIS — N28.1 BILATERAL RENAL CYSTS: ICD-10-CM

## 2021-02-26 DIAGNOSIS — N40.1 BPH WITH OBSTRUCTION/LOWER URINARY TRACT SYMPTOMS: ICD-10-CM

## 2021-02-26 PROCEDURE — 99213 OFFICE O/P EST LOW 20 MIN: CPT | Mod: PBBFAC | Performed by: NURSE PRACTITIONER

## 2021-02-26 PROCEDURE — 99999 PR PBB SHADOW E&M-EST. PATIENT-LVL III: ICD-10-PCS | Mod: PBBFAC,,, | Performed by: NURSE PRACTITIONER

## 2021-02-26 PROCEDURE — 99999 PR PBB SHADOW E&M-EST. PATIENT-LVL III: CPT | Mod: PBBFAC,,, | Performed by: NURSE PRACTITIONER

## 2021-02-26 PROCEDURE — 99214 PR OFFICE/OUTPT VISIT, EST, LEVL IV, 30-39 MIN: ICD-10-PCS | Mod: S$PBB,,, | Performed by: NURSE PRACTITIONER

## 2021-02-26 PROCEDURE — 99214 OFFICE O/P EST MOD 30 MIN: CPT | Mod: S$PBB,,, | Performed by: NURSE PRACTITIONER

## 2021-02-26 PROCEDURE — 51798 US URINE CAPACITY MEASURE: CPT | Mod: PBBFAC | Performed by: NURSE PRACTITIONER

## 2021-02-26 PROCEDURE — 81001 URINALYSIS AUTO W/SCOPE: CPT | Mod: PBBFAC | Performed by: NURSE PRACTITIONER

## 2021-02-26 PROCEDURE — 87086 URINE CULTURE/COLONY COUNT: CPT

## 2021-02-28 ENCOUNTER — EXTERNAL CHRONIC CARE MANAGEMENT (OUTPATIENT)
Dept: PRIMARY CARE CLINIC | Facility: CLINIC | Age: 86
End: 2021-02-28
Payer: MEDICARE

## 2021-02-28 LAB — BACTERIA UR CULT: NORMAL

## 2021-02-28 PROCEDURE — 99490 CHRNC CARE MGMT STAFF 1ST 20: CPT | Mod: S$PBB,,, | Performed by: INTERNAL MEDICINE

## 2021-02-28 PROCEDURE — 99490 CHRNC CARE MGMT STAFF 1ST 20: CPT | Mod: PBBFAC,PN | Performed by: INTERNAL MEDICINE

## 2021-02-28 PROCEDURE — 99490 PR CHRONIC CARE MGMT, 1ST 20 MIN: ICD-10-PCS | Mod: S$PBB,,, | Performed by: INTERNAL MEDICINE

## 2021-03-31 ENCOUNTER — EXTERNAL CHRONIC CARE MANAGEMENT (OUTPATIENT)
Dept: PRIMARY CARE CLINIC | Facility: CLINIC | Age: 86
End: 2021-03-31
Payer: MEDICARE

## 2021-03-31 PROCEDURE — 99490 CHRNC CARE MGMT STAFF 1ST 20: CPT | Mod: S$PBB,,, | Performed by: INTERNAL MEDICINE

## 2021-03-31 PROCEDURE — 99490 PR CHRONIC CARE MGMT, 1ST 20 MIN: ICD-10-PCS | Mod: S$PBB,,, | Performed by: INTERNAL MEDICINE

## 2021-03-31 PROCEDURE — 99490 CHRNC CARE MGMT STAFF 1ST 20: CPT | Mod: PBBFAC,PN | Performed by: INTERNAL MEDICINE

## 2021-04-19 ENCOUNTER — HOSPITAL ENCOUNTER (OUTPATIENT)
Dept: RADIOLOGY | Facility: HOSPITAL | Age: 86
Discharge: HOME | End: 2021-04-19
Attending: NURSE PRACTITIONER
Payer: MEDICARE

## 2021-04-19 DIAGNOSIS — R33.9 URINARY RETENTION: ICD-10-CM

## 2021-04-19 DIAGNOSIS — N28.1 BILATERAL RENAL CYSTS: ICD-10-CM

## 2021-04-19 PROCEDURE — 76770 US RETROPERITONEAL COMPLETE: ICD-10-PCS | Mod: 26,,, | Performed by: RADIOLOGY

## 2021-04-19 PROCEDURE — 76770 US EXAM ABDO BACK WALL COMP: CPT | Mod: 26,,, | Performed by: RADIOLOGY

## 2021-04-19 PROCEDURE — 76770 US EXAM ABDO BACK WALL COMP: CPT | Mod: TC

## 2021-04-28 ENCOUNTER — OFFICE VISIT (OUTPATIENT)
Dept: UROLOGY | Facility: CLINIC | Age: 86
End: 2021-04-28
Payer: MEDICARE

## 2021-04-28 VITALS
DIASTOLIC BLOOD PRESSURE: 90 MMHG | SYSTOLIC BLOOD PRESSURE: 142 MMHG | HEIGHT: 66 IN | WEIGHT: 148 LBS | BODY MASS INDEX: 23.78 KG/M2

## 2021-04-28 DIAGNOSIS — R31.0 GROSS HEMATURIA: ICD-10-CM

## 2021-04-28 DIAGNOSIS — I25.10 CORONARY ARTERY DISEASE INVOLVING NATIVE CORONARY ARTERY OF NATIVE HEART WITHOUT ANGINA PECTORIS: ICD-10-CM

## 2021-04-28 DIAGNOSIS — I10 ESSENTIAL HYPERTENSION: ICD-10-CM

## 2021-04-28 DIAGNOSIS — I25.810 CORONARY ARTERY DISEASE INVOLVING CORONARY BYPASS GRAFT OF NATIVE HEART WITHOUT ANGINA PECTORIS: ICD-10-CM

## 2021-04-28 DIAGNOSIS — E78.5 HYPERLIPIDEMIA, UNSPECIFIED HYPERLIPIDEMIA TYPE: ICD-10-CM

## 2021-04-28 DIAGNOSIS — G47.00 INSOMNIA, UNSPECIFIED TYPE: Primary | ICD-10-CM

## 2021-04-28 DIAGNOSIS — N13.8 BPH WITH OBSTRUCTION/LOWER URINARY TRACT SYMPTOMS: ICD-10-CM

## 2021-04-28 DIAGNOSIS — R33.9 URINARY RETENTION: Primary | ICD-10-CM

## 2021-04-28 DIAGNOSIS — N40.1 BPH WITH OBSTRUCTION/LOWER URINARY TRACT SYMPTOMS: ICD-10-CM

## 2021-04-28 PROCEDURE — 99999 PR PBB SHADOW E&M-EST. PATIENT-LVL III: ICD-10-PCS | Mod: PBBFAC,,, | Performed by: NURSE PRACTITIONER

## 2021-04-28 PROCEDURE — 99999 PR PBB SHADOW E&M-EST. PATIENT-LVL III: CPT | Mod: PBBFAC,,, | Performed by: NURSE PRACTITIONER

## 2021-04-28 PROCEDURE — 99214 OFFICE O/P EST MOD 30 MIN: CPT | Mod: S$PBB,,, | Performed by: NURSE PRACTITIONER

## 2021-04-28 PROCEDURE — 99213 OFFICE O/P EST LOW 20 MIN: CPT | Mod: PBBFAC | Performed by: NURSE PRACTITIONER

## 2021-04-28 PROCEDURE — 99214 PR OFFICE/OUTPT VISIT, EST, LEVL IV, 30-39 MIN: ICD-10-PCS | Mod: S$PBB,,, | Performed by: NURSE PRACTITIONER

## 2021-04-28 PROCEDURE — 81001 URINALYSIS AUTO W/SCOPE: CPT | Mod: PBBFAC | Performed by: NURSE PRACTITIONER

## 2021-04-28 RX ORDER — METOPROLOL TARTRATE 25 MG/1
25 TABLET, FILM COATED ORAL 2 TIMES DAILY
Qty: 180 TABLET | Refills: 3 | Status: SHIPPED | OUTPATIENT
Start: 2021-04-28 | End: 2022-04-19

## 2021-04-28 RX ORDER — TRAZODONE HYDROCHLORIDE 150 MG/1
150 TABLET ORAL NIGHTLY PRN
Qty: 90 TABLET | Refills: 0 | Status: SHIPPED | OUTPATIENT
Start: 2021-04-28 | End: 2021-08-16

## 2021-04-28 RX ORDER — FINASTERIDE 5 MG/1
5 TABLET, FILM COATED ORAL DAILY
Qty: 30 TABLET | Refills: 11 | Status: SHIPPED | OUTPATIENT
Start: 2021-04-28 | End: 2023-03-07

## 2021-04-28 RX ORDER — AMLODIPINE BESYLATE 10 MG/1
10 TABLET ORAL DAILY
Qty: 90 TABLET | Refills: 3 | Status: SHIPPED | OUTPATIENT
Start: 2021-04-28 | End: 2022-04-19

## 2021-04-28 RX ORDER — PRAVASTATIN SODIUM 40 MG/1
40 TABLET ORAL DAILY
Qty: 90 TABLET | Refills: 3 | Status: SHIPPED | OUTPATIENT
Start: 2021-04-28 | End: 2022-05-04

## 2021-04-28 RX ORDER — TAMSULOSIN HYDROCHLORIDE 0.4 MG/1
0.4 CAPSULE ORAL 2 TIMES DAILY
Qty: 180 CAPSULE | Refills: 3 | Status: SHIPPED | OUTPATIENT
Start: 2021-04-28 | End: 2022-08-11 | Stop reason: SDUPTHER

## 2021-04-30 ENCOUNTER — EXTERNAL CHRONIC CARE MANAGEMENT (OUTPATIENT)
Dept: PRIMARY CARE CLINIC | Facility: CLINIC | Age: 86
End: 2021-04-30
Payer: MEDICARE

## 2021-04-30 PROCEDURE — 99490 CHRNC CARE MGMT STAFF 1ST 20: CPT | Mod: S$PBB,,, | Performed by: INTERNAL MEDICINE

## 2021-04-30 PROCEDURE — 99490 CHRNC CARE MGMT STAFF 1ST 20: CPT | Mod: PBBFAC,PN | Performed by: INTERNAL MEDICINE

## 2021-04-30 PROCEDURE — 99490 PR CHRONIC CARE MGMT, 1ST 20 MIN: ICD-10-PCS | Mod: S$PBB,,, | Performed by: INTERNAL MEDICINE

## 2021-05-10 ENCOUNTER — TELEPHONE (OUTPATIENT)
Dept: UROLOGY | Facility: CLINIC | Age: 86
End: 2021-05-10

## 2021-05-17 ENCOUNTER — OFFICE VISIT (OUTPATIENT)
Dept: FAMILY MEDICINE | Facility: CLINIC | Age: 86
End: 2021-05-17
Payer: MEDICARE

## 2021-05-17 VITALS
DIASTOLIC BLOOD PRESSURE: 92 MMHG | HEIGHT: 66 IN | OXYGEN SATURATION: 99 % | HEART RATE: 82 BPM | BODY MASS INDEX: 24.8 KG/M2 | RESPIRATION RATE: 18 BRPM | SYSTOLIC BLOOD PRESSURE: 151 MMHG | WEIGHT: 154.31 LBS

## 2021-05-17 DIAGNOSIS — I25.10 CORONARY ARTERY DISEASE INVOLVING NATIVE CORONARY ARTERY OF NATIVE HEART WITHOUT ANGINA PECTORIS: ICD-10-CM

## 2021-05-17 DIAGNOSIS — R05.9 COUGH: ICD-10-CM

## 2021-05-17 DIAGNOSIS — R09.82 POST-NASAL DRIP: ICD-10-CM

## 2021-05-17 DIAGNOSIS — I10 ESSENTIAL HYPERTENSION: ICD-10-CM

## 2021-05-17 DIAGNOSIS — J01.80 ACUTE NON-RECURRENT SINUSITIS OF OTHER SINUS: Primary | ICD-10-CM

## 2021-05-17 DIAGNOSIS — E11.22 CONTROLLED TYPE 2 DIABETES MELLITUS WITH STAGE 3 CHRONIC KIDNEY DISEASE, WITHOUT LONG-TERM CURRENT USE OF INSULIN: ICD-10-CM

## 2021-05-17 DIAGNOSIS — N18.30 CONTROLLED TYPE 2 DIABETES MELLITUS WITH STAGE 3 CHRONIC KIDNEY DISEASE, WITHOUT LONG-TERM CURRENT USE OF INSULIN: ICD-10-CM

## 2021-05-17 PROCEDURE — 99214 OFFICE O/P EST MOD 30 MIN: CPT | Mod: PBBFAC,PO | Performed by: FAMILY MEDICINE

## 2021-05-17 PROCEDURE — 99214 OFFICE O/P EST MOD 30 MIN: CPT | Mod: S$PBB,,, | Performed by: FAMILY MEDICINE

## 2021-05-17 PROCEDURE — 99999 PR PBB SHADOW E&M-EST. PATIENT-LVL IV: ICD-10-PCS | Mod: PBBFAC,,, | Performed by: FAMILY MEDICINE

## 2021-05-17 PROCEDURE — 99999 PR PBB SHADOW E&M-EST. PATIENT-LVL IV: CPT | Mod: PBBFAC,,, | Performed by: FAMILY MEDICINE

## 2021-05-17 PROCEDURE — 99214 PR OFFICE/OUTPT VISIT, EST, LEVL IV, 30-39 MIN: ICD-10-PCS | Mod: S$PBB,,, | Performed by: FAMILY MEDICINE

## 2021-05-17 RX ORDER — FLUTICASONE PROPIONATE 50 MCG
1 SPRAY, SUSPENSION (ML) NASAL 2 TIMES DAILY PRN
Qty: 16 ML | Refills: 1 | Status: SHIPPED | OUTPATIENT
Start: 2021-05-17 | End: 2021-12-23 | Stop reason: SDUPTHER

## 2021-05-17 RX ORDER — PROMETHAZINE HYDROCHLORIDE AND DEXTROMETHORPHAN HYDROBROMIDE 6.25; 15 MG/5ML; MG/5ML
5 SYRUP ORAL 3 TIMES DAILY PRN
Qty: 118 ML | Refills: 1 | Status: SHIPPED | OUTPATIENT
Start: 2021-05-17 | End: 2021-12-23 | Stop reason: ALTCHOICE

## 2021-05-17 RX ORDER — DOXYCYCLINE HYCLATE 100 MG
100 TABLET ORAL EVERY 12 HOURS
Qty: 20 TABLET | Refills: 0 | Status: SHIPPED | OUTPATIENT
Start: 2021-05-17 | End: 2021-12-23 | Stop reason: ALTCHOICE

## 2021-05-17 RX ORDER — LORATADINE 10 MG/1
10 TABLET ORAL DAILY
Qty: 30 TABLET | Refills: 1 | Status: SHIPPED | OUTPATIENT
Start: 2021-05-17 | End: 2022-03-28

## 2021-05-31 ENCOUNTER — EXTERNAL CHRONIC CARE MANAGEMENT (OUTPATIENT)
Dept: PRIMARY CARE CLINIC | Facility: CLINIC | Age: 86
End: 2021-05-31
Payer: MEDICARE

## 2021-05-31 PROCEDURE — 99490 PR CHRONIC CARE MGMT, 1ST 20 MIN: ICD-10-PCS | Mod: S$PBB,,, | Performed by: INTERNAL MEDICINE

## 2021-05-31 PROCEDURE — 99490 CHRNC CARE MGMT STAFF 1ST 20: CPT | Mod: S$PBB,,, | Performed by: INTERNAL MEDICINE

## 2021-05-31 PROCEDURE — 99490 CHRNC CARE MGMT STAFF 1ST 20: CPT | Mod: PBBFAC,PN | Performed by: INTERNAL MEDICINE

## 2021-06-01 DIAGNOSIS — E11.22 CONTROLLED TYPE 2 DIABETES MELLITUS WITH STAGE 3 CHRONIC KIDNEY DISEASE, WITHOUT LONG-TERM CURRENT USE OF INSULIN: ICD-10-CM

## 2021-06-01 DIAGNOSIS — E11.40 TYPE 2 DIABETES MELLITUS WITH DIABETIC NEUROPATHY, WITHOUT LONG-TERM CURRENT USE OF INSULIN: ICD-10-CM

## 2021-06-01 DIAGNOSIS — N18.30 CONTROLLED TYPE 2 DIABETES MELLITUS WITH STAGE 3 CHRONIC KIDNEY DISEASE, WITHOUT LONG-TERM CURRENT USE OF INSULIN: ICD-10-CM

## 2021-06-01 RX ORDER — METFORMIN HYDROCHLORIDE 500 MG/1
500 TABLET, EXTENDED RELEASE ORAL
Qty: 90 TABLET | Refills: 3 | Status: SHIPPED | OUTPATIENT
Start: 2021-06-01 | End: 2022-04-19

## 2021-06-30 ENCOUNTER — EXTERNAL CHRONIC CARE MANAGEMENT (OUTPATIENT)
Dept: PRIMARY CARE CLINIC | Facility: CLINIC | Age: 86
End: 2021-06-30
Payer: MEDICARE

## 2021-06-30 PROCEDURE — 99490 PR CHRONIC CARE MGMT, 1ST 20 MIN: ICD-10-PCS | Mod: S$PBB,,, | Performed by: INTERNAL MEDICINE

## 2021-06-30 PROCEDURE — 99490 CHRNC CARE MGMT STAFF 1ST 20: CPT | Mod: PBBFAC,PN | Performed by: INTERNAL MEDICINE

## 2021-06-30 PROCEDURE — 99490 CHRNC CARE MGMT STAFF 1ST 20: CPT | Mod: S$PBB,,, | Performed by: INTERNAL MEDICINE

## 2021-07-30 DIAGNOSIS — I25.810 CORONARY ARTERY DISEASE INVOLVING CORONARY BYPASS GRAFT OF NATIVE HEART WITHOUT ANGINA PECTORIS: ICD-10-CM

## 2021-07-30 RX ORDER — ASPIRIN 81 MG/1
81 TABLET ORAL DAILY
Qty: 90 TABLET | Refills: 3 | Status: ON HOLD | OUTPATIENT
Start: 2021-07-30 | End: 2022-09-23 | Stop reason: SDUPTHER

## 2021-07-31 ENCOUNTER — EXTERNAL CHRONIC CARE MANAGEMENT (OUTPATIENT)
Dept: PRIMARY CARE CLINIC | Facility: CLINIC | Age: 86
End: 2021-07-31
Payer: MEDICARE

## 2021-07-31 PROCEDURE — 99490 CHRNC CARE MGMT STAFF 1ST 20: CPT | Mod: PBBFAC,PN | Performed by: INTERNAL MEDICINE

## 2021-07-31 PROCEDURE — 99490 PR CHRONIC CARE MGMT, 1ST 20 MIN: ICD-10-PCS | Mod: S$PBB,,, | Performed by: INTERNAL MEDICINE

## 2021-07-31 PROCEDURE — 99490 CHRNC CARE MGMT STAFF 1ST 20: CPT | Mod: S$PBB,,, | Performed by: INTERNAL MEDICINE

## 2021-08-16 ENCOUNTER — TELEPHONE (OUTPATIENT)
Dept: FAMILY MEDICINE | Facility: CLINIC | Age: 86
End: 2021-08-16

## 2021-08-16 DIAGNOSIS — G47.00 INSOMNIA, UNSPECIFIED TYPE: ICD-10-CM

## 2021-08-16 RX ORDER — TRAZODONE HYDROCHLORIDE 150 MG/1
150 TABLET ORAL NIGHTLY PRN
Qty: 90 TABLET | Refills: 0 | Status: SHIPPED | OUTPATIENT
Start: 2021-08-16 | End: 2021-11-08

## 2021-08-19 ENCOUNTER — OFFICE VISIT (OUTPATIENT)
Dept: FAMILY MEDICINE | Facility: CLINIC | Age: 86
End: 2021-08-19
Payer: MEDICARE

## 2021-08-19 ENCOUNTER — LAB VISIT (OUTPATIENT)
Dept: LAB | Facility: HOSPITAL | Age: 86
End: 2021-08-19
Attending: INTERNAL MEDICINE
Payer: MEDICARE

## 2021-08-19 VITALS
HEIGHT: 66 IN | SYSTOLIC BLOOD PRESSURE: 150 MMHG | BODY MASS INDEX: 24.94 KG/M2 | HEART RATE: 90 BPM | OXYGEN SATURATION: 99 % | WEIGHT: 155.19 LBS | DIASTOLIC BLOOD PRESSURE: 88 MMHG | TEMPERATURE: 98 F

## 2021-08-19 DIAGNOSIS — E11.22 CONTROLLED TYPE 2 DIABETES MELLITUS WITH STAGE 3 CHRONIC KIDNEY DISEASE, WITHOUT LONG-TERM CURRENT USE OF INSULIN: ICD-10-CM

## 2021-08-19 DIAGNOSIS — D63.8 ANEMIA OF CHRONIC DISEASE: ICD-10-CM

## 2021-08-19 DIAGNOSIS — R30.0 DYSURIA: Primary | ICD-10-CM

## 2021-08-19 DIAGNOSIS — I10 ESSENTIAL HYPERTENSION: ICD-10-CM

## 2021-08-19 DIAGNOSIS — R30.0 DYSURIA: ICD-10-CM

## 2021-08-19 DIAGNOSIS — Z95.1 S/P CABG (CORONARY ARTERY BYPASS GRAFT): ICD-10-CM

## 2021-08-19 DIAGNOSIS — N40.1 BENIGN PROSTATIC HYPERPLASIA WITH NOCTURIA: ICD-10-CM

## 2021-08-19 DIAGNOSIS — I25.10 CORONARY ARTERY DISEASE INVOLVING NATIVE CORONARY ARTERY OF NATIVE HEART WITHOUT ANGINA PECTORIS: ICD-10-CM

## 2021-08-19 DIAGNOSIS — R35.1 BENIGN PROSTATIC HYPERPLASIA WITH NOCTURIA: ICD-10-CM

## 2021-08-19 DIAGNOSIS — K59.00 CONSTIPATION, UNSPECIFIED CONSTIPATION TYPE: ICD-10-CM

## 2021-08-19 DIAGNOSIS — E78.5 HYPERLIPIDEMIA, UNSPECIFIED HYPERLIPIDEMIA TYPE: ICD-10-CM

## 2021-08-19 DIAGNOSIS — N18.30 CONTROLLED TYPE 2 DIABETES MELLITUS WITH STAGE 3 CHRONIC KIDNEY DISEASE, WITHOUT LONG-TERM CURRENT USE OF INSULIN: ICD-10-CM

## 2021-08-19 DIAGNOSIS — E11.40 TYPE 2 DIABETES MELLITUS WITH DIABETIC NEUROPATHY, WITHOUT LONG-TERM CURRENT USE OF INSULIN: ICD-10-CM

## 2021-08-19 LAB
BILIRUB UR QL STRIP: NEGATIVE
CLARITY UR REFRACT.AUTO: CLEAR
COLOR UR AUTO: YELLOW
GLUCOSE UR QL STRIP: NEGATIVE
HGB UR QL STRIP: NEGATIVE
KETONES UR QL STRIP: NEGATIVE
LEUKOCYTE ESTERASE UR QL STRIP: NEGATIVE
NITRITE UR QL STRIP: NEGATIVE
PH UR STRIP: 5 [PH] (ref 5–8)
PROT UR QL STRIP: NEGATIVE
SP GR UR STRIP: 1.02 (ref 1–1.03)
URN SPEC COLLECT METH UR: NORMAL

## 2021-08-19 PROCEDURE — 81003 URINALYSIS AUTO W/O SCOPE: CPT | Performed by: INTERNAL MEDICINE

## 2021-08-19 PROCEDURE — 99213 OFFICE O/P EST LOW 20 MIN: CPT | Mod: PBBFAC,PO | Performed by: INTERNAL MEDICINE

## 2021-08-19 PROCEDURE — 99214 OFFICE O/P EST MOD 30 MIN: CPT | Mod: S$PBB,,, | Performed by: INTERNAL MEDICINE

## 2021-08-19 PROCEDURE — 87086 URINE CULTURE/COLONY COUNT: CPT | Performed by: INTERNAL MEDICINE

## 2021-08-19 PROCEDURE — 99999 PR PBB SHADOW E&M-EST. PATIENT-LVL III: CPT | Mod: PBBFAC,,, | Performed by: INTERNAL MEDICINE

## 2021-08-19 PROCEDURE — 99214 PR OFFICE/OUTPT VISIT, EST, LEVL IV, 30-39 MIN: ICD-10-PCS | Mod: S$PBB,,, | Performed by: INTERNAL MEDICINE

## 2021-08-19 PROCEDURE — 99999 PR PBB SHADOW E&M-EST. PATIENT-LVL III: ICD-10-PCS | Mod: PBBFAC,,, | Performed by: INTERNAL MEDICINE

## 2021-08-19 RX ORDER — DOCUSATE SODIUM 100 MG/1
100 CAPSULE, LIQUID FILLED ORAL 2 TIMES DAILY PRN
Qty: 60 CAPSULE | Refills: 12 | COMMUNITY
Start: 2021-08-19 | End: 2023-03-07

## 2021-08-20 ENCOUNTER — HOSPITAL ENCOUNTER (EMERGENCY)
Facility: HOSPITAL | Age: 86
Discharge: HOME | End: 2021-08-20
Attending: EMERGENCY MEDICINE
Payer: MEDICARE

## 2021-08-20 VITALS
BODY MASS INDEX: 24.11 KG/M2 | TEMPERATURE: 98 F | WEIGHT: 150 LBS | HEART RATE: 82 BPM | RESPIRATION RATE: 18 BRPM | OXYGEN SATURATION: 96 % | SYSTOLIC BLOOD PRESSURE: 179 MMHG | HEIGHT: 66 IN | DIASTOLIC BLOOD PRESSURE: 99 MMHG

## 2021-08-20 DIAGNOSIS — M79.605 PAIN IN BOTH LOWER EXTREMITIES: Primary | ICD-10-CM

## 2021-08-20 DIAGNOSIS — M79.604 PAIN IN BOTH LOWER EXTREMITIES: Primary | ICD-10-CM

## 2021-08-20 PROCEDURE — 63600175 PHARM REV CODE 636 W HCPCS: Mod: ER | Performed by: EMERGENCY MEDICINE

## 2021-08-20 PROCEDURE — 99284 EMERGENCY DEPT VISIT MOD MDM: CPT | Mod: 25,ER

## 2021-08-20 PROCEDURE — 96372 THER/PROPH/DIAG INJ SC/IM: CPT | Mod: ER

## 2021-08-20 RX ORDER — DICLOFENAC SODIUM 10 MG/G
2 GEL TOPICAL 4 TIMES DAILY
Qty: 450 G | Refills: 0 | Status: SHIPPED | OUTPATIENT
Start: 2021-08-20 | End: 2023-05-05

## 2021-08-20 RX ORDER — KETOROLAC TROMETHAMINE 30 MG/ML
30 INJECTION, SOLUTION INTRAMUSCULAR; INTRAVENOUS
Status: COMPLETED | OUTPATIENT
Start: 2021-08-20 | End: 2021-08-20

## 2021-08-20 RX ORDER — ACETAMINOPHEN 500 MG
500 TABLET ORAL EVERY 6 HOURS PRN
Qty: 30 TABLET | Refills: 0 | Status: SHIPPED | OUTPATIENT
Start: 2021-08-20 | End: 2023-03-07

## 2021-08-20 RX ADMIN — KETOROLAC TROMETHAMINE 30 MG: 30 INJECTION, SOLUTION INTRAMUSCULAR; INTRAVENOUS at 11:08

## 2021-08-21 LAB — BACTERIA UR CULT: NORMAL

## 2021-08-31 ENCOUNTER — EXTERNAL CHRONIC CARE MANAGEMENT (OUTPATIENT)
Dept: PRIMARY CARE CLINIC | Facility: CLINIC | Age: 86
End: 2021-08-31
Payer: MEDICARE

## 2021-08-31 PROCEDURE — 99490 PR CHRONIC CARE MGMT, 1ST 20 MIN: ICD-10-PCS | Mod: S$PBB,,, | Performed by: INTERNAL MEDICINE

## 2021-08-31 PROCEDURE — 99490 CHRNC CARE MGMT STAFF 1ST 20: CPT | Mod: S$PBB,,, | Performed by: INTERNAL MEDICINE

## 2021-08-31 PROCEDURE — 99490 CHRNC CARE MGMT STAFF 1ST 20: CPT | Mod: PBBFAC,PN | Performed by: INTERNAL MEDICINE

## 2021-09-27 ENCOUNTER — PATIENT OUTREACH (OUTPATIENT)
Dept: ADMINISTRATIVE | Facility: OTHER | Age: 86
End: 2021-09-27

## 2021-09-30 ENCOUNTER — EXTERNAL CHRONIC CARE MANAGEMENT (OUTPATIENT)
Dept: PRIMARY CARE CLINIC | Facility: CLINIC | Age: 86
End: 2021-09-30
Payer: MEDICARE

## 2021-09-30 PROCEDURE — 99490 CHRNC CARE MGMT STAFF 1ST 20: CPT | Mod: PBBFAC,PN | Performed by: INTERNAL MEDICINE

## 2021-09-30 PROCEDURE — 99490 CHRNC CARE MGMT STAFF 1ST 20: CPT | Mod: S$PBB,,, | Performed by: INTERNAL MEDICINE

## 2021-09-30 PROCEDURE — 99490 PR CHRONIC CARE MGMT, 1ST 20 MIN: ICD-10-PCS | Mod: S$PBB,,, | Performed by: INTERNAL MEDICINE

## 2021-10-28 ENCOUNTER — PATIENT OUTREACH (OUTPATIENT)
Dept: ADMINISTRATIVE | Facility: OTHER | Age: 86
End: 2021-10-28
Payer: MEDICAID

## 2021-10-28 ENCOUNTER — PATIENT MESSAGE (OUTPATIENT)
Dept: ADMINISTRATIVE | Facility: HOSPITAL | Age: 86
End: 2021-10-28
Payer: MEDICAID

## 2021-10-31 ENCOUNTER — EXTERNAL CHRONIC CARE MANAGEMENT (OUTPATIENT)
Dept: PRIMARY CARE CLINIC | Facility: CLINIC | Age: 86
End: 2021-10-31
Payer: MEDICARE

## 2021-10-31 PROCEDURE — 99490 CHRNC CARE MGMT STAFF 1ST 20: CPT | Mod: S$PBB,,, | Performed by: INTERNAL MEDICINE

## 2021-10-31 PROCEDURE — 99490 PR CHRONIC CARE MGMT, 1ST 20 MIN: ICD-10-PCS | Mod: S$PBB,,, | Performed by: INTERNAL MEDICINE

## 2021-10-31 PROCEDURE — 99490 CHRNC CARE MGMT STAFF 1ST 20: CPT | Mod: PBBFAC,PN | Performed by: INTERNAL MEDICINE

## 2021-11-08 ENCOUNTER — TELEPHONE (OUTPATIENT)
Dept: FAMILY MEDICINE | Facility: CLINIC | Age: 86
End: 2021-11-08
Payer: MEDICAID

## 2021-11-08 DIAGNOSIS — G47.00 INSOMNIA, UNSPECIFIED TYPE: ICD-10-CM

## 2021-11-08 RX ORDER — TRAZODONE HYDROCHLORIDE 150 MG/1
150 TABLET ORAL NIGHTLY PRN
Qty: 90 TABLET | Refills: 0 | Status: SHIPPED | OUTPATIENT
Start: 2021-11-08 | End: 2022-01-19

## 2021-11-30 ENCOUNTER — EXTERNAL CHRONIC CARE MANAGEMENT (OUTPATIENT)
Dept: PRIMARY CARE CLINIC | Facility: CLINIC | Age: 86
End: 2021-11-30
Payer: MEDICARE

## 2021-11-30 PROCEDURE — 99489 PR COMPLX CHRON CARE MGMT, EA ADDTL 30 MIN, PER MONTH: ICD-10-PCS | Mod: S$PBB,,, | Performed by: INTERNAL MEDICINE

## 2021-11-30 PROCEDURE — 99489 CPLX CHRNC CARE EA ADDL 30: CPT | Mod: PBBFAC,27,PN | Performed by: INTERNAL MEDICINE

## 2021-11-30 PROCEDURE — 99489 CPLX CHRNC CARE EA ADDL 30: CPT | Mod: S$PBB,,, | Performed by: INTERNAL MEDICINE

## 2021-11-30 PROCEDURE — 99487 PR COMPLX CHRON CARE MGMT, 1ST HR, PER MONTH: ICD-10-PCS | Mod: S$PBB,,, | Performed by: INTERNAL MEDICINE

## 2021-11-30 PROCEDURE — 99487 CPLX CHRNC CARE 1ST 60 MIN: CPT | Mod: PBBFAC,25,27,PN | Performed by: INTERNAL MEDICINE

## 2021-11-30 PROCEDURE — 99487 CPLX CHRNC CARE 1ST 60 MIN: CPT | Mod: S$PBB,,, | Performed by: INTERNAL MEDICINE

## 2021-12-15 ENCOUNTER — PATIENT MESSAGE (OUTPATIENT)
Dept: ADMINISTRATIVE | Facility: HOSPITAL | Age: 86
End: 2021-12-15
Payer: MEDICAID

## 2021-12-23 ENCOUNTER — OFFICE VISIT (OUTPATIENT)
Dept: FAMILY MEDICINE | Facility: CLINIC | Age: 86
End: 2021-12-23
Payer: MEDICARE

## 2021-12-23 ENCOUNTER — TELEPHONE (OUTPATIENT)
Dept: FAMILY MEDICINE | Facility: CLINIC | Age: 86
End: 2021-12-23
Payer: MEDICAID

## 2021-12-23 VITALS
BODY MASS INDEX: 24.31 KG/M2 | WEIGHT: 151.25 LBS | HEART RATE: 98 BPM | DIASTOLIC BLOOD PRESSURE: 70 MMHG | SYSTOLIC BLOOD PRESSURE: 136 MMHG | RESPIRATION RATE: 18 BRPM | TEMPERATURE: 98 F | HEIGHT: 66 IN | OXYGEN SATURATION: 97 %

## 2021-12-23 DIAGNOSIS — R09.82 POST-NASAL DRIP: ICD-10-CM

## 2021-12-23 DIAGNOSIS — N18.30 CONTROLLED TYPE 2 DIABETES MELLITUS WITH STAGE 3 CHRONIC KIDNEY DISEASE, WITHOUT LONG-TERM CURRENT USE OF INSULIN: ICD-10-CM

## 2021-12-23 DIAGNOSIS — Z23 NEED FOR INFLUENZA VACCINATION: ICD-10-CM

## 2021-12-23 DIAGNOSIS — I10 ESSENTIAL HYPERTENSION: Primary | ICD-10-CM

## 2021-12-23 DIAGNOSIS — E11.22 CONTROLLED TYPE 2 DIABETES MELLITUS WITH STAGE 3 CHRONIC KIDNEY DISEASE, WITHOUT LONG-TERM CURRENT USE OF INSULIN: ICD-10-CM

## 2021-12-23 PROCEDURE — 99215 OFFICE O/P EST HI 40 MIN: CPT | Mod: PBBFAC,PN,25 | Performed by: INTERNAL MEDICINE

## 2021-12-23 PROCEDURE — 99999 PR PBB SHADOW E&M-EST. PATIENT-LVL V: CPT | Mod: PBBFAC,,, | Performed by: INTERNAL MEDICINE

## 2021-12-23 PROCEDURE — 90694 VACC AIIV4 NO PRSRV 0.5ML IM: CPT | Mod: PBBFAC,PN

## 2021-12-23 PROCEDURE — 99214 OFFICE O/P EST MOD 30 MIN: CPT | Mod: S$PBB,,, | Performed by: INTERNAL MEDICINE

## 2021-12-23 PROCEDURE — 99214 PR OFFICE/OUTPT VISIT, EST, LEVL IV, 30-39 MIN: ICD-10-PCS | Mod: S$PBB,,, | Performed by: INTERNAL MEDICINE

## 2021-12-23 PROCEDURE — 99999 PR PBB SHADOW E&M-EST. PATIENT-LVL V: ICD-10-PCS | Mod: PBBFAC,,, | Performed by: INTERNAL MEDICINE

## 2021-12-23 PROCEDURE — G0008 ADMIN INFLUENZA VIRUS VAC: HCPCS | Mod: PBBFAC,PN

## 2021-12-23 RX ORDER — LISINOPRIL 20 MG/1
20 TABLET ORAL DAILY
Qty: 90 TABLET | Refills: 3 | Status: SHIPPED | OUTPATIENT
Start: 2021-12-23 | End: 2022-09-06 | Stop reason: SDUPTHER

## 2021-12-23 RX ORDER — FLUTICASONE PROPIONATE 50 MCG
1 SPRAY, SUSPENSION (ML) NASAL 2 TIMES DAILY PRN
Qty: 16 ML | Refills: 1 | Status: SHIPPED | OUTPATIENT
Start: 2021-12-23 | End: 2021-12-23

## 2021-12-31 ENCOUNTER — EXTERNAL CHRONIC CARE MANAGEMENT (OUTPATIENT)
Dept: PRIMARY CARE CLINIC | Facility: CLINIC | Age: 86
End: 2021-12-31
Payer: MEDICARE

## 2021-12-31 PROCEDURE — 99490 PR CHRONIC CARE MGMT, 1ST 20 MIN: ICD-10-PCS | Mod: S$PBB,,, | Performed by: INTERNAL MEDICINE

## 2021-12-31 PROCEDURE — 99490 CHRNC CARE MGMT STAFF 1ST 20: CPT | Mod: S$PBB,,, | Performed by: INTERNAL MEDICINE

## 2021-12-31 PROCEDURE — 99490 CHRNC CARE MGMT STAFF 1ST 20: CPT | Mod: PBBFAC,PN | Performed by: INTERNAL MEDICINE

## 2022-01-19 DIAGNOSIS — G47.00 INSOMNIA, UNSPECIFIED TYPE: ICD-10-CM

## 2022-01-19 RX ORDER — TRAZODONE HYDROCHLORIDE 150 MG/1
TABLET ORAL
Qty: 90 TABLET | Refills: 1 | Status: SHIPPED | OUTPATIENT
Start: 2022-01-19 | End: 2022-02-11

## 2022-01-19 NOTE — TELEPHONE ENCOUNTER
No new care gaps identified.  Powered by [email or web address]. Reference number: [id number].   1/19/2022 9:36:02 AM CST

## 2022-01-31 ENCOUNTER — EXTERNAL CHRONIC CARE MANAGEMENT (OUTPATIENT)
Dept: PRIMARY CARE CLINIC | Facility: CLINIC | Age: 87
End: 2022-01-31
Payer: COMMERCIAL

## 2022-01-31 PROCEDURE — 99490 PR CHRONIC CARE MGMT, 1ST 20 MIN: ICD-10-PCS | Mod: S$PBB,,, | Performed by: INTERNAL MEDICINE

## 2022-01-31 PROCEDURE — 99490 CHRNC CARE MGMT STAFF 1ST 20: CPT | Mod: S$PBB,,, | Performed by: INTERNAL MEDICINE

## 2022-01-31 PROCEDURE — 99490 CHRNC CARE MGMT STAFF 1ST 20: CPT | Mod: PBBFAC,PN | Performed by: INTERNAL MEDICINE

## 2022-02-28 ENCOUNTER — EXTERNAL CHRONIC CARE MANAGEMENT (OUTPATIENT)
Dept: PRIMARY CARE CLINIC | Facility: CLINIC | Age: 87
End: 2022-02-28
Payer: COMMERCIAL

## 2022-02-28 PROCEDURE — 99490 PR CHRONIC CARE MGMT, 1ST 20 MIN: ICD-10-PCS | Mod: S$PBB,,, | Performed by: INTERNAL MEDICINE

## 2022-02-28 PROCEDURE — 99490 CHRNC CARE MGMT STAFF 1ST 20: CPT | Mod: PBBFAC,PN | Performed by: INTERNAL MEDICINE

## 2022-02-28 PROCEDURE — 99490 CHRNC CARE MGMT STAFF 1ST 20: CPT | Mod: S$PBB,,, | Performed by: INTERNAL MEDICINE

## 2022-03-07 ENCOUNTER — PES CALL (OUTPATIENT)
Dept: ADMINISTRATIVE | Facility: CLINIC | Age: 87
End: 2022-03-07
Payer: COMMERCIAL

## 2022-03-16 ENCOUNTER — PES CALL (OUTPATIENT)
Dept: ADMINISTRATIVE | Facility: CLINIC | Age: 87
End: 2022-03-16
Payer: COMMERCIAL

## 2022-03-24 ENCOUNTER — PES CALL (OUTPATIENT)
Dept: ADMINISTRATIVE | Facility: CLINIC | Age: 87
End: 2022-03-24
Payer: COMMERCIAL

## 2022-03-28 ENCOUNTER — TELEPHONE (OUTPATIENT)
Dept: FAMILY MEDICINE | Facility: CLINIC | Age: 87
End: 2022-03-28
Payer: COMMERCIAL

## 2022-03-28 DIAGNOSIS — J30.89 NON-SEASONAL ALLERGIC RHINITIS, UNSPECIFIED TRIGGER: Primary | ICD-10-CM

## 2022-03-28 RX ORDER — CETIRIZINE HYDROCHLORIDE 10 MG/1
10 TABLET, CHEWABLE ORAL DAILY
Qty: 90 TABLET | Refills: 1 | Status: SHIPPED | OUTPATIENT
Start: 2022-03-28 | End: 2022-09-06 | Stop reason: SDUPTHER

## 2022-03-30 ENCOUNTER — HOSPITAL ENCOUNTER (EMERGENCY)
Facility: HOSPITAL | Age: 87
Discharge: HOME | End: 2022-03-30
Attending: EMERGENCY MEDICINE
Payer: COMMERCIAL

## 2022-03-30 VITALS
HEIGHT: 60 IN | DIASTOLIC BLOOD PRESSURE: 89 MMHG | OXYGEN SATURATION: 98 % | HEART RATE: 98 BPM | WEIGHT: 140 LBS | SYSTOLIC BLOOD PRESSURE: 143 MMHG | TEMPERATURE: 99 F | BODY MASS INDEX: 27.48 KG/M2 | RESPIRATION RATE: 48 BRPM

## 2022-03-30 DIAGNOSIS — J06.9 UPPER RESPIRATORY TRACT INFECTION, UNSPECIFIED TYPE: Primary | ICD-10-CM

## 2022-03-30 DIAGNOSIS — R11.10 VOMITING: ICD-10-CM

## 2022-03-30 LAB
ALBUMIN SERPL BCP-MCNC: 3.9 G/DL (ref 3.5–5.2)
ALP SERPL-CCNC: 84 U/L (ref 55–135)
ALT SERPL W/O P-5'-P-CCNC: 16 U/L (ref 10–44)
ANION GAP SERPL CALC-SCNC: 11 MMOL/L (ref 8–16)
AST SERPL-CCNC: 21 U/L (ref 10–40)
BASOPHILS # BLD AUTO: 0.02 K/UL (ref 0–0.2)
BASOPHILS NFR BLD: 0.3 % (ref 0–1.9)
BILIRUB SERPL-MCNC: 0.2 MG/DL (ref 0.1–1)
BNP SERPL-MCNC: 40 PG/ML (ref 0–99)
BUN SERPL-MCNC: 19 MG/DL (ref 8–23)
CALCIUM SERPL-MCNC: 8.8 MG/DL (ref 8.7–10.5)
CHLORIDE SERPL-SCNC: 108 MMOL/L (ref 95–110)
CO2 SERPL-SCNC: 22 MMOL/L (ref 23–29)
CREAT SERPL-MCNC: 1.1 MG/DL (ref 0.5–1.4)
CTP QC/QA: YES
CTP QC/QA: YES
DIFFERENTIAL METHOD: ABNORMAL
EOSINOPHIL # BLD AUTO: 0.2 K/UL (ref 0–0.5)
EOSINOPHIL NFR BLD: 3.3 % (ref 0–8)
ERYTHROCYTE [DISTWIDTH] IN BLOOD BY AUTOMATED COUNT: 12.5 % (ref 11.5–14.5)
EST. GFR  (AFRICAN AMERICAN): >60 ML/MIN/1.73 M^2
EST. GFR  (NON AFRICAN AMERICAN): >60 ML/MIN/1.73 M^2
GLUCOSE SERPL-MCNC: 71 MG/DL (ref 70–110)
HCT VFR BLD AUTO: 37 % (ref 40–54)
HGB BLD-MCNC: 13.1 G/DL (ref 14–18)
IMM GRANULOCYTES # BLD AUTO: 0.01 K/UL (ref 0–0.04)
IMM GRANULOCYTES NFR BLD AUTO: 0.1 % (ref 0–0.5)
LYMPHOCYTES # BLD AUTO: 3.3 K/UL (ref 1–4.8)
LYMPHOCYTES NFR BLD: 45 % (ref 18–48)
MCH RBC QN AUTO: 32.3 PG (ref 27–31)
MCHC RBC AUTO-ENTMCNC: 35.4 G/DL (ref 32–36)
MCV RBC AUTO: 91 FL (ref 82–98)
MONOCYTES # BLD AUTO: 0.7 K/UL (ref 0.3–1)
MONOCYTES NFR BLD: 9.6 % (ref 4–15)
NEUTROPHILS # BLD AUTO: 3 K/UL (ref 1.8–7.7)
NEUTROPHILS NFR BLD: 41.7 % (ref 38–73)
NRBC BLD-RTO: 0 /100 WBC
PLATELET # BLD AUTO: 206 K/UL (ref 150–450)
PMV BLD AUTO: 9.2 FL (ref 9.2–12.9)
POC MOLECULAR INFLUENZA A AGN: NEGATIVE
POC MOLECULAR INFLUENZA B AGN: NEGATIVE
POTASSIUM SERPL-SCNC: 3.9 MMOL/L (ref 3.5–5.1)
PROT SERPL-MCNC: 7.7 G/DL (ref 6–8.4)
RBC # BLD AUTO: 4.06 M/UL (ref 4.6–6.2)
SARS-COV-2 RDRP RESP QL NAA+PROBE: NEGATIVE
SODIUM SERPL-SCNC: 141 MMOL/L (ref 136–145)
TROPONIN I SERPL DL<=0.01 NG/ML-MCNC: 0.01 NG/ML (ref 0–0.03)
WBC # BLD AUTO: 7.27 K/UL (ref 3.9–12.7)

## 2022-03-30 PROCEDURE — U0002 COVID-19 LAB TEST NON-CDC: HCPCS | Performed by: EMERGENCY MEDICINE

## 2022-03-30 PROCEDURE — 83880 ASSAY OF NATRIURETIC PEPTIDE: CPT | Performed by: EMERGENCY MEDICINE

## 2022-03-30 PROCEDURE — 94761 N-INVAS EAR/PLS OXIMETRY MLT: CPT

## 2022-03-30 PROCEDURE — 84484 ASSAY OF TROPONIN QUANT: CPT | Performed by: EMERGENCY MEDICINE

## 2022-03-30 PROCEDURE — 87502 INFLUENZA DNA AMP PROBE: CPT

## 2022-03-30 PROCEDURE — 85025 COMPLETE CBC W/AUTO DIFF WBC: CPT | Performed by: EMERGENCY MEDICINE

## 2022-03-30 PROCEDURE — 99285 EMERGENCY DEPT VISIT HI MDM: CPT | Mod: 25

## 2022-03-30 PROCEDURE — 80053 COMPREHEN METABOLIC PANEL: CPT | Performed by: EMERGENCY MEDICINE

## 2022-03-30 PROCEDURE — 63600175 PHARM REV CODE 636 W HCPCS: Performed by: EMERGENCY MEDICINE

## 2022-03-30 PROCEDURE — 25000242 PHARM REV CODE 250 ALT 637 W/ HCPCS: Performed by: EMERGENCY MEDICINE

## 2022-03-30 PROCEDURE — 94640 AIRWAY INHALATION TREATMENT: CPT

## 2022-03-30 PROCEDURE — 96374 THER/PROPH/DIAG INJ IV PUSH: CPT

## 2022-03-30 RX ORDER — IPRATROPIUM BROMIDE AND ALBUTEROL SULFATE 2.5; .5 MG/3ML; MG/3ML
3 SOLUTION RESPIRATORY (INHALATION)
Status: COMPLETED | OUTPATIENT
Start: 2022-03-30 | End: 2022-03-30

## 2022-03-30 RX ORDER — ONDANSETRON 4 MG/1
4 TABLET, ORALLY DISINTEGRATING ORAL EVERY 8 HOURS PRN
Qty: 20 TABLET | Refills: 0 | Status: SHIPPED | OUTPATIENT
Start: 2022-03-30 | End: 2023-03-07

## 2022-03-30 RX ORDER — PREDNISONE 10 MG/1
10 TABLET ORAL DAILY
Qty: 21 TABLET | Refills: 0 | Status: SHIPPED | OUTPATIENT
Start: 2022-03-30 | End: 2022-04-22

## 2022-03-30 RX ORDER — ALBUTEROL SULFATE 90 UG/1
1-2 AEROSOL, METERED RESPIRATORY (INHALATION) EVERY 4 HOURS PRN
Qty: 18 G | Refills: 0 | Status: SHIPPED | OUTPATIENT
Start: 2022-03-30 | End: 2022-04-22 | Stop reason: SDUPTHER

## 2022-03-30 RX ORDER — PREDNISONE 10 MG/1
10 TABLET ORAL DAILY
Qty: 21 TABLET | Refills: 0 | Status: SHIPPED | OUTPATIENT
Start: 2022-03-30 | End: 2022-03-30 | Stop reason: SDUPTHER

## 2022-03-30 RX ORDER — DOXYCYCLINE 100 MG/1
100 CAPSULE ORAL 2 TIMES DAILY
Qty: 20 CAPSULE | Refills: 0 | Status: SHIPPED | OUTPATIENT
Start: 2022-03-30 | End: 2022-04-09

## 2022-03-30 RX ORDER — ONDANSETRON 2 MG/ML
4 INJECTION INTRAMUSCULAR; INTRAVENOUS
Status: COMPLETED | OUTPATIENT
Start: 2022-03-30 | End: 2022-03-30

## 2022-03-30 RX ADMIN — IPRATROPIUM BROMIDE AND ALBUTEROL SULFATE 3 ML: 2.5; .5 SOLUTION RESPIRATORY (INHALATION) at 10:03

## 2022-03-30 RX ADMIN — ONDANSETRON 4 MG: 2 INJECTION INTRAMUSCULAR; INTRAVENOUS at 10:03

## 2022-03-30 NOTE — ED TRIAGE NOTES
Pt c/o cold symptoms and productive cough with some post-tussive emesis for 3-4 days.  Pt states he produced some white sputum with his cough.  Denies any nausea or abdominal pain.  Has periods of SOB, especially when coughing.  Denies any CP.

## 2022-03-30 NOTE — ED PROVIDER NOTES
Encounter Date: 3/30/2022    SCRIBE #1 NOTE: I, Amparo Glover, am scribing for, and in the presence of,  Jamari Villeda MD. I have scribed the following portions of the note - Other sections scribed: HPI, ROS.       History     Chief Complaint   Patient presents with    Vomiting     Pt reports vomiting x 3 days. Reports SOB and nasal congestion since Friday. Reports hx HTN and DM. Denies any hx of respiratory problems.     Destin Barber is a 87 y.o male with a PMHx of CAD, HTN, hyperlipidemia, MI, and DM type 2 presenting to the ED with a chief complaint of vomiting onset 3 days ago. The patient complains of congestion with associated vomiting and shortness of breath exacerbated by cough. States that he has been coughing up thick mucus since onset of symptoms. Denies exposure to known sick contacts. Patient is not a tobacco smoker. Denies chest pain, fever, diarrhea, and abdominal pain. No other associated symptoms.     The history is provided by the patient. No  was used.     Review of patient's allergies indicates:   Allergen Reactions    Penicillins Nausea And Vomiting     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    Anemia     Coronary artery disease     Diastolic dysfunction     Gout, chronic     Heart failure     HTN (hypertension)     Hyperlipidemia     Myocardial infarction     Pneumonia due to other staphylococcus     Pressure ulcer     Renal manifestation of secondary diabetes mellitus     Type 2 diabetes mellitus     diet controlled     Past Surgical History:   Procedure Laterality Date    CATARACT EXTRACTION Bilateral     CATARACT EXTRACTION W/ ANTERIOR VITRECTOMY      CORONARY ARTERY BYPASS GRAFT  07/06/2016    PAIZ-LAD, SVG-Ramus, SVG-PDA    HEMORRHOID SURGERY       Family History   Problem Relation Age of Onset    Cancer Father         colon    Hypertension Mother     Heart disease Mother     Heart  disease Sister     No Known Problems Brother     No Known Problems Maternal Aunt     No Known Problems Maternal Uncle     No Known Problems Paternal Aunt     No Known Problems Paternal Uncle     No Known Problems Maternal Grandmother     No Known Problems Maternal Grandfather     No Known Problems Paternal Grandmother     No Known Problems Paternal Grandfather     Amblyopia Neg Hx     Blindness Neg Hx     Cataracts Neg Hx     Diabetes Neg Hx     Glaucoma Neg Hx     Macular degeneration Neg Hx     Retinal detachment Neg Hx     Strabismus Neg Hx     Stroke Neg Hx     Thyroid disease Neg Hx      Social History     Tobacco Use    Smoking status: Former Smoker     Types: Cigarettes     Quit date: 1985     Years since quittin.7    Smokeless tobacco: Never Used   Substance Use Topics    Alcohol use: No    Drug use: No     Review of Systems   Constitutional: Negative.    HENT: Positive for congestion.    Eyes: Negative.    Respiratory: Positive for cough and shortness of breath.    Cardiovascular: Negative.    Gastrointestinal: Positive for vomiting.   Genitourinary: Negative.    Musculoskeletal: Negative.    Skin: Negative.    Neurological: Negative.    Psychiatric/Behavioral: Negative.        Physical Exam     Initial Vitals [22 0905]   BP Pulse Resp Temp SpO2   (!) 165/93 110 18 98.6 °F (37 °C) 100 %      MAP       --         Physical Exam    Nursing note and vitals reviewed.  Constitutional: He appears well-developed and well-nourished. He is not diaphoretic. No distress.   HENT:   Head: Normocephalic and atraumatic.   Nose: Nose normal.   Mouth/Throat: No oropharyngeal exudate.   Boggy nasal mucosa   Eyes: EOM are normal. Pupils are equal, round, and reactive to light.   Neck: Neck supple. No tracheal deviation present. No JVD present.   Normal range of motion.  Cardiovascular: Normal rate, regular rhythm, normal heart sounds and intact distal pulses.   Pulmonary/Chest: Breath  sounds normal. No respiratory distress. He has no wheezes. He has no rhonchi. He has no rales.   Abdominal: Abdomen is soft. Bowel sounds are normal. He exhibits no distension. There is abdominal tenderness (mild epigastric). There is no rebound and no guarding.   Musculoskeletal:         General: No tenderness or edema. Normal range of motion.      Cervical back: Normal range of motion and neck supple.     Neurological: He is alert and oriented to person, place, and time. He has normal strength.   Skin: Skin is warm and dry. Capillary refill takes less than 2 seconds. No rash noted. No erythema.         ED Course   Procedures  Labs Reviewed   CBC W/ AUTO DIFFERENTIAL - Abnormal; Notable for the following components:       Result Value    RBC 4.06 (*)     Hemoglobin 13.1 (*)     Hematocrit 37.0 (*)     MCH 32.3 (*)     All other components within normal limits   COMPREHENSIVE METABOLIC PANEL - Abnormal; Notable for the following components:    CO2 22 (*)     All other components within normal limits   TROPONIN I   B-TYPE NATRIURETIC PEPTIDE   POCT INFLUENZA A/B MOLECULAR   SARS-COV-2 RDRP GENE          Imaging Results          X-Ray Chest 1 View (Final result)  Result time 03/30/22 10:14:04    Final result by Zechariah Agrawal Jr., MD (03/30/22 10:14:04)                 Impression:      No acute cardiopulmonary disease      Electronically signed by: Zechariah Bowers Jr  Date:    03/30/2022  Time:    10:14             Narrative:    EXAMINATION:  XR CHEST 1 VIEW    CLINICAL HISTORY:  Vomiting, unspecified    TECHNIQUE:  Single frontal view of the chest was performed.    COMPARISON:  03/21/2020    FINDINGS:  Cardiomediastinal silhouetteunchanged with some tortuosity and atherosclerosis of the thoracic aorta.    Calcified granuloma and parenchymal scar in the right upper lobe unchanged.  Lungs otherwise grossly clear within limitations of portable technique.    Sternal wire sutures.  No pleural effusions.                                  Medications   ondansetron injection 4 mg (4 mg Intravenous Given 3/30/22 1035)   albuterol-ipratropium 2.5 mg-0.5 mg/3 mL nebulizer solution 3 mL (3 mLs Nebulization Given 3/30/22 1040)     Medical Decision Making:   History:   Old Medical Records: I decided to obtain old medical records.  Clinical Tests:   Lab Tests: Ordered and Reviewed  Radiological Study: Ordered and Reviewed  Medical Tests: Ordered and Reviewed        MDM:    87-year-old male with past medical history as noted above presenting with cough, congestion, vomiting.  Physical exam as noted above.  ED workup notable for negative COVID, negative flu, troponin negative, BNP 40, CBC/CMP within normal limits, chest x-ray unremarkable.  Patient presentation and history more consistent with viral illness, no signs or symptoms of sepsis at this time, no fever, no pulmonary findings, chest x-ray clear, no leukocytosis.  Negative cardiac markers, some mild epigastric tenderness however still easily resolved with some symptomatic therapy given in the emergency department.  Patient reassessed with benign abdomen, noting significant improvement in symptoms, tolerating p.o. without any further complaints.  At this point time based on physical exam evaluation not suspect acute mi/ACS, pancreatitis, respiratory failure, PE, pericarditis, pneumonia, sepsis, pneumothorax, ischemic colitis, aortic dissection, or any further surgical or medical emergency.  Discussed diagnosis and further treatment with patient, including f/u.  Return precautions given and all questions answered.  Patient in understanding of plan.  Pt discharged to home improved and stable.         Scribe Attestation:   Scribe #1: I performed the above scribed service and the documentation accurately describes the services I performed. I attest to the accuracy of the note.                 Clinical Impression:   Final diagnoses:  [R11.10] Vomiting  [J06.9] Upper respiratory tract  infection, unspecified type (Primary)        I, Jamari Villeda M.D., personally performed the services described in this documentation. All medical record entries made by the scribe were at my direction and in my presence. I have reviewed the chart and agree that the record reflects my personal performance and is accurate and complete.       ED Disposition Condition    Discharge Stable        ED Prescriptions     Medication Sig Dispense Start Date End Date Auth. Provider    doxycycline (VIBRAMYCIN) 100 MG Cap Take 1 capsule (100 mg total) by mouth 2 (two) times daily. for 10 days 20 capsule 3/30/2022 4/9/2022 Jamari Villeda MD    predniSONE (DELTASONE) 10 MG tablet  (Status: Discontinued) Take 1 tablet (10 mg total) by mouth once daily. Take 4 tabs x 3 days, then  Take 2 tabs x 3 days, then   Take 1 tab x 3 days. 21 tablet 3/30/2022 3/30/2022 Jamari Villeda MD    ondansetron (ZOFRAN-ODT) 4 MG TbDL Take 1 tablet (4 mg total) by mouth every 8 (eight) hours as needed (nausea/vomiting). 20 tablet 3/30/2022  Jamari Villeda MD    albuterol (PROVENTIL/VENTOLIN HFA) 90 mcg/actuation inhaler Inhale 1-2 puffs into the lungs every 4 (four) hours as needed for Shortness of Breath. Rescue 18 g 3/30/2022 3/30/2023 Jamari Villeda MD    predniSONE (DELTASONE) 10 MG tablet Take 1 tablet (10 mg total) by mouth once daily. Take 4 tabs x 3 days, then Take 2 tabs x 3 days, then Take 1 tab x 3 days. 21 tablet 3/30/2022  Jamari Villeda MD        Follow-up Information     Follow up With Specialties Details Why Contact Info    [facility] - Emergency Dept Emergency Medicine Go to  If symptoms worsen [address]  [facility] [address] [phone]    Harry Velazco MD Internal Medicine, Wound Care Go in 1 week As needed [address] [facility] [address]  [phone]             Jamari Villeda MD  03/31/22 2037

## 2022-03-31 ENCOUNTER — EXTERNAL CHRONIC CARE MANAGEMENT (OUTPATIENT)
Dept: PRIMARY CARE CLINIC | Facility: CLINIC | Age: 87
End: 2022-03-31
Payer: COMMERCIAL

## 2022-03-31 PROCEDURE — 99490 CHRNC CARE MGMT STAFF 1ST 20: CPT | Mod: S$PBB,,, | Performed by: INTERNAL MEDICINE

## 2022-03-31 PROCEDURE — 99490 CHRNC CARE MGMT STAFF 1ST 20: CPT | Mod: PBBFAC,PN | Performed by: INTERNAL MEDICINE

## 2022-03-31 PROCEDURE — 99490 PR CHRONIC CARE MGMT, 1ST 20 MIN: ICD-10-PCS | Mod: S$PBB,,, | Performed by: INTERNAL MEDICINE

## 2022-03-31 PROCEDURE — 99439 CHRNC CARE MGMT STAF EA ADDL: CPT | Mod: S$PBB,,, | Performed by: INTERNAL MEDICINE

## 2022-03-31 PROCEDURE — 99439 CHRNC CARE MGMT STAF EA ADDL: CPT | Mod: PBBFAC,PN | Performed by: INTERNAL MEDICINE

## 2022-03-31 PROCEDURE — 99439 PR CHRONIC CARE MGMT, EA ADDTL 20 MIN: ICD-10-PCS | Mod: S$PBB,,, | Performed by: INTERNAL MEDICINE

## 2022-04-18 DIAGNOSIS — I25.10 CORONARY ARTERY DISEASE INVOLVING NATIVE CORONARY ARTERY OF NATIVE HEART WITHOUT ANGINA PECTORIS: ICD-10-CM

## 2022-04-18 DIAGNOSIS — E11.22 CONTROLLED TYPE 2 DIABETES MELLITUS WITH STAGE 3 CHRONIC KIDNEY DISEASE, WITHOUT LONG-TERM CURRENT USE OF INSULIN: ICD-10-CM

## 2022-04-18 DIAGNOSIS — I10 ESSENTIAL HYPERTENSION: ICD-10-CM

## 2022-04-18 DIAGNOSIS — E11.40 TYPE 2 DIABETES MELLITUS WITH DIABETIC NEUROPATHY, WITHOUT LONG-TERM CURRENT USE OF INSULIN: ICD-10-CM

## 2022-04-18 DIAGNOSIS — N18.30 CONTROLLED TYPE 2 DIABETES MELLITUS WITH STAGE 3 CHRONIC KIDNEY DISEASE, WITHOUT LONG-TERM CURRENT USE OF INSULIN: ICD-10-CM

## 2022-04-18 NOTE — TELEPHONE ENCOUNTER
Care Due:                  Date            Visit Type   Department     Provider  --------------------------------------------------------------------------------                                [facility]                              PRIMARY      MEDICINE/  Last Visit: 12-      CARE ([facility])   INTERNAL MED   Harry Velazco                              [facility]                              PRIMARY      MEDICINE/  Next Visit: 06-      CARE ([facility])   INTERNAL MED   Harry Velazco                                                            Last  Test          Frequency    Reason                     Performed    Due Date  --------------------------------------------------------------------------------    HBA1C.......  6 months...  metFORMIN................  12- 06-    Lipid Panel.  12 months..  pravastatin..............  01- 01-    Powered by [employer]. Reference number: [id number].   4/18/2022 8:29:20 AM CDT

## 2022-04-18 NOTE — TELEPHONE ENCOUNTER
Refill Routing Note   Medication(s) are not appropriate for processing by [facility] for the following reason(s):      - Required vitals are abnormal  - Patient has been seen in the ED/Hospital since the last PCP visit    [facility] action(s):  Defer Medication-related problems identified: Requires labs        Medication reconciliation completed: No     Appointments  past 12m or future 3m with PCP    Date Provider   Last Visit   12/23/2021 Harry Velazco MD   Next Visit   6/23/2022 Harry Velazco MD   ED visits in past 90 days: 1        Note composed:6:22 PM 04/18/2022

## 2022-04-19 RX ORDER — METFORMIN HYDROCHLORIDE 500 MG/1
TABLET, EXTENDED RELEASE ORAL
Qty: 90 TABLET | Refills: 0 | Status: SHIPPED | OUTPATIENT
Start: 2022-04-19 | End: 2022-09-06 | Stop reason: SDUPTHER

## 2022-04-19 RX ORDER — METOPROLOL TARTRATE 25 MG/1
TABLET, FILM COATED ORAL
Qty: 180 TABLET | Refills: 3 | Status: SHIPPED | OUTPATIENT
Start: 2022-04-19 | End: 2022-10-07

## 2022-04-19 RX ORDER — AMLODIPINE BESYLATE 10 MG/1
TABLET ORAL
Qty: 90 TABLET | Refills: 3 | Status: SHIPPED | OUTPATIENT
Start: 2022-04-19 | End: 2023-01-13 | Stop reason: SDUPTHER

## 2022-04-22 ENCOUNTER — OFFICE VISIT (OUTPATIENT)
Dept: FAMILY MEDICINE | Facility: CLINIC | Age: 87
End: 2022-04-22
Payer: COMMERCIAL

## 2022-04-22 VITALS
OXYGEN SATURATION: 99 % | HEART RATE: 75 BPM | WEIGHT: 140.63 LBS | HEIGHT: 60 IN | SYSTOLIC BLOOD PRESSURE: 170 MMHG | BODY MASS INDEX: 27.61 KG/M2 | TEMPERATURE: 99 F | DIASTOLIC BLOOD PRESSURE: 90 MMHG

## 2022-04-22 DIAGNOSIS — R42 DIZZINESS: ICD-10-CM

## 2022-04-22 DIAGNOSIS — E78.5 HYPERLIPIDEMIA, UNSPECIFIED HYPERLIPIDEMIA TYPE: ICD-10-CM

## 2022-04-22 DIAGNOSIS — M79.606 PAIN OF LOWER EXTREMITY, UNSPECIFIED LATERALITY: ICD-10-CM

## 2022-04-22 DIAGNOSIS — I70.0 AORTIC ATHEROSCLEROSIS: ICD-10-CM

## 2022-04-22 DIAGNOSIS — I10 ESSENTIAL HYPERTENSION: ICD-10-CM

## 2022-04-22 DIAGNOSIS — H61.23 IMPACTED CERUMEN OF BOTH EARS: ICD-10-CM

## 2022-04-22 DIAGNOSIS — B96.89 BACTERIAL UPPER RESPIRATORY INFECTION: Primary | ICD-10-CM

## 2022-04-22 DIAGNOSIS — Z95.1 S/P CABG (CORONARY ARTERY BYPASS GRAFT): ICD-10-CM

## 2022-04-22 DIAGNOSIS — I51.89 DIASTOLIC DYSFUNCTION: ICD-10-CM

## 2022-04-22 DIAGNOSIS — R09.82 POST-NASAL DRIP: ICD-10-CM

## 2022-04-22 DIAGNOSIS — E11.22 CONTROLLED TYPE 2 DIABETES MELLITUS WITH STAGE 3 CHRONIC KIDNEY DISEASE, WITHOUT LONG-TERM CURRENT USE OF INSULIN: ICD-10-CM

## 2022-04-22 DIAGNOSIS — N18.30 CONTROLLED TYPE 2 DIABETES MELLITUS WITH STAGE 3 CHRONIC KIDNEY DISEASE, WITHOUT LONG-TERM CURRENT USE OF INSULIN: ICD-10-CM

## 2022-04-22 DIAGNOSIS — I25.810 CORONARY ARTERY DISEASE INVOLVING CORONARY BYPASS GRAFT OF NATIVE HEART WITHOUT ANGINA PECTORIS: ICD-10-CM

## 2022-04-22 DIAGNOSIS — R06.02 SHORTNESS OF BREATH: ICD-10-CM

## 2022-04-22 DIAGNOSIS — J06.9 BACTERIAL UPPER RESPIRATORY INFECTION: Primary | ICD-10-CM

## 2022-04-22 PROCEDURE — 99999 PR PBB SHADOW E&M-EST. PATIENT-LVL V: ICD-10-PCS | Mod: PBBFAC,,, | Performed by: NURSE PRACTITIONER

## 2022-04-22 PROCEDURE — 99215 OFFICE O/P EST HI 40 MIN: CPT | Mod: PBBFAC,PO | Performed by: NURSE PRACTITIONER

## 2022-04-22 PROCEDURE — 99999 PR PBB SHADOW E&M-EST. PATIENT-LVL V: CPT | Mod: PBBFAC,,, | Performed by: NURSE PRACTITIONER

## 2022-04-22 PROCEDURE — 99215 PR OFFICE/OUTPT VISIT, EST, LEVL V, 40-54 MIN: ICD-10-PCS | Mod: S$GLB,,, | Performed by: NURSE PRACTITIONER

## 2022-04-22 PROCEDURE — 99215 OFFICE O/P EST HI 40 MIN: CPT | Mod: S$GLB,,, | Performed by: NURSE PRACTITIONER

## 2022-04-22 RX ORDER — FLUTICASONE PROPIONATE 50 MCG
SPRAY, SUSPENSION (ML) NASAL
Qty: 48 G | Refills: 1 | Status: SHIPPED | OUTPATIENT
Start: 2022-04-22 | End: 2023-03-07

## 2022-04-22 RX ORDER — DOXYCYCLINE 100 MG/1
100 CAPSULE ORAL EVERY 12 HOURS
Qty: 20 CAPSULE | Refills: 0 | Status: SHIPPED | OUTPATIENT
Start: 2022-04-22 | End: 2022-05-02

## 2022-04-22 RX ORDER — ALBUTEROL SULFATE 90 UG/1
1-2 AEROSOL, METERED RESPIRATORY (INHALATION) EVERY 4 HOURS PRN
Qty: 18 G | Refills: 0 | Status: SHIPPED | OUTPATIENT
Start: 2022-04-22 | End: 2023-05-30 | Stop reason: CLARIF

## 2022-04-22 RX ORDER — LIDOCAINE 50 MG/G
1 PATCH TOPICAL DAILY
Qty: 5 PATCH | Refills: 0 | Status: SHIPPED | OUTPATIENT
Start: 2022-04-22 | End: 2022-04-27

## 2022-04-22 NOTE — PROGRESS NOTES
Chief Complaint  Chief Complaint   Patient presents with    Nasal Congestion    Fatigue       HPI    HPI   Mr. Destin Barber is a 87 y.o. male with medical problems as listed below. The patient presents to clinic with c/o nasal congestion, cough and fatigue. The patient was seen in the ED on 3/30/2022 for the same symptoms and was diagnosed with a URI.     HPI as below:   Vomiting       Pt reports vomiting x 3 days. Reports SOB and nasal congestion since Friday. Reports hx HTN and DM. Denies any hx of respiratory problems.      Destin Barber is a 87 y.o male with a PMHx of CAD, HTN, hyperlipidemia, MI, and DM type 2 presenting to the ED with a chief complaint of vomiting onset 3 days ago. The patient complains of congestion with associated vomiting and shortness of breath exacerbated by cough. States that he has been coughing up thick mucus since onset of symptoms. Denies exposure to known sick contacts. Patient is not a tobacco smoker. Denies chest pain, fever, diarrhea, and abdominal pain. No other associated symptoms.     MDM:     87-year-old male with past medical history as noted above presenting with cough, congestion, vomiting.  Physical exam as noted above.  ED workup notable for negative COVID, negative flu, troponin negative, BNP 40, CBC/CMP within normal limits, chest x-ray unremarkable.  Patient presentation and history more consistent with viral illness, no signs or symptoms of sepsis at this time, no fever, no pulmonary findings, chest x-ray clear, no leukocytosis.  Negative cardiac markers, some mild epigastric tenderness however still easily resolved with some symptomatic therapy given in the emergency department.  Patient reassessed with benign abdomen, noting significant improvement in symptoms, tolerating p.o. without any further complaints.  At this point time based on physical exam evaluation not suspect acute mi/ACS, pancreatitis, respiratory failure, PE, pericarditis, pneumonia, sepsis,  pneumothorax, ischemic colitis, aortic dissection, or any further surgical or medical emergency.  Discussed diagnosis and further treatment with patient, including f/u.  Return precautions given and all questions answered.  Patient in understanding of plan.  Pt discharged to home improved and stable.    Treatment below:    doxycycline (VIBRAMYCIN) 100 MG Cap Take 1 capsule (100 mg total) by mouth 2 (two) times daily. for 10 days 20 capsule 3/30/2022 4/9/2022 Jamari Villeda MD      predniSONE (DELTASONE) 10 MG tablet  (Status: Discontinued) Take 1 tablet (10 mg total) by mouth once daily. Take 4 tabs x 3 days, then  Take 2 tabs x 3 days, then   Take 1 tab x 3 days. 21 tablet 3/30/2022 3/30/2022 Jamari Villeda MD     ondansetron (ZOFRAN-ODT) 4 MG TbDL Take 1 tablet (4 mg total) by mouth every 8 (eight) hours as needed (nausea/vomiting). 20 tablet 3/30/2022   Jamari Villeda MD     albuterol (PROVENTIL/VENTOLIN HFA) 90 mcg/actuation inhaler Inhale 1-2 puffs into the lungs every 4 (four) hours as needed for Shortness of Breath. Rescue 18 g 3/30/2022 3/30/2023 Jamari Villeda MD     predniSONE (DELTASONE) 10 MG tablet Take 1 tablet (10 mg total) by mouth once daily. Take 4 tabs x 3 days, then Take 2 tabs x 3 days, then Take 1 tab x 3 days. 21 tablet 3/30/2022   Jamari Villeda MD            The patient since visit:  The patient has still been having the cough and congestion. The patient also states that he has been having fatigue. The patient states that with the weather change, the patient states that he has been having bad sinuses. The patient has been using the Flonase and his albuterol inhaler but that it has not been helping.     The patient states that he did not take his BP medication today. His BP is high in clinic today. The patient does endorse LOC last week. The patient denies hitting his head. Does endorse leg pain. The patient had a CABG but has not been followed by cardiology  since .     PAST MEDICAL HISTORY:  Past Medical History:   Diagnosis Date    Acute coronary syndrome 2016    s/p 3V CABG 2016    Anemia     Coronary artery disease     Diastolic dysfunction     Gout, chronic     Heart failure     HTN (hypertension)     Hyperlipidemia     Myocardial infarction     Pneumonia due to other staphylococcus     Pressure ulcer     Renal manifestation of secondary diabetes mellitus     Type 2 diabetes mellitus     diet controlled       PAST SURGICAL HISTORY:  Past Surgical History:   Procedure Laterality Date    CATARACT EXTRACTION Bilateral     CATARACT EXTRACTION W/ ANTERIOR VITRECTOMY      CORONARY ARTERY BYPASS GRAFT  2016    PAIZ-LAD, SVG-Ramus, SVG-PDA    HEMORRHOID SURGERY         SOCIAL HISTORY:  Social History     Socioeconomic History    Marital status:    Tobacco Use    Smoking status: Former Smoker     Types: Cigarettes     Quit date: 1985     Years since quittin.8    Smokeless tobacco: Never Used   Substance and Sexual Activity    Alcohol use: No    Drug use: No       FAMILY HISTORY:  Family History   Problem Relation Age of Onset    Cancer Father         colon    Hypertension Mother     Heart disease Mother     Heart disease Sister     No Known Problems Brother     No Known Problems Maternal Aunt     No Known Problems Maternal Uncle     No Known Problems Paternal Aunt     No Known Problems Paternal Uncle     No Known Problems Maternal Grandmother     No Known Problems Maternal Grandfather     No Known Problems Paternal Grandmother     No Known Problems Paternal Grandfather     Amblyopia Neg Hx     Blindness Neg Hx     Cataracts Neg Hx     Diabetes Neg Hx     Glaucoma Neg Hx     Macular degeneration Neg Hx     Retinal detachment Neg Hx     Strabismus Neg Hx     Stroke Neg Hx     Thyroid disease Neg Hx        ALLERGIES AND MEDICATIONS: updated and reviewed.  Review of patient's allergies indicates:    Allergen Reactions    Penicillins Nausea And Vomiting     Current Outpatient Medications   Medication Sig Dispense Refill    albuterol (PROVENTIL/VENTOLIN HFA) 90 mcg/actuation inhaler Inhale 1 puff into the lungs every 6 (six) hours as needed. Rescue  5    amLODIPine (NORVASC) 10 MG tablet TAKE 1 TABLET(10 MG) BY MOUTH EVERY DAY 90 tablet 3    azelastine (ASTELIN) 137 mcg (0.1 %) nasal spray 1 spray (137 mcg total) by Nasal route 2 (two) times daily as needed for Rhinitis. 30 mL 0    cetirizine 10 mg chewable tablet Take 10 mg by mouth once daily. 90 tablet 1    diclofenac sodium (VOLTAREN) 1 % Gel Apply 2 g topically 4 (four) times daily. 450 g 0    docusate sodium (COLACE) 100 MG capsule Take 1 capsule (100 mg total) by mouth 2 (two) times daily as needed for Constipation (and hard stool). 60 capsule 12    finasteride (PROSCAR) 5 mg tablet Take 1 tablet (5 mg total) by mouth once daily. 30 tablet 11    lisinopriL (PRINIVIL,ZESTRIL) 20 MG tablet Take 1 tablet (20 mg total) by mouth once daily. 90 tablet 3    metFORMIN (GLUCOPHAGE-XR) 500 MG ER 24hr tablet TAKE 1 TABLET(500 MG) BY MOUTH DAILY WITH BREAKFAST 90 tablet 0    metoprolol tartrate (LOPRESSOR) 25 MG tablet TAKE 1 TABLET(25 MG) BY MOUTH TWICE DAILY 180 tablet 3    pravastatin (PRAVACHOL) 40 MG tablet Take 1 tablet (40 mg total) by mouth once daily. 90 tablet 3    traZODone (DESYREL) 150 MG tablet TAKE 1 TABLET(150 MG) BY MOUTH EVERY NIGHT AS NEEDED FOR INSOMNIA 90 tablet 1    acetaminophen (TYLENOL) 500 MG tablet Take 1 tablet (500 mg total) by mouth every 6 (six) hours as needed for Pain. (Patient not taking: Reported on 4/22/2022) 30 tablet 0    albuterol (PROVENTIL/VENTOLIN HFA) 90 mcg/actuation inhaler Inhale 1-2 puffs into the lungs every 4 (four) hours as needed for Shortness of Breath. Rescue 18 g 0    aspirin (ASPIR-LOW) 81 MG EC tablet Take 1 tablet (81 mg total) by mouth once daily. (Patient not taking: Reported on 4/22/2022) 90  tablet 3    doxycycline (VIBRAMYCIN) 100 MG Cap Take 1 capsule (100 mg total) by mouth every 12 (twelve) hours. for 10 days 20 capsule 0    fluticasone propionate (FLONASE) 50 mcg/actuation nasal spray SHAKE LIQUID AND USE 1 SPRAY(50 MCG) IN EACH NOSTRIL TWICE DAILY AS NEEDED FOR RHINITIS 48 g 1    LIDOcaine (LIDODERM) 5 % Place 1 patch onto the skin once daily. Remove & Discard patch within 12 hours or as directed by MD for 5 days 5 patch 0    ondansetron (ZOFRAN-ODT) 4 MG TbDL Take 1 tablet (4 mg total) by mouth every 8 (eight) hours as needed (nausea/vomiting). (Patient not taking: Reported on 4/22/2022) 20 tablet 0    polyethylene glycol (GLYCOLAX) 17 gram PwPk Take 17 g by mouth once daily. (Patient not taking: Reported on 4/22/2022) 30 packet 4    tamsulosin (FLOMAX) 0.4 mg Cap Take 1 capsule (0.4 mg total) by mouth 2 (two) times daily. (Patient not taking: Reported on 4/22/2022) 180 capsule 3     No current facility-administered medications for this visit.       Patient Care Team:  Harry Velazco MD as PCP - General (Internal Medicine)  Toña Whittaker MA as Care Coordinator  Yazmin Queen MA (Inactive)  MD Yazmin Roland MA (Inactive)  Yazmin Queen MA (Inactive)  Yazmin Queen MA (Inactive)    ROS  Review of Systems   Constitutional: Positive for fatigue. Negative for chills, fever and unexpected weight change.   HENT: Positive for congestion. Negative for ear pain, sore throat and voice change.    Eyes: Negative for photophobia, pain, discharge and visual disturbance.   Respiratory: Positive for cough and shortness of breath. Negative for wheezing.    Cardiovascular: Negative for chest pain, palpitations and leg swelling.   Gastrointestinal: Negative for abdominal pain, blood in stool, constipation, diarrhea, nausea and vomiting.   Genitourinary: Negative for dysuria and frequency.   Musculoskeletal: Positive for arthralgias. Negative for gait problem, joint swelling and  neck stiffness.   Skin: Negative for color change and rash.   Neurological: Negative for seizures, weakness and headaches.   Hematological: Negative for adenopathy. Does not bruise/bleed easily.   Psychiatric/Behavioral: Negative for behavioral problems and dysphoric mood. The patient is not nervous/anxious.        Physical Exam  Vitals:    04/22/22 0858   BP: (!) 170/90   Pulse: 75   Temp: 98.5 °F (36.9 °C)   TempSrc: Oral   SpO2: 99%   Weight: 63.8 kg (140 lb 10.5 oz)   Height: 5' (1.524 m)    Body mass index is 27.47 kg/m².  Weight: 63.8 kg (140 lb 10.5 oz)   Height: 5' (152.4 cm)     Physical Exam  Constitutional:       Appearance: He is well-developed.   HENT:      Head: Normocephalic and atraumatic.      Salivary Glands: Right salivary gland is diffusely enlarged and tender. Left salivary gland is diffusely enlarged and tender.      Right Ear: There is impacted cerumen.      Left Ear: There is impacted cerumen.      Nose: Congestion present.      Right Turbinates: Enlarged.      Left Turbinates: Enlarged.      Right Sinus: No maxillary sinus tenderness or frontal sinus tenderness.      Left Sinus: No maxillary sinus tenderness or frontal sinus tenderness.      Mouth/Throat:      Pharynx: Posterior oropharyngeal erythema present.   Eyes:      Conjunctiva/sclera: Conjunctivae normal.      Pupils: Pupils are equal, round, and reactive to light.   Neck:      Thyroid: No thyromegaly.   Cardiovascular:      Rate and Rhythm: Normal rate and regular rhythm.      Heart sounds: No murmur heard.  Pulmonary:      Effort: Pulmonary effort is normal.      Breath sounds: Examination of the right-middle field reveals rhonchi. Examination of the left-middle field reveals rhonchi. Examination of the right-lower field reveals rhonchi. Examination of the left-lower field reveals rhonchi. Rhonchi present. No wheezing.   Musculoskeletal:         General: Normal range of motion.      Cervical back: Normal range of motion and neck  supple.      Left lower leg: Tenderness present.   Lymphadenopathy:      Cervical: No cervical adenopathy.   Skin:     General: Skin is warm and dry.      Findings: No rash.   Neurological:      Mental Status: He is alert and oriented to person, place, and time.      Cranial Nerves: No cranial nerve deficit.   Psychiatric:         Behavior: Behavior normal.         Thought Content: Thought content normal.         Judgment: Judgment normal.             Health Maintenance       Date Due Completion Date    Shingles Vaccine (1 of 2) Never done ---    Diabetes Urine Screening 05/15/2018 5/15/2017    Eye Exam 02/05/2019 2/5/2018    COVID-19 Vaccine (3 - Booster for Moderna series) 08/26/2021 3/26/2021    Lipid Panel 01/25/2022 1/25/2021    Hemoglobin A1c 06/23/2022 12/23/2021    Aspirin/Antiplatelet Therapy 12/23/2022 12/23/2021    Foot Exam 12/23/2022 12/23/2021    Override on 12/23/2021: Done    TETANUS VACCINE 04/20/2028 4/20/2018      Health maintenance reviewed at this time.  Will have patient f/u with PCP for 6 month check up.    Assessment & Plan  Bacterial upper respiratory infection  -     doxycycline (VIBRAMYCIN) 100 MG Cap; Take 1 capsule (100 mg total) by mouth every 12 (twelve) hours. for 10 days  Dispense: 20 capsule; Refill: 0    Essential hypertension  -     Ambulatory referral/consult to Cardiology; Future; Expected date: 04/29/2022  The patients BP is elevated at this time.  The patient had not taken his BP medication prior to arrival.  The patient had a cardiology visit next week. The patient states that he will take his medication prior to the visit and have his BP checked at [facility].     Diastolic dysfunction/S/P CABG (coronary artery bypass graft)/Coronary artery disease involving coronary bypass graft of native heart without angina pectoris  -     Ambulatory referral/consult to Cardiology; Future; Expected date: 04/29/2022    Dizziness  -     Ambulatory referral/consult to Cardiology; Future; Expected  date: 04/29/2022    Controlled type 2 diabetes mellitus with stage 3 chronic kidney disease, without long-term current use of insulin    Aortic atherosclerosis  The current medical regimen is effective;  continue present plan and medications.    Hyperlipidemia, unspecified hyperlipidemia type  The patient is asked to make an attempt to improve diet and exercise patterns to aid in medical management of this problem.  The current medical regimen is effective;  continue present plan and medications.    Post-nasal drip  -     fluticasone propionate (FLONASE) 50 mcg/actuation nasal spray; SHAKE LIQUID AND USE 1 SPRAY(50 MCG) IN EACH NOSTRIL TWICE DAILY AS NEEDED FOR RHINITIS  Dispense: 48 g; Refill: 1    Impacted cerumen of both ears  -     Ambulatory referral/consult to ENT; Future; Expected date: 04/29/2022    Shortness of breath  -     albuterol (PROVENTIL/VENTOLIN HFA) 90 mcg/actuation inhaler; Inhale 1-2 puffs into the lungs every 4 (four) hours as needed for Shortness of Breath. Rescue  Dispense: 18 g; Refill: 0    Pain of lower extremity, unspecified laterality  -     LIDOcaine (LIDODERM) 5 %; Place 1 patch onto the skin once daily. Remove & Discard patch within 12 hours or as directed by MD for 5 days  Dispense: 5 patch; Refill: 0           Follow-up: Follow up in about 1 week (around 4/29/2022) for for cardiology referral.

## 2022-04-25 ENCOUNTER — PATIENT OUTREACH (OUTPATIENT)
Dept: ADMINISTRATIVE | Facility: OTHER | Age: 87
End: 2022-04-25
Payer: COMMERCIAL

## 2022-04-25 NOTE — PROGRESS NOTES
Health Maintenance Due   Topic Date Due    Shingles Vaccine (1 of 2) Never done    Diabetes Urine Screening  05/15/2018    Eye Exam  02/05/2019    Lipid Panel  01/25/2022     Updates were requested from care everywhere.  Chart was reviewed for overdue Proactive [facility] Encounters (ARELI) topics (CRS, Breast Cancer Screening, Eye exam)  Health Maintenance has been updated.  LINKS immunization registry triggered.  Immunizations were reconciled.

## 2022-04-28 ENCOUNTER — PES CALL (OUTPATIENT)
Dept: ADMINISTRATIVE | Facility: CLINIC | Age: 87
End: 2022-04-28
Payer: COMMERCIAL

## 2022-04-28 DIAGNOSIS — M17.12 PRIMARY OSTEOARTHRITIS OF LEFT KNEE: Primary | ICD-10-CM

## 2022-04-28 RX ORDER — MELOXICAM 7.5 MG/1
7.5 TABLET ORAL DAILY PRN
Qty: 14 TABLET | Refills: 0 | Status: SHIPPED | OUTPATIENT
Start: 2022-04-28 | End: 2022-10-07 | Stop reason: ALTCHOICE

## 2022-04-29 ENCOUNTER — OFFICE VISIT (OUTPATIENT)
Dept: CARDIOLOGY | Facility: CLINIC | Age: 87
End: 2022-04-29
Payer: COMMERCIAL

## 2022-04-29 ENCOUNTER — TELEPHONE (OUTPATIENT)
Dept: ADMINISTRATIVE | Facility: CLINIC | Age: 87
End: 2022-04-29
Payer: COMMERCIAL

## 2022-04-29 VITALS
OXYGEN SATURATION: 98 % | RESPIRATION RATE: 17 BRPM | HEART RATE: 86 BPM | BODY MASS INDEX: 29.64 KG/M2 | SYSTOLIC BLOOD PRESSURE: 169 MMHG | DIASTOLIC BLOOD PRESSURE: 81 MMHG | WEIGHT: 151 LBS | HEIGHT: 60 IN

## 2022-04-29 DIAGNOSIS — E78.5 HYPERLIPIDEMIA, UNSPECIFIED HYPERLIPIDEMIA TYPE: ICD-10-CM

## 2022-04-29 DIAGNOSIS — R42 DIZZINESS: ICD-10-CM

## 2022-04-29 DIAGNOSIS — I25.810 CORONARY ARTERY DISEASE INVOLVING CORONARY BYPASS GRAFT OF NATIVE HEART WITHOUT ANGINA PECTORIS: Primary | ICD-10-CM

## 2022-04-29 DIAGNOSIS — I51.89 DIASTOLIC DYSFUNCTION: ICD-10-CM

## 2022-04-29 DIAGNOSIS — Z95.1 S/P CABG (CORONARY ARTERY BYPASS GRAFT): ICD-10-CM

## 2022-04-29 DIAGNOSIS — I10 ESSENTIAL HYPERTENSION: ICD-10-CM

## 2022-04-29 DIAGNOSIS — R55 SYNCOPE AND COLLAPSE: ICD-10-CM

## 2022-04-29 PROCEDURE — 99215 OFFICE O/P EST HI 40 MIN: CPT | Mod: PBBFAC,PO | Performed by: INTERNAL MEDICINE

## 2022-04-29 PROCEDURE — 99999 PR PBB SHADOW E&M-EST. PATIENT-LVL V: ICD-10-PCS | Mod: PBBFAC,,, | Performed by: INTERNAL MEDICINE

## 2022-04-29 PROCEDURE — 99999 PR PBB SHADOW E&M-EST. PATIENT-LVL V: CPT | Mod: PBBFAC,,, | Performed by: INTERNAL MEDICINE

## 2022-04-29 PROCEDURE — 99204 PR OFFICE/OUTPT VISIT, NEW, LEVL IV, 45-59 MIN: ICD-10-PCS | Mod: S$GLB,,, | Performed by: INTERNAL MEDICINE

## 2022-04-29 PROCEDURE — 99204 OFFICE O/P NEW MOD 45 MIN: CPT | Mod: S$GLB,,, | Performed by: INTERNAL MEDICINE

## 2022-04-29 NOTE — PROGRESS NOTES
Subjective:    Patient ID:  Destin Barber is a 87 y.o. male who presents for follow-up of No chief complaint on file.      HPI     Previously saw Dr Piedra 12/2016  # CAD/ACS s/p 3V CABG 7/2016.  Appears his LCx/OM was not bypassed due to poor available vein conduit.  No LCx ischemia noted on MPI.  # HTN, uncontrolled in the setting of med noncompliance  # HLP  # DM  # abnl EKG        CAD/ACS s/p CABG 7/7/16: LIMA-LAD, SVG-Ramus, SVG-PDA (Dr. Hallman).  LCx/OM branch not bypassed due to poor quality venous conduit available     Ex MPI 11/29/16 (images pers rev)  The patient exercised for 4.45 minutes on a High Ramp protocol, achieving a peak heart rate of 121 bpm, which is 86% of the age predicted maximum heart rate. -CP/EKG.   Nuclear Quantitative Functional Analysis:   LVEF: 60 %  Impression: ABNORMAL MYOCARDIAL PERFUSION  1. The perfusion scan is free of evidence for myocardial ischemia.   2. There is mild intensity fixed defect in the anteroapical wall of the left ventricle, consistent with myocardial injury.   3. There is a mild intensity fixed defect in the inferior wall of the left ventricle, secondary to diaphragm attenuation.   4. Resting wall motion is physiologic.   5. Resting LV function is normal.   6. The ventricular volumes are normal at rest and stress.   7. The extracardiac distribution of radioactivity is normal.      Echo 6/25/16    1 - Normal left ventricular systolic function (EF 60-65%).     2 - Mild left atrial enlargement.     3 - Left ventricular diastolic dysfunction.     4 - Normal right ventricular systolic function .     5 - Trivial to mild aortic regurgitation.     6 - Trivial to mild mitral regurgitation.     7 - Trivial to mild tricuspid regurgitation.     8 - Pulmonary hypertension. The estimated PA systolic pressure is 44 mmHg.      Cath 6/24/16  LVEDP: 7mmHg  LVEF: 55%  Wall Motion: normal  Dominance: Right  LM: normal  LAD: ost/prox eccentric 70-80%, prox 50%, excellent mid LAD  "target.  Ramus: prox 50-70%, mid 90%, bifurcating distal vessel (both excellent targets)  LCx: prox 95% at bifurcation of OM1.  OM1 ost/prox 80%, bifurcating vessel, excellent target.  RCA: dominant, anterior takeoff with "blandon's crook" prox vessel.Prox 70%, mid 70%. Excellent PDA target.  Hemostasis:  R Radial band  Impression:  NSTEMI  4V CAD, normal LV fxn  R radial vasband for hemostasis     Carotid US 7/6/16  RIGHT SIDE:   1 - 39 % right ICA stenosis.   Heterogeneous plaque in the right internal carotid artery.   Antegrade flow in the right vertebral artery.   LEFT SIDE:  1 - 39% left ICA stenosis.   Homogeneous plaque in the left internal carotid artery.   Antegrade flow in the left vertebral artery.      10/17/17 Overall doing well. Denies CP or SOB  EKG NSR TWI V1 and V2 - similar to prior EKG  Suffering with an URI    EKG 3/30/22 - done in ER - not in EPIC    4/29/22 Denies CP or SOB  Had a recent brief syncopal spell 5 days ago - details unclear- poor historian  BP elevated    Review of Systems   Constitutional: Negative for decreased appetite.   HENT: Negative for ear discharge.    Eyes: Negative for blurred vision.   Endocrine: Negative for polyphagia.   Skin: Negative for nail changes.   Genitourinary: Negative for bladder incontinence.   Neurological: Negative for aphonia.   Psychiatric/Behavioral: Negative for hallucinations.   Allergic/Immunologic: Negative for hives.        Objective:    Physical Exam  Constitutional:       Appearance: He is well-developed.   HENT:      Head: Normocephalic and atraumatic.   Eyes:      Conjunctiva/sclera: Conjunctivae normal.      Pupils: Pupils are equal, round, and reactive to light.   Cardiovascular:      Rate and Rhythm: Normal rate.      Pulses: Intact distal pulses.      Heart sounds: Normal heart sounds.   Pulmonary:      Effort: Pulmonary effort is normal.      Breath sounds: Normal breath sounds.   Abdominal:      General: Bowel sounds are normal.      " Palpations: Abdomen is soft.   Musculoskeletal:         General: Normal range of motion.      Cervical back: Normal range of motion and neck supple.   Skin:     General: Skin is warm and dry.   Neurological:      Mental Status: He is alert and oriented to person, place, and time.           Assessment:       1. Coronary artery disease involving coronary bypass graft of native heart without angina pectoris    2. Essential hypertension    3. Diastolic dysfunction    4. Dizziness    5. S/P CABG (coronary artery bypass graft)    6. Hyperlipidemia, unspecified hyperlipidemia type    7. Syncope and collapse         Plan:       Echo and holter for recent syncope  Not interested in repeat stress test  Continue Rx for CAD, HTN, HLD, DD

## 2022-04-30 ENCOUNTER — EXTERNAL CHRONIC CARE MANAGEMENT (OUTPATIENT)
Dept: PRIMARY CARE CLINIC | Facility: CLINIC | Age: 87
End: 2022-04-30
Payer: COMMERCIAL

## 2022-04-30 PROCEDURE — 99490 CHRNC CARE MGMT STAFF 1ST 20: CPT | Mod: PBBFAC,PN | Performed by: INTERNAL MEDICINE

## 2022-04-30 PROCEDURE — 99439 CHRNC CARE MGMT STAF EA ADDL: CPT | Mod: PBBFAC,PN | Performed by: INTERNAL MEDICINE

## 2022-04-30 PROCEDURE — 99439 PR CHRONIC CARE MGMT, EA ADDTL 20 MIN: ICD-10-PCS | Mod: S$PBB,,, | Performed by: INTERNAL MEDICINE

## 2022-04-30 PROCEDURE — 99439 CHRNC CARE MGMT STAF EA ADDL: CPT | Mod: S$PBB,,, | Performed by: INTERNAL MEDICINE

## 2022-04-30 PROCEDURE — 99490 PR CHRONIC CARE MGMT, 1ST 20 MIN: ICD-10-PCS | Mod: S$PBB,,, | Performed by: INTERNAL MEDICINE

## 2022-04-30 PROCEDURE — 99490 CHRNC CARE MGMT STAFF 1ST 20: CPT | Mod: S$PBB,,, | Performed by: INTERNAL MEDICINE

## 2022-05-02 ENCOUNTER — OFFICE VISIT (OUTPATIENT)
Dept: FAMILY MEDICINE | Facility: CLINIC | Age: 87
End: 2022-05-02
Payer: COMMERCIAL

## 2022-05-02 VITALS
WEIGHT: 151.56 LBS | BODY MASS INDEX: 29.76 KG/M2 | TEMPERATURE: 98 F | DIASTOLIC BLOOD PRESSURE: 78 MMHG | HEIGHT: 60 IN | HEART RATE: 75 BPM | OXYGEN SATURATION: 99 % | SYSTOLIC BLOOD PRESSURE: 146 MMHG

## 2022-05-02 DIAGNOSIS — N18.30 CONTROLLED TYPE 2 DIABETES MELLITUS WITH STAGE 3 CHRONIC KIDNEY DISEASE, WITHOUT LONG-TERM CURRENT USE OF INSULIN: ICD-10-CM

## 2022-05-02 DIAGNOSIS — M1A.00X0 IDIOPATHIC CHRONIC GOUT WITHOUT TOPHUS, UNSPECIFIED SITE: ICD-10-CM

## 2022-05-02 DIAGNOSIS — I51.89 DIASTOLIC DYSFUNCTION: ICD-10-CM

## 2022-05-02 DIAGNOSIS — I10 ESSENTIAL HYPERTENSION: ICD-10-CM

## 2022-05-02 DIAGNOSIS — I25.810 CORONARY ARTERY DISEASE INVOLVING CORONARY BYPASS GRAFT OF NATIVE HEART WITHOUT ANGINA PECTORIS: ICD-10-CM

## 2022-05-02 DIAGNOSIS — E11.22 CONTROLLED TYPE 2 DIABETES MELLITUS WITH STAGE 3 CHRONIC KIDNEY DISEASE, WITHOUT LONG-TERM CURRENT USE OF INSULIN: ICD-10-CM

## 2022-05-02 DIAGNOSIS — Z95.1 S/P CABG (CORONARY ARTERY BYPASS GRAFT): ICD-10-CM

## 2022-05-02 DIAGNOSIS — Z00.00 ENCOUNTER FOR PREVENTIVE HEALTH EXAMINATION: Primary | ICD-10-CM

## 2022-05-02 DIAGNOSIS — E78.5 HYPERLIPIDEMIA, UNSPECIFIED HYPERLIPIDEMIA TYPE: ICD-10-CM

## 2022-05-02 DIAGNOSIS — I70.0 AORTIC ATHEROSCLEROSIS: ICD-10-CM

## 2022-05-02 DIAGNOSIS — D63.8 ANEMIA OF CHRONIC DISEASE: ICD-10-CM

## 2022-05-02 PROCEDURE — G0439 PPPS, SUBSEQ VISIT: HCPCS | Mod: S$GLB,,, | Performed by: NURSE PRACTITIONER

## 2022-05-02 PROCEDURE — G0439 PR MEDICARE ANNUAL WELLNESS SUBSEQUENT VISIT: ICD-10-PCS | Mod: S$GLB,,, | Performed by: NURSE PRACTITIONER

## 2022-05-02 PROCEDURE — 99999 PR PBB SHADOW E&M-EST. PATIENT-LVL V: CPT | Mod: PBBFAC,,, | Performed by: NURSE PRACTITIONER

## 2022-05-02 PROCEDURE — 99999 PR PBB SHADOW E&M-EST. PATIENT-LVL V: ICD-10-PCS | Mod: PBBFAC,,, | Performed by: NURSE PRACTITIONER

## 2022-05-02 PROCEDURE — 99215 OFFICE O/P EST HI 40 MIN: CPT | Mod: PBBFAC,PO | Performed by: NURSE PRACTITIONER

## 2022-05-02 NOTE — PATIENT INSTRUCTIONS
Counseling and Referral of Other Preventative  (Italic type indicates deductible and co-insurance are waived)    Patient Name: Destin Barber  Today's Date: 5/2/2022    Health Maintenance       Date Due Completion Date    Shingles Vaccine (1 of 2) Never done ---    Diabetes Urine Screening 05/15/2018 5/15/2017    Eye Exam 02/05/2019 2/5/2018    Lipid Panel 01/25/2022 1/25/2021    COVID-19 Vaccine (4 - Booster for Moderna series) 04/04/2022 12/4/2021    Hemoglobin A1c 06/23/2022 12/23/2021    Aspirin/Antiplatelet Therapy 12/23/2022 12/23/2021    Foot Exam 12/23/2022 12/23/2021    Override on 12/23/2021: Done    TETANUS VACCINE 04/20/2028 4/20/2018        Orders Placed This Encounter   Procedures    Ambulatory referral/consult to Optometry     The following information is provided to all patients.  This information is to help you find resources for any of the problems found today that may be affecting your health:                Living healthy guide: www.[facility].[place].gov      Understanding Diabetes: www.diabetes.org      Eating healthy: www.cdc.gov/healthyweight      CDC home safety checklist: www.cdc.gov/steadi/patient.html      Agency on Aging: www.goea.[place].gov      Alcoholics anonymous (AA): www.aa.org      Physical Activity: www.matt.nih.gov/eo9kaku      Tobacco use: www.quitwithusla.org

## 2022-05-02 NOTE — PROGRESS NOTES
Destin Barber presented for a  Medicare AWV and comprehensive Health Risk Assessment today. The following components were reviewed and updated:    · Medical history  · Family History  · Social history  · Allergies and Current Medications  · Health Risk Assessment  · Health Maintenance  · Care Team       ** See Completed Assessments for Annual Wellness Visit within the encounter summary.**       The following assessments were completed:  · Living Situation  · CAGE  · Depression Screening  · Timed Get Up and Go  · Whisper Test  · Cognitive Function Screening  · Nutrition Screening  · ADL Screening  · PAQ Screening          Vitals:    05/02/22 0826   BP: (!) 146/78   BP Location: Left arm   Patient Position: Sitting   BP Method: Medium (Automatic)   Pulse: 75   Temp: 98 °F (36.7 °C)   TempSrc: Oral   SpO2: 99%   Weight: 68.8 kg (151 lb 9.1 oz)   Height: 5' (1.524 m)     Body mass index is 29.6 kg/m².  Physical Exam  Vitals and nursing note reviewed.   Constitutional:       Appearance: Normal appearance.      Comments: ambulates with a cane   Cardiovascular:      Rate and Rhythm: Normal rate.      Pulses: Normal pulses.      Heart sounds: Normal heart sounds.   Pulmonary:      Effort: Pulmonary effort is normal.      Breath sounds: Normal breath sounds.   Abdominal:      General: Bowel sounds are normal.      Palpations: Abdomen is soft.   Musculoskeletal:         General: Normal range of motion.   Neurological:      Mental Status: He is alert.   Psychiatric:         Mood and Affect: Mood normal.         Behavior: Behavior normal.             Diagnoses and health risks identified today and associated recommendations/orders:    1. Encounter for preventive health examination  Pt was seen today for an Annual Wellness visit. Healthcare maintenance and screening recommendations were discussed and updated as indicated. Return in one year for AWV.    Review current opioid prescriptions: n/a  Screen for potential Substance Use  Disorders: n/a    2. 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 The current medical regimen is effective;  continue present plan and medications.    - Ambulatory referral/consult to Optometry; Future    3. Aortic atherosclerosis  CT Urogram Abd/Pelvis 8/5/20. The current medical regimen is effective;  continue present plan and medications.    4. Essential hypertension  The current medical regimen is effective;  continue present plan and medications.    5. Diastolic dysfunction  The current medical regimen is effective;  continue present plan and medications.    6. Hyperlipidemia, unspecified hyperlipidemia type  The current medical regimen is effective;  continue present plan and medications.    7. Coronary artery disease involving coronary bypass graft of native heart without angina pectoris  The current medical regimen is effective;  continue present plan and medications.    8. Anemia of chronic disease  The current medical regimen is effective;  continue present plan and medications.    9. Idiopathic chronic gout without tophus, unspecified site  The current medical regimen is effective;  continue present plan and medications.    10. S/P CABG (coronary artery bypass graft)  The current medical regimen is effective;  continue present plan and medications.        Provided Destin with a 5-10 year written screening schedule and personal prevention plan. Recommendations were developed using the USPSTF age appropriate recommendations. Education, counseling, and referrals were provided as needed. After Visit Summary printed and given to patient which includes a list of additional screenings\tests needed.    Follow up in about 7 weeks (around 6/23/2022) with provider.    CAROL Head  I offered to discuss advanced care planning, including how to pick a person who would make decisions for you if you were unable to make  them for yourself, called a health care power of , and what kind of decisions you might make such as use of life sustaining treatments such as ventilators and tube feeding when faced with a life limiting illness recorded on a living will that they will need to know. (How you want to be cared for as you near the end of your natural life)     X  Patient has advanced directives on file, which we reviewed, and they do not wish to make changes.

## 2022-05-04 DIAGNOSIS — I25.810 CORONARY ARTERY DISEASE INVOLVING CORONARY BYPASS GRAFT OF NATIVE HEART WITHOUT ANGINA PECTORIS: ICD-10-CM

## 2022-05-04 DIAGNOSIS — E78.5 HYPERLIPIDEMIA, UNSPECIFIED HYPERLIPIDEMIA TYPE: ICD-10-CM

## 2022-05-04 RX ORDER — PRAVASTATIN SODIUM 40 MG/1
TABLET ORAL
Qty: 90 TABLET | Refills: 3 | Status: SHIPPED | OUTPATIENT
Start: 2022-05-04 | End: 2023-01-13 | Stop reason: SDUPTHER

## 2022-05-04 NOTE — TELEPHONE ENCOUNTER
No new care gaps identified.  [facility] Embedded Care Gaps. Reference number: [id number]. 5/04/2022   1:08:32 PM CDT

## 2022-05-04 NOTE — TELEPHONE ENCOUNTER
Refill Routing Note   Medication(s) are not appropriate for processing by [facility] for the following reason(s):      - Required laboratory values are outdated  - Patient has been seen in the ED/Hospital since the last PCP visit    [facility] action(s):  Defer          Medication reconciliation completed: No     Appointments  past 12m or future 3m with PCP    Date Provider   Last Visit   12/23/2021 Haryr Velazco MD   Next Visit   6/23/2022 Harry Velazco MD   ED visits in past 90 days: 1        Note composed:1:21 PM 05/04/2022

## 2022-05-31 ENCOUNTER — EXTERNAL CHRONIC CARE MANAGEMENT (OUTPATIENT)
Dept: PRIMARY CARE CLINIC | Facility: CLINIC | Age: 87
End: 2022-05-31
Payer: COMMERCIAL

## 2022-05-31 PROCEDURE — 99490 CHRNC CARE MGMT STAFF 1ST 20: CPT | Mod: S$GLB,,, | Performed by: INTERNAL MEDICINE

## 2022-05-31 PROCEDURE — 99490 PR CHRONIC CARE MGMT, 1ST 20 MIN: ICD-10-PCS | Mod: S$GLB,,, | Performed by: INTERNAL MEDICINE

## 2022-06-30 ENCOUNTER — EXTERNAL CHRONIC CARE MANAGEMENT (OUTPATIENT)
Dept: PRIMARY CARE CLINIC | Facility: CLINIC | Age: 87
End: 2022-06-30
Payer: COMMERCIAL

## 2022-06-30 PROCEDURE — 99490 PR CHRONIC CARE MGMT, 1ST 20 MIN: ICD-10-PCS | Mod: S$GLB,,, | Performed by: INTERNAL MEDICINE

## 2022-06-30 PROCEDURE — 99490 CHRNC CARE MGMT STAFF 1ST 20: CPT | Mod: S$GLB,,, | Performed by: INTERNAL MEDICINE

## 2022-06-30 PROCEDURE — 99439 PR CHRONIC CARE MGMT, EA ADDTL 20 MIN: ICD-10-PCS | Mod: S$GLB,,, | Performed by: INTERNAL MEDICINE

## 2022-06-30 PROCEDURE — 99439 CHRNC CARE MGMT STAF EA ADDL: CPT | Mod: S$GLB,,, | Performed by: INTERNAL MEDICINE

## 2022-07-11 ENCOUNTER — OFFICE VISIT (OUTPATIENT)
Dept: OTOLARYNGOLOGY | Facility: CLINIC | Age: 87
End: 2022-07-11
Payer: COMMERCIAL

## 2022-07-11 VITALS — TEMPERATURE: 98 F | DIASTOLIC BLOOD PRESSURE: 94 MMHG | SYSTOLIC BLOOD PRESSURE: 156 MMHG

## 2022-07-11 DIAGNOSIS — H61.23 IMPACTED CERUMEN OF BOTH EARS: ICD-10-CM

## 2022-07-11 PROCEDURE — 69210 REMOVE IMPACTED EAR WAX UNI: CPT | Mod: S$GLB,,, | Performed by: NURSE PRACTITIONER

## 2022-07-11 PROCEDURE — 99499 UNLISTED E&M SERVICE: CPT | Mod: S$GLB,,, | Performed by: NURSE PRACTITIONER

## 2022-07-11 PROCEDURE — 69210 EAR CERUMEN REMOVAL: ICD-10-PCS | Mod: S$GLB,,, | Performed by: NURSE PRACTITIONER

## 2022-07-11 PROCEDURE — 99499 NO LOS: ICD-10-PCS | Mod: S$GLB,,, | Performed by: NURSE PRACTITIONER

## 2022-07-11 NOTE — PROCEDURES
Ear Cerumen Removal    Date/Time: 7/11/2022 10:30 AM  Performed by: Jessica Lynn NP  Authorized by: Jessica Lynn NP       Local anesthetic:  None  Location details:  Both ears  Procedure type: curette    Cerumen  Removal Results:  Cerumen completely removed  Patient tolerance:  Patient tolerated the procedure well with no immediate complications     Procedure Note:    The patient was brought to the minor procedure room and placed under the operating microscope of the right ear canal which was cleaned of ceruminous debris. Using a combination of suction, curettes and cup forceps the patient's cerumen impaction was removed. The patient tolerated the procedure well. There were no complications.    Procedure Note:    The patient was brought to the minor procedure room and placed under the operating microscope of the left ear canal which was cleaned of ceruminous debris. Using a combination of suction, curettes and cup forceps the patient's cerumen impaction was removed.  The patient tolerated the procedure well. There were no complications.        Presents with wife for ear cleaning. After cleaning, normal ear exam.  Wife is concerned about hearing loss and notes he is struggling in conversation.  He denies any otalgia, otorrhea, ear fullness/pressure, tinnitus or vertigo.  Last audio was 2020 showing bilateral SNHL.  Schedule for hearing test then follow-up with me to review results.  Annual cleanings advised.

## 2022-07-14 ENCOUNTER — OFFICE VISIT (OUTPATIENT)
Dept: CARDIOLOGY | Facility: CLINIC | Age: 87
End: 2022-07-14
Payer: COMMERCIAL

## 2022-07-14 VITALS
HEART RATE: 102 BPM | BODY MASS INDEX: 24.62 KG/M2 | OXYGEN SATURATION: 97 % | SYSTOLIC BLOOD PRESSURE: 161 MMHG | DIASTOLIC BLOOD PRESSURE: 101 MMHG | HEIGHT: 67 IN | RESPIRATION RATE: 19 BRPM | WEIGHT: 156.88 LBS

## 2022-07-14 DIAGNOSIS — R55 SYNCOPE AND COLLAPSE: ICD-10-CM

## 2022-07-14 DIAGNOSIS — I10 ESSENTIAL HYPERTENSION: ICD-10-CM

## 2022-07-14 DIAGNOSIS — Z95.1 S/P CABG (CORONARY ARTERY BYPASS GRAFT): Primary | ICD-10-CM

## 2022-07-14 DIAGNOSIS — I25.810 CORONARY ARTERY DISEASE INVOLVING CORONARY BYPASS GRAFT OF NATIVE HEART WITHOUT ANGINA PECTORIS: ICD-10-CM

## 2022-07-14 DIAGNOSIS — E78.5 HYPERLIPIDEMIA, UNSPECIFIED HYPERLIPIDEMIA TYPE: ICD-10-CM

## 2022-07-14 PROCEDURE — 93000 ELECTROCARDIOGRAM COMPLETE: CPT | Mod: S$GLB,,, | Performed by: INTERNAL MEDICINE

## 2022-07-14 PROCEDURE — 93000 EKG 12-LEAD: ICD-10-PCS | Mod: S$GLB,,, | Performed by: INTERNAL MEDICINE

## 2022-07-14 PROCEDURE — 99214 PR OFFICE/OUTPT VISIT, EST, LEVL IV, 30-39 MIN: ICD-10-PCS | Mod: S$GLB,,, | Performed by: INTERNAL MEDICINE

## 2022-07-14 PROCEDURE — 99214 OFFICE O/P EST MOD 30 MIN: CPT | Mod: S$GLB,,, | Performed by: INTERNAL MEDICINE

## 2022-07-14 PROCEDURE — 99999 PR PBB SHADOW E&M-EST. PATIENT-LVL IV: ICD-10-PCS | Mod: PBBFAC,,, | Performed by: INTERNAL MEDICINE

## 2022-07-14 PROCEDURE — 99999 PR PBB SHADOW E&M-EST. PATIENT-LVL IV: CPT | Mod: PBBFAC,,, | Performed by: INTERNAL MEDICINE

## 2022-07-14 RX ORDER — ISOSORBIDE MONONITRATE 30 MG/1
30 TABLET, EXTENDED RELEASE ORAL DAILY
Qty: 90 TABLET | Refills: 3 | Status: SHIPPED | OUTPATIENT
Start: 2022-07-14 | End: 2022-09-16 | Stop reason: SDUPTHER

## 2022-07-14 NOTE — PROGRESS NOTES
Subjective:    Patient ID:  Destin Barber is a 87 y.o. male who presents for follow-up of No chief complaint on file.      HPI     Previously saw Dr Piedra 12/2016  # CAD/ACS s/p 3V CABG 7/2016.  Appears his LCx/OM was not bypassed due to poor available vein conduit.  No LCx ischemia noted on MPI.  # HTN, uncontrolled in the setting of med noncompliance  # HLP  # DM  # abnl EKG        CAD/ACS s/p CABG 7/7/16: LIMA-LAD, SVG-Ramus, SVG-PDA (Dr. Hallman).  LCx/OM branch not bypassed due to poor quality venous conduit available     Ex MPI 11/29/16 (images pers rev)  The patient exercised for 4.45 minutes on a High Ramp protocol, achieving a peak heart rate of 121 bpm, which is 86% of the age predicted maximum heart rate. -CP/EKG.   Nuclear Quantitative Functional Analysis:   LVEF: 60 %  Impression: ABNORMAL MYOCARDIAL PERFUSION  1. The perfusion scan is free of evidence for myocardial ischemia.   2. There is mild intensity fixed defect in the anteroapical wall of the left ventricle, consistent with myocardial injury.   3. There is a mild intensity fixed defect in the inferior wall of the left ventricle, secondary to diaphragm attenuation.   4. Resting wall motion is physiologic.   5. Resting LV function is normal.   6. The ventricular volumes are normal at rest and stress.   7. The extracardiac distribution of radioactivity is normal.      Echo 6/25/16    1 - Normal left ventricular systolic function (EF 60-65%).     2 - Mild left atrial enlargement.     3 - Left ventricular diastolic dysfunction.     4 - Normal right ventricular systolic function .     5 - Trivial to mild aortic regurgitation.     6 - Trivial to mild mitral regurgitation.     7 - Trivial to mild tricuspid regurgitation.     8 - Pulmonary hypertension. The estimated PA systolic pressure is 44 mmHg.      Cath 6/24/16  LVEDP: 7mmHg  LVEF: 55%  Wall Motion: normal  Dominance: Right  LM: normal  LAD: ost/prox eccentric 70-80%, prox 50%, excellent mid LAD  "target.  Ramus: prox 50-70%, mid 90%, bifurcating distal vessel (both excellent targets)  LCx: prox 95% at bifurcation of OM1.  OM1 ost/prox 80%, bifurcating vessel, excellent target.  RCA: dominant, anterior takeoff with "blandon's crook" prox vessel.Prox 70%, mid 70%. Excellent PDA target.  Hemostasis:  R Radial band  Impression:  NSTEMI  4V CAD, normal LV fxn  R radial vasband for hemostasis     Carotid US 7/6/16  RIGHT SIDE:   1 - 39 % right ICA stenosis.   Heterogeneous plaque in the right internal carotid artery.   Antegrade flow in the right vertebral artery.   LEFT SIDE:  1 - 39% left ICA stenosis.   Homogeneous plaque in the left internal carotid artery.   Antegrade flow in the left vertebral artery.      10/17/17 Overall doing well. Denies CP or SOB  EKG NSR TWI V1 and V2 - similar to prior EKG  Suffering with an URI     EKG 3/30/22 - done in ER - not in EPIC     4/29/22 Denies CP or SOB  Had a recent brief syncopal spell 5 days ago - details unclear- poor historian  BP elevated  Echo and holter for recent syncope  Not interested in repeat stress test  Continue Rx for CAD, HTN, HLD, DD    7/14/22 Reports JULIO and pain across lower rib cage with radiation to left shoulder for the last week  EKG sinus tachycardia 102 RBBB/LAD  BP elevated  Denies further syncope  Poor historian    Review of Systems   Constitutional: Negative for decreased appetite.   HENT: Negative for ear discharge.    Eyes: Negative for blurred vision.   Endocrine: Negative for polyphagia.   Skin: Negative for nail changes.   Genitourinary: Negative for bladder incontinence.   Neurological: Negative for aphonia.   Psychiatric/Behavioral: Negative for hallucinations.   Allergic/Immunologic: Negative for hives.        Objective:    Physical Exam  Constitutional:       Appearance: He is well-developed.   HENT:      Head: Normocephalic and atraumatic.   Eyes:      Conjunctiva/sclera: Conjunctivae normal.      Pupils: Pupils are equal, round, " and reactive to light.   Cardiovascular:      Rate and Rhythm: Normal rate.      Pulses: Intact distal pulses.      Heart sounds: Normal heart sounds.   Pulmonary:      Effort: Pulmonary effort is normal.      Breath sounds: Normal breath sounds.   Abdominal:      General: Bowel sounds are normal.      Palpations: Abdomen is soft.   Musculoskeletal:         General: Normal range of motion.      Cervical back: Normal range of motion and neck supple.   Skin:     General: Skin is warm and dry.   Neurological:      Mental Status: He is alert and oriented to person, place, and time.           Assessment:       1. S/P CABG (coronary artery bypass graft)    2. 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 Essential hypertension    4. Hyperlipidemia, unspecified hyperlipidemia type    5. Syncope and collapse         Plan:       Add imdur for CP  Echo, lexiscan myoview and holter for CP, JULIO, recent syncope  Needs to go to the ER for worsening CP  Continue Rx for CAD, HTN, HLD, DD

## 2022-07-19 DIAGNOSIS — N18.30 CONTROLLED TYPE 2 DIABETES MELLITUS WITH STAGE 3 CHRONIC KIDNEY DISEASE, WITHOUT LONG-TERM CURRENT USE OF INSULIN: Primary | ICD-10-CM

## 2022-07-19 DIAGNOSIS — E11.22 CONTROLLED TYPE 2 DIABETES MELLITUS WITH STAGE 3 CHRONIC KIDNEY DISEASE, WITHOUT LONG-TERM CURRENT USE OF INSULIN: Primary | ICD-10-CM

## 2022-07-31 ENCOUNTER — EXTERNAL CHRONIC CARE MANAGEMENT (OUTPATIENT)
Dept: PRIMARY CARE CLINIC | Facility: CLINIC | Age: 87
End: 2022-07-31
Payer: COMMERCIAL

## 2022-07-31 PROCEDURE — 99490 CHRNC CARE MGMT STAFF 1ST 20: CPT | Mod: S$GLB,,, | Performed by: INTERNAL MEDICINE

## 2022-07-31 PROCEDURE — 99439 CHRNC CARE MGMT STAF EA ADDL: CPT | Mod: S$GLB,,, | Performed by: INTERNAL MEDICINE

## 2022-07-31 PROCEDURE — 99490 PR CHRONIC CARE MGMT, 1ST 20 MIN: ICD-10-PCS | Mod: S$GLB,,, | Performed by: INTERNAL MEDICINE

## 2022-07-31 PROCEDURE — 99439 PR CHRONIC CARE MGMT, EA ADDTL 20 MIN: ICD-10-PCS | Mod: S$GLB,,, | Performed by: INTERNAL MEDICINE

## 2022-08-11 ENCOUNTER — CLINICAL SUPPORT (OUTPATIENT)
Dept: AUDIOLOGY | Facility: CLINIC | Age: 87
End: 2022-08-11
Payer: COMMERCIAL

## 2022-08-11 DIAGNOSIS — H90.3 SENSORINEURAL HEARING LOSS OF BOTH EARS: Primary | ICD-10-CM

## 2022-08-11 DIAGNOSIS — N13.8 BPH WITH OBSTRUCTION/LOWER URINARY TRACT SYMPTOMS: ICD-10-CM

## 2022-08-11 DIAGNOSIS — R33.9 URINARY RETENTION: ICD-10-CM

## 2022-08-11 DIAGNOSIS — N40.1 BPH WITH OBSTRUCTION/LOWER URINARY TRACT SYMPTOMS: ICD-10-CM

## 2022-08-11 PROCEDURE — 92557 COMPREHENSIVE HEARING TEST: CPT | Mod: S$GLB,,, | Performed by: AUDIOLOGIST

## 2022-08-11 PROCEDURE — 92550 TYMPANOMETRY & REFLEX THRESH: CPT | Mod: S$GLB,,, | Performed by: AUDIOLOGIST

## 2022-08-11 PROCEDURE — 92557 PR COMPREHENSIVE HEARING TEST: ICD-10-PCS | Mod: S$GLB,,, | Performed by: AUDIOLOGIST

## 2022-08-11 PROCEDURE — 92550 PR TYMPANOMETRY AND REFLEX THRESHOLD MEASUREMENTS: ICD-10-PCS | Mod: S$GLB,,, | Performed by: AUDIOLOGIST

## 2022-08-11 RX ORDER — TAMSULOSIN HYDROCHLORIDE 0.4 MG/1
0.4 CAPSULE ORAL DAILY
Qty: 90 CAPSULE | Refills: 3 | Status: SHIPPED | OUTPATIENT
Start: 2022-08-11 | End: 2023-01-13 | Stop reason: SDUPTHER

## 2022-08-11 NOTE — TELEPHONE ENCOUNTER
----- Message from Noheim Garcia sent at 8/11/2022  9:48 AM CDT -----  Regarding: Medication refill  Patient needs refill on his Tamsulosin 0.4 mg capsules. He only has a few pills left.

## 2022-08-11 NOTE — PROGRESS NOTES
Mr. Destin Barber was seen in the clinic today for an audiological evaluation.  Mr. 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    Audiological testing revealed mild to severe sensorineural hearing loss for the right ear and mild to moderately-severe sensorineural hearing loss for the left ear.  A speech reception threshold was obtained at 35 dBHL for the right ear and at 35 dBHL for the left ear.  Speech discrimination was 72% for the right ear and 72% for the left ear.      Tympanometry testing revealed a Type A tympanogram for the right ear and a Type A tympanogram for the left ear.  Ipsilateral acoustic reflexes were present from 500-4000 Hz for the right ear and were present from 500-2000 Hz for the left ear.    Recommendations:  1. Otologic evaluation  2. Annual audiological evaluation  3. Hearing protection when in noise   4. Hearing aid consultation    Please click on link to view Audiogram:  Document on 8/11/2022  9:32 AM by ALBINA RamirezD: Audiogram

## 2022-08-11 NOTE — Clinical Note
Shilo Velasquez! Please see the results of today's audio.  He is not yet scheduled with you to review the results.  No real changes since last audio.  Thanks! Jason

## 2022-08-11 NOTE — TELEPHONE ENCOUNTER
Care Due:                  Date            Visit Type   Department     Provider  --------------------------------------------------------------------------------                                [facility]                              PRIMARY      MEDICINE/  Last Visit: 12-      CARE ([facility])   INTERNAL MED   Harry Velazco                              [facility]                              PRIMARY      MEDICINE/  Next Visit: 09-      CARE ([facility])   INTERNAL MED   Harry Velazco                                                            Last  Test          Frequency    Reason                     Performed    Due Date  --------------------------------------------------------------------------------    HBA1C.......  6 months...  metFORMIN................  12- 06-    Lipid Panel.  12 months..  pravastatin..............  01- 01-    [facility] Embedded Care Gaps. Reference number: [id number]. 8/11/2022   9:53:15 AM CDT

## 2022-08-24 ENCOUNTER — HOSPITAL ENCOUNTER (OUTPATIENT)
Dept: CARDIOLOGY | Facility: HOSPITAL | Age: 87
Discharge: HOME | End: 2022-08-24
Attending: INTERNAL MEDICINE
Payer: COMMERCIAL

## 2022-08-24 ENCOUNTER — HOSPITAL ENCOUNTER (OUTPATIENT)
Dept: RADIOLOGY | Facility: HOSPITAL | Age: 87
Discharge: HOME | End: 2022-08-24
Attending: INTERNAL MEDICINE
Payer: COMMERCIAL

## 2022-08-24 DIAGNOSIS — R55 SYNCOPE AND COLLAPSE: ICD-10-CM

## 2022-08-24 DIAGNOSIS — I25.810 CORONARY ARTERY DISEASE INVOLVING CORONARY BYPASS GRAFT OF NATIVE HEART WITHOUT ANGINA PECTORIS: ICD-10-CM

## 2022-08-24 DIAGNOSIS — I10 ESSENTIAL HYPERTENSION: ICD-10-CM

## 2022-08-24 DIAGNOSIS — Z95.1 S/P CABG (CORONARY ARTERY BYPASS GRAFT): ICD-10-CM

## 2022-08-24 DIAGNOSIS — E78.5 HYPERLIPIDEMIA, UNSPECIFIED HYPERLIPIDEMIA TYPE: ICD-10-CM

## 2022-08-31 ENCOUNTER — EXTERNAL CHRONIC CARE MANAGEMENT (OUTPATIENT)
Dept: PRIMARY CARE CLINIC | Facility: CLINIC | Age: 87
End: 2022-08-31
Payer: COMMERCIAL

## 2022-08-31 PROCEDURE — 99490 PR CHRONIC CARE MGMT, 1ST 20 MIN: ICD-10-PCS | Mod: S$GLB,,, | Performed by: INTERNAL MEDICINE

## 2022-08-31 PROCEDURE — 99490 CHRNC CARE MGMT STAFF 1ST 20: CPT | Mod: S$GLB,,, | Performed by: INTERNAL MEDICINE

## 2022-08-31 PROCEDURE — 99439 PR CHRONIC CARE MGMT, EA ADDTL 20 MIN: ICD-10-PCS | Mod: S$GLB,,, | Performed by: INTERNAL MEDICINE

## 2022-08-31 PROCEDURE — 99439 CHRNC CARE MGMT STAF EA ADDL: CPT | Mod: S$GLB,,, | Performed by: INTERNAL MEDICINE

## 2022-09-06 DIAGNOSIS — J30.89 NON-SEASONAL ALLERGIC RHINITIS, UNSPECIFIED TRIGGER: ICD-10-CM

## 2022-09-06 DIAGNOSIS — E11.22 CONTROLLED TYPE 2 DIABETES MELLITUS WITH STAGE 3 CHRONIC KIDNEY DISEASE, WITHOUT LONG-TERM CURRENT USE OF INSULIN: ICD-10-CM

## 2022-09-06 DIAGNOSIS — G47.00 INSOMNIA, UNSPECIFIED TYPE: ICD-10-CM

## 2022-09-06 DIAGNOSIS — N18.30 CONTROLLED TYPE 2 DIABETES MELLITUS WITH STAGE 3 CHRONIC KIDNEY DISEASE, WITHOUT LONG-TERM CURRENT USE OF INSULIN: ICD-10-CM

## 2022-09-06 DIAGNOSIS — E11.40 TYPE 2 DIABETES MELLITUS WITH DIABETIC NEUROPATHY, WITHOUT LONG-TERM CURRENT USE OF INSULIN: ICD-10-CM

## 2022-09-06 DIAGNOSIS — I10 ESSENTIAL HYPERTENSION: ICD-10-CM

## 2022-09-06 RX ORDER — LISINOPRIL 20 MG/1
20 TABLET ORAL DAILY
Qty: 90 TABLET | Refills: 3 | Status: SHIPPED | OUTPATIENT
Start: 2022-09-06 | End: 2022-09-16 | Stop reason: SDUPTHER

## 2022-09-06 RX ORDER — TRAZODONE HYDROCHLORIDE 150 MG/1
TABLET ORAL
Qty: 90 TABLET | Refills: 1 | Status: SHIPPED | OUTPATIENT
Start: 2022-09-06 | End: 2023-01-13 | Stop reason: SDUPTHER

## 2022-09-06 RX ORDER — METFORMIN HYDROCHLORIDE 500 MG/1
TABLET, EXTENDED RELEASE ORAL
Qty: 90 TABLET | Refills: 0 | Status: SHIPPED | OUTPATIENT
Start: 2022-09-06 | End: 2023-01-13 | Stop reason: SDUPTHER

## 2022-09-06 RX ORDER — CETIRIZINE HYDROCHLORIDE 10 MG/1
10 TABLET, CHEWABLE ORAL DAILY
Qty: 90 TABLET | Refills: 1 | Status: SHIPPED | OUTPATIENT
Start: 2022-09-06 | End: 2023-03-07

## 2022-09-06 NOTE — TELEPHONE ENCOUNTER
----- Message from Carolyn Mccord sent at 9/6/2022  2:12 PM CDT -----  Regarding: medication  Pt came into the clinic today requesting his medication- metformin, cetirizine, lisinopril. All three bottles the pt handed me are empty, pt is also stating he lost his sleeping medication and also wants a refill on that. Pt can be reached at [phone]

## 2022-09-06 NOTE — TELEPHONE ENCOUNTER
No new care gaps identified.  [facility] Embedded Care Gaps. Reference number: [id number]. 9/06/2022   2:28:00 PM CDT

## 2022-09-16 ENCOUNTER — OFFICE VISIT (OUTPATIENT)
Dept: CARDIOLOGY | Facility: CLINIC | Age: 87
End: 2022-09-16
Payer: COMMERCIAL

## 2022-09-16 VITALS
RESPIRATION RATE: 18 BRPM | HEART RATE: 90 BPM | DIASTOLIC BLOOD PRESSURE: 108 MMHG | WEIGHT: 149.06 LBS | SYSTOLIC BLOOD PRESSURE: 172 MMHG | OXYGEN SATURATION: 98 % | BODY MASS INDEX: 23.39 KG/M2 | HEIGHT: 67 IN

## 2022-09-16 DIAGNOSIS — I10 ESSENTIAL HYPERTENSION: ICD-10-CM

## 2022-09-16 DIAGNOSIS — N18.30 CONTROLLED TYPE 2 DIABETES MELLITUS WITH STAGE 3 CHRONIC KIDNEY DISEASE, WITHOUT LONG-TERM CURRENT USE OF INSULIN: ICD-10-CM

## 2022-09-16 DIAGNOSIS — I25.810 CORONARY ARTERY DISEASE INVOLVING CORONARY BYPASS GRAFT OF NATIVE HEART WITHOUT ANGINA PECTORIS: ICD-10-CM

## 2022-09-16 DIAGNOSIS — R42 DIZZINESS: ICD-10-CM

## 2022-09-16 DIAGNOSIS — E78.5 HYPERLIPIDEMIA, UNSPECIFIED HYPERLIPIDEMIA TYPE: ICD-10-CM

## 2022-09-16 DIAGNOSIS — E11.22 CONTROLLED TYPE 2 DIABETES MELLITUS WITH STAGE 3 CHRONIC KIDNEY DISEASE, WITHOUT LONG-TERM CURRENT USE OF INSULIN: ICD-10-CM

## 2022-09-16 DIAGNOSIS — Z95.1 S/P CABG (CORONARY ARTERY BYPASS GRAFT): Primary | ICD-10-CM

## 2022-09-16 DIAGNOSIS — R55 SYNCOPE AND COLLAPSE: ICD-10-CM

## 2022-09-16 PROCEDURE — 3077F PR MOST RECENT SYSTOLIC BLOOD PRESSURE >= 140 MM HG: ICD-10-PCS | Mod: CPTII,S$GLB,, | Performed by: INTERNAL MEDICINE

## 2022-09-16 PROCEDURE — 3080F PR MOST RECENT DIASTOLIC BLOOD PRESSURE >= 90 MM HG: ICD-10-PCS | Mod: CPTII,S$GLB,, | Performed by: INTERNAL MEDICINE

## 2022-09-16 PROCEDURE — 3080F DIAST BP >= 90 MM HG: CPT | Mod: CPTII,S$GLB,, | Performed by: INTERNAL MEDICINE

## 2022-09-16 PROCEDURE — 99999 PR PBB SHADOW E&M-EST. PATIENT-LVL IV: CPT | Mod: PBBFAC,,, | Performed by: INTERNAL MEDICINE

## 2022-09-16 PROCEDURE — 1159F MED LIST DOCD IN RCRD: CPT | Mod: CPTII,S$GLB,, | Performed by: INTERNAL MEDICINE

## 2022-09-16 PROCEDURE — 1159F PR MEDICATION LIST DOCUMENTED IN MEDICAL RECORD: ICD-10-PCS | Mod: CPTII,S$GLB,, | Performed by: INTERNAL MEDICINE

## 2022-09-16 PROCEDURE — 1125F PR PAIN SEVERITY QUANTIFIED, PAIN PRESENT: ICD-10-PCS | Mod: CPTII,S$GLB,, | Performed by: INTERNAL MEDICINE

## 2022-09-16 PROCEDURE — 1157F PR ADVANCE CARE PLAN OR EQUIV PRESENT IN MEDICAL RECORD: ICD-10-PCS | Mod: CPTII,S$GLB,, | Performed by: INTERNAL MEDICINE

## 2022-09-16 PROCEDURE — 99214 OFFICE O/P EST MOD 30 MIN: CPT | Mod: S$GLB,,, | Performed by: INTERNAL MEDICINE

## 2022-09-16 PROCEDURE — 99214 PR OFFICE/OUTPT VISIT, EST, LEVL IV, 30-39 MIN: ICD-10-PCS | Mod: S$GLB,,, | Performed by: INTERNAL MEDICINE

## 2022-09-16 PROCEDURE — 1125F AMNT PAIN NOTED PAIN PRSNT: CPT | Mod: CPTII,S$GLB,, | Performed by: INTERNAL MEDICINE

## 2022-09-16 PROCEDURE — 1101F PR PT FALLS ASSESS DOC 0-1 FALLS W/OUT INJ PAST YR: ICD-10-PCS | Mod: CPTII,S$GLB,, | Performed by: INTERNAL MEDICINE

## 2022-09-16 PROCEDURE — 99999 PR PBB SHADOW E&M-EST. PATIENT-LVL IV: ICD-10-PCS | Mod: PBBFAC,,, | Performed by: INTERNAL MEDICINE

## 2022-09-16 PROCEDURE — 3077F SYST BP >= 140 MM HG: CPT | Mod: CPTII,S$GLB,, | Performed by: INTERNAL MEDICINE

## 2022-09-16 PROCEDURE — 1157F ADVNC CARE PLAN IN RCRD: CPT | Mod: CPTII,S$GLB,, | Performed by: INTERNAL MEDICINE

## 2022-09-16 PROCEDURE — 3288F FALL RISK ASSESSMENT DOCD: CPT | Mod: CPTII,S$GLB,, | Performed by: INTERNAL MEDICINE

## 2022-09-16 PROCEDURE — 3288F PR FALLS RISK ASSESSMENT DOCUMENTED: ICD-10-PCS | Mod: CPTII,S$GLB,, | Performed by: INTERNAL MEDICINE

## 2022-09-16 PROCEDURE — 1101F PT FALLS ASSESS-DOCD LE1/YR: CPT | Mod: CPTII,S$GLB,, | Performed by: INTERNAL MEDICINE

## 2022-09-16 RX ORDER — ISOSORBIDE MONONITRATE 60 MG/1
60 TABLET, EXTENDED RELEASE ORAL DAILY
Qty: 90 TABLET | Refills: 3 | Status: SHIPPED | OUTPATIENT
Start: 2022-09-16 | End: 2023-10-26 | Stop reason: SDUPTHER

## 2022-09-16 RX ORDER — LISINOPRIL 40 MG/1
40 TABLET ORAL DAILY
Qty: 90 TABLET | Refills: 3 | Status: SHIPPED | OUTPATIENT
Start: 2022-09-16 | End: 2023-10-26 | Stop reason: SDUPTHER

## 2022-09-16 NOTE — PROGRESS NOTES
Subjective:    Patient ID:  Destin Barber is a 87 y.o. male who presents for follow-up of No chief complaint on file.      HPI      Previously saw Dr Piedra 12/2016  # CAD/ACS s/p 3V CABG 7/2016.  Appears his LCx/OM was not bypassed due to poor available vein conduit.  No LCx ischemia noted on MPI.  # HTN, uncontrolled in the setting of med noncompliance  # HLP  # DM  # abnl EKG        CAD/ACS s/p CABG 7/7/16: LIMA-LAD, SVG-Ramus, SVG-PDA (Dr. Hallman).  LCx/OM branch not bypassed due to poor quality venous conduit available     Ex MPI 11/29/16 (images pers rev)  The patient exercised for 4.45 minutes on a High Ramp protocol, achieving a peak heart rate of 121 bpm, which is 86% of the age predicted maximum heart rate. -CP/EKG.   Nuclear Quantitative Functional Analysis:   LVEF: 60 %  Impression: ABNORMAL MYOCARDIAL PERFUSION  1. The perfusion scan is free of evidence for myocardial ischemia.   2. There is mild intensity fixed defect in the anteroapical wall of the left ventricle, consistent with myocardial injury.   3. There is a mild intensity fixed defect in the inferior wall of the left ventricle, secondary to diaphragm attenuation.   4. Resting wall motion is physiologic.   5. Resting LV function is normal.   6. The ventricular volumes are normal at rest and stress.   7. The extracardiac distribution of radioactivity is normal.      Echo 6/25/16    1 - Normal left ventricular systolic function (EF 60-65%).     2 - Mild left atrial enlargement.     3 - Left ventricular diastolic dysfunction.     4 - Normal right ventricular systolic function .     5 - Trivial to mild aortic regurgitation.     6 - Trivial to mild mitral regurgitation.     7 - Trivial to mild tricuspid regurgitation.     8 - Pulmonary hypertension. The estimated PA systolic pressure is 44 mmHg.      Cath 6/24/16  LVEDP: 7mmHg  LVEF: 55%  Wall Motion: normal  Dominance: Right  LM: normal  LAD: ost/prox eccentric 70-80%, prox 50%, excellent mid LAD  "target.  Ramus: prox 50-70%, mid 90%, bifurcating distal vessel (both excellent targets)  LCx: prox 95% at bifurcation of OM1.  OM1 ost/prox 80%, bifurcating vessel, excellent target.  RCA: dominant, anterior takeoff with "blandon's crook" prox vessel.Prox 70%, mid 70%. Excellent PDA target.  Hemostasis:  R Radial band  Impression:  NSTEMI  4V CAD, normal LV fxn  R radial vasband for hemostasis     Carotid US 7/6/16  RIGHT SIDE:   1 - 39 % right ICA stenosis.   Heterogeneous plaque in the right internal carotid artery.   Antegrade flow in the right vertebral artery.   LEFT SIDE:  1 - 39% left ICA stenosis.   Homogeneous plaque in the left internal carotid artery.   Antegrade flow in the left vertebral artery.      10/17/17 Overall doing well. Denies CP or SOB  EKG NSR TWI V1 and V2 - similar to prior EKG  Suffering with an URI     EKG 3/30/22 - done in ER - not in EPIC     4/29/22 Denies CP or SOB  Had a recent brief syncopal spell 5 days ago - details unclear- poor historian  BP elevated  Echo and holter for recent syncope  Not interested in repeat stress test  Continue Rx for CAD, HTN, HLD, DD     7/14/22 Reports JULIO and pain across lower rib cage with radiation to left shoulder for the last week  EKG sinus tachycardia 102 RBBB/LAD  BP elevated  Denies further syncope  Poor historian   Add imdur for CP  Echo, lexiscan myoview and holter for CP, JULIO, recent syncope  Needs to go to the ER for worsening CP  Continue Rx for CAD, HTN, HLD, DD    9/16/22 Testing not done. BP poorly controlled  Having CP that radiates centrally from neck down to his abdomen  Some JULIO        Review of Systems   Constitutional: Negative for decreased appetite.   HENT:  Negative for ear discharge.    Eyes:  Negative for blurred vision.   Endocrine: Negative for polyphagia.   Skin:  Negative for nail changes.   Genitourinary:  Negative for bladder incontinence.   Neurological:  Negative for aphonia.   Psychiatric/Behavioral:  Negative for " hallucinations.    Allergic/Immunologic: Negative for hives.      Objective:    Physical Exam  Constitutional:       Appearance: He is well-developed.   HENT:      Head: Normocephalic and atraumatic.   Eyes:      Conjunctiva/sclera: Conjunctivae normal.      Pupils: Pupils are equal, round, and reactive to light.   Cardiovascular:      Rate and Rhythm: Normal rate.      Pulses: Intact distal pulses.      Heart sounds: Normal heart sounds.   Pulmonary:      Effort: Pulmonary effort is normal.      Breath sounds: Normal breath sounds.   Abdominal:      General: Bowel sounds are normal.      Palpations: Abdomen is soft.   Musculoskeletal:         General: Normal range of motion.      Cervical back: Normal range of motion and neck supple.   Skin:     General: Skin is warm and dry.   Neurological:      Mental Status: He is alert and oriented to person, place, and time.         Assessment:       1. S/P CABG (coronary artery bypass graft)    2. Hyperlipidemia, unspecified hyperlipidemia type    3. Essential hypertension    4. 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 Syncope and collapse    6. Dizziness         Plan:       Increase lisinopril 40 qd and imdur 60 qd  Echo, lexiscan myoview and holter for CP, JULIO, recent syncope  Needs to go to the ER for worsening CP  Continue Rx for CAD, HTN, HLD, DD

## 2022-09-21 ENCOUNTER — HOSPITAL ENCOUNTER (OUTPATIENT)
Facility: HOSPITAL | Age: 87
Discharge: HOME | End: 2022-09-23
Attending: EMERGENCY MEDICINE | Admitting: STUDENT IN AN ORGANIZED HEALTH CARE EDUCATION/TRAINING PROGRAM
Payer: COMMERCIAL

## 2022-09-21 DIAGNOSIS — N17.9 AKI (ACUTE KIDNEY INJURY): Primary | ICD-10-CM

## 2022-09-21 DIAGNOSIS — I25.10 CAD (CORONARY ARTERY DISEASE): ICD-10-CM

## 2022-09-21 DIAGNOSIS — R07.9 CHEST PAIN: ICD-10-CM

## 2022-09-21 DIAGNOSIS — I25.810 CORONARY ARTERY DISEASE INVOLVING CORONARY BYPASS GRAFT OF NATIVE HEART WITHOUT ANGINA PECTORIS: ICD-10-CM

## 2022-09-21 LAB
ALBUMIN SERPL BCP-MCNC: 3.8 G/DL (ref 3.5–5.2)
ALP SERPL-CCNC: 91 U/L (ref 55–135)
ALT SERPL W/O P-5'-P-CCNC: 10 U/L (ref 10–44)
ANION GAP SERPL CALC-SCNC: 14 MMOL/L (ref 8–16)
AST SERPL-CCNC: 21 U/L (ref 10–40)
BASOPHILS # BLD AUTO: 0.04 K/UL (ref 0–0.2)
BASOPHILS NFR BLD: 0.5 % (ref 0–1.9)
BILIRUB SERPL-MCNC: 0.2 MG/DL (ref 0.1–1)
BNP SERPL-MCNC: 107 PG/ML (ref 0–99)
BUN SERPL-MCNC: 25 MG/DL (ref 8–23)
CALCIUM SERPL-MCNC: 9.4 MG/DL (ref 8.7–10.5)
CHLORIDE SERPL-SCNC: 105 MMOL/L (ref 95–110)
CO2 SERPL-SCNC: 22 MMOL/L (ref 23–29)
CREAT SERPL-MCNC: 1.8 MG/DL (ref 0.5–1.4)
DIFFERENTIAL METHOD: ABNORMAL
EOSINOPHIL # BLD AUTO: 0.2 K/UL (ref 0–0.5)
EOSINOPHIL NFR BLD: 2.5 % (ref 0–8)
ERYTHROCYTE [DISTWIDTH] IN BLOOD BY AUTOMATED COUNT: 12.3 % (ref 11.5–14.5)
EST. GFR  (NO RACE VARIABLE): 36 ML/MIN/1.73 M^2
GLUCOSE SERPL-MCNC: 108 MG/DL (ref 70–110)
HCT VFR BLD AUTO: 35.9 % (ref 40–54)
HGB BLD-MCNC: 12.8 G/DL (ref 14–18)
IMM GRANULOCYTES # BLD AUTO: 0.01 K/UL (ref 0–0.04)
IMM GRANULOCYTES NFR BLD AUTO: 0.1 % (ref 0–0.5)
LYMPHOCYTES # BLD AUTO: 4.5 K/UL (ref 1–4.8)
LYMPHOCYTES NFR BLD: 57.8 % (ref 18–48)
MCH RBC QN AUTO: 30.9 PG (ref 27–31)
MCHC RBC AUTO-ENTMCNC: 35.7 G/DL (ref 32–36)
MCV RBC AUTO: 87 FL (ref 82–98)
MONOCYTES # BLD AUTO: 0.6 K/UL (ref 0.3–1)
MONOCYTES NFR BLD: 7.6 % (ref 4–15)
NEUTROPHILS # BLD AUTO: 2.4 K/UL (ref 1.8–7.7)
NEUTROPHILS NFR BLD: 31.5 % (ref 38–73)
NRBC BLD-RTO: 0 /100 WBC
PLATELET # BLD AUTO: 237 K/UL (ref 150–450)
PMV BLD AUTO: 9 FL (ref 9.2–12.9)
POTASSIUM SERPL-SCNC: 4.6 MMOL/L (ref 3.5–5.1)
PROT SERPL-MCNC: 7.5 G/DL (ref 6–8.4)
RBC # BLD AUTO: 4.14 M/UL (ref 4.6–6.2)
SODIUM SERPL-SCNC: 141 MMOL/L (ref 136–145)
TROPONIN I SERPL DL<=0.01 NG/ML-MCNC: 0.09 NG/ML (ref 0–0.03)
WBC # BLD AUTO: 7.74 K/UL (ref 3.9–12.7)

## 2022-09-21 PROCEDURE — 99285 EMERGENCY DEPT VISIT HI MDM: CPT | Mod: 25

## 2022-09-21 PROCEDURE — 80053 COMPREHEN METABOLIC PANEL: CPT | Performed by: EMERGENCY MEDICINE

## 2022-09-21 PROCEDURE — 25000242 PHARM REV CODE 250 ALT 637 W/ HCPCS: Performed by: EMERGENCY MEDICINE

## 2022-09-21 PROCEDURE — 93010 ELECTROCARDIOGRAM REPORT: CPT | Mod: ,,, | Performed by: INTERNAL MEDICINE

## 2022-09-21 PROCEDURE — G0378 HOSPITAL OBSERVATION PER HR: HCPCS

## 2022-09-21 PROCEDURE — 84484 ASSAY OF TROPONIN QUANT: CPT | Performed by: EMERGENCY MEDICINE

## 2022-09-21 PROCEDURE — 83880 ASSAY OF NATRIURETIC PEPTIDE: CPT | Performed by: EMERGENCY MEDICINE

## 2022-09-21 PROCEDURE — 25000003 PHARM REV CODE 250: Performed by: EMERGENCY MEDICINE

## 2022-09-21 PROCEDURE — 93010 EKG 12-LEAD: ICD-10-PCS | Mod: ,,, | Performed by: INTERNAL MEDICINE

## 2022-09-21 PROCEDURE — 93005 ELECTROCARDIOGRAM TRACING: CPT

## 2022-09-21 PROCEDURE — 85025 COMPLETE CBC W/AUTO DIFF WBC: CPT | Performed by: EMERGENCY MEDICINE

## 2022-09-21 RX ORDER — TAMSULOSIN HYDROCHLORIDE 0.4 MG/1
0.4 CAPSULE ORAL DAILY
Status: DISCONTINUED | OUTPATIENT
Start: 2022-09-22 | End: 2022-09-23 | Stop reason: HOSPADM

## 2022-09-21 RX ORDER — TALC
6 POWDER (GRAM) TOPICAL NIGHTLY PRN
Status: DISCONTINUED | OUTPATIENT
Start: 2022-09-22 | End: 2022-09-23 | Stop reason: HOSPADM

## 2022-09-21 RX ORDER — ISOSORBIDE MONONITRATE 30 MG/1
60 TABLET, EXTENDED RELEASE ORAL DAILY
Status: DISCONTINUED | OUTPATIENT
Start: 2022-09-22 | End: 2022-09-23 | Stop reason: HOSPADM

## 2022-09-21 RX ORDER — SODIUM CHLORIDE 0.9 % (FLUSH) 0.9 %
10 SYRINGE (ML) INJECTION EVERY 8 HOURS
Status: DISCONTINUED | OUTPATIENT
Start: 2022-09-22 | End: 2022-09-23 | Stop reason: HOSPADM

## 2022-09-21 RX ORDER — HYDRALAZINE HYDROCHLORIDE 20 MG/ML
5 INJECTION INTRAMUSCULAR; INTRAVENOUS ONCE
Status: COMPLETED | OUTPATIENT
Start: 2022-09-22 | End: 2022-09-22

## 2022-09-21 RX ORDER — ACETAMINOPHEN 325 MG/1
650 TABLET ORAL EVERY 6 HOURS PRN
Status: DISCONTINUED | OUTPATIENT
Start: 2022-09-22 | End: 2022-09-23 | Stop reason: HOSPADM

## 2022-09-21 RX ORDER — AMOXICILLIN 250 MG
1 CAPSULE ORAL 2 TIMES DAILY PRN
Status: DISCONTINUED | OUTPATIENT
Start: 2022-09-22 | End: 2022-09-23 | Stop reason: HOSPADM

## 2022-09-21 RX ORDER — ATORVASTATIN CALCIUM 10 MG/1
40 TABLET, FILM COATED ORAL DAILY
Status: DISCONTINUED | OUTPATIENT
Start: 2022-09-22 | End: 2022-09-21

## 2022-09-21 RX ORDER — NALOXONE HCL 0.4 MG/ML
0.02 VIAL (ML) INJECTION
Status: DISCONTINUED | OUTPATIENT
Start: 2022-09-22 | End: 2022-09-23 | Stop reason: HOSPADM

## 2022-09-21 RX ORDER — AMLODIPINE BESYLATE 5 MG/1
10 TABLET ORAL DAILY
Status: DISCONTINUED | OUTPATIENT
Start: 2022-09-22 | End: 2022-09-23 | Stop reason: HOSPADM

## 2022-09-21 RX ORDER — GLUCAGON 1 MG
1 KIT INJECTION
Status: DISCONTINUED | OUTPATIENT
Start: 2022-09-21 | End: 2022-09-23 | Stop reason: HOSPADM

## 2022-09-21 RX ORDER — INSULIN ASPART 100 [IU]/ML
0-5 INJECTION, SOLUTION INTRAVENOUS; SUBCUTANEOUS EVERY 6 HOURS PRN
Status: DISCONTINUED | OUTPATIENT
Start: 2022-09-21 | End: 2022-09-23 | Stop reason: HOSPADM

## 2022-09-21 RX ORDER — ASPIRIN 81 MG/1
81 TABLET ORAL DAILY
Status: DISCONTINUED | OUTPATIENT
Start: 2022-09-22 | End: 2022-09-23 | Stop reason: HOSPADM

## 2022-09-21 RX ORDER — METOPROLOL TARTRATE 25 MG/1
25 TABLET, FILM COATED ORAL 2 TIMES DAILY
Status: DISCONTINUED | OUTPATIENT
Start: 2022-09-21 | End: 2022-09-23 | Stop reason: HOSPADM

## 2022-09-21 RX ORDER — NITROGLYCERIN 0.4 MG/1
0.4 TABLET SUBLINGUAL EVERY 5 MIN PRN
Status: DISCONTINUED | OUTPATIENT
Start: 2022-09-21 | End: 2022-09-23 | Stop reason: HOSPADM

## 2022-09-21 RX ORDER — ASPIRIN 325 MG
325 TABLET ORAL
Status: COMPLETED | OUTPATIENT
Start: 2022-09-21 | End: 2022-09-21

## 2022-09-21 RX ORDER — PRAVASTATIN SODIUM 40 MG/1
40 TABLET ORAL DAILY
Status: DISCONTINUED | OUTPATIENT
Start: 2022-09-22 | End: 2022-09-23 | Stop reason: HOSPADM

## 2022-09-21 RX ORDER — NITROGLYCERIN 0.4 MG/1
0.4 TABLET SUBLINGUAL EVERY 5 MIN PRN
Status: COMPLETED | OUTPATIENT
Start: 2022-09-21 | End: 2022-09-21

## 2022-09-21 RX ADMIN — NITROGLYCERIN 0.4 MG: 0.4 TABLET, ORALLY DISINTEGRATING SUBLINGUAL at 08:09

## 2022-09-21 RX ADMIN — ASPIRIN 325 MG ORAL TABLET 325 MG: 325 PILL ORAL at 08:09

## 2022-09-21 NOTE — Clinical Note
Diagnosis: Chest pain [927972]   Future Attending Provider: DENILSON MCKENNA [[id number]]   Admitting Provider:: DENILSON MCKENNA [[id number]]

## 2022-09-22 LAB
ANION GAP SERPL CALC-SCNC: 10 MMOL/L (ref 8–16)
APTT BLDCRRT: 30.5 SEC (ref 21–32)
APTT BLDCRRT: 47.9 SEC (ref 21–32)
APTT BLDCRRT: 56.8 SEC (ref 21–32)
ASCENDING AORTA: 3.17 CM
AV PEAK GRADIENT: 4 MMHG
AV VELOCITY RATIO: 0.79
BASOPHILS # BLD AUTO: 0.02 K/UL (ref 0–0.2)
BASOPHILS # BLD AUTO: 0.03 K/UL (ref 0–0.2)
BASOPHILS NFR BLD: 0.3 % (ref 0–1.9)
BASOPHILS NFR BLD: 0.4 % (ref 0–1.9)
BSA FOR ECHO PROCEDURE: 1.75 M2
BUN SERPL-MCNC: 29 MG/DL (ref 8–23)
CALCIUM SERPL-MCNC: 8.6 MG/DL (ref 8.7–10.5)
CHLORIDE SERPL-SCNC: 108 MMOL/L (ref 95–110)
CHOLEST SERPL-MCNC: 118 MG/DL (ref 120–199)
CHOLEST/HDLC SERPL: 2.7 {RATIO} (ref 2–5)
CO2 SERPL-SCNC: 21 MMOL/L (ref 23–29)
CREAT SERPL-MCNC: 1.4 MG/DL (ref 0.5–1.4)
CV ECHO LV RWT: 0.6 CM
CV STRESS BASE HR: 79 BPM
DIASTOLIC BLOOD PRESSURE: 76 MMHG
DIFFERENTIAL METHOD: ABNORMAL
DIFFERENTIAL METHOD: ABNORMAL
DOP CALC AO PEAK VEL: 1.04 M/S
DOP CALC LVOT AREA: 3.1 CM2
DOP CALC LVOT DIAMETER: 1.99 CM
DOP CALC LVOT PEAK VEL: 0.82 M/S
DOP CALC LVOT STROKE VOLUME: 42.9 CM3
DOP CALCLVOT PEAK VEL VTI: 13.8 CM
E WAVE DECELERATION TIME: 317.16 MSEC
E/A RATIO: 0.56
E/E' RATIO: 10.6 M/S
ECHO LV POSTERIOR WALL: 1.23 CM (ref 0.6–1.1)
EJECTION FRACTION: 65 %
EOSINOPHIL # BLD AUTO: 0.2 K/UL (ref 0–0.5)
EOSINOPHIL # BLD AUTO: 0.2 K/UL (ref 0–0.5)
EOSINOPHIL NFR BLD: 2.3 % (ref 0–8)
EOSINOPHIL NFR BLD: 2.8 % (ref 0–8)
ERYTHROCYTE [DISTWIDTH] IN BLOOD BY AUTOMATED COUNT: 12.2 % (ref 11.5–14.5)
ERYTHROCYTE [DISTWIDTH] IN BLOOD BY AUTOMATED COUNT: 12.4 % (ref 11.5–14.5)
EST. GFR  (NO RACE VARIABLE): 49 ML/MIN/1.73 M^2
ESTIMATED AVG GLUCOSE: 126 MG/DL (ref 68–131)
FRACTIONAL SHORTENING: 35 % (ref 28–44)
GLUCOSE SERPL-MCNC: 103 MG/DL (ref 70–110)
HBA1C MFR BLD: 6 % (ref 4–5.6)
HCT VFR BLD AUTO: 34.7 % (ref 40–54)
HCT VFR BLD AUTO: 37.7 % (ref 40–54)
HDLC SERPL-MCNC: 43 MG/DL (ref 40–75)
HDLC SERPL: 36.4 % (ref 20–50)
HGB BLD-MCNC: 12.5 G/DL (ref 14–18)
HGB BLD-MCNC: 13.2 G/DL (ref 14–18)
IMM GRANULOCYTES # BLD AUTO: 0.01 K/UL (ref 0–0.04)
IMM GRANULOCYTES # BLD AUTO: 0.01 K/UL (ref 0–0.04)
IMM GRANULOCYTES NFR BLD AUTO: 0.1 % (ref 0–0.5)
IMM GRANULOCYTES NFR BLD AUTO: 0.1 % (ref 0–0.5)
INR PPP: 1 (ref 0.8–1.2)
INTERVENTRICULAR SEPTUM: 1.3 CM (ref 0.6–1.1)
IVRT: 65.74 MSEC
LA MAJOR: 5.52 CM
LA MINOR: 5.63 CM
LA WIDTH: 4.4 CM
LDLC SERPL CALC-MCNC: 55.2 MG/DL (ref 63–159)
LEFT ATRIUM SIZE: 4.12 CM
LEFT ATRIUM VOLUME INDEX: 49.4 ML/M2
LEFT ATRIUM VOLUME: 85.9 CM3
LEFT INTERNAL DIMENSION IN SYSTOLE: 2.64 CM (ref 2.1–4)
LEFT VENTRICLE DIASTOLIC VOLUME INDEX: 41.94 ML/M2
LEFT VENTRICLE DIASTOLIC VOLUME: 72.98 ML
LEFT VENTRICLE MASS INDEX: 106 G/M2
LEFT VENTRICLE SYSTOLIC VOLUME INDEX: 14.6 ML/M2
LEFT VENTRICLE SYSTOLIC VOLUME: 25.46 ML
LEFT VENTRICULAR INTERNAL DIMENSION IN DIASTOLE: 4.07 CM (ref 3.5–6)
LEFT VENTRICULAR MASS: 183.7 G
LV LATERAL E/E' RATIO: 7.57 M/S
LV SEPTAL E/E' RATIO: 17.67 M/S
LVOT MG: 1.49 MMHG
LVOT MV: 0.56 CM/S
LYMPHOCYTES # BLD AUTO: 2.9 K/UL (ref 1–4.8)
LYMPHOCYTES # BLD AUTO: 3.8 K/UL (ref 1–4.8)
LYMPHOCYTES NFR BLD: 42.3 % (ref 18–48)
LYMPHOCYTES NFR BLD: 51.2 % (ref 18–48)
MAGNESIUM SERPL-MCNC: 2.1 MG/DL (ref 1.6–2.6)
MCH RBC QN AUTO: 31.1 PG (ref 27–31)
MCH RBC QN AUTO: 31.3 PG (ref 27–31)
MCHC RBC AUTO-ENTMCNC: 35 G/DL (ref 32–36)
MCHC RBC AUTO-ENTMCNC: 36 G/DL (ref 32–36)
MCV RBC AUTO: 87 FL (ref 82–98)
MCV RBC AUTO: 89 FL (ref 82–98)
MONOCYTES # BLD AUTO: 0.4 K/UL (ref 0.3–1)
MONOCYTES # BLD AUTO: 0.6 K/UL (ref 0.3–1)
MONOCYTES NFR BLD: 6.3 % (ref 4–15)
MONOCYTES NFR BLD: 7.7 % (ref 4–15)
MV PEAK A VEL: 0.94 M/S
MV PEAK E VEL: 0.53 M/S
MV STENOSIS PRESSURE HALF TIME: 91.98 MS
MV VALVE AREA P 1/2 METHOD: 2.39 CM2
NEUTROPHILS # BLD AUTO: 2.8 K/UL (ref 1.8–7.7)
NEUTROPHILS # BLD AUTO: 3.4 K/UL (ref 1.8–7.7)
NEUTROPHILS NFR BLD: 37.8 % (ref 38–73)
NEUTROPHILS NFR BLD: 48.7 % (ref 38–73)
NONHDLC SERPL-MCNC: 75 MG/DL
NRBC BLD-RTO: 0 /100 WBC
NRBC BLD-RTO: 0 /100 WBC
NUC REST EJECTION FRACTION: 79
OHS CV CPX 85 PERCENT MAX PREDICTED HEART RATE MALE: 115
OHS CV CPX MAX PREDICTED HEART RATE: 135
OHS CV CPX PATIENT IS FEMALE: 0
OHS CV CPX PATIENT IS MALE: 1
OHS CV CPX PEAK DIASTOLIC BLOOD PRESSURE: 76 MMHG
OHS CV CPX PEAK HEAR RATE: 96 BPM
OHS CV CPX PEAK RATE PRESSURE PRODUCT: NORMAL
OHS CV CPX PEAK SYSTOLIC BLOOD PRESSURE: 131 MMHG
OHS CV CPX PERCENT MAX PREDICTED HEART RATE ACHIEVED: 71
OHS CV CPX RATE PRESSURE PRODUCT PRESENTING: 9954
PISA TR MAX VEL: 2.08 M/S
PLATELET # BLD AUTO: 240 K/UL (ref 150–450)
PLATELET # BLD AUTO: 260 K/UL (ref 150–450)
PMV BLD AUTO: 8.8 FL (ref 9.2–12.9)
PMV BLD AUTO: 9.1 FL (ref 9.2–12.9)
POCT GLUCOSE: 113 MG/DL (ref 70–110)
POCT GLUCOSE: 176 MG/DL (ref 70–110)
POTASSIUM SERPL-SCNC: 3.5 MMOL/L (ref 3.5–5.1)
PROTHROMBIN TIME: 10.9 SEC (ref 9–12.5)
PV PEAK VELOCITY: 1.13 CM/S
RA MAJOR: 4.83 CM
RA PRESSURE: 3 MMHG
RA WIDTH: 3.7 CM
RBC # BLD AUTO: 4 M/UL (ref 4.6–6.2)
RBC # BLD AUTO: 4.24 M/UL (ref 4.6–6.2)
RIGHT VENTRICULAR END-DIASTOLIC DIMENSION: 3.68 CM
SINUS: 3.2 CM
SODIUM SERPL-SCNC: 139 MMOL/L (ref 136–145)
STJ: 2.49 CM
SYSTOLIC BLOOD PRESSURE: 126 MMHG
TDI LATERAL: 0.07 M/S
TDI SEPTAL: 0.03 M/S
TDI: 0.05 M/S
TR MAX PG: 17 MMHG
TRICUSPID ANNULAR PLANE SYSTOLIC EXCURSION: 1.2 CM
TRIGL SERPL-MCNC: 99 MG/DL (ref 30–150)
TROPONIN I SERPL DL<=0.01 NG/ML-MCNC: 0.04 NG/ML (ref 0–0.03)
TROPONIN I SERPL DL<=0.01 NG/ML-MCNC: 0.06 NG/ML (ref 0–0.03)
TV REST PULMONARY ARTERY PRESSURE: 20 MMHG
WBC # BLD AUTO: 6.95 K/UL (ref 3.9–12.7)
WBC # BLD AUTO: 7.39 K/UL (ref 3.9–12.7)

## 2022-09-22 PROCEDURE — 36415 COLL VENOUS BLD VENIPUNCTURE: CPT | Performed by: STUDENT IN AN ORGANIZED HEALTH CARE EDUCATION/TRAINING PROGRAM

## 2022-09-22 PROCEDURE — 83735 ASSAY OF MAGNESIUM: CPT | Performed by: PHYSICIAN ASSISTANT

## 2022-09-22 PROCEDURE — 25000003 PHARM REV CODE 250: Performed by: PHYSICIAN ASSISTANT

## 2022-09-22 PROCEDURE — 96375 TX/PRO/DX INJ NEW DRUG ADDON: CPT | Mod: 59

## 2022-09-22 PROCEDURE — 63600175 PHARM REV CODE 636 W HCPCS: Performed by: PHYSICIAN ASSISTANT

## 2022-09-22 PROCEDURE — 84484 ASSAY OF TROPONIN QUANT: CPT | Mod: 91 | Performed by: STUDENT IN AN ORGANIZED HEALTH CARE EDUCATION/TRAINING PROGRAM

## 2022-09-22 PROCEDURE — 96365 THER/PROPH/DIAG IV INF INIT: CPT | Mod: 59

## 2022-09-22 PROCEDURE — A4216 STERILE WATER/SALINE, 10 ML: HCPCS | Performed by: PHYSICIAN ASSISTANT

## 2022-09-22 PROCEDURE — 85610 PROTHROMBIN TIME: CPT | Performed by: PHYSICIAN ASSISTANT

## 2022-09-22 PROCEDURE — 63600175 PHARM REV CODE 636 W HCPCS: Performed by: INTERNAL MEDICINE

## 2022-09-22 PROCEDURE — 84484 ASSAY OF TROPONIN QUANT: CPT | Performed by: EMERGENCY MEDICINE

## 2022-09-22 PROCEDURE — 85025 COMPLETE CBC W/AUTO DIFF WBC: CPT | Performed by: PHYSICIAN ASSISTANT

## 2022-09-22 PROCEDURE — 85730 THROMBOPLASTIN TIME PARTIAL: CPT | Performed by: PHYSICIAN ASSISTANT

## 2022-09-22 PROCEDURE — 96374 THER/PROPH/DIAG INJ IV PUSH: CPT | Mod: 59

## 2022-09-22 PROCEDURE — 85730 THROMBOPLASTIN TIME PARTIAL: CPT | Mod: 91 | Performed by: STUDENT IN AN ORGANIZED HEALTH CARE EDUCATION/TRAINING PROGRAM

## 2022-09-22 PROCEDURE — 99220 PR INITIAL OBSERVATION CARE,LEVL III: ICD-10-PCS | Mod: 25,,, | Performed by: INTERNAL MEDICINE

## 2022-09-22 PROCEDURE — 36415 COLL VENOUS BLD VENIPUNCTURE: CPT | Performed by: PHYSICIAN ASSISTANT

## 2022-09-22 PROCEDURE — 96366 THER/PROPH/DIAG IV INF ADDON: CPT

## 2022-09-22 PROCEDURE — 99220 PR INITIAL OBSERVATION CARE,LEVL III: CPT | Mod: 25,,, | Performed by: INTERNAL MEDICINE

## 2022-09-22 PROCEDURE — 80061 LIPID PANEL: CPT | Performed by: PHYSICIAN ASSISTANT

## 2022-09-22 PROCEDURE — 83036 HEMOGLOBIN GLYCOSYLATED A1C: CPT | Performed by: PHYSICIAN ASSISTANT

## 2022-09-22 PROCEDURE — 80048 BASIC METABOLIC PNL TOTAL CA: CPT | Performed by: PHYSICIAN ASSISTANT

## 2022-09-22 PROCEDURE — G0378 HOSPITAL OBSERVATION PER HR: HCPCS

## 2022-09-22 RX ORDER — HEPARIN SODIUM,PORCINE/D5W 25000/250
0-40 INTRAVENOUS SOLUTION INTRAVENOUS CONTINUOUS
Status: DISCONTINUED | OUTPATIENT
Start: 2022-09-22 | End: 2022-09-23

## 2022-09-22 RX ORDER — REGADENOSON 0.08 MG/ML
0.4 INJECTION, SOLUTION INTRAVENOUS ONCE
Status: COMPLETED | OUTPATIENT
Start: 2022-09-22 | End: 2022-09-22

## 2022-09-22 RX ORDER — ONDANSETRON 2 MG/ML
4 INJECTION INTRAMUSCULAR; INTRAVENOUS ONCE
Status: COMPLETED | OUTPATIENT
Start: 2022-09-22 | End: 2022-09-22

## 2022-09-22 RX ADMIN — HYDRALAZINE HYDROCHLORIDE 5 MG: 20 INJECTION INTRAMUSCULAR; INTRAVENOUS at 12:09

## 2022-09-22 RX ADMIN — ASPIRIN 81 MG: 81 TABLET, COATED ORAL at 08:09

## 2022-09-22 RX ADMIN — HEPARIN SODIUM 12 UNITS/KG/HR: 10000 INJECTION, SOLUTION INTRAVENOUS at 06:09

## 2022-09-22 RX ADMIN — PRAVASTATIN SODIUM 40 MG: 40 TABLET ORAL at 08:09

## 2022-09-22 RX ADMIN — Medication 6 MG: at 12:09

## 2022-09-22 RX ADMIN — METOPROLOL TARTRATE 25 MG: 25 TABLET, FILM COATED ORAL at 08:09

## 2022-09-22 RX ADMIN — ONDANSETRON 4 MG: 2 INJECTION, SOLUTION INTRAMUSCULAR; INTRAVENOUS at 02:09

## 2022-09-22 RX ADMIN — Medication 10 ML: at 10:09

## 2022-09-22 RX ADMIN — Medication 10 ML: at 06:09

## 2022-09-22 RX ADMIN — METOPROLOL TARTRATE 25 MG: 25 TABLET, FILM COATED ORAL at 12:09

## 2022-09-22 RX ADMIN — REGADENOSON 0.4 MG: 0.08 INJECTION, SOLUTION INTRAVENOUS at 02:09

## 2022-09-22 RX ADMIN — ISOSORBIDE MONONITRATE 60 MG: 30 TABLET, EXTENDED RELEASE ORAL at 08:09

## 2022-09-22 RX ADMIN — Medication 6 MG: at 08:09

## 2022-09-22 RX ADMIN — AMLODIPINE BESYLATE 10 MG: 5 TABLET ORAL at 08:09

## 2022-09-22 RX ADMIN — TAMSULOSIN HYDROCHLORIDE 0.4 MG: 0.4 CAPSULE ORAL at 08:09

## 2022-09-22 NOTE — PLAN OF CARE
Pt seen and examined at bedside. Pt with h/o cabg several years prior presenting with cp and mildly elevated troponins. Currently on heparin drip. Echo and Stress test pending. Cardiology consulted. Will monitor for results.

## 2022-09-22 NOTE — PLAN OF CARE
[facility] - Telemetry  Discharge Assessment    Primary Care Provider: Harry Velazco MD     CM discussed discharge planning with pt and pt's family. Assessment completed and role of CM discussed. Plan A ( home with family) and Plan B ( other- tbd).    Discharge Assessment (most recent)       BRIEF DISCHARGE ASSESSMENT - 09/22/22 7781          Discharge Planning    Assessment Type Discharge Planning Brief Assessment     Resource/Environmental Concerns none     Support Systems Family members     Assistance Needed None     Equipment Currently Used at Home none     Current Living Arrangements home/apartment/condo (P)      Patient/Family Anticipates Transition to home with family (P)      Patient/Family Anticipated Services at Transition none (P)      DME Needed Upon Discharge  other (see comments) (P)    TBD    Discharge Plan A Home with family (P)      Discharge Plan B Other (P)    TBD

## 2022-09-22 NOTE — ED PROVIDER NOTES
Encounter Date: 9/21/2022    SCRIBE #1 NOTE: I, Lamar Carias, am scribing for, and in the presence of,  Stefano Pinon MD. I have scribed the following portions of the note - Other sections scribed: HPI, ROS, PE.     History     Chief Complaint   Patient presents with    Chest Pain     Pt presents to the ED with c/o generalized anterior CP that radiates to right arm x1 week. Denies n/v or any other symptoms. Reports hx of MI with stents. Reports compliance with all meds.     A 87 y.o. male with a pertinent PMHx of acute coronary syndrome, anemia, CAD, diastolic dysfunction, heart failure, HTN, HLP, and DM, presents to the ED for evaluation of intermittent generalized 8/10 chest pain which radiates to the right arm, neck, and back that began 1 week ago. Patient reports that the pain happens every day, lasts 5 minutes, and worsened today. He has an associated symptom of shortness of breath. He reports that this episode feels similar to when he had a heart attack 6 years ago. Compliant with HTN medication. PSHx of coronary artery bypass graft. He denies tobacco use, EtOH use, and illicit drug use. No other exacerbating or alleviating factors. Patient denies diaphoresis, nausea, leg pain, or any other associated symptoms.      The history is provided by the patient. No  was used.   Review of patient's allergies indicates:   Allergen Reactions    Penicillins Nausea And Vomiting     Pt reports he is not allergic to penicillin       Past Medical History:   Diagnosis Date    Acute coronary syndrome 06/24/2016    s/p 3V CABG 7/2016    Anemia     Coronary artery disease     Diastolic dysfunction     Gout, chronic     Heart failure     HTN (hypertension)     Hyperlipidemia     Myocardial infarction     Pneumonia due to other staphylococcus     Pressure ulcer     Renal manifestation of secondary diabetes mellitus     Type 2 diabetes mellitus     diet controlled     Past Surgical History:    Procedure Laterality Date    CATARACT EXTRACTION Bilateral     CATARACT EXTRACTION W/ ANTERIOR VITRECTOMY      CORONARY ARTERY BYPASS GRAFT  2016    PAIZ-LAD, SVG-Ramus, SVG-PDA    HEMORRHOID SURGERY       Family History   Problem Relation Age of Onset    Cancer Father         colon    Hypertension Mother     Heart disease Mother     Heart disease Sister     No Known Problems Brother     No Known Problems Maternal Aunt     No Known Problems Maternal Uncle     No Known Problems Paternal Aunt     No Known Problems Paternal Uncle     No Known Problems Maternal Grandmother     No Known Problems Maternal Grandfather     No Known Problems Paternal Grandmother     No Known Problems Paternal Grandfather     Amblyopia Neg Hx     Blindness Neg Hx     Cataracts Neg Hx     Diabetes Neg Hx     Glaucoma Neg Hx     Macular degeneration Neg Hx     Retinal detachment Neg Hx     Strabismus Neg Hx     Stroke Neg Hx     Thyroid disease Neg Hx      Social History     Tobacco Use    Smoking status: Former     Types: Cigarettes     Quit date: 1985     Years since quittin.2    Smokeless tobacco: Never   Substance Use Topics    Alcohol use: No    Drug use: No     Review of Systems   Constitutional:  Negative for diaphoresis and fever.   HENT:  Negative for congestion.    Eyes:  Negative for photophobia.   Respiratory:  Positive for shortness of breath.    Cardiovascular:  Positive for chest pain (intermittent, radiates to right arm, neck, and back).   Gastrointestinal:  Negative for abdominal pain and nausea.   Genitourinary:  Negative for dysuria.   Musculoskeletal:         (-) Leg pain   Skin:  Negative for rash.   Neurological:  Negative for headaches.     Physical Exam     Initial Vitals [22]   BP Pulse Resp Temp SpO2   (!) 215/104 78 18 97.6 °F (36.4 °C) 98 %      MAP       --         Physical Exam    Nursing note and vitals reviewed.  Constitutional: He appears well-developed and well-nourished.   HENT:    Head: Normocephalic and atraumatic.   Eyes: EOM are normal. Pupils are equal, round, and reactive to light.   Neck: Neck supple. No thyromegaly present. No JVD present.   Normal range of motion.  Cardiovascular:  Normal rate and regular rhythm.     Exam reveals no gallop and no friction rub.       No murmur heard.  Pulmonary/Chest: Breath sounds normal. No respiratory distress.   Abdominal: Abdomen is soft. Bowel sounds are normal. There is no abdominal tenderness.   Musculoskeletal:         General: No tenderness or edema. Normal range of motion.      Cervical back: Normal range of motion and neck supple.     Neurological: He is alert and oriented to person, place, and time. He has normal strength. GCS score is 15. GCS eye subscore is 4. GCS verbal subscore is 5. GCS motor subscore is 6.   Skin: Skin is warm. Capillary refill takes less than 2 seconds.   Psychiatric: He has a normal mood and affect. His behavior is normal. Thought content normal.       ED Course   Procedures  Labs Reviewed   CBC W/ AUTO DIFFERENTIAL - Abnormal; Notable for the following components:       Result Value    RBC 4.14 (*)     Hemoglobin 12.8 (*)     Hematocrit 35.9 (*)     MPV 9.0 (*)     Gran % 31.5 (*)     Lymph % 57.8 (*)     All other components within normal limits   COMPREHENSIVE METABOLIC PANEL - Abnormal; Notable for the following components:    CO2 22 (*)     BUN 25 (*)     Creatinine 1.8 (*)     eGFR 36 (*)     All other components within normal limits   TROPONIN I - Abnormal; Notable for the following components:    Troponin I 0.087 (*)     All other components within normal limits   B-TYPE NATRIURETIC PEPTIDE - Abnormal; Notable for the following components:     (*)     All other components within normal limits     EKG Readings: (Independently Interpreted)   Initial Reading: No STEMI. Rhythm: Normal Sinus Rhythm. Heart Rate: 73. Conduction: Bifasicular.   LVH     Imaging Results              X-Ray Chest AP Portable  (Final result)  Result time 09/21/22 19:54:56      Final result by Leanna Chen MD (09/21/22 19:54:56)                   Impression:      No acute cardiopulmonary process identified.      Electronically signed by: Leanna Chen MD  Date:    09/21/2022  Time:    19:54               Narrative:    EXAMINATION:  XR CHEST AP PORTABLE    CLINICAL HISTORY:  Chest Pain;    TECHNIQUE:  Single frontal view of the chest was performed.    COMPARISON:  03/30/2022.    FINDINGS:  Cardiac silhouette is stable in size.  Postsurgical sternotomy changes are seen.  Lungs are hypoinflated which accentuates pulmonary vascular markings.  No evidence of new focal consolidative process, pneumothorax, or significant pleural effusion.  No acute osseous abnormality identified.                                       Medications   aspirin tablet 325 mg (325 mg Oral Given 9/21/22 2010)   nitroGLYCERIN SL tablet 0.4 mg (0.4 mg Sublingual Given 9/21/22 2040)     Medical Decision Making:   History:   Old Medical Records: I decided to obtain old medical records.  Additional MDM:   Heart Score:    History:          Moderately suspicious.  ECG:             Nonspecific repolarisation disturbance  Age:               >65 years  Risk factors: >= 3 risk factors or history of atherosclerotic disease  Troponin:       >2x normal limit  Final Score: 8       Scribe Attestation:   Scribe #1: I performed the above scribed service and the documentation accurately describes the services I performed. I attest to the accuracy of the note.            Pt asleep on reevaluation. Appears comfortable. Troponin has minimal elevation but so has renal function from 6 months ago. Will obs for ACS.        Clinical Impression:   Final diagnoses:  [R07.9] Chest pain  [N17.9] KAL (acute kidney injury) (Primary)        ED Disposition Condition    Observation Stable           Ihernan, personally performed the services described in this documentation. All medical  record entries made by the scribe were at my direction and in my presence. I have reviewed the chart and agree that the record reflects my personal performance and is accurate and complete.      Stefano Pinon MD  09/21/22 6202

## 2022-09-22 NOTE — ED TRIAGE NOTES
Pt presents to ED via POV with c/o constant midsternal chest pressure 8/10 that started 6 days ago and is getting worse. Pt also reports HTN and intermittent dizziness. Denies SOB, N/V. Has not taken anything for symptoms. Unsure of exacerbating or alleviating factors.

## 2022-09-22 NOTE — ASSESSMENT & PLAN NOTE
- Mr. Destin Barber presents with 6 days of substernal chest pain  CAD s/p CABG & stenting   Diastolic Dysfunction   - this is associated with elevated troponin of  0.087    - trend troponin  - an EKG was consistent with prior showing NSR at rate of 73 with bifascicular block   - monitor on tele

## 2022-09-22 NOTE — ASSESSMENT & PLAN NOTE
- last A1C:   Lab Results   Component Value Date    HGBA1C 6.0 (H) 12/23/2021    - A1C pending for AM   - hold oral antidiabetic meds   - Diabetic diet   - SSI with accuchecks AC/HS    Anti-hyperglycemic dose as follows-   Antihyperglycemics (From admission, onward)    Start     Stop Route Frequency Ordered    09/21/22 2322  insulin aspart U-100 pen 0-5 Units         -- SubQ Every 6 hours PRN 09/21/22 2222        Hold Oral hypoglycemics while patient is in the hospital.

## 2022-09-22 NOTE — SUBJECTIVE & OBJECTIVE
Past Medical History:   Diagnosis Date    Acute coronary syndrome 06/24/2016    s/p 3V CABG 7/2016    Anemia     Coronary artery disease     Diastolic dysfunction     Gout, chronic     Heart failure     HTN (hypertension)     Hyperlipidemia     Myocardial infarction     Pneumonia due to other staphylococcus     Pressure ulcer     Renal manifestation of secondary diabetes mellitus     Type 2 diabetes mellitus     diet controlled       Past Surgical History:   Procedure Laterality Date    CATARACT EXTRACTION Bilateral     CATARACT EXTRACTION W/ ANTERIOR VITRECTOMY      CORONARY ARTERY BYPASS GRAFT  07/06/2016    PAIZ-LAD, SVG-Ramus, SVG-PDA    HEMORRHOID SURGERY         Review of patient's allergies indicates:   Allergen Reactions    Penicillins Nausea And Vomiting     Pt reports he is not allergic to penicillin         No current facility-administered medications on file prior to encounter.     Current Outpatient Medications on File Prior to Encounter   Medication Sig    acetaminophen (TYLENOL) 500 MG tablet Take 1 tablet (500 mg total) by mouth every 6 (six) hours as needed for Pain.    albuterol (PROVENTIL/VENTOLIN HFA) 90 mcg/actuation inhaler Inhale 1 puff into the lungs every 6 (six) hours as needed. Rescue    albuterol (PROVENTIL/VENTOLIN HFA) 90 mcg/actuation inhaler Inhale 1-2 puffs into the lungs every 4 (four) hours as needed for Shortness of Breath. Rescue    amLODIPine (NORVASC) 10 MG tablet TAKE 1 TABLET(10 MG) BY MOUTH EVERY DAY    aspirin (ASPIR-LOW) 81 MG EC tablet Take 1 tablet (81 mg total) by mouth once daily.    azelastine (ASTELIN) 137 mcg (0.1 %) nasal spray 1 spray (137 mcg total) by Nasal route 2 (two) times daily as needed for Rhinitis.    cetirizine 10 mg chewable tablet Take 10 mg by mouth once daily.    diclofenac sodium (VOLTAREN) 1 % Gel Apply 2 g topically 4 (four) times daily.    docusate sodium (COLACE) 100 MG capsule Take 1 capsule (100 mg total) by mouth 2 (two) times daily as  needed for Constipation (and hard stool).    fluticasone propionate (FLONASE) 50 mcg/actuation nasal spray SHAKE LIQUID AND USE 1 SPRAY(50 MCG) IN EACH NOSTRIL TWICE DAILY AS NEEDED FOR RHINITIS    isosorbide mononitrate (IMDUR) 60 MG 24 hr tablet Take 1 tablet (60 mg total) by mouth once daily.    lisinopriL (PRINIVIL,ZESTRIL) 40 MG tablet Take 1 tablet (40 mg total) by mouth once daily.    meloxicam (MOBIC) 7.5 MG tablet Take 1 tablet (7.5 mg total) by mouth daily as needed for Pain (knee pain).    metFORMIN (GLUCOPHAGE-XR) 500 MG ER 24hr tablet TAKE 1 TABLET(500 MG) BY MOUTH DAILY WITH BREAKFAST Strength: 500 mg    metoprolol tartrate (LOPRESSOR) 25 MG tablet TAKE 1 TABLET(25 MG) BY MOUTH TWICE DAILY    ondansetron (ZOFRAN-ODT) 4 MG TbDL Take 1 tablet (4 mg total) by mouth every 8 (eight) hours as needed (nausea/vomiting).    polyethylene glycol (GLYCOLAX) 17 gram PwPk Take 17 g by mouth once daily.    pravastatin (PRAVACHOL) 40 MG tablet TAKE 1 TABLET(40 MG) BY MOUTH EVERY DAY    tamsulosin (FLOMAX) 0.4 mg Cap Take 1 capsule (0.4 mg total) by mouth once daily.    traZODone (DESYREL) 150 MG tablet TAKE 1 TABLET(150 MG) BY MOUTH EVERY NIGHT AS NEEDED FOR INSOMNIA Strength: 150 mg    finasteride (PROSCAR) 5 mg tablet Take 1 tablet (5 mg total) by mouth once daily.     Family History       Problem Relation (Age of Onset)    Cancer Father    Heart disease Mother, Sister    Hypertension Mother    No Known Problems Brother, Maternal Aunt, Maternal Uncle, Paternal Aunt, Paternal Uncle, Maternal Grandmother, Maternal Grandfather, Paternal Grandmother, Paternal Grandfather          Tobacco Use    Smoking status: Former     Types: Cigarettes     Quit date: 1985     Years since quittin.2    Smokeless tobacco: Never   Substance and Sexual Activity    Alcohol use: No    Drug use: No    Sexual activity: Not Currently     Partners: Female     Review of Systems   Constitutional:  Negative for activity change, appetite  change, chills, diaphoresis and fever.   HENT:  Negative for congestion, ear pain, rhinorrhea, sinus pressure, sinus pain and sore throat.    Respiratory:  Negative for cough, chest tightness, shortness of breath and wheezing.    Cardiovascular:  Positive for chest pain (substernal). Negative for palpitations.   Gastrointestinal:  Negative for abdominal pain, constipation, diarrhea, nausea and vomiting.   Genitourinary:  Negative for decreased urine volume, difficulty urinating, dysuria, frequency, hematuria and urgency.   Musculoskeletal:  Negative for back pain, neck pain and neck stiffness.   Skin:  Negative for rash and wound.   Neurological:  Negative for dizziness, syncope, weakness, light-headedness and headaches.   Hematological:  Does not bruise/bleed easily.   Psychiatric/Behavioral:  Negative for agitation and confusion.    Objective:     Vital Signs (Most Recent):  Temp: 97.6 °F (36.4 °C) (09/21/22 1934)  Pulse: 63 (09/21/22 2333)  Resp: 18 (09/21/22 2333)  BP: (!) 192/97 (09/21/22 2333)  SpO2: 100 % (09/21/22 2132)   Vital Signs (24h Range):  Temp:  [97.6 °F (36.4 °C)] 97.6 °F (36.4 °C)  Pulse:  [63-78] 63  Resp:  [18] 18  SpO2:  [97 %-100 %] 100 %  BP: (124-215)/() 192/97     Weight: 65.5 kg (144 lb 4.7 oz)  Body mass index is 23.29 kg/m².    Physical Exam  Vitals and nursing note reviewed.   Constitutional:       General: He is not in acute distress.     Appearance: He is well-developed and normal weight. He is not ill-appearing, toxic-appearing or diaphoretic.      Comments: Elderly appearing male.    HENT:      Head: Normocephalic and atraumatic.      Right Ear: External ear normal.      Left Ear: External ear normal.   Eyes:      General: No scleral icterus.        Right eye: No discharge.         Left eye: No discharge.      Conjunctiva/sclera: Conjunctivae normal.   Neck:      Vascular: No JVD.      Trachea: No tracheal deviation.   Cardiovascular:      Rate and Rhythm: Normal rate and  regular rhythm.      Pulses: Normal pulses.      Heart sounds: Normal heart sounds. No murmur heard.    No gallop.   Pulmonary:      Effort: Pulmonary effort is normal. No respiratory distress.      Breath sounds: Normal breath sounds. No stridor. No wheezing or rales.   Abdominal:      General: Bowel sounds are normal. There is no distension.      Palpations: Abdomen is soft. There is no mass.      Tenderness: There is no abdominal tenderness. There is no guarding.   Musculoskeletal:         General: Tenderness (mild tenderness to palpation over xiphoid process) present. No deformity. Normal range of motion.      Cervical back: Normal range of motion and neck supple.   Skin:     General: Skin is warm and dry.   Neurological:      General: No focal deficit present.      Mental Status: He is alert and oriented to person, place, and time.      Cranial Nerves: No cranial nerve deficit.      Motor: No abnormal muscle tone.      Coordination: Coordination normal.   Psychiatric:         Mood and Affect: Mood normal.         Behavior: Behavior normal.         Thought Content: Thought content normal.         Judgment: Judgment normal.           Significant Labs: All pertinent labs within the past 24 hours have been reviewed.  BMP:   Recent Labs   Lab 09/22/22 0220         K 3.5      CO2 21*   BUN 29*   CREATININE 1.4   CALCIUM 8.6*   MG 2.1     CBC:   Recent Labs   Lab 09/21/22 2007 09/22/22 0220   WBC 7.74 7.39   HGB 12.8* 12.5*   HCT 35.9* 34.7*    240     CMP:   Recent Labs   Lab 09/21/22 2007 09/22/22 0220    139   K 4.6 3.5    108   CO2 22* 21*    103   BUN 25* 29*   CREATININE 1.8* 1.4   CALCIUM 9.4 8.6*   PROT 7.5  --    ALBUMIN 3.8  --    BILITOT 0.2  --    ALKPHOS 91  --    AST 21  --    ALT 10  --    ANIONGAP 14 10     Cardiac Markers:   Recent Labs   Lab 09/21/22 2007   *     Troponin:   Recent Labs   Lab 09/21/22 2007 09/22/22 0221   TROPONINI 0.087*  0.063*     Urine Culture: No results for input(s): LABURIN in the last 48 hours.  Urine Studies: No results for input(s): COLORU, APPEARANCEUA, PHUR, SPECGRAV, PROTEINUA, GLUCUA, KETONESU, BILIRUBINUA, OCCULTUA, NITRITE, UROBILINOGEN, LEUKOCYTESUR, RBCUA, WBCUA, BACTERIA, SQUAMEPITHEL, HYALINECASTS in the last 48 hours.    Invalid input(s): WRIGHTSUR    Significant Imaging: I have reviewed all pertinent imaging results/findings within the past 24 hours.  Imaging Results              X-Ray Chest AP Portable (Final result)  Result time 09/21/22 19:54:56      Final result by Leanna Chen MD (09/21/22 19:54:56)                   Impression:      No acute cardiopulmonary process identified.      Electronically signed by: Leanna Chen MD  Date:    09/21/2022  Time:    19:54               Narrative:    EXAMINATION:  XR CHEST AP PORTABLE    CLINICAL HISTORY:  Chest Pain;    TECHNIQUE:  Single frontal view of the chest was performed.    COMPARISON:  03/30/2022.    FINDINGS:  Cardiac silhouette is stable in size.  Postsurgical sternotomy changes are seen.  Lungs are hypoinflated which accentuates pulmonary vascular markings.  No evidence of new focal consolidative process, pneumothorax, or significant pleural effusion.  No acute osseous abnormality identified.

## 2022-09-22 NOTE — HPI
Mr. Destin Barber is a 87 y.o. male, with PMH of Diastolic Dysfunction, CAD s/p CABG & stenting, HTN, HLD, T2DM, anemia of chronic disease, who presented to [facility] ED on 9/21/22 with mid-sternal chest pressure for the past 6 days that radiates into his right arm, neck, and back. He notes associated intermittent dizziness and shortness of breath. He states the pain occurs daily, and lasts 5 minutes at a time. He denies SOB, N/V, diaphoresis, leg pain. He was evaluated in the ED with labs showing  an overall normal CBC. A metabolic panel showed KAL with BUN 25 and Cr of 1.8. A BNP was mildly elevated at 107, and troponin was elevated at 0.087. An EKG showed no acute changes from prior, with NSR at rate of 73, and a bifasicular block. A CXR showed no acute cardiopulmonary process. He was placed on OBS.

## 2022-09-22 NOTE — H&P
[facility] Medicine  History & Physical    Patient Name: Destin Barber  MRN: [id number]  Patient Class: OP- Observation  Admission Date: 9/21/2022  Attending Physician: Mario Maria MD   Primary Care Provider: Harry Velazco MD         Patient information was obtained from patient, past medical records and ER records.     Subjective:     Principal Problem:Chest pain    Chief Complaint:   Chief Complaint   Patient presents with    Chest Pain     Pt presents to the ED with c/o generalized anterior CP that radiates to right arm x1 week. Denies n/v or any other symptoms. Reports hx of MI with stents. Reports compliance with all meds.        HPI: Mr. Destin Barber is a 87 y.o. male, with PMH of Diastolic Dysfunction, CAD s/p CABG & stenting, HTN, HLD, T2DM, anemia of chronic disease, who presented to [facility] ED on 9/21/22 with mid-sternal chest pressure for the past 6 days that radiates into his right arm, neck, and back. He notes associated intermittent dizziness and shortness of breath. He states the pain occurs daily, and lasts 5 minutes at a time. He denies SOB, N/V, diaphoresis, leg pain. He was evaluated in the ED with labs showing  an overall normal CBC. A metabolic panel showed KAL with BUN 25 and Cr of 1.8. A BNP was mildly elevated at 107, and troponin was elevated at 0.087. An EKG showed no acute changes from prior, with NSR at rate of 73, and a bifasicular block. A CXR showed no acute cardiopulmonary process. He was placed on OBS.      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    Anemia     Coronary artery disease     Diastolic dysfunction     Gout, chronic     Heart failure     HTN (hypertension)     Hyperlipidemia     Myocardial infarction     Pneumonia due to other staphylococcus     Pressure ulcer     Renal manifestation of secondary diabetes mellitus     Type 2 diabetes mellitus     diet controlled       Past  Surgical History:   Procedure Laterality Date    CATARACT EXTRACTION Bilateral     CATARACT EXTRACTION W/ ANTERIOR VITRECTOMY      CORONARY ARTERY BYPASS GRAFT  07/06/2016    PAIZ-LAD, SVG-Ramus, SVG-PDA    HEMORRHOID SURGERY         Review of patient's allergies indicates:   Allergen Reactions    Penicillins Nausea And Vomiting     Pt reports he is not allergic to penicillin         No current facility-administered medications on file prior to encounter.     Current Outpatient Medications on File Prior to Encounter   Medication Sig    acetaminophen (TYLENOL) 500 MG tablet Take 1 tablet (500 mg total) by mouth every 6 (six) hours as needed for Pain.    albuterol (PROVENTIL/VENTOLIN HFA) 90 mcg/actuation inhaler Inhale 1 puff into the lungs every 6 (six) hours as needed. Rescue    albuterol (PROVENTIL/VENTOLIN HFA) 90 mcg/actuation inhaler Inhale 1-2 puffs into the lungs every 4 (four) hours as needed for Shortness of Breath. Rescue    amLODIPine (NORVASC) 10 MG tablet TAKE 1 TABLET(10 MG) BY MOUTH EVERY DAY    aspirin (ASPIR-LOW) 81 MG EC tablet Take 1 tablet (81 mg total) by mouth once daily.    azelastine (ASTELIN) 137 mcg (0.1 %) nasal spray 1 spray (137 mcg total) by Nasal route 2 (two) times daily as needed for Rhinitis.    cetirizine 10 mg chewable tablet Take 10 mg by mouth once daily.    diclofenac sodium (VOLTAREN) 1 % Gel Apply 2 g topically 4 (four) times daily.    docusate sodium (COLACE) 100 MG capsule Take 1 capsule (100 mg total) by mouth 2 (two) times daily as needed for Constipation (and hard stool).    fluticasone propionate (FLONASE) 50 mcg/actuation nasal spray SHAKE LIQUID AND USE 1 SPRAY(50 MCG) IN EACH NOSTRIL TWICE DAILY AS NEEDED FOR RHINITIS    isosorbide mononitrate (IMDUR) 60 MG 24 hr tablet Take 1 tablet (60 mg total) by mouth once daily.    lisinopriL (PRINIVIL,ZESTRIL) 40 MG tablet Take 1 tablet (40 mg total) by mouth once daily.    meloxicam (MOBIC) 7.5 MG tablet  Take 1 tablet (7.5 mg total) by mouth daily as needed for Pain (knee pain).    metFORMIN (GLUCOPHAGE-XR) 500 MG ER 24hr tablet TAKE 1 TABLET(500 MG) BY MOUTH DAILY WITH BREAKFAST Strength: 500 mg    metoprolol tartrate (LOPRESSOR) 25 MG tablet TAKE 1 TABLET(25 MG) BY MOUTH TWICE DAILY    ondansetron (ZOFRAN-ODT) 4 MG TbDL Take 1 tablet (4 mg total) by mouth every 8 (eight) hours as needed (nausea/vomiting).    polyethylene glycol (GLYCOLAX) 17 gram PwPk Take 17 g by mouth once daily.    pravastatin (PRAVACHOL) 40 MG tablet TAKE 1 TABLET(40 MG) BY MOUTH EVERY DAY    tamsulosin (FLOMAX) 0.4 mg Cap Take 1 capsule (0.4 mg total) by mouth once daily.    traZODone (DESYREL) 150 MG tablet TAKE 1 TABLET(150 MG) BY MOUTH EVERY NIGHT AS NEEDED FOR INSOMNIA Strength: 150 mg    finasteride (PROSCAR) 5 mg tablet Take 1 tablet (5 mg total) by mouth once daily.     Family History       Problem Relation (Age of Onset)    Cancer Father    Heart disease Mother, Sister    Hypertension Mother    No Known Problems Brother, Maternal Aunt, Maternal Uncle, Paternal Aunt, Paternal Uncle, Maternal Grandmother, Maternal Grandfather, Paternal Grandmother, Paternal Grandfather          Tobacco Use    Smoking status: Former     Types: Cigarettes     Quit date: 1985     Years since quittin.2    Smokeless tobacco: Never   Substance and Sexual Activity    Alcohol use: No    Drug use: No    Sexual activity: Not Currently     Partners: Female     Review of Systems   Constitutional:  Negative for activity change, appetite change, chills, diaphoresis and fever.   HENT:  Negative for congestion, ear pain, rhinorrhea, sinus pressure, sinus pain and sore throat.    Respiratory:  Negative for cough, chest tightness, shortness of breath and wheezing.    Cardiovascular:  Positive for chest pain (substernal). Negative for palpitations.   Gastrointestinal:  Negative for abdominal pain, constipation, diarrhea, nausea and vomiting.    Genitourinary:  Negative for decreased urine volume, difficulty urinating, dysuria, frequency, hematuria and urgency.   Musculoskeletal:  Negative for back pain, neck pain and neck stiffness.   Skin:  Negative for rash and wound.   Neurological:  Negative for dizziness, syncope, weakness, light-headedness and headaches.   Hematological:  Does not bruise/bleed easily.   Psychiatric/Behavioral:  Negative for agitation and confusion.    Objective:     Vital Signs (Most Recent):  Temp: 97.6 °F (36.4 °C) (09/21/22 1934)  Pulse: 63 (09/21/22 2333)  Resp: 18 (09/21/22 2333)  BP: (!) 192/97 (09/21/22 2333)  SpO2: 100 % (09/21/22 2132)   Vital Signs (24h Range):  Temp:  [97.6 °F (36.4 °C)] 97.6 °F (36.4 °C)  Pulse:  [63-78] 63  Resp:  [18] 18  SpO2:  [97 %-100 %] 100 %  BP: (124-215)/() 192/97     Weight: 65.5 kg (144 lb 4.7 oz)  Body mass index is 23.29 kg/m².    Physical Exam  Vitals and nursing note reviewed.   Constitutional:       General: He is not in acute distress.     Appearance: He is well-developed and normal weight. He is not ill-appearing, toxic-appearing or diaphoretic.      Comments: Elderly appearing male.    HENT:      Head: Normocephalic and atraumatic.      Right Ear: External ear normal.      Left Ear: External ear normal.   Eyes:      General: No scleral icterus.        Right eye: No discharge.         Left eye: No discharge.      Conjunctiva/sclera: Conjunctivae normal.   Neck:      Vascular: No JVD.      Trachea: No tracheal deviation.   Cardiovascular:      Rate and Rhythm: Normal rate and regular rhythm.      Pulses: Normal pulses.      Heart sounds: Normal heart sounds. No murmur heard.    No gallop.   Pulmonary:      Effort: Pulmonary effort is normal. No respiratory distress.      Breath sounds: Normal breath sounds. No stridor. No wheezing or rales.   Abdominal:      General: Bowel sounds are normal. There is no distension.      Palpations: Abdomen is soft. There is no mass.       Tenderness: There is no abdominal tenderness. There is no guarding.   Musculoskeletal:         General: Tenderness (mild tenderness to palpation over xiphoid process) present. No deformity. Normal range of motion.      Cervical back: Normal range of motion and neck supple.   Skin:     General: Skin is warm and dry.   Neurological:      General: No focal deficit present.      Mental Status: He is alert and oriented to person, place, and time.      Cranial Nerves: No cranial nerve deficit.      Motor: No abnormal muscle tone.      Coordination: Coordination normal.   Psychiatric:         Mood and Affect: Mood normal.         Behavior: Behavior normal.         Thought Content: Thought content normal.         Judgment: Judgment normal.           Significant Labs: All pertinent labs within the past 24 hours have been reviewed.  BMP:   Recent Labs   Lab 09/22/22 0220         K 3.5      CO2 21*   BUN 29*   CREATININE 1.4   CALCIUM 8.6*   MG 2.1     CBC:   Recent Labs   Lab 09/21/22 2007 09/22/22 0220   WBC 7.74 7.39   HGB 12.8* 12.5*   HCT 35.9* 34.7*    240     CMP:   Recent Labs   Lab 09/21/22 2007 09/22/22 0220    139   K 4.6 3.5    108   CO2 22* 21*    103   BUN 25* 29*   CREATININE 1.8* 1.4   CALCIUM 9.4 8.6*   PROT 7.5  --    ALBUMIN 3.8  --    BILITOT 0.2  --    ALKPHOS 91  --    AST 21  --    ALT 10  --    ANIONGAP 14 10     Cardiac Markers:   Recent Labs   Lab 09/21/22 2007   *     Troponin:   Recent Labs   Lab 09/21/22 2007 09/22/22 0221   TROPONINI 0.087* 0.063*     Urine Culture: No results for input(s): LABURIN in the last 48 hours.  Urine Studies: No results for input(s): COLORU, APPEARANCEUA, PHUR, SPECGRAV, PROTEINUA, GLUCUA, KETONESU, BILIRUBINUA, OCCULTUA, NITRITE, UROBILINOGEN, LEUKOCYTESUR, RBCUA, WBCUA, BACTERIA, SQUAMEPITHEL, HYALINECASTS in the last 48 hours.    Invalid input(s): WRIGHTSUR    Significant Imaging: I have reviewed all  pertinent imaging results/findings within the past 24 hours.  Imaging Results              X-Ray Chest AP Portable (Final result)  Result time 09/21/22 19:54:56      Final result by Leanna Chen MD (09/21/22 19:54:56)                   Impression:      No acute cardiopulmonary process identified.      Electronically signed by: Leanna Chen MD  Date:    09/21/2022  Time:    19:54               Narrative:    EXAMINATION:  XR CHEST AP PORTABLE    CLINICAL HISTORY:  Chest Pain;    TECHNIQUE:  Single frontal view of the chest was performed.    COMPARISON:  03/30/2022.    FINDINGS:  Cardiac silhouette is stable in size.  Postsurgical sternotomy changes are seen.  Lungs are hypoinflated which accentuates pulmonary vascular markings.  No evidence of new focal consolidative process, pneumothorax, or significant pleural effusion.  No acute osseous abnormality identified.                                       Assessment/Plan:     * Chest pain  - Mr. Destin Barber presents with 6 days of substernal chest pain  CAD s/p CABG & stenting   Diastolic Dysfunction   - this is associated with elevated troponin of  0.087    - trend troponin  - an EKG was consistent with prior showing NSR at rate of 73 with bifascicular block   - monitor on tele       Coronary artery disease involving coronary bypass graft of native heart without angina pectoris  - continue AS and statin   - s/p LIMA-LAD, SVG-Ramus, SVG-PDA    Diastolic dysfunction  - appears euvolemic at present   - BNP mildly elevated at 107   - no pulmonary edema on CXR      Anemia of chronic disease  - H&H is at baseline   - no active sources of blood loss   - monitor     Hyperlipidemia  - continue statin   - lipid panel pending       Controlled type 2 diabetes mellitus with stage 3 chronic kidney disease, without long-term current use of insulin  - last A1C:   Lab Results   Component Value Date    HGBA1C 6.0 (H) 12/23/2021    - A1C pending for AM   - hold oral antidiabetic  meds   - Diabetic diet   - SSI with accuchecks AC/HS    Anti-hyperglycemic dose as follows-   Antihyperglycemics (From admission, onward)    Start     Stop Route Frequency Ordered    09/21/22 2322  insulin aspart U-100 pen 0-5 Units         -- SubQ Every 6 hours PRN 09/21/22 2222        Hold Oral hypoglycemics while patient is in the hospital.    VTE Risk Mitigation (From admission, onward)         Ordered     heparin 25,000 units in dextrose 5% (100 units/ml) IV bolus from bag - ADDITIONAL PRN BOLUS - 60 units/kg (max bolus 4000 units)  As needed (PRN)        Question:  Heparin Infusion Adjustment (DO NOT MODIFY ANSWER)  Answer:  [email or web address]\epic\Images\Pharmacy\HeparinInfusions\heparin LOW INTENSITY nomogram for [facility] [id number]    09/22/22 0404     heparin 25,000 units in dextrose 5% (100 units/ml) IV bolus from bag - ADDITIONAL PRN BOLUS - 30 units/kg (max bolus 4000 units)  As needed (PRN)        Question:  Heparin Infusion Adjustment (DO NOT MODIFY ANSWER)  Answer:  [email or web address]\epic\Images\Pharmacy\HeparinInfusions\heparin LOW INTENSITY nomogram for [facility] [id number]    09/22/22 0404     heparin 25,000 units in dextrose 5% (100 units/ml) IV bolus from bag INITIAL BOLUS (max bolus 4000 units)  Once        Question:  Heparin Infusion Adjustment (DO NOT MODIFY ANSWER)  Answer:  [email or web address]\epic\Images\Pharmacy\HeparinInfusions\heparin LOW INTENSITY nomogram for [facility] [id number]    09/22/22 0404     heparin 25,000 units in dextrose 5% 250 mL (100 units/mL) infusion LOW INTENSITY nomogram - [facility]  Continuous        Question Answer Comment   Heparin Infusion Adjustment (DO NOT MODIFY ANSWER) [email or web address]\epic\Images\Pharmacy\HeparinInfusions\heparin LOW INTENSITY nomogram for [facility] [id number]    Begin at (in units/kg/hr) 12        09/22/22 0404     IP VTE HIGH RISK PATIENT  Once         09/21/22 2335     Place sequential compression device  Until discontinued         09/21/22 2335                   Radha Lima,  EDMUNDO  Department of [facility] Medicine   [facility] - Telemetry

## 2022-09-23 VITALS
SYSTOLIC BLOOD PRESSURE: 170 MMHG | BODY MASS INDEX: 23.19 KG/M2 | RESPIRATION RATE: 20 BRPM | HEIGHT: 66 IN | WEIGHT: 144.31 LBS | TEMPERATURE: 98 F | HEART RATE: 74 BPM | DIASTOLIC BLOOD PRESSURE: 82 MMHG | OXYGEN SATURATION: 99 %

## 2022-09-23 LAB
ANION GAP SERPL CALC-SCNC: 11 MMOL/L (ref 8–16)
APTT BLDCRRT: 48.8 SEC (ref 21–32)
BASOPHILS # BLD AUTO: 0.03 K/UL (ref 0–0.2)
BASOPHILS # BLD AUTO: 0.03 K/UL (ref 0–0.2)
BASOPHILS NFR BLD: 0.4 % (ref 0–1.9)
BASOPHILS NFR BLD: 0.4 % (ref 0–1.9)
BUN SERPL-MCNC: 33 MG/DL (ref 8–23)
CALCIUM SERPL-MCNC: 8.9 MG/DL (ref 8.7–10.5)
CHLORIDE SERPL-SCNC: 109 MMOL/L (ref 95–110)
CO2 SERPL-SCNC: 19 MMOL/L (ref 23–29)
CREAT SERPL-MCNC: 1.4 MG/DL (ref 0.5–1.4)
DIFFERENTIAL METHOD: ABNORMAL
DIFFERENTIAL METHOD: ABNORMAL
EOSINOPHIL # BLD AUTO: 0.1 K/UL (ref 0–0.5)
EOSINOPHIL # BLD AUTO: 0.1 K/UL (ref 0–0.5)
EOSINOPHIL NFR BLD: 1.8 % (ref 0–8)
EOSINOPHIL NFR BLD: 1.8 % (ref 0–8)
ERYTHROCYTE [DISTWIDTH] IN BLOOD BY AUTOMATED COUNT: 12.6 % (ref 11.5–14.5)
ERYTHROCYTE [DISTWIDTH] IN BLOOD BY AUTOMATED COUNT: 12.6 % (ref 11.5–14.5)
EST. GFR  (NO RACE VARIABLE): 49 ML/MIN/1.73 M^2
GLUCOSE SERPL-MCNC: 135 MG/DL (ref 70–110)
HCT VFR BLD AUTO: 30.8 % (ref 40–54)
HCT VFR BLD AUTO: 30.8 % (ref 40–54)
HGB BLD-MCNC: 10.3 G/DL (ref 14–18)
HGB BLD-MCNC: 10.3 G/DL (ref 14–18)
IMM GRANULOCYTES # BLD AUTO: 0.01 K/UL (ref 0–0.04)
IMM GRANULOCYTES # BLD AUTO: 0.01 K/UL (ref 0–0.04)
IMM GRANULOCYTES NFR BLD AUTO: 0.1 % (ref 0–0.5)
IMM GRANULOCYTES NFR BLD AUTO: 0.1 % (ref 0–0.5)
LYMPHOCYTES # BLD AUTO: 3.2 K/UL (ref 1–4.8)
LYMPHOCYTES # BLD AUTO: 3.2 K/UL (ref 1–4.8)
LYMPHOCYTES NFR BLD: 43.3 % (ref 18–48)
LYMPHOCYTES NFR BLD: 43.3 % (ref 18–48)
MAGNESIUM SERPL-MCNC: 2.2 MG/DL (ref 1.6–2.6)
MCH RBC QN AUTO: 29.9 PG (ref 27–31)
MCH RBC QN AUTO: 29.9 PG (ref 27–31)
MCHC RBC AUTO-ENTMCNC: 33.4 G/DL (ref 32–36)
MCHC RBC AUTO-ENTMCNC: 33.4 G/DL (ref 32–36)
MCV RBC AUTO: 89 FL (ref 82–98)
MCV RBC AUTO: 89 FL (ref 82–98)
MONOCYTES # BLD AUTO: 0.7 K/UL (ref 0.3–1)
MONOCYTES # BLD AUTO: 0.7 K/UL (ref 0.3–1)
MONOCYTES NFR BLD: 8.8 % (ref 4–15)
MONOCYTES NFR BLD: 8.8 % (ref 4–15)
NEUTROPHILS # BLD AUTO: 3.4 K/UL (ref 1.8–7.7)
NEUTROPHILS # BLD AUTO: 3.4 K/UL (ref 1.8–7.7)
NEUTROPHILS NFR BLD: 45.6 % (ref 38–73)
NEUTROPHILS NFR BLD: 45.6 % (ref 38–73)
NRBC BLD-RTO: 0 /100 WBC
NRBC BLD-RTO: 0 /100 WBC
PLATELET # BLD AUTO: 247 K/UL (ref 150–450)
PLATELET # BLD AUTO: 247 K/UL (ref 150–450)
PMV BLD AUTO: 9.5 FL (ref 9.2–12.9)
PMV BLD AUTO: 9.5 FL (ref 9.2–12.9)
POCT GLUCOSE: 152 MG/DL (ref 70–110)
POCT GLUCOSE: 83 MG/DL (ref 70–110)
POCT GLUCOSE: 86 MG/DL (ref 70–110)
POTASSIUM SERPL-SCNC: 3.7 MMOL/L (ref 3.5–5.1)
RBC # BLD AUTO: 3.45 M/UL (ref 4.6–6.2)
RBC # BLD AUTO: 3.45 M/UL (ref 4.6–6.2)
SODIUM SERPL-SCNC: 139 MMOL/L (ref 136–145)
WBC # BLD AUTO: 7.41 K/UL (ref 3.9–12.7)
WBC # BLD AUTO: 7.41 K/UL (ref 3.9–12.7)

## 2022-09-23 PROCEDURE — G0378 HOSPITAL OBSERVATION PER HR: HCPCS

## 2022-09-23 PROCEDURE — 96366 THER/PROPH/DIAG IV INF ADDON: CPT

## 2022-09-23 PROCEDURE — A4216 STERILE WATER/SALINE, 10 ML: HCPCS | Performed by: PHYSICIAN ASSISTANT

## 2022-09-23 PROCEDURE — 25000003 PHARM REV CODE 250: Performed by: PHYSICIAN ASSISTANT

## 2022-09-23 PROCEDURE — 83735 ASSAY OF MAGNESIUM: CPT | Performed by: PHYSICIAN ASSISTANT

## 2022-09-23 PROCEDURE — 85730 THROMBOPLASTIN TIME PARTIAL: CPT | Performed by: PHYSICIAN ASSISTANT

## 2022-09-23 PROCEDURE — 80048 BASIC METABOLIC PNL TOTAL CA: CPT | Performed by: PHYSICIAN ASSISTANT

## 2022-09-23 PROCEDURE — 36415 COLL VENOUS BLD VENIPUNCTURE: CPT | Performed by: PHYSICIAN ASSISTANT

## 2022-09-23 PROCEDURE — 85025 COMPLETE CBC W/AUTO DIFF WBC: CPT | Performed by: PHYSICIAN ASSISTANT

## 2022-09-23 RX ORDER — ASPIRIN 81 MG/1
81 TABLET ORAL DAILY
Qty: 90 TABLET | Refills: 3 | Status: SHIPPED | OUTPATIENT
Start: 2022-09-23

## 2022-09-23 RX ORDER — NITROGLYCERIN 0.4 MG/1
0.4 TABLET SUBLINGUAL EVERY 5 MIN PRN
Qty: 30 TABLET | Refills: 0 | Status: SHIPPED | OUTPATIENT
Start: 2022-09-23 | End: 2023-06-30

## 2022-09-23 RX ADMIN — AMLODIPINE BESYLATE 10 MG: 5 TABLET ORAL at 08:09

## 2022-09-23 RX ADMIN — ISOSORBIDE MONONITRATE 60 MG: 30 TABLET, EXTENDED RELEASE ORAL at 08:09

## 2022-09-23 RX ADMIN — METOPROLOL TARTRATE 25 MG: 25 TABLET, FILM COATED ORAL at 08:09

## 2022-09-23 RX ADMIN — ASPIRIN 81 MG: 81 TABLET, COATED ORAL at 09:09

## 2022-09-23 RX ADMIN — Medication 10 ML: at 05:09

## 2022-09-23 RX ADMIN — TAMSULOSIN HYDROCHLORIDE 0.4 MG: 0.4 CAPSULE ORAL at 08:09

## 2022-09-23 RX ADMIN — PRAVASTATIN SODIUM 40 MG: 40 TABLET ORAL at 08:09

## 2022-09-23 NOTE — PLAN OF CARE
Problem: Adult Inpatient Plan of Care  Goal: Plan of Care Review  Outcome: Ongoing, Progressing  Flowsheets (Taken 9/23/2022 0332)  Plan of Care Reviewed With: patient  Goal: Patient-Specific Goal (Individualized)  Outcome: Ongoing, Progressing  Flowsheets (Taken 9/23/2022 0332)  Anxieties, Fears or Concerns: concern about missing sock  Individualized Care Needs: new pair of non skid socks provided  Patient-Specific Goals (Include Timeframe): IV heparin infusing per provider orders  Goal: Optimal Comfort and Wellbeing  Outcome: Ongoing, Progressing     Problem: Fall Injury Risk  Goal: Absence of Fall and Fall-Related Injury  Outcome: Ongoing, Progressing

## 2022-09-23 NOTE — HOSPITAL COURSE
87M with PMH of Diastolic Dysfunction, CAD s/p CABG & stenting, HTN, HLD, T2DM, anemia of chronic disease, who presented to [facility] ED on 9/21/22 with mid-sternal chest pressure for the past 6 days that radiates into his right arm, neck, and back. He notes associated intermittent dizziness and shortness of breath. He states the pain occurs daily, and lasts 5 minutes at a time. He denies SOB, N/V, diaphoresis, leg pain. He was evaluated in the ED with labs showing  an overall normal CBC. A metabolic panel showed KAL with BUN 25 and Cr of 1.8. A BNP was mildly elevated at 107, and troponin was elevated at 0.087. An EKG showed no acute changes from prior, with NSR at rate of 73, and a bifasicular block. A CXR showed no acute cardiopulmonary process. He was placed on OBS and had an echo and stress with cardiology consultation. Pt was on heparin for 24 hours. Pt without any more episodes of chest pain. Cardiology does not recommend intervention at this time due to chronic kidney disease and other comorbidities and recommended medical management.  Patient to follow-up in Cardiology Clinic with Dr. Candelaria. Pt had not been taking his aspirin daily as he ran out of it. I have sent new rx for asa as well as nitro tabs to be used prn. Pt told he needs to f/u with his pcp next week for re-evaluation

## 2022-09-23 NOTE — NURSING
Discharge instructions given to patient at bedside. Patient verbalized understanding of instructions. Patient states willingness to comply. Saline lock removed. Tele monitoring removed.  Patient is calling his family member to come pick him up.

## 2022-09-23 NOTE — SUBJECTIVE & OBJECTIVE
Past Medical History:   Diagnosis Date    Acute coronary syndrome 06/24/2016    s/p 3V CABG 7/2016    Anemia     Coronary artery disease     Diastolic dysfunction     Gout, chronic     Heart failure     HTN (hypertension)     Hyperlipidemia     Myocardial infarction     Pneumonia due to other staphylococcus     Pressure ulcer     Renal manifestation of secondary diabetes mellitus     Type 2 diabetes mellitus     diet controlled       Past Surgical History:   Procedure Laterality Date    CATARACT EXTRACTION Bilateral     CATARACT EXTRACTION W/ ANTERIOR VITRECTOMY      CORONARY ARTERY BYPASS GRAFT  07/06/2016    PAIZ-LAD, SVG-Ramus, SVG-PDA    HEMORRHOID SURGERY         Review of patient's allergies indicates:   Allergen Reactions    Penicillins Nausea And Vomiting     Pt reports he is not allergic to penicillin         No current facility-administered medications on file prior to encounter.     Current Outpatient Medications on File Prior to Encounter   Medication Sig    acetaminophen (TYLENOL) 500 MG tablet Take 1 tablet (500 mg total) by mouth every 6 (six) hours as needed for Pain.    albuterol (PROVENTIL/VENTOLIN HFA) 90 mcg/actuation inhaler Inhale 1 puff into the lungs every 6 (six) hours as needed. Rescue    albuterol (PROVENTIL/VENTOLIN HFA) 90 mcg/actuation inhaler Inhale 1-2 puffs into the lungs every 4 (four) hours as needed for Shortness of Breath. Rescue    amLODIPine (NORVASC) 10 MG tablet TAKE 1 TABLET(10 MG) BY MOUTH EVERY DAY    aspirin (ASPIR-LOW) 81 MG EC tablet Take 1 tablet (81 mg total) by mouth once daily.    azelastine (ASTELIN) 137 mcg (0.1 %) nasal spray 1 spray (137 mcg total) by Nasal route 2 (two) times daily as needed for Rhinitis.    cetirizine 10 mg chewable tablet Take 10 mg by mouth once daily.    diclofenac sodium (VOLTAREN) 1 % Gel Apply 2 g topically 4 (four) times daily.    docusate sodium (COLACE) 100 MG capsule Take 1 capsule (100 mg total) by mouth 2 (two) times daily as  needed for Constipation (and hard stool).    fluticasone propionate (FLONASE) 50 mcg/actuation nasal spray SHAKE LIQUID AND USE 1 SPRAY(50 MCG) IN EACH NOSTRIL TWICE DAILY AS NEEDED FOR RHINITIS    isosorbide mononitrate (IMDUR) 60 MG 24 hr tablet Take 1 tablet (60 mg total) by mouth once daily.    lisinopriL (PRINIVIL,ZESTRIL) 40 MG tablet Take 1 tablet (40 mg total) by mouth once daily.    meloxicam (MOBIC) 7.5 MG tablet Take 1 tablet (7.5 mg total) by mouth daily as needed for Pain (knee pain).    metFORMIN (GLUCOPHAGE-XR) 500 MG ER 24hr tablet TAKE 1 TABLET(500 MG) BY MOUTH DAILY WITH BREAKFAST Strength: 500 mg    metoprolol tartrate (LOPRESSOR) 25 MG tablet TAKE 1 TABLET(25 MG) BY MOUTH TWICE DAILY    ondansetron (ZOFRAN-ODT) 4 MG TbDL Take 1 tablet (4 mg total) by mouth every 8 (eight) hours as needed (nausea/vomiting).    polyethylene glycol (GLYCOLAX) 17 gram PwPk Take 17 g by mouth once daily.    pravastatin (PRAVACHOL) 40 MG tablet TAKE 1 TABLET(40 MG) BY MOUTH EVERY DAY    tamsulosin (FLOMAX) 0.4 mg Cap Take 1 capsule (0.4 mg total) by mouth once daily.    traZODone (DESYREL) 150 MG tablet TAKE 1 TABLET(150 MG) BY MOUTH EVERY NIGHT AS NEEDED FOR INSOMNIA Strength: 150 mg    finasteride (PROSCAR) 5 mg tablet Take 1 tablet (5 mg total) by mouth once daily.     Family History       Problem Relation (Age of Onset)    Cancer Father    Heart disease Mother, Sister    Hypertension Mother    No Known Problems Brother, Maternal Aunt, Maternal Uncle, Paternal Aunt, Paternal Uncle, Maternal Grandmother, Maternal Grandfather, Paternal Grandmother, Paternal Grandfather          Tobacco Use    Smoking status: Former     Types: Cigarettes     Quit date: 1985     Years since quittin.2    Smokeless tobacco: Never   Substance and Sexual Activity    Alcohol use: No    Drug use: No    Sexual activity: Not Currently     Partners: Female     Review of Systems   Constitutional: Negative.   HENT: Negative.     Eyes:  Negative.    Cardiovascular:  Positive for chest pain.   Respiratory: Negative.     Endocrine: Negative.    Hematologic/Lymphatic: Negative.    Skin: Negative.    Musculoskeletal: Negative.    Gastrointestinal: Negative.    Genitourinary: Negative.    Neurological: Negative.    Psychiatric/Behavioral: Negative.     Allergic/Immunologic: Negative.    Objective:     Vital Signs (Most Recent):  Temp: 98 °F (36.7 °C) (09/22/22 1708)  Pulse: 92 (09/22/22 1708)  Resp: 18 (09/22/22 1708)  BP: (!) 157/73 (09/22/22 1708)  SpO2: 98 % (09/22/22 1708)   Vital Signs (24h Range):  Temp:  [97.6 °F (36.4 °C)-98.5 °F (36.9 °C)] 98 °F (36.7 °C)  Pulse:  [63-92] 92  Resp:  [18] 18  SpO2:  [97 %-100 %] 98 %  BP: (124-215)/() 157/73     Weight: 65.5 kg (144 lb 4.7 oz)  Body mass index is 23.29 kg/m².    SpO2: 98 %  O2 Device (Oxygen Therapy): nasal cannula    No intake or output data in the 24 hours ending 09/22/22 1921    Lines/Drains/Airways       Peripheral Intravenous Line  Duration                  Peripheral IV - Single Lumen 09/22/22 0641 20 G Left;Posterior Hand <1 day                    Physical Exam  Vitals reviewed.   Constitutional:       Appearance: He is well-developed.   HENT:      Head: Normocephalic.   Eyes:      Conjunctiva/sclera: Conjunctivae normal.      Pupils: Pupils are equal, round, and reactive to light.   Cardiovascular:      Rate and Rhythm: Normal rate and regular rhythm.      Heart sounds: Normal heart sounds.   Pulmonary:      Effort: Pulmonary effort is normal.      Breath sounds: Normal breath sounds.   Abdominal:      General: Bowel sounds are normal.      Palpations: Abdomen is soft.   Musculoskeletal:      Cervical back: Normal range of motion and neck supple.   Skin:     General: Skin is warm.   Neurological:      Mental Status: He is alert and oriented to person, place, and time.       Significant Labs: BMP:   Recent Labs   Lab 09/21/22 2007 09/22/22  0220    103    139   K 4.6  3.5    108   CO2 22* 21*   BUN 25* 29*   CREATININE 1.8* 1.4   CALCIUM 9.4 8.6*   MG  --  2.1   , CMP   Recent Labs   Lab 09/21/22 2007 09/22/22 0220    139   K 4.6 3.5    108   CO2 22* 21*    103   BUN 25* 29*   CREATININE 1.8* 1.4   CALCIUM 9.4 8.6*   PROT 7.5  --    ALBUMIN 3.8  --    BILITOT 0.2  --    ALKPHOS 91  --    AST 21  --    ALT 10  --    ANIONGAP 14 10   , CBC   Recent Labs   Lab 09/21/22 2007 09/22/22 0220 09/22/22  0509   WBC 7.74 7.39 6.95   HGB 12.8* 12.5* 13.2*   HCT 35.9* 34.7* 37.7*    240 260   , INR   Recent Labs   Lab 09/22/22  0509   INR 1.0   , Lipid Panel   Recent Labs   Lab 09/22/22 0220   CHOL 118*   HDL 43   LDLCALC 55.2*   TRIG 99   CHOLHDL 36.4   , Troponin   Recent Labs   Lab 09/21/22 2007 09/22/22 0221 09/22/22  0831   TROPONINI 0.087* 0.063* 0.037*   , and All pertinent lab results from the last 24 hours have been reviewed.    Significant Imaging: Echocardiogram: Transthoracic echo (TTE) complete (Cupid Only):   Results for orders placed or performed during the hospital encounter of 09/21/22   Echo   Result Value Ref Range    BSA 1.75 m2    TDI SEPTAL 0.03 m/s    LV LATERAL E/E' RATIO 7.57 m/s    LV SEPTAL E/E' RATIO 17.67 m/s    LA WIDTH 4.40 cm    Left Ventricular Outflow Tract Mean Velocity 0.56 cm/s    Left Ventricular Outflow Tract Mean Gradient 1.49 mmHg    TDI LATERAL 0.07 m/s    PV PEAK VELOCITY 1.13 cm/s    LVIDd 4.07 3.5 - 6.0 cm    IVS 1.30 (A) 0.6 - 1.1 cm    Posterior Wall 1.23 (A) 0.6 - 1.1 cm    LVIDs 2.64 2.1 - 4.0 cm    FS 35 28 - 44 %    LA volume 85.90 cm3    Sinus 3.20 cm    STJ 2.49 cm    Ascending aorta 3.17 cm    LV mass 183.70 g    LA size 4.12 cm    RVDD 3.68 cm    TAPSE 1.20 cm    Left Ventricle Relative Wall Thickness 0.60 cm    AV Velocity Ratio 0.79     MV valve area p 1/2 method 2.39 cm2    E/A ratio 0.56     Mean e' 0.05 m/s    E wave deceleration time 317.16 msec    IVRT 65.74 msec    LVOT diameter 1.99 cm     LVOT area 3.1 cm2    LVOT peak baljit 0.82 m/s    LVOT peak VTI 13.80 cm    Ao peak baljit 1.04 m/s    LVOT stroke volume 42.90 cm3    AV peak gradient 4 mmHg    E/E' ratio 10.60 m/s    MV Peak E Baljit 0.53 m/s    TR Max Baljit 2.08 m/s    MV stenosis pressure 1/2 time 91.98 ms    MV Peak A Baljit 0.94 m/s    LV Systolic Volume 25.46 mL    LV Systolic Volume Index 14.6 mL/m2    LV Diastolic Volume 72.98 mL    LV Diastolic Volume Index 41.94 mL/m2    LA Volume Index 49.4 mL/m2    LV Mass Index 106 g/m2    RA Major Axis 4.83 cm    Left Atrium Minor Axis 5.63 cm    Left Atrium Major Axis 5.52 cm    Triscuspid Valve Regurgitation Peak Gradient 17 mmHg    RA Width 3.70 cm    Right Atrial Pressure (from IVC) 3 mmHg    EF 65 %    TV rest pulmonary artery pressure 20 mmHg    Narrative    · The estimated ejection fraction is 65%.  · The left ventricle is normal in size with mild concentric hypertrophy   and normal systolic function.  · Moderate to severe left atrial enlargement.  · Grade I left ventricular diastolic dysfunction.  · Mild pulmonic regurgitation.  · Normal right ventricular size with normal right ventricular systolic   function.  · Mild right atrial enlargement.  · Normal central venous pressure (3 mmHg).  · The estimated PA systolic pressure is 20 mmHg.  · Mild tricuspid regurgitation.  · Mild aortic regurgitation.

## 2022-09-23 NOTE — HPI
Peter 87-year-old man.  He is a patient of Dr. Candelaria.  Past medical history of coronary artery disease status post CABG with LIMA to LAD, SVG to ramus and SVG to PDA.  His circ OM branch was not bypassed due to poor quality venous conduit.  Other medical history includes hypertension, dyslipidemia, diabetes. SHe presented to [facility] ED on 9/21/22 with mid-sternal chest pressure for the past 6 days that radiates into his right arm, neck, and back. He notes associated intermittent dizziness and shortness of breath. He states the pain occurs daily, and lasts 5 minutes at a time. He denies SOB, N/V, diaphoresis, leg pain. He was evaluated in the ED with labs showing  an overall normal CBC. A metabolic panel showed KAL with BUN 25 and Cr of 1.8. A BNP was mildly elevated at 107, and troponin was elevated at 0.087. An EKG showed no acute changes from prior, with NSR at rate of 73, and a bifasicular block.    He states that his chest pain resolved overnight and has been chest pain-free all day.    tress test done today demonstrated predominantly fixed inferior wall defect and small-to-moderate mild to moderate perfusion abnormality consistent with ischemia in the lateral wall.  This is consistent with his non revascularized OM.       Abnormal myocardial perfusion scan.    There are two significant perfusion abnormalities.    Perfusion Abnormality #1 - There is a moderate to severe intensity, moderate sized, mostly fixed perfusion abnormality with some reversibilty in the basal to mid inferior wall(s).    Perfusion Abnormality #2 - There is a small to moderate sized, mild to moderate intensity, reversible perfusion abnormality that is consistent with ischemia in the lateral wall(s).    There are no other significant perfusion abnormalities.    The gated perfusion images showed an ejection fraction of 79% at rest.    There is normal wall motion at rest and post stress.    The EKG portion of this study is negative for  ischemia.    The patient reported no chest pain during the stress test.    There were no arrhythmias during stress.    The estimated ejection fraction is 65%.  The left ventricle is normal in size with mild concentric hypertrophy and normal systolic function.  Moderate to severe left atrial enlargement.  Grade I left ventricular diastolic dysfunction.  Mild pulmonic regurgitation.  Normal right ventricular size with normal right ventricular systolic function.  Mild right atrial enlargement.  Normal central venous pressure (3 mmHg).  The estimated PA systolic pressure is 20 mmHg.  Mild tricuspid regurgitation.  Mild aortic regurgitation.          CAD/ACS s/p CABG 7/7/16: LIMA-LAD, SVG-Ramus, SVG-PDA (Dr. Hallman).  LCx/OM branch not bypassed due to poor quality venous conduit available     Ex MPI 11/29/16 (images pers rev)  The patient exercised for 4.45 minutes on a High Ramp protocol, achieving a peak heart rate of 121 bpm, which is 86% of the age predicted maximum heart rate. -CP/EKG.   Nuclear Quantitative Functional Analysis:   LVEF: 60 %  Impression: ABNORMAL MYOCARDIAL PERFUSION  1. The perfusion scan is free of evidence for myocardial ischemia.   2. There is mild intensity fixed defect in the anteroapical wall of the left ventricle, consistent with myocardial injury.   3. There is a mild intensity fixed defect in the inferior wall of the left ventricle, secondary to diaphragm attenuation.   4. Resting wall motion is physiologic.   5. Resting LV function is normal.   6. The ventricular volumes are normal at rest and stress.   7. The extracardiac distribution of radioactivity is normal.      Echo 6/25/16    1 - Normal left ventricular systolic function (EF 60-65%).     2 - Mild left atrial enlargement.     3 - Left ventricular diastolic dysfunction.     4 - Normal right ventricular systolic function .     5 - Trivial to mild aortic regurgitation.     6 - Trivial to mild mitral regurgitation.     7 - Trivial to  "mild tricuspid regurgitation.     8 - Pulmonary hypertension. The estimated PA systolic pressure is 44 mmHg.      Cath 6/24/16  LVEDP: 7mmHg  LVEF: 55%  Wall Motion: normal  Dominance: Right  LM: normal  LAD: ost/prox eccentric 70-80%, prox 50%, excellent mid LAD target.  Ramus: prox 50-70%, mid 90%, bifurcating distal vessel (both excellent targets)  LCx: prox 95% at bifurcation of OM1.  OM1 ost/prox 80%, bifurcating vessel, excellent target.  RCA: dominant, anterior takeoff with "blandon's crook" prox vessel.Prox 70%, mid 70%. Excellent PDA target.  Hemostasis:  R Radial band  Impression:  NSTEMI  4V CAD, normal LV fxn  R radial vasband for hemostasis     Carotid US 7/6/16  RIGHT SIDE:   1 - 39 % right ICA stenosis.   Heterogeneous plaque in the right internal carotid artery.   Antegrade flow in the right vertebral artery.   LEFT SIDE:  1 - 39% left ICA stenosis.   Homogeneous plaque in the left internal carotid artery.   Antegrade flow in the left vertebral artery.      10/17/17 Overall doing well. Denies CP or SOB  EKG NSR TWI V1 and V2 - similar to prior EKG  Suffering with an URI     EKG 3/30/22 - done in ER - not in EPIC     4/29/22 Denies CP or SOB  Had a recent brief syncopal spell 5 days ago - details unclear- poor historian  BP elevated  Echo and holter for recent syncope  Not interested in repeat stress test  Continue Rx for CAD, HTN, HLD, DD     7/14/22 Reports JULIO and pain across lower rib cage with radiation to left shoulder for the last week  EKG sinus tachycardia 102 RBBB/LAD  BP elevated  Denies further syncope  Poor historian   Add imdur for CP  Echo, lexiscan myoview and holter for CP, JULIO, recent syncope  Needs to go to the ER for worsening CP  Continue Rx for CAD, HTN, HLD, DD     9/16/22 Testing not done. BP poorly controlled  Having CP that radiates centrally from neck down to his abdomen  Some JULIO  "

## 2022-09-23 NOTE — CONSULTS
[facility] - Telemetry  Cardiology  Consult Note    Patient Name: Destin Barber  MRN: [id number]  Admission Date: 9/21/2022  Hospital Length of Stay: 0 days  Code Status: Full Code   Attending Provider: Zechariah Solo III, MD   Consulting Provider: Bob Driver MD  Primary Care Physician: Harry Velazco MD  Principal Problem:Chest pain    Patient information was obtained from patient and ER records.     Inpatient consult to Cardiology  Consult performed by: Bob Driver MD  Consult ordered by: Zechariah Solo III, MD        Subjective:     Chief Complaint:  cp     HPI:   Pleasant 87-year-old man.  He is a patient of Dr. Candelaria.  Past medical history of coronary artery disease status post CABG with LIMA to LAD, SVG to ramus and SVG to PDA.  His circ OM branch was not bypassed due to poor quality venous conduit.  Other medical history includes hypertension, dyslipidemia, diabetes. SHe presented to [facility] ED on 9/21/22 with mid-sternal chest pressure for the past 6 days that radiates into his right arm, neck, and back. He notes associated intermittent dizziness and shortness of breath. He states the pain occurs daily, and lasts 5 minutes at a time. He denies SOB, N/V, diaphoresis, leg pain. He was evaluated in the ED with labs showing  an overall normal CBC. A metabolic panel showed KAL with BUN 25 and Cr of 1.8. A BNP was mildly elevated at 107, and troponin was elevated at 0.087. An EKG showed no acute changes from prior, with NSR at rate of 73, and a bifasicular block.    He states that his chest pain resolved overnight and has been chest pain-free all day.    tress test done today demonstrated predominantly fixed inferior wall defect and small-to-moderate mild to moderate perfusion abnormality consistent with ischemia in the lateral wall.  This is consistent with his non revascularized OM.       Abnormal myocardial perfusion scan.    There are two significant perfusion abnormalities.    Perfusion Abnormality  #1 - There is a moderate to severe intensity, moderate sized, mostly fixed perfusion abnormality with some reversibilty in the basal to mid inferior wall(s).    Perfusion Abnormality #2 - There is a small to moderate sized, mild to moderate intensity, reversible perfusion abnormality that is consistent with ischemia in the lateral wall(s).    There are no other significant perfusion abnormalities.    The gated perfusion images showed an ejection fraction of 79% at rest.    There is normal wall motion at rest and post stress.    The EKG portion of this study is negative for ischemia.    The patient reported no chest pain during the stress test.    There were no arrhythmias during stress.    The estimated ejection fraction is 65%.  The left ventricle is normal in size with mild concentric hypertrophy and normal systolic function.  Moderate to severe left atrial enlargement.  Grade I left ventricular diastolic dysfunction.  Mild pulmonic regurgitation.  Normal right ventricular size with normal right ventricular systolic function.  Mild right atrial enlargement.  Normal central venous pressure (3 mmHg).  The estimated PA systolic pressure is 20 mmHg.  Mild tricuspid regurgitation.  Mild aortic regurgitation.          CAD/ACS s/p CABG 7/7/16: LIMA-LAD, SVG-Ramus, SVG-PDA (Dr. Hallman).  LCx/OM branch not bypassed due to poor quality venous conduit available     Ex MPI 11/29/16 (images pers rev)  The patient exercised for 4.45 minutes on a High Ramp protocol, achieving a peak heart rate of 121 bpm, which is 86% of the age predicted maximum heart rate. -CP/EKG.   Nuclear Quantitative Functional Analysis:   LVEF: 60 %  Impression: ABNORMAL MYOCARDIAL PERFUSION  1. The perfusion scan is free of evidence for myocardial ischemia.   2. There is mild intensity fixed defect in the anteroapical wall of the left ventricle, consistent with myocardial injury.   3. There is a mild intensity fixed defect in the inferior wall of  "the left ventricle, secondary to diaphragm attenuation.   4. Resting wall motion is physiologic.   5. Resting LV function is normal.   6. The ventricular volumes are normal at rest and stress.   7. The extracardiac distribution of radioactivity is normal.      Echo 6/25/16    1 - Normal left ventricular systolic function (EF 60-65%).     2 - Mild left atrial enlargement.     3 - Left ventricular diastolic dysfunction.     4 - Normal right ventricular systolic function .     5 - Trivial to mild aortic regurgitation.     6 - Trivial to mild mitral regurgitation.     7 - Trivial to mild tricuspid regurgitation.     8 - Pulmonary hypertension. The estimated PA systolic pressure is 44 mmHg.      Cath 6/24/16  LVEDP: 7mmHg  LVEF: 55%  Wall Motion: normal  Dominance: Right  LM: normal  LAD: ost/prox eccentric 70-80%, prox 50%, excellent mid LAD target.  Ramus: prox 50-70%, mid 90%, bifurcating distal vessel (both excellent targets)  LCx: prox 95% at bifurcation of OM1.  OM1 ost/prox 80%, bifurcating vessel, excellent target.  RCA: dominant, anterior takeoff with "blandon's crook" prox vessel.Prox 70%, mid 70%. Excellent PDA target.  Hemostasis:  R Radial band  Impression:  NSTEMI  4V CAD, normal LV fxn  R radial vasband for hemostasis     Carotid US 7/6/16  RIGHT SIDE:   1 - 39 % right ICA stenosis.   Heterogeneous plaque in the right internal carotid artery.   Antegrade flow in the right vertebral artery.   LEFT SIDE:  1 - 39% left ICA stenosis.   Homogeneous plaque in the left internal carotid artery.   Antegrade flow in the left vertebral artery.      10/17/17 Overall doing well. Denies CP or SOB  EKG NSR TWI V1 and V2 - similar to prior EKG  Suffering with an URI     EKG 3/30/22 - done in ER - not in EPIC     4/29/22 Denies CP or SOB  Had a recent brief syncopal spell 5 days ago - details unclear- poor historian  BP elevated  Echo and holter for recent syncope  Not interested in repeat stress test  Continue Rx for " CAD, HTN, HLD, DD     7/14/22 Reports JULIO and pain across lower rib cage with radiation to left shoulder for the last week  EKG sinus tachycardia 102 RBBB/LAD  BP elevated  Denies further syncope  Poor historian   Add imdur for CP  Echo, lexiscan myoview and holter for CP, JULIO, recent syncope  Needs to go to the ER for worsening CP  Continue Rx for CAD, HTN, HLD, DD     9/16/22 Testing not done. BP poorly controlled  Having CP that radiates centrally from neck down to his abdomen  Some JULIO      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    Anemia     Coronary artery disease     Diastolic dysfunction     Gout, chronic     Heart failure     HTN (hypertension)     Hyperlipidemia     Myocardial infarction     Pneumonia due to other staphylococcus     Pressure ulcer     Renal manifestation of secondary diabetes mellitus     Type 2 diabetes mellitus     diet controlled       Past Surgical History:   Procedure Laterality Date    CATARACT EXTRACTION Bilateral     CATARACT EXTRACTION W/ ANTERIOR VITRECTOMY      CORONARY ARTERY BYPASS GRAFT  07/06/2016    PAIZ-LAD, SVG-Ramus, SVG-PDA    HEMORRHOID SURGERY         Review of patient's allergies indicates:   Allergen Reactions    Penicillins Nausea And Vomiting     Pt reports he is not allergic to penicillin         No current facility-administered medications on file prior to encounter.     Current Outpatient Medications on File Prior to Encounter   Medication Sig    acetaminophen (TYLENOL) 500 MG tablet Take 1 tablet (500 mg total) by mouth every 6 (six) hours as needed for Pain.    albuterol (PROVENTIL/VENTOLIN HFA) 90 mcg/actuation inhaler Inhale 1 puff into the lungs every 6 (six) hours as needed. Rescue    albuterol (PROVENTIL/VENTOLIN HFA) 90 mcg/actuation inhaler Inhale 1-2 puffs into the lungs every 4 (four) hours as needed for Shortness of Breath. Rescue    amLODIPine (NORVASC) 10 MG tablet  TAKE 1 TABLET(10 MG) BY MOUTH EVERY DAY    aspirin (ASPIR-LOW) 81 MG EC tablet Take 1 tablet (81 mg total) by mouth once daily.    azelastine (ASTELIN) 137 mcg (0.1 %) nasal spray 1 spray (137 mcg total) by Nasal route 2 (two) times daily as needed for Rhinitis.    cetirizine 10 mg chewable tablet Take 10 mg by mouth once daily.    diclofenac sodium (VOLTAREN) 1 % Gel Apply 2 g topically 4 (four) times daily.    docusate sodium (COLACE) 100 MG capsule Take 1 capsule (100 mg total) by mouth 2 (two) times daily as needed for Constipation (and hard stool).    fluticasone propionate (FLONASE) 50 mcg/actuation nasal spray SHAKE LIQUID AND USE 1 SPRAY(50 MCG) IN EACH NOSTRIL TWICE DAILY AS NEEDED FOR RHINITIS    isosorbide mononitrate (IMDUR) 60 MG 24 hr tablet Take 1 tablet (60 mg total) by mouth once daily.    lisinopriL (PRINIVIL,ZESTRIL) 40 MG tablet Take 1 tablet (40 mg total) by mouth once daily.    meloxicam (MOBIC) 7.5 MG tablet Take 1 tablet (7.5 mg total) by mouth daily as needed for Pain (knee pain).    metFORMIN (GLUCOPHAGE-XR) 500 MG ER 24hr tablet TAKE 1 TABLET(500 MG) BY MOUTH DAILY WITH BREAKFAST Strength: 500 mg    metoprolol tartrate (LOPRESSOR) 25 MG tablet TAKE 1 TABLET(25 MG) BY MOUTH TWICE DAILY    ondansetron (ZOFRAN-ODT) 4 MG TbDL Take 1 tablet (4 mg total) by mouth every 8 (eight) hours as needed (nausea/vomiting).    polyethylene glycol (GLYCOLAX) 17 gram PwPk Take 17 g by mouth once daily.    pravastatin (PRAVACHOL) 40 MG tablet TAKE 1 TABLET(40 MG) BY MOUTH EVERY DAY    tamsulosin (FLOMAX) 0.4 mg Cap Take 1 capsule (0.4 mg total) by mouth once daily.    traZODone (DESYREL) 150 MG tablet TAKE 1 TABLET(150 MG) BY MOUTH EVERY NIGHT AS NEEDED FOR INSOMNIA Strength: 150 mg    finasteride (PROSCAR) 5 mg tablet Take 1 tablet (5 mg total) by mouth once daily.     Family History       Problem Relation (Age of Onset)    Cancer Father    Heart disease Mother, Sister    Hypertension Mother     No Known Problems Brother, Maternal Aunt, Maternal Uncle, Paternal Aunt, Paternal Uncle, Maternal Grandmother, Maternal Grandfather, Paternal Grandmother, Paternal Grandfather          Tobacco Use    Smoking status: Former     Types: Cigarettes     Quit date: 1985     Years since quittin.2    Smokeless tobacco: Never   Substance and Sexual Activity    Alcohol use: No    Drug use: No    Sexual activity: Not Currently     Partners: Female     Review of Systems   Constitutional: Negative.   HENT: Negative.     Eyes: Negative.    Cardiovascular:  Positive for chest pain.   Respiratory: Negative.     Endocrine: Negative.    Hematologic/Lymphatic: Negative.    Skin: Negative.    Musculoskeletal: Negative.    Gastrointestinal: Negative.    Genitourinary: Negative.    Neurological: Negative.    Psychiatric/Behavioral: Negative.     Allergic/Immunologic: Negative.    Objective:     Vital Signs (Most Recent):  Temp: 98 °F (36.7 °C) (22)  Pulse: 92 (22)  Resp: 18 (22)  BP: (!) 157/73 (22)  SpO2: 98 % (22)   Vital Signs (24h Range):  Temp:  [97.6 °F (36.4 °C)-98.5 °F (36.9 °C)] 98 °F (36.7 °C)  Pulse:  [63-92] 92  Resp:  [18] 18  SpO2:  [97 %-100 %] 98 %  BP: (124-215)/() 157/73     Weight: 65.5 kg (144 lb 4.7 oz)  Body mass index is 23.29 kg/m².    SpO2: 98 %  O2 Device (Oxygen Therapy): nasal cannula    No intake or output data in the 24 hours ending 22 1921    Lines/Drains/Airways       Peripheral Intravenous Line  Duration                  Peripheral IV - Single Lumen 22 0641 20 G Left;Posterior Hand <1 day                    Physical Exam  Vitals reviewed.   Constitutional:       Appearance: He is well-developed.   HENT:      Head: Normocephalic.   Eyes:      Conjunctiva/sclera: Conjunctivae normal.      Pupils: Pupils are equal, round, and reactive to light.   Cardiovascular:      Rate and Rhythm: Normal rate and regular rhythm.       Heart sounds: Normal heart sounds.   Pulmonary:      Effort: Pulmonary effort is normal.      Breath sounds: Normal breath sounds.   Abdominal:      General: Bowel sounds are normal.      Palpations: Abdomen is soft.   Musculoskeletal:      Cervical back: Normal range of motion and neck supple.   Skin:     General: Skin is warm.   Neurological:      Mental Status: He is alert and oriented to person, place, and time.       Significant Labs: BMP:   Recent Labs   Lab 09/21/22 2007 09/22/22 0220    103    139   K 4.6 3.5    108   CO2 22* 21*   BUN 25* 29*   CREATININE 1.8* 1.4   CALCIUM 9.4 8.6*   MG  --  2.1   , CMP   Recent Labs   Lab 09/21/22 2007 09/22/22 0220    139   K 4.6 3.5    108   CO2 22* 21*    103   BUN 25* 29*   CREATININE 1.8* 1.4   CALCIUM 9.4 8.6*   PROT 7.5  --    ALBUMIN 3.8  --    BILITOT 0.2  --    ALKPHOS 91  --    AST 21  --    ALT 10  --    ANIONGAP 14 10   , CBC   Recent Labs   Lab 09/21/22 2007 09/22/22 0220 09/22/22  0509   WBC 7.74 7.39 6.95   HGB 12.8* 12.5* 13.2*   HCT 35.9* 34.7* 37.7*    240 260   , INR   Recent Labs   Lab 09/22/22  0509   INR 1.0   , Lipid Panel   Recent Labs   Lab 09/22/22 0220   CHOL 118*   HDL 43   LDLCALC 55.2*   TRIG 99   CHOLHDL 36.4   , Troponin   Recent Labs   Lab 09/21/22 2007 09/22/22 0221 09/22/22  0831   TROPONINI 0.087* 0.063* 0.037*   , and All pertinent lab results from the last 24 hours have been reviewed.    Significant Imaging: Echocardiogram: Transthoracic echo (TTE) complete ([facility] Only):   Results for orders placed or performed during the hospital encounter of 09/21/22   Echo   Result Value Ref Range    BSA 1.75 m2    TDI SEPTAL 0.03 m/s    LV LATERAL E/E' RATIO 7.57 m/s    LV SEPTAL E/E' RATIO 17.67 m/s    LA WIDTH 4.40 cm    Left Ventricular Outflow Tract Mean Velocity 0.56 cm/s    Left Ventricular Outflow Tract Mean Gradient 1.49 mmHg    TDI LATERAL 0.07 m/s    PV PEAK VELOCITY 1.13 cm/s     LVIDd 4.07 3.5 - 6.0 cm    IVS 1.30 (A) 0.6 - 1.1 cm    Posterior Wall 1.23 (A) 0.6 - 1.1 cm    LVIDs 2.64 2.1 - 4.0 cm    FS 35 28 - 44 %    LA volume 85.90 cm3    Sinus 3.20 cm    STJ 2.49 cm    Ascending aorta 3.17 cm    LV mass 183.70 g    LA size 4.12 cm    RVDD 3.68 cm    TAPSE 1.20 cm    Left Ventricle Relative Wall Thickness 0.60 cm    AV Velocity Ratio 0.79     MV valve area p 1/2 method 2.39 cm2    E/A ratio 0.56     Mean e' 0.05 m/s    E wave deceleration time 317.16 msec    IVRT 65.74 msec    LVOT diameter 1.99 cm    LVOT area 3.1 cm2    LVOT peak baljit 0.82 m/s    LVOT peak VTI 13.80 cm    Ao peak baljit 1.04 m/s    LVOT stroke volume 42.90 cm3    AV peak gradient 4 mmHg    E/E' ratio 10.60 m/s    MV Peak E Baljit 0.53 m/s    TR Max Baljit 2.08 m/s    MV stenosis pressure 1/2 time 91.98 ms    MV Peak A Baljit 0.94 m/s    LV Systolic Volume 25.46 mL    LV Systolic Volume Index 14.6 mL/m2    LV Diastolic Volume 72.98 mL    LV Diastolic Volume Index 41.94 mL/m2    LA Volume Index 49.4 mL/m2    LV Mass Index 106 g/m2    RA Major Axis 4.83 cm    Left Atrium Minor Axis 5.63 cm    Left Atrium Major Axis 5.52 cm    Triscuspid Valve Regurgitation Peak Gradient 17 mmHg    RA Width 3.70 cm    Right Atrial Pressure (from IVC) 3 mmHg    EF 65 %    TV rest pulmonary artery pressure 20 mmHg    Narrative    · The estimated ejection fraction is 65%.  · The left ventricle is normal in size with mild concentric hypertrophy   and normal systolic function.  · Moderate to severe left atrial enlargement.  · Grade I left ventricular diastolic dysfunction.  · Mild pulmonic regurgitation.  · Normal right ventricular size with normal right ventricular systolic   function.  · Mild right atrial enlargement.  · Normal central venous pressure (3 mmHg).  · The estimated PA systolic pressure is 20 mmHg.  · Mild tricuspid regurgitation.  · Mild aortic regurgitation.        Assessment and Plan:     * Chest pain  Patient with mildly elevated troponins  of flat trend.  Had some chest pain yesterday which resolved.  Has been chest pain-free today.  Stress test done today demonstrated predominantly fixed inferior wall defect and a small, mild to moderate area of lateral ischemia.  This is consistent with his un revascularized circumflex.  Considering his advanced age, small area of ischemia, resolution of symptoms with medical management, chronic kidney disease and other comorbidities, it is prudent to try to manage him medically.  If he continues to have lifestyle limiting angina despite maximum medical management, then consider revascularization of the on revascularize circ territory with PCI.  Continue medical management and aggressive risk factor control.  Patient to follow-up in Cardiology Clinic with Dr. Candelaria.    Controlled type 2 diabetes mellitus with stage 3 chronic kidney disease, without long-term current use of insulin        Anemia of chronic disease        Diastolic dysfunction        Hyperlipidemia            VTE Risk Mitigation (From admission, onward)         Ordered     heparin 25,000 units in dextrose 5% (100 units/ml) IV bolus from bag - ADDITIONAL PRN BOLUS - 60 units/kg (max bolus 4000 units)  As needed (PRN)        Question:  Heparin Infusion Adjustment (DO NOT MODIFY ANSWER)  Answer:  [email or web address]\Images\Pharmacy\HeparinInfusions\heparin LOW INTENSITY nomogram for [facility] [id number]    09/22/22 0404     heparin 25,000 units in dextrose 5% (100 units/ml) IV bolus from bag - ADDITIONAL PRN BOLUS - 30 units/kg (max bolus 4000 units)  As needed (PRN)        Question:  Heparin Infusion Adjustment (DO NOT MODIFY ANSWER)  Answer:  [email or web address]\Images\Pharmacy\HeparinInfusions\heparin LOW INTENSITY nomogram for [facility] [id number]    09/22/22 0404     heparin 25,000 units in dextrose 5% 250 mL (100 units/mL) infusion LOW INTENSITY nomogram - [facility]  Continuous        Question Answer Comment   Heparin Infusion Adjustment (DO NOT MODIFY ANSWER)  [email or web address]\Images\Pharmacy\HeparinInfusions\heparin LOW INTENSITY nomogram for [facility] [id number]    Begin at (in units/kg/hr) 12        09/22/22 0404     IP VTE HIGH RISK PATIENT  Once         09/21/22 2335     Place sequential compression device  Until discontinued         09/21/22 2335                Thank you for your consult. I will sign off. Please contact us if you have any additional questions.    Bob Driver MD  Cardiology   [facility] - Telemetry

## 2022-09-23 NOTE — DISCHARGE SUMMARY
[facility] Medicine  Discharge Summary      Patient Name: Destin Barber  MRN: [id number]  Patient Class: OP- Observation  Admission Date: 9/21/2022  Hospital Length of Stay: 0 days  Discharge Date and Time:  09/23/2022 4:39 PM  Attending Physician: No att. providers found   Discharging Provider: Zechariah Solo III, MD  Primary Care Provider: Harry Velazco MD      HPI:   Mr. Destin Barber is a 87 y.o. male, with PMH of Diastolic Dysfunction, CAD s/p CABG & stenting, HTN, HLD, T2DM, anemia of chronic disease, who presented to [facility] ED on 9/21/22 with mid-sternal chest pressure for the past 6 days that radiates into his right arm, neck, and back. He notes associated intermittent dizziness and shortness of breath. He states the pain occurs daily, and lasts 5 minutes at a time. He denies SOB, N/V, diaphoresis, leg pain. He was evaluated in the ED with labs showing  an overall normal CBC. A metabolic panel showed KAL with BUN 25 and Cr of 1.8. A BNP was mildly elevated at 107, and troponin was elevated at 0.087. An EKG showed no acute changes from prior, with NSR at rate of 73, and a bifasicular block. A CXR showed no acute cardiopulmonary process. He was placed on OBS.      * No surgery found *      Hospital Course:   87M with PMH of Diastolic Dysfunction, CAD s/p CABG & stenting, HTN, HLD, T2DM, anemia of chronic disease, who presented to [facility] ED on 9/21/22 with mid-sternal chest pressure for the past 6 days that radiates into his right arm, neck, and back. He notes associated intermittent dizziness and shortness of breath. He states the pain occurs daily, and lasts 5 minutes at a time. He denies SOB, N/V, diaphoresis, leg pain. He was evaluated in the ED with labs showing  an overall normal CBC. A metabolic panel showed KAL with BUN 25 and Cr of 1.8. A BNP was mildly elevated at 107, and troponin was elevated at 0.087. An EKG showed no acute changes from prior, with NSR at rate of 73, and a  bifasicular block. A CXR showed no acute cardiopulmonary process. He was placed on OBS and had an echo and stress with cardiology consultation. Pt was on heparin for 24 hours. Pt without any more episodes of chest pain. Cardiology does not recommend intervention at this time due to chronic kidney disease and other comorbidities and recommended medical management.  Patient to follow-up in Cardiology Clinic with Dr. Candelaria. Pt had not been taking his aspirin daily as he ran out of it. I have sent new rx for asa as well as nitro tabs to be used prn. Pt told he needs to f/u with his pcp next week for re-evaluation       Goals of Care Treatment Preferences:  Code Status: Full Code    Living Will: Yes              Consults:   Consults (From admission, onward)        Status Ordering Provider     Inpatient virtual consult to Hospital Medicine  Once        Provider:  (Not yet assigned)    Completed VIKTORIA GILLETTE III     Inpatient consult to Cardiology  Once        Provider:  Bob Driver MD    Completed VIKTORIA GILLETTE III          No new Assessment & Plan notes have been filed under this hospital service since the last note was generated.  Service: Hospital Medicine    Final Active Diagnoses:    Diagnosis Date Noted POA    PRINCIPAL PROBLEM:  Chest pain [R07.9] 09/21/2022 Yes    Controlled type 2 diabetes mellitus with stage 3 chronic kidney disease, without long-term current use of insulin [E11.22, N18.30] 02/03/2017 Yes    Coronary artery disease involving coronary bypass graft of native heart without angina pectoris [I25.810]  Yes    Anemia of chronic disease [D63.8] 06/23/2016 Yes    Diastolic dysfunction [I51.89]  Yes    Hyperlipidemia [E78.5]  Yes      Problems Resolved During this Admission:       Discharged Condition: fair    Disposition: Home or Self Care    Follow Up:   Follow-up Information     Harry Velazco MD. Go on 10/7/2022.    Specialties: Internal Medicine, Wound Care  Why: Hospital F/U with  PCP at 1:20 am.  Contact information:  [address] [facility]  [place] [address]  [phone]             Sreedhar Candelaria MD Follow up.    Specialty: Cardiology  Why: Hospital F/U with Cardiologist at 1:30 pm.  Contact information:  [address] [facility]  [address]  [place] [address]  [phone]                       Patient Instructions:      Ambulatory referral/consult to Cardiology   Standing Status: Future   Referral Priority: Routine Referral Type: Consultation   Referral Reason: Specialty Services Required   Requested Specialty: Cardiology   Number of Visits Requested: 1       Significant Diagnostic Studies: Labs: All labs within the past 24 hours have been reviewed    Pending Diagnostic Studies:     None         Medications:  Reconciled Home Medications:      Medication List      START taking these medications    nitroGLYCERIN 0.4 MG SL tablet  Commonly known as: NITROSTAT  Place 1 tablet (0.4 mg total) under the tongue every 5 (five) minutes as needed for Chest pain.        CONTINUE taking these medications    acetaminophen 500 MG tablet  Commonly known as: TYLENOL  Take 1 tablet (500 mg total) by mouth every 6 (six) hours as needed for Pain.     * albuterol 90 mcg/actuation inhaler  Commonly known as: PROVENTIL/VENTOLIN HFA  Inhale 1 puff into the lungs every 6 (six) hours as needed. Rescue     * albuterol 90 mcg/actuation inhaler  Commonly known as: PROVENTIL/VENTOLIN HFA  Inhale 1-2 puffs into the lungs every 4 (four) hours as needed for Shortness of Breath. Rescue     amLODIPine 10 MG tablet  Commonly known as: NORVASC  TAKE 1 TABLET(10 MG) BY MOUTH EVERY DAY     aspirin 81 MG EC tablet  Commonly known as: ASPIR-LOW  Take 1 tablet (81 mg total) by mouth once daily.     azelastine 137 mcg (0.1 %) nasal spray  Commonly known as: ASTELIN  1 spray (137 mcg total) by Nasal route 2 (two) times daily as needed for Rhinitis.     cetirizine 10 mg chewable tablet  Take 10 mg by mouth once daily.     diclofenac sodium 1 %  Gel  Commonly known as: VOLTAREN  Apply 2 g topically 4 (four) times daily.     docusate sodium 100 MG capsule  Commonly known as: COLACE  Take 1 capsule (100 mg total) by mouth 2 (two) times daily as needed for Constipation (and hard stool).     finasteride 5 mg tablet  Commonly known as: PROSCAR  Take 1 tablet (5 mg total) by mouth once daily.     fluticasone propionate 50 mcg/actuation nasal spray  Commonly known as: FLONASE  SHAKE LIQUID AND USE 1 SPRAY(50 MCG) IN EACH NOSTRIL TWICE DAILY AS NEEDED FOR RHINITIS     isosorbide mononitrate 60 MG 24 hr tablet  Commonly known as: IMDUR  Take 1 tablet (60 mg total) by mouth once daily.     lisinopriL 40 MG tablet  Commonly known as: PRINIVIL,ZESTRIL  Take 1 tablet (40 mg total) by mouth once daily.     meloxicam 7.5 MG tablet  Commonly known as: MOBIC  Take 1 tablet (7.5 mg total) by mouth daily as needed for Pain (knee pain).     metFORMIN 500 MG ER 24hr tablet  Commonly known as: GLUCOPHAGE-XR  TAKE 1 TABLET(500 MG) BY MOUTH DAILY WITH BREAKFAST Strength: 500 mg     metoprolol tartrate 25 MG tablet  Commonly known as: LOPRESSOR  TAKE 1 TABLET(25 MG) BY MOUTH TWICE DAILY     ondansetron 4 MG Tbdl  Commonly known as: ZOFRAN-ODT  Take 1 tablet (4 mg total) by mouth every 8 (eight) hours as needed (nausea/vomiting).     polyethylene glycol 17 gram Pwpk  Commonly known as: GLYCOLAX  Take 17 g by mouth once daily.     pravastatin 40 MG tablet  Commonly known as: PRAVACHOL  TAKE 1 TABLET(40 MG) BY MOUTH EVERY DAY     tamsulosin 0.4 mg Cap  Commonly known as: FLOMAX  Take 1 capsule (0.4 mg total) by mouth once daily.     traZODone 150 MG tablet  Commonly known as: DESYREL  TAKE 1 TABLET(150 MG) BY MOUTH EVERY NIGHT AS NEEDED FOR INSOMNIA Strength: 150 mg         * This list has 2 medication(s) that are the same as other medications prescribed for you. Read the directions carefully, and ask your doctor or other care provider to review them with you.                 Indwelling Lines/Drains at time of discharge:   Lines/Drains/Airways     None                 Time spent on the discharge of patient: >35 minutes         Zechariah Solo III, MD  Department of [facility] Medicine  [facility]

## 2022-09-23 NOTE — PLAN OF CARE
[facility] - Telemetry  Discharge Final Note    Primary Care Provider: Harry Velazco MD    Expected Discharge Date: 9/23/2022    All needs met. Appointments scheduled. SW notified nurse Pattie that patient is ready for discharge from case management standpoint.     Final Discharge Note (most recent)       Final Note - 09/23/22 1057          Final Note    Assessment Type Final Discharge Note     Anticipated Discharge Disposition Home or Self Care     What phone number can be called within the next 1-3 days to see how you are doing after discharge? [phone]     Hospital Resources/Appts/Education Provided Appointments scheduled and added to AVS;Appointments scheduled by Navigator/Coordinator        Post-Acute Status    Post-Acute Authorization Other     Other Status No Post-Acute Service Needs     Discharge Delays None known at this time                     Important Message from Medicare

## 2022-09-23 NOTE — CONSULTS
[facility]  Hospital Medicine  Telemedicine Consult Note       Virtual [facility] Medicine consulted for Destin Barber to be followed through telemedicine modalities.  The patient is currently not appropriate for virtual visits as pt is having chest pain, elevated troponin, still on heparin drip for medical management.   Please re-consult once the patient is appropriate for virtual visits.      Eryn Ruiz MD  Hospital Medicine Staff

## 2022-09-23 NOTE — ASSESSMENT & PLAN NOTE
Patient with mildly elevated troponins of flat trend.  Had some chest pain yesterday which resolved.  Has been chest pain-free today.  Stress test done today demonstrated predominantly fixed inferior wall defect and a small, mild to moderate area of lateral ischemia.  This is consistent with his un revascularized circumflex.  Considering his advanced age, small area of ischemia, resolution of symptoms with medical management, chronic kidney disease and other comorbidities, it is prudent to try to manage him medically.  If he continues to have lifestyle limiting angina despite maximum medical management, then consider revascularization of the on revascularize circ territory with PCI.  Continue medical management and aggressive risk factor control.  Patient to follow-up in Cardiology Clinic with Dr. Candelaria.

## 2022-10-07 ENCOUNTER — LAB VISIT (OUTPATIENT)
Dept: LAB | Facility: HOSPITAL | Age: 87
End: 2022-10-07
Attending: INTERNAL MEDICINE
Payer: COMMERCIAL

## 2022-10-07 ENCOUNTER — OFFICE VISIT (OUTPATIENT)
Dept: FAMILY MEDICINE | Facility: CLINIC | Age: 87
End: 2022-10-07
Payer: COMMERCIAL

## 2022-10-07 VITALS
HEART RATE: 98 BPM | OXYGEN SATURATION: 98 % | DIASTOLIC BLOOD PRESSURE: 100 MMHG | TEMPERATURE: 98 F | BODY MASS INDEX: 23.64 KG/M2 | HEIGHT: 66 IN | WEIGHT: 147.06 LBS | SYSTOLIC BLOOD PRESSURE: 160 MMHG

## 2022-10-07 DIAGNOSIS — Z23 NEED FOR INFLUENZA VACCINATION: ICD-10-CM

## 2022-10-07 DIAGNOSIS — I10 ESSENTIAL HYPERTENSION: ICD-10-CM

## 2022-10-07 DIAGNOSIS — I25.10 CORONARY ARTERY DISEASE INVOLVING NATIVE CORONARY ARTERY OF NATIVE HEART WITHOUT ANGINA PECTORIS: ICD-10-CM

## 2022-10-07 DIAGNOSIS — I10 ESSENTIAL HYPERTENSION: Primary | ICD-10-CM

## 2022-10-07 DIAGNOSIS — N18.31 STAGE 3A CHRONIC KIDNEY DISEASE: ICD-10-CM

## 2022-10-07 LAB
ALBUMIN SERPL BCP-MCNC: 3.6 G/DL (ref 3.5–5.2)
ANION GAP SERPL CALC-SCNC: 9 MMOL/L (ref 8–16)
BUN SERPL-MCNC: 21 MG/DL (ref 8–23)
CALCIUM SERPL-MCNC: 9.2 MG/DL (ref 8.7–10.5)
CHLORIDE SERPL-SCNC: 105 MMOL/L (ref 95–110)
CO2 SERPL-SCNC: 26 MMOL/L (ref 23–29)
CREAT SERPL-MCNC: 1.4 MG/DL (ref 0.5–1.4)
EST. GFR  (NO RACE VARIABLE): 49 ML/MIN/1.73 M^2
GLUCOSE SERPL-MCNC: 111 MG/DL (ref 70–110)
PHOSPHATE SERPL-MCNC: 4 MG/DL (ref 2.7–4.5)
POTASSIUM SERPL-SCNC: 4.3 MMOL/L (ref 3.5–5.1)
SODIUM SERPL-SCNC: 140 MMOL/L (ref 136–145)

## 2022-10-07 PROCEDURE — 99214 OFFICE O/P EST MOD 30 MIN: CPT | Mod: S$GLB,,, | Performed by: INTERNAL MEDICINE

## 2022-10-07 PROCEDURE — 1159F MED LIST DOCD IN RCRD: CPT | Mod: CPTII,S$GLB,, | Performed by: INTERNAL MEDICINE

## 2022-10-07 PROCEDURE — 90694 VACC AIIV4 NO PRSRV 0.5ML IM: CPT | Mod: S$GLB,,, | Performed by: INTERNAL MEDICINE

## 2022-10-07 PROCEDURE — 99214 PR OFFICE/OUTPT VISIT, EST, LEVL IV, 30-39 MIN: ICD-10-PCS | Mod: S$GLB,,, | Performed by: INTERNAL MEDICINE

## 2022-10-07 PROCEDURE — G0008 FLU VACCINE - QUADRIVALENT - ADJUVANTED: ICD-10-PCS | Mod: S$GLB,,, | Performed by: INTERNAL MEDICINE

## 2022-10-07 PROCEDURE — 1101F PR PT FALLS ASSESS DOC 0-1 FALLS W/OUT INJ PAST YR: ICD-10-PCS | Mod: CPTII,S$GLB,, | Performed by: INTERNAL MEDICINE

## 2022-10-07 PROCEDURE — 3077F PR MOST RECENT SYSTOLIC BLOOD PRESSURE >= 140 MM HG: ICD-10-PCS | Mod: CPTII,S$GLB,, | Performed by: INTERNAL MEDICINE

## 2022-10-07 PROCEDURE — 1157F ADVNC CARE PLAN IN RCRD: CPT | Mod: CPTII,S$GLB,, | Performed by: INTERNAL MEDICINE

## 2022-10-07 PROCEDURE — 80069 RENAL FUNCTION PANEL: CPT | Performed by: INTERNAL MEDICINE

## 2022-10-07 PROCEDURE — 1160F RVW MEDS BY RX/DR IN RCRD: CPT | Mod: CPTII,S$GLB,, | Performed by: INTERNAL MEDICINE

## 2022-10-07 PROCEDURE — 99999 PR PBB SHADOW E&M-EST. PATIENT-LVL V: CPT | Mod: PBBFAC,,, | Performed by: INTERNAL MEDICINE

## 2022-10-07 PROCEDURE — 1126F AMNT PAIN NOTED NONE PRSNT: CPT | Mod: CPTII,S$GLB,, | Performed by: INTERNAL MEDICINE

## 2022-10-07 PROCEDURE — 1159F PR MEDICATION LIST DOCUMENTED IN MEDICAL RECORD: ICD-10-PCS | Mod: CPTII,S$GLB,, | Performed by: INTERNAL MEDICINE

## 2022-10-07 PROCEDURE — 3288F PR FALLS RISK ASSESSMENT DOCUMENTED: ICD-10-PCS | Mod: CPTII,S$GLB,, | Performed by: INTERNAL MEDICINE

## 2022-10-07 PROCEDURE — 3080F DIAST BP >= 90 MM HG: CPT | Mod: CPTII,S$GLB,, | Performed by: INTERNAL MEDICINE

## 2022-10-07 PROCEDURE — 3077F SYST BP >= 140 MM HG: CPT | Mod: CPTII,S$GLB,, | Performed by: INTERNAL MEDICINE

## 2022-10-07 PROCEDURE — 1157F PR ADVANCE CARE PLAN OR EQUIV PRESENT IN MEDICAL RECORD: ICD-10-PCS | Mod: CPTII,S$GLB,, | Performed by: INTERNAL MEDICINE

## 2022-10-07 PROCEDURE — 36415 COLL VENOUS BLD VENIPUNCTURE: CPT | Mod: PN | Performed by: INTERNAL MEDICINE

## 2022-10-07 PROCEDURE — 1160F PR REVIEW ALL MEDS BY PRESCRIBER/CLIN PHARMACIST DOCUMENTED: ICD-10-PCS | Mod: CPTII,S$GLB,, | Performed by: INTERNAL MEDICINE

## 2022-10-07 PROCEDURE — 1126F PR PAIN SEVERITY QUANTIFIED, NO PAIN PRESENT: ICD-10-PCS | Mod: CPTII,S$GLB,, | Performed by: INTERNAL MEDICINE

## 2022-10-07 PROCEDURE — 99999 PR PBB SHADOW E&M-EST. PATIENT-LVL V: ICD-10-PCS | Mod: PBBFAC,,, | Performed by: INTERNAL MEDICINE

## 2022-10-07 PROCEDURE — 3080F PR MOST RECENT DIASTOLIC BLOOD PRESSURE >= 90 MM HG: ICD-10-PCS | Mod: CPTII,S$GLB,, | Performed by: INTERNAL MEDICINE

## 2022-10-07 PROCEDURE — 90694 FLU VACCINE - QUADRIVALENT - ADJUVANTED: ICD-10-PCS | Mod: S$GLB,,, | Performed by: INTERNAL MEDICINE

## 2022-10-07 PROCEDURE — 1101F PT FALLS ASSESS-DOCD LE1/YR: CPT | Mod: CPTII,S$GLB,, | Performed by: INTERNAL MEDICINE

## 2022-10-07 PROCEDURE — G0008 ADMIN INFLUENZA VIRUS VAC: HCPCS | Mod: S$GLB,,, | Performed by: INTERNAL MEDICINE

## 2022-10-07 PROCEDURE — 3288F FALL RISK ASSESSMENT DOCD: CPT | Mod: CPTII,S$GLB,, | Performed by: INTERNAL MEDICINE

## 2022-10-07 RX ORDER — METOPROLOL TARTRATE 50 MG/1
50 TABLET ORAL 2 TIMES DAILY
Qty: 180 TABLET | Refills: 1 | Status: SHIPPED | OUTPATIENT
Start: 2022-10-07 | End: 2023-01-13 | Stop reason: SDUPTHER

## 2022-10-07 NOTE — PROGRESS NOTES
Patient given flu vaccine to right deltoid, no complaints or reactions noted. Vis given 8/6/21 to patient. Instructed to wait in lobby for 15 minutes to monitor for reaction

## 2022-10-07 NOTE — PROGRESS NOTES
"Subjective:       Patient ID: Destin Barber is a 87 y.o. male.    Chief Complaint: Hospital Follow Up    F/u hospitalization    HPI: 86 y/o presents with grand daughter for hospital follow up. Two weeks ago developed mid sternal chest pain with radiation to his neck. Pain had actually been ongoing for several days prior to presentation. In ED found to have elevation in creatinine, admitted for obs, cardiac enzymes mildly elevated in flat pattern (peaked at 0.087). no further chest pain. He does not have his medications with him today. Grand-daughter helps organize medications and is able to recall from his list. Reports breathing fine never felt short of breath. No LE swelling no dysphagia/odynphagia since discharge has not had any further episodes of chest pain. No orthostatic symptoms     Review of Systems   Constitutional:  Negative for activity change, fever and unexpected weight change.   HENT:  Negative for congestion, rhinorrhea, sore throat and trouble swallowing.    Eyes:  Negative for photophobia and redness.   Respiratory:  Negative for cough, chest tightness, shortness of breath and wheezing.    Cardiovascular:  Negative for chest pain, palpitations and leg swelling.   Gastrointestinal:  Negative for abdominal pain, blood in stool, constipation, diarrhea, nausea and vomiting.   Endocrine: Negative for cold intolerance, heat intolerance and polyuria.   Genitourinary:  Negative for decreased urine volume, difficulty urinating, dysuria and urgency.   Musculoskeletal:  Negative for arthralgias and back pain.   Skin:  Negative for rash.   Neurological:  Negative for dizziness, syncope, weakness and headaches.   Psychiatric/Behavioral:  Negative for dysphoric mood, sleep disturbance and suicidal ideas.      Objective:     Vitals:    10/07/22 1326   BP: (!) 160/100   BP Location: Right arm   Pulse: 98   Temp: 97.6 °F (36.4 °C)   TempSrc: Oral   SpO2: 98%   Weight: 66.7 kg (147 lb 0.8 oz)   Height: 5' 6" " (1.676 m)          Physical Exam  Constitutional:       Appearance: He is well-developed.   HENT:      Head: Normocephalic and atraumatic.   Cardiovascular:      Rate and Rhythm: Normal rate and regular rhythm.      Heart sounds: No murmur heard.    No friction rub. No gallop.      Comments: Rate in 80's and regular  Pulmonary:      Effort: Pulmonary effort is normal.      Breath sounds: Normal breath sounds. No wheezing or rales.   Abdominal:      General: There is no distension.      Palpations: Abdomen is soft.      Tenderness: There is no abdominal tenderness. There is no guarding or rebound.   Musculoskeletal:         General: No tenderness. Normal range of motion.      Cervical back: Normal range of motion.      Right lower leg: No edema.      Left lower leg: No edema.   Skin:     General: Skin is warm and dry.   Neurological:      Mental Status: He is alert and oriented to person, place, and time.      Cranial Nerves: No cranial nerve deficit.     Transitional Care Note    Family and/or Caretaker present at visit?  Yes.  Diagnostic tests reviewed/disposition: No diagnosic tests pending after this hospitalization.  Disease/illness education: renal disease, hypertension  Home health/community services discussion/referrals: Patient does not have home health established from hospital visit.  They do not need home health.  If needed, we will set up home health for the patient.   Establishment or re-establishment of referral orders for community resources: No other necessary community resources.   Discussion with other health care providers: No discussion with other health care providers necessary.         Assessment and Plan   1. Essential hypertension  Increse metoprolol repeat creatinine today to trend  - Renal Function Panel; Future  - metoprolol tartrate (LOPRESSOR) 50 MG tablet; Take 1 tablet (50 mg total) by mouth 2 (two) times daily.  Dispense: 180 tablet; Refill: 1    2. Stage 3a chronic kidney disease  As  above  - Renal Function Panel; Future    3. Need for influenza vaccination  Flu vaccine today  - Influenza (FLUAD) - Quadrivalent (Adjuvanted) *Preferred* (65+) (PF)    4. Coronary artery disease involving native coronary artery of native heart without angina pectoris  On statin and asa, arb, increase beta blocker as above  - metoprolol tartrate (LOPRESSOR) 50 MG tablet; Take 1 tablet (50 mg total) by mouth 2 (two) times daily.  Dispense: 180 tablet; Refill: 1

## 2022-10-18 ENCOUNTER — OFFICE VISIT (OUTPATIENT)
Dept: CARDIOLOGY | Facility: CLINIC | Age: 87
End: 2022-10-18
Payer: COMMERCIAL

## 2022-10-18 ENCOUNTER — PATIENT OUTREACH (OUTPATIENT)
Dept: ADMINISTRATIVE | Facility: HOSPITAL | Age: 87
End: 2022-10-18
Payer: COMMERCIAL

## 2022-10-18 VITALS
OXYGEN SATURATION: 99 % | WEIGHT: 149.56 LBS | BODY MASS INDEX: 24.04 KG/M2 | RESPIRATION RATE: 18 BRPM | HEART RATE: 88 BPM | DIASTOLIC BLOOD PRESSURE: 90 MMHG | SYSTOLIC BLOOD PRESSURE: 152 MMHG | HEIGHT: 66 IN

## 2022-10-18 DIAGNOSIS — R07.9 CHEST PAIN, UNSPECIFIED TYPE: ICD-10-CM

## 2022-10-18 DIAGNOSIS — I51.89 DIASTOLIC DYSFUNCTION: ICD-10-CM

## 2022-10-18 DIAGNOSIS — E11.22 CONTROLLED TYPE 2 DIABETES MELLITUS WITH STAGE 3 CHRONIC KIDNEY DISEASE, WITHOUT LONG-TERM CURRENT USE OF INSULIN: Primary | ICD-10-CM

## 2022-10-18 DIAGNOSIS — N18.30 CONTROLLED TYPE 2 DIABETES MELLITUS WITH STAGE 3 CHRONIC KIDNEY DISEASE, WITHOUT LONG-TERM CURRENT USE OF INSULIN: Primary | ICD-10-CM

## 2022-10-18 DIAGNOSIS — I25.810 CORONARY ARTERY DISEASE INVOLVING CORONARY BYPASS GRAFT OF NATIVE HEART WITHOUT ANGINA PECTORIS: ICD-10-CM

## 2022-10-18 DIAGNOSIS — R55 SYNCOPE AND COLLAPSE: ICD-10-CM

## 2022-10-18 DIAGNOSIS — Z95.1 S/P CABG (CORONARY ARTERY BYPASS GRAFT): Primary | ICD-10-CM

## 2022-10-18 DIAGNOSIS — I10 ESSENTIAL HYPERTENSION: ICD-10-CM

## 2022-10-18 DIAGNOSIS — E78.5 HYPERLIPIDEMIA, UNSPECIFIED HYPERLIPIDEMIA TYPE: ICD-10-CM

## 2022-10-18 PROCEDURE — 3288F PR FALLS RISK ASSESSMENT DOCUMENTED: ICD-10-PCS | Mod: CPTII,S$GLB,, | Performed by: INTERNAL MEDICINE

## 2022-10-18 PROCEDURE — 1157F PR ADVANCE CARE PLAN OR EQUIV PRESENT IN MEDICAL RECORD: ICD-10-PCS | Mod: CPTII,S$GLB,, | Performed by: INTERNAL MEDICINE

## 2022-10-18 PROCEDURE — 1126F PR PAIN SEVERITY QUANTIFIED, NO PAIN PRESENT: ICD-10-PCS | Mod: CPTII,S$GLB,, | Performed by: INTERNAL MEDICINE

## 2022-10-18 PROCEDURE — 99214 PR OFFICE/OUTPT VISIT, EST, LEVL IV, 30-39 MIN: ICD-10-PCS | Mod: S$GLB,,, | Performed by: INTERNAL MEDICINE

## 2022-10-18 PROCEDURE — 1101F PR PT FALLS ASSESS DOC 0-1 FALLS W/OUT INJ PAST YR: ICD-10-PCS | Mod: CPTII,S$GLB,, | Performed by: INTERNAL MEDICINE

## 2022-10-18 PROCEDURE — 1126F AMNT PAIN NOTED NONE PRSNT: CPT | Mod: CPTII,S$GLB,, | Performed by: INTERNAL MEDICINE

## 2022-10-18 PROCEDURE — 1159F PR MEDICATION LIST DOCUMENTED IN MEDICAL RECORD: ICD-10-PCS | Mod: CPTII,S$GLB,, | Performed by: INTERNAL MEDICINE

## 2022-10-18 PROCEDURE — 3077F SYST BP >= 140 MM HG: CPT | Mod: CPTII,S$GLB,, | Performed by: INTERNAL MEDICINE

## 2022-10-18 PROCEDURE — 1159F MED LIST DOCD IN RCRD: CPT | Mod: CPTII,S$GLB,, | Performed by: INTERNAL MEDICINE

## 2022-10-18 PROCEDURE — 3077F PR MOST RECENT SYSTOLIC BLOOD PRESSURE >= 140 MM HG: ICD-10-PCS | Mod: CPTII,S$GLB,, | Performed by: INTERNAL MEDICINE

## 2022-10-18 PROCEDURE — 99214 OFFICE O/P EST MOD 30 MIN: CPT | Mod: S$GLB,,, | Performed by: INTERNAL MEDICINE

## 2022-10-18 PROCEDURE — 1157F ADVNC CARE PLAN IN RCRD: CPT | Mod: CPTII,S$GLB,, | Performed by: INTERNAL MEDICINE

## 2022-10-18 PROCEDURE — 3080F PR MOST RECENT DIASTOLIC BLOOD PRESSURE >= 90 MM HG: ICD-10-PCS | Mod: CPTII,S$GLB,, | Performed by: INTERNAL MEDICINE

## 2022-10-18 PROCEDURE — 99999 PR PBB SHADOW E&M-EST. PATIENT-LVL IV: CPT | Mod: PBBFAC,,, | Performed by: INTERNAL MEDICINE

## 2022-10-18 PROCEDURE — 3080F DIAST BP >= 90 MM HG: CPT | Mod: CPTII,S$GLB,, | Performed by: INTERNAL MEDICINE

## 2022-10-18 PROCEDURE — 3288F FALL RISK ASSESSMENT DOCD: CPT | Mod: CPTII,S$GLB,, | Performed by: INTERNAL MEDICINE

## 2022-10-18 PROCEDURE — 99999 PR PBB SHADOW E&M-EST. PATIENT-LVL IV: ICD-10-PCS | Mod: PBBFAC,,, | Performed by: INTERNAL MEDICINE

## 2022-10-18 PROCEDURE — 1101F PT FALLS ASSESS-DOCD LE1/YR: CPT | Mod: CPTII,S$GLB,, | Performed by: INTERNAL MEDICINE

## 2022-10-18 NOTE — PROGRESS NOTES
Subjective:    Patient ID:  Destin Barber is a 87 y.o. male who presents for follow-up of Hospital Follow Up      HPI    Previously saw Dr Piedra 12/2016  # CAD/ACS s/p 3V CABG 7/2016.  Appears his LCx/OM was not bypassed due to poor available vein conduit.  No LCx ischemia noted on MPI.  # HTN, uncontrolled in the setting of med noncompliance  # HLP  # DM  # abnl EKG        CAD/ACS s/p CABG 7/7/16: LIMA-LAD, SVG-Ramus, SVG-PDA (Dr. Hallman).  LCx/OM branch not bypassed due to poor quality venous conduit available     Stress test 9/22/22    Abnormal myocardial perfusion scan.    There are two significant perfusion abnormalities.    Perfusion Abnormality #1 - There is a moderate to severe intensity, moderate sized, mostly fixed perfusion abnormality with some reversibilty in the basal to mid inferior wall(s).    Perfusion Abnormality #2 - There is a small to moderate sized, mild to moderate intensity, reversible perfusion abnormality that is consistent with ischemia in the lateral wall(s).    There are no other significant perfusion abnormalities.    The gated perfusion images showed an ejection fraction of 79% at rest.    There is normal wall motion at rest and post stress.    The EKG portion of this study is negative for ischemia.    The patient reported no chest pain during the stress test.    There were no arrhythmias during stress.     Echo 9/22/22  The estimated ejection fraction is 65%.  The left ventricle is normal in size with mild concentric hypertrophy and normal systolic function.  Moderate to severe left atrial enlargement.  Grade I left ventricular diastolic dysfunction.  Mild pulmonic regurgitation.  Normal right ventricular size with normal right ventricular systolic function.  Mild right atrial enlargement.  Normal central venous pressure (3 mmHg).  The estimated PA systolic pressure is 20 mmHg.  Mild tricuspid regurgitation.  Mild aortic regurgitation.     Cath 6/24/16  LVEDP: 7mmHg  LVEF:  "55%  Wall Motion: normal  Dominance: Right  LM: normal  LAD: ost/prox eccentric 70-80%, prox 50%, excellent mid LAD target.  Ramus: prox 50-70%, mid 90%, bifurcating distal vessel (both excellent targets)  LCx: prox 95% at bifurcation of OM1.  OM1 ost/prox 80%, bifurcating vessel, excellent target.  RCA: dominant, anterior takeoff with "blandon's crook" prox vessel.Prox 70%, mid 70%. Excellent PDA target.  Hemostasis:  R Radial band  Impression:  NSTEMI  4V CAD, normal LV fxn  R radial vasband for hemostasis     Carotid US 7/6/16  RIGHT SIDE:   1 - 39 % right ICA stenosis.   Heterogeneous plaque in the right internal carotid artery.   Antegrade flow in the right vertebral artery.   LEFT SIDE:  1 - 39% left ICA stenosis.   Homogeneous plaque in the left internal carotid artery.   Antegrade flow in the left vertebral artery.      10/17/17 Overall doing well. Denies CP or SOB  EKG NSR TWI V1 and V2 - similar to prior EKG  Suffering with an URI     EKG 3/30/22 - done in ER - not in EPIC     4/29/22 Denies CP or SOB  Had a recent brief syncopal spell 5 days ago - details unclear- poor historian  BP elevated  Echo and holter for recent syncope  Not interested in repeat stress test  Continue Rx for CAD, HTN, HLD, DD     7/14/22 Reports JULIO and pain across lower rib cage with radiation to left shoulder for the last week  EKG sinus tachycardia 102 RBBB/LAD  BP elevated  Denies further syncope  Poor historian   Add imdur for CP  Echo, lexiscan myoview and holter for CP, JULIO, recent syncope  Needs to go to the ER for worsening CP  Continue Rx for CAD, HTN, HLD, DD     9/16/22 Testing not done. BP poorly controlled  Having CP that radiates centrally from neck down to his abdomen  Some JULIO    Increase lisinopril 40 qd and imdur 60 qd  Echo, lexiscan myoview and holter for CP, JULIO, recent syncope  Needs to go to the ER for worsening CP  Continue Rx for CAD, HTN, HLD, DD    10/18/22 Only gets CP is he gets upset. Denies SOB  BP " up some - better controlled by home readings    Review of Systems   Constitutional: Negative for decreased appetite.   HENT:  Negative for ear discharge.    Eyes:  Negative for blurred vision.   Endocrine: Negative for polyphagia.   Skin:  Negative for nail changes.   Genitourinary:  Negative for bladder incontinence.   Neurological:  Negative for aphonia.   Psychiatric/Behavioral:  Negative for hallucinations.    Allergic/Immunologic: Negative for hives.      Objective:    Physical Exam  Constitutional:       Appearance: He is well-developed.   HENT:      Head: Normocephalic and atraumatic.   Eyes:      Conjunctiva/sclera: Conjunctivae normal.      Pupils: Pupils are equal, round, and reactive to light.   Cardiovascular:      Rate and Rhythm: Normal rate.      Pulses: Intact distal pulses.      Heart sounds: Normal heart sounds.   Pulmonary:      Effort: Pulmonary effort is normal.      Breath sounds: Normal breath sounds.   Abdominal:      General: Bowel sounds are normal.      Palpations: Abdomen is soft.   Musculoskeletal:         General: Normal range of motion.      Cervical back: Normal range of motion and neck supple.   Skin:     General: Skin is warm and dry.   Neurological:      Mental Status: He is alert and oriented to person, place, and time.         Assessment:       1. S/P CABG (coronary artery bypass graft)    2. Chest pain, unspecified type    3. Essential hypertension    4. Hyperlipidemia, unspecified hyperlipidemia type    5. Diastolic dysfunction    6. Coronary artery disease involving coronary bypass graft of native heart without angina pectoris    7. Syncope and collapse         Plan:       Discussed abnormal stress test if fixed inferior defect and lateral ischemia - this is consistent with known un-revascularized Cx territory. Discussed repeat LHC - he prefers to treat CAD medically    Continue Rx for CAD, HTN, HLD, DD  OV 3 months

## 2022-12-16 NOTE — ED PROVIDER NOTES
"Encounter Date: 2/2/2017    SCRIBE #1 NOTE: I, Amparo Jules, am scribing for, and in the presence of,  Conor Faulkner MD. I have scribed the following portions of the note - Other sections scribed: HPI, ROS.       History     Chief Complaint   Patient presents with    Shortness of Breath     pt c/o cough and congestion for the past couple of weeks. SOB is exertional.      Review of patient's allergies indicates:  No Known Allergies  HPI Comments: CC: Shortness of Breath    HPI: 82 year old male with a PMHx of HTN, HLD, DM Type II, CAD, diastolic dysfunction, and acute coronary syndrome presents to the ED c/o progressively worsening shortness of breath and associated productive cough, congestion, fever (initial 102.9 F), and diarrhea. Patient reports he was diagnosed with the flu on January 30, 2017, but his symptoms have worsened. Wife notes that she was sick with a similar illness, but her symptoms have improved and says her  "will take his medicine but he just won't slow down". Patient denies vomiting, dizziness and other symptoms.          The history is provided by the patient and the spouse. No  was used.     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  Past Surgical History   Procedure Laterality Date    Cataract extraction w/ anterior vitrectomy      Hemorrhoid surgery      Coronary artery bypass graft  07/06/2016     PAIZ-LAD, SVG-Ramus, SVG-PDA     Family History   Problem Relation Age of Onset    Cancer Father      colon    Hypertension Mother     Heart disease Mother     Heart disease Sister      Social History   Substance Use Topics    Smoking status: Former Smoker     Types: Cigarettes     Quit " date: 6/23/1985    Smokeless tobacco: Not on file    Alcohol use No     Review of Systems   Constitutional: Positive for fever.   HENT: Positive for congestion.    Eyes: Negative for pain.   Respiratory: Positive for cough (productive) and shortness of breath.    Cardiovascular: Negative for chest pain.   Gastrointestinal: Positive for diarrhea. Negative for vomiting.   Genitourinary: Negative for dysuria.   Musculoskeletal: Negative for back pain.   Skin: Negative for rash.   Neurological: Negative for dizziness.       Physical Exam   Initial Vitals   BP Pulse Resp Temp SpO2   02/02/17 1614 02/02/17 1614 02/02/17 1614 02/02/17 1614 02/02/17 1614   170/80 126 20 102.9 °F (39.4 °C) 96 %     Physical Exam    Constitutional: He appears well-developed and well-nourished. He is not diaphoretic. No distress.   HENT:   Head: Normocephalic and atraumatic.   Nose: Nose normal.   Mouth/Throat: Oropharynx is clear and moist. No oropharyngeal exudate.   Eyes: Conjunctivae and EOM are normal. Pupils are equal, round, and reactive to light. No scleral icterus.   Neck: Normal range of motion. Neck supple. No thyromegaly present. No tracheal deviation present.   Cardiovascular: Regular rhythm and normal heart sounds. Exam reveals no gallop and no friction rub.    No murmur heard.  tachycardia   Pulmonary/Chest: Breath sounds normal. No respiratory distress. He has no wheezes. He has no rhonchi. He has no rales.   Abdominal: Soft. Bowel sounds are normal. He exhibits no distension and no mass. There is no tenderness. There is no rebound and no guarding.   Musculoskeletal: Normal range of motion. He exhibits no edema or tenderness.   Lymphadenopathy:     He has no cervical adenopathy.   Neurological: He is alert and oriented to person, place, and time. He has normal strength. No cranial nerve deficit or sensory deficit.   Skin: Skin is warm and dry. No rash noted. No erythema. No pallor.   Psychiatric: He has a normal mood and  affect. His behavior is normal. Thought content normal.         ED Course   Critical Care  Date/Time: 2/3/2017 3:48 AM  Performed by: PRECIOUS BILLINGS III  Authorized by: PRECIOUS BILLINGS III   Direct patient critical care time: 20 minutes  Additional history critical care time: 4 minutes  Ordering / reviewing critical care time: 8 minutes  Documentation critical care time: 8 minutes  Consulting other physicians critical care time: 5 minutes  Total critical care time (exclusive of procedural time) : 45 minutes  Critical care time was exclusive of separately billable procedures and treating other patients and teaching time.  Critical care was necessary to treat or prevent imminent or life-threatening deterioration of the following conditions: respiratory failure, shock and sepsis.        Labs Reviewed   CULTURE, BLOOD   CULTURE, BLOOD   COMPREHENSIVE METABOLIC PANEL   CBC W/ AUTO DIFFERENTIAL   B-TYPE NATRIURETIC PEPTIDE   TROPONIN I   LACTIC ACID, PLASMA   INFLUENZA A AND B ANTIGEN             Medical Decision Making:   History:   Old Medical Records: I decided to obtain old medical records.  Old Records Summarized: records from previous admission(s).       <> Summary of Records: Recently documented influenza A positive  Initial Assessment:   82-year-old male with recent diagnosis of influenza A presents with worsening symptoms, including worsening dyspnea on exertion, cough, fever. She does reveal fever, tachycardia, mild hypoxia, but a well appearance overall.  Differential Diagnosis:   Pulmonary influenza, secondary bacterial pneumonia, sepsis, dehydration, renal insufficiency  Independently Interpreted Test(s):   I have ordered and independently interpreted X-rays - see summary below.       <> Summary of X-Ray Reading(s): CXR: bilateral pulmonary infiltrates consistent with either infection or pulmonary edema  ED Management:  Patient's workup reveals pulmonary infiltrates.  He remains hemodynamically stable, with  a lactate of 1.1.  Repeat examination reveals warm, dry skin with capillary refill less than 3 seconds.  Given his history of CHF, will not be aggressive with IV fluids, especially since he remains stable and has a normal lactate. Admission orders placed.             Scribe Attestation:   Scribe #1: I performed the above scribed service and the documentation accurately describes the services I performed. I attest to the accuracy of the note.    Attending Attestation:           Physician Attestation for Scribe:  Physician Attestation Statement for Scribe #1: I, Conor Faulkner MD, reviewed documentation, as scribed by Amparo Jules in my presence, and it is both accurate and complete.                 ED Course     Clinical Impression:   The primary encounter diagnosis was Sepsis, due to unspecified organism. Diagnoses of Shortness of breath, Pneumonia due to infectious organism, unspecified laterality, unspecified part of lung, and Influenza A were also pertinent to this visit.          Conor Faulkner III, MD  02/03/17 0349     Home

## 2023-01-13 DIAGNOSIS — I25.10 CORONARY ARTERY DISEASE INVOLVING NATIVE CORONARY ARTERY OF NATIVE HEART WITHOUT ANGINA PECTORIS: ICD-10-CM

## 2023-01-13 DIAGNOSIS — E11.40 TYPE 2 DIABETES MELLITUS WITH DIABETIC NEUROPATHY, WITHOUT LONG-TERM CURRENT USE OF INSULIN: ICD-10-CM

## 2023-01-13 DIAGNOSIS — N18.30 CONTROLLED TYPE 2 DIABETES MELLITUS WITH STAGE 3 CHRONIC KIDNEY DISEASE, WITHOUT LONG-TERM CURRENT USE OF INSULIN: ICD-10-CM

## 2023-01-13 DIAGNOSIS — N40.1 BPH WITH OBSTRUCTION/LOWER URINARY TRACT SYMPTOMS: ICD-10-CM

## 2023-01-13 DIAGNOSIS — G47.00 INSOMNIA, UNSPECIFIED TYPE: ICD-10-CM

## 2023-01-13 DIAGNOSIS — E78.5 HYPERLIPIDEMIA, UNSPECIFIED HYPERLIPIDEMIA TYPE: ICD-10-CM

## 2023-01-13 DIAGNOSIS — N13.8 BPH WITH OBSTRUCTION/LOWER URINARY TRACT SYMPTOMS: ICD-10-CM

## 2023-01-13 DIAGNOSIS — I25.810 CORONARY ARTERY DISEASE INVOLVING CORONARY BYPASS GRAFT OF NATIVE HEART WITHOUT ANGINA PECTORIS: ICD-10-CM

## 2023-01-13 DIAGNOSIS — E11.22 CONTROLLED TYPE 2 DIABETES MELLITUS WITH STAGE 3 CHRONIC KIDNEY DISEASE, WITHOUT LONG-TERM CURRENT USE OF INSULIN: ICD-10-CM

## 2023-01-13 DIAGNOSIS — R33.9 URINARY RETENTION: ICD-10-CM

## 2023-01-13 DIAGNOSIS — I10 ESSENTIAL HYPERTENSION: ICD-10-CM

## 2023-01-13 NOTE — TELEPHONE ENCOUNTER
Care Due:                  Date            Visit Type   Department     Provider  --------------------------------------------------------------------------------                                             [facility]     MEDICINE/  Last Visit: 10-      FOLLOW UP    INTERNAL MED   Harry Velazco  Next Visit: None Scheduled  None         None Found                                                            Last  Test          Frequency    Reason                     Performed    Due Date  --------------------------------------------------------------------------------    HBA1C.......  6 months...  metFORMIN................  09- 03-    [facility] Embedded Care Gaps. Reference number: [id number]. 1/13/2023   11:30:32 AM CST

## 2023-01-13 NOTE — TELEPHONE ENCOUNTER
----- Message from Rosalinddemetri Hale sent at 1/13/2023 11:10 AM CST -----  Type: RX Refill Request    Who Called: pt     Have you contacted your pharmacy:    Refill or New Rx:albuterol (PROVENTIL/VENTOLIN HFA) 90 mcg/actuation inhaler - needs pa    amLODIPine (NORVASC) 10 MG tablet - needs pa    metFORMIN (GLUCOPHAGE-XR) 500 MG ER 24hr tablet - needs pa    metoprolol tartrate (LOPRESSOR) 50 MG tablet - needs pa    pravastatin (PRAVACHOL) 40 MG tablet - needs pa    pravastatin (PRAVACHOL) 40 MG tablet- needs pa    tamsulosin (FLOMAX) 0.4 mg Cap - needs pa    traZODone (DESYREL) 150 MG tablet - needs pa      RX Name and Strength:    How is the patient currently taking it? (ex. 1XDay):    Is this a 30 day or 90 day RX:    Preferred Pharmacy with phone number:    [facility][id number] [address] AT [address] [address]  Phone: [phone] Fax: [phone]        Local or Mail Order:    Ordering Provider:    Would the patient rather a call back or a response via [facility]? call    Best Call Back Number:[phone] (home)       Additional Information:

## 2023-01-16 RX ORDER — TRAZODONE HYDROCHLORIDE 150 MG/1
TABLET ORAL
Qty: 90 TABLET | Refills: 0 | Status: SHIPPED | OUTPATIENT
Start: 2023-01-16 | End: 2023-03-22 | Stop reason: SDUPTHER

## 2023-01-16 RX ORDER — PRAVASTATIN SODIUM 40 MG/1
40 TABLET ORAL DAILY
Qty: 90 TABLET | Refills: 2 | Status: SHIPPED | OUTPATIENT
Start: 2023-01-16 | End: 2023-03-22 | Stop reason: SDUPTHER

## 2023-01-16 RX ORDER — AMLODIPINE BESYLATE 10 MG/1
10 TABLET ORAL DAILY
Qty: 90 TABLET | Refills: 0 | Status: SHIPPED | OUTPATIENT
Start: 2023-01-16

## 2023-01-16 RX ORDER — METOPROLOL TARTRATE 50 MG/1
50 TABLET ORAL 2 TIMES DAILY
Qty: 180 TABLET | Refills: 0 | Status: SHIPPED | OUTPATIENT
Start: 2023-01-16 | End: 2023-04-28

## 2023-01-16 RX ORDER — METFORMIN HYDROCHLORIDE 500 MG/1
TABLET, EXTENDED RELEASE ORAL
Qty: 90 TABLET | Refills: 0 | Status: SHIPPED | OUTPATIENT
Start: 2023-01-16 | End: 2023-04-28

## 2023-01-16 RX ORDER — TAMSULOSIN HYDROCHLORIDE 0.4 MG/1
0.4 CAPSULE ORAL DAILY
Qty: 90 CAPSULE | Refills: 2 | Status: SHIPPED | OUTPATIENT
Start: 2023-01-16 | End: 2023-11-07

## 2023-01-16 NOTE — TELEPHONE ENCOUNTER
Refill Routing Note   Medication(s) are not appropriate for processing by [facility] for the following reason(s):      - Required vitals are abnormal  - Drug-Disease Interaction (trazodone and hypokalemia  //  metformin and controlled type 2 diabetes)    [facility] action(s):  Defer  Approve Medication-related problems identified: Drug-disease interaction     Medication Therapy Plan: Drug-Disease: traZODone and Hypokalemia; Drug-Disease: metFORMIN and Controlled type 2 diabetes mellitus with stage 3 chronic kidney disease, without long-term current use of insulin;  Medication reconciliation completed: No     Appointments  past 12m or future 3m with PCP    Date Provider   Last Visit   10/7/2022 Harry Velazco MD   Next Visit   Visit date not found Harry Velazco MD   ED visits in past 90 days: 0        Note composed:9:32 AM 01/16/2023

## 2023-03-01 ENCOUNTER — HOSPITAL ENCOUNTER (EMERGENCY)
Facility: HOSPITAL | Age: 88
Discharge: HOME | End: 2023-03-02
Attending: STUDENT IN AN ORGANIZED HEALTH CARE EDUCATION/TRAINING PROGRAM
Payer: COMMERCIAL

## 2023-03-01 ENCOUNTER — TELEPHONE (OUTPATIENT)
Dept: FAMILY MEDICINE | Facility: CLINIC | Age: 88
End: 2023-03-01
Payer: COMMERCIAL

## 2023-03-01 DIAGNOSIS — M79.606 PAIN AND SWELLING OF LOWER EXTREMITY: ICD-10-CM

## 2023-03-01 DIAGNOSIS — I83.812 VARICOSE VEINS OF LEFT LOWER EXTREMITY WITH PAIN: Primary | ICD-10-CM

## 2023-03-01 DIAGNOSIS — M79.89 PAIN AND SWELLING OF LOWER EXTREMITY: ICD-10-CM

## 2023-03-01 PROCEDURE — 99284 EMERGENCY DEPT VISIT MOD MDM: CPT | Mod: 25

## 2023-03-01 NOTE — TELEPHONE ENCOUNTER
----- Message from Gracia Cabral MA sent at 3/1/2023  9:14 AM CST -----  Type: Patient Call Back    Who called: Nory - wife     What is the request in detail: she is asking for him to be seen due to his legs being in pain and him being weak and hurting to walk ..     Can the clinic reply by [facility]?No    Would the patient rather a call back or a response via [facility]? yes    Best call back number: [phone]

## 2023-03-02 VITALS
DIASTOLIC BLOOD PRESSURE: 69 MMHG | OXYGEN SATURATION: 100 % | HEIGHT: 66 IN | HEART RATE: 81 BPM | TEMPERATURE: 97 F | WEIGHT: 149.94 LBS | SYSTOLIC BLOOD PRESSURE: 142 MMHG | RESPIRATION RATE: 16 BRPM | BODY MASS INDEX: 24.1 KG/M2

## 2023-03-02 NOTE — FIRST PROVIDER EVALUATION
Medical screening examination initiated.  I have conducted a focused provider triage encounter, findings are as follows:    Brief history of present illness:   88 y.o. male   Past Medical History:   Diagnosis Date    Acute coronary syndrome 06/24/2016    s/p 3V CABG 7/2016    Anemia     Coronary artery disease     Diastolic dysfunction     Gout, chronic     Heart failure     HTN (hypertension)     Hyperlipidemia     Myocardial infarction     Pneumonia due to other staphylococcus     Pressure ulcer     Renal manifestation of secondary diabetes mellitus     Type 2 diabetes mellitus     diet controlled      L leg pain over the last 2 days, worse with walking, improved with rest. No falls/trauma/injuries    There were no vitals filed for this visit.    Pertinent physical exam:    Vitals:    03/01/23 2248   BP: (!) 181/89   Pulse: 80   Resp: 16   Temp: 97.7 °F (36.5 °C)       Pt is well appearing, sitting up in no distress  HEADt: normocephalic, atraumatic  Eyes: EOMI, PERRL, ANICTERIC  Chest: normal chest expansion, no respiratory distress  CV: normal rate  Abd: non distended  Ext: no edema  Psych: linear goal directed thinking, no si/hi  Neuro: no tremor  LLE: No calf ttp, good cap refill L foot all toes, palpable DP, foot warm/well perfused    Brief workup plan:  Awaiting room to facilitate exam to     Preliminary workup initiated; this workup will be continued and followed by the physician or advanced practice provider that is assigned to the patient when roomed.

## 2023-03-02 NOTE — ED PROVIDER NOTES
"Encounter Date: 3/1/2023    SCRIBE #1 NOTE: I, Kelton John, am scribing for, and in the presence of,  Amna Galeano MD. I have scribed the following portions of the note - Other sections scribed: HPI, ROS, PE.     History     Chief Complaint   Patient presents with    Leg Pain     PT with multiple painful lumps to vein in left leg that became visible two days ago.     Destin Barber is an 88 y.o male with a PMHx of HTN, HLD, DM type 2, CAD, CHF, MI, who presents to the ED complaining of pain and swelling to his LLE beginning 2 days ago. Patient reports swelling to his BLE, endorsing "there are bumps in his legs," as well as noting pain to his LLE only. No medications taken PTA. No alleviating or exacerbating factors noted. Denies fever, chills, CP, SOB, numbness, weakness, or other associated symptoms. Allergic to penicillins.     The history is provided by the patient. No  was used.   Review of patient's allergies indicates:   Allergen Reactions    Penicillins Nausea And Vomiting     Pt reports he is not allergic to penicillin       Past Medical History:   Diagnosis Date    Acute coronary syndrome 06/24/2016    s/p 3V CABG 7/2016    Anemia     Coronary artery disease     Diastolic dysfunction     Gout, chronic     Heart failure     HTN (hypertension)     Hyperlipidemia     Myocardial infarction     Pneumonia due to other staphylococcus     Pressure ulcer     Renal manifestation of secondary diabetes mellitus     Type 2 diabetes mellitus     diet controlled     Past Surgical History:   Procedure Laterality Date    CATARACT EXTRACTION Bilateral     CATARACT EXTRACTION W/ ANTERIOR VITRECTOMY      CORONARY ARTERY BYPASS GRAFT  07/06/2016    PAIZ-LAD, SVG-Ramus, SVG-PDA    HEMORRHOID SURGERY       Family History   Problem Relation Age of Onset    Cancer Father         colon    Hypertension Mother     Heart disease Mother     Heart disease Sister     No Known Problems Brother     No Known " Problems Maternal Aunt     No Known Problems Maternal Uncle     No Known Problems Paternal Aunt     No Known Problems Paternal Uncle     No Known Problems Maternal Grandmother     No Known Problems Maternal Grandfather     No Known Problems Paternal Grandmother     No Known Problems Paternal Grandfather     Amblyopia Neg Hx     Blindness Neg Hx     Cataracts Neg Hx     Diabetes Neg Hx     Glaucoma Neg Hx     Macular degeneration Neg Hx     Retinal detachment Neg Hx     Strabismus Neg Hx     Stroke Neg Hx     Thyroid disease Neg Hx      Social History     Tobacco Use    Smoking status: Former     Types: Cigarettes     Quit date: 1985     Years since quittin.7    Smokeless tobacco: Never   Substance Use Topics    Alcohol use: No    Drug use: No     Review of Systems   Constitutional:  Negative for chills and fever.   HENT:  Negative for congestion and rhinorrhea.    Eyes:  Negative for pain.   Respiratory:  Negative for cough and shortness of breath.    Cardiovascular:  Positive for leg swelling (BLE). Negative for chest pain.   Gastrointestinal:  Negative for abdominal pain, nausea and vomiting.   Endocrine: Negative for polyuria.   Genitourinary:  Negative for dysuria and hematuria.   Musculoskeletal:  Positive for myalgias (LLE). Negative for gait problem and neck pain.   Skin:  Negative for rash.   Allergic/Immunologic: Negative for immunocompromised state.   Neurological:  Negative for weakness and headaches.     Physical Exam     Initial Vitals [23 2248]   BP Pulse Resp Temp SpO2   (!) 181/89 80 16 97.7 °F (36.5 °C) 100 %      MAP       --         Physical Exam    Nursing note and vitals reviewed.  Constitutional: He is not diaphoretic. No distress.   HENT:   Head: Normocephalic and atraumatic.   Eyes: Conjunctivae and EOM are normal. Pupils are equal, round, and reactive to light.   Neck:   Normal range of motion.  Cardiovascular:  Normal rate and regular rhythm.           Pulses:       Radial  pulses are 2+ on the right side and 2+ on the left side.        Posterior tibial pulses are 2+ on the right side and 2+ on the left side.   Pulmonary/Chest: Breath sounds normal. No respiratory distress.   Abdominal: Abdomen is soft. Bowel sounds are normal. He exhibits no distension. There is no abdominal tenderness. There is no rebound and no guarding.   Musculoskeletal:         General: No tenderness. Normal range of motion.      Cervical back: Normal range of motion.      Comments: Focal areas of swelling to the LLE following venous distribution that is TTP.     Lymphadenopathy:     He has no cervical adenopathy.   Neurological: He is alert and oriented to person, place, and time.   Skin: Skin is warm. Capillary refill takes less than 2 seconds.   Psychiatric: He has a normal mood and affect. His behavior is normal.       ED Course   Procedures  Labs Reviewed - No data to display       Imaging Results              US Lower Extremity Veins Bilateral (Final result)  Result time 03/02/23 00:39:22      Final result by Carol Valdes MD (03/02/23 00:39:22)                   Impression:      No evidence of deep venous thrombosis in either lower extremity.      Electronically signed by: Carol Valdes  Date:    03/02/2023  Time:    00:39               Narrative:    EXAMINATION:  ULTRASOUND LOWER EXTREMITY VEINS BILATERAL    CLINICAL HISTORY:  Pain in leg, unspecified    TECHNIQUE:  Duplex and color flow Doppler and dynamic compression was performed of the bilateral lower extremity veins was performed.    COMPARISON:  None    FINDINGS:  Right thigh veins: The common femoral, femoral, popliteal, upper greater saphenous, and deep femoral veins are patent and free of thrombus. The veins are normally compressible and have normal phasic flow and augmentation response.    Right calf veins: The visualized calf veins are patent.    Left thigh veins: The common femoral, femoral, popliteal, upper greater saphenous, and deep  femoral veins are patent and free of thrombus. The veins are normally compressible and have normal phasic flow and augmentation response.    Left calf veins: The visualized calf veins are patent.    Miscellaneous: None                                       Medications - No data to display  Medical Decision Making:   History:   Old Medical Records: I decided to obtain old medical records.  Initial Assessment:   88 y.o male with a PMHx of HTN, HLD, DM type 2, CAD, CHF, MI, who presents to the ED complaining of pain and swelling to his LLE beginning 2 days ago.  Patient in no acute distress vitals notable for elevated blood pressure otherwise unremarkable.  Exam with focal areas of swelling in left lower extremity that follow venous distribution.  No overlying erythema or crepitus.  Low suspicion for cellulitis.  Suspect patient's symptoms secondary to varicose veins.  Will obtain ultrasound of bilateral extremities to assess for DVT.  Currently no indication for blood work.  Will reassess after imaging.  Clinical Tests:   Radiological Study: Ordered and Reviewed        Scribe Attestation:   Scribe #1: I performed the above scribed service and the documentation accurately describes the services I performed. I attest to the accuracy of the note.      ED Course as of 03/02/23 0122   Thu Mar 02, 2023   0044 US Lower Extremity Veins Bilateral  Negative for DVT.  Patient's symptoms likely secondary to varicose veins.  Recommended wearing compression socks. Pt is currently stable for discharge. I see no indication of an emergent process beyond that addressed during our encounter but have duly counseled the patient/family regarding the need for prompt follow-up as well as the indications that should prompt immediate return to the emergency room should new or worrisome developments occur. The patient/family has been provided with verbal and printed direction regarding our final diagnosis(es) as well as instructions regarding  use of OTC and/or Rx medications intended to manage the patient's aforementioned conditions. The patient/family verbalized an understanding. The patient/family is asked if there are any questions or concerns. We discuss the case, until all issues are addressed to the patient/family's satisfaction. Patient/family understands and is agreeable to the plan.   [AS]      ED Course User Index  [AS] Amna Galeano MD          This dictation has been generated using M-Modal Fluency Direct dictation; some phonetic errors may occur.        Clinical Impression:   Final diagnoses:  [M79.606, M79.89] Pain and swelling of lower extremity  [I83.812] Varicose veins of left lower extremity with pain (Primary)        ED Disposition Condition    Discharge Stable          ED Prescriptions    None       Follow-up Information       Follow up With Specialties Details Why Contact Info    Harry Velazco MD Internal Medicine, Wound Care Call in 1 day for reassesment [address] [facility] [address]  [phone]      [facility] - Emergency Dept Emergency Medicine  If symptoms worsen [address] [facility] [address] [phone]            I, Amna Galeano MD , personally performed the services described in this documentation. All medical record entries made by the scribe were at my direction and in my presence. I have reviewed the chart and agree that the record reflects my personal performance and is accurate and complete.      Amna Galeano MD  03/02/23 0123

## 2023-03-02 NOTE — DISCHARGE INSTRUCTIONS
Thank you for coming to our Emergency Department today. It is important to remember that some problems are difficult to diagnose and may not be found during your first visit. Be sure to follow up with your primary care doctor and review any labs/imaging that was performed with them. If you do not have a primary care doctor, you may contact the one listed on your discharge paperwork or you may also call the [facility] Appointment Desk at [phone] to schedule an appointment with one.     All medications may potentially have side effects and it is impossible to predict which medications may give you side effects. If you feel that you are having a negative effect of any medication you should immediately stop taking them and seek medical attention.    Return to the ER with any questions/concerns, new/concerning symptoms, worsening or failure to improve. Do not drive or make any important decisions for 24 hours if you have received any pain medications, sedatives or mood altering drugs during your ER visit.

## 2023-03-07 ENCOUNTER — OFFICE VISIT (OUTPATIENT)
Dept: INTERNAL MEDICINE | Facility: CLINIC | Age: 88
End: 2023-03-07
Payer: COMMERCIAL

## 2023-03-07 ENCOUNTER — PATIENT MESSAGE (OUTPATIENT)
Dept: INTERNAL MEDICINE | Facility: CLINIC | Age: 88
End: 2023-03-07

## 2023-03-07 VITALS
DIASTOLIC BLOOD PRESSURE: 78 MMHG | TEMPERATURE: 98 F | HEIGHT: 66 IN | HEART RATE: 66 BPM | BODY MASS INDEX: 23.77 KG/M2 | SYSTOLIC BLOOD PRESSURE: 155 MMHG | WEIGHT: 147.94 LBS

## 2023-03-07 DIAGNOSIS — D63.8 ANEMIA OF CHRONIC DISEASE: ICD-10-CM

## 2023-03-07 DIAGNOSIS — R97.20 ELEVATED PSA: ICD-10-CM

## 2023-03-07 DIAGNOSIS — E87.6 HYPOKALEMIA: ICD-10-CM

## 2023-03-07 DIAGNOSIS — M1A.1590: ICD-10-CM

## 2023-03-07 DIAGNOSIS — T56.0X1S: ICD-10-CM

## 2023-03-07 DIAGNOSIS — N18.30 CONTROLLED TYPE 2 DIABETES MELLITUS WITH STAGE 3 CHRONIC KIDNEY DISEASE, WITHOUT LONG-TERM CURRENT USE OF INSULIN: Primary | ICD-10-CM

## 2023-03-07 DIAGNOSIS — Z87.898 HISTORY OF SYNCOPE: ICD-10-CM

## 2023-03-07 DIAGNOSIS — E78.00 PURE HYPERCHOLESTEROLEMIA: ICD-10-CM

## 2023-03-07 DIAGNOSIS — Z13.29 THYROID DISORDER SCREENING: ICD-10-CM

## 2023-03-07 DIAGNOSIS — E11.22 CONTROLLED TYPE 2 DIABETES MELLITUS WITH STAGE 3 CHRONIC KIDNEY DISEASE, WITHOUT LONG-TERM CURRENT USE OF INSULIN: Primary | ICD-10-CM

## 2023-03-07 DIAGNOSIS — I51.89 DIASTOLIC DYSFUNCTION: ICD-10-CM

## 2023-03-07 DIAGNOSIS — I25.810 CORONARY ARTERY DISEASE INVOLVING CORONARY BYPASS GRAFT OF NATIVE HEART WITHOUT ANGINA PECTORIS: ICD-10-CM

## 2023-03-07 DIAGNOSIS — I70.0 AORTIC ATHEROSCLEROSIS: ICD-10-CM

## 2023-03-07 DIAGNOSIS — Z12.5 SCREENING FOR PROSTATE CANCER: ICD-10-CM

## 2023-03-07 DIAGNOSIS — Z95.1 S/P CABG (CORONARY ARTERY BYPASS GRAFT): ICD-10-CM

## 2023-03-07 DIAGNOSIS — I10 ESSENTIAL HYPERTENSION: ICD-10-CM

## 2023-03-07 PROCEDURE — 1125F AMNT PAIN NOTED PAIN PRSNT: CPT | Mod: CPTII,S$GLB,, | Performed by: INTERNAL MEDICINE

## 2023-03-07 PROCEDURE — 1125F PR PAIN SEVERITY QUANTIFIED, PAIN PRESENT: ICD-10-PCS | Mod: CPTII,S$GLB,, | Performed by: INTERNAL MEDICINE

## 2023-03-07 PROCEDURE — 1159F MED LIST DOCD IN RCRD: CPT | Mod: CPTII,S$GLB,, | Performed by: INTERNAL MEDICINE

## 2023-03-07 PROCEDURE — 1157F ADVNC CARE PLAN IN RCRD: CPT | Mod: CPTII,S$GLB,, | Performed by: INTERNAL MEDICINE

## 2023-03-07 PROCEDURE — 1157F PR ADVANCE CARE PLAN OR EQUIV PRESENT IN MEDICAL RECORD: ICD-10-PCS | Mod: CPTII,S$GLB,, | Performed by: INTERNAL MEDICINE

## 2023-03-07 PROCEDURE — 99214 OFFICE O/P EST MOD 30 MIN: CPT | Mod: S$GLB,,, | Performed by: INTERNAL MEDICINE

## 2023-03-07 PROCEDURE — 99214 PR OFFICE/OUTPT VISIT, EST, LEVL IV, 30-39 MIN: ICD-10-PCS | Mod: S$GLB,,, | Performed by: INTERNAL MEDICINE

## 2023-03-07 PROCEDURE — 1159F PR MEDICATION LIST DOCUMENTED IN MEDICAL RECORD: ICD-10-PCS | Mod: CPTII,S$GLB,, | Performed by: INTERNAL MEDICINE

## 2023-03-07 RX ORDER — METOPROLOL TARTRATE 25 MG/1
25 TABLET, FILM COATED ORAL 2 TIMES DAILY
COMMUNITY
Start: 2023-01-13 | End: 2023-03-07

## 2023-03-07 NOTE — PROGRESS NOTES
Chief C/o:    Nasal Congestion, Establish Care, Leg Pain (Left Leg, started about a week ago. Went to [facility] ER), Hypertension, and Diabetes        Health Care Maintenance    Health Maintenance         Date Due Completion Date    Shingles Vaccine (1 of 2) Never done ---    Diabetes Urine Screening 05/15/2018 5/15/2017    Eye Exam 02/05/2019 2/5/2018    COVID-19 Vaccine (4 - Booster for Moderna series) 01/29/2022 12/4/2021    Hemoglobin A1c 03/22/2023 9/22/2022    Lipid Panel 09/22/2023 9/22/2022    TETANUS VACCINE 04/20/2028 4/20/2018                   HISTORY OF PRESENT ILLNESS:    ROSS Barber is a 88 y.o. male who presents to the clinic today for Nasal Congestion, Establish Care, Leg Pain (Left Leg, started about a week ago. Went to [facility] ER), Hypertension, and Diabetes  .           Assessment:       1. S/P CABG (coronary artery bypass graft)    2. Chest pain, unspecified type    3. Essential hypertension    4. Hyperlipidemia, unspecified hyperlipidemia type    5. Diastolic dysfunction    6. Coronary artery disease involving coronary bypass graft of native heart without angina pectoris    7. Syncope and collapse       Plan:       Discussed abnormal stress test if fixed inferior defect and lateral ischemia - this is consistent with known un-revascularized Cx territory. Discussed repeat LHC - he prefers to treat CAD medically    Continue Rx for CAD, HTN, HLD, DD  OV 3 months                  Electronically signed by Sreedhar Candelaria MD at 10/18/2022  1:38 PM  Office Visit on 10/18/2022    Office Visit on 10/18/2022    -------------------------------------------------------------  Assessment and Plan   1. Essential hypertension  Increse metoprolol repeat creatinine today to trend  - Renal Function Panel; Future  - metoprolol tartrate (LOPRESSOR) 50 MG tablet; Take 1 tablet (50 mg total) by mouth 2 (two) times daily.  Dispense: 180 tablet; Refill: 1     2. Stage 3a chronic kidney disease  As above  - Renal  Function Panel; Future     3. Need for influenza vaccination  Flu vaccine today  - Influenza (FLUAD) - Quadrivalent (Adjuvanted) *Preferred* (65+) (PF)     4. Coronary artery disease involving native coronary artery of native heart without angina pectoris  On statin and asa, arb, increase beta blocker as above  - metoprolol tartrate (LOPRESSOR) 50 MG tablet; Take 1 tablet (50 mg total) by mouth 2 (two) times daily.  Dispense: 180 tablet; Refill: 1         Electronically signed by aHrry Velazco MD at 10/7/2022  2:56 PM  Office Visit on 10/7/2022    Office Visit on 10/7/2022    ALLERGIES AND MEDICATIONS: updated and reviewed.  Review of patient's allergies indicates:   Allergen Reactions    Penicillins Nausea And Vomiting     Pt reports he is not allergic to penicillin       Medication List with Changes/Refills   Current Medications    ALBUTEROL (PROVENTIL/VENTOLIN HFA) 90 MCG/ACTUATION INHALER    Inhale 1-2 puffs into the lungs every 4 (four) hours as needed for Shortness of Breath. Rescue    AMLODIPINE (NORVASC) 10 MG TABLET    Take 1 tablet (10 mg total) by mouth once daily.    ASPIRIN (ASPIR-LOW) 81 MG EC TABLET    Take 1 tablet (81 mg total) by mouth once daily.    DICLOFENAC SODIUM (VOLTAREN) 1 % GEL    Apply 2 g topically 4 (four) times daily.    ISOSORBIDE MONONITRATE (IMDUR) 60 MG 24 HR TABLET    Take 1 tablet (60 mg total) by mouth once daily.    LISINOPRIL (PRINIVIL,ZESTRIL) 40 MG TABLET    Take 1 tablet (40 mg total) by mouth once daily.    METFORMIN (GLUCOPHAGE-XR) 500 MG ER 24HR TABLET    TAKE 1 TABLET(500 MG) BY MOUTH DAILY WITH BREAKFAST Strength: 500 mg    METOPROLOL TARTRATE (LOPRESSOR) 50 MG TABLET    Take 1 tablet (50 mg total) by mouth 2 (two) times daily.    NITROGLYCERIN (NITROSTAT) 0.4 MG SL TABLET    Place 1 tablet (0.4 mg total) under the tongue every 5 (five) minutes as needed for Chest pain.    PRAVASTATIN (PRAVACHOL) 40 MG TABLET    Take 1 tablet (40 mg total) by mouth once daily.     TAMSULOSIN (FLOMAX) 0.4 MG CAP    Take 1 capsule (0.4 mg total) by mouth once daily.    TRAZODONE (DESYREL) 150 MG TABLET    TAKE 1 TABLET(150 MG) BY MOUTH EVERY NIGHT AS NEEDED FOR INSOMNIA Strength: 150 mg   Discontinued Medications    ACETAMINOPHEN (TYLENOL) 500 MG TABLET    Take 1 tablet (500 mg total) by mouth every 6 (six) hours as needed for Pain.    ALBUTEROL (PROVENTIL/VENTOLIN HFA) 90 MCG/ACTUATION INHALER    Inhale 1 puff into the lungs every 6 (six) hours as needed. Rescue    AZELASTINE (ASTELIN) 137 MCG (0.1 %) NASAL SPRAY    1 spray (137 mcg total) by Nasal route 2 (two) times daily as needed for Rhinitis.    CETIRIZINE 10 MG CHEWABLE TABLET    Take 10 mg by mouth once daily.    DOCUSATE SODIUM (COLACE) 100 MG CAPSULE    Take 1 capsule (100 mg total) by mouth 2 (two) times daily as needed for Constipation (and hard stool).    FINASTERIDE (PROSCAR) 5 MG TABLET    Take 1 tablet (5 mg total) by mouth once daily.    FLUTICASONE PROPIONATE (FLONASE) 50 MCG/ACTUATION NASAL SPRAY    SHAKE LIQUID AND USE 1 SPRAY(50 MCG) IN EACH NOSTRIL TWICE DAILY AS NEEDED FOR RHINITIS    METOPROLOL TARTRATE (LOPRESSOR) 25 MG TABLET    Take 25 mg by mouth 2 (two) times daily.    ONDANSETRON (ZOFRAN-ODT) 4 MG TBDL    Take 1 tablet (4 mg total) by mouth every 8 (eight) hours as needed (nausea/vomiting).    POLYETHYLENE GLYCOL (GLYCOLAX) 17 GRAM PWPK    Take 17 g by mouth once daily.       Problem List:  Patient Active Problem List   Diagnosis    Essential hypertension    Pure hypercholesterolemia    Diastolic dysfunction    Anemia of chronic disease    Coronary artery disease involving coronary bypass graft of native heart without angina pectoris    S/P CABG (coronary artery bypass graft)    Chronic gout without tophus    Hypokalemia    Controlled type 2 diabetes mellitus with stage 3 chronic kidney disease, without long-term current use of insulin    Aortic atherosclerosis    BMI 23.0-23.9, adult    History of syncope     Elevated PSA         CARE TEAM:    Patient Care Team:  Gatito Mcdonald MD as PCP - General (Internal Medicine)  Toña Whittaker MA as Care Coordinator  Harry Velazco MD         REVIEW OF SYSTEMS:    Review of Systems   Constitutional:  Negative for appetite change, chills, diaphoresis, fatigue, fever and unexpected weight change.   HENT:  Negative for congestion, drooling, ear discharge, ear pain, facial swelling, hearing loss, nosebleeds, rhinorrhea, sinus pain, sneezing, sore throat, tinnitus, trouble swallowing and voice change.    Eyes:  Negative for pain, discharge, redness, itching and visual disturbance.   Respiratory:  Negative for cough, choking, chest tightness, shortness of breath, wheezing and stridor.    Cardiovascular:  Negative for chest pain, palpitations and leg swelling.   Gastrointestinal:  Negative for abdominal distention, abdominal pain, blood in stool, constipation, diarrhea, nausea and vomiting.   Endocrine: Negative for cold intolerance, heat intolerance, polydipsia, polyphagia and polyuria.   Genitourinary:  Negative for difficulty urinating, dysuria, flank pain, frequency, hematuria and urgency.   Musculoskeletal:  Negative for arthralgias, back pain, gait problem, joint swelling, myalgias, neck pain and neck stiffness.   Skin:  Negative for color change, pallor, rash and wound.   Allergic/Immunologic: Negative for environmental allergies, food allergies and immunocompromised state.   Neurological:  Negative for dizziness, tremors, seizures, syncope, speech difficulty, weakness, light-headedness, numbness and headaches.   Hematological:  Negative for adenopathy. Does not bruise/bleed easily.   Psychiatric/Behavioral:  Negative for agitation, behavioral problems, confusion, decreased concentration, dysphoric mood, hallucinations, sleep disturbance and suicidal ideas. The patient is not nervous/anxious.        PHYSICAL EXAM:    Vitals:    03/07/23 0831   BP: (!) 155/78  "  Pulse: 66   Temp: 97.6 °F (36.4 °C)     Weight: 67.1 kg (147 lb 14.9 oz)   Height: 5' 5.98" (167.6 cm)   Body mass index is 23.89 kg/m².  Vitals:    03/07/23 0831   BP: (!) 155/78   Pulse: 66   Temp: 97.6 °F (36.4 °C)   TempSrc: Temporal   Weight: 67.1 kg (147 lb 14.9 oz)   Height: 5' 5.98" (1.676 m)   PainSc:   4   PainLoc: Leg          Physical Exam  Vitals and nursing note reviewed.   Constitutional:       General: He is not in acute distress.     Appearance: Normal appearance. He is normal weight. He is not ill-appearing, toxic-appearing or diaphoretic.      Comments: Patient is alert, awake and oriented X 3.  Patient is comfortable, cooperative and in no apparent distress.     HENT:      Head: Normocephalic and atraumatic.      Right Ear: Tympanic membrane, ear canal and external ear normal. There is no impacted cerumen.      Left Ear: Tympanic membrane, ear canal and external ear normal. There is no impacted cerumen.      Nose: Nose normal. No congestion or rhinorrhea.      Mouth/Throat:      Mouth: Mucous membranes are moist.      Pharynx: Oropharynx is clear. No oropharyngeal exudate or posterior oropharyngeal erythema.   Eyes:      General: No scleral icterus.        Right eye: No discharge.         Left eye: No discharge.      Extraocular Movements: Extraocular movements intact.      Conjunctiva/sclera: Conjunctivae normal.      Pupils: Pupils are equal, round, and reactive to light.   Cardiovascular:      Rate and Rhythm: Normal rate and regular rhythm.      Pulses: Normal pulses.      Heart sounds: Normal heart sounds. No murmur heard.    No friction rub. No gallop.   Pulmonary:      Effort: Pulmonary effort is normal. No respiratory distress.      Breath sounds: Normal breath sounds. No stridor. No wheezing, rhonchi or rales.   Chest:      Chest wall: No tenderness.   Abdominal:      General: Bowel sounds are normal. There is no distension.      Palpations: Abdomen is soft. There is no mass.      " Tenderness: There is no abdominal tenderness. There is no guarding or rebound.      Hernia: No hernia is present.   Musculoskeletal:         General: No swelling, tenderness, deformity or signs of injury. Normal range of motion.      Cervical back: Normal range of motion and neck supple. No rigidity.      Right lower leg: Edema (+1 pitting edema bilaterally.) present.      Left lower leg: Edema present.   Lymphadenopathy:      Cervical: No cervical adenopathy.   Skin:     General: Skin is warm and dry.      Capillary Refill: Capillary refill takes less than 2 seconds.      Coloration: Skin is not jaundiced or pale.      Findings: No bruising, erythema, lesion or rash.   Neurological:      General: No focal deficit present.      Mental Status: He is alert and oriented to person, place, and time.      Cranial Nerves: No cranial nerve deficit.      Sensory: No sensory deficit.      Motor: No weakness.      Coordination: Coordination normal.      Deep Tendon Reflexes: Reflexes normal.   Psychiatric:         Mood and Affect: Mood normal.         Behavior: Behavior normal.         Thought Content: Thought content normal.         Judgment: Judgment normal.          Labs:    Lab Results   Component Value Date     (H) 10/07/2022     10/07/2022    K 4.3 10/07/2022     10/07/2022    CO2 26 10/07/2022    BUN 21 10/07/2022    CREATININE 1.4 10/07/2022    CALCIUM 9.2 10/07/2022    PROT 7.5 09/21/2022    ALBUMIN 3.6 10/07/2022    BILITOT 0.2 09/21/2022    ALKPHOS 91 09/21/2022    AST 21 09/21/2022    ALT 10 09/21/2022    ANIONGAP 9 10/07/2022    ESTGFRAFRICA >60 03/30/2022    EGFRNONAA >60 03/30/2022     Lab Results   Component Value Date    WBC 7.41 09/23/2022    WBC 7.41 09/23/2022    RBC 3.45 (L) 09/23/2022    RBC 3.45 (L) 09/23/2022    HGB 10.3 (L) 09/23/2022    HGB 10.3 (L) 09/23/2022    HCT 30.8 (L) 09/23/2022    HCT 30.8 (L) 09/23/2022    HCT 26 (L) 07/07/2016    MCV 89 09/23/2022    MCV 89 09/23/2022     RDW 12.6 09/23/2022    RDW 12.6 09/23/2022     09/23/2022     09/23/2022      Lab Results   Component Value Date    CHOL 118 (L) 09/22/2022    TRIG 99 09/22/2022    HDL 43 09/22/2022    LDLCALC 55.2 (L) 09/22/2022    TOTALCHOLEST 2.7 09/22/2022     Lab Results   Component Value Date    TSH 0.660 08/01/2011     Lab Results   Component Value Date    HGBA1C 6.0 (H) 09/22/2022    ESTIMATEDAVG 126 09/22/2022      No components found for: MICROALBUMIN/CREATININE    ASSESSMENT & PLAN:    1. Controlled type 2 diabetes mellitus with stage 3 chronic kidney disease, without long-term current use of insulin  - Comprehensive Metabolic Panel; Future  - Hemoglobin A1C; Future  - Microalbumin/Creatinine Ratio, Urine; Future  - TSH; Future  - Comprehensive Metabolic Panel  - Hemoglobin A1C  - Microalbumin/Creatinine Ratio, Urine  - TSH    2. Essential hypertension  - Comprehensive Metabolic Panel; Future  - Comprehensive Metabolic Panel    3. Pure hypercholesterolemia  - Lipid Panel; Future  - Lipid Panel    4. 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 Aortic atherosclerosis    6. Hypokalemia  - Comprehensive Metabolic Panel; Future  - Magnesium; Future  - Comprehensive Metabolic Panel  - Magnesium    7. Anemia of chronic disease  - CBC Auto Differential; Future  - Iron and TIBC; Future  - Ferritin; Future  - CBC Auto Differential  - Iron and TIBC  - Ferritin    8. 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 BMI 23.0-23.9, adult    10. Diastolic dysfunction    11. S/P CABG (coronary artery bypass graft)    12. History of syncope    13. Elevated PSA  - PSA, Screening; Future  - PSA, Screening    14. Thyroid disorder screening  - TSH; Future  - TSH    15. Screening for prostate cancer  - PSA, Screening; Future  - PSA, Screening       Patient is coming to be established as new patient, has multiple medical problems as  stated above, will start by continue patient on his current medications, and do a comprehensive blood test for him as above, follow-up after the labs.    His blood pressure was elevated, he is currently on lisinopril as well as metoprolol, to continue current medication and follow a low-salt diet and will re-evaluate after his labs and when he comes next visit.      Orders Placed This Encounter   Procedures    Comprehensive Metabolic Panel    Lipid Panel    Hemoglobin A1C    Microalbumin/Creatinine Ratio, Urine    TSH    CBC Auto Differential    Iron and TIBC    Ferritin    PSA, Screening    Magnesium      Follow up in about 2 weeks (around 3/21/2023), or if symptoms worsen or fail to improve. or sooner as needed.    Patient was counseled and questions and concerns were addressed.    Please note:  Parts of this report were done using a dictation software, voice to text, and sometimes the text contains some uncorrected words or sentences that are missed during revision.

## 2023-03-09 LAB
ALBUMIN SERPL-MCNC: 4.1 G/DL (ref 3.6–4.6)
ALBUMIN/CREAT UR: 228 MG/G CREAT (ref 0–29)
ALBUMIN/GLOB SERPL: 1.9 {RATIO} (ref 1.2–2.2)
ALP SERPL-CCNC: 80 IU/L (ref 44–121)
ALT SERPL-CCNC: 8 IU/L (ref 0–44)
AST SERPL-CCNC: 15 IU/L (ref 0–40)
BASOPHILS # BLD AUTO: 0 X10E3/UL (ref 0–0.2)
BASOPHILS NFR BLD AUTO: 0 %
BILIRUB SERPL-MCNC: <0.2 MG/DL (ref 0–1.2)
BUN SERPL-MCNC: 19 MG/DL (ref 8–27)
BUN/CREAT SERPL: 14 (ref 10–24)
CALCIUM SERPL-MCNC: 8.9 MG/DL (ref 8.6–10.2)
CHLORIDE SERPL-SCNC: 105 MMOL/L (ref 96–106)
CHOLEST SERPL-MCNC: 118 MG/DL (ref 100–199)
CO2 SERPL-SCNC: 23 MMOL/L (ref 20–29)
CREAT SERPL-MCNC: 1.35 MG/DL (ref 0.76–1.27)
CREAT UR-MCNC: 94 MG/DL
EOSINOPHIL # BLD AUTO: 0.2 X10E3/UL (ref 0–0.4)
EOSINOPHIL NFR BLD AUTO: 3 %
ERYTHROCYTE [DISTWIDTH] IN BLOOD BY AUTOMATED COUNT: 13.8 % (ref 11.6–15.4)
EST. GFR  (NO RACE VARIABLE): 51 ML/MIN/1.73
FERRITIN SERPL-MCNC: 287 NG/ML (ref 30–400)
GLOBULIN SER CALC-MCNC: 2.2 G/DL (ref 1.5–4.5)
GLUCOSE SERPL-MCNC: 116 MG/DL (ref 70–99)
HBA1C MFR BLD: 6.2 % (ref 4.8–5.6)
HCT VFR BLD AUTO: 32 % (ref 37.5–51)
HDLC SERPL-MCNC: 35 MG/DL
HGB BLD-MCNC: 11 G/DL (ref 13–17.7)
IMM GRANULOCYTES # BLD AUTO: 0 X10E3/UL (ref 0–0.1)
IMM GRANULOCYTES NFR BLD AUTO: 0 %
IMP & REVIEW OF LAB RESULTS: NORMAL
IRON SATN MFR SERPL: 19 % (ref 15–55)
IRON SERPL-MCNC: 41 UG/DL (ref 38–169)
LDLC SERPL CALC-MCNC: 57 MG/DL (ref 0–99)
LYMPHOCYTES # BLD AUTO: 3.2 X10E3/UL (ref 0.7–3.1)
LYMPHOCYTES NFR BLD AUTO: 53 %
MAGNESIUM SERPL-MCNC: 2.2 MG/DL (ref 1.6–2.3)
MCH RBC QN AUTO: 31.2 PG (ref 26.6–33)
MCHC RBC AUTO-ENTMCNC: 34.4 G/DL (ref 31.5–35.7)
MCV RBC AUTO: 91 FL (ref 79–97)
MICROALBUMIN UR-MCNC: 214.5 UG/ML
MONOCYTES # BLD AUTO: 0.5 X10E3/UL (ref 0.1–0.9)
MONOCYTES NFR BLD AUTO: 9 %
NEUTROPHILS # BLD AUTO: 2.2 X10E3/UL (ref 1.4–7)
NEUTROPHILS NFR BLD AUTO: 35 %
PLATELET # BLD AUTO: 193 X10E3/UL (ref 150–450)
POTASSIUM SERPL-SCNC: 4.1 MMOL/L (ref 3.5–5.2)
PROT SERPL-MCNC: 6.3 G/DL (ref 6–8.5)
PSA SERPL-MCNC: 4.9 NG/ML (ref 0–4)
RBC # BLD AUTO: 3.53 X10E6/UL (ref 4.14–5.8)
SODIUM SERPL-SCNC: 142 MMOL/L (ref 134–144)
TIBC SERPL-MCNC: 215 UG/DL (ref 250–450)
TRIGL SERPL-MCNC: 147 MG/DL (ref 0–149)
TSH SERPL DL<=0.005 MIU/L-ACNC: 1.19 UIU/ML (ref 0.45–4.5)
UIBC SERPL-MCNC: 174 UG/DL (ref 111–343)
VLDLC SERPL CALC-MCNC: 26 MG/DL (ref 5–40)
WBC # BLD AUTO: 6.1 X10E3/UL (ref 3.4–10.8)

## 2023-03-22 ENCOUNTER — OFFICE VISIT (OUTPATIENT)
Dept: INTERNAL MEDICINE | Facility: CLINIC | Age: 88
End: 2023-03-22
Payer: COMMERCIAL

## 2023-03-22 VITALS
BODY MASS INDEX: 24.04 KG/M2 | HEIGHT: 66 IN | DIASTOLIC BLOOD PRESSURE: 60 MMHG | TEMPERATURE: 98 F | HEART RATE: 67 BPM | SYSTOLIC BLOOD PRESSURE: 105 MMHG | WEIGHT: 149.56 LBS

## 2023-03-22 DIAGNOSIS — D63.8 ANEMIA OF CHRONIC DISEASE: ICD-10-CM

## 2023-03-22 DIAGNOSIS — E87.6 HYPOKALEMIA: ICD-10-CM

## 2023-03-22 DIAGNOSIS — E78.5 HYPERLIPIDEMIA, UNSPECIFIED HYPERLIPIDEMIA TYPE: ICD-10-CM

## 2023-03-22 DIAGNOSIS — I10 ESSENTIAL HYPERTENSION: ICD-10-CM

## 2023-03-22 DIAGNOSIS — N13.8 BPH WITH OBSTRUCTION/LOWER URINARY TRACT SYMPTOMS: ICD-10-CM

## 2023-03-22 DIAGNOSIS — G47.00 INSOMNIA, UNSPECIFIED TYPE: ICD-10-CM

## 2023-03-22 DIAGNOSIS — I51.89 DIASTOLIC DYSFUNCTION: ICD-10-CM

## 2023-03-22 DIAGNOSIS — I70.0 AORTIC ATHEROSCLEROSIS: ICD-10-CM

## 2023-03-22 DIAGNOSIS — Z95.1 S/P CABG (CORONARY ARTERY BYPASS GRAFT): ICD-10-CM

## 2023-03-22 DIAGNOSIS — R97.20 ELEVATED PSA: Primary | ICD-10-CM

## 2023-03-22 DIAGNOSIS — Z87.898 HISTORY OF SYNCOPE: ICD-10-CM

## 2023-03-22 DIAGNOSIS — N18.30 CONTROLLED TYPE 2 DIABETES MELLITUS WITH STAGE 3 CHRONIC KIDNEY DISEASE, WITHOUT LONG-TERM CURRENT USE OF INSULIN: ICD-10-CM

## 2023-03-22 DIAGNOSIS — N40.1 BPH WITH OBSTRUCTION/LOWER URINARY TRACT SYMPTOMS: ICD-10-CM

## 2023-03-22 DIAGNOSIS — E78.00 PURE HYPERCHOLESTEROLEMIA: ICD-10-CM

## 2023-03-22 DIAGNOSIS — J06.9 ACUTE URI: ICD-10-CM

## 2023-03-22 DIAGNOSIS — I25.810 CORONARY ARTERY DISEASE INVOLVING CORONARY BYPASS GRAFT OF NATIVE HEART WITHOUT ANGINA PECTORIS: ICD-10-CM

## 2023-03-22 DIAGNOSIS — E11.22 CONTROLLED TYPE 2 DIABETES MELLITUS WITH STAGE 3 CHRONIC KIDNEY DISEASE, WITHOUT LONG-TERM CURRENT USE OF INSULIN: ICD-10-CM

## 2023-03-22 DIAGNOSIS — N28.1 BILATERAL RENAL CYSTS: ICD-10-CM

## 2023-03-22 DIAGNOSIS — M1A.00X0 IDIOPATHIC CHRONIC GOUT WITHOUT TOPHUS, UNSPECIFIED SITE: ICD-10-CM

## 2023-03-22 PROCEDURE — 1160F RVW MEDS BY RX/DR IN RCRD: CPT | Mod: CPTII,S$GLB,, | Performed by: INTERNAL MEDICINE

## 2023-03-22 PROCEDURE — 1160F PR REVIEW ALL MEDS BY PRESCRIBER/CLIN PHARMACIST DOCUMENTED: ICD-10-PCS | Mod: CPTII,S$GLB,, | Performed by: INTERNAL MEDICINE

## 2023-03-22 PROCEDURE — 99214 PR OFFICE/OUTPT VISIT, EST, LEVL IV, 30-39 MIN: ICD-10-PCS | Mod: 25,S$GLB,, | Performed by: INTERNAL MEDICINE

## 2023-03-22 PROCEDURE — 3288F FALL RISK ASSESSMENT DOCD: CPT | Mod: CPTII,S$GLB,, | Performed by: INTERNAL MEDICINE

## 2023-03-22 PROCEDURE — 3288F PR FALLS RISK ASSESSMENT DOCUMENTED: ICD-10-PCS | Mod: CPTII,S$GLB,, | Performed by: INTERNAL MEDICINE

## 2023-03-22 PROCEDURE — 1157F PR ADVANCE CARE PLAN OR EQUIV PRESENT IN MEDICAL RECORD: ICD-10-PCS | Mod: CPTII,S$GLB,, | Performed by: INTERNAL MEDICINE

## 2023-03-22 PROCEDURE — 99214 OFFICE O/P EST MOD 30 MIN: CPT | Mod: 25,S$GLB,, | Performed by: INTERNAL MEDICINE

## 2023-03-22 PROCEDURE — 1101F PR PT FALLS ASSESS DOC 0-1 FALLS W/OUT INJ PAST YR: ICD-10-PCS | Mod: CPTII,S$GLB,, | Performed by: INTERNAL MEDICINE

## 2023-03-22 PROCEDURE — 1159F PR MEDICATION LIST DOCUMENTED IN MEDICAL RECORD: ICD-10-PCS | Mod: CPTII,S$GLB,, | Performed by: INTERNAL MEDICINE

## 2023-03-22 PROCEDURE — 1157F ADVNC CARE PLAN IN RCRD: CPT | Mod: CPTII,S$GLB,, | Performed by: INTERNAL MEDICINE

## 2023-03-22 PROCEDURE — 1126F PR PAIN SEVERITY QUANTIFIED, NO PAIN PRESENT: ICD-10-PCS | Mod: CPTII,S$GLB,, | Performed by: INTERNAL MEDICINE

## 2023-03-22 PROCEDURE — 1159F MED LIST DOCD IN RCRD: CPT | Mod: CPTII,S$GLB,, | Performed by: INTERNAL MEDICINE

## 2023-03-22 PROCEDURE — 96372 THER/PROPH/DIAG INJ SC/IM: CPT | Mod: S$GLB,,, | Performed by: INTERNAL MEDICINE

## 2023-03-22 PROCEDURE — 1101F PT FALLS ASSESS-DOCD LE1/YR: CPT | Mod: CPTII,S$GLB,, | Performed by: INTERNAL MEDICINE

## 2023-03-22 PROCEDURE — 96372 PR INJECTION,THERAP/PROPH/DIAG2ST, IM OR SUBCUT: ICD-10-PCS | Mod: S$GLB,,, | Performed by: INTERNAL MEDICINE

## 2023-03-22 PROCEDURE — 1126F AMNT PAIN NOTED NONE PRSNT: CPT | Mod: CPTII,S$GLB,, | Performed by: INTERNAL MEDICINE

## 2023-03-22 RX ORDER — DEXAMETHASONE SODIUM PHOSPHATE 4 MG/ML
4 INJECTION, SOLUTION INTRA-ARTICULAR; INTRALESIONAL; INTRAMUSCULAR; INTRAVENOUS; SOFT TISSUE ONCE
Status: COMPLETED | OUTPATIENT
Start: 2023-03-22 | End: 2023-03-22

## 2023-03-22 RX ORDER — PROMETHAZINE HYDROCHLORIDE 6.25 MG/5ML
12.5 SYRUP ORAL EVERY 6 HOURS PRN
Qty: 437 ML | Refills: 0 | Status: SHIPPED | OUTPATIENT
Start: 2023-03-22 | End: 2023-05-05

## 2023-03-22 RX ORDER — TRAZODONE HYDROCHLORIDE 150 MG/1
TABLET ORAL
Qty: 90 TABLET | Refills: 0 | Status: SHIPPED | OUTPATIENT
Start: 2023-03-22 | End: 2023-05-05

## 2023-03-22 RX ORDER — PRAVASTATIN SODIUM 40 MG/1
40 TABLET ORAL DAILY
Qty: 90 TABLET | Refills: 3 | Status: SHIPPED | OUTPATIENT
Start: 2023-03-22

## 2023-03-22 RX ADMIN — DEXAMETHASONE SODIUM PHOSPHATE 4 MG: 4 INJECTION, SOLUTION INTRA-ARTICULAR; INTRALESIONAL; INTRAMUSCULAR; INTRAVENOUS; SOFT TISSUE at 10:03

## 2023-03-22 NOTE — PROGRESS NOTES
Chief C/o:    Diabetes (Lab results check.), Hypertension, Hyperlipidemia, and Cough (Pt. c/o a cough with white phlegm, sinus pressure in head and (B) ear congestion x 1 day. )        Health Care Maintenance    Health Maintenance         Date Due Completion Date    Shingles Vaccine (1 of 2) Never done ---    Eye Exam 02/05/2019 2/5/2018    COVID-19 Vaccine (4 - Booster for Moderna series) 01/29/2022 12/4/2021    Hemoglobin A1c 09/08/2023 3/8/2023    Diabetes Urine Screening 03/08/2024 3/8/2023    Lipid Panel 03/08/2024 3/8/2023    TETANUS VACCINE 04/20/2028 4/20/2018                   HISTORY OF PRESENT ILLNESS:    ROSS Barber is a 88 y.o. male who presents to the clinic today for Diabetes (Lab results check.), Hypertension, Hyperlipidemia, and Cough (Pt. c/o a cough with white phlegm, sinus pressure in head and (B) ear congestion x 1 day. )  .   Patient has no fever no chills, no chest pain and no shortness of breath.  Has labs done and once the results as well.              ALLERGIES AND MEDICATIONS: updated and reviewed.  Review of patient's allergies indicates:   Allergen Reactions    Penicillins Nausea And Vomiting     Pt reports he is not allergic to penicillin       Medication List with Changes/Refills   New Medications    PROMETHAZINE (PHENERGAN) 6.25 MG/5 ML SYRUP    Take 10 mLs (12.5 mg total) by mouth every 6 (six) hours as needed for Nausea (Congestion).   Current Medications    ALBUTEROL (PROVENTIL/VENTOLIN HFA) 90 MCG/ACTUATION INHALER    Inhale 1-2 puffs into the lungs every 4 (four) hours as needed for Shortness of Breath. Rescue    AMLODIPINE (NORVASC) 10 MG TABLET    Take 1 tablet (10 mg total) by mouth once daily.    ASPIRIN (ASPIR-LOW) 81 MG EC TABLET    Take 1 tablet (81 mg total) by mouth once daily.    DICLOFENAC SODIUM (VOLTAREN) 1 % GEL    Apply 2 g topically 4 (four) times daily.    ISOSORBIDE MONONITRATE (IMDUR) 60 MG 24 HR TABLET    Take 1 tablet (60 mg total) by mouth once  daily.    LISINOPRIL (PRINIVIL,ZESTRIL) 40 MG TABLET    Take 1 tablet (40 mg total) by mouth once daily.    METFORMIN (GLUCOPHAGE-XR) 500 MG ER 24HR TABLET    TAKE 1 TABLET(500 MG) BY MOUTH DAILY WITH BREAKFAST Strength: 500 mg    METOPROLOL TARTRATE (LOPRESSOR) 50 MG TABLET    Take 1 tablet (50 mg total) by mouth 2 (two) times daily.    NITROGLYCERIN (NITROSTAT) 0.4 MG SL TABLET    Place 1 tablet (0.4 mg total) under the tongue every 5 (five) minutes as needed for Chest pain.    TAMSULOSIN (FLOMAX) 0.4 MG CAP    Take 1 capsule (0.4 mg total) by mouth once daily.   Changed and/or Refilled Medications    Modified Medication Previous Medication    PRAVASTATIN (PRAVACHOL) 40 MG TABLET pravastatin (PRAVACHOL) 40 MG tablet       Take 1 tablet (40 mg total) by mouth once daily.    Take 1 tablet (40 mg total) by mouth once daily.    TRAZODONE (DESYREL) 150 MG TABLET traZODone (DESYREL) 150 MG tablet       TAKE 1 TABLET(150 MG) BY MOUTH EVERY NIGHT AS NEEDED FOR INSOMNIA Strength: 150 mg    TAKE 1 TABLET(150 MG) BY MOUTH EVERY NIGHT AS NEEDED FOR INSOMNIA Strength: 150 mg       Problem List:  Patient Active Problem List   Diagnosis    Essential hypertension    Pure hypercholesterolemia    Diastolic dysfunction    Anemia of chronic disease    Coronary artery disease involving coronary bypass graft of native heart without angina pectoris    S/P CABG (coronary artery bypass graft)    Chronic gout without tophus    Hypokalemia    Controlled type 2 diabetes mellitus with stage 3 chronic kidney disease, without long-term current use of insulin    Aortic atherosclerosis    BMI 24.0-24.9, adult    History of syncope    Elevated PSA    BPH with obstruction/lower urinary tract symptoms    Bilateral renal cysts         CARE TEAM:    Patient Care Team:  Gatito Mcdonald MD as PCP - General (Internal Medicine)  Toña Whittaker MA as Care Coordinator  Harry Velazco MD         REVIEW OF SYSTEMS:    Review of Systems  "  Constitutional:  Negative for appetite change, chills, diaphoresis, fatigue, fever and unexpected weight change.   HENT:  Positive for congestion, ear pain, sinus pain and sore throat. Negative for drooling, ear discharge, facial swelling, hearing loss, nosebleeds, rhinorrhea, sneezing, tinnitus, trouble swallowing and voice change.    Eyes:  Negative for pain, discharge, redness, itching and visual disturbance.   Respiratory:  Positive for cough. Negative for choking, chest tightness, shortness of breath, wheezing and stridor.    Cardiovascular:  Negative for chest pain, palpitations and leg swelling.   Gastrointestinal:  Negative for abdominal distention, abdominal pain, blood in stool, constipation, diarrhea, nausea and vomiting.   Endocrine: Negative for cold intolerance, heat intolerance, polydipsia, polyphagia and polyuria.   Genitourinary:  Negative for difficulty urinating, dysuria, flank pain, frequency, hematuria and urgency.   Musculoskeletal:  Negative for arthralgias, back pain, gait problem, joint swelling, myalgias, neck pain and neck stiffness.   Skin:  Negative for color change, pallor, rash and wound.   Allergic/Immunologic: Negative for environmental allergies, food allergies and immunocompromised state.   Neurological:  Negative for dizziness, tremors, seizures, syncope, speech difficulty, weakness, light-headedness, numbness and headaches.   Hematological:  Negative for adenopathy. Does not bruise/bleed easily.   Psychiatric/Behavioral:  Negative for agitation, behavioral problems, confusion, decreased concentration, dysphoric mood, hallucinations, sleep disturbance and suicidal ideas. The patient is not nervous/anxious.        PHYSICAL EXAM:    Vitals:    03/22/23 1018   BP: 105/60   Pulse: 67   Temp: 97.6 °F (36.4 °C)     Weight: 67.8 kg (149 lb 9.3 oz)   Height: 5' 5.98" (167.6 cm)   Body mass index is 24.16 kg/m².  Vitals:    03/22/23 1018   BP: 105/60   Pulse: 67   Temp: 97.6 °F (36.4 °C) " "  TempSrc: Temporal   Weight: 67.8 kg (149 lb 9.3 oz)   Height: 5' 5.98" (1.676 m)   PainSc: 0-No pain          Physical Exam  Vitals and nursing note reviewed.   Constitutional:       General: He is not in acute distress.     Appearance: Normal appearance. He is normal weight. He is not ill-appearing, toxic-appearing or diaphoretic.      Comments: Patient is alert, awake and oriented X 3.  Patient is comfortable, cooperative and in no apparent distress.     HENT:      Head: Normocephalic and atraumatic.      Right Ear: Tympanic membrane, ear canal and external ear normal. There is no impacted cerumen.      Left Ear: Tympanic membrane, ear canal and external ear normal. There is no impacted cerumen.      Nose: Congestion present. No rhinorrhea.      Mouth/Throat:      Mouth: Mucous membranes are moist.      Pharynx: Oropharynx is clear. Posterior oropharyngeal erythema present. No oropharyngeal exudate.   Eyes:      General: No scleral icterus.        Right eye: No discharge.         Left eye: No discharge.      Extraocular Movements: Extraocular movements intact.      Conjunctiva/sclera: Conjunctivae normal.      Pupils: Pupils are equal, round, and reactive to light.   Cardiovascular:      Rate and Rhythm: Normal rate and regular rhythm.      Pulses: Normal pulses.      Heart sounds: Normal heart sounds. No murmur heard.    No friction rub. No gallop.   Pulmonary:      Effort: Pulmonary effort is normal. No respiratory distress.      Breath sounds: Normal breath sounds. No stridor. No wheezing, rhonchi or rales.   Chest:      Chest wall: No tenderness.   Abdominal:      General: Bowel sounds are normal. There is no distension.      Palpations: Abdomen is soft. There is no mass.      Tenderness: There is no abdominal tenderness. There is no guarding or rebound.      Hernia: No hernia is present.   Musculoskeletal:         General: No swelling, tenderness, deformity or signs of injury. Normal range of motion.      " Cervical back: Normal range of motion and neck supple. No rigidity.      Right lower leg: Edema (+1 pitting edema bilaterally.) present.      Left lower leg: Edema present.   Lymphadenopathy:      Cervical: No cervical adenopathy.   Skin:     General: Skin is warm and dry.      Capillary Refill: Capillary refill takes less than 2 seconds.      Coloration: Skin is not jaundiced or pale.      Findings: No bruising, erythema, lesion or rash.   Neurological:      General: No focal deficit present.      Mental Status: He is alert and oriented to person, place, and time.      Cranial Nerves: No cranial nerve deficit.      Sensory: No sensory deficit.      Motor: No weakness.      Coordination: Coordination normal.      Deep Tendon Reflexes: Reflexes normal.   Psychiatric:         Mood and Affect: Mood normal.         Behavior: Behavior normal.         Thought Content: Thought content normal.         Judgment: Judgment normal.          Labs:  Discussed with patient.    Lab Results   Component Value Date     (H) 03/08/2023     03/08/2023    K 4.1 03/08/2023     03/08/2023    CO2 23 03/08/2023    BUN 19 03/08/2023    CREATININE 1.35 (H) 03/08/2023    CALCIUM 8.9 03/08/2023    PROT 7.5 09/21/2022    ALBUMIN 4.1 03/08/2023    ALBUMIN 3.6 10/07/2022    BILITOT <0.2 03/08/2023    ALKPHOS 91 09/21/2022    AST 15 03/08/2023    ALT 8 03/08/2023    ANIONGAP 9 10/07/2022    ESTGFRAFRICA >60 03/30/2022    EGFRNONAA >60 03/30/2022     Lab Results   Component Value Date    WBC 6.1 03/08/2023    WBC 7.41 09/23/2022    WBC 7.41 09/23/2022    RBC 3.53 (L) 03/08/2023    RBC 3.45 (L) 09/23/2022    RBC 3.45 (L) 09/23/2022    HGB 11.0 (L) 03/08/2023    HGB 10.3 (L) 09/23/2022    HGB 10.3 (L) 09/23/2022    HCT 32.0 (L) 03/08/2023    HCT 30.8 (L) 09/23/2022    HCT 30.8 (L) 09/23/2022    HCT 26 (L) 07/07/2016    MCV 91 03/08/2023    MCV 89 09/23/2022    MCV 89 09/23/2022    RDW 13.8 03/08/2023    RDW 12.6 09/23/2022    RDW 12.6  09/23/2022     03/08/2023     09/23/2022     09/23/2022      Lab Results   Component Value Date    CHOL 118 03/08/2023    TRIG 147 03/08/2023    HDL 35 (L) 03/08/2023    LDLCALC 57 03/08/2023    TOTALCHOLEST 2.7 09/22/2022     Lab Results   Component Value Date    TSH 1.190 03/08/2023     Lab Results   Component Value Date    HGBA1C 6.2 (H) 03/08/2023    HGBA1C 6.0 (H) 09/22/2022    ESTIMATEDAVG 126 09/22/2022      No components found for: MICROALBUMIN/CREATININE    ASSESSMENT & PLAN:    1. Controlled type 2 diabetes mellitus with stage 3 chronic kidney disease, without long-term current use of insulin  - Ambulatory referral/consult to Ophthalmology; Future  The current medical regimen is effective;  continue present plan and medications.  2. Essential hypertension  The current medical regimen is effective;  continue present plan and medications.  3. Pure hypercholesterolemia  The current medical regimen is effective;  continue present plan and medications.  4. Aortic atherosclerosis  Continue antiplatelets-anticoagulation and statin to decrease chances of progression, in addition to diet, exercise, and weight management.Continue antiplatelets-anticoagulation and statin to decrease chances of progression, in addition to diet, exercise, and weight management.  5. Coronary artery disease involving coronary bypass graft of native heart without angina pectoris  - pravastatin (PRAVACHOL) 40 MG tablet; Take 1 tablet (40 mg total) by mouth once daily.  Dispense: 90 tablet; Refill: 3    6. Diastolic dysfunction  Clinically euvolemic with no evidence of heart failure  7. Hypokalemia  Potassium was normal this time  8. Elevated PSA    9. Anemia of chronic disease    10. BMI 24.0-24.9, adult    11. Idiopathic chronic gout without tophus, unspecified site    12. History of syncope    13. S/P CABG (coronary artery bypass graft)    14. Acute URI  Mostly viral, symptomatic treatment and keep well hydrated and  observe  Dexamethasone injection was given in the office and promethazine prescribed to be taken on p.r.n. basis.    Patient was advised to keep well hydrated, and avoid dehydration.  Patient may take Tylenol 650 mg q.6 hours p.r.n. for pain or fever.  Patient may contact us for any questions or new issues.  Patient was advised to go to the emergency room for any distress or new symptoms.  15. Insomnia, unspecified type  - traZODone (DESYREL) 150 MG tablet; TAKE 1 TABLET(150 MG) BY MOUTH EVERY NIGHT AS NEEDED FOR INSOMNIA Strength: 150 mg  Dispense: 90 tablet; Refill: 0    16. Hyperlipidemia, unspecified hyperlipidemia type  - pravastatin (PRAVACHOL) 40 MG tablet; Take 1 tablet (40 mg total) by mouth once daily.  Dispense: 90 tablet; Refill: 3    17. BPH with obstruction/lower urinary tract symptoms  - Ambulatory referral/consult to Urology; Future    18. Bilateral renal cysts             Orders Placed This Encounter   Procedures    Ambulatory referral/consult to Urology    Ambulatory referral/consult to Ophthalmology      Follow up in about 3 months (around 6/22/2023), or if symptoms worsen or fail to improve. or sooner as needed.    Patient was counseled and questions and concerns were addressed.    Please note:  Parts of this report were done using a dictation software, voice to text, and sometimes the text contains some uncorrected words or sentences that are missed during revision.

## 2023-04-14 ENCOUNTER — OFFICE VISIT (OUTPATIENT)
Dept: UROLOGY | Facility: CLINIC | Age: 88
End: 2023-04-14
Payer: COMMERCIAL

## 2023-04-14 VITALS — BODY MASS INDEX: 24.25 KG/M2 | WEIGHT: 150.13 LBS

## 2023-04-14 DIAGNOSIS — N40.1 BPH WITH OBSTRUCTION/LOWER URINARY TRACT SYMPTOMS: Primary | ICD-10-CM

## 2023-04-14 DIAGNOSIS — N13.8 BPH WITH OBSTRUCTION/LOWER URINARY TRACT SYMPTOMS: Primary | ICD-10-CM

## 2023-04-14 DIAGNOSIS — N21.0 BLADDER STONES: ICD-10-CM

## 2023-04-14 DIAGNOSIS — R33.9 URINARY RETENTION: ICD-10-CM

## 2023-04-14 LAB
BILIRUB SERPL-MCNC: NEGATIVE MG/DL
BLOOD URINE, POC: NEGATIVE
COLOR, POC UA: YELLOW
GLUCOSE UR QL STRIP: NORMAL
KETONES UR QL STRIP: NEGATIVE
LEUKOCYTE ESTERASE URINE, POC: NEGATIVE
NITRITE, POC UA: NEGATIVE
PH, POC UA: 5
PROTEIN, POC: NORMAL
SPECIFIC GRAVITY, POC UA: 1015
UROBILINOGEN, POC UA: NORMAL

## 2023-04-14 PROCEDURE — 1159F MED LIST DOCD IN RCRD: CPT | Mod: CPTII,S$GLB,, | Performed by: STUDENT IN AN ORGANIZED HEALTH CARE EDUCATION/TRAINING PROGRAM

## 2023-04-14 PROCEDURE — 1126F PR PAIN SEVERITY QUANTIFIED, NO PAIN PRESENT: ICD-10-PCS | Mod: CPTII,S$GLB,, | Performed by: STUDENT IN AN ORGANIZED HEALTH CARE EDUCATION/TRAINING PROGRAM

## 2023-04-14 PROCEDURE — 99999 PR PBB SHADOW E&M-EST. PATIENT-LVL IV: ICD-10-PCS | Mod: PBBFAC,,, | Performed by: STUDENT IN AN ORGANIZED HEALTH CARE EDUCATION/TRAINING PROGRAM

## 2023-04-14 PROCEDURE — 1157F PR ADVANCE CARE PLAN OR EQUIV PRESENT IN MEDICAL RECORD: ICD-10-PCS | Mod: CPTII,S$GLB,, | Performed by: STUDENT IN AN ORGANIZED HEALTH CARE EDUCATION/TRAINING PROGRAM

## 2023-04-14 PROCEDURE — 81001 POCT URINALYSIS, DIPSTICK OR TABLET REAGENT, AUTOMATED, WITH MICROSCOP: ICD-10-PCS | Mod: S$GLB,,, | Performed by: STUDENT IN AN ORGANIZED HEALTH CARE EDUCATION/TRAINING PROGRAM

## 2023-04-14 PROCEDURE — 99214 PR OFFICE/OUTPT VISIT, EST, LEVL IV, 30-39 MIN: ICD-10-PCS | Mod: S$GLB,,, | Performed by: STUDENT IN AN ORGANIZED HEALTH CARE EDUCATION/TRAINING PROGRAM

## 2023-04-14 PROCEDURE — 1126F AMNT PAIN NOTED NONE PRSNT: CPT | Mod: CPTII,S$GLB,, | Performed by: STUDENT IN AN ORGANIZED HEALTH CARE EDUCATION/TRAINING PROGRAM

## 2023-04-14 PROCEDURE — 99999 PR PBB SHADOW E&M-EST. PATIENT-LVL IV: CPT | Mod: PBBFAC,,, | Performed by: STUDENT IN AN ORGANIZED HEALTH CARE EDUCATION/TRAINING PROGRAM

## 2023-04-14 PROCEDURE — 99214 OFFICE O/P EST MOD 30 MIN: CPT | Mod: S$GLB,,, | Performed by: STUDENT IN AN ORGANIZED HEALTH CARE EDUCATION/TRAINING PROGRAM

## 2023-04-14 PROCEDURE — 1157F ADVNC CARE PLAN IN RCRD: CPT | Mod: CPTII,S$GLB,, | Performed by: STUDENT IN AN ORGANIZED HEALTH CARE EDUCATION/TRAINING PROGRAM

## 2023-04-14 PROCEDURE — 81001 URINALYSIS AUTO W/SCOPE: CPT | Mod: S$GLB,,, | Performed by: STUDENT IN AN ORGANIZED HEALTH CARE EDUCATION/TRAINING PROGRAM

## 2023-04-14 PROCEDURE — 1101F PT FALLS ASSESS-DOCD LE1/YR: CPT | Mod: CPTII,S$GLB,, | Performed by: STUDENT IN AN ORGANIZED HEALTH CARE EDUCATION/TRAINING PROGRAM

## 2023-04-14 PROCEDURE — 1101F PR PT FALLS ASSESS DOC 0-1 FALLS W/OUT INJ PAST YR: ICD-10-PCS | Mod: CPTII,S$GLB,, | Performed by: STUDENT IN AN ORGANIZED HEALTH CARE EDUCATION/TRAINING PROGRAM

## 2023-04-14 PROCEDURE — 3288F FALL RISK ASSESSMENT DOCD: CPT | Mod: CPTII,S$GLB,, | Performed by: STUDENT IN AN ORGANIZED HEALTH CARE EDUCATION/TRAINING PROGRAM

## 2023-04-14 PROCEDURE — 1159F PR MEDICATION LIST DOCUMENTED IN MEDICAL RECORD: ICD-10-PCS | Mod: CPTII,S$GLB,, | Performed by: STUDENT IN AN ORGANIZED HEALTH CARE EDUCATION/TRAINING PROGRAM

## 2023-04-14 PROCEDURE — 3288F PR FALLS RISK ASSESSMENT DOCUMENTED: ICD-10-PCS | Mod: CPTII,S$GLB,, | Performed by: STUDENT IN AN ORGANIZED HEALTH CARE EDUCATION/TRAINING PROGRAM

## 2023-04-14 NOTE — H&P (VIEW-ONLY)
Patient ID: Destin Barber is a 88 y.o. male.    Chief Complaint: Benign Prostatic Hypertrophy    Referral: Gatito Mcdonald MD  [address]  88 y.o. who presents to the Urology clinic for evaluation of BPH/LUTS/ chronic urinary retention. He has failed voiding trials in the past.  Meds are ineffective, he is interested in a procedure if possible. Denies recurrent UTI. Patient w/ hx of bladder stones, declined management in the past ( 10/2020)/. Prior negative hematuria work up in the past., former smoker.     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    Hypokalemia 02/03/2017    Controlled type 2 diabetes mellitus with stage 3 chronic kidney disease, without long-term current use of insulin 02/03/2017    Aortic atherosclerosis 08/31/2020    BMI 24.0-24.9, adult 03/07/2023    History of syncope 03/07/2023    Elevated PSA 03/07/2023    BPH with obstruction/lower urinary tract symptoms 03/22/2023    Bilateral renal cysts 03/22/2023     Resolved Ambulatory Problems     Diagnosis Date Noted    Type 2 diabetes mellitus with complication     Hypertension, benign 01/31/2013    NSTEMI (non-ST elevated myocardial infarction) 06/23/2016    Pain of left upper extremity 06/23/2016    Coronary artery disease due to lipid rich plaque 06/24/2016    Hyperglycemia 07/07/2016    Chronic diastolic CHF (congestive heart failure), NYHA class 2 07/08/2016    Gait instability 07/12/2016    Sepsis 02/02/2017    Influenza A 02/03/2017    SOB (shortness of breath) 02/03/2017    Paresthesia 03/12/2018     Past Medical  History:   Diagnosis Date    Acute coronary syndrome 06/24/2016    Anemia     Coronary artery disease     Gout, chronic     Heart failure     HTN (hypertension)     Hyperlipidemia     Myocardial infarction     Pneumonia due to other staphylococcus     Pressure ulcer     Renal manifestation of secondary diabetes mellitus     Type 2 diabetes mellitus          Past Surgical History  Past Surgical History:   Procedure Laterality Date    CATARACT EXTRACTION Bilateral     CATARACT EXTRACTION W/ ANTERIOR VITRECTOMY      CORONARY ARTERY BYPASS GRAFT  07/06/2016    PAIZ-LAD, SVG-Ramus, SVG-PDA    HEMORRHOID SURGERY         Social History  Social Connections: Moderately Isolated    Frequency of Communication with Friends and Family: More than three times a week    Frequency of Social Gatherings with Friends and Family: More than three times a week    Attends Jewish Services: Never    Active Member of Clubs or Organizations: No    Attends Club or Organization Meetings: Never    Marital Status:        Medications    Current Outpatient Medications:     albuterol (PROVENTIL/VENTOLIN HFA) 90 mcg/actuation inhaler, Inhale 1-2 puffs into the lungs every 4 (four) hours as needed for Shortness of Breath. Rescue, Disp: 18 g, Rfl: 0    amLODIPine (NORVASC) 10 MG tablet, Take 1 tablet (10 mg total) by mouth once daily., Disp: 90 tablet, Rfl: 0    aspirin (ASPIR-LOW) 81 MG EC tablet, Take 1 tablet (81 mg total) by mouth once daily., Disp: 90 tablet, Rfl: 3    diclofenac sodium (VOLTAREN) 1 % Gel, Apply 2 g topically 4 (four) times daily., Disp: 450 g, Rfl: 0    isosorbide mononitrate (IMDUR) 60 MG 24 hr tablet, Take 1 tablet (60 mg total) by mouth once daily., Disp: 90 tablet, Rfl: 3    lisinopriL (PRINIVIL,ZESTRIL) 40 MG tablet, Take 1 tablet (40 mg total) by mouth once daily., Disp: 90 tablet, Rfl: 3    metFORMIN (GLUCOPHAGE-XR) 500 MG ER 24hr tablet, TAKE 1 TABLET(500 MG) BY MOUTH DAILY WITH BREAKFAST Strength: 500 mg, Disp:  90 tablet, Rfl: 0    metoprolol tartrate (LOPRESSOR) 50 MG tablet, Take 1 tablet (50 mg total) by mouth 2 (two) times daily., Disp: 180 tablet, Rfl: 0    pravastatin (PRAVACHOL) 40 MG tablet, Take 1 tablet (40 mg total) by mouth once daily., Disp: 90 tablet, Rfl: 3    promethazine (PHENERGAN) 6.25 mg/5 mL syrup, Take 10 mLs (12.5 mg total) by mouth every 6 (six) hours as needed for Nausea (Congestion)., Disp: 437 mL, Rfl: 0    tamsulosin (FLOMAX) 0.4 mg Cap, Take 1 capsule (0.4 mg total) by mouth once daily., Disp: 90 capsule, Rfl: 2    traZODone (DESYREL) 150 MG tablet, TAKE 1 TABLET(150 MG) BY MOUTH EVERY NIGHT AS NEEDED FOR INSOMNIA Strength: 150 mg, Disp: 90 tablet, Rfl: 0    nitroGLYCERIN (NITROSTAT) 0.4 MG SL tablet, Place 1 tablet (0.4 mg total) under the tongue every 5 (five) minutes as needed for Chest pain., Disp: 30 tablet, Rfl: 0    Allergies  Review of patient's allergies indicates:   Allergen Reactions    Penicillins Nausea And Vomiting     Pt reports he is not allergic to penicillin         Patient's PMH, FH, Social hx, Medications, allergies reviewed and updated as pertinent to today's visit    Objective:      Physical Exam  Constitutional:       General: He is not in acute distress.     Appearance: He is well-developed. He is not ill-appearing, toxic-appearing or diaphoretic.   HENT:      Head: Normocephalic and atraumatic.      Mouth/Throat:      Mouth: Mucous membranes are moist.   Eyes:      Conjunctiva/sclera: Conjunctivae normal.   Cardiovascular:      Rate and Rhythm: Normal rate and regular rhythm.   Pulmonary:      Effort: Pulmonary effort is normal. No respiratory distress.   Abdominal:      General: Abdomen is flat. There is no distension.      Palpations: Abdomen is soft. There is no mass.      Tenderness: There is no abdominal tenderness. There is no right CVA tenderness, left CVA tenderness or guarding.   Musculoskeletal:         General: No swelling or deformity.      Cervical back:  Neck supple.   Skin:     General: Skin is warm.      Capillary Refill: Capillary refill takes less than 2 seconds.      Findings: No rash.   Neurological:      Mental Status: He is alert and oriented to person, place, and time.      Gait: Gait normal.   Psychiatric:         Mood and Affect: Mood normal.         Thought Content: Thought content normal.         Judgment: Judgment normal.           Assessment:       1. BPH with obstruction/lower urinary tract symptoms    2. Bladder stones    3. Urinary retention        Plan:       PVR 300cc  Discussed with small prostate gland and incomplete bladder emptying, recommend UDS to eval patient's residual bladder function prior to DEL RIO procedure  RBUS to reassess patient's prostate volume, last obtained 2018, 18 g  He may be a candidate for sacral neuromodulation if he is found to be unable to void post DEL RIO procedure

## 2023-04-14 NOTE — PROGRESS NOTES
Patient ID: Destin Barber is a 88 y.o. male.    Chief Complaint: Benign Prostatic Hypertrophy    Referral: Gatito Mcdonald MD  [address]  88 y.o. who presents to the Urology clinic for evaluation of BPH/LUTS/ chronic urinary retention. He has failed voiding trials in the past.  Meds are ineffective, he is interested in a procedure if possible. Denies recurrent UTI. Patient w/ hx of bladder stones, declined management in the past ( 10/2020)/. Prior negative hematuria work up in the past., former smoker.     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    Hypokalemia 02/03/2017    Controlled type 2 diabetes mellitus with stage 3 chronic kidney disease, without long-term current use of insulin 02/03/2017    Aortic atherosclerosis 08/31/2020    BMI 24.0-24.9, adult 03/07/2023    History of syncope 03/07/2023    Elevated PSA 03/07/2023    BPH with obstruction/lower urinary tract symptoms 03/22/2023    Bilateral renal cysts 03/22/2023     Resolved Ambulatory Problems     Diagnosis Date Noted    Type 2 diabetes mellitus with complication     Hypertension, benign 01/31/2013    NSTEMI (non-ST elevated myocardial infarction) 06/23/2016    Pain of left upper extremity 06/23/2016    Coronary artery disease due to lipid rich plaque 06/24/2016    Hyperglycemia 07/07/2016    Chronic diastolic CHF (congestive heart failure), NYHA class 2 07/08/2016    Gait instability 07/12/2016    Sepsis 02/02/2017    Influenza A 02/03/2017    SOB (shortness of breath) 02/03/2017    Paresthesia 03/12/2018     Past Medical  History:   Diagnosis Date    Acute coronary syndrome 06/24/2016    Anemia     Coronary artery disease     Gout, chronic     Heart failure     HTN (hypertension)     Hyperlipidemia     Myocardial infarction     Pneumonia due to other staphylococcus     Pressure ulcer     Renal manifestation of secondary diabetes mellitus     Type 2 diabetes mellitus          Past Surgical History  Past Surgical History:   Procedure Laterality Date    CATARACT EXTRACTION Bilateral     CATARACT EXTRACTION W/ ANTERIOR VITRECTOMY      CORONARY ARTERY BYPASS GRAFT  07/06/2016    PAIZ-LAD, SVG-Ramus, SVG-PDA    HEMORRHOID SURGERY         Social History  Social Connections: Moderately Isolated    Frequency of Communication with Friends and Family: More than three times a week    Frequency of Social Gatherings with Friends and Family: More than three times a week    Attends Hindu Services: Never    Active Member of Clubs or Organizations: No    Attends Club or Organization Meetings: Never    Marital Status:        Medications    Current Outpatient Medications:     albuterol (PROVENTIL/VENTOLIN HFA) 90 mcg/actuation inhaler, Inhale 1-2 puffs into the lungs every 4 (four) hours as needed for Shortness of Breath. Rescue, Disp: 18 g, Rfl: 0    amLODIPine (NORVASC) 10 MG tablet, Take 1 tablet (10 mg total) by mouth once daily., Disp: 90 tablet, Rfl: 0    aspirin (ASPIR-LOW) 81 MG EC tablet, Take 1 tablet (81 mg total) by mouth once daily., Disp: 90 tablet, Rfl: 3    diclofenac sodium (VOLTAREN) 1 % Gel, Apply 2 g topically 4 (four) times daily., Disp: 450 g, Rfl: 0    isosorbide mononitrate (IMDUR) 60 MG 24 hr tablet, Take 1 tablet (60 mg total) by mouth once daily., Disp: 90 tablet, Rfl: 3    lisinopriL (PRINIVIL,ZESTRIL) 40 MG tablet, Take 1 tablet (40 mg total) by mouth once daily., Disp: 90 tablet, Rfl: 3    metFORMIN (GLUCOPHAGE-XR) 500 MG ER 24hr tablet, TAKE 1 TABLET(500 MG) BY MOUTH DAILY WITH BREAKFAST Strength: 500 mg, Disp:  90 tablet, Rfl: 0    metoprolol tartrate (LOPRESSOR) 50 MG tablet, Take 1 tablet (50 mg total) by mouth 2 (two) times daily., Disp: 180 tablet, Rfl: 0    pravastatin (PRAVACHOL) 40 MG tablet, Take 1 tablet (40 mg total) by mouth once daily., Disp: 90 tablet, Rfl: 3    promethazine (PHENERGAN) 6.25 mg/5 mL syrup, Take 10 mLs (12.5 mg total) by mouth every 6 (six) hours as needed for Nausea (Congestion)., Disp: 437 mL, Rfl: 0    tamsulosin (FLOMAX) 0.4 mg Cap, Take 1 capsule (0.4 mg total) by mouth once daily., Disp: 90 capsule, Rfl: 2    traZODone (DESYREL) 150 MG tablet, TAKE 1 TABLET(150 MG) BY MOUTH EVERY NIGHT AS NEEDED FOR INSOMNIA Strength: 150 mg, Disp: 90 tablet, Rfl: 0    nitroGLYCERIN (NITROSTAT) 0.4 MG SL tablet, Place 1 tablet (0.4 mg total) under the tongue every 5 (five) minutes as needed for Chest pain., Disp: 30 tablet, Rfl: 0    Allergies  Review of patient's allergies indicates:   Allergen Reactions    Penicillins Nausea And Vomiting     Pt reports he is not allergic to penicillin         Patient's PMH, FH, Social hx, Medications, allergies reviewed and updated as pertinent to today's visit    Objective:      Physical Exam  Constitutional:       General: He is not in acute distress.     Appearance: He is well-developed. He is not ill-appearing, toxic-appearing or diaphoretic.   HENT:      Head: Normocephalic and atraumatic.      Mouth/Throat:      Mouth: Mucous membranes are moist.   Eyes:      Conjunctiva/sclera: Conjunctivae normal.   Cardiovascular:      Rate and Rhythm: Normal rate and regular rhythm.   Pulmonary:      Effort: Pulmonary effort is normal. No respiratory distress.   Abdominal:      General: Abdomen is flat. There is no distension.      Palpations: Abdomen is soft. There is no mass.      Tenderness: There is no abdominal tenderness. There is no right CVA tenderness, left CVA tenderness or guarding.   Musculoskeletal:         General: No swelling or deformity.      Cervical back:  Neck supple.   Skin:     General: Skin is warm.      Capillary Refill: Capillary refill takes less than 2 seconds.      Findings: No rash.   Neurological:      Mental Status: He is alert and oriented to person, place, and time.      Gait: Gait normal.   Psychiatric:         Mood and Affect: Mood normal.         Thought Content: Thought content normal.         Judgment: Judgment normal.           Assessment:       1. BPH with obstruction/lower urinary tract symptoms    2. Bladder stones    3. Urinary retention        Plan:       PVR 300cc  Discussed with small prostate gland and incomplete bladder emptying, recommend UDS to eval patient's residual bladder function prior to DEL RIO procedure  RBUS to reassess patient's prostate volume, last obtained 2018, 18 g  He may be a candidate for sacral neuromodulation if he is found to be unable to void post DEL RIO procedure

## 2023-04-21 ENCOUNTER — HOSPITAL ENCOUNTER (OUTPATIENT)
Dept: PREADMISSION TESTING | Facility: HOSPITAL | Age: 88
Discharge: HOME | End: 2023-04-21
Attending: STUDENT IN AN ORGANIZED HEALTH CARE EDUCATION/TRAINING PROGRAM
Payer: COMMERCIAL

## 2023-04-21 VITALS — HEIGHT: 66 IN | WEIGHT: 149.94 LBS | BODY MASS INDEX: 24.1 KG/M2

## 2023-04-21 NOTE — PLAN OF CARE
Pre-operative instructions, medication directives and pain scales reviewed with patient. All questions the patient had were answered. Re-assurance about surgical procedure and day of surgery routine given as needed, patient verbalized understanding of the pre-op instructions. Phone preop completed with elodia Pope 4/21/23

## 2023-04-25 ENCOUNTER — HOSPITAL ENCOUNTER (OUTPATIENT)
Facility: HOSPITAL | Age: 88
Discharge: HOME | End: 2023-04-25
Attending: STUDENT IN AN ORGANIZED HEALTH CARE EDUCATION/TRAINING PROGRAM | Admitting: STUDENT IN AN ORGANIZED HEALTH CARE EDUCATION/TRAINING PROGRAM
Payer: COMMERCIAL

## 2023-04-25 DIAGNOSIS — N13.8 BPH WITH OBSTRUCTION/LOWER URINARY TRACT SYMPTOMS: Primary | ICD-10-CM

## 2023-04-25 DIAGNOSIS — N40.1 BPH WITH OBSTRUCTION/LOWER URINARY TRACT SYMPTOMS: Primary | ICD-10-CM

## 2023-04-25 PROCEDURE — 51600 INJECTION FOR BLADDER X-RAY: CPT | Mod: 51,,, | Performed by: STUDENT IN AN ORGANIZED HEALTH CARE EDUCATION/TRAINING PROGRAM

## 2023-04-25 PROCEDURE — 25500020 PHARM REV CODE 255: Performed by: STUDENT IN AN ORGANIZED HEALTH CARE EDUCATION/TRAINING PROGRAM

## 2023-04-25 PROCEDURE — 51784 ANAL/URINARY MUSCLE STUDY: CPT | Mod: 26,51,, | Performed by: STUDENT IN AN ORGANIZED HEALTH CARE EDUCATION/TRAINING PROGRAM

## 2023-04-25 PROCEDURE — 51797 PR VOIDING PRESS STUDY INTRA-ABDOMINAL VOID: ICD-10-PCS | Mod: 26,,, | Performed by: STUDENT IN AN ORGANIZED HEALTH CARE EDUCATION/TRAINING PROGRAM

## 2023-04-25 PROCEDURE — 51741 ELECTRO-UROFLOWMETRY FIRST: CPT | Mod: 26,51,, | Performed by: STUDENT IN AN ORGANIZED HEALTH CARE EDUCATION/TRAINING PROGRAM

## 2023-04-25 PROCEDURE — 51741 PR UROFLOWMETRY, COMPLEX: ICD-10-PCS | Mod: 26,51,, | Performed by: STUDENT IN AN ORGANIZED HEALTH CARE EDUCATION/TRAINING PROGRAM

## 2023-04-25 PROCEDURE — 36000704 HC OR TIME LEV I 1ST 15 MIN: Performed by: STUDENT IN AN ORGANIZED HEALTH CARE EDUCATION/TRAINING PROGRAM

## 2023-04-25 PROCEDURE — 51784 PR ANAL/URINARY MUSCLE STUDY: ICD-10-PCS | Mod: 26,51,, | Performed by: STUDENT IN AN ORGANIZED HEALTH CARE EDUCATION/TRAINING PROGRAM

## 2023-04-25 PROCEDURE — 51797 INTRAABDOMINAL PRESSURE TEST: CPT | Mod: 26,,, | Performed by: STUDENT IN AN ORGANIZED HEALTH CARE EDUCATION/TRAINING PROGRAM

## 2023-04-25 PROCEDURE — 25000003 PHARM REV CODE 250: Performed by: STUDENT IN AN ORGANIZED HEALTH CARE EDUCATION/TRAINING PROGRAM

## 2023-04-25 PROCEDURE — 36000705 HC OR TIME LEV I EA ADD 15 MIN: Performed by: STUDENT IN AN ORGANIZED HEALTH CARE EDUCATION/TRAINING PROGRAM

## 2023-04-25 PROCEDURE — 51600 PR INJECTION FOR BLADDER X-RAY: ICD-10-PCS | Mod: 51,,, | Performed by: STUDENT IN AN ORGANIZED HEALTH CARE EDUCATION/TRAINING PROGRAM

## 2023-04-25 PROCEDURE — 51728 CYSTOMETROGRAM W/VP: CPT | Mod: 26,,, | Performed by: STUDENT IN AN ORGANIZED HEALTH CARE EDUCATION/TRAINING PROGRAM

## 2023-04-25 PROCEDURE — 51728 PR COMPLEX CYSTOMETROGRAM VOIDING PRESSURE STUDIES: ICD-10-PCS | Mod: 26,,, | Performed by: STUDENT IN AN ORGANIZED HEALTH CARE EDUCATION/TRAINING PROGRAM

## 2023-04-25 PROCEDURE — 71000015 HC POSTOP RECOV 1ST HR: Performed by: STUDENT IN AN ORGANIZED HEALTH CARE EDUCATION/TRAINING PROGRAM

## 2023-04-25 PROCEDURE — 74455 X-RAY URETHRA/BLADDER: CPT | Mod: 26,,, | Performed by: STUDENT IN AN ORGANIZED HEALTH CARE EDUCATION/TRAINING PROGRAM

## 2023-04-25 PROCEDURE — 74455 PR X-RAY URETHROCYSTOGRAM+VOIDING: ICD-10-PCS | Mod: 26,,, | Performed by: STUDENT IN AN ORGANIZED HEALTH CARE EDUCATION/TRAINING PROGRAM

## 2023-04-25 RX ORDER — LIDOCAINE HYDROCHLORIDE 20 MG/ML
JELLY TOPICAL
Status: DISCONTINUED | OUTPATIENT
Start: 2023-04-25 | End: 2023-04-25 | Stop reason: HOSPADM

## 2023-04-25 RX ORDER — CIPROFLOXACIN 500 MG/1
500 TABLET ORAL EVERY 12 HOURS
Qty: 2 TABLET | Refills: 0 | Status: SHIPPED | OUTPATIENT
Start: 2023-04-25 | End: 2023-04-26

## 2023-04-25 NOTE — BRIEF OP NOTE
[facility] - Surgery  Brief Operative Note    Surgery Date: 2023     Surgeon(s) and Role:     * Raji Mcmullen MD - Primary    Assisting Surgeon: None    Pre-op Diagnosis:  BPH with obstruction/lower urinary tract symptoms [N40.1, N13.8]    Post-op Diagnosis:  Post-Op Diagnosis Codes:     * BPH with obstruction/lower urinary tract symptoms [N40.1, N13.8]    Procedure(s) (LRB):  URODYNAMIC STUDY, FLUOROSCOPIC (N/A)    Anesthesia: Local    Operative Findings:   See below      Estimated Blood Loss: < 2cc         Specimens:none        Operative Note      Date of Procedure:  2023 11:32 AM     Procedure:   1. Complex urodynamics with fluoroscopy      Indication for procedure:  Urinary retention, bladder stone, lower urinary tract symptoms    Description of procedure:  The patient was brought to the urodynamics suite and transferred to the urodynamics chair. Full timeout procedures were performed identifying the correct patient and procedure. Appropriate antibiotics were given prior to commencement of surgery. A 14-Guyanese red rubber catheter was then placed per urethra after genitals were prepped with Betadine solution to remove any residual urine in bladder.  P1 and P2 catheters were placed in the urethra into the bladder and rectally respectively. EMG senses were placed in the appropriate location on perineum and left leg. The bladder was filled at an appropriately slow rate.    PVR pre-procedure: 400 mL  Uroflow-   Max flow 26.3ml/s  Avf flow 11 ml/s  Time to max flow 4.8s  Bell shaped curve    Filling phase:  Rate of fillin-50 mL/min  First sensation: 56 mL  First desire: 56 mL  Strong desire: 488 mL  Capacity: 491 mL  Permission to void given at capacity     Stress maneuvers (Valsalva and cough) at 100 mL: no leak  VLLP: none cm H2O  Compliance: normal  ALLP: none  Involuntary bladder contraction: no    Voiding phase:  Bladder contraction: no DO  Coordination: YES  Flow rate (max): 7.1 mL/s  Flow  rate (avg): 1.1 mL/s ( volume missed container)  Pdet (max flow): 27 cm H2O  Max Pdet: 28 cm H2O  Voided volume: 35 mL  PVR: 450 mL upon insertion of straight catheter    Fluoroscopy:  Bladder wall: diverticuli noted  Voiding: open bladder neck with voiding  Vesicoureteral reflux: none  Radiographic PVR: yes    Sterile water/ conray admixture was used as a medium at a rate  30-50 mL per minute.  First sensation was at 56mL, strong desire at 488, capacity was 491 mL.  The patient 491 voided with a maximum flow rate of 7.1 mL per second.    Patient had a detrusor pressure of 27 cm of water.  Maximum detrusor pressure was   28. PVR was 450 mL.  There was no bladder sphincter   dyssynergia.  There were no uninhibited bladder contractions.           Anesthesia: Local    Complications: none    Estimated Blood Loss (EBL): 0cc          Drains: none    Specimens: none     Attestation: I was present the entirety of the procedure.     Disposition:  Patient is stable and deemed appropriate for discharge home. The patient will follow up in 2-3 weeks.        Recommendations:   Incomplete bladder emptying, recommend bladder outlet procedure ( TURP, Urolift, etc depending on size of prostate, ultrasound pending)                                                  Discharge Note    OUTCOME: Patient tolerated treatment/procedure well without complication and is now ready for discharge.    DISPOSITION: Home or Self Care    FINAL DIAGNOSIS:  BPH with obstruction/lower urinary tract symptoms    FOLLOWUP: In clinic    DISCHARGE INSTRUCTIONS:    Discharge Procedure Orders   Diet general     No dressing needed     Activity as tolerated        Clinical Reference Documents Added to Patient Instructions         Document    URODYNAMIC TESTING (ENGLISH)

## 2023-04-25 NOTE — PROGRESS NOTES
during chart review, patients BP noted to be elevated, MD not notified  patient discharged already  will have patient follow up with PCP vs ED if found to be symptomatic

## 2023-04-25 NOTE — INTERVAL H&P NOTE
The patient has been examined and the H&P has been reviewed:    I concur with the findings and no changes have occurred since H&P was written.    Procedure risks, benefits and alternative options discussed and understood by patient/family.      Denies fevers, chills, chest pain, dysuria, nausea, vomiting    There are no hospital problems to display for this patient.

## 2023-04-25 NOTE — DISCHARGE INSTRUCTIONS
DIET: You may resume your home diet.     Medications: Pain medication should be taken only if needed and as directed. If antibiotics are prescribed, the  medication should be taken until completed. You will be given an updated list of you medications.    CALL THE DOCTOR:  Fever over 101°F  Severe pain that doesnt go away with medication.  Upset stomach and vomiting that is persistent.  Problems urinating-unable to urinate or heavy bleeding (with or without clots)     Please schedule a follow-up appointment if you don't have one already.

## 2023-04-26 VITALS
RESPIRATION RATE: 18 BRPM | BODY MASS INDEX: 24.19 KG/M2 | WEIGHT: 149.88 LBS | OXYGEN SATURATION: 97 % | HEART RATE: 62 BPM | SYSTOLIC BLOOD PRESSURE: 168 MMHG | DIASTOLIC BLOOD PRESSURE: 84 MMHG | TEMPERATURE: 98 F

## 2023-04-26 NOTE — OR NURSING
Late note. Upon discharge the patient 's blood pressure was remeasured with manual cuff. It was 168/84 . Asympotmatic Patient was instructed to go home and take his blood pressure medicine.

## 2023-04-27 ENCOUNTER — TELEPHONE (OUTPATIENT)
Dept: UROLOGY | Facility: CLINIC | Age: 88
End: 2023-04-27
Payer: COMMERCIAL

## 2023-04-27 NOTE — TELEPHONE ENCOUNTER
No answer/mailbox full      ----- Message from Raji Mcmullen MD sent at 4/27/2023  3:25 PM CDT -----  Regarding: rbus  Patient missed his renal ultrasound appt pls help reschedule prior to his upcoming appt with me, thanks

## 2023-04-28 DIAGNOSIS — I10 ESSENTIAL HYPERTENSION: ICD-10-CM

## 2023-04-28 DIAGNOSIS — E11.22 CONTROLLED TYPE 2 DIABETES MELLITUS WITH STAGE 3 CHRONIC KIDNEY DISEASE, WITHOUT LONG-TERM CURRENT USE OF INSULIN: ICD-10-CM

## 2023-04-28 DIAGNOSIS — N18.30 CONTROLLED TYPE 2 DIABETES MELLITUS WITH STAGE 3 CHRONIC KIDNEY DISEASE, WITHOUT LONG-TERM CURRENT USE OF INSULIN: ICD-10-CM

## 2023-04-28 DIAGNOSIS — I25.10 CORONARY ARTERY DISEASE INVOLVING NATIVE CORONARY ARTERY OF NATIVE HEART WITHOUT ANGINA PECTORIS: ICD-10-CM

## 2023-04-28 DIAGNOSIS — E11.40 TYPE 2 DIABETES MELLITUS WITH DIABETIC NEUROPATHY, WITHOUT LONG-TERM CURRENT USE OF INSULIN: ICD-10-CM

## 2023-04-28 RX ORDER — METOPROLOL TARTRATE 50 MG/1
TABLET ORAL
Qty: 180 TABLET | Refills: 0 | Status: SHIPPED | OUTPATIENT
Start: 2023-04-28 | End: 2023-06-15

## 2023-04-28 RX ORDER — METFORMIN HYDROCHLORIDE 500 MG/1
TABLET, EXTENDED RELEASE ORAL
Qty: 90 TABLET | Refills: 0 | Status: SHIPPED | OUTPATIENT
Start: 2023-04-28 | End: 2023-05-22 | Stop reason: SDUPTHER

## 2023-04-28 NOTE — TELEPHONE ENCOUNTER
Refill Routing Note   Medication(s) are not appropriate for processing by [facility] for the following reason(s):      Non-participating provider    [facility] action(s):  Route              Appointments  past 12m or future 3m with PCP    Date Provider   Last Visit   3/22/2023 Gatito Mcdonald MD   Next Visit   6/15/2023 Gatito Mcdonald MD   ED visits in past 90 days: 1        Note composed:12:16 PM 04/28/2023

## 2023-05-05 ENCOUNTER — OFFICE VISIT (OUTPATIENT)
Dept: UROLOGY | Facility: CLINIC | Age: 88
End: 2023-05-05
Payer: COMMERCIAL

## 2023-05-05 VITALS — WEIGHT: 149.13 LBS | BODY MASS INDEX: 23.97 KG/M2 | HEIGHT: 66 IN

## 2023-05-05 DIAGNOSIS — R33.9 URINARY RETENTION: ICD-10-CM

## 2023-05-05 DIAGNOSIS — N40.1 BPH WITH OBSTRUCTION/LOWER URINARY TRACT SYMPTOMS: Primary | ICD-10-CM

## 2023-05-05 DIAGNOSIS — N21.0 BLADDER STONE: ICD-10-CM

## 2023-05-05 DIAGNOSIS — N13.8 BPH WITH OBSTRUCTION/LOWER URINARY TRACT SYMPTOMS: Primary | ICD-10-CM

## 2023-05-05 PROCEDURE — 1160F PR REVIEW ALL MEDS BY PRESCRIBER/CLIN PHARMACIST DOCUMENTED: ICD-10-PCS | Mod: CPTII,S$GLB,, | Performed by: STUDENT IN AN ORGANIZED HEALTH CARE EDUCATION/TRAINING PROGRAM

## 2023-05-05 PROCEDURE — 3288F PR FALLS RISK ASSESSMENT DOCUMENTED: ICD-10-PCS | Mod: CPTII,S$GLB,, | Performed by: STUDENT IN AN ORGANIZED HEALTH CARE EDUCATION/TRAINING PROGRAM

## 2023-05-05 PROCEDURE — 99999 PR PBB SHADOW E&M-EST. PATIENT-LVL IV: CPT | Mod: PBBFAC,,, | Performed by: STUDENT IN AN ORGANIZED HEALTH CARE EDUCATION/TRAINING PROGRAM

## 2023-05-05 PROCEDURE — 99999 PR PBB SHADOW E&M-EST. PATIENT-LVL IV: ICD-10-PCS | Mod: PBBFAC,,, | Performed by: STUDENT IN AN ORGANIZED HEALTH CARE EDUCATION/TRAINING PROGRAM

## 2023-05-05 PROCEDURE — 1157F ADVNC CARE PLAN IN RCRD: CPT | Mod: CPTII,S$GLB,, | Performed by: STUDENT IN AN ORGANIZED HEALTH CARE EDUCATION/TRAINING PROGRAM

## 2023-05-05 PROCEDURE — 1157F PR ADVANCE CARE PLAN OR EQUIV PRESENT IN MEDICAL RECORD: ICD-10-PCS | Mod: CPTII,S$GLB,, | Performed by: STUDENT IN AN ORGANIZED HEALTH CARE EDUCATION/TRAINING PROGRAM

## 2023-05-05 PROCEDURE — 1159F PR MEDICATION LIST DOCUMENTED IN MEDICAL RECORD: ICD-10-PCS | Mod: CPTII,S$GLB,, | Performed by: STUDENT IN AN ORGANIZED HEALTH CARE EDUCATION/TRAINING PROGRAM

## 2023-05-05 PROCEDURE — 1126F PR PAIN SEVERITY QUANTIFIED, NO PAIN PRESENT: ICD-10-PCS | Mod: CPTII,S$GLB,, | Performed by: STUDENT IN AN ORGANIZED HEALTH CARE EDUCATION/TRAINING PROGRAM

## 2023-05-05 PROCEDURE — 1160F RVW MEDS BY RX/DR IN RCRD: CPT | Mod: CPTII,S$GLB,, | Performed by: STUDENT IN AN ORGANIZED HEALTH CARE EDUCATION/TRAINING PROGRAM

## 2023-05-05 PROCEDURE — 1159F MED LIST DOCD IN RCRD: CPT | Mod: CPTII,S$GLB,, | Performed by: STUDENT IN AN ORGANIZED HEALTH CARE EDUCATION/TRAINING PROGRAM

## 2023-05-05 PROCEDURE — 99213 OFFICE O/P EST LOW 20 MIN: CPT | Mod: S$GLB,,, | Performed by: STUDENT IN AN ORGANIZED HEALTH CARE EDUCATION/TRAINING PROGRAM

## 2023-05-05 PROCEDURE — 3288F FALL RISK ASSESSMENT DOCD: CPT | Mod: CPTII,S$GLB,, | Performed by: STUDENT IN AN ORGANIZED HEALTH CARE EDUCATION/TRAINING PROGRAM

## 2023-05-05 PROCEDURE — 1126F AMNT PAIN NOTED NONE PRSNT: CPT | Mod: CPTII,S$GLB,, | Performed by: STUDENT IN AN ORGANIZED HEALTH CARE EDUCATION/TRAINING PROGRAM

## 2023-05-05 PROCEDURE — 1101F PR PT FALLS ASSESS DOC 0-1 FALLS W/OUT INJ PAST YR: ICD-10-PCS | Mod: CPTII,S$GLB,, | Performed by: STUDENT IN AN ORGANIZED HEALTH CARE EDUCATION/TRAINING PROGRAM

## 2023-05-05 PROCEDURE — 1101F PT FALLS ASSESS-DOCD LE1/YR: CPT | Mod: CPTII,S$GLB,, | Performed by: STUDENT IN AN ORGANIZED HEALTH CARE EDUCATION/TRAINING PROGRAM

## 2023-05-05 PROCEDURE — 99213 PR OFFICE/OUTPT VISIT, EST, LEVL III, 20-29 MIN: ICD-10-PCS | Mod: S$GLB,,, | Performed by: STUDENT IN AN ORGANIZED HEALTH CARE EDUCATION/TRAINING PROGRAM

## 2023-05-05 RX ORDER — CIPROFLOXACIN 2 MG/ML
400 INJECTION, SOLUTION INTRAVENOUS
Status: CANCELLED | OUTPATIENT
Start: 2023-05-05

## 2023-05-05 NOTE — PROGRESS NOTES
Patient ID: Destin Barber is a 88 y.o. male.    Chief Complaint: Follow-up    Referral: No referring provider defined for this encounter.     HPI  88 y.o. who presents to the Urology clinic for evaluation of difficulty voiding. Noted to have DEL RIO on UDS. Hx of untreated bladder stone. Pending RBUS. Accompanied by daughter.     Medically Necessary ROS documented in HPI  Review of Systems   Constitutional:  Negative for chills and fever.   HENT:  Negative for congestion and sore throat.    Eyes:  Negative for blurred vision and redness.   Respiratory:  Negative for cough and shortness of breath.    Cardiovascular:  Negative for chest pain and leg swelling.   Gastrointestinal:  Negative for abdominal pain, blood in stool, constipation, nausea and vomiting.   Genitourinary:  Negative for dysuria, flank pain, frequency, hematuria and urgency.   Musculoskeletal:  Negative for back pain.   Skin:  Negative for rash.   Neurological:  Negative for dizziness and headaches.   Psychiatric/Behavioral:  Negative for depression. The patient is not nervous/anxious.        Past Medical History  Active Ambulatory Problems     Diagnosis Date Noted    Essential hypertension     Pure hypercholesterolemia     Diastolic dysfunction     Anemia of chronic disease 06/23/2016    Coronary artery disease involving coronary bypass graft of native heart without angina pectoris     S/P CABG (coronary artery bypass graft) 07/07/2016    Chronic gout without tophus 07/13/2016    Hypokalemia 02/03/2017    Controlled type 2 diabetes mellitus with stage 3 chronic kidney disease, without long-term current use of insulin 02/03/2017    Aortic atherosclerosis 08/31/2020    BMI 24.0-24.9, adult 03/07/2023    History of syncope 03/07/2023    Elevated PSA 03/07/2023    BPH with obstruction/lower urinary tract symptoms 03/22/2023    Bilateral renal cysts 03/22/2023     Resolved Ambulatory Problems     Diagnosis Date Noted    Type 2 diabetes mellitus with  complication     Hypertension, benign 01/31/2013    NSTEMI (non-ST elevated myocardial infarction) 06/23/2016    Pain of left upper extremity 06/23/2016    Coronary artery disease due to lipid rich plaque 06/24/2016    Hyperglycemia 07/07/2016    Chronic diastolic CHF (congestive heart failure), NYHA class 2 07/08/2016    Gait instability 07/12/2016    Sepsis 02/02/2017    Influenza A 02/03/2017    SOB (shortness of breath) 02/03/2017    Paresthesia 03/12/2018     Past Medical History:   Diagnosis Date    Acute coronary syndrome 06/24/2016    Anemia     Coronary artery disease     Gout, chronic     Heart failure     HTN (hypertension)     Hyperlipidemia     Myocardial infarction     Pneumonia due to other staphylococcus     Pressure ulcer     Renal manifestation of secondary diabetes mellitus     Type 2 diabetes mellitus          Past Surgical History  Past Surgical History:   Procedure Laterality Date    CATARACT EXTRACTION Bilateral     CATARACT EXTRACTION W/ ANTERIOR VITRECTOMY      CORONARY ARTERY BYPASS GRAFT  07/06/2016    PAIZ-LAD, SVG-Ramus, SVG-PDA    FLUOROSCOPIC URODYNAMIC STUDY N/A 4/25/2023    Procedure: URODYNAMIC STUDY, FLUOROSCOPIC;  Surgeon: Raji Mcmullen MD;  Location: [facility];  Service: Urology;  Laterality: N/A;  RN PHONE PREOP WITH GRANDDAUGHTER ROGER 4/21/23    HEMORRHOID SURGERY         Social History  Social Connections: Moderately Isolated    Frequency of Communication with Friends and Family: More than three times a week    Frequency of Social Gatherings with Friends and Family: More than three times a week    Attends Zoroastrianism Services: Never    Active Member of Clubs or Organizations: No    Attends Club or Organization Meetings: Never    Marital Status:        Medications    Current Outpatient Medications:     albuterol (PROVENTIL/VENTOLIN HFA) 90 mcg/actuation inhaler, Inhale 1-2 puffs into the lungs every 4 (four) hours as needed for Shortness of Breath. Rescue, Disp: 18 g,  Rfl: 0    aspirin (ASPIR-LOW) 81 MG EC tablet, Take 1 tablet (81 mg total) by mouth once daily., Disp: 90 tablet, Rfl: 3    isosorbide mononitrate (IMDUR) 60 MG 24 hr tablet, Take 1 tablet (60 mg total) by mouth once daily., Disp: 90 tablet, Rfl: 3    lisinopriL (PRINIVIL,ZESTRIL) 40 MG tablet, Take 1 tablet (40 mg total) by mouth once daily., Disp: 90 tablet, Rfl: 3    metFORMIN (GLUCOPHAGE-XR) 500 MG ER 24hr tablet, TAKE 1 TABLET BY MOUTH DAILY WITH BREAKFAST, Disp: 90 tablet, Rfl: 0    metoprolol tartrate (LOPRESSOR) 50 MG tablet, TAKE 1 TABLET(50 MG) BY MOUTH TWICE DAILY, Disp: 180 tablet, Rfl: 0    nitroGLYCERIN (NITROSTAT) 0.4 MG SL tablet, Place 1 tablet (0.4 mg total) under the tongue every 5 (five) minutes as needed for Chest pain., Disp: 30 tablet, Rfl: 0    pravastatin (PRAVACHOL) 40 MG tablet, Take 1 tablet (40 mg total) by mouth once daily., Disp: 90 tablet, Rfl: 3    tamsulosin (FLOMAX) 0.4 mg Cap, Take 1 capsule (0.4 mg total) by mouth once daily., Disp: 90 capsule, Rfl: 2    amLODIPine (NORVASC) 10 MG tablet, Take 1 tablet (10 mg total) by mouth once daily. (Patient not taking: Reported on 5/5/2023), Disp: 90 tablet, Rfl: 0    Allergies  Review of patient's allergies indicates:   Allergen Reactions    Penicillins Nausea And Vomiting     Pt reports he is not allergic to penicillin         Patient's PMH, FH, Social hx, Medications, allergies reviewed and updated as pertinent to today's visit    Objective:      Physical Exam  Constitutional:       General: He is not in acute distress.     Appearance: He is well-developed. He is not ill-appearing, toxic-appearing or diaphoretic.   HENT:      Head: Normocephalic and atraumatic.      Mouth/Throat:      Mouth: Mucous membranes are moist.   Eyes:      Conjunctiva/sclera: Conjunctivae normal.   Cardiovascular:      Rate and Rhythm: Normal rate and regular rhythm.   Pulmonary:      Effort: Pulmonary effort is normal. No respiratory distress.   Abdominal:       General: There is no distension.      Palpations: Abdomen is soft. There is no mass.      Tenderness: There is no abdominal tenderness. There is no guarding.   Musculoskeletal:         General: No swelling or deformity.      Cervical back: Neck supple.   Skin:     General: Skin is warm.      Capillary Refill: Capillary refill takes less than 2 seconds.      Findings: No rash.   Neurological:      Mental Status: He is alert and oriented to person, place, and time.      Gait: Gait normal.   Psychiatric:         Mood and Affect: Mood normal.         Thought Content: Thought content normal.         Judgment: Judgment normal.           Assessment:       1. BPH with obstruction/lower urinary tract symptoms    2. Bladder stone    3. Urinary retention        Plan:         RBUS due to evaluate prostate size  TURP/ HOLEP/ cystolithalopaxy    Patient with medication refractory BPH  Concerned about diverticula and incomplete bladder emptying. DEL RIO procedure would alleviate pressure off of bladder with chronic outlet obstruction changes.     Discussed bladder outlet procedure options  ( TURP, HoLEP, Urolift, Simple prostatectomy etc). Educational pamphlet provided   Patient elected for the most definitive surgery option that will allow him to be catheter free the soonest, HoLEP.     Discussed risk of bleeding, infection, damage to surrouding structures  Patient to follow up for a voiding trial 1-2 days after procedure.   Discussed risk of incontinence urge and to a lesser degree stress incontinence for 6-8 weeks post procedure.    Hold anticoagulants prior to procedure to minimize risk of post op blood loss/hemorrhage

## 2023-05-09 ENCOUNTER — ANESTHESIA EVENT (OUTPATIENT)
Dept: SURGERY | Facility: HOSPITAL | Age: 88
End: 2023-05-09
Payer: COMMERCIAL

## 2023-05-17 ENCOUNTER — PATIENT MESSAGE (OUTPATIENT)
Dept: SURGERY | Facility: HOSPITAL | Age: 88
End: 2023-05-17
Payer: COMMERCIAL

## 2023-05-19 DIAGNOSIS — E11.22 CONTROLLED TYPE 2 DIABETES MELLITUS WITH STAGE 3 CHRONIC KIDNEY DISEASE, WITHOUT LONG-TERM CURRENT USE OF INSULIN: ICD-10-CM

## 2023-05-19 DIAGNOSIS — E11.40 TYPE 2 DIABETES MELLITUS WITH DIABETIC NEUROPATHY, WITHOUT LONG-TERM CURRENT USE OF INSULIN: ICD-10-CM

## 2023-05-19 DIAGNOSIS — N18.30 CONTROLLED TYPE 2 DIABETES MELLITUS WITH STAGE 3 CHRONIC KIDNEY DISEASE, WITHOUT LONG-TERM CURRENT USE OF INSULIN: ICD-10-CM

## 2023-05-20 RX ORDER — METFORMIN HYDROCHLORIDE 500 MG/1
TABLET, EXTENDED RELEASE ORAL
Qty: 90 TABLET | Refills: 0 | OUTPATIENT
Start: 2023-05-20

## 2023-05-20 NOTE — TELEPHONE ENCOUNTER
Patient has to come in to establish care or you should send to his PCP.  I have never seen him before.

## 2023-05-22 DIAGNOSIS — E11.22 CONTROLLED TYPE 2 DIABETES MELLITUS WITH STAGE 3 CHRONIC KIDNEY DISEASE, WITHOUT LONG-TERM CURRENT USE OF INSULIN: ICD-10-CM

## 2023-05-22 DIAGNOSIS — E11.40 TYPE 2 DIABETES MELLITUS WITH DIABETIC NEUROPATHY, WITHOUT LONG-TERM CURRENT USE OF INSULIN: ICD-10-CM

## 2023-05-22 DIAGNOSIS — N18.30 CONTROLLED TYPE 2 DIABETES MELLITUS WITH STAGE 3 CHRONIC KIDNEY DISEASE, WITHOUT LONG-TERM CURRENT USE OF INSULIN: ICD-10-CM

## 2023-05-22 RX ORDER — METFORMIN HYDROCHLORIDE 500 MG/1
TABLET, EXTENDED RELEASE ORAL
Qty: 90 TABLET | Refills: 0 | Status: SHIPPED | OUTPATIENT
Start: 2023-05-22 | End: 2023-07-21 | Stop reason: SDUPTHER

## 2023-05-30 ENCOUNTER — HOSPITAL ENCOUNTER (OUTPATIENT)
Dept: PREADMISSION TESTING | Facility: HOSPITAL | Age: 88
Discharge: HOME | End: 2023-05-30
Attending: STUDENT IN AN ORGANIZED HEALTH CARE EDUCATION/TRAINING PROGRAM
Payer: COMMERCIAL

## 2023-05-30 VITALS
OXYGEN SATURATION: 100 % | TEMPERATURE: 97 F | WEIGHT: 147.5 LBS | DIASTOLIC BLOOD PRESSURE: 77 MMHG | SYSTOLIC BLOOD PRESSURE: 165 MMHG | BODY MASS INDEX: 23.7 KG/M2 | HEIGHT: 66 IN | HEART RATE: 77 BPM | RESPIRATION RATE: 18 BRPM

## 2023-05-30 DIAGNOSIS — N40.1 BPH WITH OBSTRUCTION/LOWER URINARY TRACT SYMPTOMS: ICD-10-CM

## 2023-05-30 DIAGNOSIS — N21.0 BLADDER STONE: ICD-10-CM

## 2023-05-30 DIAGNOSIS — N13.8 BPH WITH OBSTRUCTION/LOWER URINARY TRACT SYMPTOMS: ICD-10-CM

## 2023-05-30 DIAGNOSIS — Z01.818 PREOPERATIVE TESTING: Primary | ICD-10-CM

## 2023-05-30 LAB
ANION GAP SERPL CALC-SCNC: 9 MMOL/L (ref 8–16)
BASOPHILS # BLD AUTO: 0.02 K/UL (ref 0–0.2)
BASOPHILS NFR BLD: 0.3 % (ref 0–1.9)
BUN SERPL-MCNC: 20 MG/DL (ref 8–23)
CALCIUM SERPL-MCNC: 9.3 MG/DL (ref 8.7–10.5)
CHLORIDE SERPL-SCNC: 111 MMOL/L (ref 95–110)
CO2 SERPL-SCNC: 21 MMOL/L (ref 23–29)
CREAT SERPL-MCNC: 1.2 MG/DL (ref 0.5–1.4)
DIFFERENTIAL METHOD: ABNORMAL
EOSINOPHIL # BLD AUTO: 0.2 K/UL (ref 0–0.5)
EOSINOPHIL NFR BLD: 2.8 % (ref 0–8)
ERYTHROCYTE [DISTWIDTH] IN BLOOD BY AUTOMATED COUNT: 12.7 % (ref 11.5–14.5)
EST. GFR  (NO RACE VARIABLE): 59 ML/MIN/1.73 M^2
GLUCOSE SERPL-MCNC: 92 MG/DL (ref 70–110)
HCT VFR BLD AUTO: 33.5 % (ref 40–54)
HGB BLD-MCNC: 11.9 G/DL (ref 14–18)
IMM GRANULOCYTES # BLD AUTO: 0.01 K/UL (ref 0–0.04)
IMM GRANULOCYTES NFR BLD AUTO: 0.1 % (ref 0–0.5)
LYMPHOCYTES # BLD AUTO: 4.1 K/UL (ref 1–4.8)
LYMPHOCYTES NFR BLD: 59.8 % (ref 18–48)
MCH RBC QN AUTO: 31.4 PG (ref 27–31)
MCHC RBC AUTO-ENTMCNC: 35.5 G/DL (ref 32–36)
MCV RBC AUTO: 88 FL (ref 82–98)
MONOCYTES # BLD AUTO: 0.6 K/UL (ref 0.3–1)
MONOCYTES NFR BLD: 8.6 % (ref 4–15)
NEUTROPHILS # BLD AUTO: 2 K/UL (ref 1.8–7.7)
NEUTROPHILS NFR BLD: 28.4 % (ref 38–73)
NRBC BLD-RTO: 0 /100 WBC
PLATELET # BLD AUTO: 197 K/UL (ref 150–450)
PMV BLD AUTO: 9.9 FL (ref 9.2–12.9)
POTASSIUM SERPL-SCNC: 3.7 MMOL/L (ref 3.5–5.1)
RBC # BLD AUTO: 3.79 M/UL (ref 4.6–6.2)
SODIUM SERPL-SCNC: 141 MMOL/L (ref 136–145)
WBC # BLD AUTO: 6.89 K/UL (ref 3.9–12.7)

## 2023-05-30 PROCEDURE — 36415 COLL VENOUS BLD VENIPUNCTURE: CPT | Performed by: STUDENT IN AN ORGANIZED HEALTH CARE EDUCATION/TRAINING PROGRAM

## 2023-05-30 PROCEDURE — 93010 ELECTROCARDIOGRAM REPORT: CPT | Mod: ,,, | Performed by: INTERNAL MEDICINE

## 2023-05-30 PROCEDURE — 93010 EKG 12-LEAD: ICD-10-PCS | Mod: ,,, | Performed by: INTERNAL MEDICINE

## 2023-05-30 PROCEDURE — 80048 BASIC METABOLIC PNL TOTAL CA: CPT | Performed by: STUDENT IN AN ORGANIZED HEALTH CARE EDUCATION/TRAINING PROGRAM

## 2023-05-30 PROCEDURE — 93005 ELECTROCARDIOGRAM TRACING: CPT

## 2023-05-30 PROCEDURE — 85025 COMPLETE CBC W/AUTO DIFF WBC: CPT | Performed by: STUDENT IN AN ORGANIZED HEALTH CARE EDUCATION/TRAINING PROGRAM

## 2023-05-30 NOTE — ANESTHESIA PREPROCEDURE EVALUATION
2023  Destin Barber is a 88 y.o., male   To undergo Procedure(s) (LRB):  ENUCLEATION, PROSTATE, USING LASER; cystolithalopaxy (N/A)     Denies CP/SOB/GERD/CVA/URI symptoms.  Endorses MI s/p CABG.  METS > 4  NPO > 8    Past Medical History:  Past Medical History:   Diagnosis Date    Acute coronary syndrome 2016    s/p 3V CABG 2016    Anemia     Coronary artery disease     Diastolic dysfunction     Gout, chronic     Heart failure     HTN (hypertension)     Hyperlipidemia     Myocardial infarction     Pneumonia due to other staphylococcus     Pressure ulcer     Renal manifestation of secondary diabetes mellitus     Type 2 diabetes mellitus     diet controlled       Past Surgical History:  Past Surgical History:   Procedure Laterality Date    CATARACT EXTRACTION Bilateral     CATARACT EXTRACTION W/ ANTERIOR VITRECTOMY      CORONARY ARTERY BYPASS GRAFT  2016    PAIZ-LAD, SVG-Ramus, SVG-PDA    FLUOROSCOPIC URODYNAMIC STUDY N/A 2023    Procedure: URODYNAMIC STUDY, FLUOROSCOPIC;  Surgeon: Raji Mcmullen MD;  Location: [facility];  Service: Urology;  Laterality: N/A;  RN PHONE PREOP WITH GRANDDAUGHTER ROGER 23    HEMORRHOID SURGERY         Social History:  Social History     Socioeconomic History    Marital status:     Number of children: 3    Highest education level: 6th grade   Tobacco Use    Smoking status: Former     Types: Cigars     Start date:      Quit date:      Years since quittin.5    Smokeless tobacco: Never    Tobacco comments:     Former smoker. Pt. Smoked 2 cigars daily.   Substance and Sexual Activity    Alcohol use: No    Drug use: No    Sexual activity: Not Currently     Partners: Female     Social Determinants of Health     Financial Resource Strain: Low Risk     Difficulty of Paying Living Expenses: Not hard at all    Food Insecurity: No Food Insecurity    Worried About Running Out of Food in the Last Year: Never true    Ran Out of Food in the Last Year: Never true   Transportation Needs: No Transportation Needs    Lack of Transportation (Medical): No    Lack of Transportation (Non-Medical): No   Physical Activity: Sufficiently Active    Days of Exercise per Week: 5 days    Minutes of Exercise per Session: 30 min   Stress: No Stress Concern Present    Feeling of Stress : Not at all   Social Connections: Moderately Isolated    Frequency of Communication with Friends and Family: More than three times a week    Frequency of Social Gatherings with Friends and Family: More than three times a week    Attends Voodoo Services: Never    Active Member of Clubs or Organizations: No    Attends Club or Organization Meetings: Never    Marital Status:    Housing Stability: Unknown    Unable to Pay for Housing in the Last Year: No    Unstable Housing in the Last Year: No       Medications:  No current facility-administered medications on file prior to encounter.     Current Outpatient Medications on File Prior to Encounter   Medication Sig Dispense Refill    amLODIPine (NORVASC) 10 MG tablet Take 1 tablet (10 mg total) by mouth once daily. 90 tablet 0    aspirin (ASPIR-LOW) 81 MG EC tablet Take 1 tablet (81 mg total) by mouth once daily. 90 tablet 3    isosorbide mononitrate (IMDUR) 60 MG 24 hr tablet Take 1 tablet (60 mg total) by mouth once daily. 90 tablet 3    lisinopriL (PRINIVIL,ZESTRIL) 40 MG tablet Take 1 tablet (40 mg total) by mouth once daily. 90 tablet 3    nitroGLYCERIN (NITROSTAT) 0.4 MG SL tablet Place 1 tablet (0.4 mg total) under the tongue every 5 (five) minutes as needed for Chest pain. 30 tablet 0    pravastatin (PRAVACHOL) 40 MG tablet Take 1 tablet (40 mg total) by mouth once daily. 90 tablet 3    tamsulosin (FLOMAX) 0.4 mg Cap Take 1 capsule (0.4 mg total) by mouth once daily. 90 capsule 2        Allergies:  Review of patient's allergies indicates:   Allergen Reactions    Penicillins Nausea And Vomiting     Pt reports he is not allergic to penicillin         Active Problems:  Patient Active Problem List   Diagnosis    Essential hypertension    Pure hypercholesterolemia    Diastolic dysfunction    Anemia of chronic disease    Coronary artery disease involving coronary bypass graft of native heart without angina pectoris    S/P CABG (coronary artery bypass graft)    Chronic gout without tophus    Hypokalemia    Controlled type 2 diabetes mellitus with stage 3 chronic kidney disease, without long-term current use of insulin    Aortic atherosclerosis    BMI 24.0-24.9, adult    History of syncope    Elevated PSA    BPH with obstruction/lower urinary tract symptoms    Bilateral renal cysts    Stage 3a chronic kidney disease    Primary insomnia    Chronic idiopathic constipation       Diagnostic Studies:   Latest Reference Range & Units 06/30/23 09:00   WBC 3.90 - 12.70 K/uL 6.61   RBC 4.60 - 6.20 M/uL 3.51 (L)   Hemoglobin 14.0 - 18.0 g/dL 11.0 (L)   Hematocrit 40.0 - 54.0 % 32.3 (L)   MCV 82 - 98 fL 92   MCH 27.0 - 31.0 pg 31.3 (H)   MCHC 32.0 - 36.0 g/dL 34.1   RDW 11.5 - 14.5 % 12.8   Platelets 150 - 450 K/uL 159      Latest Reference Range & Units 06/30/23 09:00   Sodium 136 - 145 mmol/L 138   Potassium 3.5 - 5.1 mmol/L 3.7   Chloride 95 - 110 mmol/L 107   CO2 23 - 29 mmol/L 20 (L)   Anion Gap 8 - 16 mmol/L 11   BUN 8 - 23 mg/dL 25 (H)   Creatinine 0.5 - 1.4 mg/dL 1.6 (H)     EKG (5/30/23):  Normal sinus rhythm   Left axis deviation   Right bundle branch block    Nuclear Stress (9/22/22):    Abnormal myocardial perfusion scan.    There are two significant perfusion abnormalities.    Perfusion Abnormality #1 - There is a moderate to severe intensity, moderate sized, mostly fixed perfusion abnormality with some reversibilty in the basal to mid inferior wall(s).    Perfusion  Abnormality #2 - There is a small to moderate sized, mild to moderate intensity, reversible perfusion abnormality that is consistent with ischemia in the lateral wall(s).    There are no other significant perfusion abnormalities.    The gated perfusion images showed an ejection fraction of 79% at rest.    There is normal wall motion at rest and post stress.    The EKG portion of this study is negative for ischemia.    The patient reported no chest pain during the stress test.    There were no arrhythmias during stress.    TTE (9/22/22):   The estimated ejection fraction is 65%.   The left ventricle is normal in size with mild concentric hypertrophy and normal systolic function.   Moderate to severe left atrial enlargement.   Grade I left ventricular diastolic dysfunction.   Mild pulmonic regurgitation.   Normal right ventricular size with normal right ventricular systolic function.   Mild right atrial enlargement.   Normal central venous pressure (3 mmHg).   The estimated PA systolic pressure is 20 mmHg.   Mild tricuspid regurgitation.   Mild aortic regurgitation.    24 Hour Vitals:      See Nursing Charting For Additional Vitals    Pre-op Assessment    I have reviewed the Patient Summary Reports.     I have reviewed the Nursing Notes. I have reviewed the NPO Status.   I have reviewed the Medications.     Review of Systems  Anesthesia Hx:  No problems with previous Anesthesia  Denies Family Hx of Anesthesia complications.   Denies Personal Hx of Anesthesia complications.   Social:  Former Smoker, No Alcohol Use    Hematology/Oncology:     Oncology Normal    -- Anemia:   EENT/Dental:EENT/Dental Normal   Cardiovascular:   Exercise tolerance: good Hypertension Past MI CAD  CABG/stent  CHF hyperlipidemia ECG has been reviewed. CAD being medically managed Functional Capacity good / => 4 METS    Pulmonary:  Pulmonary Normal    Renal/:   Chronic Renal Disease BPH    Hepatic/GI:  Hepatic/GI Normal   "  Musculoskeletal:  Musculoskeletal Normal    Neurological:  Neurology Normal    Endocrine:   Diabetes, type 2    Dermatological:  Skin Normal    Psych:  Psychiatric Normal           Physical Exam  General: Well nourished and Cooperative    Airway:  Mallampati: II   Mouth Opening: Normal  TM Distance: Normal      Dental:  Edentulous    Chest/Lungs:  Clear to auscultation, Normal Respiratory Rate    Heart:  Rate: Normal  Rhythm: Regular Rhythm        Anesthesia Plan  Type of Anesthesia, risks & benefits discussed:    Anesthesia Type: Gen ETT  Intra-op Monitoring Plan: Standard ASA Monitors  Post Op Pain Control Plan: multimodal analgesia and IV/PO Opioids PRN  Induction:  IV  Airway Plan: Direct and Video, Post-Induction  Informed Consent: Informed consent signed with the Patient and all parties understand the risks and agree with anesthesia plan.  All questions answered. Patient consented to blood products? Yes  ASA Score: 3  Anesthesia Plan Notes: PMHx significant for CAD/ACS s/p 3V CABG 7/2016, HTN, HLP, DM, abnl EKG  Cleared by Dr. Candelaria-"Cleared for prostate surgery at moderate cardiac risk. Ok to hold ASA 5 days before."    GA with OETT  Standard ASA monitors  Recovery in PACU  PONV: 2    Ready For Surgery From Anesthesia Perspective.     .      "

## 2023-05-30 NOTE — DISCHARGE INSTRUCTIONS
Before 7 AM, enter through the Emergency Entrance..   After 7 AM enter through the Main Entrance.      Your procedure  is scheduled for ___6/6/2023_______.    Call [phone] between 2pm and 5pm on _6/5/2023______to find out your arrival time for the day of surgery.    You may have one visitor.  No children allowed.     You will be going to the Same Day Surgery Unit on the 2nd floor of the hospital.    Important instructions:  Do not eat anything after midnight.  You may have plain water, non carbonated.  You may also have Gatorade or Powerade after midnight.    Stop all fluids 2 hours before your surgery.    It is okay to brush your teeth.  Do not have gum, candy or mints.    SEE MEDICATION SHEET.   TAKE MEDICATIONS AS DIRECTED WITH SIPS OF WATER.      Do not take any diabetic medication on the morning of surgery unless instructed to do so by your doctor or pre op nurse.    STOP taking Aspirin, Ibuprofen,  Advil, Motrin, Mobic(meloxicam), Aleve (naproxen), Fish oil, and Vitamin E for at least 7 days before your surgery.     You may take Tylenol if needed which is not a blood thinner.    Please shower the night before and the morning of your surgery.      Contact lenses and removable denture work may not be worn during your procedure.    You may wear deodorant only. If you are having breast surgery, do not wear deodorant on the operative side.    Do not wear powder, body lotion, perfume/cologne or make-up.    Do not wear any jewelry or have any metal on your body.    You will be asked to remove any dentures or partials for the procedure.    If you are going home on the same day of surgery, you must arrange for a family member or a friend to drive you home.  Public transportation is prohibited.  You will not be able to drive home if you were given anesthesia or sedation.    Patients who want to have their Post-op prescriptions filled from our in-house [facility], bring a Credit/Debit Card or cash  with you. A co-pay may be required.  The pharmacy closes at 5:30 pm.    Wear loose fitting clothes allowing for bandages.    Please leave money and valuables home.      You may bring your cell phone.    Call the doctor if fever or illness should occur before your surgery.    Call [phone] to contact us here if needed.                            CLOTHES ON DAY OF SURGERY    SHOULDER surgery:  you must have a very oversized shirt.  Very, Very large.  You will probably have a large sling on with your arm strapped to your chest.  You will not be able to put the arm of the operated shoulder into a sleeve.  You can put the arm of the un-operated shoulder into the sleeve, but the shirt will need to be draped over the operated shoulder.       ARM or HAND surgery:  make sure that your sleeves are large and loose enough to pass over large dressings or cast.      BREAST or UNDERARM surgery:  wear a loose, button down shirt so that you can dress without raising your arms over your head.    ABDOMINAL surgery:  wear loose, comfortable clothing.  Nothing tight around the abdomen.  NO JEANS    PENIS or SCROTAL surgery:  loose comfortable clothing.  Large sweat pants, pajama pants or a robe.  ABSOLUTELY NO JEANS      LEG or FOOT surgery:  wear large loose pants that are able to pass over any large dressings or casts.  You could also wear loose shorts or a skirt.

## 2023-06-01 ENCOUNTER — OFFICE VISIT (OUTPATIENT)
Dept: CARDIOLOGY | Facility: CLINIC | Age: 88
End: 2023-06-01
Payer: COMMERCIAL

## 2023-06-01 VITALS
HEIGHT: 66 IN | WEIGHT: 148.25 LBS | RESPIRATION RATE: 18 BRPM | OXYGEN SATURATION: 98 % | SYSTOLIC BLOOD PRESSURE: 152 MMHG | BODY MASS INDEX: 23.83 KG/M2 | HEART RATE: 71 BPM | DIASTOLIC BLOOD PRESSURE: 88 MMHG

## 2023-06-01 DIAGNOSIS — Z95.1 S/P CABG (CORONARY ARTERY BYPASS GRAFT): Primary | ICD-10-CM

## 2023-06-01 DIAGNOSIS — I25.10 CAD (CORONARY ARTERY DISEASE): ICD-10-CM

## 2023-06-01 DIAGNOSIS — E78.00 PURE HYPERCHOLESTEROLEMIA: ICD-10-CM

## 2023-06-01 DIAGNOSIS — Z87.898 HISTORY OF SYNCOPE: ICD-10-CM

## 2023-06-01 DIAGNOSIS — I10 ESSENTIAL HYPERTENSION: ICD-10-CM

## 2023-06-01 DIAGNOSIS — I51.89 DIASTOLIC DYSFUNCTION: ICD-10-CM

## 2023-06-01 DIAGNOSIS — I25.810 CORONARY ARTERY DISEASE INVOLVING CORONARY BYPASS GRAFT OF NATIVE HEART WITHOUT ANGINA PECTORIS: ICD-10-CM

## 2023-06-01 DIAGNOSIS — I70.0 AORTIC ATHEROSCLEROSIS: ICD-10-CM

## 2023-06-01 PROCEDURE — 1159F MED LIST DOCD IN RCRD: CPT | Mod: CPTII,S$GLB,, | Performed by: INTERNAL MEDICINE

## 2023-06-01 PROCEDURE — 1126F AMNT PAIN NOTED NONE PRSNT: CPT | Mod: CPTII,S$GLB,, | Performed by: INTERNAL MEDICINE

## 2023-06-01 PROCEDURE — 1126F PR PAIN SEVERITY QUANTIFIED, NO PAIN PRESENT: ICD-10-PCS | Mod: CPTII,S$GLB,, | Performed by: INTERNAL MEDICINE

## 2023-06-01 PROCEDURE — 99214 PR OFFICE/OUTPT VISIT, EST, LEVL IV, 30-39 MIN: ICD-10-PCS | Mod: S$GLB,,, | Performed by: INTERNAL MEDICINE

## 2023-06-01 PROCEDURE — 99999 PR PBB SHADOW E&M-EST. PATIENT-LVL IV: CPT | Mod: PBBFAC,,, | Performed by: INTERNAL MEDICINE

## 2023-06-01 PROCEDURE — 99214 OFFICE O/P EST MOD 30 MIN: CPT | Mod: S$GLB,,, | Performed by: INTERNAL MEDICINE

## 2023-06-01 PROCEDURE — 1159F PR MEDICATION LIST DOCUMENTED IN MEDICAL RECORD: ICD-10-PCS | Mod: CPTII,S$GLB,, | Performed by: INTERNAL MEDICINE

## 2023-06-01 PROCEDURE — 1101F PR PT FALLS ASSESS DOC 0-1 FALLS W/OUT INJ PAST YR: ICD-10-PCS | Mod: CPTII,S$GLB,, | Performed by: INTERNAL MEDICINE

## 2023-06-01 PROCEDURE — 3288F FALL RISK ASSESSMENT DOCD: CPT | Mod: CPTII,S$GLB,, | Performed by: INTERNAL MEDICINE

## 2023-06-01 PROCEDURE — 1157F ADVNC CARE PLAN IN RCRD: CPT | Mod: CPTII,S$GLB,, | Performed by: INTERNAL MEDICINE

## 2023-06-01 PROCEDURE — 3288F PR FALLS RISK ASSESSMENT DOCUMENTED: ICD-10-PCS | Mod: CPTII,S$GLB,, | Performed by: INTERNAL MEDICINE

## 2023-06-01 PROCEDURE — 1157F PR ADVANCE CARE PLAN OR EQUIV PRESENT IN MEDICAL RECORD: ICD-10-PCS | Mod: CPTII,S$GLB,, | Performed by: INTERNAL MEDICINE

## 2023-06-01 PROCEDURE — 99999 PR PBB SHADOW E&M-EST. PATIENT-LVL IV: ICD-10-PCS | Mod: PBBFAC,,, | Performed by: INTERNAL MEDICINE

## 2023-06-01 PROCEDURE — 1101F PT FALLS ASSESS-DOCD LE1/YR: CPT | Mod: CPTII,S$GLB,, | Performed by: INTERNAL MEDICINE

## 2023-06-01 NOTE — PROGRESS NOTES
Subjective:   Patient ID:  Destin Barber is a 88 y.o. male who presents for follow-up of No chief complaint on file.      HPI    # CAD/ACS s/p 3V CABG 7/2016.  Appears his LCx/OM was not bypassed due to poor available vein conduit.  No LCx ischemia noted on MPI.  # HTN, uncontrolled in the setting of med noncompliance  # HLP  # DM  # abnl EKG        CAD/ACS s/p CABG 7/7/16: LIMA-LAD, SVG-Ramus, SVG-PDA (Dr. Hallman).  LCx/OM branch not bypassed due to poor quality venous conduit available     Stress test 9/22/22    Abnormal myocardial perfusion scan.    There are two significant perfusion abnormalities.    Perfusion Abnormality #1 - There is a moderate to severe intensity, moderate sized, mostly fixed perfusion abnormality with some reversibilty in the basal to mid inferior wall(s).    Perfusion Abnormality #2 - There is a small to moderate sized, mild to moderate intensity, reversible perfusion abnormality that is consistent with ischemia in the lateral wall(s).    There are no other significant perfusion abnormalities.    The gated perfusion images showed an ejection fraction of 79% at rest.    There is normal wall motion at rest and post stress.    The EKG portion of this study is negative for ischemia.    The patient reported no chest pain during the stress test.    There were no arrhythmias during stress.     Echo 9/22/22  The estimated ejection fraction is 65%.  The left ventricle is normal in size with mild concentric hypertrophy and normal systolic function.  Moderate to severe left atrial enlargement.  Grade I left ventricular diastolic dysfunction.  Mild pulmonic regurgitation.  Normal right ventricular size with normal right ventricular systolic function.  Mild right atrial enlargement.  Normal central venous pressure (3 mmHg).  The estimated PA systolic pressure is 20 mmHg.  Mild tricuspid regurgitation.  Mild aortic regurgitation.     Cath 6/24/16  LVEDP: 7mmHg  LVEF: 55%  Wall Motion:  "normal  Dominance: Right  LM: normal  LAD: ost/prox eccentric 70-80%, prox 50%, excellent mid LAD target.  Ramus: prox 50-70%, mid 90%, bifurcating distal vessel (both excellent targets)  LCx: prox 95% at bifurcation of OM1.  OM1 ost/prox 80%, bifurcating vessel, excellent target.  RCA: dominant, anterior takeoff with "blandon's crook" prox vessel.Prox 70%, mid 70%. Excellent PDA target.  Hemostasis:  R Radial band  Impression:  NSTEMI  4V CAD, normal LV fxn  R radial vasband for hemostasis     Carotid US 7/6/16  RIGHT SIDE:   1 - 39 % right ICA stenosis.   Heterogeneous plaque in the right internal carotid artery.   Antegrade flow in the right vertebral artery.   LEFT SIDE:  1 - 39% left ICA stenosis.   Homogeneous plaque in the left internal carotid artery.   Antegrade flow in the left vertebral artery.      10/17/17 Overall doing well. Denies CP or SOB  EKG NSR TWI V1 and V2 - similar to prior EKG  Suffering with an URI     EKG 3/30/22 - done in ER - not in EPIC     4/29/22 Denies CP or SOB  Had a recent brief syncopal spell 5 days ago - details unclear- poor historian  BP elevated  Echo and holter for recent syncope  Not interested in repeat stress test  Continue Rx for CAD, HTN, HLD, DD     7/14/22 Reports JULIO and pain across lower rib cage with radiation to left shoulder for the last week  EKG sinus tachycardia 102 RBBB/LAD  BP elevated  Denies further syncope  Poor historian   Add imdur for CP  Echo, lexiscan myoview and holter for CP, JULIO, recent syncope  Needs to go to the ER for worsening CP  Continue Rx for CAD, HTN, HLD, DD     9/16/22 Testing not done. BP poorly controlled  Having CP that radiates centrally from neck down to his abdomen  Some JULIO    Increase lisinopril 40 qd and imdur 60 qd  Echo, lexiscan myoview and holter for CP, JULIO, recent syncope  Needs to go to the ER for worsening CP  Continue Rx for CAD, HTN, HLD, DD     10/18/22 Only gets CP is he gets upset. Denies SOB  BP up some - better " controlled by home readings  Discussed abnormal stress test if fixed inferior defect and lateral ischemia - this is consistent with known un-revascularized Cx territory. Discussed repeat LHC - he prefers to treat CAD medically    Continue Rx for CAD, HTN, HLD, DD  OV 3 months    6/1/23 Needs clearance for prostate surgery  Reports mild stable exertional angina and JULIO  BP controlled by outside readings    Review of Systems   Constitutional: Negative for decreased appetite.   HENT:  Negative for ear discharge.    Eyes:  Negative for blurred vision.   Endocrine: Negative for polyphagia.   Skin:  Negative for nail changes.   Genitourinary:  Negative for bladder incontinence.   Neurological:  Negative for aphonia.   Psychiatric/Behavioral:  Negative for hallucinations.    Allergic/Immunologic: Negative for hives.     Objective:   Physical Exam  Constitutional:       Appearance: He is well-developed.   HENT:      Head: Normocephalic and atraumatic.   Eyes:      Conjunctiva/sclera: Conjunctivae normal.      Pupils: Pupils are equal, round, and reactive to light.   Cardiovascular:      Rate and Rhythm: Normal rate.      Pulses: Intact distal pulses.      Heart sounds: Normal heart sounds.   Pulmonary:      Effort: Pulmonary effort is normal.      Breath sounds: Normal breath sounds.   Abdominal:      General: Bowel sounds are normal.      Palpations: Abdomen is soft.   Musculoskeletal:         General: Normal range of motion.      Cervical back: Normal range of motion and neck supple.   Skin:     General: Skin is warm and dry.   Neurological:      Mental Status: He is alert and oriented to person, place, and time.       Assessment:      1. S/P CABG (coronary artery bypass graft)    2. CAD (coronary artery disease)    3. Coronary artery disease involving coronary bypass graft of native heart without angina pectoris    4. Essential hypertension    5. Pure hypercholesterolemia    6. Diastolic dysfunction    7. Aortic  atherosclerosis    8. History of syncope        Plan:     Cleared for prostate surgery at moderate cardiac risk. Ok to hold ASA 5 days before  Continue to request medical Rx for CAD    Continue Rx for CAD, HTN, HLD, DD  OV 3 months

## 2023-06-06 ENCOUNTER — ANESTHESIA (OUTPATIENT)
Dept: SURGERY | Facility: HOSPITAL | Age: 88
End: 2023-06-06
Payer: COMMERCIAL

## 2023-06-15 ENCOUNTER — HOSPITAL ENCOUNTER (OUTPATIENT)
Dept: RADIOLOGY | Facility: HOSPITAL | Age: 88
Discharge: HOME | End: 2023-06-15
Attending: STUDENT IN AN ORGANIZED HEALTH CARE EDUCATION/TRAINING PROGRAM
Payer: COMMERCIAL

## 2023-06-15 ENCOUNTER — OFFICE VISIT (OUTPATIENT)
Dept: INTERNAL MEDICINE | Facility: CLINIC | Age: 88
End: 2023-06-15
Payer: COMMERCIAL

## 2023-06-15 VITALS
TEMPERATURE: 98 F | WEIGHT: 148.81 LBS | DIASTOLIC BLOOD PRESSURE: 78 MMHG | HEART RATE: 72 BPM | SYSTOLIC BLOOD PRESSURE: 150 MMHG | BODY MASS INDEX: 23.92 KG/M2 | HEIGHT: 66 IN

## 2023-06-15 DIAGNOSIS — K59.09 OTHER CONSTIPATION: ICD-10-CM

## 2023-06-15 DIAGNOSIS — I10 ESSENTIAL HYPERTENSION: ICD-10-CM

## 2023-06-15 DIAGNOSIS — I51.89 DIASTOLIC DYSFUNCTION: ICD-10-CM

## 2023-06-15 DIAGNOSIS — E11.22 CONTROLLED TYPE 2 DIABETES MELLITUS WITH STAGE 3 CHRONIC KIDNEY DISEASE, WITHOUT LONG-TERM CURRENT USE OF INSULIN: Primary | ICD-10-CM

## 2023-06-15 DIAGNOSIS — I70.0 AORTIC ATHEROSCLEROSIS: ICD-10-CM

## 2023-06-15 DIAGNOSIS — N18.30 CONTROLLED TYPE 2 DIABETES MELLITUS WITH STAGE 3 CHRONIC KIDNEY DISEASE, WITHOUT LONG-TERM CURRENT USE OF INSULIN: Primary | ICD-10-CM

## 2023-06-15 DIAGNOSIS — R97.20 ELEVATED PSA: ICD-10-CM

## 2023-06-15 DIAGNOSIS — N40.1 BPH WITH OBSTRUCTION/LOWER URINARY TRACT SYMPTOMS: ICD-10-CM

## 2023-06-15 DIAGNOSIS — N13.8 BPH WITH OBSTRUCTION/LOWER URINARY TRACT SYMPTOMS: ICD-10-CM

## 2023-06-15 DIAGNOSIS — E78.00 PURE HYPERCHOLESTEROLEMIA: ICD-10-CM

## 2023-06-15 DIAGNOSIS — K59.04 CHRONIC IDIOPATHIC CONSTIPATION: ICD-10-CM

## 2023-06-15 DIAGNOSIS — I25.810 CORONARY ARTERY DISEASE INVOLVING CORONARY BYPASS GRAFT OF NATIVE HEART WITHOUT ANGINA PECTORIS: ICD-10-CM

## 2023-06-15 DIAGNOSIS — I25.10 CORONARY ARTERY DISEASE INVOLVING NATIVE CORONARY ARTERY OF NATIVE HEART WITHOUT ANGINA PECTORIS: ICD-10-CM

## 2023-06-15 DIAGNOSIS — F51.01 PRIMARY INSOMNIA: ICD-10-CM

## 2023-06-15 DIAGNOSIS — N18.31 STAGE 3A CHRONIC KIDNEY DISEASE: ICD-10-CM

## 2023-06-15 DIAGNOSIS — D63.8 ANEMIA OF CHRONIC DISEASE: ICD-10-CM

## 2023-06-15 PROBLEM — K59.00 CONSTIPATION: Status: ACTIVE | Noted: 2023-06-15

## 2023-06-15 PROCEDURE — 76770 US EXAM ABDO BACK WALL COMP: CPT | Mod: 26,,, | Performed by: RADIOLOGY

## 2023-06-15 PROCEDURE — 76770 US RETROPERITONEAL COMPLETE: ICD-10-PCS | Mod: 26,,, | Performed by: RADIOLOGY

## 2023-06-15 PROCEDURE — 1157F ADVNC CARE PLAN IN RCRD: CPT | Mod: CPTII,S$GLB,, | Performed by: INTERNAL MEDICINE

## 2023-06-15 PROCEDURE — 1126F PR PAIN SEVERITY QUANTIFIED, NO PAIN PRESENT: ICD-10-PCS | Mod: CPTII,S$GLB,, | Performed by: INTERNAL MEDICINE

## 2023-06-15 PROCEDURE — 3288F PR FALLS RISK ASSESSMENT DOCUMENTED: ICD-10-PCS | Mod: CPTII,S$GLB,, | Performed by: INTERNAL MEDICINE

## 2023-06-15 PROCEDURE — 1159F MED LIST DOCD IN RCRD: CPT | Mod: CPTII,S$GLB,, | Performed by: INTERNAL MEDICINE

## 2023-06-15 PROCEDURE — 1126F AMNT PAIN NOTED NONE PRSNT: CPT | Mod: CPTII,S$GLB,, | Performed by: INTERNAL MEDICINE

## 2023-06-15 PROCEDURE — 99214 PR OFFICE/OUTPT VISIT, EST, LEVL IV, 30-39 MIN: ICD-10-PCS | Mod: S$GLB,,, | Performed by: INTERNAL MEDICINE

## 2023-06-15 PROCEDURE — 1159F PR MEDICATION LIST DOCUMENTED IN MEDICAL RECORD: ICD-10-PCS | Mod: CPTII,S$GLB,, | Performed by: INTERNAL MEDICINE

## 2023-06-15 PROCEDURE — 3288F FALL RISK ASSESSMENT DOCD: CPT | Mod: CPTII,S$GLB,, | Performed by: INTERNAL MEDICINE

## 2023-06-15 PROCEDURE — 1160F PR REVIEW ALL MEDS BY PRESCRIBER/CLIN PHARMACIST DOCUMENTED: ICD-10-PCS | Mod: CPTII,S$GLB,, | Performed by: INTERNAL MEDICINE

## 2023-06-15 PROCEDURE — 76770 US EXAM ABDO BACK WALL COMP: CPT | Mod: TC

## 2023-06-15 PROCEDURE — 99214 OFFICE O/P EST MOD 30 MIN: CPT | Mod: S$GLB,,, | Performed by: INTERNAL MEDICINE

## 2023-06-15 PROCEDURE — 1160F RVW MEDS BY RX/DR IN RCRD: CPT | Mod: CPTII,S$GLB,, | Performed by: INTERNAL MEDICINE

## 2023-06-15 PROCEDURE — 1157F PR ADVANCE CARE PLAN OR EQUIV PRESENT IN MEDICAL RECORD: ICD-10-PCS | Mod: CPTII,S$GLB,, | Performed by: INTERNAL MEDICINE

## 2023-06-15 PROCEDURE — 1101F PR PT FALLS ASSESS DOC 0-1 FALLS W/OUT INJ PAST YR: ICD-10-PCS | Mod: CPTII,S$GLB,, | Performed by: INTERNAL MEDICINE

## 2023-06-15 PROCEDURE — 1101F PT FALLS ASSESS-DOCD LE1/YR: CPT | Mod: CPTII,S$GLB,, | Performed by: INTERNAL MEDICINE

## 2023-06-15 RX ORDER — POLYETHYLENE GLYCOL 3350 17 G/17G
17 POWDER, FOR SOLUTION ORAL DAILY PRN
Qty: 90 PACKET | Refills: 1 | Status: SHIPPED | OUTPATIENT
Start: 2023-06-15

## 2023-06-15 RX ORDER — TRAZODONE HYDROCHLORIDE 50 MG/1
50 TABLET ORAL NIGHTLY PRN
Qty: 90 TABLET | Refills: 0 | Status: SHIPPED | OUTPATIENT
Start: 2023-06-15 | End: 2023-06-22

## 2023-06-15 RX ORDER — METOPROLOL SUCCINATE 100 MG/1
100 TABLET, EXTENDED RELEASE ORAL 2 TIMES DAILY
Qty: 180 TABLET | Refills: 3 | Status: SHIPPED | OUTPATIENT
Start: 2023-06-15 | End: 2023-07-31 | Stop reason: SDUPTHER

## 2023-06-15 NOTE — PROGRESS NOTES
Chief C/o:    Diabetes, Hypertension, Hyperlipidemia, and Coronary Artery Disease        Health Care Maintenance    Health Maintenance         Date Due Completion Date    Shingles Vaccine (1 of 2) Never done ---    Eye Exam 02/05/2019 2/5/2018    COVID-19 Vaccine (4 - Moderna series) 01/29/2022 12/4/2021    Hemoglobin A1c 09/08/2023 3/8/2023    Diabetes Urine Screening 03/08/2024 3/8/2023    Lipid Panel 03/08/2024 3/8/2023    TETANUS VACCINE 04/20/2028 4/20/2018                   HISTORY OF PRESENT ILLNESS:    ROSS Barber is a 88 y.o. male who presents to the clinic today for Diabetes, Hypertension, Hyperlipidemia, and Coronary Artery Disease  .  Patient having insomnia and wants medication to help him to sleep, he was taking some medication before which used to help.    He is also having constipation.    Patient denies chest pain, no shortness of breath, no nausea, no vomiting no fever no chills.  His aches and pains are fairly controlled.                  ALLERGIES AND MEDICATIONS: updated and reviewed.  Review of patient's allergies indicates:   Allergen Reactions    Penicillins Nausea And Vomiting     Pt reports he is not allergic to penicillin       Medication List with Changes/Refills   New Medications    METOPROLOL SUCCINATE (TOPROL-XL) 100 MG 24 HR TABLET    Take 1 tablet (100 mg total) by mouth 2 (two) times daily. Hold for systolic blood pressure less than 110 and or heart rate less than 55    POLYETHYLENE GLYCOL (GLYCOLAX) 17 GRAM PWPK    Take 17 g by mouth daily as needed (For constipation).    TRAZODONE (DESYREL) 50 MG TABLET    Take 1 tablet (50 mg total) by mouth nightly as needed for Insomnia.   Current Medications    AMLODIPINE (NORVASC) 10 MG TABLET    Take 1 tablet (10 mg total) by mouth once daily.    ASPIRIN (ASPIR-LOW) 81 MG EC TABLET    Take 1 tablet (81 mg total) by mouth once daily.    ISOSORBIDE MONONITRATE (IMDUR) 60 MG 24 HR TABLET    Take 1 tablet (60 mg total) by mouth once  daily.    LISINOPRIL (PRINIVIL,ZESTRIL) 40 MG TABLET    Take 1 tablet (40 mg total) by mouth once daily.    METFORMIN (GLUCOPHAGE-XR) 500 MG ER 24HR TABLET    TAKE 1 TABLET BY MOUTH DAILY WITH BREAKFAST    NITROGLYCERIN (NITROSTAT) 0.4 MG SL TABLET    Place 1 tablet (0.4 mg total) under the tongue every 5 (five) minutes as needed for Chest pain.    PRAVASTATIN (PRAVACHOL) 40 MG TABLET    Take 1 tablet (40 mg total) by mouth once daily.    TAMSULOSIN (FLOMAX) 0.4 MG CAP    Take 1 capsule (0.4 mg total) by mouth once daily.   Discontinued Medications    METOPROLOL TARTRATE (LOPRESSOR) 50 MG TABLET    TAKE 1 TABLET(50 MG) BY MOUTH TWICE DAILY       Problem List:  Patient Active Problem List   Diagnosis    Essential hypertension    Pure hypercholesterolemia    Diastolic dysfunction    Anemia of chronic disease    Coronary artery disease involving coronary bypass graft of native heart without angina pectoris    S/P CABG (coronary artery bypass graft)    Chronic gout without tophus    Hypokalemia    Controlled type 2 diabetes mellitus with stage 3 chronic kidney disease, without long-term current use of insulin    Aortic atherosclerosis    BMI 24.0-24.9, adult    History of syncope    Elevated PSA    BPH with obstruction/lower urinary tract symptoms    Bilateral renal cysts    Stage 3a chronic kidney disease    Primary insomnia    Chronic idiopathic constipation         CARE TEAM:    Patient Care Team:  Gatito Mcdonald MD as PCP - General (Internal Medicine)  Toña Whittaker MA as Care Coordinator  Harry Velazco MD         REVIEW OF SYSTEMS:    Review of Systems   Constitutional:  Negative for appetite change, chills, diaphoresis, fatigue, fever and unexpected weight change.   HENT:  Negative for congestion, drooling, ear discharge, ear pain, facial swelling, hearing loss, nosebleeds, rhinorrhea, sinus pain, sneezing, sore throat, tinnitus, trouble swallowing and voice change.    Eyes:  Negative for pain,  "discharge, redness, itching and visual disturbance.   Respiratory:  Negative for cough, choking, chest tightness, shortness of breath, wheezing and stridor.    Cardiovascular:  Negative for chest pain, palpitations and leg swelling.   Gastrointestinal:  Negative for abdominal distention, abdominal pain, blood in stool, constipation, diarrhea, nausea and vomiting.   Endocrine: Negative for cold intolerance, heat intolerance, polydipsia, polyphagia and polyuria.   Genitourinary:  Negative for difficulty urinating, dysuria, flank pain, frequency, hematuria and urgency.   Musculoskeletal:  Negative for arthralgias, back pain, gait problem, joint swelling, myalgias, neck pain and neck stiffness.   Skin:  Negative for color change, pallor, rash and wound.   Allergic/Immunologic: Negative for environmental allergies, food allergies and immunocompromised state.   Neurological:  Negative for dizziness, tremors, seizures, syncope, speech difficulty, weakness, light-headedness, numbness and headaches.   Hematological:  Negative for adenopathy. Does not bruise/bleed easily.   Psychiatric/Behavioral:  Positive for sleep disturbance. Negative for agitation, behavioral problems, confusion, decreased concentration, dysphoric mood, hallucinations and suicidal ideas. The patient is not nervous/anxious.        PHYSICAL EXAM:    Vitals:    06/15/23 0909   BP: (!) 150/78   Pulse: 72   Temp: 97.7 °F (36.5 °C)     Weight: 67.5 kg (148 lb 13 oz)   Height: 5' 5.98" (167.6 cm)   Body mass index is 24.03 kg/m².  Vitals:    06/15/23 0909   BP: (!) 150/78   Pulse: 72   Temp: 97.7 °F (36.5 °C)   TempSrc: Temporal   Weight: 67.5 kg (148 lb 13 oz)   Height: 5' 5.98" (1.676 m)   PainSc: 0-No pain          Physical Exam  Vitals and nursing note reviewed.   Constitutional:       General: He is not in acute distress.     Appearance: Normal appearance. He is normal weight. He is not ill-appearing, toxic-appearing or diaphoretic.      Comments: Patient " is alert, awake and oriented X 3.  Patient is comfortable, cooperative and in no apparent distress.     HENT:      Head: Normocephalic and atraumatic.      Right Ear: Tympanic membrane, ear canal and external ear normal. There is no impacted cerumen.      Left Ear: Tympanic membrane, ear canal and external ear normal. There is no impacted cerumen.      Nose: Nose normal. No congestion or rhinorrhea.      Mouth/Throat:      Mouth: Mucous membranes are moist.      Pharynx: Oropharynx is clear. No oropharyngeal exudate or posterior oropharyngeal erythema.   Eyes:      General: No scleral icterus.        Right eye: No discharge.         Left eye: No discharge.      Extraocular Movements: Extraocular movements intact.      Conjunctiva/sclera: Conjunctivae normal.      Pupils: Pupils are equal, round, and reactive to light.   Cardiovascular:      Rate and Rhythm: Normal rate and regular rhythm.      Pulses: Normal pulses.      Heart sounds: Normal heart sounds. No murmur heard.    No friction rub. No gallop.   Pulmonary:      Effort: Pulmonary effort is normal. No respiratory distress.      Breath sounds: Normal breath sounds. No stridor. No wheezing, rhonchi or rales.   Chest:      Chest wall: No tenderness.   Abdominal:      General: Bowel sounds are normal. There is no distension.      Palpations: Abdomen is soft. There is no mass.      Tenderness: There is no abdominal tenderness. There is no guarding or rebound.      Hernia: No hernia is present.   Musculoskeletal:         General: No swelling, tenderness, deformity or signs of injury. Normal range of motion.      Cervical back: Normal range of motion and neck supple. No rigidity.      Right lower leg: Edema (+1 pitting edema bilaterally.) present.      Left lower leg: Edema present.   Lymphadenopathy:      Cervical: No cervical adenopathy.   Skin:     General: Skin is warm and dry.      Capillary Refill: Capillary refill takes less than 2 seconds.      Coloration:  Skin is not jaundiced or pale.      Findings: No bruising, erythema, lesion or rash.   Neurological:      General: No focal deficit present.      Mental Status: He is alert and oriented to person, place, and time.      Cranial Nerves: No cranial nerve deficit.      Sensory: No sensory deficit.      Motor: No weakness.      Coordination: Coordination normal.      Deep Tendon Reflexes: Reflexes normal.   Psychiatric:         Mood and Affect: Mood normal.         Behavior: Behavior normal.         Thought Content: Thought content normal.         Judgment: Judgment normal.          Labs:  Discussed with patient.    Lab Results   Component Value Date    GLU 92 05/30/2023     05/30/2023    K 3.7 05/30/2023     (H) 05/30/2023    CO2 21 (L) 05/30/2023    BUN 20 05/30/2023    CREATININE 1.2 05/30/2023    CALCIUM 9.3 05/30/2023    PROT 7.5 09/21/2022    ALBUMIN 4.1 03/08/2023    ALBUMIN 3.6 10/07/2022    BILITOT <0.2 03/08/2023    ALKPHOS 91 09/21/2022    AST 15 03/08/2023    ALT 8 03/08/2023    ANIONGAP 9 05/30/2023    ESTGFRAFRICA >60 03/30/2022    EGFRNONAA >60 03/30/2022     Lab Results   Component Value Date    WBC 6.89 05/30/2023    RBC 3.79 (L) 05/30/2023    HGB 11.9 (L) 05/30/2023    HCT 33.5 (L) 05/30/2023    HCT 26 (L) 07/07/2016    MCV 88 05/30/2023    RDW 12.7 05/30/2023     05/30/2023      Lab Results   Component Value Date    CHOL 118 03/08/2023    TRIG 147 03/08/2023    HDL 35 (L) 03/08/2023    LDLCALC 57 03/08/2023    TOTALCHOLEST 2.7 09/22/2022     Lab Results   Component Value Date    TSH 1.190 03/08/2023     Lab Results   Component Value Date    HGBA1C 6.2 (H) 03/08/2023    HGBA1C 6.0 (H) 09/22/2022    ESTIMATEDAVG 126 09/22/2022      No components found for: MICROALBUMIN/CREATININE    ASSESSMENT & PLAN:    1. Controlled type 2 diabetes mellitus with stage 3 chronic kidney disease, without long-term current use of insulin    2. Essential hypertension  - metoprolol succinate (TOPROL-XL)  100 MG 24 hr tablet; Take 1 tablet (100 mg total) by mouth 2 (two) times daily. Hold for systolic blood pressure less than 110 and or heart rate less than 55  Dispense: 180 tablet; Refill: 3    3. Pure hypercholesterolemia    4. 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 Aortic atherosclerosis    6. Diastolic dysfunction    7. Anemia of chronic disease    8. BMI 24.0-24.9, adult    9. Stage 3a chronic kidney disease    10. BPH with obstruction/lower urinary tract symptoms    11. Elevated PSA    12. Primary insomnia    13. Chronic idiopathic constipation  - traZODone (DESYREL) 50 MG tablet; Take 1 tablet (50 mg total) by mouth nightly as needed for Insomnia.  Dispense: 90 tablet; Refill: 0    14. Coronary artery disease involving native coronary artery of native heart without angina pectoris    15. Other constipation  - traZODone (DESYREL) 50 MG tablet; Take 1 tablet (50 mg total) by mouth nightly as needed for Insomnia.  Dispense: 90 tablet; Refill: 0     Patient blood pressure is not well controlled, her medication were adjusted, metoprolol dose was increased, will see the patient 1 month with records of blood pressure and pulse, MiraLax was added for constipation.  General measures: No smoking, no second-hand smoking, regular exercise, weight management, low salt, healthy diet and maintain peace of mind . Check BP before taking BP medications and follow precautions and guidelines. Keep a records of your BP and Pulse readings and bring the chart to the office next visit. If BP is consistently above 130/80, I recommend that you book a sooner appointment to address the issue.  ------------------------------  6/22/23:  Patient was prescribed trazodone 50 mg at night as needed for sleep, however it is not working, used to take 150 mg before, will put him back on 150 mg and observe  ---------------------    No orders of the defined types were placed in this  encounter.     Follow up in about 1 month (around 7/15/2023), or if symptoms worsen or fail to improve. or sooner as needed.    Patient was counseled and questions and concerns were addressed.    Please note:  Parts of this report were done using a dictation software, voice to text, and sometimes the text contains some uncorrected words or sentences that are missed during revision.

## 2023-06-19 ENCOUNTER — TELEPHONE (OUTPATIENT)
Dept: UROLOGY | Facility: CLINIC | Age: 88
End: 2023-06-19
Payer: MEDICARE

## 2023-06-19 DIAGNOSIS — N40.1 BPH WITH OBSTRUCTION/LOWER URINARY TRACT SYMPTOMS: Primary | ICD-10-CM

## 2023-06-19 DIAGNOSIS — N13.8 BPH WITH OBSTRUCTION/LOWER URINARY TRACT SYMPTOMS: Primary | ICD-10-CM

## 2023-06-19 NOTE — TELEPHONE ENCOUNTER
Pts granddaughter notified of preop appt on 6/30/23 @ 8:30am.  She was also advised he needs to bring a urine specimen to any [facility] lab prior to his surgery.  She will bring him towards the end of the week.

## 2023-06-19 NOTE — TELEPHONE ENCOUNTER
----- Message from Raji Mcmullen MD sent at 6/19/2023 11:54 AM CDT -----  Patient needs to drop off urine culture prior to procedure coming up, urine culture order in

## 2023-06-22 RX ORDER — TRAZODONE HYDROCHLORIDE 150 MG/1
150 TABLET ORAL NIGHTLY
Qty: 90 TABLET | Refills: 3 | Status: SHIPPED | OUTPATIENT
Start: 2023-06-22 | End: 2024-06-21

## 2023-06-27 ENCOUNTER — LAB VISIT (OUTPATIENT)
Dept: LAB | Facility: HOSPITAL | Age: 88
End: 2023-06-27
Attending: STUDENT IN AN ORGANIZED HEALTH CARE EDUCATION/TRAINING PROGRAM
Payer: MEDICARE

## 2023-06-27 DIAGNOSIS — N13.8 BPH WITH OBSTRUCTION/LOWER URINARY TRACT SYMPTOMS: ICD-10-CM

## 2023-06-27 DIAGNOSIS — N40.1 BPH WITH OBSTRUCTION/LOWER URINARY TRACT SYMPTOMS: ICD-10-CM

## 2023-06-27 PROCEDURE — 87086 URINE CULTURE/COLONY COUNT: CPT | Performed by: STUDENT IN AN ORGANIZED HEALTH CARE EDUCATION/TRAINING PROGRAM

## 2023-06-29 ENCOUNTER — TELEPHONE (OUTPATIENT)
Dept: FAMILY MEDICINE | Facility: CLINIC | Age: 88
End: 2023-06-29
Payer: MEDICARE

## 2023-06-29 LAB — BACTERIA UR CULT: NO GROWTH

## 2023-06-29 NOTE — TELEPHONE ENCOUNTER
----- Message from Cathi Nesbitt sent at 6/29/2023  2:44 PM CDT -----  Regarding: Patient Call Back  .Type: Patient Call Back    Who called: Cristalus -- Zara    What is the request in detail: Calling to report on the pt. Spoke with the pt a while ago and he was complaining about shortness of breath. Just informing the dr, pt does have an appt tomorrow. Please advise.    Can the clinic reply by [facility]? No    Would the patient rather a call back or a response via [facility]? Call back    Best call back number: [phone]     Additional Information:

## 2023-06-29 NOTE — TELEPHONE ENCOUNTER
Called pt , wife Valentino answered , says she has strep throat and may have passed it to him . Sore throat , s.o.b and cough . Hasn't taken any otc medications  for it . Accepted same ov at [facility]

## 2023-06-30 ENCOUNTER — HOSPITAL ENCOUNTER (OUTPATIENT)
Dept: PREADMISSION TESTING | Facility: HOSPITAL | Age: 88
Discharge: HOME | End: 2023-06-30
Attending: STUDENT IN AN ORGANIZED HEALTH CARE EDUCATION/TRAINING PROGRAM
Payer: COMMERCIAL

## 2023-06-30 ENCOUNTER — HOSPITAL ENCOUNTER (OUTPATIENT)
Dept: RADIOLOGY | Facility: HOSPITAL | Age: 88
Discharge: HOME | End: 2023-06-30
Attending: STUDENT IN AN ORGANIZED HEALTH CARE EDUCATION/TRAINING PROGRAM
Payer: COMMERCIAL

## 2023-06-30 VITALS
WEIGHT: 148.38 LBS | SYSTOLIC BLOOD PRESSURE: 101 MMHG | HEIGHT: 65 IN | BODY MASS INDEX: 24.72 KG/M2 | HEART RATE: 75 BPM | DIASTOLIC BLOOD PRESSURE: 60 MMHG | TEMPERATURE: 98 F | OXYGEN SATURATION: 99 % | RESPIRATION RATE: 20 BRPM

## 2023-06-30 DIAGNOSIS — Z01.818 PREOP TESTING: Primary | ICD-10-CM

## 2023-06-30 LAB
ANION GAP SERPL CALC-SCNC: 11 MMOL/L (ref 8–16)
BASOPHILS # BLD AUTO: 0.03 K/UL (ref 0–0.2)
BASOPHILS NFR BLD: 0.5 % (ref 0–1.9)
BUN SERPL-MCNC: 25 MG/DL (ref 8–23)
CALCIUM SERPL-MCNC: 8.7 MG/DL (ref 8.7–10.5)
CHLORIDE SERPL-SCNC: 107 MMOL/L (ref 95–110)
CO2 SERPL-SCNC: 20 MMOL/L (ref 23–29)
CREAT SERPL-MCNC: 1.6 MG/DL (ref 0.5–1.4)
DIFFERENTIAL METHOD: ABNORMAL
EOSINOPHIL # BLD AUTO: 0.2 K/UL (ref 0–0.5)
EOSINOPHIL NFR BLD: 2.7 % (ref 0–8)
ERYTHROCYTE [DISTWIDTH] IN BLOOD BY AUTOMATED COUNT: 12.8 % (ref 11.5–14.5)
EST. GFR  (NO RACE VARIABLE): 42 ML/MIN/1.73 M^2
GLUCOSE SERPL-MCNC: 118 MG/DL (ref 70–110)
HCT VFR BLD AUTO: 32.3 % (ref 40–54)
HGB BLD-MCNC: 11 G/DL (ref 14–18)
IMM GRANULOCYTES # BLD AUTO: 0.01 K/UL (ref 0–0.04)
IMM GRANULOCYTES NFR BLD AUTO: 0.2 % (ref 0–0.5)
LYMPHOCYTES # BLD AUTO: 2.9 K/UL (ref 1–4.8)
LYMPHOCYTES NFR BLD: 43.1 % (ref 18–48)
MCH RBC QN AUTO: 31.3 PG (ref 27–31)
MCHC RBC AUTO-ENTMCNC: 34.1 G/DL (ref 32–36)
MCV RBC AUTO: 92 FL (ref 82–98)
MONOCYTES # BLD AUTO: 0.6 K/UL (ref 0.3–1)
MONOCYTES NFR BLD: 8.6 % (ref 4–15)
NEUTROPHILS # BLD AUTO: 3 K/UL (ref 1.8–7.7)
NEUTROPHILS NFR BLD: 44.9 % (ref 38–73)
NRBC BLD-RTO: 0 /100 WBC
PLATELET # BLD AUTO: 159 K/UL (ref 150–450)
PMV BLD AUTO: 9.8 FL (ref 9.2–12.9)
POTASSIUM SERPL-SCNC: 3.7 MMOL/L (ref 3.5–5.1)
RBC # BLD AUTO: 3.51 M/UL (ref 4.6–6.2)
SODIUM SERPL-SCNC: 138 MMOL/L (ref 136–145)
WBC # BLD AUTO: 6.61 K/UL (ref 3.9–12.7)

## 2023-06-30 PROCEDURE — 80048 BASIC METABOLIC PNL TOTAL CA: CPT | Performed by: STUDENT IN AN ORGANIZED HEALTH CARE EDUCATION/TRAINING PROGRAM

## 2023-06-30 PROCEDURE — 85025 COMPLETE CBC W/AUTO DIFF WBC: CPT | Performed by: STUDENT IN AN ORGANIZED HEALTH CARE EDUCATION/TRAINING PROGRAM

## 2023-06-30 PROCEDURE — 71046 X-RAY EXAM CHEST 2 VIEWS: CPT | Mod: 26,,, | Performed by: RADIOLOGY

## 2023-06-30 PROCEDURE — 71046 XR CHEST PA AND LATERAL PRE-OP: ICD-10-PCS | Mod: 26,,, | Performed by: RADIOLOGY

## 2023-06-30 PROCEDURE — 71046 X-RAY EXAM CHEST 2 VIEWS: CPT | Mod: TC,FY

## 2023-06-30 NOTE — DISCHARGE INSTRUCTIONS

## 2023-07-06 ENCOUNTER — HOSPITAL ENCOUNTER (OUTPATIENT)
Facility: HOSPITAL | Age: 88
Discharge: HOME | End: 2023-07-06
Attending: STUDENT IN AN ORGANIZED HEALTH CARE EDUCATION/TRAINING PROGRAM | Admitting: STUDENT IN AN ORGANIZED HEALTH CARE EDUCATION/TRAINING PROGRAM
Payer: COMMERCIAL

## 2023-07-06 VITALS
RESPIRATION RATE: 16 BRPM | SYSTOLIC BLOOD PRESSURE: 164 MMHG | OXYGEN SATURATION: 95 % | DIASTOLIC BLOOD PRESSURE: 92 MMHG | TEMPERATURE: 97 F | HEART RATE: 67 BPM

## 2023-07-06 DIAGNOSIS — N21.0 BLADDER STONE: ICD-10-CM

## 2023-07-06 DIAGNOSIS — N40.1 BPH WITH OBSTRUCTION/LOWER URINARY TRACT SYMPTOMS: Primary | ICD-10-CM

## 2023-07-06 DIAGNOSIS — Z01.818 PREOPERATIVE TESTING: ICD-10-CM

## 2023-07-06 DIAGNOSIS — N13.8 BPH WITH OBSTRUCTION/LOWER URINARY TRACT SYMPTOMS: Primary | ICD-10-CM

## 2023-07-06 LAB
ABO + RH BLD: NORMAL
BLD GP AB SCN CELLS X3 SERPL QL: NORMAL
POCT GLUCOSE: 126 MG/DL (ref 70–110)

## 2023-07-06 PROCEDURE — D9220A PRA ANESTHESIA: ICD-10-PCS | Mod: CRNA,,, | Performed by: STUDENT IN AN ORGANIZED HEALTH CARE EDUCATION/TRAINING PROGRAM

## 2023-07-06 PROCEDURE — 52649 PR LASER ENUCLEATION PROSTATE W MORCELLATION: ICD-10-PCS | Mod: ,,, | Performed by: STUDENT IN AN ORGANIZED HEALTH CARE EDUCATION/TRAINING PROGRAM

## 2023-07-06 PROCEDURE — 36415 COLL VENOUS BLD VENIPUNCTURE: CPT | Performed by: STUDENT IN AN ORGANIZED HEALTH CARE EDUCATION/TRAINING PROGRAM

## 2023-07-06 PROCEDURE — 88342 IMHCHEM/IMCYTCHM 1ST ANTB: CPT | Mod: 26,,, | Performed by: PATHOLOGY

## 2023-07-06 PROCEDURE — 36000707: Performed by: STUDENT IN AN ORGANIZED HEALTH CARE EDUCATION/TRAINING PROGRAM

## 2023-07-06 PROCEDURE — 25000003 PHARM REV CODE 250: Performed by: STUDENT IN AN ORGANIZED HEALTH CARE EDUCATION/TRAINING PROGRAM

## 2023-07-06 PROCEDURE — 88342 CHG IMMUNOCYTOCHEMISTRY: ICD-10-PCS | Mod: 26,,, | Performed by: PATHOLOGY

## 2023-07-06 PROCEDURE — D9220A PRA ANESTHESIA: ICD-10-PCS | Mod: ANES,,, | Performed by: ANESTHESIOLOGY

## 2023-07-06 PROCEDURE — 63600175 PHARM REV CODE 636 W HCPCS: Performed by: ANESTHESIOLOGY

## 2023-07-06 PROCEDURE — 88305 TISSUE EXAM BY PATHOLOGIST: CPT | Performed by: PATHOLOGY

## 2023-07-06 PROCEDURE — 36000706: Performed by: STUDENT IN AN ORGANIZED HEALTH CARE EDUCATION/TRAINING PROGRAM

## 2023-07-06 PROCEDURE — 52649 PROSTATE LASER ENUCLEATION: CPT | Mod: ,,, | Performed by: STUDENT IN AN ORGANIZED HEALTH CARE EDUCATION/TRAINING PROGRAM

## 2023-07-06 PROCEDURE — 82962 GLUCOSE BLOOD TEST: CPT | Performed by: STUDENT IN AN ORGANIZED HEALTH CARE EDUCATION/TRAINING PROGRAM

## 2023-07-06 PROCEDURE — 25000003 PHARM REV CODE 250: Performed by: ANESTHESIOLOGY

## 2023-07-06 PROCEDURE — 88305 TISSUE EXAM BY PATHOLOGIST: ICD-10-PCS | Mod: 26,,, | Performed by: PATHOLOGY

## 2023-07-06 PROCEDURE — 88305 TISSUE EXAM BY PATHOLOGIST: CPT | Mod: 26,,, | Performed by: PATHOLOGY

## 2023-07-06 PROCEDURE — 63600175 PHARM REV CODE 636 W HCPCS: Performed by: STUDENT IN AN ORGANIZED HEALTH CARE EDUCATION/TRAINING PROGRAM

## 2023-07-06 PROCEDURE — 71000015 HC POSTOP RECOV 1ST HR: Performed by: STUDENT IN AN ORGANIZED HEALTH CARE EDUCATION/TRAINING PROGRAM

## 2023-07-06 PROCEDURE — 88342 IMHCHEM/IMCYTCHM 1ST ANTB: CPT | Performed by: PATHOLOGY

## 2023-07-06 PROCEDURE — D9220A PRA ANESTHESIA: Mod: ANES,,, | Performed by: ANESTHESIOLOGY

## 2023-07-06 PROCEDURE — C1758 CATHETER, URETERAL: HCPCS | Performed by: STUDENT IN AN ORGANIZED HEALTH CARE EDUCATION/TRAINING PROGRAM

## 2023-07-06 PROCEDURE — 88341 IMHCHEM/IMCYTCHM EA ADD ANTB: CPT | Performed by: PATHOLOGY

## 2023-07-06 PROCEDURE — 88341 PR IHC OR ICC EACH ADD'L SINGLE ANTIBODY  STAINPR: ICD-10-PCS | Mod: 26,,, | Performed by: PATHOLOGY

## 2023-07-06 PROCEDURE — 88341 IMHCHEM/IMCYTCHM EA ADD ANTB: CPT | Mod: 26,,, | Performed by: PATHOLOGY

## 2023-07-06 PROCEDURE — 71000033 HC RECOVERY, INTIAL HOUR: Performed by: STUDENT IN AN ORGANIZED HEALTH CARE EDUCATION/TRAINING PROGRAM

## 2023-07-06 PROCEDURE — 37000009 HC ANESTHESIA EA ADD 15 MINS: Performed by: STUDENT IN AN ORGANIZED HEALTH CARE EDUCATION/TRAINING PROGRAM

## 2023-07-06 PROCEDURE — 37000008 HC ANESTHESIA 1ST 15 MINUTES: Performed by: STUDENT IN AN ORGANIZED HEALTH CARE EDUCATION/TRAINING PROGRAM

## 2023-07-06 PROCEDURE — 86900 BLOOD TYPING SEROLOGIC ABO: CPT | Performed by: STUDENT IN AN ORGANIZED HEALTH CARE EDUCATION/TRAINING PROGRAM

## 2023-07-06 PROCEDURE — 27201423 OPTIME MED/SURG SUP & DEVICES STERILE SUPPLY: Performed by: STUDENT IN AN ORGANIZED HEALTH CARE EDUCATION/TRAINING PROGRAM

## 2023-07-06 PROCEDURE — D9220A PRA ANESTHESIA: Mod: CRNA,,, | Performed by: STUDENT IN AN ORGANIZED HEALTH CARE EDUCATION/TRAINING PROGRAM

## 2023-07-06 RX ORDER — ONDANSETRON 2 MG/ML
4 INJECTION INTRAMUSCULAR; INTRAVENOUS DAILY PRN
Status: DISCONTINUED | OUTPATIENT
Start: 2023-07-06 | End: 2023-07-06 | Stop reason: HOSPADM

## 2023-07-06 RX ORDER — LIDOCAINE HYDROCHLORIDE 10 MG/ML
1 INJECTION, SOLUTION EPIDURAL; INFILTRATION; INTRACAUDAL; PERINEURAL ONCE
Status: ACTIVE | OUTPATIENT
Start: 2023-07-06

## 2023-07-06 RX ORDER — FENTANYL CITRATE 50 UG/ML
INJECTION, SOLUTION INTRAMUSCULAR; INTRAVENOUS
Status: DISCONTINUED | OUTPATIENT
Start: 2023-07-06 | End: 2023-07-06

## 2023-07-06 RX ORDER — SODIUM CHLORIDE 0.9 % (FLUSH) 0.9 %
10 SYRINGE (ML) INJECTION
Status: DISCONTINUED | OUTPATIENT
Start: 2023-07-06 | End: 2023-07-06 | Stop reason: HOSPADM

## 2023-07-06 RX ORDER — HYDROMORPHONE HYDROCHLORIDE 2 MG/ML
0.2 INJECTION, SOLUTION INTRAMUSCULAR; INTRAVENOUS; SUBCUTANEOUS EVERY 5 MIN PRN
Status: DISCONTINUED | OUTPATIENT
Start: 2023-07-06 | End: 2023-07-06

## 2023-07-06 RX ORDER — PROPOFOL 10 MG/ML
VIAL (ML) INTRAVENOUS
Status: DISCONTINUED | OUTPATIENT
Start: 2023-07-06 | End: 2023-07-06

## 2023-07-06 RX ORDER — PHENYLEPHRINE HYDROCHLORIDE 10 MG/ML
INJECTION INTRAVENOUS
Status: DISCONTINUED | OUTPATIENT
Start: 2023-07-06 | End: 2023-07-06

## 2023-07-06 RX ORDER — DEXAMETHASONE SODIUM PHOSPHATE 4 MG/ML
INJECTION, SOLUTION INTRA-ARTICULAR; INTRALESIONAL; INTRAMUSCULAR; INTRAVENOUS; SOFT TISSUE
Status: DISCONTINUED | OUTPATIENT
Start: 2023-07-06 | End: 2023-07-06

## 2023-07-06 RX ORDER — CIPROFLOXACIN 2 MG/ML
400 INJECTION, SOLUTION INTRAVENOUS
Status: COMPLETED | OUTPATIENT
Start: 2023-07-06 | End: 2023-07-06

## 2023-07-06 RX ORDER — ONDANSETRON 2 MG/ML
4 INJECTION INTRAMUSCULAR; INTRAVENOUS EVERY 12 HOURS PRN
Status: DISCONTINUED | OUTPATIENT
Start: 2023-07-06 | End: 2023-07-06 | Stop reason: HOSPADM

## 2023-07-06 RX ORDER — ONDANSETRON 2 MG/ML
INJECTION INTRAMUSCULAR; INTRAVENOUS
Status: DISCONTINUED | OUTPATIENT
Start: 2023-07-06 | End: 2023-07-06

## 2023-07-06 RX ORDER — LIDOCAINE HYDROCHLORIDE 20 MG/ML
INJECTION INTRAVENOUS
Status: DISCONTINUED | OUTPATIENT
Start: 2023-07-06 | End: 2023-07-06

## 2023-07-06 RX ORDER — ROCURONIUM BROMIDE 10 MG/ML
INJECTION, SOLUTION INTRAVENOUS
Status: DISCONTINUED | OUTPATIENT
Start: 2023-07-06 | End: 2023-07-06

## 2023-07-06 RX ORDER — ACETAMINOPHEN 500 MG
1000 TABLET ORAL
Status: COMPLETED | OUTPATIENT
Start: 2023-07-06 | End: 2023-07-06

## 2023-07-06 RX ORDER — SODIUM CHLORIDE, SODIUM LACTATE, POTASSIUM CHLORIDE, CALCIUM CHLORIDE 600; 310; 30; 20 MG/100ML; MG/100ML; MG/100ML; MG/100ML
INJECTION, SOLUTION INTRAVENOUS CONTINUOUS
Status: ACTIVE | OUTPATIENT
Start: 2023-07-06

## 2023-07-06 RX ADMIN — DEXAMETHASONE SODIUM PHOSPHATE 4 MG: 4 INJECTION, SOLUTION INTRAMUSCULAR; INTRAVENOUS at 01:07

## 2023-07-06 RX ADMIN — CIPROFLOXACIN 400 MG: 2 INJECTION, SOLUTION INTRAVENOUS at 01:07

## 2023-07-06 RX ADMIN — PHENYLEPHRINE HYDROCHLORIDE 100 MCG: 10 INJECTION INTRAVENOUS at 01:07

## 2023-07-06 RX ADMIN — ROCURONIUM BROMIDE 50 MG: 10 INJECTION, SOLUTION INTRAVENOUS at 01:07

## 2023-07-06 RX ADMIN — ACETAMINOPHEN 1000 MG: 500 TABLET ORAL at 12:07

## 2023-07-06 RX ADMIN — SUGAMMADEX 200 MG: 100 INJECTION, SOLUTION INTRAVENOUS at 02:07

## 2023-07-06 RX ADMIN — ONDANSETRON 4 MG: 2 INJECTION, SOLUTION INTRAMUSCULAR; INTRAVENOUS at 02:07

## 2023-07-06 RX ADMIN — PROPOFOL 70 MG: 10 INJECTION, EMULSION INTRAVENOUS at 01:07

## 2023-07-06 RX ADMIN — PROPOFOL 60 MG: 10 INJECTION, EMULSION INTRAVENOUS at 01:07

## 2023-07-06 RX ADMIN — PROPOFOL 20 MG: 10 INJECTION, EMULSION INTRAVENOUS at 02:07

## 2023-07-06 RX ADMIN — SODIUM CHLORIDE, POTASSIUM CHLORIDE, SODIUM LACTATE AND CALCIUM CHLORIDE: 600; 310; 30; 20 INJECTION, SOLUTION INTRAVENOUS at 12:07

## 2023-07-06 RX ADMIN — PHENYLEPHRINE HYDROCHLORIDE 200 MCG: 10 INJECTION INTRAVENOUS at 02:07

## 2023-07-06 RX ADMIN — FENTANYL CITRATE 50 MCG: 50 INJECTION, SOLUTION INTRAMUSCULAR; INTRAVENOUS at 01:07

## 2023-07-06 RX ADMIN — LIDOCAINE HYDROCHLORIDE 100 MG: 20 INJECTION, SOLUTION INTRAVENOUS at 01:07

## 2023-07-06 NOTE — ANESTHESIA PROCEDURE NOTES
Intubation    Date/Time: 7/6/2023 1:16 PM  Performed by: Kelton Jackson CRNA  Authorized by: Carlos Manuel Foley MD     Intubation:     Induction:  Intravenous    Intubated:  Postinduction    Mask Ventilation:  Easy with oral airway    Attempts:  1    Attempted By:  CRNA    Method of Intubation:  Video laryngoscopy    Blade:  Jaffe 3    Laryngeal View Grade: Grade I - full view of cords      Difficult Airway Encountered?: No      Complications:  None    Airway Device:  Oral endotracheal tube    Airway Device Size:  7.5    Style/Cuff Inflation:  Cuffed (inflated to minimal occlusive pressure)    Tube secured:  22    Secured at:  The lips    Placement Verified By:  Capnometry    Complicating Factors:  None    Findings Post-Intubation:  BS equal bilateral and atraumatic/condition of teeth unchanged

## 2023-07-06 NOTE — OP NOTE
DATE OF PROCEDURE:  7/6/2023  PREOPERATIVE DIAGNOSIS:  BPH with obstruction. Lower urinary tract symptoms     POSTOPERATIVE DIAGNOSIS:  as above     PROCEDURE PERFORMED: Holmium laser enucleation of the prostate with morcellation     PRIMARY SURGEON:  Raji Mcmullen M.D.     ANESTHESIA:  General.     ESTIMATED BLOOD LOSS: 5cc     DRAINS:  A 22 Slovak Lee catheter. 50 cc in balloon     SPECIMENS REMOVED:  Prostate specimen     COMPLICATIONS:  None.     INDICATIONS:  88 year old man with lower urinary tract symptoms- incomplete bladder emptying, bladder diverticuli, bladder outlet obstruction desiring definitive management of bladder outlet obstruction.  PSA density 0.05.  After review of options, benefits, risks, alternatives, patient elected to proceed with aforementioned procedure.      PROCEDURE DETAILS:   Mr. Barber was taken to the Operating Room where he was positively identified by ayush.  He was placed supine on the operating room table.  Following induction of adequate general anesthesia, he was placed in the dorsal lithotomy position and his external genitalia were prepped and draped in usual sterile fashion. A preoperative timeout was performed as well as confirmation of preoperative   Antibiotics, ciprofloxacin. A 26-Slovak resectoscope sheath was then passed per urethra into the bladder using the visual obturator. The obturator was withdrawn and the  resectoscope with laser bridge was then inserted.  The bladder was inspected, both ureteral orifices were  in their orthotopic position away from the bladder neck.   The prostate was inspected, hypertrophy of the lateral lobes of the prostate was noted, bladder trabeculations, diverticuli noted as well.  No bladder masses noted. On settings of 2.5J 30 Hz I used a 550 micron holmium laser on  Papo settings to miguel the mucosa proximal to the urethral sphincter to facilitate early apical release to minimize stress incontinence.  I then began my  resection using an en bloc technique, I started by creating a horseshoe incision at the 6 o clock position just proximal to the verumontanum to ensure all the apical tissue was released . I carried this until I was able to generate a plane between the prostate adenoma and prostate capsule posteriorly. I carried this circumferentially along both lateral lobes of the prostate. I connected the lateral incisions with with anterior release of the prostate adenoma from the prostate capsule until the bladder mucosa was reached anteriorly. Care was take to vaporize blood vessels at the level of the bladder neck. Once the prostate was freed circumferentially, I turned my attention to the prostatic fossa, I used laser energy to coagulate any active bleeders. I used the laser to vaporize any residual adenomatous fragments.  The bladder was inspected systematically, no injury to the bladder was noted.  I then switched to a nephroscope to begin the morcellation. A PirAllofMena morcellator was used to morcellate the prostate adenoma while the urinary bladder remained distended. At the end of morcellation no residual pieces of prostate tissue were seen. The irrigant from the bladder remained clear, no injury to either ureteral orifice was seen.  Hemostasis was satisfactory. The verumontanum and mucosa overlying the external urinary sphincter remained intact without injury.   The scope was then withdrawn. A 22-Yakut Lee catheter was inserted without difficulty into the bladder with 50 mL was placed into the balloon.  The catheter was irrigated three times and the irrigant remained clear without debris. The catheter was left to gravity drainage. His anesthesia was reversed.      He was then taken to the Recovery Room in stable condition.     Tomorrow a voiding trial will commence

## 2023-07-06 NOTE — TRANSFER OF CARE
Anesthesia Transfer of Care Note    Patient: Destin Barber    Procedure(s) Performed: Procedure(s) (LRB):  ENUCLEATION, PROSTATE, USING LASER; cystolithalopaxy (N/A)    Patient location: PACU    Anesthesia Type: general    Transport from OR: Transported from OR on room air with adequate spontaneous ventilation    Post pain: adequate analgesia    Post assessment: no apparent anesthetic complications and tolerated procedure well    Post vital signs: stable    Level of consciousness: alert, awake and oriented    Nausea/Vomiting: no nausea/vomiting    Complications: none    Transfer of care protocol was followed      Last vitals:   Visit Vitals  BP (!) 186/88 (BP Location: Left arm, Patient Position: Lying)   Pulse 78   Temp 36.6 °C (97.9 °F) (Skin)   Resp 11   SpO2 100%

## 2023-07-06 NOTE — SUBJECTIVE & OBJECTIVE
Past Medical History:   Diagnosis Date    Acute coronary syndrome 2016    s/p 3V CABG 2016    Anemia     Coronary artery disease     Diastolic dysfunction     Gout, chronic     Heart failure     HTN (hypertension)     Hyperlipidemia     Myocardial infarction     Pneumonia due to other staphylococcus     Pressure ulcer     Renal manifestation of secondary diabetes mellitus     Type 2 diabetes mellitus     diet controlled       Past Surgical History:   Procedure Laterality Date    CATARACT EXTRACTION Bilateral     CATARACT EXTRACTION W/ ANTERIOR VITRECTOMY      CORONARY ARTERY BYPASS GRAFT  2016    PAIZ-LAD, SVG-Ramus, SVG-PDA    FLUOROSCOPIC URODYNAMIC STUDY N/A 2023    Procedure: URODYNAMIC STUDY, FLUOROSCOPIC;  Surgeon: Raji Mcmullen MD;  Location: [facility];  Service: Urology;  Laterality: N/A;  RN PHONE PREOP WITH GRANDDAUGHTER ROGER 23    HEMORRHOID SURGERY         Review of patient's allergies indicates:   Allergen Reactions    Penicillins Nausea And Vomiting     Pt reports he is not allergic to penicillin         Family History       Problem Relation (Age of Onset)    Cancer Father    Heart disease Mother, Sister, Brother    Hypertension Mother    No Known Problems Sister, Sister, Sister, Sister, Brother, Brother, Brother, Brother, Brother, Daughter, Daughter, Son            Tobacco Use    Smoking status: Former     Types: Cigars     Start date:      Quit date:      Years since quittin.5    Smokeless tobacco: Never    Tobacco comments:     Former smoker. Pt. Smoked 2 cigars daily.   Substance and Sexual Activity    Alcohol use: No    Drug use: No    Sexual activity: Not Currently     Partners: Female       Review of Systems   Constitutional:  Negative for activity change, appetite change, chills, diaphoresis and fever.   HENT:  Negative for congestion and rhinorrhea.    Eyes:  Negative for visual disturbance.   Respiratory:  Negative for choking and shortness of breath.     Gastrointestinal:  Negative for abdominal distention, abdominal pain, constipation, diarrhea, nausea and vomiting.   Genitourinary:  Positive for difficulty urinating. Negative for dysuria, flank pain, hematuria, scrotal swelling, testicular pain and urgency.   Skin:  Negative for color change, pallor and wound.   Neurological:  Negative for dizziness and syncope.   Psychiatric/Behavioral:  Negative for confusion and hallucinations.      Objective:     Temp:  [98.6 °F (37 °C)] 98.6 °F (37 °C)  Pulse:  [78] 78  Resp:  [16] 16  SpO2:  [97 %] 97 %  BP: (159)/(87) 159/87     There is no height or weight on file to calculate BMI.    No intake/output data recorded.       Drains       None                     Physical Exam  Constitutional:       General: He is not in acute distress.     Appearance: He is well-developed. He is not ill-appearing, toxic-appearing or diaphoretic.   HENT:      Head: Normocephalic and atraumatic.      Mouth/Throat:      Mouth: Mucous membranes are moist.   Eyes:      Conjunctiva/sclera: Conjunctivae normal.   Pulmonary:      Effort: Pulmonary effort is normal. No respiratory distress.   Abdominal:      General: Abdomen is flat. There is no distension.      Palpations: There is no mass.      Tenderness: There is no abdominal tenderness. There is no guarding.   Musculoskeletal:         General: No swelling or deformity.      Cervical back: Neck supple.   Skin:     Findings: No rash.   Neurological:      Mental Status: He is alert and oriented to person, place, and time.      Gait: Gait normal.   Psychiatric:         Mood and Affect: Mood normal.         Thought Content: Thought content normal.         Judgment: Judgment normal.        Significant Labs:    BMP:  Recent Labs   Lab 06/30/23  0900      K 3.7      CO2 20*   BUN 25*   CREATININE 1.6*   CALCIUM 8.7       CBC:  Recent Labs   Lab 06/30/23  0900   WBC 6.61   HGB 11.0*   HCT 32.3*

## 2023-07-06 NOTE — H&P
[facility]  Urology  History & Physical    Patient Name: Destin Barber  MRN: [id number]  Admission Date: 2023  Code Status: Prior   Attending Provider: Raji Mcmullen MD   Primary Care Physician: Gatito Mcdonald MD  Principal Problem:BPH with obstruction/lower urinary tract symptoms    Subjective:     HPI:  87 yo man w/ longstanding bladder outlet obstruction, presents today for HOLEP. Negative pre op urine culture.       Past Medical History:   Diagnosis Date    Acute coronary syndrome 2016    s/p 3V CABG 2016    Anemia     Coronary artery disease     Diastolic dysfunction     Gout, chronic     Heart failure     HTN (hypertension)     Hyperlipidemia     Myocardial infarction     Pneumonia due to other staphylococcus     Pressure ulcer     Renal manifestation of secondary diabetes mellitus     Type 2 diabetes mellitus     diet controlled       Past Surgical History:   Procedure Laterality Date    CATARACT EXTRACTION Bilateral     CATARACT EXTRACTION W/ ANTERIOR VITRECTOMY      CORONARY ARTERY BYPASS GRAFT  2016    PAIZ-LAD, SVG-Ramus, SVG-PDA    FLUOROSCOPIC URODYNAMIC STUDY N/A 2023    Procedure: URODYNAMIC STUDY, FLUOROSCOPIC;  Surgeon: Raji Mcmullen MD;  Location: [facility];  Service: Urology;  Laterality: N/A;  RN PHONE PREOP WITH GRANDDAUGHTER ROGER 23    HEMORRHOID SURGERY         Review of patient's allergies indicates:   Allergen Reactions    Penicillins Nausea And Vomiting     Pt reports he is not allergic to penicillin         Family History       Problem Relation (Age of Onset)    Cancer Father    Heart disease Mother, Sister, Brother    Hypertension Mother    No Known Problems Sister, Sister, Sister, Sister, Brother, Brother, Brother, Brother, Brother, Daughter, Daughter, Son            Tobacco Use    Smoking status: Former     Types: Cigars     Start date:      Quit date:      Years since quittin.5    Smokeless tobacco:  Never    Tobacco comments:     Former smoker. Pt. Smoked 2 cigars daily.   Substance and Sexual Activity    Alcohol use: No    Drug use: No    Sexual activity: Not Currently     Partners: Female       Review of Systems   Constitutional:  Negative for activity change, appetite change, chills, diaphoresis and fever.   HENT:  Negative for congestion and rhinorrhea.    Eyes:  Negative for visual disturbance.   Respiratory:  Negative for choking and shortness of breath.    Gastrointestinal:  Negative for abdominal distention, abdominal pain, constipation, diarrhea, nausea and vomiting.   Genitourinary:  Positive for difficulty urinating. Negative for dysuria, flank pain, hematuria, scrotal swelling, testicular pain and urgency.   Skin:  Negative for color change, pallor and wound.   Neurological:  Negative for dizziness and syncope.   Psychiatric/Behavioral:  Negative for confusion and hallucinations.      Objective:     Temp:  [98.6 °F (37 °C)] 98.6 °F (37 °C)  Pulse:  [78] 78  Resp:  [16] 16  SpO2:  [97 %] 97 %  BP: (159)/(87) 159/87     There is no height or weight on file to calculate BMI.    No intake/output data recorded.       Drains       None                     Physical Exam  Constitutional:       General: He is not in acute distress.     Appearance: He is well-developed. He is not ill-appearing, toxic-appearing or diaphoretic.   HENT:      Head: Normocephalic and atraumatic.      Mouth/Throat:      Mouth: Mucous membranes are moist.   Eyes:      Conjunctiva/sclera: Conjunctivae normal.   Pulmonary:      Effort: Pulmonary effort is normal. No respiratory distress.   Abdominal:      General: Abdomen is flat. There is no distension.      Palpations: There is no mass.      Tenderness: There is no abdominal tenderness. There is no guarding.   Musculoskeletal:         General: No swelling or deformity.      Cervical back: Neck supple.   Skin:     Findings: No rash.   Neurological:      Mental Status: He is alert  and oriented to person, place, and time.      Gait: Gait normal.   Psychiatric:         Mood and Affect: Mood normal.         Thought Content: Thought content normal.         Judgment: Judgment normal.        Significant Labs:    BMP:  Recent Labs   Lab 06/30/23  0900      K 3.7      CO2 20*   BUN 25*   CREATININE 1.6*   CALCIUM 8.7       CBC:  Recent Labs   Lab 06/30/23  0900   WBC 6.61   HGB 11.0*   HCT 32.3*        Narrative & Impression  EXAMINATION:  US RETROPERITONEAL COMPLETE     CLINICAL HISTORY:  no PVR, check for bladder stones, measure prostate;  Benign prostatic hyperplasia with lower urinary tract symptoms     FINDINGS:  Right kidney measures 9.4 cm, resistive index 0.72.     Left kidney measures 10.1 cm, resistive index 0.69.     There is a left upper pole cyst measuring 1.5 cm.  Bladder volume is 509 cc prevoid, 359 cc postvoid.  Prostate measures 5.4 x 5.2 x 6.1 cm.  There are bladder diverticula bilaterally.     Impression:     Left upper pole renal cyst.  The kidneys are otherwise normal.     Large bladder diverticulum and prostatomegaly with a large bladder volumes suggesting bladder outlet obstruction from prostatomegaly.        Electronically signed by: Biju Alfred MD  Date:                                            06/15/2023  Time:                                           13:47      Assessment and Plan:     * BPH with obstruction/lower urinary tract symptoms  Plan for HOLEP  Perio op abx  Informed consent previously obtained        VTE Risk Mitigation (From admission, onward)         Ordered     IP VTE HIGH RISK PATIENT  Once         07/06/23 1112     Place sequential compression device  Until discontinued         07/06/23 1112                aRji Mcmullen MD  Urology  [facility] - Surgery

## 2023-07-06 NOTE — PLAN OF CARE
Preop plan of care reviewed.  Questions encouraged and questions answered. Pt verbalized readiness to proceed. Preop tylenol given.

## 2023-07-06 NOTE — BRIEF OP NOTE
[facility] - Surgery  Brief Operative Note    Surgery Date: 7/6/2023     Surgeon(s) and Role:     * Raji Mcmullen MD - Primary    Assisting Surgeon: None    Pre-op Diagnosis:  BPH with obstruction/lower urinary tract symptoms [N40.1, N13.8]      Post-op Diagnosis:  Post-Op Diagnosis Codes:     * BPH with obstruction/lower urinary tract symptoms [N40.1, N13.8]      Procedure(s) (LRB):  ENUCLEATION, PROSTATE, USING LASER    Anesthesia: General    Operative Findings:   Bilobar hypetrophy of prostate  2 bladder diverticuli- no masses within  Severe grade 3/4 trabeculations  Lee left to gravity drainage      Estimated Blood Loss: *2cc         Specimens:   Specimen (24h ago, onward)       Start     Ordered    07/06/23 1430  Specimen to Pathology, Surgery Urology  Once        Comments: Pre-op Diagnosis: BPH with obstruction/lower urinary tract symptoms [N40.1, N13.8]Bladder stone [N21.0]Procedure(s):ENUCLEATION, PROSTATE, USING LASER; cystolithalopaxy Number of specimens: 1Name of specimens: Prostate chips     References:    Click here for ordering Quick Tip   Question Answer Comment   Procedure Type: Urology    Specimen Class: Routine/Screening    Which provider would you like to cc? RAJI MCMULLEN    Release to patient Immediate        07/06/23 1430                      Discharge Note    OUTCOME: Patient tolerated treatment/procedure well without complication and is now ready for discharge.    DISPOSITION: Home or Self Care    FINAL DIAGNOSIS:  BPH with obstruction/lower urinary tract symptoms    FOLLOWUP: In clinic    DISCHARGE INSTRUCTIONS:    Discharge Procedure Orders   Diet general     Remove dressing in 48 hours   Order Comments: Okay to shower after dressing is removed in 48 hrs. No antibiotic or wound ointment needed. Do not submerge wound in water for at least 6 weeks.     Call MD for:  temperature >100.4     Call MD for:  persistent nausea and vomiting     Call MD for:  severe uncontrolled pain     Call  MD for:  difficulty breathing, headache or visual disturbances     Call MD for:  redness, tenderness, or signs of infection (pain, swelling, redness, odor or green/yellow discharge around incision site)     Activity as tolerated

## 2023-07-06 NOTE — HPI
87 yo man w/ longstanding bladder outlet obstruction, presents today for HOLEP. Negative pre op urine culture.

## 2023-07-06 NOTE — PATIENT INSTRUCTIONS
-Drink at least 4 bottles of water daily for the next 4-6 weeks  -Avoid constipation for at least 1 week with prescribed miralax  -Urinary urgency is anticipate part of recovery, some patients opt to wear a protective pad to guard against accidents  -Avoid known bladder irritants while your urinary tract is recovering for the next 4-6 weeks  Coffee - Regular & Decaf  Tea - caffeinated  Carbonated beverages - cola, non-jessy, diet & caffeine-free  Alcohols - Beer, Red Wine, White Wine, Champagne  Fruits - Grapefruit, Lemon, Orange, Pineapple  Fruit Juices - Cranberry, Grapefruit, Orange, Pineapple  Vegetables - Tomato & Tomato Products  Flavor Enhancers - Hot peppers, Spicy foods, Chili, Horseradish, Vinegar, Monosodium glutamate (MSG)  Artificial Sweeteners - NutraSweet, Sweet N Low, Equal (sweetener), Saccharin

## 2023-07-06 NOTE — PLAN OF CARE
Care complete. Pt verbalized understanding of discharge instructions and readiness to go home. Both daughters at bedside for discharge instructions and agreed to make certain they are followed, will also take pt to f/u appointment with MD tomorrow, pt escorted off unit per wheelchair.

## 2023-07-07 ENCOUNTER — OFFICE VISIT (OUTPATIENT)
Dept: UROLOGY | Facility: CLINIC | Age: 88
End: 2023-07-07
Payer: COMMERCIAL

## 2023-07-07 VITALS — BODY MASS INDEX: 24.89 KG/M2 | WEIGHT: 149.56 LBS

## 2023-07-07 DIAGNOSIS — N40.1 BPH WITH OBSTRUCTION/LOWER URINARY TRACT SYMPTOMS: Primary | ICD-10-CM

## 2023-07-07 DIAGNOSIS — N13.8 BPH WITH OBSTRUCTION/LOWER URINARY TRACT SYMPTOMS: Primary | ICD-10-CM

## 2023-07-07 LAB — POC RESIDUAL URINE VOLUME: 161 ML (ref 0–100)

## 2023-07-07 PROCEDURE — 1157F ADVNC CARE PLAN IN RCRD: CPT | Mod: CPTII,S$GLB,, | Performed by: STUDENT IN AN ORGANIZED HEALTH CARE EDUCATION/TRAINING PROGRAM

## 2023-07-07 PROCEDURE — 99999 PR PBB SHADOW E&M-EST. PATIENT-LVL II: CPT | Mod: PBBFAC,,, | Performed by: STUDENT IN AN ORGANIZED HEALTH CARE EDUCATION/TRAINING PROGRAM

## 2023-07-07 PROCEDURE — 99999 PR PBB SHADOW E&M-EST. PATIENT-LVL II: ICD-10-PCS | Mod: PBBFAC,,, | Performed by: STUDENT IN AN ORGANIZED HEALTH CARE EDUCATION/TRAINING PROGRAM

## 2023-07-07 PROCEDURE — 1159F PR MEDICATION LIST DOCUMENTED IN MEDICAL RECORD: ICD-10-PCS | Mod: CPTII,S$GLB,, | Performed by: STUDENT IN AN ORGANIZED HEALTH CARE EDUCATION/TRAINING PROGRAM

## 2023-07-07 PROCEDURE — 1126F PR PAIN SEVERITY QUANTIFIED, NO PAIN PRESENT: ICD-10-PCS | Mod: CPTII,S$GLB,, | Performed by: STUDENT IN AN ORGANIZED HEALTH CARE EDUCATION/TRAINING PROGRAM

## 2023-07-07 PROCEDURE — 1101F PR PT FALLS ASSESS DOC 0-1 FALLS W/OUT INJ PAST YR: ICD-10-PCS | Mod: CPTII,S$GLB,, | Performed by: STUDENT IN AN ORGANIZED HEALTH CARE EDUCATION/TRAINING PROGRAM

## 2023-07-07 PROCEDURE — 1159F MED LIST DOCD IN RCRD: CPT | Mod: CPTII,S$GLB,, | Performed by: STUDENT IN AN ORGANIZED HEALTH CARE EDUCATION/TRAINING PROGRAM

## 2023-07-07 PROCEDURE — 51798 POCT BLADDER SCAN: ICD-10-PCS | Mod: S$GLB,,, | Performed by: STUDENT IN AN ORGANIZED HEALTH CARE EDUCATION/TRAINING PROGRAM

## 2023-07-07 PROCEDURE — 3288F PR FALLS RISK ASSESSMENT DOCUMENTED: ICD-10-PCS | Mod: CPTII,S$GLB,, | Performed by: STUDENT IN AN ORGANIZED HEALTH CARE EDUCATION/TRAINING PROGRAM

## 2023-07-07 PROCEDURE — 3288F FALL RISK ASSESSMENT DOCD: CPT | Mod: CPTII,S$GLB,, | Performed by: STUDENT IN AN ORGANIZED HEALTH CARE EDUCATION/TRAINING PROGRAM

## 2023-07-07 PROCEDURE — 1157F PR ADVANCE CARE PLAN OR EQUIV PRESENT IN MEDICAL RECORD: ICD-10-PCS | Mod: CPTII,S$GLB,, | Performed by: STUDENT IN AN ORGANIZED HEALTH CARE EDUCATION/TRAINING PROGRAM

## 2023-07-07 PROCEDURE — 99024 POSTOP FOLLOW-UP VISIT: CPT | Mod: S$GLB,,, | Performed by: STUDENT IN AN ORGANIZED HEALTH CARE EDUCATION/TRAINING PROGRAM

## 2023-07-07 PROCEDURE — 99024 PR POST-OP FOLLOW-UP VISIT: ICD-10-PCS | Mod: S$GLB,,, | Performed by: STUDENT IN AN ORGANIZED HEALTH CARE EDUCATION/TRAINING PROGRAM

## 2023-07-07 PROCEDURE — 51798 US URINE CAPACITY MEASURE: CPT | Mod: S$GLB,,, | Performed by: STUDENT IN AN ORGANIZED HEALTH CARE EDUCATION/TRAINING PROGRAM

## 2023-07-07 PROCEDURE — 1126F AMNT PAIN NOTED NONE PRSNT: CPT | Mod: CPTII,S$GLB,, | Performed by: STUDENT IN AN ORGANIZED HEALTH CARE EDUCATION/TRAINING PROGRAM

## 2023-07-07 PROCEDURE — 1101F PT FALLS ASSESS-DOCD LE1/YR: CPT | Mod: CPTII,S$GLB,, | Performed by: STUDENT IN AN ORGANIZED HEALTH CARE EDUCATION/TRAINING PROGRAM

## 2023-07-07 NOTE — PROGRESS NOTES
HOLEP for BPH w/ LUTS POD1  Clinically stable  Fill and void trial        Patient to call with any concerns  Anticipate blood in urine for several weeks, recommended drinking at least 4 bottles of water daily    Has two significant bladder diverticuli which are signs of severe bladder dysfunction; CKD    Patient able to void 120 cc with about 161 cc residual    Return to clinic for repeat bladder scan        Patient returned to clinic  after voiding 400 cc, given POD 1 of procedure/age/baseline dysfunctional voiding    Replaced seo for rest and repeat voiding trial with RN on Monday      Patient may need urodynamics / diverticulectomy in the future

## 2023-07-07 NOTE — ANESTHESIA POSTPROCEDURE EVALUATION
Anesthesia Post Evaluation    Patient: Destin Barber    Procedure(s) Performed: Procedure(s) (LRB):  ENUCLEATION, PROSTATE, USING LASER; cystolithalopaxy (N/A)    Final Anesthesia Type: general      Patient location during evaluation: PACU  Patient participation: Yes- Able to Participate  Level of consciousness: awake and alert and oriented  Post-procedure vital signs: reviewed and stable  Pain management: adequate  Airway patency: patent    PONV status at discharge: No PONV  Anesthetic complications: no      Cardiovascular status: hemodynamically stable and blood pressure returned to baseline  Respiratory status: spontaneous ventilation, room air and unassisted  Hydration status: euvolemic  Follow-up not needed.          Vitals Value Taken Time   /92 07/06/23 1541   Temp 36.3 °C (97.4 °F) 07/06/23 1531   Pulse 67 07/06/23 1531   Resp 16 07/06/23 1531   SpO2 95 % 07/06/23 1531         Event Time   Out of Recovery 15:23:00         Pain/Ami Score: Pain Rating Prior to Med Admin: 0 (7/6/2023 12:32 PM)  Ami Score: 10 (7/6/2023  3:41 PM)  Modified Ami Score: 20 (7/6/2023  3:41 PM)

## 2023-07-10 ENCOUNTER — CLINICAL SUPPORT (OUTPATIENT)
Dept: UROLOGY | Facility: CLINIC | Age: 88
End: 2023-07-10
Payer: COMMERCIAL

## 2023-07-10 DIAGNOSIS — N13.8 BPH WITH OBSTRUCTION/LOWER URINARY TRACT SYMPTOMS: Primary | ICD-10-CM

## 2023-07-10 DIAGNOSIS — N40.1 BPH WITH OBSTRUCTION/LOWER URINARY TRACT SYMPTOMS: Primary | ICD-10-CM

## 2023-07-10 PROCEDURE — 99999 PR PBB SHADOW E&M-EST. PATIENT-LVL II: ICD-10-PCS | Mod: PBBFAC,,,

## 2023-07-10 PROCEDURE — 99999 PR PBB SHADOW E&M-EST. PATIENT-LVL II: CPT | Mod: PBBFAC,,,

## 2023-07-10 NOTE — NURSING
Pt arrived for voiding trial.  In-120 cc Out-100 cc.  PVR-0.  Pt tolerated procedure without complaints.  Pt and his granddaughter notified to call us or come to the ER if he is unable to void tonight.

## 2023-07-12 LAB
FINAL PATHOLOGIC DIAGNOSIS: NORMAL
GROSS: NORMAL
Lab: NORMAL

## 2023-07-17 ENCOUNTER — TELEPHONE (OUTPATIENT)
Dept: UROLOGY | Facility: CLINIC | Age: 88
End: 2023-07-17
Payer: COMMERCIAL

## 2023-07-17 NOTE — TELEPHONE ENCOUNTER
Tt pt and made appt for 8/17 to fu with path results         ----- Message from Raji Mcmullen MD sent at 7/17/2023  3:10 PM CDT -----  Patient needs appt to discuss his pathology findings  Ok to schedule in 2-3 weeks.

## 2023-07-21 ENCOUNTER — OFFICE VISIT (OUTPATIENT)
Dept: INTERNAL MEDICINE | Facility: CLINIC | Age: 88
End: 2023-07-21
Payer: COMMERCIAL

## 2023-07-21 VITALS
SYSTOLIC BLOOD PRESSURE: 130 MMHG | HEIGHT: 66 IN | DIASTOLIC BLOOD PRESSURE: 72 MMHG | TEMPERATURE: 98 F | HEART RATE: 76 BPM | WEIGHT: 144.63 LBS | BODY MASS INDEX: 23.24 KG/M2

## 2023-07-21 DIAGNOSIS — E11.40 TYPE 2 DIABETES MELLITUS WITH DIABETIC NEUROPATHY, WITHOUT LONG-TERM CURRENT USE OF INSULIN: ICD-10-CM

## 2023-07-21 DIAGNOSIS — R35.1 BPH ASSOCIATED WITH NOCTURIA: ICD-10-CM

## 2023-07-21 DIAGNOSIS — N18.30 CONTROLLED TYPE 2 DIABETES MELLITUS WITH STAGE 3 CHRONIC KIDNEY DISEASE, WITHOUT LONG-TERM CURRENT USE OF INSULIN: ICD-10-CM

## 2023-07-21 DIAGNOSIS — I25.810 CORONARY ARTERY DISEASE INVOLVING CORONARY BYPASS GRAFT OF NATIVE HEART WITHOUT ANGINA PECTORIS: ICD-10-CM

## 2023-07-21 DIAGNOSIS — N39.41 URGE INCONTINENCE OF URINE: ICD-10-CM

## 2023-07-21 DIAGNOSIS — E78.00 PURE HYPERCHOLESTEROLEMIA: ICD-10-CM

## 2023-07-21 DIAGNOSIS — N40.1 BPH ASSOCIATED WITH NOCTURIA: ICD-10-CM

## 2023-07-21 DIAGNOSIS — I70.0 AORTIC ATHEROSCLEROSIS: ICD-10-CM

## 2023-07-21 DIAGNOSIS — R35.0 FREQUENCY OF MICTURITION: ICD-10-CM

## 2023-07-21 DIAGNOSIS — E11.22 CONTROLLED TYPE 2 DIABETES MELLITUS WITH STAGE 3 CHRONIC KIDNEY DISEASE, WITHOUT LONG-TERM CURRENT USE OF INSULIN: ICD-10-CM

## 2023-07-21 DIAGNOSIS — I10 ESSENTIAL HYPERTENSION: Primary | ICD-10-CM

## 2023-07-21 PROCEDURE — 99214 PR OFFICE/OUTPT VISIT, EST, LEVL IV, 30-39 MIN: ICD-10-PCS | Mod: S$GLB,,, | Performed by: INTERNAL MEDICINE

## 2023-07-21 PROCEDURE — 1126F AMNT PAIN NOTED NONE PRSNT: CPT | Mod: CPTII,S$GLB,, | Performed by: INTERNAL MEDICINE

## 2023-07-21 PROCEDURE — 1126F PR PAIN SEVERITY QUANTIFIED, NO PAIN PRESENT: ICD-10-PCS | Mod: CPTII,S$GLB,, | Performed by: INTERNAL MEDICINE

## 2023-07-21 PROCEDURE — 1157F PR ADVANCE CARE PLAN OR EQUIV PRESENT IN MEDICAL RECORD: ICD-10-PCS | Mod: CPTII,S$GLB,, | Performed by: INTERNAL MEDICINE

## 2023-07-21 PROCEDURE — 1101F PR PT FALLS ASSESS DOC 0-1 FALLS W/OUT INJ PAST YR: ICD-10-PCS | Mod: CPTII,S$GLB,, | Performed by: INTERNAL MEDICINE

## 2023-07-21 PROCEDURE — 3288F FALL RISK ASSESSMENT DOCD: CPT | Mod: CPTII,S$GLB,, | Performed by: INTERNAL MEDICINE

## 2023-07-21 PROCEDURE — 3288F PR FALLS RISK ASSESSMENT DOCUMENTED: ICD-10-PCS | Mod: CPTII,S$GLB,, | Performed by: INTERNAL MEDICINE

## 2023-07-21 PROCEDURE — 1101F PT FALLS ASSESS-DOCD LE1/YR: CPT | Mod: CPTII,S$GLB,, | Performed by: INTERNAL MEDICINE

## 2023-07-21 PROCEDURE — 99214 OFFICE O/P EST MOD 30 MIN: CPT | Mod: S$GLB,,, | Performed by: INTERNAL MEDICINE

## 2023-07-21 PROCEDURE — 1157F ADVNC CARE PLAN IN RCRD: CPT | Mod: CPTII,S$GLB,, | Performed by: INTERNAL MEDICINE

## 2023-07-21 RX ORDER — OXYBUTYNIN CHLORIDE 5 MG/1
5 TABLET, EXTENDED RELEASE ORAL EVERY MORNING
Qty: 90 TABLET | Refills: 3 | Status: SHIPPED | OUTPATIENT
Start: 2023-07-21 | End: 2023-07-26

## 2023-07-21 RX ORDER — METFORMIN HYDROCHLORIDE 500 MG/1
TABLET, EXTENDED RELEASE ORAL
Qty: 90 TABLET | Refills: 3 | Status: SHIPPED | OUTPATIENT
Start: 2023-07-21

## 2023-07-21 NOTE — PROGRESS NOTES
Chief C/o:    Transitional Care (Follow-up after hospital stay for BPH with obstruction/lower urinary tract symptoms  - Primary;Preoperative testing;Bladder stone.  //), Diabetes, Hypertension, Hyperlipidemia, and Coronary Artery Disease        Health Care Maintenance    Health Maintenance         Date Due Completion Date    Shingles Vaccine (1 of 2) Never done ---    Eye Exam 02/05/2019 2/5/2018    COVID-19 Vaccine (4 - Moderna series) 01/29/2022 12/4/2021    Influenza Vaccine (1) 09/01/2023 10/7/2022    Hemoglobin A1c 09/08/2023 3/8/2023    Diabetes Urine Screening 03/08/2024 3/8/2023    Lipid Panel 03/08/2024 3/8/2023    TETANUS VACCINE 04/20/2028 4/20/2018                   HISTORY OF PRESENT ILLNESS:    ROSS Barber is a 88 y.o. male who presents to the clinic today for Transitional Care (Follow-up after hospital stay for BPH with obstruction/lower urinary tract symptoms  - Primary;Preoperative testing;Bladder stone.  //), Diabetes, Hypertension, Hyperlipidemia, and Coronary Artery Disease  .   Patient has less nocturia currently however during the daytime he has frequency of micturition as well as sometimes he have urge incontinence.    Otherwise no new complaint, no chest pain, no shortness of breath, no fever no chills.                ALLERGIES AND MEDICATIONS: updated and reviewed.  Review of patient's allergies indicates:   Allergen Reactions    Penicillins Nausea And Vomiting     Pt reports he is not allergic to penicillin       Medication List with Changes/Refills   New Medications    OXYBUTYNIN (DITROPAN-XL) 5 MG TR24    Take 1 tablet (5 mg total) by mouth every morning.   Current Medications    AMLODIPINE (NORVASC) 10 MG TABLET    Take 1 tablet (10 mg total) by mouth once daily.    ASPIRIN (ASPIR-LOW) 81 MG EC TABLET    Take 1 tablet (81 mg total) by mouth once daily.    ISOSORBIDE MONONITRATE (IMDUR) 60 MG 24 HR TABLET    Take 1 tablet (60 mg total) by mouth once daily.    LISINOPRIL  (PRINIVIL,ZESTRIL) 40 MG TABLET    Take 1 tablet (40 mg total) by mouth once daily.    METOPROLOL SUCCINATE (TOPROL-XL) 100 MG 24 HR TABLET    Take 1 tablet (100 mg total) by mouth 2 (two) times daily. Hold for systolic blood pressure less than 110 and or heart rate less than 55    NITROGLYCERIN (NITROSTAT) 0.4 MG SL TABLET    Place 1 tablet (0.4 mg total) under the tongue every 5 (five) minutes as needed for Chest pain.    POLYETHYLENE GLYCOL (GLYCOLAX) 17 GRAM PWPK    Take 17 g by mouth daily as needed (For constipation).    PRAVASTATIN (PRAVACHOL) 40 MG TABLET    Take 1 tablet (40 mg total) by mouth once daily.    TAMSULOSIN (FLOMAX) 0.4 MG CAP    Take 1 capsule (0.4 mg total) by mouth once daily.    TRAZODONE (DESYREL) 150 MG TABLET    Take 1 tablet (150 mg total) by mouth every evening.   Changed and/or Refilled Medications    Modified Medication Previous Medication    METFORMIN (GLUCOPHAGE-XR) 500 MG ER 24HR TABLET metFORMIN (GLUCOPHAGE-XR) 500 MG ER 24hr tablet       TAKE 1 TABLET BY MOUTH DAILY WITH BREAKFAST    TAKE 1 TABLET BY MOUTH DAILY WITH BREAKFAST       Problem List:  Patient Active Problem List   Diagnosis    Essential hypertension    Pure hypercholesterolemia    Diastolic dysfunction    Anemia of chronic disease    Coronary artery disease involving coronary bypass graft of native heart without angina pectoris    S/P CABG (coronary artery bypass graft)    Chronic gout without tophus    Hypokalemia    Controlled type 2 diabetes mellitus with stage 3 chronic kidney disease, without long-term current use of insulin    Aortic atherosclerosis    BMI 23.0-23.9, adult    History of syncope    Elevated PSA    BPH associated with nocturia    Bilateral renal cysts    Stage 3a chronic kidney disease    Primary insomnia    Chronic idiopathic constipation    Frequency of micturition    Urge incontinence of urine         CARE TEAM:    Patient Care Team:  Gatito Mcdonald MD as PCP - General (Internal  "Medicine)  Harry Velazco MD         REVIEW OF SYSTEMS:    Review of Systems   Constitutional:  Negative for appetite change, chills, diaphoresis, fatigue, fever and unexpected weight change.   HENT:  Negative for congestion, drooling, ear discharge, ear pain, facial swelling, hearing loss, nosebleeds, rhinorrhea, sinus pain, sneezing, sore throat, tinnitus, trouble swallowing and voice change.    Eyes:  Negative for pain, discharge, redness, itching and visual disturbance.   Respiratory:  Negative for cough, choking, chest tightness, shortness of breath, wheezing and stridor.    Cardiovascular:  Negative for chest pain, palpitations and leg swelling.   Gastrointestinal:  Negative for abdominal distention, abdominal pain, blood in stool, constipation, diarrhea, nausea and vomiting.   Endocrine: Negative for cold intolerance, heat intolerance, polydipsia, polyphagia and polyuria.   Genitourinary:  Negative for difficulty urinating, dysuria, flank pain, frequency, hematuria and urgency.   Musculoskeletal:  Negative for arthralgias, back pain, gait problem, joint swelling, myalgias, neck pain and neck stiffness.   Skin:  Negative for color change, pallor, rash and wound.   Allergic/Immunologic: Negative for environmental allergies, food allergies and immunocompromised state.   Neurological:  Negative for dizziness, tremors, seizures, syncope, speech difficulty, weakness, light-headedness, numbness and headaches.   Hematological:  Negative for adenopathy. Does not bruise/bleed easily.   Psychiatric/Behavioral:  Positive for sleep disturbance. Negative for agitation, behavioral problems, confusion, decreased concentration, dysphoric mood, hallucinations and suicidal ideas. The patient is not nervous/anxious.        PHYSICAL EXAM:    Vitals:    07/21/23 0844   BP: 130/72   Pulse: 76   Temp: 97.6 °F (36.4 °C)     Weight: 65.6 kg (144 lb 10 oz)   Height: 5' 5.98" (167.6 cm)   Body mass index is 23.36 kg/m².  Vitals:    " "07/21/23 0844   BP: 130/72   Pulse: 76   Temp: 97.6 °F (36.4 °C)   TempSrc: Temporal   Weight: 65.6 kg (144 lb 10 oz)   Height: 5' 5.98" (1.676 m)   PainSc: 0-No pain          Physical Exam  Vitals and nursing note reviewed.   Constitutional:       General: He is not in acute distress.     Appearance: Normal appearance. He is normal weight. He is not ill-appearing, toxic-appearing or diaphoretic.      Comments: Patient is alert, awake and oriented X 3.  Patient is comfortable, cooperative and in no apparent distress.     HENT:      Head: Normocephalic and atraumatic.      Right Ear: Tympanic membrane, ear canal and external ear normal. There is no impacted cerumen.      Left Ear: Tympanic membrane, ear canal and external ear normal. There is no impacted cerumen.      Nose: Nose normal. No congestion or rhinorrhea.      Mouth/Throat:      Mouth: Mucous membranes are moist.      Pharynx: Oropharynx is clear. No oropharyngeal exudate or posterior oropharyngeal erythema.   Eyes:      General: No scleral icterus.        Right eye: No discharge.         Left eye: No discharge.      Extraocular Movements: Extraocular movements intact.      Conjunctiva/sclera: Conjunctivae normal.      Pupils: Pupils are equal, round, and reactive to light.   Cardiovascular:      Rate and Rhythm: Normal rate and regular rhythm.      Pulses: Normal pulses.      Heart sounds: Normal heart sounds. No murmur heard.    No friction rub. No gallop.   Pulmonary:      Effort: Pulmonary effort is normal. No respiratory distress.      Breath sounds: Normal breath sounds. No stridor. No wheezing, rhonchi or rales.   Chest:      Chest wall: No tenderness.   Abdominal:      General: Bowel sounds are normal. There is no distension.      Palpations: Abdomen is soft. There is no mass.      Tenderness: There is no abdominal tenderness. There is no guarding or rebound.      Hernia: No hernia is present.   Musculoskeletal:         General: No swelling, " tenderness, deformity or signs of injury. Normal range of motion.      Cervical back: Normal range of motion and neck supple. No rigidity.      Right lower leg: Edema (+1 pitting edema bilaterally.) present.      Left lower leg: Edema present.   Lymphadenopathy:      Cervical: No cervical adenopathy.   Skin:     General: Skin is warm and dry.      Capillary Refill: Capillary refill takes less than 2 seconds.      Coloration: Skin is not jaundiced or pale.      Findings: No bruising, erythema, lesion or rash.   Neurological:      General: No focal deficit present.      Mental Status: He is alert and oriented to person, place, and time.      Cranial Nerves: No cranial nerve deficit.      Sensory: No sensory deficit.      Motor: No weakness.      Coordination: Coordination normal.      Deep Tendon Reflexes: Reflexes normal.   Psychiatric:         Mood and Affect: Mood normal.         Behavior: Behavior normal.         Thought Content: Thought content normal.         Judgment: Judgment normal.          Labs:    Lab Results   Component Value Date     (H) 06/30/2023     06/30/2023    K 3.7 06/30/2023     06/30/2023    CO2 20 (L) 06/30/2023    BUN 25 (H) 06/30/2023    CREATININE 1.6 (H) 06/30/2023    CALCIUM 8.7 06/30/2023    PROT 7.5 09/21/2022    ALBUMIN 4.1 03/08/2023    ALBUMIN 3.6 10/07/2022    BILITOT <0.2 03/08/2023    ALKPHOS 91 09/21/2022    AST 15 03/08/2023    ALT 8 03/08/2023    ANIONGAP 11 06/30/2023    ESTGFRAFRICA >60 03/30/2022    EGFRNONAA >60 03/30/2022     Lab Results   Component Value Date    WBC 6.61 06/30/2023    RBC 3.51 (L) 06/30/2023    HGB 11.0 (L) 06/30/2023    HCT 32.3 (L) 06/30/2023    HCT 26 (L) 07/07/2016    MCV 92 06/30/2023    RDW 12.8 06/30/2023     06/30/2023      Lab Results   Component Value Date    CHOL 118 03/08/2023    TRIG 147 03/08/2023    HDL 35 (L) 03/08/2023    LDLCALC 57 03/08/2023    TOTALCHOLEST 2.7 09/22/2022     Lab Results   Component Value Date     TSH 1.190 03/08/2023     Lab Results   Component Value Date    HGBA1C 6.2 (H) 03/08/2023    HGBA1C 6.0 (H) 09/22/2022    ESTIMATEDAVG 126 09/22/2022      No components found for: MICROALBUMIN/CREATININE    ASSESSMENT & PLAN:    1. Controlled type 2 diabetes mellitus with stage 3 chronic kidney disease, without long-term current use of insulin  - metFORMIN (GLUCOPHAGE-XR) 500 MG ER 24hr tablet; TAKE 1 TABLET BY MOUTH DAILY WITH BREAKFAST  Dispense: 90 tablet; Refill: 3    2. Type 2 diabetes mellitus with diabetic neuropathy, without long-term current use of insulin  - metFORMIN (GLUCOPHAGE-XR) 500 MG ER 24hr tablet; TAKE 1 TABLET BY MOUTH DAILY WITH BREAKFAST  Dispense: 90 tablet; Refill: 3    3. Essential hypertension    4. Pure hypercholesterolemia    5. Coronary artery disease involving coronary bypass graft of native heart without angina pectoris    6. Aortic atherosclerosis    7. BPH associated with nocturia  - oxybutynin (DITROPAN-XL) 5 MG TR24; Take 1 tablet (5 mg total) by mouth every morning.  Dispense: 90 tablet; Refill: 3    8. Frequency of micturition  - oxybutynin (DITROPAN-XL) 5 MG TR24; Take 1 tablet (5 mg total) by mouth every morning.  Dispense: 90 tablet; Refill: 3    9. Urge incontinence of urine    10. BMI 23.0-23.9, adult       Patient is stable in general, will add oxybutynin his medication and to continue on Flomax as well, will see him again in about 6 weeks for re-evaluation, and referral to urologist if his symptoms are not improved.  Otherwise to continue current medication, he was given a refill of his metformin and advised to take his medications as prescribed.    Healthy diet and exercise were recommended.      No orders of the defined types were placed in this encounter.     Follow up in about 6 weeks (around 9/1/2023), or if symptoms worsen or fail to improve. or sooner as needed.    Patient was counseled and questions and concerns were addressed.    Please note:  Parts of this  report were done using a dictation software, voice to text, and sometimes the text contains some uncorrected words or sentences that are missed during revision.

## 2023-07-26 ENCOUNTER — PATIENT MESSAGE (OUTPATIENT)
Dept: UROLOGY | Facility: CLINIC | Age: 88
End: 2023-07-26
Payer: COMMERCIAL

## 2023-07-27 DIAGNOSIS — I10 ESSENTIAL HYPERTENSION: ICD-10-CM

## 2023-07-27 DIAGNOSIS — I25.10 CORONARY ARTERY DISEASE INVOLVING NATIVE CORONARY ARTERY OF NATIVE HEART WITHOUT ANGINA PECTORIS: ICD-10-CM

## 2023-07-28 ENCOUNTER — TELEPHONE (OUTPATIENT)
Dept: UROLOGY | Facility: CLINIC | Age: 88
End: 2023-07-28
Payer: COMMERCIAL

## 2023-07-28 NOTE — TELEPHONE ENCOUNTER
Refill Routing Note   Medication(s) are not appropriate for processing by [facility] for the following reason(s):      Non-participating provider    [facility] action(s):  Route Care Due:  None identified      Refill center unable to handle this medication because the provider is not covered under our protocol.      Appointments  past 12m or future 3m with PCP    Date Provider   Last Visit   7/21/2023 Gatito Mcdonald MD   Next Visit   9/1/2023 Gatito Mcdonald MD   ED visits in past 90 days: 0        Note composed:7:24 PM 07/27/2023

## 2023-07-28 NOTE — TELEPHONE ENCOUNTER
Pts granddaughter called to r/s appt to 8/2/23 @ 1:30pm.  She is unable to make the appt today.      ----- Message from Rosalind Hale sent at 7/28/2023 12:41 PM CDT -----  Type: Patient Call Back    Who called:pt's granddaughter hannah [phone]      What is the request in detail:calling to verify appt today and what it is for. Call hannah    Can the clinic reply by [facility]?    Would the patient rather a call back or a response via [facility]? call    Best call back number:[phone] (home)       Additional Information:

## 2023-07-31 DIAGNOSIS — I10 ESSENTIAL HYPERTENSION: ICD-10-CM

## 2023-08-01 RX ORDER — METOPROLOL TARTRATE 25 MG/1
TABLET, FILM COATED ORAL
Qty: 180 TABLET | Refills: 3 | OUTPATIENT
Start: 2023-08-01

## 2023-08-02 ENCOUNTER — OFFICE VISIT (OUTPATIENT)
Dept: UROLOGY | Facility: CLINIC | Age: 88
End: 2023-08-02
Payer: COMMERCIAL

## 2023-08-02 VITALS — BODY MASS INDEX: 23.82 KG/M2 | WEIGHT: 147.5 LBS

## 2023-08-02 DIAGNOSIS — N40.1 BPH WITH OBSTRUCTION/LOWER URINARY TRACT SYMPTOMS: ICD-10-CM

## 2023-08-02 DIAGNOSIS — N13.8 BPH WITH OBSTRUCTION/LOWER URINARY TRACT SYMPTOMS: ICD-10-CM

## 2023-08-02 DIAGNOSIS — C61 PROSTATE CANCER: Primary | ICD-10-CM

## 2023-08-02 PROCEDURE — 1160F PR REVIEW ALL MEDS BY PRESCRIBER/CLIN PHARMACIST DOCUMENTED: ICD-10-PCS | Mod: CPTII,S$GLB,, | Performed by: STUDENT IN AN ORGANIZED HEALTH CARE EDUCATION/TRAINING PROGRAM

## 2023-08-02 PROCEDURE — 3288F PR FALLS RISK ASSESSMENT DOCUMENTED: ICD-10-PCS | Mod: CPTII,S$GLB,, | Performed by: STUDENT IN AN ORGANIZED HEALTH CARE EDUCATION/TRAINING PROGRAM

## 2023-08-02 PROCEDURE — 1101F PR PT FALLS ASSESS DOC 0-1 FALLS W/OUT INJ PAST YR: ICD-10-PCS | Mod: CPTII,S$GLB,, | Performed by: STUDENT IN AN ORGANIZED HEALTH CARE EDUCATION/TRAINING PROGRAM

## 2023-08-02 PROCEDURE — 99214 OFFICE O/P EST MOD 30 MIN: CPT | Mod: 24,S$GLB,, | Performed by: STUDENT IN AN ORGANIZED HEALTH CARE EDUCATION/TRAINING PROGRAM

## 2023-08-02 PROCEDURE — 1160F RVW MEDS BY RX/DR IN RCRD: CPT | Mod: CPTII,S$GLB,, | Performed by: STUDENT IN AN ORGANIZED HEALTH CARE EDUCATION/TRAINING PROGRAM

## 2023-08-02 PROCEDURE — 99999 PR PBB SHADOW E&M-EST. PATIENT-LVL III: ICD-10-PCS | Mod: PBBFAC,,, | Performed by: STUDENT IN AN ORGANIZED HEALTH CARE EDUCATION/TRAINING PROGRAM

## 2023-08-02 PROCEDURE — 1126F PR PAIN SEVERITY QUANTIFIED, NO PAIN PRESENT: ICD-10-PCS | Mod: CPTII,S$GLB,, | Performed by: STUDENT IN AN ORGANIZED HEALTH CARE EDUCATION/TRAINING PROGRAM

## 2023-08-02 PROCEDURE — 3288F FALL RISK ASSESSMENT DOCD: CPT | Mod: CPTII,S$GLB,, | Performed by: STUDENT IN AN ORGANIZED HEALTH CARE EDUCATION/TRAINING PROGRAM

## 2023-08-02 PROCEDURE — 1126F AMNT PAIN NOTED NONE PRSNT: CPT | Mod: CPTII,S$GLB,, | Performed by: STUDENT IN AN ORGANIZED HEALTH CARE EDUCATION/TRAINING PROGRAM

## 2023-08-02 PROCEDURE — 99214 PR OFFICE/OUTPT VISIT, EST, LEVL IV, 30-39 MIN: ICD-10-PCS | Mod: 24,S$GLB,, | Performed by: STUDENT IN AN ORGANIZED HEALTH CARE EDUCATION/TRAINING PROGRAM

## 2023-08-02 PROCEDURE — 1157F ADVNC CARE PLAN IN RCRD: CPT | Mod: CPTII,S$GLB,, | Performed by: STUDENT IN AN ORGANIZED HEALTH CARE EDUCATION/TRAINING PROGRAM

## 2023-08-02 PROCEDURE — 1159F PR MEDICATION LIST DOCUMENTED IN MEDICAL RECORD: ICD-10-PCS | Mod: CPTII,S$GLB,, | Performed by: STUDENT IN AN ORGANIZED HEALTH CARE EDUCATION/TRAINING PROGRAM

## 2023-08-02 PROCEDURE — 1101F PT FALLS ASSESS-DOCD LE1/YR: CPT | Mod: CPTII,S$GLB,, | Performed by: STUDENT IN AN ORGANIZED HEALTH CARE EDUCATION/TRAINING PROGRAM

## 2023-08-02 PROCEDURE — 1159F MED LIST DOCD IN RCRD: CPT | Mod: CPTII,S$GLB,, | Performed by: STUDENT IN AN ORGANIZED HEALTH CARE EDUCATION/TRAINING PROGRAM

## 2023-08-02 PROCEDURE — 99999 PR PBB SHADOW E&M-EST. PATIENT-LVL III: CPT | Mod: PBBFAC,,, | Performed by: STUDENT IN AN ORGANIZED HEALTH CARE EDUCATION/TRAINING PROGRAM

## 2023-08-02 PROCEDURE — 1157F PR ADVANCE CARE PLAN OR EQUIV PRESENT IN MEDICAL RECORD: ICD-10-PCS | Mod: CPTII,S$GLB,, | Performed by: STUDENT IN AN ORGANIZED HEALTH CARE EDUCATION/TRAINING PROGRAM

## 2023-08-02 RX ORDER — METOPROLOL SUCCINATE 100 MG/1
100 TABLET, EXTENDED RELEASE ORAL 2 TIMES DAILY
Qty: 180 TABLET | Refills: 0 | Status: SHIPPED | OUTPATIENT
Start: 2023-08-02

## 2023-08-02 NOTE — PROGRESS NOTES
Patient ID: Destin Barber is a 88 y.o. male.    Chief Complaint: Urinary Frequency (Follow up. Pt states he is going to the restroom a lot during there day but not at all during the night time.)    Referral: No referring provider defined for this encounter.     HPI  88 y.o. who presents to the Urology clinic for evaluation of path findings post op. Patient underwent BPH procedure, found to have incidental prostate cancer.  PSA 4.9 pre op. Here to review options for management. Also patient with post op UU, his PCP provided oxybutynin. Patient denies hematuria, notes slight dysuria. Patient drinks water all day from a 32 oz cup filled multiple times.   Medically Necessary ROS documented in HPI    Review of Systems   Constitutional:  Negative for chills and fever.   HENT:  Negative for congestion and sore throat.    Eyes:  Negative for blurred vision and redness.   Respiratory:  Negative for cough and shortness of breath.    Cardiovascular:  Negative for chest pain and leg swelling.   Gastrointestinal:  Negative for abdominal pain, blood in stool, constipation, nausea and vomiting.   Genitourinary:  Negative for dysuria, flank pain, frequency, hematuria and urgency.   Musculoskeletal:  Negative for back pain.   Skin:  Negative for rash.   Neurological:  Negative for dizziness and headaches.   Psychiatric/Behavioral:  Negative for depression. The patient is not nervous/anxious.          Past Medical History  Active Ambulatory Problems     Diagnosis Date Noted    Essential hypertension     Pure hypercholesterolemia     Diastolic dysfunction     Anemia of chronic disease 06/23/2016    Coronary artery disease involving coronary bypass graft of native heart without angina pectoris     S/P CABG (coronary artery bypass graft) 07/07/2016    Chronic gout without tophus 07/13/2016    Hypokalemia 02/03/2017    Controlled type 2 diabetes mellitus with stage 3 chronic kidney disease, without long-term current use of insulin  02/03/2017    Aortic atherosclerosis 08/31/2020    BMI 23.0-23.9, adult 03/07/2023    History of syncope 03/07/2023    Elevated PSA 03/07/2023    BPH associated with nocturia 03/22/2023    Bilateral renal cysts 03/22/2023    Stage 3a chronic kidney disease 06/15/2023    Primary insomnia 06/15/2023    Chronic idiopathic constipation 06/15/2023    Frequency of micturition 07/21/2023    Urge incontinence of urine 07/21/2023     Resolved Ambulatory Problems     Diagnosis Date Noted    Type 2 diabetes mellitus with complication     Hypertension, benign 01/31/2013    NSTEMI (non-ST elevated myocardial infarction) 06/23/2016    Pain of left upper extremity 06/23/2016    Coronary artery disease due to lipid rich plaque 06/24/2016    Hyperglycemia 07/07/2016    Chronic diastolic CHF (congestive heart failure), NYHA class 2 07/08/2016    Gait instability 07/12/2016    Sepsis 02/02/2017    Influenza A 02/03/2017    SOB (shortness of breath) 02/03/2017    Paresthesia 03/12/2018     Past Medical History:   Diagnosis Date    Acute coronary syndrome 06/24/2016    Anemia     Coronary artery disease     Gout, chronic     Heart failure     HTN (hypertension)     Hyperlipidemia     Myocardial infarction     Pneumonia due to other staphylococcus     Pressure ulcer     Renal manifestation of secondary diabetes mellitus     Type 2 diabetes mellitus          Past Surgical History  Past Surgical History:   Procedure Laterality Date    CATARACT EXTRACTION Bilateral     CATARACT EXTRACTION W/ ANTERIOR VITRECTOMY      CORONARY ARTERY BYPASS GRAFT  07/06/2016    PAIZ-LAD, SVG-Ramus, SVG-PDA    FLUOROSCOPIC URODYNAMIC STUDY N/A 4/25/2023    Procedure: URODYNAMIC STUDY, FLUOROSCOPIC;  Surgeon: Raji Mcmullen MD;  Location: [facility];  Service: Urology;  Laterality: N/A;  RN PHONE PREOP WITH GRANDDAUGHTER ROGER 4/21/23    HEMORRHOID SURGERY      LASER ENUCLEATION OF PROSTATE N/A 7/6/2023    Procedure: ENUCLEATION, PROSTATE, USING LASER;  cystolithalopaxy;  Surgeon: Raji Mcmullen MD;  Location: [facility];  Service: Urology;  Laterality: N/A;  [facility] [phone] SPOKE TO NICK ON 6/2/2023 @ 10:37AM. CONFIRMATION NUMBER [id number]  FIORDALIZA POLLACK [phone] EMAILED CHANNING ON 6/15/2023@ 3:07PM-KEYON  RN PREOP 5/30/2023   T/S ON 6/5/2023  RN PREOP 06/30/2023 --CARLOS       Social History  Social Connections: Moderately Isolated (3/22/2023)    Social Connection and Isolation Panel [NHANES]     Frequency of Communication with Friends and Family: More than three times a week     Frequency of Social Gatherings with Friends and Family: More than three times a week     Attends [employer] Services: Never     Active Member of Clubs or Organizations: No     Attends Club or Organization Meetings: Never     Marital Status:        Medications    Current Outpatient Medications:     amLODIPine (NORVASC) 10 MG tablet, Take 1 tablet (10 mg total) by mouth once daily., Disp: 90 tablet, Rfl: 0    aspirin (ASPIR-LOW) 81 MG EC tablet, Take 1 tablet (81 mg total) by mouth once daily., Disp: 90 tablet, Rfl: 3    isosorbide mononitrate (IMDUR) 60 MG 24 hr tablet, Take 1 tablet (60 mg total) by mouth once daily., Disp: 90 tablet, Rfl: 3    lisinopriL (PRINIVIL,ZESTRIL) 40 MG tablet, Take 1 tablet (40 mg total) by mouth once daily., Disp: 90 tablet, Rfl: 3    metFORMIN (GLUCOPHAGE-XR) 500 MG ER 24hr tablet, TAKE 1 TABLET BY MOUTH DAILY WITH BREAKFAST, Disp: 90 tablet, Rfl: 3    metoprolol succinate (TOPROL-XL) 100 MG 24 hr tablet, Take 1 tablet (100 mg total) by mouth 2 (two) times daily. Hold for systolic blood pressure less than 110 and or heart rate less than 55, Disp: 180 tablet, Rfl: 0    polyethylene glycol (GLYCOLAX) 17 gram PwPk, Take 17 g by mouth daily as needed (For constipation)., Disp: 90 packet, Rfl: 1    pravastatin (PRAVACHOL) 40 MG tablet, Take 1 tablet (40 mg total) by mouth once daily., Disp: 90 tablet, Rfl: 3    tamsulosin (FLOMAX) 0.4 mg  Cap, Take 1 capsule (0.4 mg total) by mouth once daily., Disp: 90 capsule, Rfl: 2    traZODone (DESYREL) 150 MG tablet, Take 1 tablet (150 mg total) by mouth every evening., Disp: 90 tablet, Rfl: 3    nitroGLYCERIN (NITROSTAT) 0.4 MG SL tablet, Place 1 tablet (0.4 mg total) under the tongue every 5 (five) minutes as needed for Chest pain., Disp: 30 tablet, Rfl: 0    vibegron 75 mg Tab, Take 1 tablet by mouth once daily., Disp: 30 tablet, Rfl: 0  No current facility-administered medications for this visit.    Facility-Administered Medications Ordered in Other Visits:     lactated ringers infusion, , Intravenous, Continuous, Julia Fong MD, Last Rate: 10 mL/hr at 07/06/23 1230, New Bag at 07/06/23 1230    LIDOcaine (PF) 10 mg/ml (1%) injection 10 mg, 1 mL, Intradermal, Once, Julia Fong MD    Allergies  Review of patient's allergies indicates:   Allergen Reactions    Penicillins Nausea And Vomiting     Pt reports he is not allergic to penicillin         Patient's PMH, FH, Social hx, Medications, allergies reviewed and updated as pertinent to today's visit    Objective:      Physical Exam  Constitutional:       General: He is not in acute distress.     Appearance: He is well-developed. He is not ill-appearing, toxic-appearing or diaphoretic.   HENT:      Head: Normocephalic and atraumatic.      Mouth/Throat:      Mouth: Mucous membranes are moist.   Eyes:      Conjunctiva/sclera: Conjunctivae normal.   Pulmonary:      Effort: Pulmonary effort is normal. No respiratory distress.   Abdominal:      General: Abdomen is flat. There is no distension.      Palpations: Abdomen is soft.      Tenderness: There is no guarding.   Musculoskeletal:         General: No swelling or deformity.      Cervical back: Neck supple.   Skin:     Findings: No rash.   Neurological:      Mental Status: He is alert and oriented to person, place, and time.      Gait: Gait normal.   Psychiatric:         Mood and Affect: Mood normal.          Thought Content: Thought content normal.         Judgment: Judgment normal.         Path  Prostate chips (9 g), TURP:   Multifocal prostatic acinar adenocarcinoma, Grade Group 1, supported by immunohistochemical stains for AMACR, p63, and HMWK with adequate controls   The tumor is present in approximately 3 prostate chips and measures approximately 6 mm in length, approximately 2% of total prostatic tissue   No perineural invasion identified   Background prostatic hyperplasia     Comment: Interp By Luz Maria Lott D.O., Signed on 07/12/2023 at        Assessment:       1. BPH with obstruction/lower urinary tract symptoms    2. Prostate cancer        Plan:       OAB anticipated after prostate procedure  Anticholinergics contraindicated with patient's age  Gemtesa provided as 30 day trial can consider mirabegron  in future  Patient and daughter VU  Patient to drink less water to minimize LUTS conservatively    BPH s/p TURP w/ incidental prostate cancer detected    Given patient's age, plan for watchful waiting  PSA/MRI of prostate in 3 months at follow up appt  Can consider targetted biopsy and potential XRT/ADT if aggressive disease suggested  Minimize risk of post biopsy sepsis

## 2023-09-15 ENCOUNTER — HOSPITAL ENCOUNTER (EMERGENCY)
Facility: HOSPITAL | Age: 88
Discharge: HOME | End: 2023-09-15
Attending: EMERGENCY MEDICINE
Payer: COMMERCIAL

## 2023-09-15 VITALS
TEMPERATURE: 98 F | RESPIRATION RATE: 18 BRPM | BODY MASS INDEX: 24.99 KG/M2 | HEIGHT: 65 IN | WEIGHT: 150 LBS | OXYGEN SATURATION: 100 % | HEART RATE: 65 BPM | SYSTOLIC BLOOD PRESSURE: 182 MMHG | DIASTOLIC BLOOD PRESSURE: 85 MMHG

## 2023-09-15 DIAGNOSIS — I10 HYPERTENSION, UNSPECIFIED TYPE: ICD-10-CM

## 2023-09-15 DIAGNOSIS — L02.91 PHLEGMON: ICD-10-CM

## 2023-09-15 DIAGNOSIS — M27.2 CHRONIC OSTEOMYELITIS OF MANDIBLE: Primary | ICD-10-CM

## 2023-09-15 LAB
ALBUMIN SERPL BCP-MCNC: 3.8 G/DL (ref 3.5–5.2)
ALP SERPL-CCNC: 65 U/L (ref 55–135)
ALT SERPL W/O P-5'-P-CCNC: 10 U/L (ref 10–44)
ANION GAP SERPL CALC-SCNC: 16 MMOL/L (ref 8–16)
ANION GAP SERPL CALC-SCNC: 8 MMOL/L (ref 8–16)
AST SERPL-CCNC: 17 U/L (ref 10–40)
BASOPHILS # BLD AUTO: 0.02 K/UL (ref 0–0.2)
BASOPHILS NFR BLD: 0.4 % (ref 0–1.9)
BILIRUB SERPL-MCNC: 0.3 MG/DL (ref 0.1–1)
BUN SERPL-MCNC: 22 MG/DL (ref 8–23)
BUN SERPL-MCNC: 23 MG/DL (ref 6–30)
CALCIUM SERPL-MCNC: 9 MG/DL (ref 8.7–10.5)
CHLORIDE SERPL-SCNC: 105 MMOL/L (ref 95–110)
CHLORIDE SERPL-SCNC: 108 MMOL/L (ref 95–110)
CO2 SERPL-SCNC: 23 MMOL/L (ref 23–29)
CREAT SERPL-MCNC: 1.7 MG/DL (ref 0.5–1.4)
CREAT SERPL-MCNC: 1.8 MG/DL (ref 0.5–1.4)
DIFFERENTIAL METHOD: ABNORMAL
EOSINOPHIL # BLD AUTO: 0.1 K/UL (ref 0–0.5)
EOSINOPHIL NFR BLD: 1.6 % (ref 0–8)
ERYTHROCYTE [DISTWIDTH] IN BLOOD BY AUTOMATED COUNT: 13.2 % (ref 11.5–14.5)
EST. GFR  (NO RACE VARIABLE): 39 ML/MIN/1.73 M^2
GLUCOSE SERPL-MCNC: 102 MG/DL (ref 70–110)
GLUCOSE SERPL-MCNC: 95 MG/DL (ref 70–110)
HCT VFR BLD AUTO: 33.2 % (ref 40–54)
HCT VFR BLD CALC: 33 %PCV (ref 36–54)
HGB BLD-MCNC: 11.1 G/DL (ref 14–18)
IMM GRANULOCYTES # BLD AUTO: 0.01 K/UL (ref 0–0.04)
IMM GRANULOCYTES NFR BLD AUTO: 0.2 % (ref 0–0.5)
LYMPHOCYTES # BLD AUTO: 3 K/UL (ref 1–4.8)
LYMPHOCYTES NFR BLD: 54.9 % (ref 18–48)
MCH RBC QN AUTO: 30.7 PG (ref 27–31)
MCHC RBC AUTO-ENTMCNC: 33.4 G/DL (ref 32–36)
MCV RBC AUTO: 92 FL (ref 82–98)
MONOCYTES # BLD AUTO: 0.5 K/UL (ref 0.3–1)
MONOCYTES NFR BLD: 8.3 % (ref 4–15)
NEUTROPHILS # BLD AUTO: 1.9 K/UL (ref 1.8–7.7)
NEUTROPHILS NFR BLD: 34.6 % (ref 38–73)
NRBC BLD-RTO: 0 /100 WBC
PLATELET # BLD AUTO: 170 K/UL (ref 150–450)
PMV BLD AUTO: 9.2 FL (ref 9.2–12.9)
POC IONIZED CALCIUM: 1.27 MMOL/L (ref 1.06–1.42)
POC TCO2 (MEASURED): 26 MMOL/L (ref 23–29)
POTASSIUM BLD-SCNC: 4.5 MMOL/L (ref 3.5–5.1)
POTASSIUM SERPL-SCNC: 4.6 MMOL/L (ref 3.5–5.1)
PROT SERPL-MCNC: 6.9 G/DL (ref 6–8.4)
RBC # BLD AUTO: 3.62 M/UL (ref 4.6–6.2)
SAMPLE: ABNORMAL
SODIUM BLD-SCNC: 141 MMOL/L (ref 136–145)
SODIUM SERPL-SCNC: 139 MMOL/L (ref 136–145)
WBC # BLD AUTO: 5.54 K/UL (ref 3.9–12.7)

## 2023-09-15 PROCEDURE — 80053 COMPREHEN METABOLIC PANEL: CPT | Performed by: NURSE PRACTITIONER

## 2023-09-15 PROCEDURE — 99285 EMERGENCY DEPT VISIT HI MDM: CPT | Mod: 25

## 2023-09-15 PROCEDURE — 63600175 PHARM REV CODE 636 W HCPCS: Performed by: NURSE PRACTITIONER

## 2023-09-15 PROCEDURE — 40800 DRAINAGE OF MOUTH LESION: CPT

## 2023-09-15 PROCEDURE — 96375 TX/PRO/DX INJ NEW DRUG ADDON: CPT

## 2023-09-15 PROCEDURE — 82330 ASSAY OF CALCIUM: CPT

## 2023-09-15 PROCEDURE — 85025 COMPLETE CBC W/AUTO DIFF WBC: CPT | Performed by: NURSE PRACTITIONER

## 2023-09-15 PROCEDURE — 25000003 PHARM REV CODE 250: Performed by: NURSE PRACTITIONER

## 2023-09-15 PROCEDURE — 25500020 PHARM REV CODE 255: Performed by: EMERGENCY MEDICINE

## 2023-09-15 PROCEDURE — 80047 BASIC METABLC PNL IONIZED CA: CPT

## 2023-09-15 PROCEDURE — 96365 THER/PROPH/DIAG IV INF INIT: CPT

## 2023-09-15 RX ORDER — ISOSORBIDE MONONITRATE 30 MG/1
60 TABLET, EXTENDED RELEASE ORAL
Status: COMPLETED | OUTPATIENT
Start: 2023-09-15 | End: 2023-09-15

## 2023-09-15 RX ORDER — LISINOPRIL 20 MG/1
40 TABLET ORAL
Status: COMPLETED | OUTPATIENT
Start: 2023-09-15 | End: 2023-09-15

## 2023-09-15 RX ORDER — CLINDAMYCIN HYDROCHLORIDE 150 MG/1
450 CAPSULE ORAL 3 TIMES DAILY
Qty: 90 CAPSULE | Refills: 0 | Status: SHIPPED | OUTPATIENT
Start: 2023-09-15 | End: 2023-09-25

## 2023-09-15 RX ORDER — MORPHINE SULFATE 4 MG/ML
4 INJECTION, SOLUTION INTRAMUSCULAR; INTRAVENOUS
Status: COMPLETED | OUTPATIENT
Start: 2023-09-15 | End: 2023-09-15

## 2023-09-15 RX ORDER — HYDROCODONE BITARTRATE AND ACETAMINOPHEN 5; 325 MG/1; MG/1
1 TABLET ORAL EVERY 6 HOURS PRN
Qty: 12 TABLET | Refills: 0 | Status: SHIPPED | OUTPATIENT
Start: 2023-09-15 | End: 2024-03-22

## 2023-09-15 RX ORDER — ONDANSETRON 2 MG/ML
4 INJECTION INTRAMUSCULAR; INTRAVENOUS
Status: COMPLETED | OUTPATIENT
Start: 2023-09-15 | End: 2023-09-15

## 2023-09-15 RX ORDER — CLINDAMYCIN PHOSPHATE 600 MG/50ML
600 INJECTION, SOLUTION INTRAVENOUS
Status: COMPLETED | OUTPATIENT
Start: 2023-09-15 | End: 2023-09-15

## 2023-09-15 RX ORDER — CHLORHEXIDINE GLUCONATE ORAL RINSE 1.2 MG/ML
15 SOLUTION DENTAL 2 TIMES DAILY
Qty: 473 ML | Refills: 0 | Status: SHIPPED | OUTPATIENT
Start: 2023-09-15 | End: 2023-09-29

## 2023-09-15 RX ORDER — METOPROLOL SUCCINATE 50 MG/1
100 TABLET, EXTENDED RELEASE ORAL
Status: COMPLETED | OUTPATIENT
Start: 2023-09-15 | End: 2023-09-15

## 2023-09-15 RX ORDER — AMLODIPINE BESYLATE 5 MG/1
10 TABLET ORAL
Status: COMPLETED | OUTPATIENT
Start: 2023-09-15 | End: 2023-09-15

## 2023-09-15 RX ADMIN — CLINDAMYCIN IN 5 PERCENT DEXTROSE 600 MG: 12 INJECTION, SOLUTION INTRAVENOUS at 03:09

## 2023-09-15 RX ADMIN — METOPROLOL SUCCINATE 100 MG: 50 TABLET, EXTENDED RELEASE ORAL at 04:09

## 2023-09-15 RX ADMIN — IOHEXOL 75 ML: 350 INJECTION, SOLUTION INTRAVENOUS at 01:09

## 2023-09-15 RX ADMIN — ONDANSETRON 4 MG: 2 INJECTION INTRAMUSCULAR; INTRAVENOUS at 11:09

## 2023-09-15 RX ADMIN — MORPHINE SULFATE 4 MG: 4 INJECTION, SOLUTION INTRAMUSCULAR; INTRAVENOUS at 11:09

## 2023-09-15 RX ADMIN — ISOSORBIDE MONONITRATE 60 MG: 30 TABLET, EXTENDED RELEASE ORAL at 04:09

## 2023-09-15 RX ADMIN — AMLODIPINE BESYLATE 10 MG: 5 TABLET ORAL at 04:09

## 2023-09-15 RX ADMIN — LISINOPRIL 40 MG: 20 TABLET ORAL at 04:09

## 2023-09-15 NOTE — DISCHARGE INSTRUCTIONS
Follow up with [facility] Maxillof[facility] surgery without fail.    Take antibiotics as ordered.   You have been given a medication that causes drowsiness.  Do not operate motor vehicles, drink alcohol, or operate heavy machinery while taking this medication.     Return to the Emergency Department for any worsening, change in condition, or any emergent concerns.

## 2023-09-15 NOTE — ED PROVIDER NOTES
"Encounter Date: 9/15/2023       History     Chief Complaint   Patient presents with    Dental Pain     Reports swelling to R bottom "gums" x 3 days. Denies sob, trouble swallowing. NAD, RR even and unlabored. States does not have any teeth.     Faint: Dental pain    History of present illness: Patient is an 88-year-old male who presents the emergency department complaining of pain to the lower right-sided jaw.  Pain started yesterday.  He is taken no medications or treatments for the issue thus far.  Patient does not wear dentures and is absent of all teeth.    The history is provided by the patient. No  was used.     Review of patient's allergies indicates:   Allergen Reactions    Penicillins Nausea And Vomiting     Pt reports he is not allergic to penicillin       Past Medical History:   Diagnosis Date    Acute coronary syndrome 06/24/2016    s/p 3V CABG 7/2016    Anemia     Coronary artery disease     Diastolic dysfunction     Gout, chronic     Heart failure     HTN (hypertension)     Hyperlipidemia     Myocardial infarction     Pneumonia due to other staphylococcus     Pressure ulcer     Renal manifestation of secondary diabetes mellitus     Type 2 diabetes mellitus     diet controlled    Urge incontinence of urine 7/21/2023     Past Surgical History:   Procedure Laterality Date    CATARACT EXTRACTION Bilateral     CATARACT EXTRACTION W/ ANTERIOR VITRECTOMY      CORONARY ARTERY BYPASS GRAFT  07/06/2016    PAIZ-LAD, SVG-Ramus, SVG-PDA    FLUOROSCOPIC URODYNAMIC STUDY N/A 4/25/2023    Procedure: URODYNAMIC STUDY, FLUOROSCOPIC;  Surgeon: Raji Mcmullen MD;  Location: [facility] OR;  Service: Urology;  Laterality: N/A;  RN PHONE PREOP WITH GRANDDAUGHTER ROGER 4/21/23    HEMORRHOID SURGERY      LASER ENUCLEATION OF PROSTATE N/A 7/6/2023    Procedure: ENUCLEATION, PROSTATE, USING LASER; cystolithalopaxy;  Surgeon: Raji Mcmullen MD;  Location: [facility] OR;  Service: Urology;  Laterality: N/A;  notify " [facility] [phone] SPOKE TO NICK ON 2023 @ 10:37AM. CONFIRMATION NUMBER [id number]  FIORDALIZA POLLACK [phone] EMAILED CHANNING ON 6/15/2023@ 3:07PM-KEYON  RN PREOP 2023   T/S ON 2023  RN PREOP 2023 --JM     Family History   Problem Relation Age of Onset    Hypertension Mother     Heart disease Mother     Cancer Father         colon    Heart disease Sister     No Known Problems Sister     No Known Problems Sister     No Known Problems Sister     No Known Problems Sister     Heart disease Brother     No Known Problems Brother     No Known Problems Brother     No Known Problems Brother     No Known Problems Brother     No Known Problems Brother     No Known Problems Daughter     No Known Problems Daughter     No Known Problems Son     Amblyopia Neg Hx     Blindness Neg Hx     Cataracts Neg Hx     Diabetes Neg Hx     Glaucoma Neg Hx     Macular degeneration Neg Hx     Retinal detachment Neg Hx     Strabismus Neg Hx     Stroke Neg Hx     Thyroid disease Neg Hx      Social History     Tobacco Use    Smoking status: Former     Types: Cigars     Start date:      Quit date:      Years since quittin.7    Smokeless tobacco: Never    Tobacco comments:     Former smoker. Pt. Smoked 2 cigars daily.   Substance Use Topics    Alcohol use: No    Drug use: No     Review of Systems   Constitutional:  Negative for appetite change, chills, diaphoresis, fatigue and fever.   HENT:  Positive for dental problem. Negative for congestion, ear discharge, ear pain, postnasal drip, rhinorrhea, sinus pressure, sneezing, sore throat and voice change.    Eyes:  Negative for discharge, itching and visual disturbance.   Respiratory:  Negative for cough, shortness of breath and wheezing.    Cardiovascular:  Negative for chest pain, palpitations and leg swelling.   Gastrointestinal:  Negative for abdominal pain, nausea and vomiting.   Endocrine: Negative for polydipsia, polyphagia and polyuria.   Genitourinary:   Negative for difficulty urinating, dysuria, frequency, hematuria, penile discharge, penile pain, penile swelling and urgency.   Musculoskeletal:  Negative for arthralgias and myalgias.   Skin:  Negative for rash and wound.   Neurological:  Negative for dizziness, seizures, syncope and weakness.   Hematological:  Negative for adenopathy. Does not bruise/bleed easily.   Psychiatric/Behavioral:  Negative for agitation and self-injury. The patient is not nervous/anxious.        Physical Exam     Initial Vitals [09/15/23 1008]   BP Pulse Resp Temp SpO2   (!) 198/93 72 16 97.8 °F (36.6 °C) 99 %      MAP       --         Physical Exam    Nursing note and vitals reviewed.  Constitutional: He appears well-developed and well-nourished. He is not diaphoretic. No distress.   HENT:   Head: Normocephalic and atraumatic.   Right Ear: External ear normal.   Left Ear: External ear normal.   Nose: Nose normal.   Mouth/Throat:       Floor the mouth soft and nontender no facial swelling   Eyes: Pupils are equal, round, and reactive to light. Right eye exhibits no discharge. Left eye exhibits no discharge. No scleral icterus.   Neck:   Normal range of motion.  Pulmonary/Chest: No respiratory distress.   Abdominal: He exhibits no distension.   Musculoskeletal:         General: Normal range of motion.      Cervical back: Normal range of motion.     Neurological: He is alert and oriented to person, place, and time.   Skin: Skin is dry. Capillary refill takes less than 2 seconds.         ED Course   I & D - Incision and Drainage    Date/Time: 9/15/2023 11:21 AM  Location procedure was performed: [facility] EMERGENCY DEPARTMENT    Performed by: Mohinder Reilly DNP  Authorized by: Soraya Sung MD  Type: abscess  Body area: mouth  Location details: vestibule of mouth  Anesthesia: local infiltration    Anesthesia:  Local Anesthetic: bupivacaine 0.25% without epinephrine  Anesthetic total: 0.5 mL  Scalpel size: 11  Incision type:  single straight  Incision depth: dermal  Complexity: simple  Drainage: pus and bloody  Drainage amount: scant  Wound treatment: incision, drainage and wound left open  Complications: No  Specimens: No  Implants: No  Patient tolerance: Patient tolerated the procedure well with no immediate complications    Incision depth: dermal        Labs Reviewed   CBC W/ AUTO DIFFERENTIAL - Abnormal; Notable for the following components:       Result Value    RBC 3.62 (*)     Hemoglobin 11.1 (*)     Hematocrit 33.2 (*)     Gran % 34.6 (*)     Lymph % 54.9 (*)     All other components within normal limits   COMPREHENSIVE METABOLIC PANEL - Abnormal; Notable for the following components:    Creatinine 1.7 (*)     eGFR 39 (*)     All other components within normal limits   ISTAT PROCEDURE - Abnormal; Notable for the following components:    POC Creatinine 1.8 (*)     POC Hematocrit 33 (*)     All other components within normal limits   ISTAT CHEM8          Imaging Results              CT Maxillofacial With Contrast (Final result)  Result time 09/15/23 15:24:04      Final result by Christopher Tim MD (09/15/23 15:24:04)                   Impression:      2.2 x 1.9 cm fluid collection in the anterior midline subcutaneous tissues at the level of the mandible with no significant rim enhancement.  The findings may reflect evolving phlegmon.    Small focus of subcutaneous air in the right anterior parasymphyseal region.  The findings may represent recent instrumentation or soft tissue infection.  Suggest clinical correlation.    Lucent lesion in the midline parasymphyseal mandibular bone.  The findings may reflect indolent osteomyelitis with underlying lesion excluded.  Follow-up MRI of the maxillofacial structures may be obtained, as clinically warranted.    Extensive calcifications in the neck vessels.  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    Incomplete enhancement of the bilateral internal jugular veins.  The  findings probably relate to the phase of enhancement.  DVT study of the upper extremity veins were may be obtained further evaluation.    Additional findings as above.      Electronically signed by: Christopher Tim MD  Date:    09/15/2023  Time:    15:24               Narrative:    EXAMINATION:  CT MAXILLOFACIAL WITH CONTRAST    CLINICAL HISTORY:  Maxillary/facial abscess;    TECHNIQUE:  Axial CT scan of the maxillofacial structures was obtained after the intravenous injection of 75 cc Omnipaque 350 IV.  Coronal and sagittal reformats were obtained.    COMPARISON:  04/20/2018    FINDINGS:  There are extensive calcification of the intracranial vessels.  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  No evidence of an aneurysm.  There is no abnormal intracranial enhancement.  No evidence of mass effect.    There are postop changes of the globes.  The orbital walls are intact.  The intraconal and extraconal fat planes are preserved.    The nasal cavity is within normal limits.  The paranasal sinuses are unremarkable.  The mastoid air cells are clear.    The patient is edentulous.  There is a 1.9 x 1.5 cm lucent lesion involving the midline parasymphyseal region of the mandibular bone.  Anterior to this region, there is a 2.2 x 0.8 cm fluid collection with the subcutaneous tissues with no significant associated enhancement.    There is small amount of soft tissue gas along the right lateral aspect of the anterior facial soft tissues adjacent to the right aspect of the aforementioned collection.  No additional soft tissue gas is identified.    The nasopharyngeal and oropharyngeal soft tissues are within normal limits.  The hypopharynx is unremarkable.  No retropharyngeal effusion is present.    The submandibular gland is unremarkable.  The submandibular glands are unremarkable.  The thyroid gland is within normal limits.  There are subcentimeter lymph nodes throughout the neck.  These are not enlarged by size  criteria.    There are extensive calcifications of the neck vessels.  There is incomplete intravascular enhancement of the bilateral internal jugular veins.  These may be secondary to the phase of enhancement.    There are degenerative changes in the cervical spine.  There is no evidence of a fracture.                                       Medications   morphine injection 4 mg (4 mg Intravenous Given 9/15/23 1115)   ondansetron injection 4 mg (4 mg Intravenous Given 9/15/23 1115)   iohexoL (OMNIPAQUE 350) injection 75 mL (75 mLs Intravenous Given 9/15/23 1341)   clindamycin in D5W 600 mg/50 mL IVPB 600 mg (0 mg Intravenous Stopped 9/15/23 1629)   amLODIPine tablet 10 mg (10 mg Oral Given 9/15/23 1648)   isosorbide mononitrate 24 hr tablet 60 mg (60 mg Oral Given 9/15/23 1648)   lisinopriL tablet 40 mg (40 mg Oral Given 9/15/23 1649)   metoprolol succinate (TOPROL-XL) 24 hr tablet 100 mg (100 mg Oral Given 9/15/23 1648)     Medical Decision Making  88-year-old male presents the emergency department complaining of lower right-sided dental pain.  On physical examination the patient has no teeth.  On the anterior surface of the mouth external to the gums there is a 1 cm round raised area that is fluctuant and tender to the touch.  Floor of the mouth soft and nontender.  No facial swelling noted.  Vital signs are stable and reassuring but mildly hypertensive.  Differential diagnosis includes abscess oral mass, malignancy    Problems Addressed:  Chronic osteomyelitis of mandible: acute illness or injury     Details: Patient is started on clindamycin as he is allergic to penicillins, Augmentin would have been the 1st choice.  He will follow up with [facility]  Hypertension, unspecified type: acute illness or injury     Details: Patient did not take his medications today, provided a dose of home medications here, blood pressure decreased prior to departure  Phlegmon: acute illness or injury     Details: Incised and drained  "per the procedure note.  Very little reduction in bulk.  Directing to [facility] for referral.    Amount and/or Complexity of Data Reviewed  Labs: ordered. Decision-making details documented in ED Course.  Radiology: ordered. Decision-making details documented in ED Course.  Discussion of management or test interpretation with external provider(s): I have discussed the case with Dr. Sung who has examined the patient and recommended CT scan.  Prior to discharge I discussed the case with Dr. Sung and Dr. Hargrove and they both concurred that the patient should be started on antibiotics and sent to [facility] for further diagnostics and testing, treatment.  The patient is aware and verbalized understanding.    Risk  Prescription drug management.  Diagnosis or treatment significantly limited by social determinants of health.  Minor surgery with identified risk factors.  Risk Details: Vital signs at the time of disposition were:  BP (!) 182/85   Pulse 65   Temp 97.8 °F (36.6 °C) (Oral)   Resp 18   Ht 5' 5" (1.651 m)   Wt 68 kg (150 lb)   SpO2 100%   BMI 24.96 kg/m²       See AVS for additional recommendations. Medications listed herein were prescribed after reviewing the patient's allergies, medication list, history, most recent laboratories as available.  Referrals below were provided after reviewing the patient's previous medical providers. He understands he  should return for any worsening or changes in condition.  Prior to discharge the patient was asked if he  had any additional concerns or complaints and he declined. The patient was given an opportunity to ask questions and all were answered to his satisfaction.               Attending Attestation:   Physician Attestation Statement for Resident:  As the supervising MD   Physician Attestation Statement: I have personally seen and examined this patient.   I agree with the above history.  -:   As the supervising MD I agree with the above PE.   -: Pain/swelling " to anterior maxillary buccal  mucosa, jenifer   As the supervising MD I agree with the above treatment, course, plan, and disposition.   -: Screening labs, ct scan, antibiotics, f/u omfs    I have staffed the patient with the midlevel/resident provider and was available for consultation in the department. I have guided the treatment plan and agree with the care provided. I additionally agree with their exam and documentation except where mine differs.                     ED Course as of 09/15/23 1807   Fri Sep 15, 2023   1011 BP(!): 198/93 [VC]   1011 Temp: 97.8 °F (36.6 °C) [VC]   1011 Temp Source: Oral [VC]   1011 Pulse: 72 [VC]   1011 Resp: 16 [VC]   1011 SpO2: 99 % [VC]   1126 ISTAT PROCEDURE(!)  Elevated creatinine, normal bun.  Cr cl is  [VC]   1129 ISTAT PROCEDURE(!)  Cr cl of 13, gfr of 47. [VC]   1130 CBC auto differential(!)  Mild anemia, normal cbc otherwise. [VC]   1130 ISTAT PROCEDURE(!)  Other than creatinine normal chem. [VC]   1134 Resp: 18 [VC]   1330 Comprehensive metabolic panel(!)  Normal for this patient. [VC]   1527 CT Maxillofacial With Contrast  2.2 x 1.9 cm fluid collection in the anterior midline subcutaneous tissues at the level of the mandible with no significant rim enhancement.  The findings may reflect evolving phlegmon.     Small focus of subcutaneous air in the right anterior parasymphyseal region.  The findings may represent recent instrumentation or soft tissue infection.  Suggest clinical correlation.     Lucent lesion in the midline parasymphyseal mandibular bone.  The findings may reflect indolent osteomyelitis with underlying lesion excluded.  Follow-up MRI of the maxillofacial structures may be obtained, as clinically warranted.     Extensive calcifications in the neck vessels.  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     Incomplete enhancement of the bilateral internal jugular veins.  The findings probably relate to the phase of enhancement.  DVT  study of the upper extremity veins were may be obtained further evaluation. [VC]   1529 c notifying omfs of need for consult. [VC]   1643 Granddaughter cynthia would like referral to call her [phone]   [VC]   1649 BP(!): 201/99 [VC]   1649 Pulse: 66 [VC]   1649 SpO2: 100 % [VC]   1732 BP(!): 182/85 [VC]   1732 Pulse: 65 [VC]      ED Course User Index  [VC] Mohinder Reilly DNP                    Clinical Impression:   Final diagnoses:  [M27.2] Chronic osteomyelitis of mandible (Primary)  [L02.91] Phlegmon  [I10] Hypertension, unspecified type        ED Disposition Condition    Discharge Stable          ED Prescriptions       Medication Sig Dispense Start Date End Date Auth. Provider    HYDROcodone-acetaminophen (NORCO) 5-325 mg per tablet Take 1 tablet by mouth every 6 (six) hours as needed for Pain. 12 tablet 9/15/2023 -- Mohinder Reilly DNP    clindamycin (CLEOCIN) 150 MG capsule Take 3 capsules (450 mg total) by mouth 3 (three) times daily. for 10 days 90 capsule 9/15/2023 9/25/2023 Mohinder Reilly DNP    chlorhexidine (PERIDEX) 0.12 % solution Use as directed 15 mLs in the mouth or throat 2 (two) times daily. for 14 days 473 mL 9/15/2023 9/29/2023 Mohinder Reilly DNP          Follow-up Information       Follow up With Specialties Details Why Contact [facility] Surgical Oncology, Orthopedic Surgery, Genetics, Physical Medicine and Rehabilitation, Occupational Therapy, Radiology Schedule an appointment as soon as possible for a visit   [address] [address] [address]  [phone]               Mohinder Reilly DNP  09/15/23 5267       Soraya Sung MD  09/15/23 3192

## 2023-09-15 NOTE — LETTER
November 5, 2023    [address]  [address]  Attention:  Oral/Maxillofacial Surgery Department                [address] [facility] [place] [address]  Phone: [phone]   Patient: Destin Barber   MR Number: [id number]   YOB: 1935   Date of Visit: 9/15/2023       Dear ***:    I am referring my patient, Destin Barber, to you for evaluation of ***.    He  has a past medical history of Acute coronary syndrome (06/24/2016), Anemia, Coronary artery disease, Diastolic dysfunction, Gout, chronic, Heart failure, HTN (hypertension), Hyperlipidemia, Myocardial infarction, Pneumonia due to other staphylococcus, Pressure ulcer, Renal manifestation of secondary diabetes mellitus, Type 2 diabetes mellitus, and Urge incontinence of urine (7/21/2023). His  has a past surgical history that includes Cataract extraction w/ anterior vitrectomy; Hemorrhoid surgery; Coronary artery bypass graft (07/06/2016); Cataract extraction (Bilateral); Fluoroscopic urodynamic study (N/A, 4/25/2023); and Laser enucleation of prostate (N/A, 7/6/2023). He  reports that he quit smoking about 20 years ago. His smoking use included cigars. He started smoking about 68 years ago. He has never used smokeless tobacco. He reports that he does not drink alcohol and does not use drugs.    He has a current medication list which includes the following prescription(s): amlodipine, aspirin, hydrocodone-acetaminophen, isosorbide mononitrate, lisinopril, metformin, metoprolol succinate, nitroglycerin, polyethylene glycol, pravastatin, tamsulosin, and trazodone, and the following Facility-Administered Medications: lactated ringers and lidocaine (pf) 10 mg/ml (1%). He is allergic to penicillins.    I appreciate your assistance in his care and look forward to your findings and recommendations.    Sincerely,                           No name on file

## 2023-09-15 NOTE — LETTER
"November 5, 2023    [address]  [address]  Attention:  Oral/Maxillofacial Surgery Department          [facility] [address]  [facility]  [place] [address]  Phone: [phone] Referred by:  Mohinder Reilly DNP/Soraya Doherty MD  Patient: Destin Barber   Phone Number [phone]   YOB: 1935   Date of Visit: 9/15/2023 :     Address:        [address] [place] [address] [place]  [address]    Dear Appointment Desk:    Patient, Destin Barber, is being referred to you for evaluation of :   Final diagnoses:  [M27.2] Chronic osteomyelitis of mandible (Primary)  [L02.91] Phlegmon  [I10] Hypertension, unspecified type.    The patient was seen in the Emergency Department at [facility] on 09/15/2023 with a complaint of dental pain.  Patient reported swelling to the R bottom "gums" x 3 days.  Denied sob or trouble swallowing (per chart review).  See attached ED Provider Notes.    He  has a past medical history of Acute coronary syndrome (06/24/2016), Anemia, Coronary artery disease, Diastolic dysfunction, Gout, chronic, Heart failure, HTN (hypertension), Hyperlipidemia, Myocardial infarction, Pneumonia due to other staphylococcus, Pressure ulcer, Renal manifestation of secondary diabetes mellitus, Type 2 diabetes mellitus, and Urge incontinence of urine (7/21/2023). He  has a past surgical history that includes Cataract extraction w/ anterior vitrectomy; Hemorrhoid surgery; Coronary artery bypass graft (07/06/2016); Cataract extraction (Bilateral); Fluoroscopic urodynamic study (N/A, 4/25/2023); and Laser enucleation of prostate (N/A, 7/6/2023). He  reports that he quit smoking about 20 years ago. His smoking use included cigars. He started smoking about 68 years ago. He has never used smokeless tobacco. He reports that he does not drink alcohol and does not use drugs.    He has a current medication list " which includes the following prescription(s): amlodipine, aspirin, hydrocodone-acetaminophen, isosorbide mononitrate, lisinopril, metformin, metoprolol succinate, nitroglycerin, polyethylene glycol, pravastatin, tamsulosin, and trazodone, and the following Facility-Administered Medications: lactated ringers and lidocaine (pf) 10 mg/ml (1%). He is allergic to penicillins.    The patient needs to be scheduled with a provider as soon as possible.  Attached are clinicals from the patient's ED visit of 09/15/2023 at our facility.  Please contact the patient or his representative (Constantino Barber) with the date and time of his appointment.      Your assistance with this matter is greatly appreciated.     Sincerely,      Carley Sung LMSW, [facility]  /Case Management Department  [phone]    Attachments:  ED Provider Notes  Imaging Results  I&D - Incision and Drainage Narrative  Labs  Medication List  (Total Pages for Clinicals=22)

## 2023-09-27 ENCOUNTER — PATIENT MESSAGE (OUTPATIENT)
Dept: SURGERY | Facility: HOSPITAL | Age: 88
End: 2023-09-27
Payer: MEDICARE

## 2023-10-24 ENCOUNTER — PATIENT MESSAGE (OUTPATIENT)
Dept: INTERNAL MEDICINE | Facility: CLINIC | Age: 88
End: 2023-10-24
Payer: MEDICARE

## 2023-10-26 DIAGNOSIS — E11.22 CONTROLLED TYPE 2 DIABETES MELLITUS WITH STAGE 3 CHRONIC KIDNEY DISEASE, WITHOUT LONG-TERM CURRENT USE OF INSULIN: ICD-10-CM

## 2023-10-26 DIAGNOSIS — N18.30 CONTROLLED TYPE 2 DIABETES MELLITUS WITH STAGE 3 CHRONIC KIDNEY DISEASE, WITHOUT LONG-TERM CURRENT USE OF INSULIN: ICD-10-CM

## 2023-10-26 DIAGNOSIS — I10 ESSENTIAL HYPERTENSION: ICD-10-CM

## 2023-10-26 RX ORDER — LISINOPRIL 40 MG/1
40 TABLET ORAL DAILY
Qty: 90 TABLET | Refills: 3 | Status: SHIPPED | OUTPATIENT
Start: 2023-10-26 | End: 2024-10-25

## 2023-10-26 RX ORDER — ISOSORBIDE MONONITRATE 60 MG/1
60 TABLET, EXTENDED RELEASE ORAL DAILY
Qty: 90 TABLET | Refills: 3 | Status: SHIPPED | OUTPATIENT
Start: 2023-10-26 | End: 2024-10-25

## 2023-11-07 ENCOUNTER — TELEPHONE (OUTPATIENT)
Dept: UROLOGY | Facility: CLINIC | Age: 88
End: 2023-11-07
Payer: MEDICARE

## 2023-11-07 NOTE — TELEPHONE ENCOUNTER
Pts wife was contacted, she was informed of upcoming appt.  ----- Message from Capri Almodovar sent at 11/7/2023  8:40 AM CST -----  Regarding: [id number]  Type: Patient Call Back    Who called:wife - Nory Barber     What is the request in detail: she states she will like a call back from the nurse Chayo Villaseñor. To discuss the pt health     Can the clinic reply by [facility]?no     Would the patient rather a call back or a response via [facility]? Call back     Best call back number:[phone]    Additional Information:

## 2023-11-13 ENCOUNTER — TELEPHONE (OUTPATIENT)
Dept: UROLOGY | Facility: CLINIC | Age: 88
End: 2023-11-13
Payer: MEDICARE

## 2023-11-13 NOTE — TELEPHONE ENCOUNTER
----- Message from Aixa Pimentel sent at 11/13/2023  8:22 AM CST -----  Regarding: Patient call back  .Type: Patient Call Back    Who called:carsonJean     What is the request in detail:would like a call back form the office about cancellation of the appointment on 11/13/2023    Can the clinic reply by [facility]no     Would the patient rather a call back or a response via [facility]? Call     Best call back number:[phone]    Additional Information:

## 2024-01-04 ENCOUNTER — OFFICE VISIT (OUTPATIENT)
Dept: CARDIOLOGY | Facility: CLINIC | Age: 89
End: 2024-01-04
Payer: MEDICARE

## 2024-01-04 VITALS
BODY MASS INDEX: 25.33 KG/M2 | SYSTOLIC BLOOD PRESSURE: 150 MMHG | DIASTOLIC BLOOD PRESSURE: 90 MMHG | HEIGHT: 64 IN | WEIGHT: 148.38 LBS

## 2024-01-04 DIAGNOSIS — I70.0 AORTIC ATHEROSCLEROSIS: ICD-10-CM

## 2024-01-04 DIAGNOSIS — Z95.1 S/P CABG (CORONARY ARTERY BYPASS GRAFT): Primary | ICD-10-CM

## 2024-01-04 DIAGNOSIS — I10 ESSENTIAL HYPERTENSION: ICD-10-CM

## 2024-01-04 DIAGNOSIS — E78.00 PURE HYPERCHOLESTEROLEMIA: ICD-10-CM

## 2024-01-04 PROCEDURE — 99214 OFFICE O/P EST MOD 30 MIN: CPT | Mod: S$PBB,,, | Performed by: INTERNAL MEDICINE

## 2024-01-04 PROCEDURE — 99999 PR PBB SHADOW E&M-EST. PATIENT-LVL III: CPT | Mod: PBBFAC,,, | Performed by: INTERNAL MEDICINE

## 2024-01-04 PROCEDURE — 93010 ELECTROCARDIOGRAM REPORT: CPT | Mod: S$PBB,,, | Performed by: INTERNAL MEDICINE

## 2024-01-04 PROCEDURE — 93005 ELECTROCARDIOGRAM TRACING: CPT | Mod: PBBFAC | Performed by: INTERNAL MEDICINE

## 2024-01-04 PROCEDURE — 99213 OFFICE O/P EST LOW 20 MIN: CPT | Mod: PBBFAC | Performed by: INTERNAL MEDICINE

## 2024-01-04 RX ORDER — HYDRALAZINE HYDROCHLORIDE 50 MG/1
50 TABLET, FILM COATED ORAL 2 TIMES DAILY
Qty: 180 TABLET | Refills: 3 | Status: SHIPPED | OUTPATIENT
Start: 2024-01-04 | End: 2025-01-03

## 2024-01-04 NOTE — PROGRESS NOTES
Subjective:   Patient ID:  Destin Barber is a 88 y.o. male who presents for follow-up of No chief complaint on file.      HPI      # CAD/ACS s/p 3V CABG 7/2016.  Appears his LCx/OM was not bypassed due to poor available vein conduit.  No LCx ischemia noted on MPI.  # HTN, uncontrolled in the setting of med noncompliance  # HLP  # DM  # abnl EKG        CAD/ACS s/p CABG 7/7/16: LIMA-LAD, SVG-Ramus, SVG-PDA (Dr. Hallman).  LCx/OM branch not bypassed due to poor quality venous conduit available     Stress test 9/22/22    Abnormal myocardial perfusion scan.    There are two significant perfusion abnormalities.    Perfusion Abnormality #1 - There is a moderate to severe intensity, moderate sized, mostly fixed perfusion abnormality with some reversibilty in the basal to mid inferior wall(s).    Perfusion Abnormality #2 - There is a small to moderate sized, mild to moderate intensity, reversible perfusion abnormality that is consistent with ischemia in the lateral wall(s).    There are no other significant perfusion abnormalities.    The gated perfusion images showed an ejection fraction of 79% at rest.    There is normal wall motion at rest and post stress.    The EKG portion of this study is negative for ischemia.    The patient reported no chest pain during the stress test.    There were no arrhythmias during stress.     Echo 9/22/22  The estimated ejection fraction is 65%.  The left ventricle is normal in size with mild concentric hypertrophy and normal systolic function.  Moderate to severe left atrial enlargement.  Grade I left ventricular diastolic dysfunction.  Mild pulmonic regurgitation.  Normal right ventricular size with normal right ventricular systolic function.  Mild right atrial enlargement.  Normal central venous pressure (3 mmHg).  The estimated PA systolic pressure is 20 mmHg.  Mild tricuspid regurgitation.  Mild aortic regurgitation.     Cath 6/24/16  LVEDP: 7mmHg  LVEF: 55%  Wall Motion:  "normal  Dominance: Right  LM: normal  LAD: ost/prox eccentric 70-80%, prox 50%, excellent mid LAD target.  Ramus: prox 50-70%, mid 90%, bifurcating distal vessel (both excellent targets)  LCx: prox 95% at bifurcation of OM1.  OM1 ost/prox 80%, bifurcating vessel, excellent target.  RCA: dominant, anterior takeoff with "blandon's crook" prox vessel.Prox 70%, mid 70%. Excellent PDA target.  Hemostasis:  R Radial band  Impression:  NSTEMI  4V CAD, normal LV fxn  R radial vasband for hemostasis     Carotid US 7/6/16  RIGHT SIDE:   1 - 39 % right ICA stenosis.   Heterogeneous plaque in the right internal carotid artery.   Antegrade flow in the right vertebral artery.   LEFT SIDE:  1 - 39% left ICA stenosis.   Homogeneous plaque in the left internal carotid artery.   Antegrade flow in the left vertebral artery.      10/17/17 Overall doing well. Denies CP or SOB  EKG NSR TWI V1 and V2 - similar to prior EKG  Suffering with an URI     EKG 3/30/22 - done in ER - not in EPIC     4/29/22 Denies CP or SOB  Had a recent brief syncopal spell 5 days ago - details unclear- poor historian  BP elevated  Echo and holter for recent syncope  Not interested in repeat stress test  Continue Rx for CAD, HTN, HLD, DD     7/14/22 Reports JULIO and pain across lower rib cage with radiation to left shoulder for the last week  EKG sinus tachycardia 102 RBBB/LAD  BP elevated  Denies further syncope  Poor historian   Add imdur for CP  Echo, lexiscan myoview and holter for CP, JULIO, recent syncope  Needs to go to the ER for worsening CP  Continue Rx for CAD, HTN, HLD, DD     9/16/22 Testing not done. BP poorly controlled  Having CP that radiates centrally from neck down to his abdomen  Some JULIO    Increase lisinopril 40 qd and imdur 60 qd  Echo, lexiscan myoview and holter for CP, JULIO, recent syncope  Needs to go to the ER for worsening CP  Continue Rx for CAD, HTN, HLD, DD     10/18/22 Only gets CP is he gets upset. Denies SOB  BP up some - better " controlled by home readings  Discussed abnormal stress test if fixed inferior defect and lateral ischemia - this is consistent with known un-revascularized Cx territory. Discussed repeat LHC - he prefers to treat CAD medically    Continue Rx for CAD, HTN, HLD, DD  OV 3 months     6/1/23 Needs clearance for prostate surgery  Reports mild stable exertional angina and JULIO  BP controlled by outside readings  Cleared for prostate surgery at moderate cardiac risk. Ok to hold ASA 5 days before  Continue to request medical Rx for CAD    Continue Rx for CAD, HTN, HLD, DD  OV 3 months    1/4/24 BP has been running high. Mild stable JULIO and angina  EKG NSR RBBB/LAFB       Review of Systems   Constitutional: Negative for decreased appetite.   HENT:  Negative for ear discharge.    Eyes:  Negative for blurred vision.   Endocrine: Negative for polyphagia.   Skin:  Negative for nail changes.   Genitourinary:  Negative for bladder incontinence.   Neurological:  Negative for aphonia.   Psychiatric/Behavioral:  Negative for hallucinations.    Allergic/Immunologic: Negative for hives.       Objective:   Physical Exam  Constitutional:       Appearance: He is well-developed.   HENT:      Head: Normocephalic and atraumatic.   Eyes:      Conjunctiva/sclera: Conjunctivae normal.      Pupils: Pupils are equal, round, and reactive to light.   Cardiovascular:      Rate and Rhythm: Normal rate.      Pulses: Intact distal pulses.      Heart sounds: Normal heart sounds.   Pulmonary:      Effort: Pulmonary effort is normal.      Breath sounds: Normal breath sounds.   Abdominal:      General: Bowel sounds are normal.      Palpations: Abdomen is soft.   Musculoskeletal:         General: Normal range of motion.      Cervical back: Normal range of motion and neck supple.   Skin:     General: Skin is warm and dry.   Neurological:      Mental Status: He is alert and oriented to person, place, and time.         Assessment:      1. S/P CABG (coronary  artery bypass graft)    2. Aortic atherosclerosis    3. Pure hypercholesterolemia    4. Essential hypertension        Plan:     Add hydralazine 50 bid for HTN  OV 3 months with BP check  Continue to request medical Rx for CAD    Continue Rx for CAD, HTN, HLD, DD

## 2024-01-25 ENCOUNTER — HOSPITAL ENCOUNTER (EMERGENCY)
Facility: HOSPITAL | Age: 89
Discharge: HOME | End: 2024-01-25
Attending: EMERGENCY MEDICINE
Payer: MEDICARE

## 2024-01-25 VITALS
DIASTOLIC BLOOD PRESSURE: 67 MMHG | RESPIRATION RATE: 18 BRPM | WEIGHT: 145 LBS | HEART RATE: 69 BPM | TEMPERATURE: 98 F | BODY MASS INDEX: 24.75 KG/M2 | HEIGHT: 64 IN | OXYGEN SATURATION: 98 % | SYSTOLIC BLOOD PRESSURE: 120 MMHG

## 2024-01-25 DIAGNOSIS — M15.2 DEGENERATIVE ARTHRITIS OF PROXIMAL INTERPHALANGEAL JOINT OF MIDDLE FINGER OF LEFT HAND: Primary | ICD-10-CM

## 2024-01-25 DIAGNOSIS — M79.645 PAIN OF LEFT MIDDLE FINGER: ICD-10-CM

## 2024-01-25 PROCEDURE — 99283 EMERGENCY DEPT VISIT LOW MDM: CPT | Mod: ER

## 2024-01-25 RX ORDER — ACETAMINOPHEN 500 MG
1000 TABLET ORAL EVERY 8 HOURS PRN
Qty: 30 TABLET | Refills: 0 | Status: SHIPPED | OUTPATIENT
Start: 2024-01-25 | End: 2024-04-05

## 2024-01-25 NOTE — DISCHARGE INSTRUCTIONS
X-rays do not show fracture or dislocation.  X-ray suggest some arthritis in your finger that is likely the cause of your pain and decreased range of motion.  Use Tylenol as needed for pain.  Follow-up with your primary physician as well as Orthopedic hand. Follow-up with your primary physician as well as Orthopedics.  Return to the emergency department for any new, worsening or significantly concerning symptoms.    Thank you for coming to our Emergency Department today. It is important to remember that some problems are difficult to diagnose and may not be found during your first visit. Be sure to follow up with your primary care doctor and review any labs/imaging that was performed with them. If you do not have a primary care doctor, you may contact the one listed on your discharge paperwork or you may also call the [facility] Appointment Desk at [phone] to schedule an appointment with one.     All medications may potentially have side effects and it is impossible to predict which medications may give you side effects. If you feel that you are having a negative effect of any medication you should immediately stop taking them and seek medical attention.    Return to the ER with any questions/concerns, new/concerning symptoms, worsening or failure to improve. Do not drive or make any important decisions for 24 hours if you have received any pain medications, sedatives or mood altering drugs during your ER visit.

## 2024-01-25 NOTE — ED PROVIDER NOTES
Encounter Date: 1/25/2024    SCRIBE #1 NOTE: IMaria Luisa, daphnie scribing for, and in the presence of,  Candida Root MD.       History     Chief Complaint   Patient presents with    Hand Pain     An 89 y/o male presents to the ER c/o (left) middle finger pain x 3 days. Denies trauma      Destin Barber is a 88 y.o. male, with no pertinent PMHx, who presents to the ED with left middle digit pain x3 days. Patient reports associated stiffness, pain w/ flexion. No attempted Tx. Denies trauma, injury or wound to area. No other exacerbating or alleviating factors. No other complaints at this time.       The history is provided by the patient. No  was used.     Review of patient's allergies indicates:   Allergen Reactions    Penicillins Nausea And Vomiting     Pt reports he is not allergic to penicillin       Past Medical History:   Diagnosis Date    Acute coronary syndrome 06/24/2016    s/p 3V CABG 7/2016    Anemia     Coronary artery disease     Diastolic dysfunction     Gout, chronic     Heart failure     HTN (hypertension)     Hyperlipidemia     Myocardial infarction     Pneumonia due to other staphylococcus     Pressure ulcer     Renal manifestation of secondary diabetes mellitus     Type 2 diabetes mellitus     diet controlled    Urge incontinence of urine 7/21/2023     Past Surgical History:   Procedure Laterality Date    CATARACT EXTRACTION Bilateral     CATARACT EXTRACTION W/ ANTERIOR VITRECTOMY      CORONARY ARTERY BYPASS GRAFT  07/06/2016    PAIZ-LAD, SVG-Ramus, SVG-PDA    FLUOROSCOPIC URODYNAMIC STUDY N/A 4/25/2023    Procedure: URODYNAMIC STUDY, FLUOROSCOPIC;  Surgeon: Raji Mcmullen MD;  Location: [facility] OR;  Service: Urology;  Laterality: N/A;  RN PHONE PREOP WITH GRANDDAUGHTER ROGER 4/21/23    HEMORRHOID SURGERY      LASER ENUCLEATION OF PROSTATE N/A 7/6/2023    Procedure: ENUCLEATION, PROSTATE, USING LASER; cystolithalopaxy;  Surgeon: Raji Mcmullen MD;  Location: [facility] OR;   Service: Urology;  Laterality: N/A;  notify radha  [facility] [phone] SPOKE TO NICK ON 2023 @ 10:37AM. CONFIRMATION NUMBER [id number]  FIORDALIZA POLLACK [phone] EMAILED CHANNING ON 6/15/2023@ 3:07PM-  RN PREOP 2023   T/S ON 2023  RN PREOP 2023 --JM     Family History   Problem Relation Age of Onset    Hypertension Mother     Heart disease Mother     Cancer Father         colon    Heart disease Sister     No Known Problems Sister     No Known Problems Sister     No Known Problems Sister     No Known Problems Sister     Heart disease Brother     No Known Problems Brother     No Known Problems Brother     No Known Problems Brother     No Known Problems Brother     No Known Problems Brother     No Known Problems Daughter     No Known Problems Daughter     No Known Problems Son     Amblyopia Neg Hx     Blindness Neg Hx     Cataracts Neg Hx     Diabetes Neg Hx     Glaucoma Neg Hx     Macular degeneration Neg Hx     Retinal detachment Neg Hx     Strabismus Neg Hx     Stroke Neg Hx     Thyroid disease Neg Hx      Social History     Tobacco Use    Smoking status: Former     Types: Cigars     Start date:      Quit date:      Years since quittin.0    Smokeless tobacco: Never    Tobacco comments:     Former smoker. Pt. Smoked 2 cigars daily.   Substance Use Topics    Alcohol use: No    Drug use: No     Review of Systems   Constitutional:  Negative for chills and fever.   HENT:  Negative for congestion and sore throat.    Eyes:  Negative for visual disturbance.   Respiratory:  Negative for cough and shortness of breath.    Cardiovascular:  Negative for chest pain.   Gastrointestinal:  Negative for abdominal pain, nausea and vomiting.   Genitourinary:  Negative for dysuria.   Musculoskeletal:         (+) finger pain, left middle   (+) finger stiffness, left middle   Skin:  Negative for rash.   Neurological:  Negative for headaches.   Psychiatric/Behavioral:  Negative for confusion.         Physical Exam     Initial Vitals [01/25/24 0948]   BP Pulse Resp Temp SpO2   120/67 69 18 97.6 °F (36.4 °C) 98 %      MAP       --         Physical Exam    Nursing note and vitals reviewed.  Constitutional: He is not diaphoretic. No distress.   HENT:   Head: Normocephalic and atraumatic.   Protecting airway   Eyes: Conjunctivae and EOM are normal. No scleral icterus.   Neck: Neck supple. No tracheal deviation present.   Normal range of motion.  Pulmonary/Chest: No stridor. No respiratory distress.   Speaking in full sentences   Abdominal: He exhibits no distension.   Musculoskeletal:         General: No edema.      Cervical back: Normal range of motion and neck supple.      Comments: No erythema or edema to left middle digit. Mild tenderness and  palpable nodule on palmar surface of left middle PIP. No fixed flexion. No pain w/ passive extension. No fusiform swelling.      Neurological: He is alert. He has normal strength. No cranial nerve deficit or sensory deficit.   Skin: Skin is warm and dry.   Psychiatric: He has a normal mood and affect.         ED Course   Procedures  Labs Reviewed - No data to display       Imaging Results              X-Ray Finger 2 or More Views Left (Final result)  Result time 01/25/24 10:26:58      Final result by Erik Vera MD (01/25/24 10:26:58)                   Impression:      No evidence of fracture.      Electronically signed by: Erik Vera MD  Date:    01/25/2024  Time:    10:26               Narrative:    EXAMINATION:  XR FINGER 2 OR MORE VIEWS LEFT    CLINICAL HISTORY:  left middle finger pain;    TECHNIQUE:  Left middle digit, three views.    COMPARISON:  None    FINDINGS:  No evidence of fracture or dislocation.  No evidence of erosions.  Joint spaces are satisfactorily maintained.  No radiopaque foreign body.  No soft tissue abnormality.                                       Medications - No data to display  Medical Decision Making  Differential  diagnosis include but are not limited to: arthritis, tendinitis, tenosynovitis     Amount and/or Complexity of Data Reviewed  Radiology: ordered.    Risk  OTC drugs.            Scribe Attestation:   Scribe #1: I performed the above scribed service and the documentation accurately describes the services I performed. I attest to the accuracy of the note.        ED Course as of 01/25/24 1447   Thu Jan 25, 2024   1125 Patient is afebrile and in no acute distress at time history and physical.  Vitals within acceptable ranges.  He complains of pain to the left middle finger.  There is no erythema, warmth to touch or wounds to the digit.  There is no purulence or fluctuance.  Patient does not have fusiform swelling of the digit there is no fixed flexion or pain with passive extension.  There is mild tenderness and a palpable nodule to the volar surface of the left middle PIP.  There is not tenderness along the flexor compartment to suggest flexor tenosynovitis.  To range digit however he has not able to make a complete fist without pain.  Fracture or dislocation.  My review of x-ray images of the digit suggest an osteophyte at the volar aspect of left middle PIP.  Clinically patient does not have flexor tenosynovitis.  He does not appear to require emergent hand surgery evaluation.  He does not appear to require IV antibiotics.  He is clinically stable for discharge to follow-up with hand as an outpatient.  Prescribed analgesia, counseled on supportive care, appropriate medication usage, concerning symptoms for which to return to ER and the importance of follow up. Understanding and agreement with treatment plan was expressed.  [SG]      ED Course User Index  [SG] Candida Root MD          This chart was completed using dictation software, as a result there may be some transcription errors.            ICandida , personally performed the services described in this documentation.  All medical record entries made  by the scribe were at my direction and in my presence.  I have reviewed the chart and agree that the record reflects my personal performance and is accurate and complete.        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 Provider    acetaminophen (TYLENOL) 500 MG tablet Take 2 tablets (1,000 mg total) by mouth every 8 (eight) hours as needed for Pain. 30 tablet 1/25/2024 -- Candida Root MD          Follow-up Information       Follow up With Specialties Details Why Contact Info    Gatito Mcdonald MD Internal Medicine Schedule an appointment as soon as possible for a visit   [address] [facility] [address] [phone]      [facility] Orthopedics Schedule an appointment as soon as possible for a visit   [address] [facility] [place] [address] [phone]             Candida Root MD  01/25/24 8143

## 2024-03-22 ENCOUNTER — OFFICE VISIT (OUTPATIENT)
Dept: FAMILY MEDICINE | Facility: CLINIC | Age: 89
End: 2024-03-22
Payer: MEDICARE

## 2024-03-22 VITALS
HEART RATE: 86 BPM | HEIGHT: 64 IN | TEMPERATURE: 98 F | DIASTOLIC BLOOD PRESSURE: 110 MMHG | BODY MASS INDEX: 24.81 KG/M2 | WEIGHT: 145.31 LBS | SYSTOLIC BLOOD PRESSURE: 164 MMHG | OXYGEN SATURATION: 98 %

## 2024-03-22 DIAGNOSIS — I25.810 CORONARY ARTERY DISEASE INVOLVING CORONARY BYPASS GRAFT OF NATIVE HEART WITHOUT ANGINA PECTORIS: ICD-10-CM

## 2024-03-22 DIAGNOSIS — M27.2 OSTEOMYELITIS OF MANDIBLE: ICD-10-CM

## 2024-03-22 DIAGNOSIS — K05.219 GINGIVAL ABSCESS: Primary | ICD-10-CM

## 2024-03-22 DIAGNOSIS — I10 PRIMARY HYPERTENSION: ICD-10-CM

## 2024-03-22 DIAGNOSIS — N18.32 STAGE 3B CHRONIC KIDNEY DISEASE: ICD-10-CM

## 2024-03-22 DIAGNOSIS — E11.40 TYPE 2 DIABETES MELLITUS WITH DIABETIC NEUROPATHY, WITHOUT LONG-TERM CURRENT USE OF INSULIN: ICD-10-CM

## 2024-03-22 DIAGNOSIS — C61 PROSTATE CANCER: ICD-10-CM

## 2024-03-22 PROBLEM — N18.31 STAGE 3A CHRONIC KIDNEY DISEASE: Status: RESOLVED | Noted: 2023-06-15 | Resolved: 2024-03-22

## 2024-03-22 PROCEDURE — 99214 OFFICE O/P EST MOD 30 MIN: CPT | Mod: PBBFAC,PO | Performed by: FAMILY MEDICINE

## 2024-03-22 PROCEDURE — 99999 PR PBB SHADOW E&M-EST. PATIENT-LVL IV: CPT | Mod: PBBFAC,,, | Performed by: FAMILY MEDICINE

## 2024-03-22 PROCEDURE — 99214 OFFICE O/P EST MOD 30 MIN: CPT | Mod: S$PBB,,, | Performed by: FAMILY MEDICINE

## 2024-03-22 RX ORDER — OXYBUTYNIN CHLORIDE 5 MG/1
5 TABLET, EXTENDED RELEASE ORAL EVERY MORNING
COMMUNITY
Start: 2024-02-28 | End: 2024-05-09

## 2024-03-22 RX ORDER — ALLOPURINOL 100 MG/1
1 TABLET ORAL DAILY
COMMUNITY

## 2024-03-22 RX ORDER — ALBUTEROL SULFATE 0.83 MG/ML
2.5 SOLUTION RESPIRATORY (INHALATION)
COMMUNITY

## 2024-03-22 RX ORDER — METOPROLOL TARTRATE 25 MG/1
1 TABLET, FILM COATED ORAL 2 TIMES DAILY
COMMUNITY

## 2024-03-22 RX ORDER — HYDROCODONE BITARTRATE AND ACETAMINOPHEN 7.5; 325 MG/1; MG/1
1 TABLET ORAL EVERY 8 HOURS PRN
Qty: 42 TABLET | Refills: 0 | Status: SHIPPED | OUTPATIENT
Start: 2024-03-22 | End: 2024-04-05

## 2024-03-22 RX ORDER — LISINOPRIL 10 MG/1
1 TABLET ORAL DAILY
COMMUNITY
End: 2024-03-22

## 2024-03-22 RX ORDER — CLONIDINE HYDROCHLORIDE 0.1 MG/1
0.1 TABLET ORAL 2 TIMES DAILY
COMMUNITY
Start: 2024-02-28 | End: 2024-04-05 | Stop reason: ALTCHOICE

## 2024-03-22 RX ORDER — METRONIDAZOLE 500 MG/1
500 TABLET ORAL EVERY 8 HOURS
Qty: 42 TABLET | Refills: 0 | Status: SHIPPED | OUTPATIENT
Start: 2024-03-22 | End: 2024-04-05 | Stop reason: ALTCHOICE

## 2024-03-22 RX ORDER — VALSARTAN 320 MG/1
320 TABLET ORAL
COMMUNITY
Start: 2023-11-21

## 2024-03-22 RX ORDER — LEVOFLOXACIN 750 MG/1
750 TABLET ORAL EVERY OTHER DAY
Qty: 14 TABLET | Refills: 0 | Status: SHIPPED | OUTPATIENT
Start: 2024-03-22 | End: 2024-04-05 | Stop reason: ALTCHOICE

## 2024-03-22 NOTE — Clinical Note
Good afternoon,  This patient came to establish care with me today. He has many chronic health conditions, including CAD and prostate cancer, that appear to have been lost to follow up. I am sending you a message to see if your staff can reach out to him to schedule follow ups.   I will be seeing him closely in 1-2 weeks with bloodwork done beforehand.  Dr. Adela Diaz, DO Family Medicine

## 2024-03-22 NOTE — PATIENT INSTRUCTIONS
You should receive a call to schedule an MRI of the face. Call [phone] if you do not get a call by Monday.     You should receive a call to schedule an appointment with Maxillofacial Surgery in 2 business days. Call [phone] if you do not get a call.     Bring your medications with you to your follow up appointment.     Follow up with me in 2 weeks.

## 2024-03-22 NOTE — PROGRESS NOTES
Assessment & Plan:    Gingival abscess  -     Ambulatory referral/consult to Internal Medicine  -     MRI Maxillofacial W W/O Contrast; Future; Expected date: 03/22/2024  -     Ambulatory referral/consult to Oral Maxillofacial Surgery; Future; Expected date: 03/29/2024  -     levoFLOXacin (LEVAQUIN) 750 MG tablet; Take 1 tablet (750 mg total) by mouth every other day.  Dispense: 14 tablet; Refill: 0  -     metroNIDAZOLE (FLAGYL) 500 MG tablet; Take 1 tablet (500 mg total) by mouth every 8 (eight) hours. for 14 days  Dispense: 42 tablet; Refill: 0  -     HYDROcodone-acetaminophen (NORCO) 7.5-325 mg per tablet; Take 1 tablet by mouth every 8 (eight) hours as needed for Pain.  Dispense: 42 tablet; Refill: 0    Osteomyelitis of mandible  -     Ambulatory referral/consult to Internal Medicine  -     MRI Maxillofacial W W/O Contrast; Future; Expected date: 03/22/2024  -     Ambulatory referral/consult to Oral Maxillofacial Surgery; Future; Expected date: 03/29/2024  -     levoFLOXacin (LEVAQUIN) 750 MG tablet; Take 1 tablet (750 mg total) by mouth every other day.  Dispense: 14 tablet; Refill: 0  -     metroNIDAZOLE (FLAGYL) 500 MG tablet; Take 1 tablet (500 mg total) by mouth every 8 (eight) hours. for 14 days  Dispense: 42 tablet; Refill: 0    Referral to Oral Maxillofacial Surgery lost to follow up, so a new stat referral will be placed. Patient was advised to follow up with a dentist if he is unable to see the specialist through [facility].    Start empiric therapy with Flagyl and renally dosed Levaquin.   Norco for pain management.   MRI to be performed prior to appointment with specialist.   Seek ER evaluation if symptoms worsen through the weekend.     Primary hypertension  -     CBC Auto Differential; Future; Expected date: 03/22/2024  -     Comprehensive Metabolic Panel; Future; Expected date: 03/22/2024  -     Hemoglobin A1C; Future; Expected date: 03/22/2024  -     Lipid Panel; Future; Expected date: 03/22/2024  -  "    Urinalysis; Future; Expected date: 03/22/2024    BP elevated. Patient does not know what medications he is taking at home.   Advised to bring his medications with him at his upcoming appointment in 1-2 weeks.   Will schedule fasting labs prior to this visit.    Coronary artery disease involving coronary bypass graft of native heart without angina pectoris  -     CBC Auto Differential; Future; Expected date: 03/22/2024  -     Comprehensive Metabolic Panel; Future; Expected date: 03/22/2024  -     Hemoglobin A1C; Future; Expected date: 03/22/2024  -     Lipid Panel; Future; Expected date: 03/22/2024    Type 2 diabetes mellitus with diabetic neuropathy, without long-term current use of insulin  -     CBC Auto Differential; Future; Expected date: 03/22/2024  -     Comprehensive Metabolic Panel; Future; Expected date: 03/22/2024  -     Hemoglobin A1C; Future; Expected date: 03/22/2024  -     Lipid Panel; Future; Expected date: 03/22/2024    Stage 3b chronic kidney disease  -     Comprehensive Metabolic Panel; Future; Expected date: 03/22/2024    Prostate cancer  -     PROSTATE SPECIFIC ANTIGEN, DIAGNOSTIC; Future; Expected date: 03/22/2024        Follow-up: Follow up in about 2 weeks (around 4/5/2024), or if symptoms worsen or fail to improve.  ______________________________________________________________________    Chief Complaint  Chief Complaint   Patient presents with    Dental Problem       HPI: Destin Barber is a 89 y.o. male with medical diagnoses as listed in the medical history and problem list that presents to the office c/o swelling and pain in the right lower gumline that has been persistent since he was seen in the ER for a gingival abscess and osteomyelitis of the right mandible on 9/15/23. Patient arrived 31 minutes past his appointment time. Please see ER encounter for further details. Abscess was drained in the ER. Maxillofacial CT showed: "2.2 x 1.9 cm fluid collection in the anterior midline " "subcutaneous tissues at the level of the mandible with no significant rim enhancement.  The findings may reflect evolving phlegmon. Small focus of subcutaneous air in the right anterior parasymphyseal region.  The findings may represent recent instrumentation or soft tissue infection. Suggest clinical correlation. Lucent lesion in the midline parasymphyseal mandibular bone.  The findings may reflect indolent osteomyelitis with underlying lesion excluded.  Follow-up MRI of the maxillofacial structures may be obtained, as clinically warranted." He was treated with IV clindamycin and morphine. Patient was also found to be hypertensive and was treated with amlodipine, lisinopril, metoprolol, and Imdur. Discharged home with [place] for pain, a 10 day course of po clindamycin, and chlorhexidine solution. He was referred to an Oral Maxillofacial Surgeon but patient states that this specialist was not covered by his insurance.           PAST MEDICAL HISTORY:  Past Medical History:   Diagnosis Date    Acute coronary syndrome 06/24/2016    s/p 3V CABG 7/2016    Anemia     Coronary artery disease     Diastolic dysfunction     Gout, chronic     Heart failure     HTN (hypertension)     Hyperlipidemia     Myocardial infarction     Pneumonia due to other staphylococcus     Pressure ulcer     Renal manifestation of secondary diabetes mellitus     Type 2 diabetes mellitus     diet controlled    Urge incontinence of urine 7/21/2023       PAST SURGICAL HISTORY:  Past Surgical History:   Procedure Laterality Date    CATARACT EXTRACTION Bilateral     CATARACT EXTRACTION W/ ANTERIOR VITRECTOMY      CORONARY ARTERY BYPASS GRAFT  07/06/2016    PAIZ-LAD, SVG-Ramus, SVG-PDA    FLUOROSCOPIC URODYNAMIC STUDY N/A 4/25/2023    Procedure: URODYNAMIC STUDY, FLUOROSCOPIC;  Surgeon: Raji Mcmullen MD;  Location: [facility];  Service: Urology;  Laterality: N/A;  RN PHONE PREOP WITH GRANDDAUGHTER ROGER 4/21/23    HEMORRHOID SURGERY      LASER " ENUCLEATION OF PROSTATE N/A 2023    Procedure: ENUCLEATION, PROSTATE, USING LASER; cystolithalopaxy;  Surgeon: Raji Mcmullen MD;  Location: [facility];  Service: Urology;  Laterality: N/A;  notify radha MAIN [phone] SPOKE TO NICK ON 2023 @ 10:37AM. CONFIRMATION NUMBER [id number]  FIORDALIZA CHANNING POLLACK [phone] EMAILED CHANNING ON 6/15/2023@ 3:07PM-KEYON  RN PREOP 2023   T/S ON 2023  RN PREOP 2023 --CARLOS       SOCIAL HISTORY:  Social History     Socioeconomic History    Marital status:     Number of children: 3    Highest education level: 6th grade   Tobacco Use    Smoking status: Former     Types: Cigars     Start date:      Quit date:      Years since quittin.2    Smokeless tobacco: Never    Tobacco comments:     Former smoker. Pt. Smoked 2 cigars daily.   Substance and Sexual Activity    Alcohol use: No    Drug use: No    Sexual activity: Not Currently     Partners: Female     Social Determinants of Health     Financial Resource Strain: Low Risk  (3/22/2023)    Overall Financial Resource Strain (CARDIA)     Difficulty of Paying Living Expenses: Not hard at all   Food Insecurity: No Food Insecurity (3/22/2023)    Hunger Vital Sign     Worried About Running Out of Food in the Last Year: Never true     Ran Out of Food in the Last Year: Never true   Transportation Needs: No Transportation Needs (3/22/2023)    PRAPARE - Transportation     Lack of Transportation (Medical): No     Lack of Transportation (Non-Medical): No   Physical Activity: Sufficiently Active (3/22/2023)    Exercise Vital Sign     Days of Exercise per Week: 5 days     Minutes of Exercise per Session: 30 min   Stress: No Stress Concern Present (3/22/2023)    [place] [employer] - Occupational Stress Questionnaire     Feeling of Stress : Not at all   Social Connections: Moderately Isolated (3/22/2023)    Social Connection and Isolation Panel [NHANES]     Frequency of Communication with  Friends and Family: More than three times a week     Frequency of Social Gatherings with Friends and Family: More than three times a week     Attends Denominational Services: Never     Active Member of Clubs or Organizations: No     Attends Club or Organization Meetings: Never     Marital Status:    Housing Stability: Unknown (3/22/2023)    Housing Stability Vital Sign     Unable to Pay for Housing in the Last Year: No     Unstable Housing in the Last Year: No       FAMILY HISTORY:  Family History   Problem Relation Age of Onset    Hypertension Mother     Heart disease Mother     Cancer Father         colon    Heart disease Sister     No Known Problems Sister     No Known Problems Sister     No Known Problems Sister     No Known Problems Sister     Heart disease Brother     No Known Problems Brother     No Known Problems Brother     No Known Problems Brother     No Known Problems Brother     No Known Problems Brother     No Known Problems Daughter     No Known Problems Daughter     No Known Problems Son     Amblyopia Neg Hx     Blindness Neg Hx     Cataracts Neg Hx     Diabetes Neg Hx     Glaucoma Neg Hx     Macular degeneration Neg Hx     Retinal detachment Neg Hx     Strabismus Neg Hx     Stroke Neg Hx     Thyroid disease Neg Hx        ALLERGIES AND MEDICATIONS: updated and reviewed.  Review of patient's allergies indicates:   Allergen Reactions    Penicillins Nausea And Vomiting     Pt reports he is not allergic to penicillin       Current Outpatient Medications   Medication Sig Dispense Refill    cloNIDine (CATAPRES) 0.1 MG tablet Take 0.1 mg by mouth 2 (two) times daily.      oxybutynin (DITROPAN-XL) 5 MG TR24 Take 5 mg by mouth every morning.      valsartan (DIOVAN) 320 MG tablet Take 320 mg by mouth.      acetaminophen (TYLENOL) 500 MG tablet Take 2 tablets (1,000 mg total) by mouth every 8 (eight) hours as needed for Pain. 30 tablet 0    albuterol (PROVENTIL) 2.5 mg /3 mL (0.083 %) nebulizer solution  Inhale 2.5 mg into the lungs.      allopurinoL (ZYLOPRIM) 100 MG tablet Take 1 tablet by mouth once daily.      amLODIPine (NORVASC) 10 MG tablet Take 1 tablet (10 mg total) by mouth once daily. 90 tablet 0    aspirin (ASPIR-LOW) 81 MG EC tablet Take 1 tablet (81 mg total) by mouth once daily. 90 tablet 3    hydrALAZINE (APRESOLINE) 50 MG tablet Take 1 tablet (50 mg total) by mouth 2 (two) times daily. 180 tablet 3    HYDROcodone-acetaminophen (NORCO) 7.5-325 mg per tablet Take 1 tablet by mouth every 8 (eight) hours as needed for Pain. 42 tablet 0    isosorbide mononitrate (IMDUR) 60 MG 24 hr tablet Take 1 tablet (60 mg total) by mouth once daily. 90 tablet 3    levoFLOXacin (LEVAQUIN) 750 MG tablet Take 1 tablet (750 mg total) by mouth every other day. 14 tablet 0    lisinopriL (PRINIVIL,ZESTRIL) 40 MG tablet Take 1 tablet (40 mg total) by mouth once daily. 90 tablet 3    metFORMIN (GLUCOPHAGE-XR) 500 MG ER 24hr tablet TAKE 1 TABLET BY MOUTH DAILY WITH BREAKFAST 90 tablet 3    metoprolol succinate (TOPROL-XL) 100 MG 24 hr tablet Take 1 tablet (100 mg total) by mouth 2 (two) times daily. Hold for systolic blood pressure less than 110 and or heart rate less than 55 180 tablet 0    metoprolol tartrate (LOPRESSOR) 25 MG tablet Take 1 tablet by mouth 2 (two) times daily.      metroNIDAZOLE (FLAGYL) 500 MG tablet Take 1 tablet (500 mg total) by mouth every 8 (eight) hours. for 14 days 42 tablet 0    nitroGLYCERIN (NITROSTAT) 0.4 MG SL tablet Place 1 tablet (0.4 mg total) under the tongue every 5 (five) minutes as needed for Chest pain. 30 tablet 0    polyethylene glycol (GLYCOLAX) 17 gram PwPk Take 17 g by mouth daily as needed (For constipation). 90 packet 1    pravastatin (PRAVACHOL) 40 MG tablet Take 1 tablet (40 mg total) by mouth once daily. 90 tablet 3    traZODone (DESYREL) 150 MG tablet Take 1 tablet (150 mg total) by mouth every evening. 90 tablet 3     No current facility-administered medications for this  "visit.     Facility-Administered Medications Ordered in Other Visits   Medication Dose Route Frequency Provider Last Rate Last Admin    lactated ringers infusion   Intravenous Continuous Julia Fong MD 10 mL/hr at 07/06/23 1230 New Bag at 07/06/23 1230    LIDOcaine (PF) 10 mg/ml (1%) injection 10 mg  1 mL Intradermal Once Julia Fong MD             ROS  Review of Systems   Constitutional:  Negative for fever.   HENT:  Positive for dental problem. Negative for facial swelling.            Physical Exam  Vitals:    03/22/24 1554   BP: (!) 164/110   BP Location: Right arm   Patient Position: Sitting   BP Method: Medium (Manual)   Pulse: 86   Temp: 97.8 °F (36.6 °C)   TempSrc: Oral   SpO2: 98%   Weight: 65.9 kg (145 lb 4.5 oz)   Height: 5' 4" (1.626 m)    Body mass index is 24.94 kg/m².  Weight: 65.9 kg (145 lb 4.5 oz)   Height: 5' 4" (162.6 cm)   Physical Exam  Constitutional:       General: He is not in acute distress.  HENT:      Head: Normocephalic and atraumatic.      Mouth/Throat:      Comments: Edentulous. Fluctuant abscess on the posterior surface if the right lower gingiva.  Musculoskeletal:      Cervical back: Neck supple.   Lymphadenopathy:      Cervical: No cervical adenopathy.   Neurological:      Mental Status: He is alert. Mental status is at baseline.   Psychiatric:         Mood and Affect: Mood normal.         Behavior: Behavior normal.             "

## 2024-03-23 ENCOUNTER — TELEPHONE (OUTPATIENT)
Dept: CARDIOLOGY | Facility: CLINIC | Age: 89
End: 2024-03-23
Payer: MEDICARE

## 2024-03-23 NOTE — TELEPHONE ENCOUNTER
----- Message from Adela Diaz DO sent at 3/22/2024  4:49 PM CDT -----  Good afternoon,    This patient came to establish care with me today. He has many chronic health conditions, including CAD and prostate cancer, that appear to have been lost to follow up. I am sending you a message to see if your staff can reach out to him to schedule follow ups.     I will be seeing him closely in 1-2 weeks with bloodwork done beforehand.    Dr. Adela Diaz, DO  Family Medicine

## 2024-03-26 ENCOUNTER — LAB VISIT (OUTPATIENT)
Dept: LAB | Facility: HOSPITAL | Age: 89
End: 2024-03-26
Attending: FAMILY MEDICINE
Payer: MEDICARE

## 2024-03-26 ENCOUNTER — TELEPHONE (OUTPATIENT)
Dept: UROLOGY | Facility: CLINIC | Age: 89
End: 2024-03-26
Payer: MEDICARE

## 2024-03-26 DIAGNOSIS — E11.40 TYPE 2 DIABETES MELLITUS WITH DIABETIC NEUROPATHY, WITHOUT LONG-TERM CURRENT USE OF INSULIN: ICD-10-CM

## 2024-03-26 DIAGNOSIS — C61 PROSTATE CANCER: ICD-10-CM

## 2024-03-26 DIAGNOSIS — N18.32 STAGE 3B CHRONIC KIDNEY DISEASE: ICD-10-CM

## 2024-03-26 DIAGNOSIS — I25.810 CORONARY ARTERY DISEASE INVOLVING CORONARY BYPASS GRAFT OF NATIVE HEART WITHOUT ANGINA PECTORIS: ICD-10-CM

## 2024-03-26 DIAGNOSIS — I10 PRIMARY HYPERTENSION: ICD-10-CM

## 2024-03-26 LAB
ALBUMIN SERPL BCP-MCNC: 3.8 G/DL (ref 3.5–5.2)
ALP SERPL-CCNC: 83 U/L (ref 55–135)
ALT SERPL W/O P-5'-P-CCNC: 7 U/L (ref 10–44)
ANION GAP SERPL CALC-SCNC: 6 MMOL/L (ref 8–16)
AST SERPL-CCNC: 18 U/L (ref 10–40)
BASOPHILS # BLD AUTO: 0.03 K/UL (ref 0–0.2)
BASOPHILS NFR BLD: 0.5 % (ref 0–1.9)
BILIRUB SERPL-MCNC: 0.4 MG/DL (ref 0.1–1)
BUN SERPL-MCNC: 14 MG/DL (ref 8–23)
CALCIUM SERPL-MCNC: 9.1 MG/DL (ref 8.7–10.5)
CHLORIDE SERPL-SCNC: 105 MMOL/L (ref 95–110)
CHOLEST SERPL-MCNC: 162 MG/DL (ref 120–199)
CHOLEST/HDLC SERPL: 5.8 {RATIO} (ref 2–5)
CO2 SERPL-SCNC: 27 MMOL/L (ref 23–29)
COMPLEXED PSA SERPL-MCNC: 4.5 NG/ML (ref 0–4)
CREAT SERPL-MCNC: 1.3 MG/DL (ref 0.5–1.4)
DIFFERENTIAL METHOD BLD: ABNORMAL
EOSINOPHIL # BLD AUTO: 0.2 K/UL (ref 0–0.5)
EOSINOPHIL NFR BLD: 3.7 % (ref 0–8)
ERYTHROCYTE [DISTWIDTH] IN BLOOD BY AUTOMATED COUNT: 13.1 % (ref 11.5–14.5)
EST. GFR  (NO RACE VARIABLE): 53.2 ML/MIN/1.73 M^2
ESTIMATED AVG GLUCOSE: 126 MG/DL (ref 68–131)
GLUCOSE SERPL-MCNC: 95 MG/DL (ref 70–110)
HBA1C MFR BLD: 6 % (ref 4–5.6)
HCT VFR BLD AUTO: 35.1 % (ref 40–54)
HDLC SERPL-MCNC: 28 MG/DL (ref 40–75)
HDLC SERPL: 17.3 % (ref 20–50)
HGB BLD-MCNC: 11.9 G/DL (ref 14–18)
IMM GRANULOCYTES # BLD AUTO: 0.01 K/UL (ref 0–0.04)
IMM GRANULOCYTES NFR BLD AUTO: 0.2 % (ref 0–0.5)
LDLC SERPL CALC-MCNC: 95.2 MG/DL (ref 63–159)
LYMPHOCYTES # BLD AUTO: 3.1 K/UL (ref 1–4.8)
LYMPHOCYTES NFR BLD: 52.1 % (ref 18–48)
MCH RBC QN AUTO: 31.5 PG (ref 27–31)
MCHC RBC AUTO-ENTMCNC: 33.9 G/DL (ref 32–36)
MCV RBC AUTO: 93 FL (ref 82–98)
MONOCYTES # BLD AUTO: 0.4 K/UL (ref 0.3–1)
MONOCYTES NFR BLD: 7.3 % (ref 4–15)
NEUTROPHILS # BLD AUTO: 2.1 K/UL (ref 1.8–7.7)
NEUTROPHILS NFR BLD: 36.2 % (ref 38–73)
NONHDLC SERPL-MCNC: 134 MG/DL
NRBC BLD-RTO: 0 /100 WBC
PLATELET # BLD AUTO: 211 K/UL (ref 150–450)
PMV BLD AUTO: 10.3 FL (ref 9.2–12.9)
POTASSIUM SERPL-SCNC: 3.7 MMOL/L (ref 3.5–5.1)
PROT SERPL-MCNC: 6.6 G/DL (ref 6–8.4)
RBC # BLD AUTO: 3.78 M/UL (ref 4.6–6.2)
SODIUM SERPL-SCNC: 138 MMOL/L (ref 136–145)
TRIGL SERPL-MCNC: 194 MG/DL (ref 30–150)
WBC # BLD AUTO: 5.91 K/UL (ref 3.9–12.7)

## 2024-03-26 PROCEDURE — 36415 COLL VENOUS BLD VENIPUNCTURE: CPT | Mod: PO | Performed by: FAMILY MEDICINE

## 2024-03-26 PROCEDURE — 84153 ASSAY OF PSA TOTAL: CPT | Performed by: FAMILY MEDICINE

## 2024-03-26 PROCEDURE — 80053 COMPREHEN METABOLIC PANEL: CPT | Performed by: FAMILY MEDICINE

## 2024-03-26 PROCEDURE — 80061 LIPID PANEL: CPT | Performed by: FAMILY MEDICINE

## 2024-03-26 PROCEDURE — 83036 HEMOGLOBIN GLYCOSYLATED A1C: CPT | Performed by: FAMILY MEDICINE

## 2024-03-26 PROCEDURE — 85025 COMPLETE CBC W/AUTO DIFF WBC: CPT | Performed by: FAMILY MEDICINE

## 2024-03-26 NOTE — TELEPHONE ENCOUNTER
Pt was contacted unable to lvm.  ----- Message from Jamal Garcia sent at 3/26/2024 12:41 PM CDT -----  Regarding: self  Type:  Patient Returning Call    Who Called:self    Who Left Message for Patient: Pau Aguirre MA    Does the patient know what this is regarding?:no    Would the patient rather a call back or a response via [facility]?callback    Best Call Back Number:[phone]    Additional Information:      Writer prepared and signed script per Dr Wenceslao Johnson.

## 2024-03-26 NOTE — TELEPHONE ENCOUNTER
Pt was contacted pt is currently at the lab. Will call back when able.  ----- Message from Raji Mcmullen MD sent at 3/22/2024  4:55 PM CDT -----  Regarding: mri/ fu  Pls arrange mri of prostate and follow up appt with me

## 2024-03-27 ENCOUNTER — TELEPHONE (OUTPATIENT)
Dept: UROLOGY | Facility: CLINIC | Age: 89
End: 2024-03-27
Payer: MEDICARE

## 2024-03-27 NOTE — TELEPHONE ENCOUNTER
Pt was contacted appt scheduled and letter sent to address.  ----- Message from Shirley Li sent at 3/27/2024  8:42 AM CDT -----  Type:  Patient Returning Call    Who Called: SELF    Who Left Message for Patient:  JOSE FRANCISCO HOWARD    Does the patient know what this is regarding?:NO    Would the patient rather a call back or a response via [facility]? CALL    Best Call Back Number:[phone] (home) [phone] (work)      Additional Information:

## 2024-04-05 ENCOUNTER — OFFICE VISIT (OUTPATIENT)
Dept: FAMILY MEDICINE | Facility: CLINIC | Age: 89
End: 2024-04-05
Payer: MEDICARE

## 2024-04-05 VITALS
HEIGHT: 64 IN | TEMPERATURE: 98 F | OXYGEN SATURATION: 98 % | DIASTOLIC BLOOD PRESSURE: 92 MMHG | BODY MASS INDEX: 25.16 KG/M2 | HEART RATE: 89 BPM | SYSTOLIC BLOOD PRESSURE: 152 MMHG | WEIGHT: 147.38 LBS

## 2024-04-05 DIAGNOSIS — E11.40 TYPE 2 DIABETES MELLITUS WITH DIABETIC NEUROPATHY, WITHOUT LONG-TERM CURRENT USE OF INSULIN: ICD-10-CM

## 2024-04-05 DIAGNOSIS — N18.31 CHRONIC KIDNEY DISEASE, STAGE 3A: ICD-10-CM

## 2024-04-05 DIAGNOSIS — K05.219 GINGIVAL ABSCESS: Primary | ICD-10-CM

## 2024-04-05 DIAGNOSIS — E78.5 DYSLIPIDEMIA ASSOCIATED WITH TYPE 2 DIABETES MELLITUS: ICD-10-CM

## 2024-04-05 DIAGNOSIS — E11.69 DYSLIPIDEMIA ASSOCIATED WITH TYPE 2 DIABETES MELLITUS: ICD-10-CM

## 2024-04-05 DIAGNOSIS — I70.0 AORTIC ATHEROSCLEROSIS: ICD-10-CM

## 2024-04-05 DIAGNOSIS — I10 ESSENTIAL HYPERTENSION: ICD-10-CM

## 2024-04-05 DIAGNOSIS — M10.9 GOUT, UNSPECIFIED CAUSE, UNSPECIFIED CHRONICITY, UNSPECIFIED SITE: ICD-10-CM

## 2024-04-05 DIAGNOSIS — I25.810 CORONARY ARTERY DISEASE INVOLVING CORONARY BYPASS GRAFT OF NATIVE HEART WITHOUT ANGINA PECTORIS: ICD-10-CM

## 2024-04-05 DIAGNOSIS — J06.9 VIRAL UPPER RESPIRATORY TRACT INFECTION: ICD-10-CM

## 2024-04-05 PROCEDURE — 99214 OFFICE O/P EST MOD 30 MIN: CPT | Mod: S$PBB,,, | Performed by: FAMILY MEDICINE

## 2024-04-05 PROCEDURE — 99999 PR PBB SHADOW E&M-EST. PATIENT-LVL IV: CPT | Mod: PBBFAC,,, | Performed by: FAMILY MEDICINE

## 2024-04-05 PROCEDURE — 99214 OFFICE O/P EST MOD 30 MIN: CPT | Mod: PBBFAC,PO | Performed by: FAMILY MEDICINE

## 2024-04-05 RX ORDER — AMOXICILLIN AND CLAVULANATE POTASSIUM 875; 125 MG/1; MG/1
1 TABLET, FILM COATED ORAL 2 TIMES DAILY
Qty: 20 TABLET | Refills: 0 | Status: SHIPPED | OUTPATIENT
Start: 2024-04-05 | End: 2024-05-09

## 2024-04-05 RX ORDER — LANCETS
EACH MISCELLANEOUS
Qty: 200 EACH | Refills: 11 | Status: SHIPPED | OUTPATIENT
Start: 2024-04-05

## 2024-04-05 RX ORDER — TAMSULOSIN HYDROCHLORIDE 0.4 MG/1
CAPSULE ORAL DAILY
COMMUNITY
End: 2024-05-09

## 2024-04-05 RX ORDER — AMLODIPINE BESYLATE 10 MG/1
10 TABLET ORAL DAILY
Qty: 90 TABLET | Refills: 3 | Status: SHIPPED | OUTPATIENT
Start: 2024-04-05 | End: 2025-04-05

## 2024-04-05 RX ORDER — PROMETHAZINE HYDROCHLORIDE AND DEXTROMETHORPHAN HYDROBROMIDE 6.25; 15 MG/5ML; MG/5ML
SYRUP ORAL
Qty: 240 ML | Refills: 0 | Status: SHIPPED | OUTPATIENT
Start: 2024-04-05

## 2024-04-05 RX ORDER — INSULIN PUMP SYRINGE, 3 ML
EACH MISCELLANEOUS
Qty: 1 EACH | Refills: 0 | Status: SHIPPED | OUTPATIENT
Start: 2024-04-05

## 2024-04-05 RX ORDER — BENZONATATE 200 MG/1
200 CAPSULE ORAL 3 TIMES DAILY PRN
Qty: 30 CAPSULE | Refills: 1 | Status: SHIPPED | OUTPATIENT
Start: 2024-04-05

## 2024-04-05 RX ORDER — SOMATROPIN 10 MG/1.5ML
INJECTION, SOLUTION SUBCUTANEOUS
COMMUNITY

## 2024-04-05 RX ORDER — PRAVASTATIN SODIUM 40 MG/1
40 TABLET ORAL DAILY
Qty: 90 TABLET | Refills: 3 | Status: SHIPPED | OUTPATIENT
Start: 2024-04-05

## 2024-04-05 NOTE — PATIENT INSTRUCTIONS
Stop clonidine.    Start taking pravastatin to lower your cholesterol.     Check your blood sugar daily. Your goal is between .

## 2024-04-19 ENCOUNTER — HOSPITAL ENCOUNTER (OUTPATIENT)
Dept: RADIOLOGY | Facility: HOSPITAL | Age: 89
Discharge: HOME | End: 2024-04-19
Attending: FAMILY MEDICINE
Payer: MEDICARE

## 2024-04-19 DIAGNOSIS — M27.2 OSTEOMYELITIS OF MANDIBLE: ICD-10-CM

## 2024-04-19 DIAGNOSIS — K05.219 GINGIVAL ABSCESS: ICD-10-CM

## 2024-04-19 PROCEDURE — 70543 MRI ORBT/FAC/NCK W/O &W/DYE: CPT | Mod: 26,,, | Performed by: RADIOLOGY

## 2024-04-19 PROCEDURE — A9585 GADOBUTROL INJECTION: HCPCS | Performed by: FAMILY MEDICINE

## 2024-04-19 PROCEDURE — 25500020 PHARM REV CODE 255: Performed by: FAMILY MEDICINE

## 2024-04-19 PROCEDURE — 70543 MRI ORBT/FAC/NCK W/O &W/DYE: CPT | Mod: TC

## 2024-04-19 RX ORDER — GADOBUTROL 604.72 MG/ML
7 INJECTION INTRAVENOUS
Status: COMPLETED | OUTPATIENT
Start: 2024-04-19 | End: 2024-04-19

## 2024-04-19 RX ADMIN — GADOBUTROL 7 ML: 604.72 INJECTION INTRAVENOUS at 06:04

## 2024-04-22 ENCOUNTER — TELEPHONE (OUTPATIENT)
Dept: FAMILY MEDICINE | Facility: CLINIC | Age: 89
End: 2024-04-22
Payer: MEDICARE

## 2024-04-22 NOTE — TELEPHONE ENCOUNTER
Attempted to contact pt, pt's wife took the call. Results and instructions given to wife. Wife informed that referral has been sent to Dr. Matt Luna. Wife instructed to call Dr. Luna's office at [phone] to schedule OV.

## 2024-04-22 NOTE — PROGRESS NOTES
Please contact the patient and let him know that his MRI is showing an abscess in his gums but this needs to be managed by a Maxillofacial specialist. Referral was signed but it does not look like he has an appointment with anyone. Please direct him to the referrals desk to assist - if we do not have this within [facility], then he needs to see a dentist for further management.

## 2024-04-22 NOTE — TELEPHONE ENCOUNTER
----- Message from Adela Diaz, DO sent at 4/22/2024 12:39 PM CDT -----  Please contact the patient and let him know that his MRI is showing an abscess in his gums but this needs to be managed by a Maxillofacial specialist. Referral was signed but it does not look like he has an appointment with anyone. Please direct him to the referrals desk to assist - if we do not have this within [facility], then he needs to see a dentist for further management.

## 2024-04-24 NOTE — TELEPHONE ENCOUNTER
This nurse spoke with Dr. Luna's office, referral was received and staff will be calling pt today to schedule OV.

## 2024-04-25 ENCOUNTER — HOSPITAL ENCOUNTER (OUTPATIENT)
Dept: RADIOLOGY | Facility: HOSPITAL | Age: 89
Discharge: HOME | End: 2024-04-25
Attending: STUDENT IN AN ORGANIZED HEALTH CARE EDUCATION/TRAINING PROGRAM
Payer: MEDICARE

## 2024-04-25 DIAGNOSIS — C61 PROSTATE CANCER: ICD-10-CM

## 2024-04-25 PROCEDURE — 72197 MRI PELVIS W/O & W/DYE: CPT | Mod: TC

## 2024-04-25 PROCEDURE — 25500020 PHARM REV CODE 255: Performed by: STUDENT IN AN ORGANIZED HEALTH CARE EDUCATION/TRAINING PROGRAM

## 2024-04-25 PROCEDURE — A9585 GADOBUTROL INJECTION: HCPCS | Performed by: STUDENT IN AN ORGANIZED HEALTH CARE EDUCATION/TRAINING PROGRAM

## 2024-04-25 PROCEDURE — 72197 MRI PELVIS W/O & W/DYE: CPT | Mod: 26,,, | Performed by: INTERNAL MEDICINE

## 2024-04-25 RX ORDER — GADOBUTROL 604.72 MG/ML
10 INJECTION INTRAVENOUS
Status: COMPLETED | OUTPATIENT
Start: 2024-04-25 | End: 2024-04-25

## 2024-04-25 RX ADMIN — GADOBUTROL 10 ML: 604.72 INJECTION INTRAVENOUS at 03:04

## 2024-05-01 ENCOUNTER — TELEPHONE (OUTPATIENT)
Dept: FAMILY MEDICINE | Facility: CLINIC | Age: 89
End: 2024-05-01
Payer: MEDICARE

## 2024-05-01 NOTE — TELEPHONE ENCOUNTER
Wife called back satijoni she does not know what medication is concerning but will find out and call back to clinic

## 2024-05-01 NOTE — TELEPHONE ENCOUNTER
Called patient spoke with wife. Patient not home yet. Questioned wife what was the medication patient was referring to? Wife seems to think it was his pressure medication. Questioned wife does patient have a blood pressure machine @ home? Patient does have one & questioned if patient took his blood pressure this am? Not sure. Instructed wife to take patient's blood pressure once patient returns home & call office with reading.

## 2024-05-01 NOTE — TELEPHONE ENCOUNTER
----- Message from Leslie Urbina sent at 5/1/2024  9:43 AM CDT -----  Type:  Patient Returning Call    Who Called:  wife     Who Left Message for Patient:  Meetademetri    Does the patient know what this is regarding?: yes    Would the patient rather a call back or a response via [facility]?  call    Best Call Back Number: [phone]

## 2024-05-01 NOTE — TELEPHONE ENCOUNTER
----- Message from Caryn Rizzo sent at 5/1/2024  8:24 AM CDT -----  Please call pt back at [phone] Pt stated Dr Diaz gave him a new medicaiton (he picked it up on Sunday ) he started taking Monday  night when he woke up Tue he felt bad and passed out. He does not know what medication it is . He came by this morning with a whole bag full of medications. He takes about 8 different medications . Pt states  he stopped taking his medication until someone from Dr Diaz's office calls  him.

## 2024-05-09 ENCOUNTER — OFFICE VISIT (OUTPATIENT)
Dept: UROLOGY | Facility: CLINIC | Age: 89
End: 2024-05-09
Payer: MEDICARE

## 2024-05-09 VITALS — BODY MASS INDEX: 24.39 KG/M2 | WEIGHT: 142.06 LBS

## 2024-05-09 DIAGNOSIS — N13.8 BPH WITH OBSTRUCTION/LOWER URINARY TRACT SYMPTOMS: ICD-10-CM

## 2024-05-09 DIAGNOSIS — N40.1 BPH WITH OBSTRUCTION/LOWER URINARY TRACT SYMPTOMS: ICD-10-CM

## 2024-05-09 DIAGNOSIS — C61 PROSTATE CANCER: Primary | ICD-10-CM

## 2024-05-09 PROCEDURE — 99213 OFFICE O/P EST LOW 20 MIN: CPT | Mod: S$PBB,,, | Performed by: STUDENT IN AN ORGANIZED HEALTH CARE EDUCATION/TRAINING PROGRAM

## 2024-05-09 PROCEDURE — 99999 PR PBB SHADOW E&M-EST. PATIENT-LVL III: CPT | Mod: PBBFAC,,, | Performed by: STUDENT IN AN ORGANIZED HEALTH CARE EDUCATION/TRAINING PROGRAM

## 2024-05-09 PROCEDURE — 99213 OFFICE O/P EST LOW 20 MIN: CPT | Mod: PBBFAC | Performed by: STUDENT IN AN ORGANIZED HEALTH CARE EDUCATION/TRAINING PROGRAM

## 2024-05-09 NOTE — PROGRESS NOTES
Patient ID: Destin Barber is a 89 y.o. male.    Chief Complaint:  Prostate cancer fu    Referral: No referring provider defined for this encounter.     HPI  89 y.o. who presents to the Urology clinic for evaluation of voiding dysfunction s/p HOLEP. Patient denies incontinence. He was found to have incidental prostate cancer during HOLEP, elected for watchful waiting with family input as well ( daughter). Denies weight loss, hematuria, UTIs since last visit. No el tenderness    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    Hypokalemia 02/03/2017    Controlled type 2 diabetes mellitus with stage 3 chronic kidney disease, without long-term current use of insulin 02/03/2017    Aortic atherosclerosis 08/31/2020    BMI 23.0-23.9, adult 03/07/2023    History of syncope 03/07/2023    Elevated PSA 03/07/2023    BPH associated with nocturia 03/22/2023    Bilateral renal cysts 03/22/2023    Primary insomnia 06/15/2023    Chronic idiopathic constipation 06/15/2023    Frequency of micturition 07/21/2023    Urge incontinence of urine 07/21/2023    Stage 3b chronic kidney disease 03/22/2024    Chronic kidney disease, stage 3a 04/05/2024     Resolved Ambulatory Problems     Diagnosis Date Noted    Type 2 diabetes mellitus with complication     Hypertension, benign 01/31/2013    NSTEMI (non-ST elevated myocardial infarction) 06/23/2016    Pain of left upper extremity 06/23/2016    Coronary artery disease due to lipid rich plaque 06/24/2016    Hyperglycemia 07/07/2016    Chronic diastolic CHF (congestive heart failure), NYHA class 2  07/08/2016    Gait instability 07/12/2016    Sepsis 02/02/2017    Influenza A 02/03/2017    SOB (shortness of breath) 02/03/2017    Paresthesia 03/12/2018    Stage 3a chronic kidney disease 06/15/2023     Past Medical History:   Diagnosis Date    Acute coronary syndrome 06/24/2016    Anemia     Coronary artery disease     Gout, chronic     Heart failure     HTN (hypertension)     Hyperlipidemia     Myocardial infarction     Pneumonia due to other staphylococcus     Pressure ulcer     Renal manifestation of secondary diabetes mellitus     Type 2 diabetes mellitus          Past Surgical History  Past Surgical History:   Procedure Laterality Date    CATARACT EXTRACTION Bilateral     CATARACT EXTRACTION W/ ANTERIOR VITRECTOMY      CORONARY ARTERY BYPASS GRAFT  07/06/2016    PAIZ-LAD, SVG-Ramus, SVG-PDA    FLUOROSCOPIC URODYNAMIC STUDY N/A 4/25/2023    Procedure: URODYNAMIC STUDY, FLUOROSCOPIC;  Surgeon: Raji Mcmullen MD;  Location: [facility] OR;  Service: Urology;  Laterality: N/A;  RN PHONE PREOP WITH GRANDDAUGHTER ROGER 4/21/23    HEMORRHOID SURGERY      LASER ENUCLEATION OF PROSTATE N/A 7/6/2023    Procedure: ENUCLEATION, PROSTATE, USING LASER; cystolithalopaxy;  Surgeon: Raji Mcmullen MD;  Location: [facility] OR;  Service: Urology;  Laterality: N/A;  notify [facility] [phone] SPOKE TO NICK ON 6/2/2023 @ 10:37AM. CONFIRMATION NUMBER [id number]  FIORDALIZA POLLACK [phone] EMAILED [place]ANNING ON 6/15/2023@ 3:07PM-KEYON  RN PREOP 5/30/2023   T/S ON 6/5/2023  RN PREOP 06/30/2023 --       Social History       Medications    Current Outpatient Medications:     albuterol (PROVENTIL) 2.5 mg /3 mL (0.083 %) nebulizer solution, Inhale 2.5 mg into the lungs., Disp: , Rfl:     allopurinoL (ZYLOPRIM) 100 MG tablet, Take 1 tablet by mouth once daily., Disp: , Rfl:     amLODIPine (NORVASC) 10 MG tablet, Take 1 tablet (10 mg total) by mouth once daily., Disp: 90 tablet, Rfl: 3    benzonatate (TESSALON) 200 MG capsule, Take 1  capsule (200 mg total) by mouth 3 (three) times daily as needed for Cough., Disp: 30 capsule, Rfl: 1    blood sugar diagnostic Strp, To check BG daily, to use with insurance preferred meter, Disp: 200 each, Rfl: 11    blood-glucose meter kit, To check BG daily, to use with insurance preferred meter, Disp: 1 each, Rfl: 0    isosorbide mononitrate (IMDUR) 60 MG 24 hr tablet, Take 1 tablet (60 mg total) by mouth once daily., Disp: 90 tablet, Rfl: 3    lancets Misc, To check BG daily, to use with insurance preferred meter, Disp: 200 each, Rfl: 11    lisinopriL (PRINIVIL,ZESTRIL) 40 MG tablet, Take 1 tablet (40 mg total) by mouth once daily., Disp: 90 tablet, Rfl: 3    metFORMIN (GLUCOPHAGE-XR) 500 MG ER 24hr tablet, TAKE 1 TABLET BY MOUTH DAILY WITH BREAKFAST, Disp: 90 tablet, Rfl: 3    metoprolol tartrate (LOPRESSOR) 25 MG tablet, Take 1 tablet by mouth 2 (two) times daily., Disp: , Rfl:     pravastatin (PRAVACHOL) 40 MG tablet, Take 1 tablet (40 mg total) by mouth once daily., Disp: 90 tablet, Rfl: 3    promethazine-dextromethorphan (PROMETHAZINE-DM) 6.25-15 mg/5 mL Syrp, Take 10-15ml by mouth every 8 hours as needed for coughing, Disp: 240 mL, Rfl: 0    somatropin (NORDITROPIN FLEXPRO) 10 mg/1.5 mL (6.7 mg/mL) PnIj, Inject into the skin., Disp: , Rfl:     valsartan (DIOVAN) 320 MG tablet, Take 320 mg by mouth., Disp: , Rfl:   No current facility-administered medications for this visit.    Facility-Administered Medications Ordered in Other Visits:     lactated ringers infusion, , Intravenous, Continuous, Julia Fong MD, Last Rate: 10 mL/hr at 07/06/23 1230, New Bag at 07/06/23 1230    LIDOcaine (PF) 10 mg/ml (1%) injection 10 mg, 1 mL, Intradermal, Once, Julia Fong MD    Allergies  Review of patient's allergies indicates:  No Known Allergies    Patient's PMH, FH, Social hx, Medications, allergies reviewed and updated as pertinent to today's visit    Objective:      Physical Exam  Constitutional:        General: He is not in acute distress.     Appearance: He is well-developed. He is not ill-appearing, toxic-appearing or diaphoretic.   HENT:      Head: Normocephalic and atraumatic.      Mouth/Throat:      Mouth: Mucous membranes are moist.   Eyes:      Conjunctiva/sclera: Conjunctivae normal.   Pulmonary:      Effort: Pulmonary effort is normal. No respiratory distress.   Abdominal:      General: Abdomen is flat. There is no distension.      Palpations: Abdomen is soft. There is no mass.      Tenderness: There is no right CVA tenderness or left CVA tenderness.   Musculoskeletal:         General: No swelling or deformity.      Cervical back: Neck supple.   Skin:     Findings: No rash.   Neurological:      Mental Status: He is alert and oriented to person, place, and time.      Gait: Gait normal.   Psychiatric:         Mood and Affect: Mood normal.         Thought Content: Thought content normal.         Judgment: Judgment normal.             Lab Results   Component Value Date    PSADIAG 4.5 (H) 03/26/2024      Assessment:       1. Prostate cancer    2. BPH with obstruction/lower urinary tract symptoms        Plan:       Prostate cancer,  Low risk watchful waiting planned given age and comorbidities  PSA in 6 months or sooner if needed    BPH  S/p HOLEP doing well w/ re: voiding and patient reported outcomes

## 2024-06-21 ENCOUNTER — OFFICE VISIT (OUTPATIENT)
Dept: CARDIOLOGY | Facility: CLINIC | Age: 89
End: 2024-06-21
Payer: MEDICARE

## 2024-06-21 VITALS
WEIGHT: 136.56 LBS | DIASTOLIC BLOOD PRESSURE: 78 MMHG | HEIGHT: 64 IN | SYSTOLIC BLOOD PRESSURE: 152 MMHG | OXYGEN SATURATION: 100 % | HEART RATE: 106 BPM | BODY MASS INDEX: 23.31 KG/M2

## 2024-06-21 DIAGNOSIS — I10 HYPERTENSION, UNSPECIFIED TYPE: Primary | ICD-10-CM

## 2024-06-21 DIAGNOSIS — E78.00 PURE HYPERCHOLESTEROLEMIA: ICD-10-CM

## 2024-06-21 DIAGNOSIS — I70.0 AORTIC ATHEROSCLEROSIS: ICD-10-CM

## 2024-06-21 DIAGNOSIS — Z95.1 S/P CABG (CORONARY ARTERY BYPASS GRAFT): ICD-10-CM

## 2024-06-21 DIAGNOSIS — I10 ESSENTIAL HYPERTENSION: ICD-10-CM

## 2024-06-21 DIAGNOSIS — I51.89 DIASTOLIC DYSFUNCTION: ICD-10-CM

## 2024-06-21 PROCEDURE — 99214 OFFICE O/P EST MOD 30 MIN: CPT | Mod: PBBFAC | Performed by: INTERNAL MEDICINE

## 2024-06-21 PROCEDURE — 99999 PR PBB SHADOW E&M-EST. PATIENT-LVL IV: CPT | Mod: PBBFAC,,, | Performed by: INTERNAL MEDICINE

## 2024-06-21 RX ORDER — MELOXICAM 15 MG/1
15 TABLET ORAL DAILY PRN
Qty: 30 TABLET | Refills: 0 | Status: SHIPPED | OUTPATIENT
Start: 2024-06-21

## 2024-06-21 RX ORDER — TRAZODONE HYDROCHLORIDE 150 MG/1
150 TABLET ORAL NIGHTLY
COMMUNITY
Start: 2024-04-21

## 2024-06-21 RX ORDER — CLONIDINE HYDROCHLORIDE 0.1 MG/1
0.1 TABLET ORAL 2 TIMES DAILY
COMMUNITY
Start: 2024-04-18

## 2024-06-21 NOTE — PROGRESS NOTES
Subjective   Patient ID:  Destin Barber is a 89 y.o. male who presents for follow-up of Follow-up      HPI      # CAD/ACS s/p 3V CABG 7/2016.  Appears his LCx/OM was not bypassed due to poor available vein conduit.  No LCx ischemia noted on MPI.  # HTN, uncontrolled in the setting of med noncompliance  # HLP  # DM  # abnl EKG        CAD/ACS s/p CABG 7/7/16: LIMA-LAD, SVG-Ramus, SVG-PDA (Dr. Hallman).  LCx/OM branch not bypassed due to poor quality venous conduit available     Stress test 9/22/22    Abnormal myocardial perfusion scan.    There are two significant perfusion abnormalities.    Perfusion Abnormality #1 - There is a moderate to severe intensity, moderate sized, mostly fixed perfusion abnormality with some reversibilty in the basal to mid inferior wall(s).    Perfusion Abnormality #2 - There is a small to moderate sized, mild to moderate intensity, reversible perfusion abnormality that is consistent with ischemia in the lateral wall(s).    There are no other significant perfusion abnormalities.    The gated perfusion images showed an ejection fraction of 79% at rest.    There is normal wall motion at rest and post stress.    The EKG portion of this study is negative for ischemia.    The patient reported no chest pain during the stress test.    There were no arrhythmias during stress.     Echo 9/22/22  The estimated ejection fraction is 65%.  The left ventricle is normal in size with mild concentric hypertrophy and normal systolic function.  Moderate to severe left atrial enlargement.  Grade I left ventricular diastolic dysfunction.  Mild pulmonic regurgitation.  Normal right ventricular size with normal right ventricular systolic function.  Mild right atrial enlargement.  Normal central venous pressure (3 mmHg).  The estimated PA systolic pressure is 20 mmHg.  Mild tricuspid regurgitation.  Mild aortic regurgitation.     Cath 6/24/16  LVEDP: 7mmHg  LVEF: 55%  Wall Motion: normal  Dominance: Right  LM:  "normal  LAD: ost/prox eccentric 70-80%, prox 50%, excellent mid LAD target.  Ramus: prox 50-70%, mid 90%, bifurcating distal vessel (both excellent targets)  LCx: prox 95% at bifurcation of OM1.  OM1 ost/prox 80%, bifurcating vessel, excellent target.  RCA: dominant, anterior takeoff with "blandon's crook" prox vessel.Prox 70%, mid 70%. Excellent PDA target.  Hemostasis:  R Radial band  Impression:  NSTEMI  4V CAD, normal LV fxn  R radial vasband for hemostasis     Carotid US 7/6/16  RIGHT SIDE:   1 - 39 % right ICA stenosis.   Heterogeneous plaque in the right internal carotid artery.   Antegrade flow in the right vertebral artery.   LEFT SIDE:  1 - 39% left ICA stenosis.   Homogeneous plaque in the left internal carotid artery.   Antegrade flow in the left vertebral artery.      10/17/17 Overall doing well. Denies CP or SOB  EKG NSR TWI V1 and V2 - similar to prior EKG  Suffering with an URI     EKG 3/30/22 - done in ER - not in EPIC     4/29/22 Denies CP or SOB  Had a recent brief syncopal spell 5 days ago - details unclear- poor historian  BP elevated  Echo and holter for recent syncope  Not interested in repeat stress test  Continue Rx for CAD, HTN, HLD, DD     7/14/22 Reports JULIO and pain across lower rib cage with radiation to left shoulder for the last week  EKG sinus tachycardia 102 RBBB/LAD  BP elevated  Denies further syncope  Poor historian   Add imdur for CP  Echo, lexiscan myoview and holter for CP, JULIO, recent syncope  Needs to go to the ER for worsening CP  Continue Rx for CAD, HTN, HLD, DD     9/16/22 Testing not done. BP poorly controlled  Having CP that radiates centrally from neck down to his abdomen  Some JULIO    Increase lisinopril 40 qd and imdur 60 qd  Echo, lexiscan myoview and holter for CP, JULIO, recent syncope  Needs to go to the ER for worsening CP  Continue Rx for CAD, HTN, HLD, DD     10/18/22 Only gets CP is he gets upset. Denies SOB  BP up some - better controlled by home " readings  Discussed abnormal stress test if fixed inferior defect and lateral ischemia - this is consistent with known un-revascularized Cx territory. Discussed repeat LHC - he prefers to treat CAD medically    Continue Rx for CAD, HTN, HLD, DD  OV 3 months     6/1/23 Needs clearance for prostate surgery  Reports mild stable exertional angina and JULIO  BP controlled by outside readings  Cleared for prostate surgery at moderate cardiac risk. Ok to hold ASA 5 days before  Continue to request medical Rx for CAD    Continue Rx for CAD, HTN, HLD, DD  OV 3 months     1/4/24 BP has been running high. Mild stable JULIO and angina  EKG NSR RBBB/LAFB  Add hydralazine 50 bid for HTN  OV 3 months with BP check  Continue to request medical Rx for CAD    Continue Rx for CAD, HTN, HLD, DD     6/21/24 Reports right shoulder pain with radiation to right arm. Denies central CP or SOB  BP mostly controlled by outsider eadings  EKG NSR RBBB/LAD    Review of Systems   Constitutional: Negative for decreased appetite.   HENT:  Negative for ear discharge.    Eyes:  Negative for blurred vision.   Endocrine: Negative for polyphagia.   Skin:  Negative for nail changes.   Genitourinary:  Negative for bladder incontinence.   Neurological:  Negative for aphonia.   Psychiatric/Behavioral:  Negative for hallucinations.    Allergic/Immunologic: Negative for hives.          Objective     Physical Exam  Constitutional:       Appearance: He is well-developed.   HENT:      Head: Normocephalic and atraumatic.   Eyes:      Conjunctiva/sclera: Conjunctivae normal.      Pupils: Pupils are equal, round, and reactive to light.   Cardiovascular:      Rate and Rhythm: Normal rate.      Pulses: Intact distal pulses.      Heart sounds: Normal heart sounds.   Pulmonary:      Effort: Pulmonary effort is normal.      Breath sounds: Normal breath sounds.   Abdominal:      General: Bowel sounds are normal.      Palpations: Abdomen is soft.   Musculoskeletal:          General: Normal range of motion.      Cervical back: Normal range of motion and neck supple.   Skin:     General: Skin is warm and dry.   Neurological:      Mental Status: He is alert and oriented to person, place, and time.            Assessment and Plan     1. Hypertension, unspecified type    2. Aortic atherosclerosis    3. S/P CABG (coronary artery bypass graft)    4. Diastolic dysfunction    5. Pure hypercholesterolemia    6. Essential hypertension        Plan:    Suspect right shoulder and arm pain are musculoskeletal - trial with Mobic - further management per PCP   Continue to request medical Rx for CAD    Continue Rx for CAD, HTN, HLD, DD  OV 6 months    Advance Care Planning     Date: 06/21/2024  Patient did not wish or was not able to name a surrogate decision maker or provide an Advance Care Plan.

## 2024-06-22 LAB
OHS QRS DURATION: 130 MS
OHS QTC CALCULATION: 523 MS

## 2024-07-01 ENCOUNTER — LAB VISIT (OUTPATIENT)
Dept: LAB | Facility: HOSPITAL | Age: 89
End: 2024-07-01
Attending: FAMILY MEDICINE
Payer: MEDICARE

## 2024-07-01 DIAGNOSIS — M10.9 GOUT, UNSPECIFIED CAUSE, UNSPECIFIED CHRONICITY, UNSPECIFIED SITE: ICD-10-CM

## 2024-07-01 DIAGNOSIS — N18.31 CHRONIC KIDNEY DISEASE, STAGE 3A: ICD-10-CM

## 2024-07-01 DIAGNOSIS — I70.0 AORTIC ATHEROSCLEROSIS: ICD-10-CM

## 2024-07-01 DIAGNOSIS — E11.69 DYSLIPIDEMIA ASSOCIATED WITH TYPE 2 DIABETES MELLITUS: ICD-10-CM

## 2024-07-01 DIAGNOSIS — E78.5 DYSLIPIDEMIA ASSOCIATED WITH TYPE 2 DIABETES MELLITUS: ICD-10-CM

## 2024-07-03 NOTE — TELEPHONE ENCOUNTER
No care due was identified.  [facility] Embedded Care Due Messages. Reference number: [id number].   7/03/2024 10:13:57 AM CDT

## 2024-07-03 NOTE — TELEPHONE ENCOUNTER
Refill Routing Note   Medication(s) are not appropriate for processing by [facility] for the following reason(s):        No active prescription written by provider    [facility] action(s):  Defer      Medication Therapy Plan: Historical med; Previously discontinued by: Adela Diaz DO on 4/5/2024 09:25      Appointments  past 12m or future 3m with PCP    Date Provider   Last Visit   4/5/2024 Adela Diaz DO   Next Visit   7/8/2024 Adela Diaz DO   ED visits in past 90 days: 0        Note composed:10:58 AM 07/03/2024

## 2024-07-04 RX ORDER — TRAZODONE HYDROCHLORIDE 150 MG/1
150 TABLET ORAL NIGHTLY PRN
Qty: 90 TABLET | Refills: 3 | Status: SHIPPED | OUTPATIENT
Start: 2024-07-04

## 2024-07-08 ENCOUNTER — TELEPHONE (OUTPATIENT)
Dept: FAMILY MEDICINE | Facility: CLINIC | Age: 89
End: 2024-07-08

## 2024-07-08 ENCOUNTER — OFFICE VISIT (OUTPATIENT)
Dept: FAMILY MEDICINE | Facility: CLINIC | Age: 89
End: 2024-07-08
Payer: MEDICARE

## 2024-07-08 VITALS
HEART RATE: 110 BPM | OXYGEN SATURATION: 95 % | TEMPERATURE: 98 F | BODY MASS INDEX: 23.84 KG/M2 | DIASTOLIC BLOOD PRESSURE: 78 MMHG | SYSTOLIC BLOOD PRESSURE: 130 MMHG | WEIGHT: 138.88 LBS

## 2024-07-08 DIAGNOSIS — E78.5 DYSLIPIDEMIA ASSOCIATED WITH TYPE 2 DIABETES MELLITUS: ICD-10-CM

## 2024-07-08 DIAGNOSIS — I10 ESSENTIAL HYPERTENSION: ICD-10-CM

## 2024-07-08 DIAGNOSIS — R80.9 CONTROLLED TYPE 2 DIABETES MELLITUS WITH MICROALBUMINURIA, WITHOUT LONG-TERM CURRENT USE OF INSULIN: Primary | ICD-10-CM

## 2024-07-08 DIAGNOSIS — N18.30 CKD STAGE 3 DUE TO TYPE 2 DIABETES MELLITUS: ICD-10-CM

## 2024-07-08 DIAGNOSIS — M10.9 GOUT, UNSPECIFIED CAUSE, UNSPECIFIED CHRONICITY, UNSPECIFIED SITE: ICD-10-CM

## 2024-07-08 DIAGNOSIS — E11.29 CONTROLLED TYPE 2 DIABETES MELLITUS WITH MICROALBUMINURIA, WITHOUT LONG-TERM CURRENT USE OF INSULIN: Primary | ICD-10-CM

## 2024-07-08 DIAGNOSIS — I25.810 CORONARY ARTERY DISEASE INVOLVING CORONARY BYPASS GRAFT OF NATIVE HEART WITHOUT ANGINA PECTORIS: ICD-10-CM

## 2024-07-08 DIAGNOSIS — E11.22 CKD STAGE 3 DUE TO TYPE 2 DIABETES MELLITUS: ICD-10-CM

## 2024-07-08 DIAGNOSIS — I70.0 AORTIC ATHEROSCLEROSIS: ICD-10-CM

## 2024-07-08 DIAGNOSIS — E11.69 DYSLIPIDEMIA ASSOCIATED WITH TYPE 2 DIABETES MELLITUS: ICD-10-CM

## 2024-07-08 PROCEDURE — 99999 PR PBB SHADOW E&M-EST. PATIENT-LVL IV: CPT | Mod: PBBFAC,,, | Performed by: FAMILY MEDICINE

## 2024-07-08 PROCEDURE — 99214 OFFICE O/P EST MOD 30 MIN: CPT | Mod: PBBFAC,PO | Performed by: FAMILY MEDICINE

## 2024-07-08 PROCEDURE — 99214 OFFICE O/P EST MOD 30 MIN: CPT | Mod: S$PBB,,, | Performed by: FAMILY MEDICINE

## 2024-07-12 ENCOUNTER — CLINICAL SUPPORT (OUTPATIENT)
Dept: FAMILY MEDICINE | Facility: CLINIC | Age: 89
End: 2024-07-12
Attending: FAMILY MEDICINE
Payer: MEDICARE

## 2024-07-12 DIAGNOSIS — E11.29 CONTROLLED TYPE 2 DIABETES MELLITUS WITH MICROALBUMINURIA, WITHOUT LONG-TERM CURRENT USE OF INSULIN: ICD-10-CM

## 2024-07-12 DIAGNOSIS — R80.9 CONTROLLED TYPE 2 DIABETES MELLITUS WITH MICROALBUMINURIA, WITHOUT LONG-TERM CURRENT USE OF INSULIN: ICD-10-CM

## 2024-07-12 PROCEDURE — 92228 IMG RTA DETC/MNTR DS PHY/QHP: CPT | Mod: PBBFAC,PO

## 2024-07-12 PROCEDURE — 92228 IMG RTA DETC/MNTR DS PHY/QHP: CPT | Mod: 26,S$PBB,, | Performed by: OPTOMETRIST

## 2024-07-15 NOTE — PROGRESS NOTES
Please contact the patient and let him know that his eye photo had shadows in it so he needs an in-person dilated eye exam. Referral to Optometry signed.

## 2024-07-16 ENCOUNTER — TELEPHONE (OUTPATIENT)
Dept: FAMILY MEDICINE | Facility: CLINIC | Age: 89
End: 2024-07-16
Payer: MEDICARE

## 2024-07-16 NOTE — TELEPHONE ENCOUNTER
----- Message from Adela Diaz DO sent at 7/15/2024  5:14 PM CDT -----  Please contact the patient and let him know that his eye photo had shadows in it so he needs an in-person dilated eye exam. Referral to Optometry signed.

## 2024-08-07 ENCOUNTER — TELEPHONE (OUTPATIENT)
Dept: FAMILY MEDICINE | Facility: CLINIC | Age: 89
End: 2024-08-07
Payer: MEDICARE

## 2024-08-07 ENCOUNTER — OFFICE VISIT (OUTPATIENT)
Dept: NEPHROLOGY | Facility: CLINIC | Age: 89
End: 2024-08-07
Payer: MEDICARE

## 2024-08-07 VITALS
HEART RATE: 65 BPM | SYSTOLIC BLOOD PRESSURE: 130 MMHG | DIASTOLIC BLOOD PRESSURE: 72 MMHG | OXYGEN SATURATION: 97 % | WEIGHT: 134.5 LBS | BODY MASS INDEX: 23.08 KG/M2

## 2024-08-07 DIAGNOSIS — Z87.39 HISTORY OF GOUT: ICD-10-CM

## 2024-08-07 DIAGNOSIS — M1A.00X0 IDIOPATHIC CHRONIC GOUT WITHOUT TOPHUS, UNSPECIFIED SITE: ICD-10-CM

## 2024-08-07 DIAGNOSIS — I10 ESSENTIAL HYPERTENSION: ICD-10-CM

## 2024-08-07 DIAGNOSIS — Z95.1 S/P CABG (CORONARY ARTERY BYPASS GRAFT): ICD-10-CM

## 2024-08-07 DIAGNOSIS — E11.22 CONTROLLED TYPE 2 DIABETES MELLITUS WITH STAGE 3 CHRONIC KIDNEY DISEASE, WITHOUT LONG-TERM CURRENT USE OF INSULIN: ICD-10-CM

## 2024-08-07 DIAGNOSIS — N18.30 CONTROLLED TYPE 2 DIABETES MELLITUS WITH STAGE 3 CHRONIC KIDNEY DISEASE, WITHOUT LONG-TERM CURRENT USE OF INSULIN: ICD-10-CM

## 2024-08-07 DIAGNOSIS — I51.89 DIASTOLIC DYSFUNCTION: ICD-10-CM

## 2024-08-07 DIAGNOSIS — N18.31 STAGE 3A CHRONIC KIDNEY DISEASE: Primary | ICD-10-CM

## 2024-08-07 DIAGNOSIS — D63.8 ANEMIA OF CHRONIC DISEASE: ICD-10-CM

## 2024-08-07 PROCEDURE — 99999 PR PBB SHADOW E&M-EST. PATIENT-LVL III: CPT | Mod: PBBFAC,,, | Performed by: NURSE PRACTITIONER

## 2024-08-07 PROCEDURE — 99204 OFFICE O/P NEW MOD 45 MIN: CPT | Mod: S$PBB,,, | Performed by: NURSE PRACTITIONER

## 2024-08-07 PROCEDURE — 99213 OFFICE O/P EST LOW 20 MIN: CPT | Mod: PBBFAC,PO | Performed by: NURSE PRACTITIONER

## 2024-08-07 RX ORDER — MELOXICAM 7.5 MG/1
7.5 TABLET ORAL DAILY PRN
Qty: 30 TABLET | Refills: 2 | Status: SHIPPED | OUTPATIENT
Start: 2024-08-07

## 2024-09-20 ENCOUNTER — HOSPITAL ENCOUNTER (EMERGENCY)
Facility: HOSPITAL | Age: 89
Discharge: HOME | End: 2024-09-20
Attending: EMERGENCY MEDICINE
Payer: MEDICARE

## 2024-09-20 VITALS
HEART RATE: 87 BPM | RESPIRATION RATE: 18 BRPM | HEIGHT: 66 IN | OXYGEN SATURATION: 99 % | WEIGHT: 140 LBS | SYSTOLIC BLOOD PRESSURE: 190 MMHG | BODY MASS INDEX: 22.5 KG/M2 | DIASTOLIC BLOOD PRESSURE: 100 MMHG | TEMPERATURE: 98 F

## 2024-09-20 DIAGNOSIS — N50.819 TESTICULAR PAIN: ICD-10-CM

## 2024-09-20 DIAGNOSIS — R30.0 DYSURIA: ICD-10-CM

## 2024-09-20 DIAGNOSIS — R33.9 URINARY RETENTION: Primary | ICD-10-CM

## 2024-09-20 LAB
ALBUMIN SERPL-MCNC: 3.3 G/DL (ref 3.3–5.5)
ALP SERPL-CCNC: 81 U/L (ref 42–141)
BILIRUB SERPL-MCNC: 0.6 MG/DL (ref 0.2–1.6)
BILIRUBIN, POC UA: NEGATIVE
BLOOD, POC UA: NEGATIVE
BUN SERPL-MCNC: 18 MG/DL (ref 7–22)
CALCIUM SERPL-MCNC: 9.6 MG/DL (ref 8–10.3)
CHLORIDE SERPL-SCNC: 105 MMOL/L (ref 98–108)
CLARITY, UA: CLEAR
COLOR, UA: YELLOW
CREAT SERPL-MCNC: 1.4 MG/DL (ref 0.6–1.2)
GLUCOSE SERPL-MCNC: 111 MG/DL (ref 73–118)
GLUCOSE, POC UA: NEGATIVE
HCT, POC: NORMAL
HGB, POC: NORMAL (ref 14–18)
KETONES, POC UA: NEGATIVE
LEUKOCYTE EST, POC UA: NEGATIVE
MCH, POC: NORMAL
MCHC, POC: NORMAL
MCV, POC: NORMAL
MPV, POC: NORMAL
NITRITE, POC UA: NEGATIVE
PH UR STRIP: 6 [PH] (ref 5–8)
POC ALT (SGPT): 6 U/L (ref 10–47)
POC AST (SGOT): 19 U/L (ref 11–38)
POC PLATELET COUNT: NORMAL
POC TCO2: 28 MMOL/L (ref 18–33)
POTASSIUM BLD-SCNC: 4 MMOL/L (ref 3.6–5.1)
PROTEIN, POC UA: NEGATIVE
PROTEIN, POC: 6.8 G/DL (ref 6.4–8.1)
RBC, POC: NORMAL
RDW, POC: NORMAL
SODIUM BLD-SCNC: 141 MMOL/L (ref 128–145)
SPECIFIC GRAVITY, POC UA: 1.01 (ref 1–1.03)
UROBILINOGEN, POC UA: 0.2 E.U./DL
WBC, POC: NORMAL

## 2024-09-20 PROCEDURE — 87088 URINE BACTERIA CULTURE: CPT | Performed by: NURSE PRACTITIONER

## 2024-09-20 PROCEDURE — 81003 URINALYSIS AUTO W/O SCOPE: CPT | Mod: ER

## 2024-09-20 PROCEDURE — 80053 COMPREHEN METABOLIC PANEL: CPT | Mod: ER

## 2024-09-20 PROCEDURE — 87491 CHLMYD TRACH DNA AMP PROBE: CPT | Performed by: NURSE PRACTITIONER

## 2024-09-20 PROCEDURE — 99284 EMERGENCY DEPT VISIT MOD MDM: CPT | Mod: 25,ER

## 2024-09-20 PROCEDURE — 87186 SC STD MICRODIL/AGAR DIL: CPT | Mod: 59 | Performed by: NURSE PRACTITIONER

## 2024-09-20 PROCEDURE — 87086 URINE CULTURE/COLONY COUNT: CPT | Performed by: NURSE PRACTITIONER

## 2024-09-20 PROCEDURE — 87077 CULTURE AEROBIC IDENTIFY: CPT | Performed by: NURSE PRACTITIONER

## 2024-09-20 PROCEDURE — 87591 N.GONORRHOEAE DNA AMP PROB: CPT | Performed by: NURSE PRACTITIONER

## 2024-09-20 PROCEDURE — 25000003 PHARM REV CODE 250: Mod: ER | Performed by: NURSE PRACTITIONER

## 2024-09-20 PROCEDURE — 85025 COMPLETE CBC W/AUTO DIFF WBC: CPT | Mod: ER

## 2024-09-20 RX ORDER — OXYCODONE AND ACETAMINOPHEN 5; 325 MG/1; MG/1
1 TABLET ORAL
Status: COMPLETED | OUTPATIENT
Start: 2024-09-20 | End: 2024-09-20

## 2024-09-20 RX ADMIN — OXYCODONE HYDROCHLORIDE AND ACETAMINOPHEN 1 TABLET: 5; 325 TABLET ORAL at 07:09

## 2024-09-20 NOTE — ED PROVIDER NOTES
Encounter Date: 9/20/2024    SCRIBE #1 NOTE: Harry BETTENCOURT, am scribing for, and in the presence of,  Shawn Aguirre NP.       History     Chief Complaint   Patient presents with    Urinary Tract Infection     Burning with urination, onset 4-5 days     89 y.o. male with PMHx of CAD, anemia, HTN, HLD, MI, MDT2, presents to the ED for evaluation of dysuria x 4 days. Reports of associated mild lower abdominal pain and bilateral testicle pain.  Denies urinary retention.  Denies any STD concerns or recent abx usage. No medications taken for symptoms. Denies back pain, fever, nausea, vomiting or any other associated symptoms.     The history is provided by the patient. No  was used.     Review of patient's allergies indicates:   Allergen Reactions    Penicillins Nausea And Vomiting     Pt reports he is not allergic to penicillin       Past Medical History:   Diagnosis Date    Acute coronary syndrome 06/24/2016    s/p 3V CABG 7/2016    Anemia     Coronary artery disease     Diastolic dysfunction     Gout, chronic     Heart failure     HTN (hypertension)     Hyperlipidemia     Myocardial infarction     Pneumonia due to other staphylococcus     Pressure ulcer     Renal manifestation of secondary diabetes mellitus     Type 2 diabetes mellitus     diet controlled    Urge incontinence of urine 7/21/2023     Past Surgical History:   Procedure Laterality Date    CATARACT EXTRACTION Bilateral     CATARACT EXTRACTION W/ ANTERIOR VITRECTOMY      CORONARY ARTERY BYPASS GRAFT  07/06/2016    PAIZ-LAD, SVG-Ramus, SVG-PDA    FLUOROSCOPIC URODYNAMIC STUDY N/A 4/25/2023    Procedure: URODYNAMIC STUDY, FLUOROSCOPIC;  Surgeon: Raji Mcmullen MD;  Location: [facility];  Service: Urology;  Laterality: N/A;  RN PHONE PREOP WITH GRANDDAUGHTER ROGER 4/21/23    HEMORRHOID SURGERY      LASER ENUCLEATION OF PROSTATE N/A 7/6/2023    Procedure: ENUCLEATION, PROSTATE, USING LASER; cystolithalopaxy;  Surgeon: Raji Mcmullen  MD;  Location: [facility] OR;  Service: Urology;  Laterality: N/A;  notify radha MAIN [phone] SPOKE TO NICK ON 2023 @ 10:37AM. CONFIRMATION NUMBER [id number]  FIORDALIZA POLLACK [phone] EMAILED CHANNING ON 6/15/2023@ 3:07PM-KEYON  RN PREOP 2023   T/S ON 2023  RN PREOP 2023 --JM     Family History   Problem Relation Name Age of Onset    Hypertension Mother      Heart disease Mother      Cancer Father          colon    Heart disease Sister      No Known Problems Sister      No Known Problems Sister      No Known Problems Sister      No Known Problems Sister      Heart disease Brother      No Known Problems Brother      No Known Problems Brother      No Known Problems Brother      No Known Problems Brother      No Known Problems Brother      No Known Problems Daughter      No Known Problems Daughter      No Known Problems Son      Amblyopia Neg Hx      Blindness Neg Hx      Cataracts Neg Hx      Diabetes Neg Hx      Glaucoma Neg Hx      Macular degeneration Neg Hx      Retinal detachment Neg Hx      Strabismus Neg Hx      Stroke Neg Hx      Thyroid disease Neg Hx       Social History     Tobacco Use    Smoking status: Former     Types: Cigars     Start date:      Quit date:      Years since quittin.7    Smokeless tobacco: Never    Tobacco comments:     Former smoker. Pt. Smoked 2 cigars daily.   Substance Use Topics    Alcohol use: No    Drug use: No     Review of Systems   Constitutional:  Negative for activity change, fatigue and fever.   HENT:  Negative for facial swelling.    Eyes:  Negative for pain.   Respiratory:  Negative for chest tightness and shortness of breath.    Cardiovascular:  Negative for chest pain.   Gastrointestinal:  Positive for abdominal pain. Negative for constipation, diarrhea, nausea and vomiting.   Genitourinary:  Positive for dysuria, penile discharge and testicular pain. Negative for difficulty urinating and flank pain.   Musculoskeletal:  Negative for  back pain.   Skin:  Negative for rash.   Neurological:  Negative for weakness and headaches.   Hematological:  Negative for adenopathy.   Psychiatric/Behavioral:  Negative for behavioral problems.        Physical Exam     Initial Vitals [09/20/24 1647]   BP Pulse Resp Temp SpO2   (S) (!) 198/116 95 20 97.5 °F (36.4 °C) 95 %      MAP       --         Physical Exam    Nursing note and vitals reviewed.  Constitutional: He appears well-developed and well-nourished. He is not diaphoretic. No distress.   HENT:   Head: Normocephalic and atraumatic.   Right Ear: External ear normal.   Left Ear: External ear normal.   Nose: Nose normal.   Eyes: EOM are normal. Right eye exhibits no discharge. Left eye exhibits no discharge.   Neck: Neck supple. No tracheal deviation present.   Normal range of motion.  Cardiovascular:  Normal rate.           Pulmonary/Chest: No stridor. No respiratory distress.   Abdominal: Abdomen is soft. He exhibits no distension. There is no abdominal tenderness.   Musculoskeletal:         General: No tenderness. Normal range of motion.      Cervical back: Normal range of motion and neck supple.     Neurological: He is alert and oriented to person, place, and time. He has normal strength. No cranial nerve deficit.   Skin: Skin is warm and dry.   Psychiatric: He has a normal mood and affect. His behavior is normal. Judgment and thought content normal.         ED Course   Procedures  Labs Reviewed   POCT CMP - Abnormal       Result Value    Albumin, POC 3.3      Alkaline Phosphatase, POC 81      ALT (SGPT), POC 6 (*)     AST (SGOT), POC 19      POC BUN 18      Calcium, POC 9.6      POC Chloride 105      POC Creatinine 1.4 (*)     POC Glucose 111      POC Potassium 4.0      POC Sodium 141      Bilirubin, POC 0.6      POC TCO2 28      Protein, POC 6.8     CULTURE, URINE   C. TRACHOMATIS/N. GONORRHOEAE BY AMP DNA   POCT URINALYSIS W/O SCOPE   POCT URINALYSIS W/O SCOPE    Glucose, UA Negative      Bilirubin, UA  Negative      Ketones, UA Negative      Spec Grav UA 1.015      Blood, UA Negative      PH, UA 6.0      Protein, UA Negative      Urobilinogen, UA 0.2      Nitrite, UA Negative      Leukocytes, UA Negative      Color, UA POC Yellow      Clarity, UA, POC Clear     POCT CBC    Hematocrit        Hemoglobin        RBC        WBC        MCV        MCH, POC        MCHC        RDW-CV        Platelet Count, POC        MPV       POCT CMP          Imaging Results              US Scrotum And Testicles (Final result)  Result time 09/20/24 18:54:57      Final result by Leanna Chen MD (09/20/24 18:54:57)                   Impression:      No acute abnormalities identified.  No evidence of testicular torsion.      Electronically signed by: Leanna Chen MD  Date:    09/20/2024  Time:    18:54               Narrative:    EXAMINATION:  US SCROTUM AND TESTICLES    CLINICAL HISTORY:  Testicular pain, unspecified    TECHNIQUE:  Sonography of the scrotum and testes.    COMPARISON:  None.    FINDINGS:  Right Testicle:    *Size: 4.9 x 2.2 x 3.2 cm  *Appearance: Normal.  *Flow: Normal arterial and venous flow  *Epididymis: Small epididymal head cysts, otherwise normal in appearance.  *Hydrocele: None.  *Varicocele: None.  .    Left Testicle:    *Size: 4.7 x 1.9 x 3.3 cm  *Appearance: Normal.  *Flow: Normal arterial and venous flow  *Epididymis: Normal.  *Hydrocele: None.  *Varicocele: None.  .    Other findings: None.                                       Medications   oxyCODONE-acetaminophen 5-325 mg per tablet 1 tablet (1 tablet Oral Given 9/20/24 1934)     Medical Decision Making  Patient reports dysuria and mild bilateral testicular pain.  Urinalysis completely normal and labs reassuring.  He is able to urinate without difficulty and denies urinary retention, however bladder scan after urination is greater than 400.  Ultrasound shows nothing acute.  Patient denies fever and pain with bowel movements.  He reports that he has an  appointment scheduled with Urology next week.  Urine culture and gonorrhea and chlamydia testing in process.  Given the above pain is likely due to urinary retention, however patient refuses Lee catheter.  Attempted to convince him to allow want to be placed, however he continued to refuse and states that he will come back to the ED for one later.    Amount and/or Complexity of Data Reviewed  Labs: ordered. Decision-making details documented in ED Course.  Radiology: ordered. Decision-making details documented in ED Course.    Risk  Prescription drug management.            Scribe Attestation:   Scribe #1: I performed the above scribed service and the documentation accurately describes the services I performed. I attest to the accuracy of the note.                               I, Shawn Aguirre NP, personally performed the services described in this documentation. All medical record entries made by the scribe were at my direction and in my presence. I have reviewed the chart and agree that the record reflects my personal performance and is accurate and complete.      Clinical Impression:  Final diagnoses:  [N50.819] Testicular pain  [R30.0] Dysuria  [R33.9] Urinary retention (Primary)          ED Disposition Condition    Discharge Stable          ED Prescriptions    None       Follow-up Information       Follow up With Specialties Details Why Contact Info    Raji Mcmullen MD Urology Schedule an appointment as soon as possible for a visit in 1 week For further evaluation [address]   [facility] [address]  [phone]      [facility] ED Emergency Medicine Go to  If symptoms worsen, As needed [address] [facility] [address] [phone]             Shawn Aguirre NP  09/20/24 1951

## 2024-09-21 NOTE — DISCHARGE INSTRUCTIONS
Return to the emergency department as soon as possible for catheter as discussed.    Follow up with Urology as planned.    Return to the emergency department for any new or worsening symptoms.    Thank you for coming to our Emergency Department today. It is important to remember that some problems are difficult to diagnose and may not be found during your first visit. Be sure to follow up with your primary care doctor.  If you do not have one, you may contact the one listed on your discharge paperwork or you may also call the [facility] Appointment Desk at [phone] to schedule an appointment with one.     Return to the ER with any questions/concerns, new/concerning symptoms, worsening or failure to improve. Do not drive or make any important decisions for 24 hours if you have received any pain medications, sedatives or mood altering drugs during your ER visit.

## 2024-09-21 NOTE — ED NOTES
Pt refused seo catheter insertion due to urinary retention at this time, stating he needs to get home. Discharge instructions discussed at length with patient, encouraging return to ED as soon as he is able for insertion of catheter. Pt agreeable and reports understanding. Pt left ED in NAD with wife providing transportation home.

## 2024-09-23 ENCOUNTER — HOSPITAL ENCOUNTER (EMERGENCY)
Facility: HOSPITAL | Age: 89
Discharge: HOME | End: 2024-09-23
Attending: EMERGENCY MEDICINE
Payer: MEDICARE

## 2024-09-23 VITALS
HEART RATE: 74 BPM | DIASTOLIC BLOOD PRESSURE: 74 MMHG | OXYGEN SATURATION: 98 % | TEMPERATURE: 98 F | WEIGHT: 140 LBS | SYSTOLIC BLOOD PRESSURE: 165 MMHG | BODY MASS INDEX: 22.6 KG/M2 | RESPIRATION RATE: 18 BRPM

## 2024-09-23 DIAGNOSIS — R33.9 URINARY RETENTION: Primary | ICD-10-CM

## 2024-09-23 DIAGNOSIS — R33.8 ACUTE URINARY RETENTION: ICD-10-CM

## 2024-09-23 LAB
ALBUMIN SERPL-MCNC: 3.7 G/DL (ref 3.3–5.5)
ALP SERPL-CCNC: 68 U/L (ref 42–141)
BACTERIA UR CULT: ABNORMAL
BACTERIA UR CULT: ABNORMAL
BILIRUB SERPL-MCNC: 0.5 MG/DL (ref 0.2–1.6)
BUN SERPL-MCNC: 17 MG/DL (ref 7–22)
C TRACH DNA SPEC QL NAA+PROBE: NOT DETECTED
CALCIUM SERPL-MCNC: 9.8 MG/DL (ref 8–10.3)
CHLORIDE SERPL-SCNC: 105 MMOL/L (ref 98–108)
CREAT SERPL-MCNC: 1.5 MG/DL (ref 0.6–1.2)
GLUCOSE SERPL-MCNC: 91 MG/DL (ref 73–118)
N GONORRHOEA DNA SPEC QL NAA+PROBE: NOT DETECTED
POC ALT (SGPT): 10 U/L (ref 10–47)
POC AST (SGOT): 27 U/L (ref 11–38)
POC TCO2: 26 MMOL/L (ref 18–33)
POTASSIUM BLD-SCNC: 4.4 MMOL/L (ref 3.6–5.1)
PROTEIN, POC: 6.7 G/DL (ref 6.4–8.1)
SODIUM BLD-SCNC: 146 MMOL/L (ref 128–145)

## 2024-09-23 PROCEDURE — 87088 URINE BACTERIA CULTURE: CPT | Performed by: NURSE PRACTITIONER

## 2024-09-23 PROCEDURE — 80053 COMPREHEN METABOLIC PANEL: CPT | Mod: ER

## 2024-09-23 PROCEDURE — 99283 EMERGENCY DEPT VISIT LOW MDM: CPT | Mod: ER

## 2024-09-23 PROCEDURE — 87086 URINE CULTURE/COLONY COUNT: CPT | Performed by: NURSE PRACTITIONER

## 2024-09-23 PROCEDURE — 25000003 PHARM REV CODE 250: Mod: ER | Performed by: NURSE PRACTITIONER

## 2024-09-23 PROCEDURE — 87186 SC STD MICRODIL/AGAR DIL: CPT | Performed by: NURSE PRACTITIONER

## 2024-09-23 RX ORDER — TAMSULOSIN HYDROCHLORIDE 0.4 MG/1
0.4 CAPSULE ORAL DAILY
Qty: 7 CAPSULE | Refills: 0 | Status: SHIPPED | OUTPATIENT
Start: 2024-09-23 | End: 2024-09-27

## 2024-09-23 RX ORDER — LIDOCAINE HYDROCHLORIDE 20 MG/ML
JELLY TOPICAL
Status: COMPLETED | OUTPATIENT
Start: 2024-09-23 | End: 2024-09-23

## 2024-09-23 RX ORDER — CEPHALEXIN 500 MG/1
500 CAPSULE ORAL EVERY 8 HOURS
Qty: 21 CAPSULE | Refills: 0 | Status: SHIPPED | OUTPATIENT
Start: 2024-09-23 | End: 2024-09-30

## 2024-09-23 RX ORDER — NITROFURANTOIN 25; 75 MG/1; MG/1
100 CAPSULE ORAL 2 TIMES DAILY
Qty: 10 CAPSULE | Refills: 0 | Status: CANCELLED | OUTPATIENT
Start: 2024-09-23 | End: 2024-09-28

## 2024-09-23 RX ADMIN — LIDOCAINE HYDROCHLORIDE: 20 JELLY TOPICAL at 11:09

## 2024-09-23 NOTE — DISCHARGE INSTRUCTIONS
§ Please return to the Emergency Department for any new or worsening symptoms including: fever, chest pain, shortness of breath, loss of consciousness, dizziness, weakness, pain or bleeding at the catheter site, no drainage from the catheter or any other concerns.     § Schedule an appointment for follow up with Urology as soon as possible for a recheck of your symptoms. If you do not have one, contact the one listed on your discharge paperwork or call the [facility] Appointment Desk at [phone] to schedule an appointment.     § Please take all medication as prescribed.  In addition to the 2 antibiotics you are being prescribed Flomax to help the urine past.  This medication may make you feel dizzy or lightheaded when you change positions.  Please change positions slowly while you are taking this medication.

## 2024-09-23 NOTE — ED PROVIDER NOTES
Encounter Date: 9/23/2024    SCRIBE #1 NOTE: I, Carol Hoyos, am scribing for, and in the presence of,  Nic Goodman FNP. I have scribed the following portions of the note - Other sections scribed: HPI, ROS.       History     Chief Complaint   Patient presents with    Urinary Retention     Presents to ER for urinary retention. Denies dysuria, denies abdominal pain and pressure. Denies nausea/vomiting.      CC: Urinary Retention    HPI: Destin Barber, a 89 y.o. male presents to the ED for urinary retention for a few days. Per chart review,  patient was seen at this facility on 09/20/24 for dysuria, lower abdominal pain, and bilateral testicle pain. Patient was diagnosed with UTI but refused to get Seo catheter. Patient is requesting the seo catheter today.  Today, he denies any abdominal pain, testicular pain, or pain anywhere else.       Patient Active Problem List:     Essential hypertension     Pure hypercholesterolemia     Diastolic dysfunction     Anemia of chronic disease     Coronary artery disease involving coronary bypass graft of native heart without angina pectoris     S/P CABG (coronary artery bypass graft)     Chronic gout without tophus     Hypokalemia     Controlled type 2 diabetes mellitus with stage 3 chronic kidney disease, without long-term current use of insulin     Aortic atherosclerosis     BMI 23.0-23.9, adult     History of syncope     Elevated PSA     BPH associated with nocturia     Bilateral renal cysts     Primary insomnia     Chronic idiopathic constipation     Frequency of micturition     Urge incontinence of urine     Stage 3b chronic kidney disease     Chronic kidney disease, stage 3a        The history is provided by the patient and medical records. No  was used.     Review of patient's allergies indicates:   Allergen Reactions    Penicillins Nausea And Vomiting     Pt reports he is not allergic to penicillin       Past Medical History:   Diagnosis Date     Acute coronary syndrome 06/24/2016    s/p 3V CABG 7/2016    Anemia     Coronary artery disease     Diastolic dysfunction     Gout, chronic     Heart failure     HTN (hypertension)     Hyperlipidemia     Myocardial infarction     Pneumonia due to other staphylococcus     Pressure ulcer     Renal manifestation of secondary diabetes mellitus     Type 2 diabetes mellitus     diet controlled    Urge incontinence of urine 7/21/2023     Past Surgical History:   Procedure Laterality Date    CATARACT EXTRACTION Bilateral     CATARACT EXTRACTION W/ ANTERIOR VITRECTOMY      CORONARY ARTERY BYPASS GRAFT  07/06/2016    PAIZ-LAD, SVG-Ramus, SVG-PDA    FLUOROSCOPIC URODYNAMIC STUDY N/A 4/25/2023    Procedure: URODYNAMIC STUDY, FLUOROSCOPIC;  Surgeon: Raji Mcmullen MD;  Location: [facility] OR;  Service: Urology;  Laterality: N/A;  RN PHONE PREOP WITH GRANDDAUGHTER ROGER 4/21/23    HEMORRHOID SURGERY      LASER ENUCLEATION OF PROSTATE N/A 7/6/2023    Procedure: ENUCLEATION, PROSTATE, USING LASER; cystolithalopaxy;  Surgeon: Raji Mcmullen MD;  Location: [facility] OR;  Service: Urology;  Laterality: N/A;  notify [facility] [phone] SPOKE TO NICK ON 6/2/2023 @ 10:37AM. CONFIRMATION NUMBER [id number] POLLACK [phone] EMAILED CHANNING ON 6/15/2023@ 3:07PM-KEYON  RN PREOP 5/30/2023   T/S ON 6/5/2023  RN PREOP 06/30/2023 --JM     Family History   Problem Relation Name Age of Onset    Hypertension Mother      Heart disease Mother      Cancer Father          colon    Heart disease Sister      No Known Problems Sister      No Known Problems Sister      No Known Problems Sister      No Known Problems Sister      Heart disease Brother      No Known Problems Brother      No Known Problems Brother      No Known Problems Brother      No Known Problems Brother      No Known Problems Brother      No Known Problems Daughter      No Known Problems Daughter      No Known Problems Son      Amblyopia Neg Hx      Blindness Neg Hx       Cataracts Neg Hx      Diabetes Neg Hx      Glaucoma Neg Hx      Macular degeneration Neg Hx      Retinal detachment Neg Hx      Strabismus Neg Hx      Stroke Neg Hx      Thyroid disease Neg Hx       Social History     Tobacco Use    Smoking status: Former     Types: Cigars     Start date:      Quit date:      Years since quittin.7    Smokeless tobacco: Never    Tobacco comments:     Former smoker. Pt. Smoked 2 cigars daily.   Substance Use Topics    Alcohol use: No    Drug use: No     Review of Systems   Constitutional:  Negative for chills and fever.   HENT:  Negative for congestion, ear discharge, ear pain, rhinorrhea, sore throat and trouble swallowing.    Eyes:  Negative for visual disturbance.   Respiratory:  Negative for cough and shortness of breath.    Cardiovascular:  Negative for chest pain and leg swelling.   Gastrointestinal:  Negative for abdominal pain, diarrhea, nausea and vomiting.   Genitourinary:  Positive for difficulty urinating (urinary retention). Negative for dysuria and testicular pain.   Musculoskeletal:  Negative for back pain, neck pain and neck stiffness.   Skin:  Negative for color change, rash and wound.   Neurological:  Negative for seizures, syncope, speech difficulty, weakness and headaches.   Psychiatric/Behavioral:  Negative for confusion.        Physical Exam     Initial Vitals [24 1125]   BP Pulse Resp Temp SpO2   (!) 176/84 79 18 98 °F (36.7 °C) 99 %      MAP       --         Physical Exam    Nursing note and vitals reviewed.  Constitutional: He appears well-developed and well-nourished. He is not diaphoretic. He is cooperative.  Non-toxic appearance. He does not have a sickly appearance. He does not appear ill. No distress.   HENT:   Head: Normocephalic and atraumatic.   Right Ear: External ear normal.   Left Ear: External ear normal.   Nose: Nose normal.   Mouth/Throat: Mucous membranes are normal. No trismus in the jaw.   Neck: Phonation normal.   Normal  range of motion.  Cardiovascular:  Normal rate and intact distal pulses.           Pulmonary/Chest: No respiratory distress.   Abdominal: Abdomen is soft. He exhibits no distension. There is no abdominal tenderness. There is no rebound and no guarding.   Genitourinary:    Testes and penis normal.   Right testis shows no mass, no swelling and no tenderness. Left testis shows no mass, no swelling and no tenderness. Uncircumcised.   Musculoskeletal:      Cervical back: Normal range of motion.     Neurological: He is alert and oriented to person, place, and time. No sensory deficit. Coordination normal. GCS eye subscore is 4. GCS verbal subscore is 5. GCS motor subscore is 6.   Skin: Skin is warm, dry and intact. Capillary refill takes less than 2 seconds. No bruising, no laceration and no rash noted. No cyanosis or erythema.   Psychiatric: He has a normal mood and affect. His speech is normal and behavior is normal. Judgment and thought content normal.         ED Course   Procedures  Labs Reviewed   POCT CMP - Abnormal       Result Value    Albumin, POC 3.7      Alkaline Phosphatase, POC 68      ALT (SGPT), POC 10      AST (SGOT), POC 27      POC BUN 17      Calcium, POC 9.8      POC Chloride 105      POC Creatinine 1.5 (*)     POC Glucose 91      POC Potassium 4.4      POC Sodium 146 (*)     Bilirubin, POC 0.5      POC TCO2 26      Protein, POC 6.7     CULTURE, URINE   POCT CMP          Imaging Results    None          Medications   LIDOcaine HCl 2% urojet ( Mucous Membrane Given 9/23/24 1145)     Medical Decision Making  Amount and/or Complexity of Data Reviewed  External Data Reviewed: notes.     Details: See HPI.     Risk  Prescription drug management.         APC / Resident Notes:   This is an evaluation of a 89 y.o. male that presents to the Emergency Department for Lee insertion.  Patient was seen here 3 days ago with abdominal pain and testicle pain.  Found to have urinary retention with a bladder scan  greater than 400.  Initially patient declined Lee.  Since then patient reports his abdominal pain and test chlamydia results.  Patient has no symptoms.  He is able to urinate without much difficulty as reported by him.  Physical Exam shows a non-toxic, afebrile, and well appearing male.  Abdomen is soft and nontender.  No guarding peritoneal signs.  Uncircumcised male  exam unremarkable.  No testicular tenderness or swelling. Vital signs are reassuring. If available, previous records reviewed.  Patient able to urinate proximally 150 cc per nursing.  Postvoid bladder scan still with urinary retention.  Will place Lee.  Previous culture results show UTI.  Will treat today.  RESULTS:  Normal BUN, creatinine 1.5.  Sodium 146.  Urine culture ordered and pending.    My overall impression is urinary retention with UTI. I considered, but at this time, do not suspect pyelonephritis, bowel obstruction, bowel perforation, acutely worsening renal dysfunction from baseline CKD. Pending urine culture to determine need for additional Enterococcus coverage given only or susceptibilities Macrobid and given patient's creatinine clearance not indicated.    ED Course:  Urinary catheter. Discharge Meds/Instructions:  Renally dosed Keflex, Flomax.   The diagnosis, treatment plan, instructions for follow-up as well as ED return precautions were discussed. All questions have been answered.  SONIDO Torres, BLANCAC     Scribe Attestation:   Scribe #1: I performed the above scribed service and the documentation accurately describes the services I performed. I attest to the accuracy of the note.        ED Course as of 09/23/24 1317   Mon Sep 23, 2024   1246 Patient with a creatinine 1.5.  Creatinine clearance 29 ml/min. Will renally dose keflex per UpToDate along with flomax. Entercoccus with minimal growth, will defer additional treatment until new culture results.  [AF]      ED Course User Index  [AF] Nic Goodman, CAROL                Medical Decision Making:   History:   Old Medical Records: I decided to obtain old medical records.  Old Records Summarized: records from previous admission(s).       <> Summary of Records: Patient seen here 09/20/2024 - the time patient reported dysuria and testicle pain.  Urinalysis and labs completed.  Bladder scan showed greater than 400.  Patient refuses Lee.  Appoint with Urology on 09/27/2024.     Labs from September 20, 2024:  creatinine 1.4 with a 1.3 six months ago.  Urine culture with greater than 100,000 E coli, 10-49k Entercoccus.  Limited sensitivity to the Enterococcus.             ILUZ APRN, OSWALDO, personally performed the services described in this documentation. All medical record entries made by the scribe were at my direction and in my presence. I have reviewed the chart and agree that the record reflects my personal performance and is accurate and complete.      DISCLAIMER: This note was prepared with [employer] voice recognition transcription software. Garbled syntax, mangled pronouns, and other bizarre constructions may be attributed to that software system.    Clinical Impression:  Final diagnoses:  [R33.9] Urinary retention (Primary)  [R33.8] Acute urinary retention          ED Disposition Condition    Discharge Stable          ED Prescriptions       Medication Sig Dispense Start Date End Date Auth. Provider    cephALEXin (KEFLEX) 500 MG capsule Take 1 capsule (500 mg total) by mouth every 8 (eight) hours. for 7 days 21 capsule 9/23/2024 9/30/2024 Nic Goodman FNP    tamsulosin (FLOMAX) 0.4 mg Cap Take 1 capsule (0.4 mg total) by mouth once daily. Change positions slowly, may cause dizziness. for 7 days 7 capsule 9/23/2024 9/30/2024 Nic Goodman FNP          Follow-up Information       Follow up With Specialties Details Why Contact Info    Raji Mcmullen MD Urology  As Scheduled [address] [address] [facility] [address]  [place] [id number] [phone]      [facility]  Emergency Medicine Go to  If symptoms worsen [address] [facility] [address] [phone]             Nic Goodman, FNP  09/23/24 9715

## 2024-09-25 LAB — BACTERIA UR CULT: ABNORMAL

## 2024-09-27 ENCOUNTER — OFFICE VISIT (OUTPATIENT)
Dept: UROLOGY | Facility: CLINIC | Age: 89
End: 2024-09-27
Payer: MEDICARE

## 2024-09-27 VITALS — WEIGHT: 140.56 LBS | BODY MASS INDEX: 22.59 KG/M2 | HEIGHT: 66 IN

## 2024-09-27 DIAGNOSIS — R33.9 URINARY RETENTION: Primary | ICD-10-CM

## 2024-09-27 DIAGNOSIS — N40.1 BPH WITH OBSTRUCTION/LOWER URINARY TRACT SYMPTOMS: ICD-10-CM

## 2024-09-27 DIAGNOSIS — N13.8 BPH WITH OBSTRUCTION/LOWER URINARY TRACT SYMPTOMS: ICD-10-CM

## 2024-09-27 DIAGNOSIS — C61 PROSTATE CANCER: ICD-10-CM

## 2024-09-27 PROCEDURE — 99999 PR PBB SHADOW E&M-EST. PATIENT-LVL III: CPT | Mod: PBBFAC,,, | Performed by: STUDENT IN AN ORGANIZED HEALTH CARE EDUCATION/TRAINING PROGRAM

## 2024-09-27 PROCEDURE — 99213 OFFICE O/P EST LOW 20 MIN: CPT | Mod: PBBFAC | Performed by: STUDENT IN AN ORGANIZED HEALTH CARE EDUCATION/TRAINING PROGRAM

## 2024-09-27 RX ORDER — TAMSULOSIN HYDROCHLORIDE 0.4 MG/1
0.4 CAPSULE ORAL DAILY
Qty: 30 CAPSULE | Refills: 11 | Status: SHIPPED | OUTPATIENT
Start: 2024-09-27 | End: 2025-09-22

## 2024-09-27 RX ORDER — OXYBUTYNIN CHLORIDE 5 MG/1
5 TABLET, EXTENDED RELEASE ORAL EVERY MORNING
COMMUNITY
Start: 2024-07-17 | End: 2024-09-27

## 2024-09-27 NOTE — PROGRESS NOTES
Patient ID: Destin Barber is a 89 y.o. male.    Chief Complaint: Urinary retention      HPI  89 y.o. who presents to the Urology clinic for evaluation of urinary retention, found to have UTI, seo placed in ED. Patient with about 450 cc output after voiding 150 cc. Patient with constipation. Patient also with prostate cancer on AS, last PSA 4.5, due for PSA. Accompanied by son.      Medically Necessary ROS documented in HPI    HPI 5/9/20024  89 y.o. who presents to the Urology clinic for evaluation of voiding dysfunction s/p HOLEP. Patient denies incontinence. He was found to have incidental prostate cancer during HOLEP, elected for watchful waiting with family input as well ( daughter). Denies weight loss, hematuria, UTIs since last visit. No el tenderness    Past Medical History  Active Ambulatory Problems     Diagnosis Date Noted    Essential hypertension     Pure hypercholesterolemia     Diastolic dysfunction     Anemia of chronic disease 06/23/2016    Coronary artery disease involving coronary bypass graft of native heart without angina pectoris     S/P CABG (coronary artery bypass graft) 07/07/2016    Chronic gout without tophus 07/13/2016    Hypokalemia 02/03/2017    Controlled type 2 diabetes mellitus with stage 3 chronic kidney disease, without long-term current use of insulin 02/03/2017    Aortic atherosclerosis 08/31/2020    BMI 23.0-23.9, adult 03/07/2023    History of syncope 03/07/2023    Elevated PSA 03/07/2023    BPH associated with nocturia 03/22/2023    Bilateral renal cysts 03/22/2023    Primary insomnia 06/15/2023    Chronic idiopathic constipation 06/15/2023    Frequency of micturition 07/21/2023    Urge incontinence of urine 07/21/2023    Stage 3b chronic kidney disease 03/22/2024    Chronic kidney disease, stage 3a 04/05/2024     Resolved Ambulatory Problems     Diagnosis Date Noted    Type 2 diabetes mellitus with complication     Hypertension, benign 01/31/2013    NSTEMI (non-ST  elevated myocardial infarction) 06/23/2016    Pain of left upper extremity 06/23/2016    Coronary artery disease due to lipid rich plaque 06/24/2016    Hyperglycemia 07/07/2016    Chronic diastolic CHF (congestive heart failure), NYHA class 2 07/08/2016    Gait instability 07/12/2016    Sepsis 02/02/2017    Influenza A 02/03/2017    SOB (shortness of breath) 02/03/2017    Paresthesia 03/12/2018    Stage 3a chronic kidney disease 06/15/2023     Past Medical History:   Diagnosis Date    Acute coronary syndrome 06/24/2016    Anemia     Coronary artery disease     Gout, chronic     Heart failure     HTN (hypertension)     Hyperlipidemia     Myocardial infarction     Pneumonia due to other staphylococcus     Pressure ulcer     Renal manifestation of secondary diabetes mellitus     Type 2 diabetes mellitus          Past Surgical History  Past Surgical History:   Procedure Laterality Date    CATARACT EXTRACTION Bilateral     CATARACT EXTRACTION W/ ANTERIOR VITRECTOMY      CORONARY ARTERY BYPASS GRAFT  07/06/2016    PAIZ-LAD, SVG-Ramus, SVG-PDA    FLUOROSCOPIC URODYNAMIC STUDY N/A 4/25/2023    Procedure: URODYNAMIC STUDY, FLUOROSCOPIC;  Surgeon: Raji Mcmullen MD;  Location: [facility] OR;  Service: Urology;  Laterality: N/A;  RN PHONE PREOP WITH GRANDDAUGHTER ROGER 4/21/23    HEMORRHOID SURGERY      LASER ENUCLEATION OF PROSTATE N/A 7/6/2023    Procedure: ENUCLEATION, PROSTATE, USING LASER; cystolithalopaxy;  Surgeon: Raji Mcmullen MD;  Location: [facility] OR;  Service: Urology;  Laterality: N/A;  notify [facility] [phone] SPOKE TO NICK ON 6/2/2023 @ 10:37AM. CONFIRMATION NUMBER [id number] POLLACK [phone] EMAILED CHANNING ON 6/15/2023@ 3:07PM-KEYON  RN PREOP 5/30/2023   T/S ON 6/5/2023  RN PREOP 06/30/2023 --CARLOS       Social History       Medications    Current Outpatient Medications:     amLODIPine (NORVASC) 10 MG tablet, Take 1 tablet (10 mg total) by mouth once daily., Disp: 90 tablet, Rfl: 3     benzonatate (TESSALON) 200 MG capsule, Take 1 capsule (200 mg total) by mouth 3 (three) times daily as needed for Cough., Disp: 30 capsule, Rfl: 1    blood sugar diagnostic Strp, To check BG daily, to use with insurance preferred meter, Disp: 200 each, Rfl: 11    blood-glucose meter kit, To check BG daily, to use with insurance preferred meter, Disp: 1 each, Rfl: 0    cephALEXin (KEFLEX) 500 MG capsule, Take 1 capsule (500 mg total) by mouth every 8 (eight) hours. for 7 days, Disp: 21 capsule, Rfl: 0    isosorbide mononitrate (IMDUR) 60 MG 24 hr tablet, Take 1 tablet (60 mg total) by mouth once daily., Disp: 90 tablet, Rfl: 3    lancets Misc, To check BG daily, to use with insurance preferred meter, Disp: 200 each, Rfl: 11    lisinopriL (PRINIVIL,ZESTRIL) 40 MG tablet, Take 1 tablet (40 mg total) by mouth once daily., Disp: 90 tablet, Rfl: 3    meloxicam (MOBIC) 7.5 MG tablet, Take 1 tablet (7.5 mg total) by mouth daily as needed for Pain., Disp: 30 tablet, Rfl: 2    metFORMIN (GLUCOPHAGE-XR) 500 MG ER 24hr tablet, TAKE 1 TABLET BY MOUTH DAILY WITH BREAKFAST, Disp: 90 tablet, Rfl: 3    metoprolol tartrate (LOPRESSOR) 25 MG tablet, Take 1 tablet by mouth 2 (two) times daily., Disp: , Rfl:     pravastatin (PRAVACHOL) 40 MG tablet, Take 1 tablet (40 mg total) by mouth once daily., Disp: 90 tablet, Rfl: 3    somatropin (NORDITROPIN FLEXPRO) 10 mg/1.5 mL (6.7 mg/mL) PnIj, Inject into the skin., Disp: , Rfl:     traZODone (DESYREL) 150 MG tablet, TAKE 1 TABLET BY MOUTH EVERY NIGHT AS NEEDED FOR INSOMNIA., Disp: 90 tablet, Rfl: 3    valsartan (DIOVAN) 320 MG tablet, Take 320 mg by mouth., Disp: , Rfl:     albuterol (PROVENTIL) 2.5 mg /3 mL (0.083 %) nebulizer solution, Inhale 2.5 mg into the lungs. (Patient not taking: Reported on 7/8/2024), Disp: , Rfl:     allopurinoL (ZYLOPRIM) 100 MG tablet, Take 1 tablet by mouth once daily. (Patient not taking: Reported on 7/8/2024), Disp: , Rfl:   No current facility-administered  medications for this visit.    Facility-Administered Medications Ordered in Other Visits:     lactated ringers infusion, , Intravenous, Continuous, Julia Fong MD, Last Rate: 10 mL/hr at 07/06/23 1230, New Bag at 07/06/23 1230    LIDOcaine (PF) 10 mg/ml (1%) injection 10 mg, 1 mL, Intradermal, Once, Julia Fong MD    Allergies  Review of patient's allergies indicates:   Allergen Reactions    Penicillins Nausea And Vomiting     Pt reports he is not allergic to penicillin         Patient's PMH, FH, Social hx, Medications, allergies reviewed and updated as pertinent to today's visit    Objective:      Physical Exam  Constitutional:       General: He is not in acute distress.     Appearance: He is well-developed. He is not ill-appearing, toxic-appearing or diaphoretic.   HENT:      Head: Normocephalic and atraumatic.      Mouth/Throat:      Mouth: Mucous membranes are moist.   Eyes:      Conjunctiva/sclera: Conjunctivae normal.   Pulmonary:      Effort: Pulmonary effort is normal. No respiratory distress.   Abdominal:      General: Abdomen is flat. There is no distension.      Palpations: Abdomen is soft. There is no mass.      Tenderness: There is no right CVA tenderness or left CVA tenderness.   Genitourinary:     Comments: Lee w/ yellow urine    Musculoskeletal:         General: No swelling or deformity.      Cervical back: Neck supple.   Skin:     Findings: No rash.   Neurological:      Mental Status: He is alert and oriented to person, place, and time.   Psychiatric:         Mood and Affect: Mood normal.         Thought Content: Thought content normal.         Judgment: Judgment normal.             Lab Results   Component Value Date    PSADIAG 4.5 (H) 03/26/2024        Assessment:       1. Urinary retention    2. BPH with obstruction/lower urinary tract symptoms    3. Prostate cancer        Plan:         UTI  On antibiotics, continue    Urinary retention  Fill and void trial  Bladder  irrigation  Patient with 16fr seo catheter in place, 180 cc instilled, patient voided 100cc, allowed to leave clinic with plan for PVR check later this afternoon.     Avoid constipation as it can contribute to voiding dysfunction  Stop flomax due to risk for falls and prior surgery to address bladder outlet/prostate    1 month after UTI cleared, will plan for PSA check

## 2024-09-27 NOTE — PROGRESS NOTES
patient returned to clinic for a bladder scan, noted to have about 400 to 500 cc of urine. Patient was taught how to perform clean intermittent catheterization, there was concern about the patient being able to do this at home. He declined  catheter placement in office, hes aware of the risk of urinary retention and its impact to his renal function and risk for infection    He reassured the staff that his daughter is a nurse and will help him with catheterization at least two times per day. He will follow up in one week for a PVR check.    Avoid constipation and resume climax

## 2024-10-01 ENCOUNTER — TELEPHONE (OUTPATIENT)
Dept: UROLOGY | Facility: CLINIC | Age: 89
End: 2024-10-01
Payer: MEDICARE

## 2024-10-01 NOTE — TELEPHONE ENCOUNTER
Called patient spoke with wife.  She states patient is doing fine and his voiding in urinal.  Appt. Scheduled for Friday October 4th at 1045.  Wife agreed on date and time.  ANNIKA CEDILLO  ----- Message from Med Assistant Suresh sent at 10/1/2024 10:06 AM CDT -----  Regarding: FW: estela    ----- Message -----  From: Raji Mcmullen MD  Sent: 10/1/2024   9:57 AM CDT  To: Benedict [facility]legria Staff  Subject: fu                                               Pt needs nurse visit follow in this week from his appt last week

## 2024-10-04 ENCOUNTER — CLINICAL SUPPORT (OUTPATIENT)
Dept: UROLOGY | Facility: CLINIC | Age: 89
End: 2024-10-04
Payer: MEDICARE

## 2024-10-04 VITALS — WEIGHT: 138.88 LBS | BODY MASS INDEX: 22.42 KG/M2

## 2024-10-04 DIAGNOSIS — R33.9 URINARY RETENTION: Primary | ICD-10-CM

## 2024-10-04 PROCEDURE — 99999 PR PBB SHADOW E&M-EST. PATIENT-LVL III: CPT | Mod: PBBFAC,,,

## 2024-10-04 PROCEDURE — 99213 OFFICE O/P EST LOW 20 MIN: CPT | Mod: PBBFAC

## 2024-10-04 NOTE — PROGRESS NOTES
Patient presented in clinic this morning.  Urine specimen obtained and dipped WNL.  PVR measured 346 ml .  Patient states he doesn't have the urge to void.  On last visit patient was shown how to self catheterize.  When asked how is the self catheterization going.  Patient stated he is able to void without catheterizing.   MD informed of results.  Patient is to RTC in one week.  ANNIKA CEDILLO

## 2024-10-08 ENCOUNTER — OFFICE VISIT (OUTPATIENT)
Dept: FAMILY MEDICINE | Facility: CLINIC | Age: 89
End: 2024-10-08
Payer: MEDICARE

## 2024-10-08 VITALS
BODY MASS INDEX: 22.29 KG/M2 | WEIGHT: 138.69 LBS | DIASTOLIC BLOOD PRESSURE: 64 MMHG | HEART RATE: 106 BPM | TEMPERATURE: 98 F | HEIGHT: 66 IN | OXYGEN SATURATION: 98 % | SYSTOLIC BLOOD PRESSURE: 170 MMHG

## 2024-10-08 DIAGNOSIS — E11.22 CKD STAGE 3 DUE TO TYPE 2 DIABETES MELLITUS: ICD-10-CM

## 2024-10-08 DIAGNOSIS — E78.5 DYSLIPIDEMIA ASSOCIATED WITH TYPE 2 DIABETES MELLITUS: ICD-10-CM

## 2024-10-08 DIAGNOSIS — E11.29 CONTROLLED TYPE 2 DIABETES MELLITUS WITH MICROALBUMINURIA, WITHOUT LONG-TERM CURRENT USE OF INSULIN: ICD-10-CM

## 2024-10-08 DIAGNOSIS — E11.69 DYSLIPIDEMIA ASSOCIATED WITH TYPE 2 DIABETES MELLITUS: ICD-10-CM

## 2024-10-08 DIAGNOSIS — N40.1 BPH WITH OBSTRUCTION/LOWER URINARY TRACT SYMPTOMS: ICD-10-CM

## 2024-10-08 DIAGNOSIS — I25.810 CORONARY ARTERY DISEASE INVOLVING CORONARY BYPASS GRAFT OF NATIVE HEART WITHOUT ANGINA PECTORIS: ICD-10-CM

## 2024-10-08 DIAGNOSIS — N13.8 BPH WITH OBSTRUCTION/LOWER URINARY TRACT SYMPTOMS: ICD-10-CM

## 2024-10-08 DIAGNOSIS — C61 PROSTATE CANCER: ICD-10-CM

## 2024-10-08 DIAGNOSIS — I10 ESSENTIAL HYPERTENSION: Primary | ICD-10-CM

## 2024-10-08 DIAGNOSIS — M1A.00X0 IDIOPATHIC CHRONIC GOUT WITHOUT TOPHUS, UNSPECIFIED SITE: ICD-10-CM

## 2024-10-08 DIAGNOSIS — R80.9 CONTROLLED TYPE 2 DIABETES MELLITUS WITH MICROALBUMINURIA, WITHOUT LONG-TERM CURRENT USE OF INSULIN: ICD-10-CM

## 2024-10-08 DIAGNOSIS — N18.30 CKD STAGE 3 DUE TO TYPE 2 DIABETES MELLITUS: ICD-10-CM

## 2024-10-08 DIAGNOSIS — I70.0 AORTIC ATHEROSCLEROSIS: ICD-10-CM

## 2024-10-08 PROCEDURE — 99999 PR PBB SHADOW E&M-EST. PATIENT-LVL IV: CPT | Mod: PBBFAC,,, | Performed by: FAMILY MEDICINE

## 2024-10-08 PROCEDURE — 99214 OFFICE O/P EST MOD 30 MIN: CPT | Mod: S$PBB,,, | Performed by: FAMILY MEDICINE

## 2024-10-08 PROCEDURE — 99214 OFFICE O/P EST MOD 30 MIN: CPT | Mod: PBBFAC,PO | Performed by: FAMILY MEDICINE

## 2024-10-08 PROCEDURE — G2211 COMPLEX E/M VISIT ADD ON: HCPCS | Mod: S$PBB,,, | Performed by: FAMILY MEDICINE

## 2024-10-08 RX ORDER — MELOXICAM 7.5 MG/1
7.5 TABLET ORAL DAILY PRN
Qty: 30 TABLET | Refills: 2 | Status: CANCELLED | OUTPATIENT
Start: 2024-10-08

## 2024-10-08 NOTE — Clinical Note
Good morning,  We share this patient. He mentioned to me that he was told by your department that he needs  nursing for urinary retention. I just wanted to check with you regarding whether this is needed. I am happy to put in the order, I just did not see it mentioned in your last note, so I wanted to clarify.  Thanks, Dr. Adela Diaz, DO Family Medicine

## 2024-10-09 ENCOUNTER — TELEPHONE (OUTPATIENT)
Dept: FAMILY MEDICINE | Facility: CLINIC | Age: 89
End: 2024-10-09
Payer: MEDICARE

## 2024-10-09 NOTE — TELEPHONE ENCOUNTER
Attempted to contact pt, phone number listed is daughter's number. Daughter states pt does not know his medications and that she is the one who organizes his meds and puts them in the pill planner. Daughter states she is at work but when she get a moment she will send all of pt's med names and doses to PCP through the portal.

## 2024-10-09 NOTE — TELEPHONE ENCOUNTER
----- Message from Nurse Walton sent at 10/9/2024 12:41 PM CDT -----  Regarding: FW: BP    ----- Message -----  From: Adela Diaz DO  Sent: 10/8/2024  11:27 AM CDT  To: Joe Chapman Staff  Subject: BP                                               Please contact patient to specify what medications he is taking and the doses. BP was elevated today but he did not know what he is taking at home. He has valsartan and lisinopril listed in his chart and you cannot take these together.

## 2024-10-10 RX ORDER — OXYBUTYNIN CHLORIDE 5 MG/1
5 TABLET, EXTENDED RELEASE ORAL DAILY
COMMUNITY

## 2024-10-10 NOTE — TELEPHONE ENCOUNTER
No care due was identified.  [facility] Embedded Care Due Messages. Reference number: [id number].   10/10/2024 5:06:05 PM CDT

## 2024-10-11 ENCOUNTER — CLINICAL SUPPORT (OUTPATIENT)
Dept: UROLOGY | Facility: CLINIC | Age: 89
End: 2024-10-11
Payer: MEDICARE

## 2024-10-11 VITALS — BODY MASS INDEX: 22.06 KG/M2 | WEIGHT: 136.69 LBS

## 2024-10-11 DIAGNOSIS — R33.9 URINARY RETENTION: Primary | ICD-10-CM

## 2024-10-11 PROCEDURE — 99213 OFFICE O/P EST LOW 20 MIN: CPT | Mod: PBBFAC

## 2024-10-11 PROCEDURE — 99999 PR PBB SHADOW E&M-EST. PATIENT-LVL III: CPT | Mod: PBBFAC,,,

## 2024-10-11 RX ORDER — TRAZODONE HYDROCHLORIDE 150 MG/1
150 TABLET ORAL NIGHTLY PRN
Qty: 90 TABLET | Refills: 3 | Status: SHIPPED | OUTPATIENT
Start: 2024-10-11

## 2024-10-11 RX ORDER — TAMSULOSIN HYDROCHLORIDE 0.4 MG/1
0.4 CAPSULE ORAL DAILY
Qty: 30 CAPSULE | Refills: 11 | OUTPATIENT
Start: 2024-10-11 | End: 2025-10-06

## 2024-10-11 NOTE — PROGRESS NOTES
Patient presented into clinic this morning for PVR.  PVR measured 551 ml urine retention.  Patient denies pain or discomfort. MD informed appt scheduled 1 month f/u.  ANNIKA CEDILLO

## 2024-10-14 ENCOUNTER — TELEPHONE (OUTPATIENT)
Dept: FAMILY MEDICINE | Facility: CLINIC | Age: 89
End: 2024-10-14
Payer: MEDICARE

## 2024-10-14 NOTE — TELEPHONE ENCOUNTER
----- Message from Daniel sent at 10/14/2024 11:09 AM CDT -----  Regarding: Rx refill  Patient states he [place] [facility] [address] about his pain pill and his sleeping pill refill. They didn't have any rx to fill. Patient states he needs it refill and want someone to  leonardo him about it.

## 2024-10-15 RX ORDER — ISOSORBIDE MONONITRATE 60 MG/1
60 TABLET, EXTENDED RELEASE ORAL
Qty: 90 TABLET | Refills: 3 | Status: SHIPPED | OUTPATIENT
Start: 2024-10-15

## 2024-10-16 ENCOUNTER — TELEPHONE (OUTPATIENT)
Dept: UROLOGY | Facility: CLINIC | Age: 89
End: 2024-10-16
Payer: MEDICARE

## 2024-10-16 NOTE — TELEPHONE ENCOUNTER
Reached out to patient.  I spoke with Mrs. Barber, she stated they are waiting for  to call back to let them know if he is/would be eligible for home health visit.  Patient is not straight catheterizing himself.  Mrs. Barber states he does not know how to that and she doesn't either. I explained to her that patient was taught how to catheterize himself and he stated he understood and if he needed help he had a family member that was a nurse and she could come over to help.  Mrs. Barber stated the family member came over yesterday and she is not trained to that.  I explained he can schedule an appt to have catheter inserted in clinic or have it done in the ER.  Verbalized understanding.  ANNIKA CEDILLO   ----- Message from Rjai Mcmullen MD sent at 10/16/2024 10:19 AM CDT -----  Regarding: RE: Ambrosio Bailey [facility] health will not place the initial seo, they can only exchange a previously placed seo catheter. He needs a RN visit for seo placement, thanks  ----- Message -----  From: Suresh Cooper MA  Sent: 10/15/2024   1:15 PM CDT  To: Raji Mcmullen MD  Subject: FW: Donmynorshandra Bailey Grandaughter                 Patient needs order for someone to insert cath at home  ----- Message -----  From: Dilip Castillo  Sent: 10/15/2024  11:39 AM CDT  To: [facility] Staff  Subject: Ambrosio Bailey Grandaughter                     Caller is requesting to schedule their Lab appointment prior to annual appointment.    Order is not listed in EPIC.  Please enter order and contact patient to schedule.    Name of Caller:[facility]     Preferred Date and Time of Labs: Pt needs orders for someone to come to the home and insert  a catheter     Date of EPP Appointment: 11/11/24    Where would they like the lab performed? Home     Would the patient rather a call back or a response via [facility]? Call back     Best Call Back Number:[phone]       Additional Information:    Thank you.

## 2024-10-18 ENCOUNTER — HOSPITAL ENCOUNTER (EMERGENCY)
Facility: HOSPITAL | Age: 89
Discharge: HOME | End: 2024-10-18
Attending: EMERGENCY MEDICINE
Payer: MEDICARE

## 2024-10-18 VITALS
DIASTOLIC BLOOD PRESSURE: 75 MMHG | SYSTOLIC BLOOD PRESSURE: 162 MMHG | WEIGHT: 138 LBS | HEIGHT: 65 IN | OXYGEN SATURATION: 97 % | BODY MASS INDEX: 22.99 KG/M2 | TEMPERATURE: 98 F | HEART RATE: 73 BPM | RESPIRATION RATE: 19 BRPM

## 2024-10-18 DIAGNOSIS — I10 HTN (HYPERTENSION): ICD-10-CM

## 2024-10-18 DIAGNOSIS — R33.8 ACUTE URINARY RETENTION: Primary | ICD-10-CM

## 2024-10-18 LAB
ALBUMIN SERPL-MCNC: 3.7 G/DL (ref 3.3–5.5)
ALP SERPL-CCNC: 63 U/L (ref 42–141)
BILIRUB SERPL-MCNC: 0.6 MG/DL (ref 0.2–1.6)
BILIRUBIN, POC UA: NEGATIVE
BLOOD, POC UA: NEGATIVE
BUN SERPL-MCNC: 15 MG/DL (ref 7–22)
CALCIUM SERPL-MCNC: 9.6 MG/DL (ref 8–10.3)
CHLORIDE SERPL-SCNC: 108 MMOL/L (ref 98–108)
CLARITY, UA: CLEAR
COLOR, UA: YELLOW
CREAT SERPL-MCNC: 1.3 MG/DL (ref 0.6–1.2)
GLUCOSE SERPL-MCNC: 98 MG/DL (ref 73–118)
GLUCOSE, POC UA: NEGATIVE
HCT, POC: NORMAL
HGB, POC: NORMAL (ref 14–18)
KETONES, POC UA: NEGATIVE
LEUKOCYTE EST, POC UA: NEGATIVE
MCH, POC: NORMAL
MCHC, POC: NORMAL
MCV, POC: NORMAL
MPV, POC: NORMAL
NITRITE, POC UA: NEGATIVE
PH UR STRIP: 6.5 [PH] (ref 5–8)
POC ALT (SGPT): 9 U/L (ref 10–47)
POC AST (SGOT): 28 U/L (ref 11–38)
POC CARDIAC TROPONIN I: 0.02 NG/ML (ref 0–0.08)
POC PLATELET COUNT: NORMAL
POC TCO2: 25 MMOL/L (ref 18–33)
POTASSIUM BLD-SCNC: 4.2 MMOL/L (ref 3.6–5.1)
PROTEIN, POC UA: NEGATIVE
PROTEIN, POC: 6.5 G/DL (ref 6.4–8.1)
RBC, POC: NORMAL
RDW, POC: NORMAL
SAMPLE: NORMAL
SODIUM BLD-SCNC: 144 MMOL/L (ref 128–145)
SPECIFIC GRAVITY, POC UA: 1.02 (ref 1–1.03)
UROBILINOGEN, POC UA: 0.2 E.U./DL
WBC, POC: NORMAL

## 2024-10-18 PROCEDURE — 93010 ELECTROCARDIOGRAM REPORT: CPT | Mod: ,,, | Performed by: INTERNAL MEDICINE

## 2024-10-18 PROCEDURE — 80053 COMPREHEN METABOLIC PANEL: CPT | Mod: ER

## 2024-10-18 PROCEDURE — 63600175 PHARM REV CODE 636 W HCPCS: Mod: ER | Performed by: EMERGENCY MEDICINE

## 2024-10-18 PROCEDURE — 25000003 PHARM REV CODE 250: Mod: ER | Performed by: EMERGENCY MEDICINE

## 2024-10-18 PROCEDURE — 51798 US URINE CAPACITY MEASURE: CPT | Mod: ER

## 2024-10-18 PROCEDURE — 81003 URINALYSIS AUTO W/O SCOPE: CPT | Mod: ER

## 2024-10-18 PROCEDURE — 93005 ELECTROCARDIOGRAM TRACING: CPT | Mod: ER

## 2024-10-18 PROCEDURE — 85025 COMPLETE CBC W/AUTO DIFF WBC: CPT | Mod: ER

## 2024-10-18 PROCEDURE — 51702 INSERT TEMP BLADDER CATH: CPT | Mod: ER

## 2024-10-18 PROCEDURE — 96374 THER/PROPH/DIAG INJ IV PUSH: CPT | Mod: 59,ER

## 2024-10-18 PROCEDURE — 99284 EMERGENCY DEPT VISIT MOD MDM: CPT | Mod: 25,ER

## 2024-10-18 PROCEDURE — 84484 ASSAY OF TROPONIN QUANT: CPT | Mod: ER

## 2024-10-18 RX ORDER — HYDRALAZINE HYDROCHLORIDE 20 MG/ML
10 INJECTION INTRAMUSCULAR; INTRAVENOUS
Status: COMPLETED | OUTPATIENT
Start: 2024-10-18 | End: 2024-10-18

## 2024-10-18 RX ORDER — TAMSULOSIN HYDROCHLORIDE 0.4 MG/1
0.4 CAPSULE ORAL DAILY
Qty: 10 CAPSULE | Refills: 0 | Status: SHIPPED | OUTPATIENT
Start: 2024-10-18 | End: 2025-10-18

## 2024-10-18 RX ORDER — TAMSULOSIN HYDROCHLORIDE 0.4 MG/1
0.4 CAPSULE ORAL
Status: COMPLETED | OUTPATIENT
Start: 2024-10-18 | End: 2024-10-18

## 2024-10-18 RX ORDER — OXYCODONE AND ACETAMINOPHEN 5; 325 MG/1; MG/1
1 TABLET ORAL
Status: COMPLETED | OUTPATIENT
Start: 2024-10-18 | End: 2024-10-18

## 2024-10-18 RX ADMIN — HYDRALAZINE HYDROCHLORIDE 10 MG: 20 INJECTION INTRAMUSCULAR; INTRAVENOUS at 05:10

## 2024-10-18 RX ADMIN — OXYCODONE HYDROCHLORIDE AND ACETAMINOPHEN 1 TABLET: 5; 325 TABLET ORAL at 05:10

## 2024-10-18 RX ADMIN — TAMSULOSIN HYDROCHLORIDE 0.4 MG: 0.4 CAPSULE ORAL at 05:10

## 2024-10-18 NOTE — ED NOTES
Bed: EXAM 03  Expected date: 10/18/24  Expected time: 3:21 PM  Means of arrival:   Comments:  Triage

## 2024-10-18 NOTE — ED PROVIDER NOTES
Encounter Date: 10/18/2024    SCRIBE #1 NOTE: Gi BETTENCOURT, daphnie scribing for, and in the presence of,  Maria Teresa Wilson DO.       History     Chief Complaint   Patient presents with    CATHETER PLACEMENT     PT PRESENTS TO ER FOR CATHETER PLACEMENT     Destin Barber is a 89 y.o. male with Hx of CAD, HTN, MI, and DMII, who presents to the ED for chief complaint of help with catheterization. Patient reports he is supposed to self cath but cannot figure it out. He states he feels the urge to urinate, but cannot empty his bladder. He also notes he cannot find his prescription for Flomax. Patient has allergy to penicillins.       The history is provided by the patient. No  was used.     Review of patient's allergies indicates:   Allergen Reactions    Penicillins Nausea And Vomiting     Pt reports he is not allergic to penicillin       Past Medical History:   Diagnosis Date    Acute coronary syndrome 06/24/2016    s/p 3V CABG 7/2016    Anemia     Coronary artery disease     Diastolic dysfunction     Gout, chronic     Heart failure     HTN (hypertension)     Hyperlipidemia     Myocardial infarction     Pneumonia due to other staphylococcus     Pressure ulcer     Renal manifestation of secondary diabetes mellitus     Type 2 diabetes mellitus     diet controlled    Urge incontinence of urine 7/21/2023     Past Surgical History:   Procedure Laterality Date    CATARACT EXTRACTION Bilateral     CATARACT EXTRACTION W/ ANTERIOR VITRECTOMY      CORONARY ARTERY BYPASS GRAFT  07/06/2016    PAIZ-LAD, SVG-Ramus, SVG-PDA    FLUOROSCOPIC URODYNAMIC STUDY N/A 4/25/2023    Procedure: URODYNAMIC STUDY, FLUOROSCOPIC;  Surgeon: Raji Mcmullen MD;  Location: [facility];  Service: Urology;  Laterality: N/A;  RN PHONE PREOP WITH GRANDDAUGHTER ROGER 4/21/23    HEMORRHOID SURGERY      LASER ENUCLEATION OF PROSTATE N/A 7/6/2023    Procedure: ENUCLEATION, PROSTATE, USING LASER; cystolithalopaxy;  Surgeon: Raji  MD Benedict;  Location: [facility] OR;  Service: Urology;  Laterality: N/A;  notify radha MAIN [phone] SPOKE TO NICK ON 2023 @ 10:37AM. CONFIRMATION NUMBER [id number]  FIORDALIZA POLLACK [phone] EMAILED CHANNING ON 6/15/2023@ 3:07PM-KEYON  RN PREOP 2023   T/S ON 2023  RN PREOP 2023 --JM     Family History   Problem Relation Name Age of Onset    Hypertension Mother      Heart disease Mother      Cancer Father          colon    Heart disease Sister      No Known Problems Sister      No Known Problems Sister      No Known Problems Sister      No Known Problems Sister      Heart disease Brother      No Known Problems Brother      No Known Problems Brother      No Known Problems Brother      No Known Problems Brother      No Known Problems Brother      No Known Problems Daughter      No Known Problems Daughter      No Known Problems Son      Amblyopia Neg Hx      Blindness Neg Hx      Cataracts Neg Hx      Diabetes Neg Hx      Glaucoma Neg Hx      Macular degeneration Neg Hx      Retinal detachment Neg Hx      Strabismus Neg Hx      Stroke Neg Hx      Thyroid disease Neg Hx       Social History     Tobacco Use    Smoking status: Former     Types: Cigars     Start date:      Quit date:      Years since quittin.8    Smokeless tobacco: Never    Tobacco comments:     Former smoker. Pt. Smoked 2 cigars daily.   Substance Use Topics    Alcohol use: No    Drug use: No     Review of Systems   Constitutional:  Negative for fever.   HENT:  Negative for rhinorrhea and sore throat.    Respiratory:  Negative for shortness of breath.    Cardiovascular:  Negative for leg swelling.   Gastrointestinal:         (+) help with catheterization    Genitourinary:  Positive for decreased urine volume, difficulty urinating and urgency.   Skin:  Negative for rash.   Neurological:  Negative for numbness.   All other systems reviewed and are negative.      Physical Exam     Initial Vitals [10/18/24 1509]   BP  Pulse Resp Temp SpO2   (!) 201/103 84 18 97.5 °F (36.4 °C) 98 %      MAP       --         Physical Exam    Nursing note and vitals reviewed.  Constitutional: He appears well-developed and well-nourished.   HENT:   Head: Normocephalic and atraumatic.   Right Ear: External ear normal.   Left Ear: External ear normal.   Nose: Nose normal. Mouth/Throat: Oropharynx is clear and moist.   Eyes: Conjunctivae and EOM are normal. Pupils are equal, round, and reactive to light.   Neck: Neck supple. JVD (bilateral) present.   Normal range of motion.  Cardiovascular:  Normal rate, regular rhythm and normal heart sounds.     Exam reveals no gallop and no friction rub.       No murmur heard.  Pulmonary/Chest: Effort normal and breath sounds normal. No respiratory distress. He has no wheezes. He has no rhonchi. He has no rales.   Abdominal: Abdomen is soft. Bowel sounds are normal. There is no abdominal tenderness. There is no rebound and no guarding.   Genitourinary:    Genitourinary Comments: Grater than 300 cc of urination on bladder scan after urination. Lee placed.   Exam chaperoned by Jenn BARRIENTOS     Musculoskeletal:         General: No tenderness or edema. Normal range of motion.      Cervical back: Normal range of motion and neck supple.     Neurological: He is alert and oriented to person, place, and time. No cranial nerve deficit.   Skin: Skin is warm and dry. Capillary refill takes less than 2 seconds. No rash noted.   Psychiatric: He has a normal mood and affect. His behavior is normal.         ED Course   Procedures  Labs Reviewed   POCT CMP - Abnormal       Result Value    Albumin, POC 3.7      Alkaline Phosphatase, POC 63      ALT (SGPT), POC 9 (*)     AST (SGOT), POC 28      POC BUN 15      Calcium, POC 9.6      POC Chloride 108      POC Creatinine 1.3 (*)     POC Glucose 98      POC Potassium 4.2      POC Sodium 144      Bilirubin, POC 0.6      POC TCO2 25      Protein, POC 6.5     TROPONIN ISTAT    POC Cardiac  Troponin I 0.02      Sample unknown     POCT URINALYSIS W/O SCOPE   POCT CBC    Hematocrit        Hemoglobin        RBC        WBC        MCV        MCH, POC        MCHC        RDW-CV        Platelet Count, POC        MPV       POCT URINALYSIS W/O SCOPE    Glucose, UA Negative      Bilirubin, UA Negative      Ketones, UA Negative      Spec Grav UA 1.025      Blood, UA Negative      PH, UA 6.5      Protein, UA Negative      Urobilinogen, UA 0.2      Nitrite, UA Negative      Leukocytes, UA Negative      Color, UA POC Yellow      Clarity, UA, POC Clear     POCT CMP   POCT TROPONIN        ECG Results              EKG 12-lead (Final result)        Collection Time Result Time QRS Duration OHS QTC Calculation    10/18/24 16:27:26 10/19/24 11:17:44 134 458                     Final result by Interface, Lab In [facility] (10/19/24 11:17:48)                   Narrative:    Test Reason : I10,    Vent. Rate : 063 BPM     Atrial Rate : 063 BPM     P-R Int : 152 ms          QRS Dur : 134 ms      QT Int : 448 ms       P-R-T Axes : 001 -43 012 degrees     QTc Int : 458 ms    Normal sinus rhythm  Left axis deviation  Right bundle branch block  Minimal voltage criteria for LVH, may be normal variant ( R in aVL )  Abnormal ECG  When compared with ECG of 21-JUN-2024 13:31,  Significant changes have occurred  Confirmed by Carrillo Piedra MD ([id number]) on 10/19/2024 11:17:42 AM    Referred By: WENDY   SELF           Confirmed By:Carrillo Piedra MD                                  Imaging Results    None          Medications   tamsulosin 24 hr capsule 0.4 mg (0.4 mg Oral Given 10/18/24 1714)   oxyCODONE-acetaminophen 5-325 mg per tablet 1 tablet (1 tablet Oral Given 10/18/24 1715)   hydrALAZINE injection 10 mg (10 mg Intravenous Given 10/18/24 1714)     Medical Decision Making  Amount and/or Complexity of Data Reviewed  Labs: ordered. Decision-making details documented in ED Course.  ECG/medicine tests: ordered and independent  interpretation performed. Decision-making details documented in ED Course.     Details: EKG independently interpreted by Dr. Wilson, see ED course.    Medical Decision Making:    This is an evaluation of a 89 y.o. male that presents to the Emergency Department for   Chief Complaint   Patient presents with    CATHETER PLACEMENT     PT PRESENTS TO ER FOR CATHETER PLACEMENT     The patient is a non-toxic and well appearing patient. On physical exam, patient appears well hydrated with moist mucus membranes. Breath sounds are clear and equal bilaterally with no adventitious breath sounds, tachypnea or respiratory distress. Regular rate and rhythm. No murmurs. Abdomen soft and non tender. Patient is tolerating PO without difficulty. Physical exam otherwise as above.     I have reviewed vital signs and nursing notes.   Vital Signs Are Reassuring.     Based on the patient's symptoms, I am considering and evaluating for the following differential diagnoses: acute urinary retention, urinary retention, UTI, HTN, uncontrolled  HTN, renal failure.     ED Course:Treatment in the ED included Physical Exam and medications given in ED  Medications   tamsulosin 24 hr capsule 0.4 mg (0.4 mg Oral Given 10/18/24 1714)   oxyCODONE-acetaminophen 5-325 mg per tablet 1 tablet (1 tablet Oral Given 10/18/24 1715)   hydrALAZINE injection 10 mg (10 mg Intravenous Given 10/18/24 1714)   .   Patient reports feeling better after treatment in the ER.   Vital signs reviewed  Nurse's notes reviewed  External Data/Documents Reviewed: Previous medical records and vital signs reviewed, see HPI and Physical exam.   Labs: ordered and reviewed.  Glucose within normal limits at 98.  UA negative for leukocytes and nitrites  EKG:  No STEMI.    Risk  Diagnosis or treatment significantly limited by the following social determinants of health: Body mass index is 22.96 kg/m².     In shared decision making with the patient, we discussed treatment, prescriptions,  labs, and imaging results.    Discharge home with   ED Prescriptions       Medication Sig Dispense Start Date End Date Auth. Provider    tamsulosin (FLOMAX) 0.4 mg Cap Take 1 capsule (0.4 mg total) by mouth once daily. 10 capsule 10/18/2024 10/18/2025 Maria Teresa Wilson,           Fill and take prescriptions as directed.  Return to the ED if symptoms worsen or do not resolve.   Answered questions and discussed discharge plan.    Patient reports resolution of symptoms and is ready for discharge.  Follow up with PCP/specialist in 1 day    The following labs and imaging were reviewed:        Admission on 10/18/2024, Discharged on 10/18/2024   Component Date Value Ref Range Status    QRS Duration 10/18/2024 134  ms Final    OHS QTC Calculation 10/18/2024 458  ms Final    Glucose, UA 10/18/2024 Negative  Negative Final    Bilirubin, UA 10/18/2024 Negative  Negative Final    Ketones, UA 10/18/2024 Negative  Negative Final    Spec Grav UA 10/18/2024 1.025  1.005 - 1.030 Final    Blood, UA 10/18/2024 Negative  Negative Final    PH, UA 10/18/2024 6.5  5.0 - 8.0 Final    Protein, UA 10/18/2024 Negative  Negative Final    Urobilinogen, UA 10/18/2024 0.2  <=1.0 E.U./dL Final    Nitrite, UA 10/18/2024 Negative  Negative Final    Leukocytes, UA 10/18/2024 Negative  Negative Final    Color, UA POC 10/18/2024 Yellow  Yellow, Straw, [place] Final    Clarity, UA, POC 10/18/2024 Clear  Clear Final    Albumin, POC 10/18/2024 3.7  3.3 - 5.5 g/dL Final    Alkaline Phosphatase, POC 10/18/2024 63  42 - 141 U/L Final    ALT (SGPT), POC 10/18/2024 9 (L)  10 - 47 U/L Final    AST (SGOT), POC 10/18/2024 28  11 - 38 U/L Final    POC BUN 10/18/2024 15  7 - 22 mg/dL Final    Calcium, POC 10/18/2024 9.6  8.0 - 10.3 mg/dL Final    POC Chloride 10/18/2024 108  98 - 108 mmol/L Final    POC Creatinine 10/18/2024 1.3 (H)  0.6 - 1.2 mg/dL Final    POC Glucose 10/18/2024 98  73 - 118 mg/dL Final    POC Potassium 10/18/2024 4.2  3.6 - 5.1 mmol/L Final    POC  Sodium 10/18/2024 144  128 - 145 mmol/L Final    Bilirubin, POC 10/18/2024 0.6  0.2 - 1.6 mg/dL Final    POC TCO2 10/18/2024 25  18 - 33 mmol/L Final    Protein, POC 10/18/2024 6.5  6.4 - 8.1 g/dL Final    POC Cardiac Troponin I 10/18/2024 0.02  0.00 - 0.08 ng/mL Final    Sample 10/18/2024 unknown   Final    Comment: A single negative troponin is insufficient to rule out myocardial infarction.  The use of a serial sampling protocol is recommended practice. Correlate results with reference intervals established for methodology used. Point of care and core laboratory   troponin results are not interchangeable.          Imaging Results    None               Scribe Attestation:   Scribe #1: I performed the above scribed service and the documentation accurately describes the services I performed. I attest to the accuracy of the note.        ED Course as of 10/19/24 1137   Fri Oct 18, 2024   1707 No STEMI. Rate of 63. Normal Sinus Rhythm. Leftward Axis. Abnormal EKG. Qtc 458. When compared to prior EKG dated 6/21/24 rate decreased by 41 bpm today.  [KW]      ED Course User Index  [KW] Gi Becerra, Dr. Maria Teresa Wilson, personally performed the services described in this documentation. This document was produced by a scribe under my direction and in my presence. All medical record entries made by the scribe were at my direction and in my presence.  I have reviewed the chart and agree that the record reflects my personal performance and is accurate and complete. Maria Teresa Wilson, .     10/19/2024 11:37 AM    Clinical Impression:  Final diagnoses:  [I10] HTN (hypertension)  [R33.8] Acute urinary retention (Primary)          ED Disposition Condition    Discharge Stable          ED Prescriptions       Medication Sig Dispense Start Date End Date Auth. Provider    tamsulosin (FLOMAX) 0.4 mg Cap Take 1 capsule (0.4 mg total) by mouth once daily. 10 capsule 10/18/2024 10/18/2025 Maria Teresa Wilson,  DO          Follow-up Information       Follow up With Specialties Details Why Contact Info Additional Information    [facility] - Urology Urology Schedule an appointment as soon as possible for a visit in 1 day  [address]  [facility] [address] [phone] Please park in garage or [facility] surface lot and check in at [facility] [address].              Maria Teresa Wilson,   10/19/24 1130

## 2024-10-18 NOTE — ED NOTES
Bladder scan showed >500cc. Pt voided. 200cc urine noted. Pt rescanned and >380cc noted. Md notified cath placed and 425cc yellow urine noted

## 2024-10-19 ENCOUNTER — HOSPITAL ENCOUNTER (EMERGENCY)
Facility: HOSPITAL | Age: 89
Discharge: HOME | End: 2024-10-19
Attending: EMERGENCY MEDICINE
Payer: MEDICARE

## 2024-10-19 VITALS
OXYGEN SATURATION: 98 % | HEIGHT: 65 IN | DIASTOLIC BLOOD PRESSURE: 79 MMHG | WEIGHT: 138 LBS | TEMPERATURE: 98 F | BODY MASS INDEX: 22.99 KG/M2 | HEART RATE: 76 BPM | SYSTOLIC BLOOD PRESSURE: 151 MMHG | RESPIRATION RATE: 18 BRPM

## 2024-10-19 DIAGNOSIS — T83.9XXA COMPLICATION OF FOLEY CATHETER, INITIAL ENCOUNTER: Primary | ICD-10-CM

## 2024-10-19 LAB
OHS QRS DURATION: 134 MS
OHS QTC CALCULATION: 458 MS

## 2024-10-19 PROCEDURE — 99281 EMR DPT VST MAYX REQ PHY/QHP: CPT | Mod: ER

## 2024-10-19 NOTE — ED PROVIDER NOTES
Encounter Date: 10/19/2024    SCRIBE #1 NOTE: I, Maria Luisa Buenrostro, am scribing for, and in the presence of,  Maria Teresa Wilson DO.       History     Chief Complaint   Patient presents with    Male  Problem     Pt presents w/ a c/o of seo catheter coming out. Reports it was placed here yesterday.     89 y.o. male with Hx of CAD, HTN, MI, and DMII, who presents to the ED for chief complaint of a catheter problem today. Pt reports his catheter has been leaking since yesterday. He has not removed his catheter. No other exacerbating or alleviating factors. Denies fever, hematuria, or other associated symptoms.     The history is provided by the patient. No  was used.     Review of patient's allergies indicates:   Allergen Reactions    Penicillins Nausea And Vomiting     Pt reports he is not allergic to penicillin       Past Medical History:   Diagnosis Date    Acute coronary syndrome 06/24/2016    s/p 3V CABG 7/2016    Anemia     Coronary artery disease     Diastolic dysfunction     Gout, chronic     Heart failure     HTN (hypertension)     Hyperlipidemia     Myocardial infarction     Pneumonia due to other staphylococcus     Pressure ulcer     Renal manifestation of secondary diabetes mellitus     Type 2 diabetes mellitus     diet controlled    Urge incontinence of urine 7/21/2023     Past Surgical History:   Procedure Laterality Date    CATARACT EXTRACTION Bilateral     CATARACT EXTRACTION W/ ANTERIOR VITRECTOMY      CORONARY ARTERY BYPASS GRAFT  07/06/2016    PAIZ-LAD, SVG-Ramus, SVG-PDA    FLUOROSCOPIC URODYNAMIC STUDY N/A 4/25/2023    Procedure: URODYNAMIC STUDY, FLUOROSCOPIC;  Surgeon: Raji Mcmullen MD;  Location: [facility] OR;  Service: Urology;  Laterality: N/A;  RN PHONE PREOP WITH GRANDDAUGHTER ROGER 4/21/23    HEMORRHOID SURGERY      LASER ENUCLEATION OF PROSTATE N/A 7/6/2023    Procedure: ENUCLEATION, PROSTATE, USING LASER; cystolithalopaxy;  Surgeon: Raji Mcmullen MD;  Location: [facility] OR;   Service: Urology;  Laterality: N/A;  notify [facility] [phone] SPOKE TO NICK ON 2023 @ 10:37AM. CONFIRMATION NUMBER [id number]  FIORDALIZA POLLACK [phone] EMAILED CHANNING ON 6/15/2023@ 3:07PM-  RN PREOP 2023   T/S ON 2023  RN PREOP 2023 --JM     Family History   Problem Relation Name Age of Onset    Hypertension Mother      Heart disease Mother      Cancer Father          colon    Heart disease Sister      No Known Problems Sister      No Known Problems Sister      No Known Problems Sister      No Known Problems Sister      Heart disease Brother      No Known Problems Brother      No Known Problems Brother      No Known Problems Brother      No Known Problems Brother      No Known Problems Brother      No Known Problems Daughter      No Known Problems Daughter      No Known Problems Son      Amblyopia Neg Hx      Blindness Neg Hx      Cataracts Neg Hx      Diabetes Neg Hx      Glaucoma Neg Hx      Macular degeneration Neg Hx      Retinal detachment Neg Hx      Strabismus Neg Hx      Stroke Neg Hx      Thyroid disease Neg Hx       Social History     Tobacco Use    Smoking status: Former     Types: Cigars     Start date:      Quit date:      Years since quittin.8    Smokeless tobacco: Never    Tobacco comments:     Former smoker. Pt. Smoked 2 cigars daily.   Substance Use Topics    Alcohol use: No    Drug use: No     Review of Systems   Constitutional:  Negative for fever.   HENT:  Negative for rhinorrhea and sore throat.    Respiratory:  Negative for shortness of breath.    Cardiovascular:  Negative for leg swelling.   Genitourinary:  Negative for hematuria.        (+) catheter problem   Skin:  Negative for rash.   Neurological:  Negative for numbness.   All other systems reviewed and are negative.      Physical Exam     Initial Vitals [10/19/24 0901]   BP Pulse Resp Temp SpO2   (!) 173/77 92 20 97.9 °F (36.6 °C) 99 %      MAP       --         Physical Exam    Nursing  note and vitals reviewed.  Constitutional: He appears well-developed and well-nourished.   HENT:   Head: Normocephalic and atraumatic.   Right Ear: External ear normal.   Left Ear: External ear normal.   Nose: Nose normal. Mouth/Throat: Oropharynx is clear and moist.   Eyes: Conjunctivae are normal.   Neck: Neck supple.   Normal range of motion.  Cardiovascular:  Normal rate, regular rhythm and normal heart sounds.     Exam reveals no gallop and no friction rub.       No murmur heard.  Pulmonary/Chest: Breath sounds normal. No respiratory distress. He has no wheezes. He has no rhonchi. He has no rales.   Abdominal: Abdomen is soft. Bowel sounds are normal. There is no abdominal tenderness. There is no rebound and no guarding.   Genitourinary:    Genitourinary Comments: Catheter bag upside down by L ankle.  It appears leg bag was attached backwards.  The part the normally squeeze to drain is where so went ahead and inserted the catheter.  Resulting in malfunction.  Error corrected.  Seo is now able to drain into bag and bag is able to be emptied appropriately.   exam chaperoned by Virgilio BARRIENTOS.      Musculoskeletal:         General: No tenderness or edema. Normal range of motion.      Cervical back: Normal range of motion and neck supple.     Neurological: He is alert and oriented to person, place, and time.   Skin: Skin is warm and dry. Capillary refill takes less than 2 seconds. No rash noted.   Psychiatric: He has a normal mood and affect. His behavior is normal.         Patient gave consent to have physical exam performed.    ED Course   Procedures  Labs Reviewed - No data to display       Imaging Results    None          Medications - No data to display  Medical Decision Making  Medical Decision Making:    This is an evaluation of a 89 y.o. male that presents to the Emergency Department for   Chief Complaint   Patient presents with    Male  Problem     Pt presents w/ a c/o of seo catheter coming out. Reports  it was placed here yesterday.         The patient is a non-toxic and well appearing patient. On physical exam, patient appears well hydrated with moist mucus membranes. Breath sounds are clear and equal bilaterally with no adventitious breath sounds, tachypnea or respiratory distress. Regular rate and rhythm. No murmurs. Abdomen soft and non tender. Patient is tolerating PO without difficulty. Catheter bag upside down by L ankle.  exam chaperoned by Virgilio BARRIENTOS. Physical exam otherwise as above.     I have reviewed vital signs and nursing notes.   Vital Signs Are Reassuring.     Based on the patient's symptoms, I am considering and evaluating for the following differential diagnoses: seo catheter failure, seo catheter malfunction, seo catheter complication    ED Course:Treatment in the ED included Physical Exam and medications given in ED  Medications - No data to display.   Patient reports feeling better after treatment in the ER.   Vital signs reviewed  Nurse's notes reviewed  External Data/Documents Reviewed: Previous medical records and vital signs reviewed, see HPI and Physical exam.     Risk  Diagnosis or treatment significantly limited by the following social determinants of health: Body mass index is 22.96 kg/m².     In shared decision making with the patient and family at bedside, we discussed Seo management, how to empty Seo, take prescriptions as prescribed and follow up with Urology in 1 day.        Fill and take prescriptions as directed.  Return to the ED if symptoms worsen or do not resolve.   Answered questions and discussed discharge plan.    Patient reports resolution of symptoms and is ready for discharge.  Follow up with PCP/specialist in 1 day    The following labs and imaging were reviewed:    No visits with results within 1 Day(s) from this visit.   Latest known visit with results is:   Admission on 10/18/2024, Discharged on 10/18/2024   Component Date Value Ref Range Status    QRS  Duration 10/18/2024 134  ms Final    OHS QTC Calculation 10/18/2024 458  ms Final    Glucose, UA 10/18/2024 Negative  Negative Final    Bilirubin, UA 10/18/2024 Negative  Negative Final    Ketones, UA 10/18/2024 Negative  Negative Final    Spec Grav UA 10/18/2024 1.025  1.005 - 1.030 Final    Blood, UA 10/18/2024 Negative  Negative Final    PH, UA 10/18/2024 6.5  5.0 - 8.0 Final    Protein, UA 10/18/2024 Negative  Negative Final    Urobilinogen, UA 10/18/2024 0.2  <=1.0 E.U./dL Final    Nitrite, UA 10/18/2024 Negative  Negative Final    Leukocytes, UA 10/18/2024 Negative  Negative Final    Color, UA POC 10/18/2024 Yellow  Yellow, Straw, [place] Final    Clarity, UA, POC 10/18/2024 Clear  Clear Final    Albumin, POC 10/18/2024 3.7  3.3 - 5.5 g/dL Final    Alkaline Phosphatase, POC 10/18/2024 63  42 - 141 U/L Final    ALT (SGPT), POC 10/18/2024 9 (L)  10 - 47 U/L Final    AST (SGOT), POC 10/18/2024 28  11 - 38 U/L Final    POC BUN 10/18/2024 15  7 - 22 mg/dL Final    Calcium, POC 10/18/2024 9.6  8.0 - 10.3 mg/dL Final    POC Chloride 10/18/2024 108  98 - 108 mmol/L Final    POC Creatinine 10/18/2024 1.3 (H)  0.6 - 1.2 mg/dL Final    POC Glucose 10/18/2024 98  73 - 118 mg/dL Final    POC Potassium 10/18/2024 4.2  3.6 - 5.1 mmol/L Final    POC Sodium 10/18/2024 144  128 - 145 mmol/L Final    Bilirubin, POC 10/18/2024 0.6  0.2 - 1.6 mg/dL Final    POC TCO2 10/18/2024 25  18 - 33 mmol/L Final    Protein, POC 10/18/2024 6.5  6.4 - 8.1 g/dL Final    POC Cardiac Troponin I 10/18/2024 0.02  0.00 - 0.08 ng/mL Final    Sample 10/18/2024 unknown   Final    Comment: A single negative troponin is insufficient to rule out myocardial infarction.  The use of a serial sampling protocol is recommended practice. Correlate results with reference intervals established for methodology used. Point of care and core laboratory   troponin results are not interchangeable.          Imaging Results    None               Scribe Attestation:   Scribe  #1: I performed the above scribed service and the documentation accurately describes the services I performed. I attest to the accuracy of the note.                            I, Dr. Maria Teresa Wilson, personally performed the services described in this documentation. This document was produced by a scribe under my direction and in my presence. All medical record entries made by the scribe were at my direction and in my presence.  I have reviewed the chart and agree that the record reflects my personal performance and is accurate and complete. Maria Teresa Wilson DO.     10/19/2024 11:40 AM      Clinical Impression:  Final diagnoses:  [T83.9XXA] Complication of Lee catheter, initial encounter - Leg bag was on upside down (Primary)          ED Disposition Condition    Discharge Stable          ED Prescriptions    None       Follow-up Information       Follow up With Specialties Details Why Contact Info    Raji Mcmullen MD Urology Schedule an appointment as soon as possible for a visit in 1 day  [address]   [facility] [address]  [phone]      [facility] [place] [facility] Emergency Medicine Go to  Please go to [facility] emergency department if symptoms worsen [address] [facility] [place] [address]  [phone]             Maria Teresa Wilson DO  10/19/24 1148

## 2024-10-20 ENCOUNTER — TELEPHONE (OUTPATIENT)
Dept: UROLOGY | Facility: HOSPITAL | Age: 89
End: 2024-10-20
Payer: MEDICARE

## 2024-10-20 NOTE — TELEPHONE ENCOUNTER
Pt w/ seo placed in the ED, he can follow up in 1 month for seo exchange for his chronic condition and failed voiding trials.

## 2024-10-21 ENCOUNTER — TELEPHONE (OUTPATIENT)
Dept: UROLOGY | Facility: CLINIC | Age: 89
End: 2024-10-21
Payer: MEDICARE

## 2024-10-21 NOTE — TELEPHONE ENCOUNTER
Pt was contacted to inform him of appt change and new appt scheduled. Unable to lvm  ----- Message from Raji Mcmullen MD sent at 10/20/2024 10:57 AM CDT -----  Regarding: seo  Pt can present for fu in 1 month for seo exchange since it was placed in the ED this weekend

## 2024-10-22 ENCOUNTER — PATIENT OUTREACH (OUTPATIENT)
Dept: ADMINISTRATIVE | Facility: HOSPITAL | Age: 89
End: 2024-10-22
Payer: MEDICARE

## 2024-10-23 ENCOUNTER — OFFICE VISIT (OUTPATIENT)
Dept: UROLOGY | Facility: CLINIC | Age: 89
End: 2024-10-23
Payer: MEDICARE

## 2024-10-23 VITALS — WEIGHT: 138 LBS | BODY MASS INDEX: 22.96 KG/M2

## 2024-10-23 DIAGNOSIS — C61 PROSTATE CANCER: Primary | ICD-10-CM

## 2024-10-23 DIAGNOSIS — R33.9 URINARY RETENTION: ICD-10-CM

## 2024-10-23 PROCEDURE — 99999 PR PBB SHADOW E&M-EST. PATIENT-LVL II: CPT | Mod: PBBFAC,,, | Performed by: STUDENT IN AN ORGANIZED HEALTH CARE EDUCATION/TRAINING PROGRAM

## 2024-10-23 PROCEDURE — 99212 OFFICE O/P EST SF 10 MIN: CPT | Mod: S$PBB,,, | Performed by: STUDENT IN AN ORGANIZED HEALTH CARE EDUCATION/TRAINING PROGRAM

## 2024-10-23 PROCEDURE — 99212 OFFICE O/P EST SF 10 MIN: CPT | Mod: PBBFAC | Performed by: STUDENT IN AN ORGANIZED HEALTH CARE EDUCATION/TRAINING PROGRAM

## 2024-10-23 NOTE — PROGRESS NOTES
Patient ID: Destin Barber is a 89 y.o. male.    Chief Complaint: Urinary Retention    Referral: No referring provider defined for this encounter.     HPI  89 y.o. who presents to the Urology clinic for evaluation of urinary retention, went to ED where he had seo catheter placed. Here today for seo removal. Patient s/p HOLEP for BPH symptoms, found to have incidental prostate cancer.   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    Hypokalemia 02/03/2017    Controlled type 2 diabetes mellitus with stage 3 chronic kidney disease, without long-term current use of insulin 02/03/2017    Aortic atherosclerosis 08/31/2020    BMI 23.0-23.9, adult 03/07/2023    History of syncope 03/07/2023    Elevated PSA 03/07/2023    BPH associated with nocturia 03/22/2023    Bilateral renal cysts 03/22/2023    Primary insomnia 06/15/2023    Chronic idiopathic constipation 06/15/2023    Frequency of micturition 07/21/2023    Urge incontinence of urine 07/21/2023    Stage 3b chronic kidney disease 03/22/2024    Chronic kidney disease, stage 3a 04/05/2024     Resolved Ambulatory Problems     Diagnosis Date Noted    Type 2 diabetes mellitus with complication     Hypertension, benign 01/31/2013    NSTEMI (non-ST elevated myocardial infarction) 06/23/2016    Pain of left upper extremity 06/23/2016    Coronary artery disease due to lipid rich plaque 06/24/2016    Hyperglycemia 07/07/2016    Chronic diastolic CHF (congestive heart failure), NYHA class 2 07/08/2016    Gait instability 07/12/2016    Sepsis 02/02/2017    Influenza A 02/03/2017    SOB  (shortness of breath) 02/03/2017    Paresthesia 03/12/2018    Stage 3a chronic kidney disease 06/15/2023     Past Medical History:   Diagnosis Date    Acute coronary syndrome 06/24/2016    Anemia     Coronary artery disease     Gout, chronic     Heart failure     HTN (hypertension)     Hyperlipidemia     Myocardial infarction     Pneumonia due to other staphylococcus     Pressure ulcer     Renal manifestation of secondary diabetes mellitus     Type 2 diabetes mellitus          Past Surgical History  Past Surgical History:   Procedure Laterality Date    CATARACT EXTRACTION Bilateral     CATARACT EXTRACTION W/ ANTERIOR VITRECTOMY      CORONARY ARTERY BYPASS GRAFT  07/06/2016    PAIZ-LAD, SVG-Ramus, SVG-PDA    FLUOROSCOPIC URODYNAMIC STUDY N/A 4/25/2023    Procedure: URODYNAMIC STUDY, FLUOROSCOPIC;  Surgeon: Raji Mcmullen MD;  Location: [facility] OR;  Service: Urology;  Laterality: N/A;  RN PHONE PREOP WITH GRANDDAUGHTER ROGER 4/21/23    HEMORRHOID SURGERY      LASER ENUCLEATION OF PROSTATE N/A 7/6/2023    Procedure: ENUCLEATION, PROSTATE, USING LASER; cystolithalopaxy;  Surgeon: Raji Mcmullen MD;  Location: [facility] OR;  Service: Urology;  Laterality: N/A;  notify Critical access hospital  [facility] [phone] SPOKE TO NICK ON 6/2/2023 @ 10:37AM. CONFIRMATION NUMBER [id number]  FIORDALIZA POLLACK [phone] EMAILED CHANNING ON 6/15/2023@ 3:07PM-LO  RN PREOP 5/30/2023   T/S ON 6/5/2023  RN PREOP 06/30/2023 --JM       Social History       Medications    Current Outpatient Medications:     amLODIPine (NORVASC) 10 MG tablet, Take 1 tablet (10 mg total) by mouth once daily., Disp: 90 tablet, Rfl: 3    benzonatate (TESSALON) 200 MG capsule, Take 1 capsule (200 mg total) by mouth 3 (three) times daily as needed for Cough., Disp: 30 capsule, Rfl: 1    blood sugar diagnostic Strp, To check BG daily, to use with insurance preferred meter, Disp: 200 each, Rfl: 11    blood-glucose meter kit, To check BG daily, to use with insurance preferred meter,  Disp: 1 each, Rfl: 0    isosorbide mononitrate (IMDUR) 60 MG 24 hr tablet, TAKE 1 TABLET(60 MG) BY MOUTH EVERY DAY, Disp: 90 tablet, Rfl: 3    lancets Misc, To check BG daily, to use with insurance preferred meter, Disp: 200 each, Rfl: 11    lisinopriL (PRINIVIL,ZESTRIL) 40 MG tablet, Take 1 tablet (40 mg total) by mouth once daily., Disp: 90 tablet, Rfl: 3    metFORMIN (GLUCOPHAGE-XR) 500 MG ER 24hr tablet, TAKE 1 TABLET BY MOUTH DAILY WITH BREAKFAST, Disp: 90 tablet, Rfl: 3    metoprolol tartrate (LOPRESSOR) 25 MG tablet, Take 1 tablet by mouth 2 (two) times daily., Disp: , Rfl:     pravastatin (PRAVACHOL) 40 MG tablet, Take 1 tablet (40 mg total) by mouth once daily., Disp: 90 tablet, Rfl: 3    somatropin (NORDITROPIN FLEXPRO) 10 mg/1.5 mL (6.7 mg/mL) PnIj, Inject into the skin., Disp: , Rfl:     tamsulosin (FLOMAX) 0.4 mg Cap, Take 1 capsule (0.4 mg total) by mouth once daily., Disp: 30 capsule, Rfl: 11    tamsulosin (FLOMAX) 0.4 mg Cap, Take 1 capsule (0.4 mg total) by mouth once daily., Disp: 10 capsule, Rfl: 0    traZODone (DESYREL) 150 MG tablet, Take 1 tablet (150 mg total) by mouth nightly as needed for Insomnia., Disp: 90 tablet, Rfl: 3  No current facility-administered medications for this visit.    Facility-Administered Medications Ordered in Other Visits:     LIDOcaine (PF) 10 mg/ml (1%) injection 10 mg, 1 mL, Intradermal, Once, Julia Fong MD    Allergies  Review of patient's allergies indicates:   Allergen Reactions    Penicillins Nausea And Vomiting     Pt reports he is not allergic to penicillin         Patient's PMH, FH, Social hx, Medications, allergies reviewed and updated as pertinent to today's visit    Objective:      Physical Exam  Constitutional:       General: He is not in acute distress.     Appearance: He is well-developed. He is not ill-appearing, toxic-appearing or diaphoretic.   HENT:      Head: Normocephalic and atraumatic.      Mouth/Throat:      Mouth: Mucous membranes are  moist.   Eyes:      Conjunctiva/sclera: Conjunctivae normal.   Pulmonary:      Effort: Pulmonary effort is normal. No respiratory distress.   Abdominal:      General: Abdomen is flat. There is no distension.      Palpations: Abdomen is soft. There is no mass.      Tenderness: There is no right CVA tenderness or left CVA tenderness.   Musculoskeletal:         General: No swelling or deformity.      Cervical back: Neck supple.   Skin:     Findings: No rash.   Neurological:      Mental Status: He is alert and oriented to person, place, and time.      Gait: Gait normal.   Psychiatric:         Mood and Affect: Mood normal.         Thought Content: Thought content normal.         Judgment: Judgment normal.             Lab Results   Component Value Date    PSADIAG 4.5 (H) 03/26/2024     Assessment:       1. Prostate cancer    2. Urinary retention        Plan:       Urinary retention  Discussed he does not empty his bladder well, he does not want a chronic indwelling seo catheter, he is unable to self catheterize  He is aware of his risk for rentention, KAL, sepsis/UTI  Seo catheter removed at patient's request, he is aware to present to ED if unable to void    Prostate cancer on watchful waiting protocol given his age

## 2024-10-28 ENCOUNTER — HOSPITAL ENCOUNTER (EMERGENCY)
Facility: HOSPITAL | Age: 89
Discharge: HOME | End: 2024-10-28
Attending: STUDENT IN AN ORGANIZED HEALTH CARE EDUCATION/TRAINING PROGRAM
Payer: MEDICARE

## 2024-10-28 VITALS
RESPIRATION RATE: 18 BRPM | HEIGHT: 65 IN | DIASTOLIC BLOOD PRESSURE: 106 MMHG | BODY MASS INDEX: 22.99 KG/M2 | TEMPERATURE: 98 F | SYSTOLIC BLOOD PRESSURE: 185 MMHG | HEART RATE: 90 BPM | WEIGHT: 138 LBS | OXYGEN SATURATION: 97 %

## 2024-10-28 DIAGNOSIS — Z85.46 HISTORY OF PROSTATE CANCER: ICD-10-CM

## 2024-10-28 DIAGNOSIS — R33.9 URINARY RETENTION: Primary | ICD-10-CM

## 2024-10-28 DIAGNOSIS — R10.2 PELVIC PAIN IN MALE: ICD-10-CM

## 2024-10-28 DIAGNOSIS — N30.01 ACUTE CYSTITIS WITH HEMATURIA: ICD-10-CM

## 2024-10-28 LAB
ALBUMIN SERPL BCP-MCNC: 2.9 G/DL (ref 3.5–5.2)
ALP SERPL-CCNC: 68 U/L (ref 40–150)
ALT SERPL W/O P-5'-P-CCNC: 18 U/L (ref 10–44)
ANION GAP SERPL CALC-SCNC: 13 MMOL/L (ref 8–16)
AST SERPL-CCNC: 24 U/L (ref 10–40)
BACTERIA #/AREA URNS HPF: ABNORMAL /HPF
BASOPHILS # BLD AUTO: 0.02 K/UL (ref 0–0.2)
BASOPHILS NFR BLD: 0.4 % (ref 0–1.9)
BILIRUB SERPL-MCNC: 0.3 MG/DL (ref 0.1–1)
BILIRUB UR QL STRIP: NEGATIVE
BUN SERPL-MCNC: 25 MG/DL (ref 8–23)
CALCIUM SERPL-MCNC: 8.5 MG/DL (ref 8.7–10.5)
CHLORIDE SERPL-SCNC: 105 MMOL/L (ref 95–110)
CLARITY UR: ABNORMAL
CO2 SERPL-SCNC: 22 MMOL/L (ref 23–29)
COLOR UR: ABNORMAL
CREAT SERPL-MCNC: 1.9 MG/DL (ref 0.5–1.4)
DIFFERENTIAL METHOD BLD: ABNORMAL
EOSINOPHIL # BLD AUTO: 0.1 K/UL (ref 0–0.5)
EOSINOPHIL NFR BLD: 1.1 % (ref 0–8)
ERYTHROCYTE [DISTWIDTH] IN BLOOD BY AUTOMATED COUNT: 12 % (ref 11.5–14.5)
EST. GFR  (NO RACE VARIABLE): 34 ML/MIN/1.73 M^2
GLUCOSE SERPL-MCNC: 237 MG/DL (ref 70–110)
GLUCOSE UR QL STRIP: NEGATIVE
HCT VFR BLD AUTO: 32 % (ref 40–54)
HGB BLD-MCNC: 10.9 G/DL (ref 14–18)
HGB UR QL STRIP: ABNORMAL
HYALINE CASTS #/AREA URNS LPF: 0 /LPF
IMM GRANULOCYTES # BLD AUTO: 0.02 K/UL (ref 0–0.04)
IMM GRANULOCYTES NFR BLD AUTO: 0.4 % (ref 0–0.5)
KETONES UR QL STRIP: NEGATIVE
LEUKOCYTE ESTERASE UR QL STRIP: ABNORMAL
LIPASE SERPL-CCNC: 10 U/L (ref 4–60)
LYMPHOCYTES # BLD AUTO: 1.5 K/UL (ref 1–4.8)
LYMPHOCYTES NFR BLD: 26.4 % (ref 18–48)
MAGNESIUM SERPL-MCNC: 2.2 MG/DL (ref 1.6–2.6)
MCH RBC QN AUTO: 31.7 PG (ref 27–31)
MCHC RBC AUTO-ENTMCNC: 34.1 G/DL (ref 32–36)
MCV RBC AUTO: 93 FL (ref 82–98)
MICROSCOPIC COMMENT: ABNORMAL
MONOCYTES # BLD AUTO: 1 K/UL (ref 0.3–1)
MONOCYTES NFR BLD: 18.2 % (ref 4–15)
NEUTROPHILS # BLD AUTO: 2.9 K/UL (ref 1.8–7.7)
NEUTROPHILS NFR BLD: 53.5 % (ref 38–73)
NITRITE UR QL STRIP: POSITIVE
NRBC BLD-RTO: 0 /100 WBC
PH UR STRIP: 7 [PH] (ref 5–8)
PLATELET # BLD AUTO: 213 K/UL (ref 150–450)
PMV BLD AUTO: 9 FL (ref 9.2–12.9)
POTASSIUM SERPL-SCNC: 3.6 MMOL/L (ref 3.5–5.1)
PROT SERPL-MCNC: 6.3 G/DL (ref 6–8.4)
PROT UR QL STRIP: ABNORMAL
RBC # BLD AUTO: 3.44 M/UL (ref 4.6–6.2)
RBC #/AREA URNS HPF: >100 /HPF (ref 0–4)
SODIUM SERPL-SCNC: 140 MMOL/L (ref 136–145)
SP GR UR STRIP: 1.01 (ref 1–1.03)
URN SPEC COLLECT METH UR: ABNORMAL
UROBILINOGEN UR STRIP-ACNC: NEGATIVE EU/DL
WBC # BLD AUTO: 5.49 K/UL (ref 3.9–12.7)
WBC #/AREA URNS HPF: >100 /HPF (ref 0–5)
WBC CLUMPS URNS QL MICRO: ABNORMAL

## 2024-10-28 PROCEDURE — 99284 EMERGENCY DEPT VISIT MOD MDM: CPT | Mod: 25

## 2024-10-28 PROCEDURE — 51702 INSERT TEMP BLADDER CATH: CPT

## 2024-10-28 PROCEDURE — 63600175 PHARM REV CODE 636 W HCPCS: Performed by: STUDENT IN AN ORGANIZED HEALTH CARE EDUCATION/TRAINING PROGRAM

## 2024-10-28 PROCEDURE — 63600175 PHARM REV CODE 636 W HCPCS: Performed by: EMERGENCY MEDICINE

## 2024-10-28 PROCEDURE — 83735 ASSAY OF MAGNESIUM: CPT | Performed by: STUDENT IN AN ORGANIZED HEALTH CARE EDUCATION/TRAINING PROGRAM

## 2024-10-28 PROCEDURE — 96375 TX/PRO/DX INJ NEW DRUG ADDON: CPT

## 2024-10-28 PROCEDURE — 85025 COMPLETE CBC W/AUTO DIFF WBC: CPT | Performed by: STUDENT IN AN ORGANIZED HEALTH CARE EDUCATION/TRAINING PROGRAM

## 2024-10-28 PROCEDURE — 96361 HYDRATE IV INFUSION ADD-ON: CPT

## 2024-10-28 PROCEDURE — 87086 URINE CULTURE/COLONY COUNT: CPT | Performed by: STUDENT IN AN ORGANIZED HEALTH CARE EDUCATION/TRAINING PROGRAM

## 2024-10-28 PROCEDURE — 87088 URINE BACTERIA CULTURE: CPT | Performed by: STUDENT IN AN ORGANIZED HEALTH CARE EDUCATION/TRAINING PROGRAM

## 2024-10-28 PROCEDURE — 25000003 PHARM REV CODE 250: Performed by: EMERGENCY MEDICINE

## 2024-10-28 PROCEDURE — 80053 COMPREHEN METABOLIC PANEL: CPT | Performed by: STUDENT IN AN ORGANIZED HEALTH CARE EDUCATION/TRAINING PROGRAM

## 2024-10-28 PROCEDURE — 96374 THER/PROPH/DIAG INJ IV PUSH: CPT

## 2024-10-28 PROCEDURE — 25000003 PHARM REV CODE 250: Performed by: STUDENT IN AN ORGANIZED HEALTH CARE EDUCATION/TRAINING PROGRAM

## 2024-10-28 PROCEDURE — 81000 URINALYSIS NONAUTO W/SCOPE: CPT | Performed by: STUDENT IN AN ORGANIZED HEALTH CARE EDUCATION/TRAINING PROGRAM

## 2024-10-28 PROCEDURE — 83690 ASSAY OF LIPASE: CPT | Performed by: STUDENT IN AN ORGANIZED HEALTH CARE EDUCATION/TRAINING PROGRAM

## 2024-10-28 PROCEDURE — 87186 SC STD MICRODIL/AGAR DIL: CPT | Performed by: STUDENT IN AN ORGANIZED HEALTH CARE EDUCATION/TRAINING PROGRAM

## 2024-10-28 RX ORDER — ONDANSETRON HYDROCHLORIDE 2 MG/ML
4 INJECTION, SOLUTION INTRAVENOUS
Status: COMPLETED | OUTPATIENT
Start: 2024-10-28 | End: 2024-10-28

## 2024-10-28 RX ORDER — CEFTRIAXONE 1 G/1
1 INJECTION, POWDER, FOR SOLUTION INTRAMUSCULAR; INTRAVENOUS
Status: COMPLETED | OUTPATIENT
Start: 2024-10-28 | End: 2024-10-28

## 2024-10-28 RX ORDER — LORAZEPAM 2 MG/ML
2 INJECTION INTRAMUSCULAR
Status: DISCONTINUED | OUTPATIENT
Start: 2024-10-28 | End: 2024-10-28

## 2024-10-28 RX ORDER — HYDROCODONE BITARTRATE AND ACETAMINOPHEN 5; 325 MG/1; MG/1
1 TABLET ORAL EVERY 6 HOURS PRN
Qty: 8 TABLET | Refills: 0 | Status: SHIPPED | OUTPATIENT
Start: 2024-10-28

## 2024-10-28 RX ORDER — CLONIDINE HYDROCHLORIDE 0.1 MG/1
0.1 TABLET ORAL
Status: COMPLETED | OUTPATIENT
Start: 2024-10-28 | End: 2024-10-28

## 2024-10-28 RX ORDER — HYDROMORPHONE HYDROCHLORIDE 1 MG/ML
2 INJECTION, SOLUTION INTRAMUSCULAR; INTRAVENOUS; SUBCUTANEOUS
Status: COMPLETED | OUTPATIENT
Start: 2024-10-28 | End: 2024-10-28

## 2024-10-28 RX ORDER — CEPHALEXIN 500 MG/1
1000 CAPSULE ORAL EVERY 12 HOURS
Qty: 40 CAPSULE | Refills: 0 | Status: ON HOLD | OUTPATIENT
Start: 2024-10-28 | End: 2024-10-30 | Stop reason: HOSPADM

## 2024-10-28 RX ADMIN — ONDANSETRON 4 MG: 2 INJECTION INTRAMUSCULAR; INTRAVENOUS at 06:10

## 2024-10-28 RX ADMIN — SODIUM CHLORIDE 1000 ML: 9 INJECTION, SOLUTION INTRAVENOUS at 06:10

## 2024-10-28 RX ADMIN — CEFTRIAXONE SODIUM 1 G: 1 INJECTION, POWDER, FOR SOLUTION INTRAMUSCULAR; INTRAVENOUS at 06:10

## 2024-10-28 RX ADMIN — HYDROMORPHONE HYDROCHLORIDE 2 MG: 1 INJECTION, SOLUTION INTRAMUSCULAR; INTRAVENOUS; SUBCUTANEOUS at 08:10

## 2024-10-28 RX ADMIN — CLONIDINE HYDROCHLORIDE 0.1 MG: 0.1 TABLET ORAL at 09:10

## 2024-10-28 NOTE — DISCHARGE INSTRUCTIONS
Please follow up with the your urologist in 1 week.  Medicines as directed.  Return immediately if you get worse or if new problems develop.  Continue your Flomax.  Empty your Lee bag as needed.  Return especially for fever.

## 2024-10-28 NOTE — ED PROVIDER NOTES
Encounter Date: 10/28/2024       History     Chief Complaint   Patient presents with    Hematuria     Pt arrived to the ED due to worsening hematuria, burning with urination, and lower abdominal pain this morning. Pt reports symptoms initially started Wednesday. Lee removed on 10/23 that was initially put in for urinary retention.      HPI    89-year-old male past medical history of BPH, prostate cancer, history of retention, presenting to the emergency department for evaluation of hematuria, dysuria and lower abdominal pain.  He notes symptoms have been worsening since Wednesday.  Patient had a Lee removed 5 days prior that was placed for urinary retention.  He denies fever, chills, chest pain, shortness for breath, syncope, nausea, vomiting, flank pain.  No other mitigating or exacerbating factors.  Review of patient's allergies indicates:   Allergen Reactions    Penicillins Nausea And Vomiting     Pt reports he is not allergic to penicillin       Past Medical History:   Diagnosis Date    Acute coronary syndrome 06/24/2016    s/p 3V CABG 7/2016    Anemia     Coronary artery disease     Diastolic dysfunction     Gout, chronic     Heart failure     HTN (hypertension)     Hyperlipidemia     Myocardial infarction     Pneumonia due to other staphylococcus     Pressure ulcer     Renal manifestation of secondary diabetes mellitus     Type 2 diabetes mellitus     diet controlled    Urge incontinence of urine 7/21/2023     Past Surgical History:   Procedure Laterality Date    CATARACT EXTRACTION Bilateral     CATARACT EXTRACTION W/ ANTERIOR VITRECTOMY      CORONARY ARTERY BYPASS GRAFT  07/06/2016    PAIZ-LAD, SVG-Ramus, SVG-PDA    FLUOROSCOPIC URODYNAMIC STUDY N/A 4/25/2023    Procedure: URODYNAMIC STUDY, FLUOROSCOPIC;  Surgeon: Raji Mcmullen MD;  Location: [facility] OR;  Service: Urology;  Laterality: N/A;  RN PHONE PREOP WITH GRANDDAUGHTER ROGER 4/21/23    HEMORRHOID SURGERY      LASER ENUCLEATION OF PROSTATE N/A  2023    Procedure: ENUCLEATION, PROSTATE, USING LASER; cystolithalopaxy;  Surgeon: Raji Mcmullen MD;  Location: [facility] OR;  Service: Urology;  Laterality: N/A;  rajany radha PORTER [phone] SPOKE TO NICK ON 2023 @ 10:37AM. CONFIRMATION NUMBER [id number]  FIORDALIZA POLLACK [phone] EMAILED CHANNING ON 6/15/2023@ 3:07PM-  RN PREOP 2023   T/S ON 2023  RN PREOP 2023 --JM     Family History   Problem Relation Name Age of Onset    Hypertension Mother      Heart disease Mother      Cancer Father          colon    Heart disease Sister      No Known Problems Sister      No Known Problems Sister      No Known Problems Sister      No Known Problems Sister      Heart disease Brother      No Known Problems Brother      No Known Problems Brother      No Known Problems Brother      No Known Problems Brother      No Known Problems Brother      No Known Problems Daughter      No Known Problems Daughter      No Known Problems Son      Amblyopia Neg Hx      Blindness Neg Hx      Cataracts Neg Hx      Diabetes Neg Hx      Glaucoma Neg Hx      Macular degeneration Neg Hx      Retinal detachment Neg Hx      Strabismus Neg Hx      Stroke Neg Hx      Thyroid disease Neg Hx       Social History     Tobacco Use    Smoking status: Former     Types: Cigars     Start date:      Quit date:      Years since quittin.8    Smokeless tobacco: Never    Tobacco comments:     Former smoker. Pt. Smoked 2 cigars daily.   Substance Use Topics    Alcohol use: No    Drug use: No     Review of Systems  See HPI  Physical Exam     Initial Vitals [10/28/24 0522]   BP Pulse Resp Temp SpO2   (!) 142/90 99 (!) 24 97.8 °F (36.6 °C) 97 %      MAP       --         Physical Exam    Nursing note and vitals reviewed.  Constitutional: He appears well-developed and well-nourished. He is not diaphoretic. No distress.   HENT:   Head: Normocephalic and atraumatic.   Right Ear: External ear normal.   Left Ear: External ear  normal.   Nose: Nose normal.   Eyes: Conjunctivae are normal. No scleral icterus.   Neck: Neck supple. No tracheal deviation present.   Cardiovascular:  Normal rate, regular rhythm, normal heart sounds and intact distal pulses.     Exam reveals no gallop and no friction rub.       No murmur heard.  Pulmonary/Chest: Breath sounds normal. No respiratory distress.   Abdominal: Abdomen is soft. Bowel sounds are normal. He exhibits no distension. There is abdominal tenderness.   Suprapubic tenderness to palpation.  Otherwise abdomen is soft nontender. There is no rebound and no guarding.   Musculoskeletal:      Cervical back: Neck supple.     Neurological: He is alert and oriented to person, place, and time. GCS score is 15.   Skin: Skin is warm and dry.   Psychiatric: He has a normal mood and affect. His behavior is normal. Thought content normal.         ED Course   Procedures  Labs Reviewed   URINALYSIS, REFLEX TO URINE CULTURE - Abnormal       Result Value    Specimen UA Urine, Clean Catch      Color, UA Orange (*)     Appearance, UA Cloudy (*)     pH, UA 7.0      Specific Gravity, UA 1.010      Protein, UA 2+ (*)     Glucose, UA Negative      Ketones, UA Negative      Bilirubin (UA) Negative      Occult Blood UA 3+ (*)     Nitrite, UA Positive (*)     Urobilinogen, UA Negative      Leukocytes, UA 3+ (*)     Narrative:     Specimen Source->Urine   CBC W/ AUTO DIFFERENTIAL - Abnormal    WBC 5.49      RBC 3.44 (*)     Hemoglobin 10.9 (*)     Hematocrit 32.0 (*)     MCV 93      MCH 31.7 (*)     MCHC 34.1      RDW 12.0      Platelets 213      MPV 9.0 (*)     Immature Granulocytes 0.4      Gran # (ANC) 2.9      Immature Grans (Abs) 0.02      Lymph # 1.5      Mono # 1.0      Eos # 0.1      Baso # 0.02      nRBC 0      Gran % 53.5      Lymph % 26.4      Mono % 18.2 (*)     Eosinophil % 1.1      Basophil % 0.4      Differential Method Automated     COMPREHENSIVE METABOLIC PANEL - Abnormal    Sodium 140      Potassium 3.6       Chloride 105      CO2 22 (*)     Glucose 237 (*)     BUN 25 (*)     Creatinine 1.9 (*)     Calcium 8.5 (*)     Total Protein 6.3      Albumin 2.9 (*)     Total Bilirubin 0.3      Alkaline Phosphatase 68      AST 24      ALT 18      eGFR 34 (*)     Anion Gap 13     URINALYSIS MICROSCOPIC - Abnormal    RBC, UA >100 (*)     WBC, UA >100 (*)     WBC Clumps, UA Many (*)     Bacteria Moderate (*)     Hyaline Casts, UA 0      Microscopic Comment SEE COMMENT      Narrative:     Specimen Source->Urine   CULTURE, URINE   LIPASE    Lipase 10     MAGNESIUM    Magnesium 2.2            Imaging Results    None          Medications   cefTRIAXone injection 1 g (1 g Intravenous Given 10/28/24 0641)   ondansetron injection 4 mg (4 mg Intravenous Given 10/28/24 0641)   sodium chloride 0.9% bolus 1,000 mL 1,000 mL (0 mLs Intravenous Stopped 10/28/24 0740)   HYDROmorphone injection 2 mg (2 mg Intravenous Given 10/28/24 0815)   cloNIDine tablet 0.1 mg (0.1 mg Oral Given 10/28/24 0909)     Medical Decision Making  This is doctor Hargrove dictating.  This patient presents to the emergency room in urinary retention.  He had hematuria.  He has a postvoid residual bladder scan of 400 mL.  Bladder was irrigated.  The patient now has clear urine.  Lee has been placed.  The patient will be discharged with a Lee catheter.  The urine is interesting for greater than 100 red cells, greater than 100 white cells, suggesting a urinary tract infection.  I will be treated with Keflex.  I will have him continue his Flomax.  I will refer him to follow up with the Urology.           ED Course as of 10/28/24 0929   Mon Oct 28, 2024   0640 Differential Diagnosis includes, but is not limited to:  UTI, pyelonephritis, nephrolithiasis, trauma, BPH, blood dyscrasia, STI epididymitis/orchitis, bladder malignancy.  [CC]      ED Course User Index  [CC] Jacky Lance MD                           Clinical Impression:  Final diagnoses:  [R33.9] Urinary  retention (Primary)  [N30.01] Acute cystitis with hematuria  [Z85.46] History of prostate cancer  [R10.2] Pelvic pain in male          ED Disposition Condition    Discharge Stable          ED Prescriptions       Medication Sig Dispense Start Date End Date Auth. Provider    cephALEXin (KEFLEX) 500 MG capsule Take 2 capsules (1,000 mg total) by mouth every 12 (twelve) hours. for 10 days 40 capsule 10/28/2024 11/7/2024 Conor Hargrove MD    HYDROcodone-acetaminophen (NORCO) 5-325 mg per tablet Take 1 tablet by mouth every 6 (six) hours as needed. 8 tablet 10/28/2024 -- Conor Hargrove MD          Follow-up Information       Follow up With Specialties Details Why Contact Info    Raji Mcmullen MD Urology In 1 week  [address]   [facility] [address]  [phone]               Conor Hargrove MD  10/28/24 0919

## 2024-10-28 NOTE — ED NOTES
Destin Barber, an 89 y.o. male presents to the ED reporting lower back pain and dysuria since yesterday. Pt reports recently having seo removed and symptoms started days after. Accompanied by wife. [email or web address]    Chief Complaint   Patient presents with    Hematuria     Pt arrived to the ED due to worsening hematuria, burning with urination, and lower abdominal pain this morning. Pt reports symptoms initially started Wednesday. Seo removed on 10/23 that was initially put in for urinary retention.      Review of patient's allergies indicates:   Allergen Reactions    Penicillins Nausea And Vomiting     Pt reports he is not allergic to penicillin       Past Medical History:   Diagnosis Date    Acute coronary syndrome 06/24/2016    s/p 3V CABG 7/2016    Anemia     Coronary artery disease     Diastolic dysfunction     Gout, chronic     Heart failure     HTN (hypertension)     Hyperlipidemia     Myocardial infarction     Pneumonia due to other staphylococcus     Pressure ulcer     Renal manifestation of secondary diabetes mellitus     Type 2 diabetes mellitus     diet controlled    Urge incontinence of urine 7/21/2023

## 2024-10-29 ENCOUNTER — HOSPITAL ENCOUNTER (OUTPATIENT)
Facility: HOSPITAL | Age: 89
Discharge: HOME | End: 2024-10-30
Attending: STUDENT IN AN ORGANIZED HEALTH CARE EDUCATION/TRAINING PROGRAM | Admitting: STUDENT IN AN ORGANIZED HEALTH CARE EDUCATION/TRAINING PROGRAM
Payer: MEDICARE

## 2024-10-29 DIAGNOSIS — K59.00 CONSTIPATION, UNSPECIFIED CONSTIPATION TYPE: ICD-10-CM

## 2024-10-29 DIAGNOSIS — R07.9 CHEST PAIN: ICD-10-CM

## 2024-10-29 DIAGNOSIS — N39.0 PSEUDOMONAS URINARY TRACT INFECTION: Primary | ICD-10-CM

## 2024-10-29 DIAGNOSIS — I10 ESSENTIAL HYPERTENSION: ICD-10-CM

## 2024-10-29 DIAGNOSIS — E87.6 HYPOKALEMIA: ICD-10-CM

## 2024-10-29 DIAGNOSIS — B96.5 PSEUDOMONAS URINARY TRACT INFECTION: Primary | ICD-10-CM

## 2024-10-29 DIAGNOSIS — R33.9 URINARY RETENTION: ICD-10-CM

## 2024-10-29 DIAGNOSIS — N17.9 AKI (ACUTE KIDNEY INJURY): ICD-10-CM

## 2024-10-29 PROBLEM — C61 PROSTATE CANCER: Status: ACTIVE | Noted: 2024-10-29

## 2024-10-29 PROBLEM — R10.84 GENERALIZED ABDOMINAL PAIN: Status: ACTIVE | Noted: 2024-10-29

## 2024-10-29 LAB
ALBUMIN SERPL BCP-MCNC: 2.7 G/DL (ref 3.5–5.2)
ALLENS TEST: ABNORMAL
ALP SERPL-CCNC: 64 U/L (ref 40–150)
ALT SERPL W/O P-5'-P-CCNC: 14 U/L (ref 10–44)
ANION GAP SERPL CALC-SCNC: 10 MMOL/L (ref 8–16)
ANION GAP SERPL CALC-SCNC: 16 MMOL/L (ref 8–16)
AST SERPL-CCNC: 21 U/L (ref 10–40)
BACTERIA #/AREA URNS HPF: ABNORMAL /HPF
BASOPHILS # BLD AUTO: 0.02 K/UL (ref 0–0.2)
BASOPHILS NFR BLD: 0.3 % (ref 0–1.9)
BILIRUB SERPL-MCNC: 0.2 MG/DL (ref 0.1–1)
BILIRUB UR QL STRIP: NEGATIVE
BUN SERPL-MCNC: 13 MG/DL (ref 6–30)
BUN SERPL-MCNC: 16 MG/DL (ref 8–23)
CALCIUM SERPL-MCNC: 8.3 MG/DL (ref 8.7–10.5)
CHLORIDE SERPL-SCNC: 108 MMOL/L (ref 95–110)
CHLORIDE SERPL-SCNC: 112 MMOL/L (ref 95–110)
CLARITY UR: ABNORMAL
CO2 SERPL-SCNC: 23 MMOL/L (ref 23–29)
COLOR UR: ABNORMAL
CREAT SERPL-MCNC: 1.1 MG/DL (ref 0.5–1.4)
CREAT SERPL-MCNC: 1.3 MG/DL (ref 0.5–1.4)
DIFFERENTIAL METHOD BLD: ABNORMAL
EOSINOPHIL # BLD AUTO: 0.1 K/UL (ref 0–0.5)
EOSINOPHIL NFR BLD: 1.7 % (ref 0–8)
ERYTHROCYTE [DISTWIDTH] IN BLOOD BY AUTOMATED COUNT: 12.2 % (ref 11.5–14.5)
EST. GFR  (NO RACE VARIABLE): 53 ML/MIN/1.73 M^2
ESTIMATED AVG GLUCOSE: 128 MG/DL (ref 68–131)
GLUCOSE SERPL-MCNC: 107 MG/DL (ref 70–110)
GLUCOSE SERPL-MCNC: 89 MG/DL (ref 70–110)
GLUCOSE UR QL STRIP: NEGATIVE
HBA1C MFR BLD: 6.1 % (ref 4–5.6)
HCT VFR BLD AUTO: 30.4 % (ref 40–54)
HCT VFR BLD CALC: 26 %PCV (ref 36–54)
HGB BLD-MCNC: 10.2 G/DL (ref 14–18)
HGB UR QL STRIP: ABNORMAL
HYALINE CASTS #/AREA URNS LPF: 0 /LPF
IMM GRANULOCYTES # BLD AUTO: 0.05 K/UL (ref 0–0.04)
IMM GRANULOCYTES NFR BLD AUTO: 0.8 % (ref 0–0.5)
KETONES UR QL STRIP: NEGATIVE
LEUKOCYTE ESTERASE UR QL STRIP: ABNORMAL
LIPASE SERPL-CCNC: 8 U/L (ref 4–60)
LYMPHOCYTES # BLD AUTO: 2.5 K/UL (ref 1–4.8)
LYMPHOCYTES NFR BLD: 38.5 % (ref 18–48)
MAGNESIUM SERPL-MCNC: 2 MG/DL (ref 1.6–2.6)
MCH RBC QN AUTO: 31.8 PG (ref 27–31)
MCHC RBC AUTO-ENTMCNC: 33.6 G/DL (ref 32–36)
MCV RBC AUTO: 95 FL (ref 82–98)
MICROSCOPIC COMMENT: ABNORMAL
MONOCYTES # BLD AUTO: 0.8 K/UL (ref 0.3–1)
MONOCYTES NFR BLD: 12.3 % (ref 4–15)
NEUTROPHILS # BLD AUTO: 3 K/UL (ref 1.8–7.7)
NEUTROPHILS NFR BLD: 46.4 % (ref 38–73)
NITRITE UR QL STRIP: NEGATIVE
NON-SQ EPI CELLS #/AREA URNS HPF: 5 /HPF
NRBC BLD-RTO: 0 /100 WBC
PH UR STRIP: 6 [PH] (ref 5–8)
PLATELET # BLD AUTO: 246 K/UL (ref 150–450)
PMV BLD AUTO: 9.2 FL (ref 9.2–12.9)
POC IONIZED CALCIUM: 1 MMOL/L (ref 1.06–1.42)
POC TCO2 (MEASURED): 20 MMOL/L (ref 23–29)
POTASSIUM BLD-SCNC: 3.1 MMOL/L (ref 3.5–5.1)
POTASSIUM SERPL-SCNC: 4 MMOL/L (ref 3.5–5.1)
PROT SERPL-MCNC: 6.1 G/DL (ref 6–8.4)
PROT UR QL STRIP: ABNORMAL
RBC # BLD AUTO: 3.21 M/UL (ref 4.6–6.2)
RBC #/AREA URNS HPF: >100 /HPF (ref 0–4)
SAMPLE: ABNORMAL
SITE: ABNORMAL
SODIUM BLD-SCNC: 144 MMOL/L (ref 136–145)
SODIUM SERPL-SCNC: 141 MMOL/L (ref 136–145)
SP GR UR STRIP: 1.01 (ref 1–1.03)
SQUAMOUS #/AREA URNS HPF: 3 /HPF
URN SPEC COLLECT METH UR: ABNORMAL
UROBILINOGEN UR STRIP-ACNC: NEGATIVE EU/DL
WBC # BLD AUTO: 6.52 K/UL (ref 3.9–12.7)
WBC #/AREA URNS HPF: >100 /HPF (ref 0–5)
WBC CLUMPS URNS QL MICRO: ABNORMAL

## 2024-10-29 PROCEDURE — 96374 THER/PROPH/DIAG INJ IV PUSH: CPT

## 2024-10-29 PROCEDURE — 84132 ASSAY OF SERUM POTASSIUM: CPT

## 2024-10-29 PROCEDURE — 80053 COMPREHEN METABOLIC PANEL: CPT | Performed by: STUDENT IN AN ORGANIZED HEALTH CARE EDUCATION/TRAINING PROGRAM

## 2024-10-29 PROCEDURE — G0378 HOSPITAL OBSERVATION PER HR: HCPCS

## 2024-10-29 PROCEDURE — 85014 HEMATOCRIT: CPT

## 2024-10-29 PROCEDURE — 96376 TX/PRO/DX INJ SAME DRUG ADON: CPT

## 2024-10-29 PROCEDURE — 87086 URINE CULTURE/COLONY COUNT: CPT | Performed by: STUDENT IN AN ORGANIZED HEALTH CARE EDUCATION/TRAINING PROGRAM

## 2024-10-29 PROCEDURE — 81000 URINALYSIS NONAUTO W/SCOPE: CPT | Performed by: STUDENT IN AN ORGANIZED HEALTH CARE EDUCATION/TRAINING PROGRAM

## 2024-10-29 PROCEDURE — 83036 HEMOGLOBIN GLYCOSYLATED A1C: CPT | Performed by: STUDENT IN AN ORGANIZED HEALTH CARE EDUCATION/TRAINING PROGRAM

## 2024-10-29 PROCEDURE — 63600175 PHARM REV CODE 636 W HCPCS: Performed by: STUDENT IN AN ORGANIZED HEALTH CARE EDUCATION/TRAINING PROGRAM

## 2024-10-29 PROCEDURE — 25000003 PHARM REV CODE 250: Performed by: STUDENT IN AN ORGANIZED HEALTH CARE EDUCATION/TRAINING PROGRAM

## 2024-10-29 PROCEDURE — 99900035 HC TECH TIME PER 15 MIN (STAT)

## 2024-10-29 PROCEDURE — 84295 ASSAY OF SERUM SODIUM: CPT

## 2024-10-29 PROCEDURE — 82565 ASSAY OF CREATININE: CPT

## 2024-10-29 PROCEDURE — 83735 ASSAY OF MAGNESIUM: CPT | Performed by: STUDENT IN AN ORGANIZED HEALTH CARE EDUCATION/TRAINING PROGRAM

## 2024-10-29 PROCEDURE — 85025 COMPLETE CBC W/AUTO DIFF WBC: CPT | Performed by: STUDENT IN AN ORGANIZED HEALTH CARE EDUCATION/TRAINING PROGRAM

## 2024-10-29 PROCEDURE — 99285 EMERGENCY DEPT VISIT HI MDM: CPT | Mod: 25

## 2024-10-29 PROCEDURE — 96375 TX/PRO/DX INJ NEW DRUG ADDON: CPT

## 2024-10-29 PROCEDURE — 82330 ASSAY OF CALCIUM: CPT

## 2024-10-29 PROCEDURE — 87088 URINE BACTERIA CULTURE: CPT | Performed by: STUDENT IN AN ORGANIZED HEALTH CARE EDUCATION/TRAINING PROGRAM

## 2024-10-29 PROCEDURE — 83690 ASSAY OF LIPASE: CPT | Performed by: STUDENT IN AN ORGANIZED HEALTH CARE EDUCATION/TRAINING PROGRAM

## 2024-10-29 PROCEDURE — 87186 SC STD MICRODIL/AGAR DIL: CPT | Performed by: STUDENT IN AN ORGANIZED HEALTH CARE EDUCATION/TRAINING PROGRAM

## 2024-10-29 RX ORDER — CLONIDINE HYDROCHLORIDE 0.1 MG/1
TABLET ORAL
COMMUNITY
Start: 2024-10-17

## 2024-10-29 RX ORDER — MORPHINE SULFATE 4 MG/ML
4 INJECTION, SOLUTION INTRAMUSCULAR; INTRAVENOUS
Status: COMPLETED | OUTPATIENT
Start: 2024-10-29 | End: 2024-10-29

## 2024-10-29 RX ORDER — CEFTRIAXONE 1 G/1
1 INJECTION, POWDER, FOR SOLUTION INTRAMUSCULAR; INTRAVENOUS
Status: DISCONTINUED | OUTPATIENT
Start: 2024-10-29 | End: 2024-10-29

## 2024-10-29 RX ORDER — CEFEPIME HYDROCHLORIDE 2 G/1
2 INJECTION, POWDER, FOR SOLUTION INTRAVENOUS
Status: COMPLETED | OUTPATIENT
Start: 2024-10-29 | End: 2024-10-29

## 2024-10-29 RX ORDER — METOPROLOL TARTRATE 25 MG/1
25 TABLET, FILM COATED ORAL 2 TIMES DAILY
Status: DISCONTINUED | OUTPATIENT
Start: 2024-10-29 | End: 2024-10-30 | Stop reason: HOSPADM

## 2024-10-29 RX ORDER — GLUCAGON 1 MG
1 KIT INJECTION
Status: DISCONTINUED | OUTPATIENT
Start: 2024-10-29 | End: 2024-10-30 | Stop reason: HOSPADM

## 2024-10-29 RX ORDER — TAMSULOSIN HYDROCHLORIDE 0.4 MG/1
0.4 CAPSULE ORAL DAILY
Status: DISCONTINUED | OUTPATIENT
Start: 2024-10-29 | End: 2024-10-30 | Stop reason: HOSPADM

## 2024-10-29 RX ORDER — HYDROCODONE BITARTRATE AND ACETAMINOPHEN 10; 325 MG/1; MG/1
1 TABLET ORAL EVERY 4 HOURS PRN
Status: DISCONTINUED | OUTPATIENT
Start: 2024-10-29 | End: 2024-10-30 | Stop reason: HOSPADM

## 2024-10-29 RX ORDER — ONDANSETRON HYDROCHLORIDE 2 MG/ML
4 INJECTION, SOLUTION INTRAVENOUS EVERY 4 HOURS PRN
Status: DISCONTINUED | OUTPATIENT
Start: 2024-10-29 | End: 2024-10-30 | Stop reason: HOSPADM

## 2024-10-29 RX ORDER — NALOXONE HCL 0.4 MG/ML
0.02 VIAL (ML) INJECTION
Status: DISCONTINUED | OUTPATIENT
Start: 2024-10-29 | End: 2024-10-30 | Stop reason: HOSPADM

## 2024-10-29 RX ORDER — HYDRALAZINE HYDROCHLORIDE 20 MG/ML
10 INJECTION INTRAMUSCULAR; INTRAVENOUS EVERY 8 HOURS PRN
Status: DISCONTINUED | OUTPATIENT
Start: 2024-10-29 | End: 2024-10-30 | Stop reason: HOSPADM

## 2024-10-29 RX ORDER — IBUPROFEN 200 MG
24 TABLET ORAL
Status: DISCONTINUED | OUTPATIENT
Start: 2024-10-29 | End: 2024-10-30 | Stop reason: HOSPADM

## 2024-10-29 RX ORDER — SIMETHICONE 80 MG
1 TABLET,CHEWABLE ORAL 3 TIMES DAILY PRN
Status: DISCONTINUED | OUTPATIENT
Start: 2024-10-29 | End: 2024-10-30 | Stop reason: HOSPADM

## 2024-10-29 RX ORDER — CEFEPIME HYDROCHLORIDE 2 G/1
2 INJECTION, POWDER, FOR SOLUTION INTRAVENOUS
Status: DISCONTINUED | OUTPATIENT
Start: 2024-10-29 | End: 2024-10-30 | Stop reason: HOSPADM

## 2024-10-29 RX ORDER — ISOSORBIDE MONONITRATE 30 MG/1
60 TABLET, EXTENDED RELEASE ORAL DAILY
Status: DISCONTINUED | OUTPATIENT
Start: 2024-10-29 | End: 2024-10-30 | Stop reason: HOSPADM

## 2024-10-29 RX ORDER — AMLODIPINE BESYLATE 5 MG/1
10 TABLET ORAL DAILY
Status: DISCONTINUED | OUTPATIENT
Start: 2024-10-29 | End: 2024-10-30 | Stop reason: HOSPADM

## 2024-10-29 RX ORDER — PRAVASTATIN SODIUM 40 MG/1
40 TABLET ORAL NIGHTLY
Status: DISCONTINUED | OUTPATIENT
Start: 2024-10-29 | End: 2024-10-30 | Stop reason: HOSPADM

## 2024-10-29 RX ORDER — GUAIFENESIN 100 MG/5ML
200 SOLUTION ORAL EVERY 4 HOURS PRN
Status: DISCONTINUED | OUTPATIENT
Start: 2024-10-29 | End: 2024-10-30 | Stop reason: HOSPADM

## 2024-10-29 RX ORDER — IBUPROFEN 200 MG
16 TABLET ORAL
Status: DISCONTINUED | OUTPATIENT
Start: 2024-10-29 | End: 2024-10-30 | Stop reason: HOSPADM

## 2024-10-29 RX ORDER — DOCUSATE SODIUM 100 MG/1
100 CAPSULE, LIQUID FILLED ORAL 2 TIMES DAILY
Status: DISCONTINUED | OUTPATIENT
Start: 2024-10-29 | End: 2024-10-30 | Stop reason: HOSPADM

## 2024-10-29 RX ORDER — SODIUM CHLORIDE 0.9 % (FLUSH) 0.9 %
10 SYRINGE (ML) INJECTION EVERY 12 HOURS PRN
Status: DISCONTINUED | OUTPATIENT
Start: 2024-10-29 | End: 2024-10-30 | Stop reason: HOSPADM

## 2024-10-29 RX ORDER — BENZONATATE 100 MG/1
100 CAPSULE ORAL 3 TIMES DAILY PRN
Status: DISCONTINUED | OUTPATIENT
Start: 2024-10-29 | End: 2024-10-30 | Stop reason: HOSPADM

## 2024-10-29 RX ORDER — TALC
6 POWDER (GRAM) TOPICAL NIGHTLY PRN
Status: DISCONTINUED | OUTPATIENT
Start: 2024-10-29 | End: 2024-10-30 | Stop reason: HOSPADM

## 2024-10-29 RX ORDER — ACETAMINOPHEN 325 MG/1
650 TABLET ORAL EVERY 6 HOURS PRN
Status: DISCONTINUED | OUTPATIENT
Start: 2024-10-29 | End: 2024-10-30 | Stop reason: HOSPADM

## 2024-10-29 RX ORDER — BISACODYL 5 MG
10 TABLET, DELAYED RELEASE (ENTERIC COATED) ORAL DAILY PRN
Status: DISCONTINUED | OUTPATIENT
Start: 2024-10-29 | End: 2024-10-30 | Stop reason: HOSPADM

## 2024-10-29 RX ORDER — POLYETHYLENE GLYCOL 3350 17 G/17G
17 POWDER, FOR SOLUTION ORAL DAILY
Status: DISCONTINUED | OUTPATIENT
Start: 2024-10-29 | End: 2024-10-30 | Stop reason: HOSPADM

## 2024-10-29 RX ORDER — HYDROCODONE BITARTRATE AND ACETAMINOPHEN 5; 325 MG/1; MG/1
1 TABLET ORAL EVERY 4 HOURS PRN
Status: DISCONTINUED | OUTPATIENT
Start: 2024-10-29 | End: 2024-10-30 | Stop reason: HOSPADM

## 2024-10-29 RX ORDER — CALCIUM CARBONATE 200(500)MG
1000 TABLET,CHEWABLE ORAL 3 TIMES DAILY PRN
Status: DISCONTINUED | OUTPATIENT
Start: 2024-10-29 | End: 2024-10-30 | Stop reason: HOSPADM

## 2024-10-29 RX ORDER — ACETAMINOPHEN 325 MG/1
650 TABLET ORAL EVERY 8 HOURS PRN
Status: DISCONTINUED | OUTPATIENT
Start: 2024-10-29 | End: 2024-10-29 | Stop reason: SDUPTHER

## 2024-10-29 RX ORDER — CEPHALEXIN 250 MG/1
500 CAPSULE ORAL
Status: DISCONTINUED | OUTPATIENT
Start: 2024-10-29 | End: 2024-10-29

## 2024-10-29 RX ORDER — ACETAMINOPHEN 325 MG/1
650 TABLET ORAL EVERY 4 HOURS PRN
Status: DISCONTINUED | OUTPATIENT
Start: 2024-10-29 | End: 2024-10-30 | Stop reason: HOSPADM

## 2024-10-29 RX ADMIN — CEFEPIME 2 G: 2 INJECTION, POWDER, FOR SOLUTION INTRAVENOUS at 08:10

## 2024-10-29 RX ADMIN — AMLODIPINE BESYLATE 10 MG: 5 TABLET ORAL at 01:10

## 2024-10-29 RX ADMIN — DOCUSATE SODIUM 100 MG: 100 CAPSULE, LIQUID FILLED ORAL at 04:10

## 2024-10-29 RX ADMIN — CEFEPIME 2 G: 2 INJECTION, POWDER, FOR SOLUTION INTRAVENOUS at 10:10

## 2024-10-29 RX ADMIN — Medication 6 MG: at 08:10

## 2024-10-29 RX ADMIN — POTASSIUM BICARBONATE 50 MEQ: 977.5 TABLET, EFFERVESCENT ORAL at 11:10

## 2024-10-29 RX ADMIN — MORPHINE SULFATE 4 MG: 4 INJECTION INTRAVENOUS at 10:10

## 2024-10-29 RX ADMIN — PRAVASTATIN SODIUM 40 MG: 40 TABLET ORAL at 08:10

## 2024-10-29 RX ADMIN — METOPROLOL TARTRATE 25 MG: 25 TABLET, FILM COATED ORAL at 08:10

## 2024-10-29 RX ADMIN — HYDROCODONE BITARTRATE AND ACETAMINOPHEN 1 TABLET: 5; 325 TABLET ORAL at 08:10

## 2024-10-29 RX ADMIN — ISOSORBIDE MONONITRATE 60 MG: 30 TABLET, EXTENDED RELEASE ORAL at 01:10

## 2024-10-29 RX ADMIN — TAMSULOSIN HYDROCHLORIDE 0.4 MG: 0.4 CAPSULE ORAL at 01:10

## 2024-10-29 RX ADMIN — POLYETHYLENE GLYCOL 3350 17 G: 17 POWDER, FOR SOLUTION ORAL at 04:10

## 2024-10-30 VITALS
RESPIRATION RATE: 22 BRPM | DIASTOLIC BLOOD PRESSURE: 77 MMHG | HEART RATE: 80 BPM | HEIGHT: 65 IN | WEIGHT: 135 LBS | BODY MASS INDEX: 22.49 KG/M2 | OXYGEN SATURATION: 99 % | SYSTOLIC BLOOD PRESSURE: 153 MMHG | TEMPERATURE: 98 F

## 2024-10-30 LAB — BACTERIA UR CULT: ABNORMAL

## 2024-10-30 PROCEDURE — 25000003 PHARM REV CODE 250: Performed by: STUDENT IN AN ORGANIZED HEALTH CARE EDUCATION/TRAINING PROGRAM

## 2024-10-30 PROCEDURE — 97530 THERAPEUTIC ACTIVITIES: CPT

## 2024-10-30 PROCEDURE — 99214 OFFICE O/P EST MOD 30 MIN: CPT | Mod: ,,, | Performed by: UROLOGY

## 2024-10-30 PROCEDURE — 96375 TX/PRO/DX INJ NEW DRUG ADDON: CPT

## 2024-10-30 PROCEDURE — 96374 THER/PROPH/DIAG INJ IV PUSH: CPT | Mod: 59

## 2024-10-30 PROCEDURE — 63600175 PHARM REV CODE 636 W HCPCS: Performed by: STUDENT IN AN ORGANIZED HEALTH CARE EDUCATION/TRAINING PROGRAM

## 2024-10-30 PROCEDURE — G0378 HOSPITAL OBSERVATION PER HR: HCPCS

## 2024-10-30 PROCEDURE — 97161 PT EVAL LOW COMPLEX 20 MIN: CPT

## 2024-10-30 RX ORDER — DOCUSATE SODIUM 100 MG/1
100 CAPSULE, LIQUID FILLED ORAL 2 TIMES DAILY
Qty: 20 CAPSULE | Refills: 0 | Status: SHIPPED | OUTPATIENT
Start: 2024-10-30 | End: 2024-11-09

## 2024-10-30 RX ORDER — CIPROFLOXACIN 500 MG/1
500 TABLET ORAL 2 TIMES DAILY
Qty: 14 TABLET | Refills: 0 | Status: SHIPPED | OUTPATIENT
Start: 2024-10-30 | End: 2024-11-06

## 2024-10-30 RX ORDER — POLYETHYLENE GLYCOL 3350 17 G/17G
17 POWDER, FOR SOLUTION ORAL DAILY
Qty: 238 G | Refills: 0 | Status: SHIPPED | OUTPATIENT
Start: 2024-10-31 | End: 2024-11-13

## 2024-10-30 RX ADMIN — ISOSORBIDE MONONITRATE 60 MG: 30 TABLET, EXTENDED RELEASE ORAL at 08:10

## 2024-10-30 RX ADMIN — METOPROLOL TARTRATE 25 MG: 25 TABLET, FILM COATED ORAL at 08:10

## 2024-10-30 RX ADMIN — HYDRALAZINE HYDROCHLORIDE 10 MG: 20 INJECTION INTRAMUSCULAR; INTRAVENOUS at 06:10

## 2024-10-30 RX ADMIN — HYDROCODONE BITARTRATE AND ACETAMINOPHEN 1 TABLET: 10; 325 TABLET ORAL at 08:10

## 2024-10-30 RX ADMIN — AMLODIPINE BESYLATE 10 MG: 5 TABLET ORAL at 08:10

## 2024-10-30 RX ADMIN — HYDROCODONE BITARTRATE AND ACETAMINOPHEN 1 TABLET: 10; 325 TABLET ORAL at 01:10

## 2024-10-30 RX ADMIN — TAMSULOSIN HYDROCHLORIDE 0.4 MG: 0.4 CAPSULE ORAL at 08:10

## 2024-10-30 RX ADMIN — CEFEPIME 2 G: 2 INJECTION, POWDER, FOR SOLUTION INTRAVENOUS at 08:10

## 2024-10-30 RX ADMIN — HYDROCODONE BITARTRATE AND ACETAMINOPHEN 1 TABLET: 5; 325 TABLET ORAL at 01:10

## 2024-10-31 LAB — BACTERIA UR CULT: ABNORMAL

## 2024-11-01 ENCOUNTER — HOSPITAL ENCOUNTER (EMERGENCY)
Facility: HOSPITAL | Age: 89
Discharge: HOME | End: 2024-11-01
Attending: EMERGENCY MEDICINE
Payer: MEDICARE

## 2024-11-01 ENCOUNTER — TELEPHONE (OUTPATIENT)
Dept: UROLOGY | Facility: CLINIC | Age: 89
End: 2024-11-01
Payer: MEDICARE

## 2024-11-01 VITALS
WEIGHT: 135 LBS | OXYGEN SATURATION: 100 % | RESPIRATION RATE: 17 BRPM | SYSTOLIC BLOOD PRESSURE: 163 MMHG | DIASTOLIC BLOOD PRESSURE: 99 MMHG | HEIGHT: 66 IN | BODY MASS INDEX: 21.69 KG/M2 | TEMPERATURE: 98 F | HEART RATE: 84 BPM

## 2024-11-01 DIAGNOSIS — K59.00 CONSTIPATION, UNSPECIFIED CONSTIPATION TYPE: Primary | ICD-10-CM

## 2024-11-01 DIAGNOSIS — K59.00 CONSTIPATION: ICD-10-CM

## 2024-11-01 PROCEDURE — 99283 EMERGENCY DEPT VISIT LOW MDM: CPT | Mod: 25,ER

## 2024-11-01 RX ORDER — AMOXICILLIN 250 MG
1 CAPSULE ORAL 2 TIMES DAILY
Qty: 60 TABLET | Refills: 0 | Status: SHIPPED | OUTPATIENT
Start: 2024-11-01 | End: 2024-12-01

## 2024-11-01 NOTE — ED PROVIDER NOTES
Encounter Date: 11/1/2024    SCRIBE #1 NOTE: I, Jailene Simpson, am scribing for, and in the presence of,  Nilda French MD. I have scribed the following portions of the note - Other sections scribed: HPI, ROS, PE.       History     Chief Complaint   Patient presents with    Dysuria     Pt states seo issue with painful urination      89M, with a PMHx of Anemia, CAD, HTN, HLD, MI, DM 2, presents to the ED for evaluation of constipation, symptoms began last week. States his last bowel movement was on Wednesday of last week. States he is currently on chemo pills for prostate cancer. No other alleviating or exacerbating factors. Denies any other complaints at this time. Patient attempted stool softeners as treatment/medication without relief. He also has a seo catheter in place and reports ongoing discomfort with it. Denies leakage and reports urine is still collecting in bag.    The history is provided by the patient. No  was used.     Review of patient's allergies indicates:   Allergen Reactions    Penicillins Nausea And Vomiting     Pt reports he is not allergic to penicillin       Past Medical History:   Diagnosis Date    Acute coronary syndrome 06/24/2016    s/p 3V CABG 7/2016    Anemia     Coronary artery disease     Diastolic dysfunction     Gout, chronic     Heart failure     HTN (hypertension)     Hyperlipidemia     Myocardial infarction     Pneumonia due to other staphylococcus     Pressure ulcer     Renal manifestation of secondary diabetes mellitus     Type 2 diabetes mellitus     diet controlled    Urge incontinence of urine 7/21/2023     Past Surgical History:   Procedure Laterality Date    CATARACT EXTRACTION Bilateral     CATARACT EXTRACTION W/ ANTERIOR VITRECTOMY      CORONARY ARTERY BYPASS GRAFT  07/06/2016    PAIZ-LAD, SVG-Ramus, SVG-PDA    FLUOROSCOPIC URODYNAMIC STUDY N/A 4/25/2023    Procedure: URODYNAMIC STUDY, FLUOROSCOPIC;  Surgeon: Raji Mcmullen MD;  Location: [facility] OR;   Service: Urology;  Laterality: N/A;  RN PHONE PREOP WITH GRANDDAUGHTER ROGER 23    HEMORRHOID SURGERY      LASER ENUCLEATION OF PROSTATE N/A 2023    Procedure: ENUCLEATION, PROSTATE, USING LASER; cystolithalopaxy;  Surgeon: Raji Mcmullen MD;  Location: [facility] OR;  Service: Urology;  Laterality: N/A;  notify [facility] [phone] SPOKE TO NICK ON 2023 @ 10:37AM. CONFIRMATION NUMBER [id number]  FIORDALIZA POLLACK [phone] EMAILED CHANNING ON 6/15/2023@ 3:07PM-  RN PREOP 2023   T/S ON 2023  RN PREOP 2023 --JM     Family History   Problem Relation Name Age of Onset    Hypertension Mother      Heart disease Mother      Cancer Father          colon    Heart disease Sister      No Known Problems Sister      No Known Problems Sister      No Known Problems Sister      No Known Problems Sister      Heart disease Brother      No Known Problems Brother      No Known Problems Brother      No Known Problems Brother      No Known Problems Brother      No Known Problems Brother      No Known Problems Daughter      No Known Problems Daughter      No Known Problems Son      Amblyopia Neg Hx      Blindness Neg Hx      Cataracts Neg Hx      Diabetes Neg Hx      Glaucoma Neg Hx      Macular degeneration Neg Hx      Retinal detachment Neg Hx      Strabismus Neg Hx      Stroke Neg Hx      Thyroid disease Neg Hx       Social History     Tobacco Use    Smoking status: Former     Types: Cigars     Start date:      Quit date:      Years since quittin.8    Smokeless tobacco: Never    Tobacco comments:     Former smoker. Pt. Smoked 2 cigars daily.   Substance Use Topics    Alcohol use: No    Drug use: No     Review of Systems   Constitutional:  Negative for activity change, appetite change, chills and fever.   HENT:  Negative for congestion, rhinorrhea, sneezing and sore throat.    Respiratory:  Negative for cough, chest tightness, shortness of breath and wheezing.    Cardiovascular:   Negative for chest pain and palpitations.   Gastrointestinal:  Positive for constipation. Negative for abdominal pain, diarrhea, nausea and vomiting.   Skin:  Negative for rash.   Neurological:  Negative for dizziness, light-headedness and headaches.   All other systems reviewed and are negative.      Physical Exam     Initial Vitals [11/01/24 1143]   BP Pulse Resp Temp SpO2   (!) 163/99 84 17 97.6 °F (36.4 °C) 100 %      MAP       --         Physical Exam    Nursing note and vitals reviewed.  Constitutional: He appears well-developed and well-nourished. No distress.   HENT:   Head: Normocephalic and atraumatic.   Eyes: Conjunctivae are normal.   Neck:   Normal range of motion.  Cardiovascular:  Normal rate and regular rhythm.           No murmur heard.  Pulmonary/Chest: Breath sounds normal. No respiratory distress.   Abdominal: Abdomen is soft. Bowel sounds are normal. He exhibits no distension. There is no abdominal tenderness.   Genitourinary:    Genitourinary Comments: Lee catheter is in place with yellow urine in the bag.      Musculoskeletal:         General: No tenderness or edema. Normal range of motion.      Cervical back: Normal range of motion.     Neurological: He is alert and oriented to person, place, and time.   Skin: Skin is warm and dry. No rash noted.   Psychiatric: He has a normal mood and affect. His behavior is normal.         ED Course   Procedures  Labs Reviewed - No data to display       Imaging Results              X-Ray Abdomen Flat And Erect (Final result)  Result time 11/01/24 14:27:52      Final result by Pilo Mckenna MD (11/01/24 14:27:52)                   Impression:      Moderate to large amount of stool identified throughout the abdomen.    Persistent borderline dilated loops of small bowel      Electronically signed by: Pilo Mckenna MD  Date:    11/01/2024  Time:    14:27               Narrative:    EXAMINATION:  XR ABDOMEN FLAT AND ERECT    CLINICAL HISTORY:  Constipation,  unspecified    COMPARISON:  KUB dated October 30, 2024    FINDINGS:  Flat and upright x-rays of the abdomen demonstrate persistent borderline dilated loops of small bowel, not significantly changed.  Moderate to large amount of stool is identified throughout the abdomen and pelvis.  No evidence of free air.  Soft tissues are unremarkable.  Osseous structures demonstrate no evidence for acute fractures or dislocations.                                       Medications - No data to display  Medical Decision Making  89M, with a PMHx of Anemia, CAD, HTN, HLD, MI, DM 2, presents to the ED for evaluation of constipation, symptoms began last week. States his last bowel movement was on Wednesday of last week. States he is currently on chemo pills for prostate cancer. No other alleviating or exacerbating factors. Denies any other complaints at this time. Patient attempted stool softeners as treatment/medication without relief. He also has a seo catheter in place and reports ongoing discomfort with it. Denies leakage and reports urine is still collecting in bag.    On exam, abdomen is soft non-tender and non-distended; on  exam the seo catheter is in place with yellow urine in the bag. No leakage. Bladder scan showed 8ml of urine in bladder. In shared decision making with pt, will get abd xray. Xray shows large amount of stool but no obstruction or perforation. Will treat constipation with senokot-s.     Amount and/or Complexity of Data Reviewed  Independent Historian: caregiver  Radiology: ordered.    Risk  Prescription drug management.            Scribe Attestation:   Scribe #1: I performed the above scribed service and the documentation accurately describes the services I performed. I attest to the accuracy of the note.                               Clinical Impression:  Final diagnoses:  [K59.00] Constipation  [K59.00] Constipation, unspecified constipation type (Primary)          ED Disposition Condition     Discharge Stable          I, Dr. Nilda French, personally performed the services described in this documentation.   All medical record entries made by the scribe were at my direction and in my presence.   I have reviewed the chart and agree that the record is accurate and complete.   Nilda French MD.  1:50 PM 11/01/2024   ED Prescriptions       Medication Sig Dispense Start Date End Date Auth. Provider    senna-docusate 8.6-50 mg (SENOKOT-S) 8.6-50 mg per tablet Take 1 tablet by mouth 2 (two) times a day. 60 tablet 11/1/2024 12/1/2024 Nilda French MD          Follow-up Information    None          Nilda French MD  11/01/24 [id number]

## 2024-11-01 NOTE — DISCHARGE INSTRUCTIONS
Take senokot-s as directed for constipation. You may try other over the counter remedies as well. Drink lots of water. Continue flomax to help with cvatheter discomfort.

## 2024-11-05 ENCOUNTER — HOSPITAL ENCOUNTER (EMERGENCY)
Facility: HOSPITAL | Age: 89
Discharge: HOME | End: 2024-11-05
Attending: STUDENT IN AN ORGANIZED HEALTH CARE EDUCATION/TRAINING PROGRAM
Payer: MEDICARE

## 2024-11-05 VITALS
TEMPERATURE: 98 F | SYSTOLIC BLOOD PRESSURE: 188 MMHG | OXYGEN SATURATION: 98 % | DIASTOLIC BLOOD PRESSURE: 79 MMHG | WEIGHT: 135 LBS | BODY MASS INDEX: 21.69 KG/M2 | RESPIRATION RATE: 18 BRPM | HEIGHT: 66 IN | HEART RATE: 79 BPM

## 2024-11-05 DIAGNOSIS — N48.1 BALANITIS: Primary | ICD-10-CM

## 2024-11-05 DIAGNOSIS — N48.89 PENILE PAIN: ICD-10-CM

## 2024-11-05 DIAGNOSIS — R03.0 ELEVATED BLOOD PRESSURE READING: ICD-10-CM

## 2024-11-05 PROCEDURE — 99284 EMERGENCY DEPT VISIT MOD MDM: CPT | Mod: ER

## 2024-11-05 PROCEDURE — 25000003 PHARM REV CODE 250: Mod: ER | Performed by: EMERGENCY MEDICINE

## 2024-11-05 PROCEDURE — 25000003 PHARM REV CODE 250: Mod: ER | Performed by: PHYSICIAN ASSISTANT

## 2024-11-05 RX ORDER — CLONIDINE HYDROCHLORIDE 0.1 MG/1
0.1 TABLET ORAL
Status: COMPLETED | OUTPATIENT
Start: 2024-11-05 | End: 2024-11-05

## 2024-11-05 RX ORDER — LIDOCAINE HYDROCHLORIDE 20 MG/ML
JELLY TOPICAL
Status: COMPLETED | OUTPATIENT
Start: 2024-11-05 | End: 2024-11-05

## 2024-11-05 RX ORDER — ACETAMINOPHEN 500 MG
500 TABLET ORAL EVERY 4 HOURS PRN
Qty: 20 TABLET | Refills: 0 | Status: SHIPPED | OUTPATIENT
Start: 2024-11-05 | End: 2024-11-10

## 2024-11-05 RX ORDER — ACETAMINOPHEN 500 MG
500 TABLET ORAL
Status: COMPLETED | OUTPATIENT
Start: 2024-11-05 | End: 2024-11-05

## 2024-11-05 RX ORDER — DOXYLAMINE SUCCINATE 25 MG
TABLET ORAL
Status: COMPLETED | OUTPATIENT
Start: 2024-11-05 | End: 2024-11-05

## 2024-11-05 RX ORDER — DOXYLAMINE SUCCINATE 25 MG
TABLET ORAL 2 TIMES DAILY
Qty: 92 G | Refills: 0 | Status: ON HOLD | OUTPATIENT
Start: 2024-11-05 | End: 2024-11-15 | Stop reason: HOSPADM

## 2024-11-05 RX ORDER — AMLODIPINE BESYLATE 5 MG/1
10 TABLET ORAL
Status: COMPLETED | OUTPATIENT
Start: 2024-11-05 | End: 2024-11-05

## 2024-11-05 RX ADMIN — ACETAMINOPHEN 500 MG: 500 TABLET, FILM COATED ORAL at 06:11

## 2024-11-05 RX ADMIN — MICONAZOLE NITRATE: 20 CREAM TOPICAL at 06:11

## 2024-11-05 RX ADMIN — CLONIDINE HYDROCHLORIDE 0.1 MG: 0.1 TABLET ORAL at 06:11

## 2024-11-05 RX ADMIN — LIDOCAINE HYDROCHLORIDE 10 ML: 20 JELLY TOPICAL at 05:11

## 2024-11-05 RX ADMIN — AMLODIPINE BESYLATE 10 MG: 5 TABLET ORAL at 06:11

## 2024-11-05 NOTE — ED PROVIDER NOTES
Encounter Date: 11/5/2024    SCRIBE #1 NOTE: I, Andria Potter, am scribing for, and in the presence of,  Muriel Gresham PA-C. I have scribed the following portions of the note - Other sections scribed: HPI,ROS..       History     Chief Complaint   Patient presents with    Groin Swelling     Reports tip of penis is swollen starting yesterday      CC: Penile swelling     HPI: 89 yo M, with a PMHx of T2DM, HTN, hyperlipidemia, myocardial infarction, CAD, BPH, prostate cancer, presents to ED with complaint of penile swelling with associated pain that began yesterday. Patient reports he has a catheter in place due to his prostate cancer. Reports history of penile swelling but denies to this extent.   No other aggravating/alleviating factors. Denies dysuria, hematuria, urinary retention, chest pain, dyspnea, malodorous urine, testicular pain, abdominal pain, flank pain nausea, vomiting, chills, fever, or other associated symptoms       Per chart review 10/28, patient had ED visit at [facility] for chief complaint of urinary retention. Patient was evaluated via labs and a postvoid residual bladder scan that showed 400ml. Patient's lab work showed urine having greater than 100 red cells, greater than 100 white cells, suggesting a urinary tract infection. Patient was discharged after having a catheter lee placed and he was given Keflex for suspected UTI. Per chart review 10/29, patient was admitted for observation at [facility] for Pseudomonas urinary tract infection. Patient was unable to take his PO abx outpatient due to worsening abdominal pain. He had CT of abdomen done which showed:  urinary bladder is collapsed around a Lee catheter noting circumferential wall thickening.  Small gas foci within the urinary bladder lumen.  Patient was discharged same day without other complaints.     The history is provided by the patient. No  was used.     Review of patient's allergies indicates:   Allergen  Reactions    Penicillins Nausea And Vomiting     Pt reports he is not allergic to penicillin       Past Medical History:   Diagnosis Date    Acute coronary syndrome 06/24/2016    s/p 3V CABG 7/2016    Anemia     Coronary artery disease     Diastolic dysfunction     Gout, chronic     Heart failure     HTN (hypertension)     Hyperlipidemia     Myocardial infarction     Pneumonia due to other staphylococcus     Pressure ulcer     Renal manifestation of secondary diabetes mellitus     Type 2 diabetes mellitus     diet controlled    Urge incontinence of urine 7/21/2023     Past Surgical History:   Procedure Laterality Date    CATARACT EXTRACTION Bilateral     CATARACT EXTRACTION W/ ANTERIOR VITRECTOMY      CORONARY ARTERY BYPASS GRAFT  07/06/2016    PAIZ-LAD, SVG-Ramus, SVG-PDA    FLUOROSCOPIC URODYNAMIC STUDY N/A 4/25/2023    Procedure: URODYNAMIC STUDY, FLUOROSCOPIC;  Surgeon: Raji Mcmullen MD;  Location: [facility] OR;  Service: Urology;  Laterality: N/A;  RN PHONE PREOP WITH GRANDDAUGHTER ROGER 4/21/23    HEMORRHOID SURGERY      LASER ENUCLEATION OF PROSTATE N/A 7/6/2023    Procedure: ENUCLEATION, PROSTATE, USING LASER; cystolithalopaxy;  Surgeon: Raji Mcmullen MD;  Location: [facility] OR;  Service: Urology;  Laterality: N/A;  notify [facility] [phone] SPOKE TO NICK ON 6/2/2023 @ 10:37AM. CONFIRMATION NUMBER [id number] POLLACK [phone] EMAILED CHANNING ON 6/15/2023@ 3:07PM-KEYON  RN PREOP 5/30/2023   T/S ON 6/5/2023  RN PREOP 06/30/2023 --JM     Family History   Problem Relation Name Age of Onset    Hypertension Mother      Heart disease Mother      Cancer Father          colon    Heart disease Sister      No Known Problems Sister      No Known Problems Sister      No Known Problems Sister      No Known Problems Sister      Heart disease Brother      No Known Problems Brother      No Known Problems Brother      No Known Problems Brother      No Known Problems Brother      No Known Problems Brother       No Known Problems Daughter      No Known Problems Daughter      No Known Problems Son      Amblyopia Neg Hx      Blindness Neg Hx      Cataracts Neg Hx      Diabetes Neg Hx      Glaucoma Neg Hx      Macular degeneration Neg Hx      Retinal detachment Neg Hx      Strabismus Neg Hx      Stroke Neg Hx      Thyroid disease Neg Hx       Social History     Tobacco Use    Smoking status: Former     Types: Cigars     Start date:      Quit date:      Years since quittin.8    Smokeless tobacco: Never    Tobacco comments:     Former smoker. Pt. Smoked 2 cigars daily.   Substance Use Topics    Alcohol use: No    Drug use: No     Review of Systems   Constitutional:  Negative for chills and fever.   HENT:  Negative for congestion, ear pain, rhinorrhea and sore throat.    Eyes:  Negative for redness.   Respiratory:  Negative for shortness of breath and stridor.    Cardiovascular:  Negative for chest pain.   Gastrointestinal:  Negative for abdominal pain, constipation, diarrhea, nausea and vomiting.   Genitourinary:  Positive for penile pain and penile swelling. Negative for difficulty urinating, dysuria, frequency, hematuria, testicular pain and urgency.   Musculoskeletal:  Negative for back pain and neck pain.   Skin:  Negative for rash.   Neurological:  Negative for dizziness, speech difficulty, weakness, light-headedness and numbness.   Hematological:  Does not bruise/bleed easily.   Psychiatric/Behavioral:  Negative for confusion.        Physical Exam     Initial Vitals [24 1628]   BP Pulse Resp Temp SpO2   (!) 215/104 92 20 97.6 °F (36.4 °C) 99 %      MAP       --         Physical Exam    Nursing note and vitals reviewed.  Constitutional: He appears well-developed and well-nourished. No distress.   HENT:   Head: Normocephalic.   Right Ear: External ear normal.   Left Ear: External ear normal.   Eyes: Conjunctivae are normal.   Pulmonary/Chest: No respiratory distress.   Genitourinary:    Genitourinary  Comments: Performed by Dr. Wilson and supervised by Muriel Gresham PA-C   No phimosis or paraphimosis. Pain with retraction of foreskin. Mild swelling to glans w area of excoriation and white discharge under foreskin.      Musculoskeletal:         General: Normal range of motion.     Neurological: He is alert.   Skin: Skin is warm and dry. No rash noted.   Psychiatric: He has a normal mood and affect. His behavior is normal. Judgment and thought content normal.         ED Course   Procedures  Labs Reviewed - No data to display       Imaging Results    None          Medications   LIDOcaine HCl 2% urojet (10 mLs Mucous Membrane Given 11/5/24 1704)   miconazole 2 % cream ( Topical (Top) Given 11/5/24 1818)   acetaminophen tablet 500 mg (500 mg Oral Given 11/5/24 1802)   amLODIPine tablet 10 mg (10 mg Oral Given 11/5/24 1846)   cloNIDine tablet 0.1 mg (0.1 mg Oral Given 11/5/24 1846)     Medical Decision Making  89 year old  male with history of hypertension, hyperlipidemia, CAD status post CABG prostate CA with seo catheter presenting for evaluation of penile pain.  Patient reports associated swelling to the tip of the penis.  Of note patient does have Seo catheter in place.  He was established with Urology, Dr. Mcmullen.  He denies any trauma or injuries to the area.  He denies fever, chills, malodorous urine , hematuria previous urine culture showed Pseudomonas sensitive to Cipro.  Patient states that he completed his course of antibiotics as prescribed.  Repeatedly requesting for us to remove the Seo catheter.  Discussed repeatedly in at length with the patient that we are unable to remove it here without clearance from urology.  He was established with Urology.  Instructed him to follow up with them or contact them regarding further recommendations.  Discouraged patient from removing Seo at home and discussed risk of for worsening symptoms, infection, death. Blood pressure was elevated during visit. Pt was  given amlodipine and clonidine as prescribed at home with improvement of BP. Asymptomatic.  Exam consistent with balanitis.  Urojet applied.  Miconazole also applied.  Instructed patient to keep the area clean and dry. Instructed to return to ER for worsening or as needed. Follow up with PCP and Urology   Discussed with Dr. Wilson who also evaluated pt face to face and agrees with assessment and plan.     Risk  OTC drugs.  Prescription drug management.            Scribe Attestation:   Scribe #1: I performed the above scribed service and the documentation accurately describes the services I performed. I attest to the accuracy of the note.                             I, Muriel Gresham PA-C, personally performed the services described in this documentation. All medical record entries made by the scribe were at my direction and in my presence. I have reviewed the chart and agree that the record reflects my personal performance and is accurate and complete.      DISCLAIMER: This note was prepared with [employer] voice recognition transcription software. Garbled syntax, mangled pronouns, and other bizarre constructions may be attributed to that software system.   Clinical Impression:  Final diagnoses:  [N48.1] Balanitis (Primary)  [R03.0] Elevated blood pressure reading  [N48.89] Penile pain          ED Disposition Condition    Discharge Stable          ED Prescriptions       Medication Sig Dispense Start Date End Date Auth. Provider    acetaminophen (TYLENOL) 500 MG tablet Take 1 tablet (500 mg total) by mouth every 4 (four) hours as needed. 20 tablet 11/5/2024 11/10/2024 Muriel Gresham PA-C    miconazole (MICOTIN) 2 % cream Apply topically 2 (two) times daily. Apply to head of penis 92 g 11/5/2024 12/5/2024 Muriel Gresham PA-C          Follow-up Information       Follow up With Specialties Details Why Contact Info    Adela Diaz, DO Family Medicine Schedule an appointment as soon as possible for a  visit in 2 days for follow up [address] [facility] [place] [address]  [phone]      Raji Mcmullen MD Urology Schedule an appointment as soon as possible for a visit in 2 days for follow up [address]  [place] [address]  [phone]      [facility] ED Emergency Medicine Go to  As needed, If symptoms worsen [address] [facility] [address] [phone]             Muriel Gresham PA-C  11/05/24 1900

## 2024-11-06 ENCOUNTER — TELEPHONE (OUTPATIENT)
Dept: UROLOGY | Facility: CLINIC | Age: 89
End: 2024-11-06
Payer: MEDICARE

## 2024-11-06 ENCOUNTER — OFFICE VISIT (OUTPATIENT)
Dept: UROLOGY | Facility: CLINIC | Age: 89
End: 2024-11-06
Payer: MEDICARE

## 2024-11-06 VITALS — BODY MASS INDEX: 21.83 KG/M2 | WEIGHT: 135.25 LBS

## 2024-11-06 DIAGNOSIS — R33.9 URINARY RETENTION: Primary | ICD-10-CM

## 2024-11-06 PROCEDURE — 99999 PR PBB SHADOW E&M-EST. PATIENT-LVL III: CPT | Mod: PBBFAC,,, | Performed by: STUDENT IN AN ORGANIZED HEALTH CARE EDUCATION/TRAINING PROGRAM

## 2024-11-06 PROCEDURE — 99213 OFFICE O/P EST LOW 20 MIN: CPT | Mod: S$PBB,,, | Performed by: STUDENT IN AN ORGANIZED HEALTH CARE EDUCATION/TRAINING PROGRAM

## 2024-11-06 PROCEDURE — 99213 OFFICE O/P EST LOW 20 MIN: CPT | Mod: PBBFAC | Performed by: STUDENT IN AN ORGANIZED HEALTH CARE EDUCATION/TRAINING PROGRAM

## 2024-11-06 NOTE — DISCHARGE INSTRUCTIONS

## 2024-11-06 NOTE — PROGRESS NOTES
Patient ID: Destin Barber is a 89 y.o. male.    Chief Complaint: Seo care followup    HPI  89 y.o. who presents to the Urology clinic for evaluation of urinary retention, he notes seo related discomfort. Hx of HOLEP for LUTS, voiding well initially now with ANGELIQUE w/ development of UTIs for which seo is now in place. Recently treated for UTI. Patient has not been wearing seo secured to thigh, stat lock has become non adherent. Hx of prostate cancer on watchful waiting protocol.     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    Hypokalemia 02/03/2017    Controlled type 2 diabetes mellitus with stage 3 chronic kidney disease, without long-term current use of insulin 02/03/2017    Aortic atherosclerosis 08/31/2020    BMI 23.0-23.9, adult 03/07/2023    History of syncope 03/07/2023    Elevated PSA 03/07/2023    BPH associated with nocturia 03/22/2023    Bilateral renal cysts 03/22/2023    Primary insomnia 06/15/2023    Chronic idiopathic constipation 06/15/2023    Frequency of micturition 07/21/2023    Urge incontinence of urine 07/21/2023    Stage 3b chronic kidney disease 03/22/2024    Chronic kidney disease, stage 3a 04/05/2024    Urinary retention 10/29/2024    Pseudomonas urinary tract infection 10/29/2024    Generalized abdominal pain 10/29/2024    Prostate cancer 10/29/2024    Constipation 10/29/2024     Resolved Ambulatory Problems     Diagnosis Date Noted    Type 2 diabetes mellitus with complication     Hypertension, benign 01/31/2013    NSTEMI (non-ST elevated myocardial infarction) 06/23/2016    Pain of left  upper extremity 06/23/2016    Coronary artery disease due to lipid rich plaque 06/24/2016    Hyperglycemia 07/07/2016    Chronic diastolic CHF (congestive heart failure), NYHA class 2 07/08/2016    Gait instability 07/12/2016    Sepsis 02/02/2017    Influenza A 02/03/2017    SOB (shortness of breath) 02/03/2017    Paresthesia 03/12/2018    Stage 3a chronic kidney disease 06/15/2023     Past Medical History:   Diagnosis Date    Acute coronary syndrome 06/24/2016    Anemia     Coronary artery disease     Gout, chronic     Heart failure     HTN (hypertension)     Hyperlipidemia     Myocardial infarction     Pneumonia due to other staphylococcus     Pressure ulcer     Renal manifestation of secondary diabetes mellitus     Type 2 diabetes mellitus          Past Surgical History  Past Surgical History:   Procedure Laterality Date    CATARACT EXTRACTION Bilateral     CATARACT EXTRACTION W/ ANTERIOR VITRECTOMY      CORONARY ARTERY BYPASS GRAFT  07/06/2016    PAIZ-LAD, SVG-Ramus, SVG-PDA    FLUOROSCOPIC URODYNAMIC STUDY N/A 4/25/2023    Procedure: URODYNAMIC STUDY, FLUOROSCOPIC;  Surgeon: Raji Mcmullen MD;  Location: [facility] OR;  Service: Urology;  Laterality: N/A;  RN PHONE PREOP WITH DAKEYLA DURAN 4/21/23    HEMORRHOID SURGERY      LASER ENUCLEATION OF PROSTATE N/A 7/6/2023    Procedure: ENUCLEATION, PROSTATE, USING LASER; cystolithalopaxy;  Surgeon: Raji Mcmullen MD;  Location: [facility] OR;  Service: Urology;  Laterality: N/A;  [facility] [phone] SPOKE TO NICK ON 6/2/2023 @ 10:37AM. CONFIRMATION NUMBER [id number]  FIORDALIZA POLLACK [phone] EMAILED CHANNING ON 6/15/2023@ 3:07PM-KEYON  RN PREOP 5/30/2023   T/S ON 6/5/2023  RN PREOP 06/30/2023 --CARLOS       Social History       Medications    Current Outpatient Medications:     acetaminophen (TYLENOL) 500 MG tablet, Take 1 tablet (500 mg total) by mouth every 4 (four) hours as needed., Disp: 20 tablet, Rfl: 0    amLODIPine (NORVASC) 10 MG tablet, Take 1  tablet (10 mg total) by mouth once daily., Disp: 90 tablet, Rfl: 3    ciprofloxacin HCl (CIPRO) 500 MG tablet, Take 1 tablet (500 mg total) by mouth 2 (two) times daily. for 7 days, Disp: 14 tablet, Rfl: 0    cloNIDine (CATAPRES) 0.1 MG tablet, , Disp: , Rfl:     docusate sodium (COLACE) 100 MG capsule, Take 1 capsule (100 mg total) by mouth 2 (two) times daily. for 10 days, Disp: 20 capsule, Rfl: 0    isosorbide mononitrate (IMDUR) 60 MG 24 hr tablet, TAKE 1 TABLET(60 MG) BY MOUTH EVERY DAY, Disp: 90 tablet, Rfl: 3    metoprolol tartrate (LOPRESSOR) 25 MG tablet, Take 1 tablet by mouth 2 (two) times daily., Disp: , Rfl:     miconazole (MICOTIN) 2 % cream, Apply topically 2 (two) times daily. Apply to head of penis, Disp: 92 g, Rfl: 0    polyethylene glycol (GLYCOLAX) 17 gram/dose powder, Mix 1 capful (17 grams total) with fluids and drink by mouth once daily for 5 days, Disp: 238 g, Rfl: 0    pravastatin (PRAVACHOL) 40 MG tablet, Take 1 tablet (40 mg total) by mouth once daily., Disp: 90 tablet, Rfl: 3    senna-docusate 8.6-50 mg (SENOKOT-S) 8.6-50 mg per tablet, Take 1 tablet by mouth 2 (two) times a day., Disp: 60 tablet, Rfl: 0    somatropin (NORDITROPIN FLEXPRO) 10 mg/1.5 mL (6.7 mg/mL) PnIj, Inject into the skin., Disp: , Rfl:     tamsulosin (FLOMAX) 0.4 mg Cap, Take 1 capsule (0.4 mg total) by mouth once daily., Disp: 30 capsule, Rfl: 11    traZODone (DESYREL) 150 MG tablet, Take 1 tablet (150 mg total) by mouth nightly as needed for Insomnia., Disp: 90 tablet, Rfl: 3    blood sugar diagnostic Strp, To check BG daily, to use with insurance preferred meter (Patient not taking: Reported on 11/6/2024), Disp: 200 each, Rfl: 11    blood-glucose meter kit, To check BG daily, to use with insurance preferred meter (Patient not taking: Reported on 11/6/2024), Disp: 1 each, Rfl: 0    HYDROcodone-acetaminophen (NORCO) 5-325 mg per tablet, Take 1 tablet by mouth every 6 (six) hours as needed. (Patient not taking:  Reported on 11/6/2024), Disp: 8 tablet, Rfl: 0    lancets Misc, To check BG daily, to use with insurance preferred meter (Patient not taking: Reported on 11/6/2024), Disp: 200 each, Rfl: 11    lisinopriL (PRINIVIL,ZESTRIL) 40 MG tablet, Take 1 tablet (40 mg total) by mouth once daily., Disp: 90 tablet, Rfl: 3  No current facility-administered medications for this visit.    Facility-Administered Medications Ordered in Other Visits:     LIDOcaine (PF) 10 mg/ml (1%) injection 10 mg, 1 mL, Intradermal, Once, Julia Fong MD    Allergies  Review of patient's allergies indicates:   Allergen Reactions    Penicillins Nausea And Vomiting     Pt reports he is not allergic to penicillin         Patient's PMH, FH, Social hx, Medications, allergies reviewed and updated as pertinent to today's visit    Objective:      Physical Exam  Constitutional:       General: He is not in acute distress.     Appearance: He is well-developed. He is not ill-appearing, toxic-appearing or diaphoretic.   HENT:      Head: Normocephalic and atraumatic.      Mouth/Throat:      Mouth: Mucous membranes are moist.   Eyes:      Conjunctiva/sclera: Conjunctivae normal.   Pulmonary:      Effort: Pulmonary effort is normal. No respiratory distress.   Abdominal:      General: Abdomen is flat. There is no distension.      Palpations: Abdomen is soft. There is no mass.      Tenderness: There is no right CVA tenderness or left CVA tenderness.   Genitourinary:     Comments: Lee w/ yellow urine  Musculoskeletal:         General: No swelling or deformity.      Cervical back: Neck supple.   Skin:     Findings: No rash.   Neurological:      Mental Status: He is alert and oriented to person, place, and time.      Gait: Gait normal.   Psychiatric:         Mood and Affect: Mood normal.         Thought Content: Thought content normal.         Judgment: Judgment normal.             Lab Results   Component Value Date    PSADIAG 4.5 (H) 03/26/2024         Assessment:       1. Urinary retention        Plan:         Patient counseled to wear leg strap and keep seo off tension to prevent pain  He declines suprapubic tube placement procedure at this time. Procedure would avoid penile urethra,  has low risk of risk of bleeding, infection, damage to surrounding structures such as bowel.

## 2024-11-06 NOTE — TELEPHONE ENCOUNTER
Pts wife was contacted pt will come in today.  ----- Message from Raji Mcmullen MD sent at 11/6/2024  9:38 AM CST -----  Regarding: RE: Wife  Pls schedule fu appt sooner, ok to ob, should avoid ED if not an emergency- such as the catheter not draining.  ----- Message -----  From: Pau Aguirre MA  Sent: 11/5/2024   3:18 PM CST  To: Raji Mcmullen MD  Subject: FW: Wife                                           ----- Message -----  From: Georgia Mccullough  Sent: 11/5/2024   3:14 PM CST  To: [facility] Staff  Subject: Wife                                             Who called: Wife    What is the request in detail: pt has catheter for 2 weeks now and is having irritation. Wife wanted to know if he can go to the ER to have it taken off?     Can the clinic reply by [facility]? No    Would the patient rather a call back or a response via [facility]? Call back    Best call back number: [phone]      Additional Information:    Thank you.

## 2024-11-07 ENCOUNTER — TELEPHONE (OUTPATIENT)
Dept: FAMILY MEDICINE | Facility: CLINIC | Age: 89
End: 2024-11-07
Payer: MEDICARE

## 2024-11-07 NOTE — TELEPHONE ENCOUNTER
Returned call to  nurse who reports pt had a fall due to misstep getting into bed on 11/5/24, no injuries to report from fall. Pt was seen at Urology Clinic yesterday and states his seo catheter related pain is getting worse with a pain level of 8-9.

## 2024-11-07 NOTE — TELEPHONE ENCOUNTER
----- Message from Carolyn sent at 11/7/2024  3:34 PM CST -----  Regarding: [facility]    Type: Patient Call Back     Who called:[facility]     What is the request in detail:pt had a fall on 11/5 no injuries, pt misstepped getting in to bed. Family assisted.  Pt complaining of pain lvl8-9 with seo, pt taking pain meds and not getting any relief     Can the clinic reply by [facility]? No     Would the patient rather a call back or a response via [facility]? Call back     Best call back number:[phone]     Additional Information:     Thank you.

## 2024-11-08 ENCOUNTER — LAB VISIT (OUTPATIENT)
Dept: LAB | Facility: HOSPITAL | Age: 89
End: 2024-11-08
Attending: STUDENT IN AN ORGANIZED HEALTH CARE EDUCATION/TRAINING PROGRAM
Payer: MEDICARE

## 2024-11-08 DIAGNOSIS — R80.9 CONTROLLED TYPE 2 DIABETES MELLITUS WITH MICROALBUMINURIA, WITHOUT LONG-TERM CURRENT USE OF INSULIN: ICD-10-CM

## 2024-11-08 DIAGNOSIS — N40.1 BPH WITH OBSTRUCTION/LOWER URINARY TRACT SYMPTOMS: ICD-10-CM

## 2024-11-08 DIAGNOSIS — C61 PROSTATE CANCER: ICD-10-CM

## 2024-11-08 DIAGNOSIS — E11.29 CONTROLLED TYPE 2 DIABETES MELLITUS WITH MICROALBUMINURIA, WITHOUT LONG-TERM CURRENT USE OF INSULIN: ICD-10-CM

## 2024-11-08 DIAGNOSIS — N13.8 BPH WITH OBSTRUCTION/LOWER URINARY TRACT SYMPTOMS: ICD-10-CM

## 2024-11-08 LAB
CHOLEST SERPL-MCNC: 143 MG/DL (ref 120–199)
CHOLEST/HDLC SERPL: 4 {RATIO} (ref 2–5)
COMPLEXED PSA SERPL-MCNC: 6.2 NG/ML (ref 0–4)
ESTIMATED AVG GLUCOSE: 140 MG/DL (ref 68–131)
HBA1C MFR BLD: 6.5 % (ref 4–5.6)
HDLC SERPL-MCNC: 36 MG/DL (ref 40–75)
HDLC SERPL: 25.2 % (ref 20–50)
LDLC SERPL CALC-MCNC: 67.6 MG/DL (ref 63–159)
NONHDLC SERPL-MCNC: 107 MG/DL
TRIGL SERPL-MCNC: 197 MG/DL (ref 30–150)

## 2024-11-08 PROCEDURE — 84153 ASSAY OF PSA TOTAL: CPT | Performed by: STUDENT IN AN ORGANIZED HEALTH CARE EDUCATION/TRAINING PROGRAM

## 2024-11-08 PROCEDURE — 83036 HEMOGLOBIN GLYCOSYLATED A1C: CPT | Performed by: FAMILY MEDICINE

## 2024-11-08 PROCEDURE — 80061 LIPID PANEL: CPT | Performed by: FAMILY MEDICINE

## 2024-11-11 ENCOUNTER — TELEPHONE (OUTPATIENT)
Dept: CARDIOLOGY | Facility: CLINIC | Age: 89
End: 2024-11-11
Payer: MEDICARE

## 2024-11-11 ENCOUNTER — OFFICE VISIT (OUTPATIENT)
Dept: FAMILY MEDICINE | Facility: CLINIC | Age: 89
End: 2024-11-11
Payer: MEDICARE

## 2024-11-11 ENCOUNTER — TELEPHONE (OUTPATIENT)
Dept: FAMILY MEDICINE | Facility: CLINIC | Age: 89
End: 2024-11-11

## 2024-11-11 ENCOUNTER — HOSPITAL ENCOUNTER (OUTPATIENT)
Dept: RADIOLOGY | Facility: HOSPITAL | Age: 89
Discharge: HOME | End: 2024-11-11
Attending: FAMILY MEDICINE
Payer: MEDICARE

## 2024-11-11 VITALS
HEIGHT: 66 IN | SYSTOLIC BLOOD PRESSURE: 142 MMHG | WEIGHT: 136.69 LBS | HEART RATE: 83 BPM | BODY MASS INDEX: 21.97 KG/M2 | DIASTOLIC BLOOD PRESSURE: 68 MMHG | TEMPERATURE: 98 F | OXYGEN SATURATION: 97 %

## 2024-11-11 DIAGNOSIS — E11.22 CKD STAGE 3 DUE TO TYPE 2 DIABETES MELLITUS: ICD-10-CM

## 2024-11-11 DIAGNOSIS — E78.5 DYSLIPIDEMIA ASSOCIATED WITH TYPE 2 DIABETES MELLITUS: ICD-10-CM

## 2024-11-11 DIAGNOSIS — I70.0 AORTIC ATHEROSCLEROSIS: ICD-10-CM

## 2024-11-11 DIAGNOSIS — I25.810 CORONARY ARTERY DISEASE INVOLVING CORONARY BYPASS GRAFT OF NATIVE HEART WITHOUT ANGINA PECTORIS: ICD-10-CM

## 2024-11-11 DIAGNOSIS — I10 ESSENTIAL HYPERTENSION: ICD-10-CM

## 2024-11-11 DIAGNOSIS — M1A.00X0 IDIOPATHIC CHRONIC GOUT WITHOUT TOPHUS, UNSPECIFIED SITE: ICD-10-CM

## 2024-11-11 DIAGNOSIS — E11.29 CONTROLLED TYPE 2 DIABETES MELLITUS WITH MICROALBUMINURIA, WITHOUT LONG-TERM CURRENT USE OF INSULIN: ICD-10-CM

## 2024-11-11 DIAGNOSIS — E11.69 DYSLIPIDEMIA ASSOCIATED WITH TYPE 2 DIABETES MELLITUS: ICD-10-CM

## 2024-11-11 DIAGNOSIS — R06.00 DYSPNEA, UNSPECIFIED TYPE: ICD-10-CM

## 2024-11-11 DIAGNOSIS — C61 PROSTATE CANCER: ICD-10-CM

## 2024-11-11 DIAGNOSIS — R06.00 DYSPNEA, UNSPECIFIED TYPE: Primary | ICD-10-CM

## 2024-11-11 DIAGNOSIS — N18.30 CKD STAGE 3 DUE TO TYPE 2 DIABETES MELLITUS: ICD-10-CM

## 2024-11-11 DIAGNOSIS — R33.8 ENLARGED PROSTATE WITH URINARY RETENTION: ICD-10-CM

## 2024-11-11 DIAGNOSIS — Z87.438 HISTORY OF BALANITIS: ICD-10-CM

## 2024-11-11 DIAGNOSIS — R80.9 CONTROLLED TYPE 2 DIABETES MELLITUS WITH MICROALBUMINURIA, WITHOUT LONG-TERM CURRENT USE OF INSULIN: ICD-10-CM

## 2024-11-11 DIAGNOSIS — N39.0 RECURRENT UTI: ICD-10-CM

## 2024-11-11 DIAGNOSIS — N40.1 ENLARGED PROSTATE WITH URINARY RETENTION: ICD-10-CM

## 2024-11-11 PROCEDURE — 99214 OFFICE O/P EST MOD 30 MIN: CPT | Mod: S$PBB,,, | Performed by: FAMILY MEDICINE

## 2024-11-11 PROCEDURE — 71046 X-RAY EXAM CHEST 2 VIEWS: CPT | Mod: TC,FY,PO

## 2024-11-11 PROCEDURE — 99999 PR PBB SHADOW E&M-EST. PATIENT-LVL V: CPT | Mod: PBBFAC,,, | Performed by: FAMILY MEDICINE

## 2024-11-11 PROCEDURE — 71046 X-RAY EXAM CHEST 2 VIEWS: CPT | Mod: 26,,, | Performed by: RADIOLOGY

## 2024-11-11 PROCEDURE — G2211 COMPLEX E/M VISIT ADD ON: HCPCS | Mod: S$PBB,,, | Performed by: FAMILY MEDICINE

## 2024-11-11 PROCEDURE — 99215 OFFICE O/P EST HI 40 MIN: CPT | Mod: PBBFAC,PO | Performed by: FAMILY MEDICINE

## 2024-11-11 PROCEDURE — 93005 ELECTROCARDIOGRAM TRACING: CPT | Mod: PBBFAC,PO | Performed by: INTERNAL MEDICINE

## 2024-11-11 PROCEDURE — 93010 ELECTROCARDIOGRAM REPORT: CPT | Mod: S$PBB,,, | Performed by: INTERNAL MEDICINE

## 2024-11-11 RX ORDER — LISINOPRIL 40 MG/1
40 TABLET ORAL DAILY
Qty: 90 TABLET | Refills: 3 | Status: SHIPPED | OUTPATIENT
Start: 2024-11-11 | End: 2025-11-11

## 2024-11-11 RX ORDER — PRAVASTATIN SODIUM 40 MG/1
40 TABLET ORAL DAILY
Qty: 90 TABLET | Refills: 3 | Status: SHIPPED | OUTPATIENT
Start: 2024-11-11

## 2024-11-11 NOTE — PROGRESS NOTES
To my staff: please inform patient that his CXR is normal.    Dr. Candelaria: we share this patient, known history of CAD s/p CABG. He c/o dyspnea that began yesterday. VS stable and not in acute distress today. EKG seemed to be stable but I am reaching out to you to see if he needs closer follow up for anginal equivalent? Last visit with you was in June.

## 2024-11-11 NOTE — PROGRESS NOTES
Assessment & Plan:    Dyspnea, unspecified type  -     EKG 12-lead  -     X-Ray Chest PA And Lateral; Future; Expected date: 11/11/2024    Etiology of dyspnea unclear. Patient is saturating well, afebrile, not tachycardic, but had pursed lip breathing that seemed to improve during exam.  EKG: sinus bradycardia, RBBB, left anterior fascicular block, unchanged from previous tracing.   CXR to be performed.   May need Cardiology follow up. Former smoker who quit over 30 years ago, so COPD is possible but not probable.     Prostate cancer  Enlarged prostate with urinary retention  Recurrent UTI  History of balanitis    Patient denies any symptoms suggestive of acute cystitis and  exam unremarkable for balanitis.   Patient's discomfort is likely from tugging on the Lee with pulling pants down. Continue topical treatments outline below. He states that he has  nursing come to assess his Lee on a regular basis.   Declined suprapubic catheter. Surveillance for prostate CA. Follow up with Urology already scheduled on 11/21.    Essential hypertension  -     lisinopriL (PRINIVIL,ZESTRIL) 40 MG tablet; Take 1 tablet (40 mg total) by mouth once daily.  Dispense: 90 tablet; Refill: 3    Patient states that he is taking all antihypertensives but did not take lisinopril yet. Continue all medications and monitor the BP in the afternoon on a log to review with nurse in a week.    Controlled type 2 diabetes mellitus with microalbuminuria, without long-term current use of insulin  Controlled. Continue current therapy.     Dyslipidemia associated with type 2 diabetes mellitus  Aortic atherosclerosis  Coronary artery disease involving coronary bypass graft of native heart without angina pectoris  Triglycerides elevated. Patient is taking pravastatin. Limited benefit of changing statin at his age.    CKD stage 3 due to type 2 diabetes mellitus  Patient has a follow up with Nephrology on 12/4.    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       Follow-up: patient already scheduled in a follow up in January  ______________________________________________________________________    Chief Complaint  Chief Complaint   Patient presents with    [facility] follow up     Urinary Tract Infection     Destin Barber is a 89 y.o. male with medical diagnoses as listed in the medical history and problem list that presents to the office to follow up on recent hospitalization for urinary retention and acute cystitis on 10/29 - 10/30. The HPI and hospital course were summarized as below:    "HPI:   89-year-old male past medical history of BPH, prostate cancer, history of urinary retention, HTN, HLD, CKD, and DM presents to the ED with complaints of worsening lower abdominal pain. Patient was evaluated in the ED yesterday (10/28) for abdominal pain, hematuria, dysuria and urinary retention. Seo was placed and patient was discharged home with Keflex for UTI. However, was unable to start his abx. Return to ED due to worsening abdominal pain.  Denies any fevers, nauase, vomiting, back pain, or any new weaknesses. Admits that the seo bag was disconnected at home and he is unsure if he connected it back correctly, since he had urine leaking from it. States hematuria resolved.Admits constipation, had a BM yesterday but it was only 1-2 pellets, and the stools were very hard.       In the ED, patient hypertensive with SBP in the 180s. Given morphine for pain control in the ED. Labs overall unremarkable. Improving renal function compared to yesterday's ED visit. UA continues to appears infected. Urine cx from 10/28/24 with presumptive pseudomonas. Given Cefepime in the ED. Admitted to hospital medicine for further evaluation and management.     Hospital Course:    89-year-old male past medical history of BPH, prostate cancer, history of urinary retention admitted on 10/29/2024 for further evaluation of " abdominal pain. Found to have constipation, urinary retention, and pseudomonas aeruginosa UTI. Seo was continued from the ED. Started on IV cefepime. CT abdomen/pelvis w/Multiple distended but not yet pathologically dilated small bowel loops with air-fluid levels throughout the abdomen.  No discrete transit point seen.  Overall nonspecific, but can be seen with small bowel enteritis or developing ileus.  No CT evidence of high-grade bowel obstruction at this time.  Started on bowel regimen. Large BM on 10/29. Urology consulted- reocmmends to Keep Seo on dishcarge and continue to treat UTI and avoid constipation. Urinary retention is becoming a chronic issue, may have to consider CIC or SP tube outpatient. Needs to follow up with urology on discharge.      Patient with abdominal pain, but improving. Discussed in length with patient and family about the chronic urinary retention and need for chronic seo at this time. They verbalized understanding. Pt denies any fever, headaches, vision changes, chest pain, shortness of breath, palpitations, nausea, vomiting, or any new weaknesses. Feels ready to go home. Patient's exam on discharge was as follow: Patient is alert and oriented, appears in no acute distress, heart with regular rate and rhythm, lungs clear to asculation with non-labored breathing, abdomen soft and nondistended, some tenderness near suprapubic region, and no new weaknesses or focal deficits seen. Bilateral lower extremities without any edema or calf tenderness.      Patient and family was counseled regarding any abnormal labs, differential diagnosis, treatment options, risk-benefit, lifestyle changes, prognosis, current condition, and medications. Patient and family was interactive and attentive.  Patient and family questions were answered in a respectful and timely manner. Patient was instructed to follow-up with PCP within 1 week and to continue taking medications as prescribed.  Instructed to  "also follow up with urology outpatient. Also, extensively discussed the risks, benefits, and side effects of patient's medications. Discussed with patient about any medication changes. Patient verbalized understanding and agrees to treatment plan.  Patient is stable for discharge.  Patient has no other questions or concerns at this time.  ED precautions discussed with the patient.     Vital signs are stable. Ambulating without any difficulty. Tolerating p.o. intake without any nausea or vomiting. Afebrile for over 24 hours. Patient is in stable condition and has no questions or concerns. Patient will be discharge to home with home health once transportation secured. States he has a prescription for pain medications already.  Prescriptions sent to pharmacy.  CM/SW to assist with discharge planning."    Medications prescribed at discharge:      ciprofloxacin HCl 500 MG tablet  Commonly known as: CIPRO  Take 1 tablet (500 mg total) by mouth 2 (two) times daily. for 7 days      CLEARLAX 17 gram/dose powder  Generic drug: polyethylene glycol  Mix 1 capful (17 grams total) with fluids and drink by mouth once daily for 5 days  Start taking on: October 31, 2024      docusate sodium 100 MG capsule  Commonly known as: COLACE  Take 1 capsule (100 mg total) by mouth 2 (two) times daily. for 10 days     Patient was continued on Flomax.     Patient went to the ER on 11/1 for constipation and was discharged home on Senn[place]ot. Patient presented again to the ER on 11/5 with balanitis for which Urojet and miconazole cream were applied. Discharged home with miconazole cream and Tylenol. Strongly advised to keep Lee in place.     Patient saw Urology on 11/6 and was counseled to wear his Lee attached to a leg strap to keep it secure. Declined suprapublic tube placement. Continues to undergo watchful monitoring of prostate cancer.     Today, patient c/o discomfort around the catheter site and feeling "short winded" since yesterday. " Dyspnea began while he was at [facility]. Endorses a mild cough, but this is not new, right-sided CP, and a sore throat. Former smoker who quit at least 30 years ago. Completed antibiotics for acute cystitis. Denies pelvic pain or fever.       PAST MEDICAL HISTORY:  Past Medical History:   Diagnosis Date    Acute coronary syndrome 06/24/2016    s/p 3V CABG 7/2016    Anemia     Coronary artery disease     Diastolic dysfunction     Gout, chronic     Heart failure     HTN (hypertension)     Hyperlipidemia     Myocardial infarction     Pneumonia due to other staphylococcus     Pressure ulcer     Renal manifestation of secondary diabetes mellitus     Type 2 diabetes mellitus     diet controlled    Urge incontinence of urine 7/21/2023       PAST SURGICAL HISTORY:  Past Surgical History:   Procedure Laterality Date    CATARACT EXTRACTION Bilateral     CATARACT EXTRACTION W/ ANTERIOR VITRECTOMY      CORONARY ARTERY BYPASS GRAFT  07/06/2016    PAIZ-LAD, SVG-Ramus, SVG-PDA    FLUOROSCOPIC URODYNAMIC STUDY N/A 4/25/2023    Procedure: URODYNAMIC STUDY, FLUOROSCOPIC;  Surgeon: Raji Mcmullen MD;  Location: [facility] OR;  Service: Urology;  Laterality: N/A;  RN PHONE PREOP WITH GRANDDAUGHTER ROGER 4/21/23    HEMORRHOID SURGERY      LASER ENUCLEATION OF PROSTATE N/A 7/6/2023    Procedure: ENUCLEATION, PROSTATE, USING LASER; cystolithalopaxy;  Surgeon: Raji Mcmullen MD;  Location: [facility] OR;  Service: Urology;  Laterality: N/A;  notify [facility] [phone] SPOKE TO NICK ON 6/2/2023 @ 10:37AM. CONFIRMATION NUMBER [id number] POLLACK [phone] EMAILED CHANNING ON 6/15/2023@ 3:07PM-KEYON  RN PREOP 5/30/2023   T/S ON 6/5/2023  RN PREOP 06/30/2023 --CARLOS       SOCIAL HISTORY:  Social History     Socioeconomic History    Marital status:     Number of children: 3    Highest education level: 6th grade   Tobacco Use    Smoking status: Former     Types: Cigars     Start date: 1955     Quit date: 2003     Years since  quittin.8    Smokeless tobacco: Never    Tobacco comments:     Former smoker. Pt. Smoked 2 cigars daily.   Substance and Sexual Activity    Alcohol use: No    Drug use: No    Sexual activity: Not Currently     Partners: Female     Social Drivers of Health     Financial Resource Strain: Low Risk  (3/22/2023)    Overall Financial Resource Strain (CARDIA)     Difficulty of Paying Living Expenses: Not hard at all   Food Insecurity: No Food Insecurity (3/22/2023)    Hunger Vital Sign     Worried About Running Out of Food in the Last Year: Never true     Ran Out of Food in the Last Year: Never true   Transportation Needs: No Transportation Needs (3/22/2023)    PRAPARE - Transportation     Lack of Transportation (Medical): No     Lack of Transportation (Non-Medical): No   Physical Activity: Sufficiently Active (3/22/2023)    Exercise Vital Sign     Days of Exercise per Week: 5 days     Minutes of Exercise per Session: 30 min   Stress: No Stress Concern Present (3/22/2023)    [employer] - Occupational Stress Questionnaire     Feeling of Stress : Not at all   Housing Stability: Unknown (3/22/2023)    Housing Stability Vital Sign     Unable to Pay for Housing in the Last Year: No     Unstable Housing in the Last Year: No       FAMILY HISTORY:  Family History   Problem Relation Name Age of Onset    Hypertension Mother      Heart disease Mother      Cancer Father          colon    Heart disease Sister      No Known Problems Sister      No Known Problems Sister      No Known Problems Sister      No Known Problems Sister      Heart disease Brother      No Known Problems Brother      No Known Problems Brother      No Known Problems Brother      No Known Problems Brother      No Known Problems Brother      No Known Problems Daughter      No Known Problems Daughter      No Known Problems Son      Amblyopia Neg Hx      Blindness Neg Hx      Cataracts Neg Hx      Diabetes Neg Hx      Glaucoma Neg Hx       Macular degeneration Neg Hx      Retinal detachment Neg Hx      Strabismus Neg Hx      Stroke Neg Hx      Thyroid disease Neg Hx         ALLERGIES AND MEDICATIONS: updated and reviewed.  Review of patient's allergies indicates:   Allergen Reactions    Penicillins Nausea And Vomiting     Pt reports he is not allergic to penicillin       Current Outpatient Medications   Medication Sig Dispense Refill    amLODIPine (NORVASC) 10 MG tablet Take 1 tablet (10 mg total) by mouth once daily. 90 tablet 3    blood sugar diagnostic Strp To check BG daily, to use with insurance preferred meter 200 each 11    blood-glucose meter kit To check BG daily, to use with insurance preferred meter 1 each 0    cloNIDine (CATAPRES) 0.1 MG tablet       HYDROcodone-acetaminophen (NORCO) 5-325 mg per tablet Take 1 tablet by mouth every 6 (six) hours as needed. 8 tablet 0    isosorbide mononitrate (IMDUR) 60 MG 24 hr tablet TAKE 1 TABLET(60 MG) BY MOUTH EVERY DAY 90 tablet 3    lancets Misc To check BG daily, to use with insurance preferred meter 200 each 11    metoprolol tartrate (LOPRESSOR) 25 MG tablet Take 1 tablet by mouth 2 (two) times daily.      miconazole (MICOTIN) 2 % cream Apply topically 2 (two) times daily. Apply to head of penis 92 g 0    polyethylene glycol (GLYCOLAX) 17 gram/dose powder Mix 1 capful (17 grams total) with fluids and drink by mouth once daily for 5 days 238 g 0    pravastatin (PRAVACHOL) 40 MG tablet Take 1 tablet (40 mg total) by mouth once daily. 90 tablet 3    senna-docusate 8.6-50 mg (SENOKOT-S) 8.6-50 mg per tablet Take 1 tablet by mouth 2 (two) times a day. 60 tablet 0    somatropin (NORDITROPIN FLEXPRO) 10 mg/1.5 mL (6.7 mg/mL) PnIj Inject into the skin.      tamsulosin (FLOMAX) 0.4 mg Cap Take 1 capsule (0.4 mg total) by mouth once daily. 30 capsule 11    traZODone (DESYREL) 150 MG tablet Take 1 tablet (150 mg total) by mouth nightly as needed for Insomnia. 90 tablet 3    lisinopriL (PRINIVIL,ZESTRIL)  "40 MG tablet Take 1 tablet (40 mg total) by mouth once daily. 90 tablet 3     No current facility-administered medications for this visit.     Facility-Administered Medications Ordered in Other Visits   Medication Dose Route Frequency Provider Last Rate Last Admin    LIDOcaine (PF) 10 mg/ml (1%) injection 10 mg  1 mL Intradermal Once Julia Fong MD             ROS  Review of Systems   Constitutional:  Negative for activity change and fever.   HENT:  Positive for sore throat. Negative for congestion, ear pain, postnasal drip and sinus pressure.    Respiratory:  Positive for cough and shortness of breath. Negative for wheezing.    Cardiovascular:  Positive for chest pain.   Genitourinary:  Positive for difficulty urinating and penile pain.           Physical Exam  Vitals:    11/11/24 0904 11/11/24 0907   BP: (!) 140/68 (!) 142/68   Pulse: 83    Temp: 97.5 °F (36.4 °C)    TempSrc: Oral    SpO2: 97%    Weight: 62 kg (136 lb 11 oz)    Height: 5' 6" (1.676 m)     Body mass index is 22.06 kg/m².  Weight: 62 kg (136 lb 11 oz)   Height: 5' 6" (167.6 cm)   Physical Exam  Constitutional:       General: He is not in acute distress.     Appearance: Normal appearance.   HENT:      Head: Normocephalic and atraumatic.      Right Ear: Tympanic membrane and ear canal normal.      Left Ear: Tympanic membrane and ear canal normal.      Nose: Nose normal.      Mouth/Throat:      Mouth: Mucous membranes are moist.      Pharynx: Posterior oropharyngeal erythema present.   Eyes:      Conjunctiva/sclera: Conjunctivae normal.   Cardiovascular:      Rate and Rhythm: Normal rate and regular rhythm.      Pulses: Normal pulses.      Heart sounds: Normal heart sounds.   Pulmonary:      Effort: Prolonged expiration present.      Breath sounds: Normal breath sounds.   Genitourinary:     Comments: Lee catheter in place, strapped to the left leg, draining light yellow urine. No erythema around the urethra or discharge.   Skin:     General: " Skin is warm and dry.   Neurological:      Mental Status: He is alert. Mental status is at baseline.   Psychiatric:         Mood and Affect: Mood normal.         Behavior: Behavior normal.       Chaperone present during  exam.

## 2024-11-11 NOTE — Clinical Note
Patient states that he is taking all antihypertensives but did not take lisinopril yet. Continue all medications and monitor the BP in the afternoon on a log to review with nurse in a week.

## 2024-11-11 NOTE — PATIENT INSTRUCTIONS
Take amlodipine and lisinopril in the morning.    Check your blood pressure once a day in the afternoon and write those readings down. My nurse, Mireya, is going to call you in a week to check on your blood pressure. Your goal BP is less than 140/90.

## 2024-11-11 NOTE — TELEPHONE ENCOUNTER
----- Message from Adela Diaz DO sent at 11/11/2024 10:49 AM CST -----  To my staff: please inform patient that his CXR is normal.    Dr. Candelaria: we share this patient, known history of CAD s/p CABG. He c/o dyspnea that began yesterday. VS stable and not in acute distress today. EKG seemed to be stable but I am reaching out to you to see if he needs closer follow up for anginal equivalent? Last visit with you was in June.

## 2024-11-12 LAB
OHS QRS DURATION: 134 MS
OHS QTC CALCULATION: 461 MS

## 2024-11-13 ENCOUNTER — HOSPITAL ENCOUNTER (EMERGENCY)
Facility: HOSPITAL | Age: 89
Discharge: HOME | End: 2024-11-13
Attending: EMERGENCY MEDICINE
Payer: MEDICARE

## 2024-11-13 VITALS
RESPIRATION RATE: 19 BRPM | SYSTOLIC BLOOD PRESSURE: 185 MMHG | BODY MASS INDEX: 20.89 KG/M2 | OXYGEN SATURATION: 99 % | DIASTOLIC BLOOD PRESSURE: 97 MMHG | HEART RATE: 65 BPM | WEIGHT: 130 LBS | TEMPERATURE: 98 F | HEIGHT: 66 IN

## 2024-11-13 DIAGNOSIS — R06.02 SHORTNESS OF BREATH: ICD-10-CM

## 2024-11-13 DIAGNOSIS — N30.00 ACUTE CYSTITIS WITHOUT HEMATURIA: Primary | ICD-10-CM

## 2024-11-13 DIAGNOSIS — R52 PAIN: ICD-10-CM

## 2024-11-13 LAB
ALBUMIN SERPL BCP-MCNC: 3.7 G/DL (ref 3.5–5.2)
ALP SERPL-CCNC: 72 U/L (ref 40–150)
ALT SERPL W/O P-5'-P-CCNC: 13 U/L (ref 10–44)
ANION GAP SERPL CALC-SCNC: 11 MMOL/L (ref 8–16)
AST SERPL-CCNC: 21 U/L (ref 10–40)
BACTERIA #/AREA URNS HPF: ABNORMAL /HPF
BASOPHILS # BLD AUTO: 0.04 K/UL (ref 0–0.2)
BASOPHILS NFR BLD: 0.7 % (ref 0–1.9)
BILIRUB SERPL-MCNC: 0.3 MG/DL (ref 0.1–1)
BILIRUB UR QL STRIP: NEGATIVE
BNP SERPL-MCNC: 218 PG/ML (ref 0–99)
BUN SERPL-MCNC: 18 MG/DL (ref 8–23)
CALCIUM SERPL-MCNC: 9.4 MG/DL (ref 8.7–10.5)
CHLORIDE SERPL-SCNC: 108 MMOL/L (ref 95–110)
CLARITY UR: ABNORMAL
CO2 SERPL-SCNC: 22 MMOL/L (ref 23–29)
COLOR UR: YELLOW
CREAT SERPL-MCNC: 1.2 MG/DL (ref 0.5–1.4)
DIFFERENTIAL METHOD BLD: ABNORMAL
EOSINOPHIL # BLD AUTO: 0.1 K/UL (ref 0–0.5)
EOSINOPHIL NFR BLD: 1.6 % (ref 0–8)
ERYTHROCYTE [DISTWIDTH] IN BLOOD BY AUTOMATED COUNT: 12.9 % (ref 11.5–14.5)
EST. GFR  (NO RACE VARIABLE): 59 ML/MIN/1.73 M^2
GLUCOSE SERPL-MCNC: 87 MG/DL (ref 70–110)
GLUCOSE UR QL STRIP: NEGATIVE
HCT VFR BLD AUTO: 34.1 % (ref 40–54)
HGB BLD-MCNC: 11.7 G/DL (ref 14–18)
HGB UR QL STRIP: NEGATIVE
HYALINE CASTS #/AREA URNS LPF: 1 /LPF
IMM GRANULOCYTES # BLD AUTO: 0.02 K/UL (ref 0–0.04)
IMM GRANULOCYTES NFR BLD AUTO: 0.3 % (ref 0–0.5)
INR PPP: 1 (ref 0.8–1.2)
KETONES UR QL STRIP: NEGATIVE
LEUKOCYTE ESTERASE UR QL STRIP: ABNORMAL
LYMPHOCYTES # BLD AUTO: 2.7 K/UL (ref 1–4.8)
LYMPHOCYTES NFR BLD: 44 % (ref 18–48)
MCH RBC QN AUTO: 31.3 PG (ref 27–31)
MCHC RBC AUTO-ENTMCNC: 34.3 G/DL (ref 32–36)
MCV RBC AUTO: 91 FL (ref 82–98)
MICROSCOPIC COMMENT: ABNORMAL
MONOCYTES # BLD AUTO: 0.5 K/UL (ref 0.3–1)
MONOCYTES NFR BLD: 8.6 % (ref 4–15)
NEUTROPHILS # BLD AUTO: 2.7 K/UL (ref 1.8–7.7)
NEUTROPHILS NFR BLD: 44.8 % (ref 38–73)
NITRITE UR QL STRIP: NEGATIVE
NRBC BLD-RTO: 0 /100 WBC
PH UR STRIP: 6 [PH] (ref 5–8)
PLATELET # BLD AUTO: 243 K/UL (ref 150–450)
PMV BLD AUTO: 9.2 FL (ref 9.2–12.9)
POTASSIUM SERPL-SCNC: 3.8 MMOL/L (ref 3.5–5.1)
PROT SERPL-MCNC: 7.7 G/DL (ref 6–8.4)
PROT UR QL STRIP: ABNORMAL
PROTHROMBIN TIME: 10.9 SEC (ref 9–12.5)
RBC # BLD AUTO: 3.74 M/UL (ref 4.6–6.2)
RBC #/AREA URNS HPF: 4 /HPF (ref 0–4)
SODIUM SERPL-SCNC: 141 MMOL/L (ref 136–145)
SP GR UR STRIP: 1.01 (ref 1–1.03)
TROPONIN I SERPL DL<=0.01 NG/ML-MCNC: 0.02 NG/ML (ref 0–0.03)
URN SPEC COLLECT METH UR: ABNORMAL
UROBILINOGEN UR STRIP-ACNC: NEGATIVE EU/DL
WBC # BLD AUTO: 6.13 K/UL (ref 3.9–12.7)
WBC #/AREA URNS HPF: 8 /HPF (ref 0–5)
YEAST URNS QL MICRO: ABNORMAL

## 2024-11-13 PROCEDURE — 80053 COMPREHEN METABOLIC PANEL: CPT

## 2024-11-13 PROCEDURE — 83880 ASSAY OF NATRIURETIC PEPTIDE: CPT

## 2024-11-13 PROCEDURE — 96375 TX/PRO/DX INJ NEW DRUG ADDON: CPT

## 2024-11-13 PROCEDURE — 99285 EMERGENCY DEPT VISIT HI MDM: CPT | Mod: 25

## 2024-11-13 PROCEDURE — 96374 THER/PROPH/DIAG INJ IV PUSH: CPT

## 2024-11-13 PROCEDURE — 85025 COMPLETE CBC W/AUTO DIFF WBC: CPT

## 2024-11-13 PROCEDURE — 85610 PROTHROMBIN TIME: CPT

## 2024-11-13 PROCEDURE — 93010 ELECTROCARDIOGRAM REPORT: CPT | Mod: ,,, | Performed by: INTERNAL MEDICINE

## 2024-11-13 PROCEDURE — 81000 URINALYSIS NONAUTO W/SCOPE: CPT

## 2024-11-13 PROCEDURE — 84484 ASSAY OF TROPONIN QUANT: CPT

## 2024-11-13 PROCEDURE — 93005 ELECTROCARDIOGRAM TRACING: CPT

## 2024-11-13 PROCEDURE — 63600175 PHARM REV CODE 636 W HCPCS: Performed by: EMERGENCY MEDICINE

## 2024-11-13 RX ORDER — HYDRALAZINE HYDROCHLORIDE 20 MG/ML
10 INJECTION INTRAMUSCULAR; INTRAVENOUS
Status: COMPLETED | OUTPATIENT
Start: 2024-11-13 | End: 2024-11-13

## 2024-11-13 RX ORDER — CIPROFLOXACIN 500 MG/1
500 TABLET ORAL 2 TIMES DAILY
Qty: 14 TABLET | Refills: 0 | Status: ON HOLD | OUTPATIENT
Start: 2024-11-13 | End: 2024-11-15 | Stop reason: HOSPADM

## 2024-11-13 RX ORDER — TRAMADOL HYDROCHLORIDE 50 MG/1
50 TABLET ORAL EVERY 6 HOURS PRN
Qty: 12 TABLET | Refills: 0 | Status: SHIPPED | OUTPATIENT
Start: 2024-11-13 | End: 2024-11-23

## 2024-11-13 RX ORDER — HYDROMORPHONE HYDROCHLORIDE 1 MG/ML
1 INJECTION, SOLUTION INTRAMUSCULAR; INTRAVENOUS; SUBCUTANEOUS
Status: COMPLETED | OUTPATIENT
Start: 2024-11-13 | End: 2024-11-13

## 2024-11-13 RX ORDER — CEFTRIAXONE 1 G/1
1 INJECTION, POWDER, FOR SOLUTION INTRAMUSCULAR; INTRAVENOUS
Status: COMPLETED | OUTPATIENT
Start: 2024-11-13 | End: 2024-11-13

## 2024-11-13 RX ADMIN — CEFTRIAXONE 1 G: 1 INJECTION, POWDER, FOR SOLUTION INTRAMUSCULAR; INTRAVENOUS at 07:11

## 2024-11-13 RX ADMIN — HYDROMORPHONE HYDROCHLORIDE 1 MG: 1 INJECTION, SOLUTION INTRAMUSCULAR; INTRAVENOUS; SUBCUTANEOUS at 05:11

## 2024-11-13 RX ADMIN — HYDRALAZINE HYDROCHLORIDE 10 MG: 20 INJECTION INTRAMUSCULAR; INTRAVENOUS at 07:11

## 2024-11-13 NOTE — ED PROVIDER NOTES
Encounter Date: 11/13/2024    SCRIBE #1 NOTE: I, Maria Luisaboo Buenrostro, am scribing for, and in the presence of,  Carrillo Mclean MD.       History     Chief Complaint   Patient presents with    Groin Swelling    Shortness of Breath    Hypertension     Pt presents to ER with complaints of HTN, SOB and penis swelling and burning times 1 week. Pt states seo was placed 3 weeks ago for prostate cancer. Pt is not receiving Chemo. Pt states he is not on blood thinners. Pt denies any other issues at this time. Pt rates pain 10/10 and says he took a pain pill.      89 year old male with PMHx of CAD, HTN, HLD, MI, T2DM, CKD, prostate cancer, s/p CABG presents to the ED with groin swelling x3 weeks. Reports associated testicular pain. Reports his symptoms are causing his BP to go up and he is SOB. Patient has no other complaints at this time.     Per chart review pt seen in ED on 10/28 with urinay retention. Postvoid residual bladder scan that showed 400ml. UA suggesting a urinary tract infection. Patient had seo placed and discharged with Rx Keflex for suspected UTI. Pt seen in ED again on 10/29/24, admitted for observation for Pseudomonas urinary tract infection. Patient was unable to take his PO abx OP due to worsening abdominal pain. CT of abdomen showed: urinary bladder is collapsed around a Seo catheter noting circumferential wall thickening.  Small gas foci within the urinary bladder lumen.  Patient was discharged same day without other complaints.     He was seen in the ED again on 11/1/24 with constipation. on  exam the seo catheter is in place with yellow urine in the bag. No leakage. Bladder scan showed 8ml of urine in bladder. Xray showed large amount of stool but no obstruction or perforation. Discharged home with senokot-s. He was seen in the ED again on 11/5/24 with penile pain and swelling. On exam, No phimosis or paraphimosis. Pain with retraction of foreskin. Mild swelling to glans w area of excoriation  and white discharge under foreskin. Discharged with micotin Rx and instructed to f/u with urology.     Pt followed up with Dr. Mcmullen with Urology on 11/6/24. Instructed to wear leg strap and keep seo off tension to prevent pain. Declined suprapubic tube placement.       The history is provided by the patient and medical records. No  was used.     Review of patient's allergies indicates:   Allergen Reactions    Penicillins Nausea And Vomiting     Pt reports he is not allergic to penicillin       Past Medical History:   Diagnosis Date    Acute coronary syndrome 06/24/2016    s/p 3V CABG 7/2016    Anemia     Coronary artery disease     Diastolic dysfunction     Gout, chronic     Heart failure     HTN (hypertension)     Hyperlipidemia     Myocardial infarction     Pneumonia due to other staphylococcus     Pressure ulcer     Renal manifestation of secondary diabetes mellitus     Type 2 diabetes mellitus     diet controlled    Urge incontinence of urine 7/21/2023     Past Surgical History:   Procedure Laterality Date    CATARACT EXTRACTION Bilateral     CATARACT EXTRACTION W/ ANTERIOR VITRECTOMY      CORONARY ARTERY BYPASS GRAFT  07/06/2016    PAIZ-LAD, SVG-Ramus, SVG-PDA    FLUOROSCOPIC URODYNAMIC STUDY N/A 4/25/2023    Procedure: URODYNAMIC STUDY, FLUOROSCOPIC;  Surgeon: Raji Mcmullen MD;  Location: [facility] OR;  Service: Urology;  Laterality: N/A;  RN PHONE PREOP WITH GRANDDAUGHTER ROGER 4/21/23    HEMORRHOID SURGERY      LASER ENUCLEATION OF PROSTATE N/A 7/6/2023    Procedure: ENUCLEATION, PROSTATE, USING LASER; cystolithalopaxy;  Surgeon: Raji Mcmullen MD;  Location: [facility] OR;  Service: Urology;  Laterality: N/A;  notify [facility] [phone] SPOKE TO NICK ON 6/2/2023 @ 10:37AM. CONFIRMATION NUMBER [id number] POLLACK [phone] EMAILED CHANNING ON 6/15/2023@ 3:07PM-KEYON  RN PREOP 5/30/2023   T/S ON 6/5/2023  RN PREOP 06/30/2023 --CARLOS     Family History   Problem Relation  Name Age of Onset    Hypertension Mother      Heart disease Mother      Cancer Father          colon    Heart disease Sister      No Known Problems Sister      No Known Problems Sister      No Known Problems Sister      No Known Problems Sister      Heart disease Brother      No Known Problems Brother      No Known Problems Brother      No Known Problems Brother      No Known Problems Brother      No Known Problems Brother      No Known Problems Daughter      No Known Problems Daughter      No Known Problems Son      Amblyopia Neg Hx      Blindness Neg Hx      Cataracts Neg Hx      Diabetes Neg Hx      Glaucoma Neg Hx      Macular degeneration Neg Hx      Retinal detachment Neg Hx      Strabismus Neg Hx      Stroke Neg Hx      Thyroid disease Neg Hx       Social History     Tobacco Use    Smoking status: Former     Types: Cigars     Start date:      Quit date:      Years since quittin.8    Smokeless tobacco: Never    Tobacco comments:     Former smoker. Pt. Smoked 2 cigars daily.   Substance Use Topics    Alcohol use: No    Drug use: No     Review of Systems   Constitutional: Negative.    HENT: Negative.     Eyes: Negative.    Respiratory:  Positive for shortness of breath.    Cardiovascular: Negative.    Gastrointestinal: Negative.    Endocrine: Negative.    Genitourinary:  Positive for scrotal swelling and testicular pain.   Musculoskeletal: Negative.    Skin: Negative.    Allergic/Immunologic: Negative.    Neurological: Negative.    Hematological: Negative.    Psychiatric/Behavioral: Negative.         Physical Exam     Initial Vitals [24 1704]   BP Pulse Resp Temp SpO2   (!) 237/114 72 20 98.3 °F (36.8 °C) 98 %      MAP       --         Physical Exam    Constitutional: He is active and cooperative.   HENT:   Head: Normocephalic and atraumatic. Mouth/Throat: Oropharynx is clear and moist.   Eyes: Conjunctivae are normal.   Pulmonary/Chest: No respiratory distress.   Genitourinary:     Genitourinary Comments: Left testicle is not firm but enlarged and tender. Lee catheter in place. No penile discharge or drainage noted.      Musculoskeletal:         General: Normal range of motion.     Neurological: He is alert and oriented to person, place, and time.   Skin: Skin is warm and dry.   Psychiatric: He has a normal mood and affect. His behavior is normal. Judgment and thought content normal.         ED Course   Procedures  Labs Reviewed   CBC W/ AUTO DIFFERENTIAL - Abnormal       Result Value    WBC 6.13      RBC 3.74 (*)     Hemoglobin 11.7 (*)     Hematocrit 34.1 (*)     MCV 91      MCH 31.3 (*)     MCHC 34.3      RDW 12.9      Platelets 243      MPV 9.2      Immature Granulocytes 0.3      Gran # (ANC) 2.7      Immature Grans (Abs) 0.02      Lymph # 2.7      Mono # 0.5      Eos # 0.1      Baso # 0.04      nRBC 0      Gran % 44.8      Lymph % 44.0      Mono % 8.6      Eosinophil % 1.6      Basophil % 0.7      Differential Method Automated     COMPREHENSIVE METABOLIC PANEL - Abnormal    Sodium 141      Potassium 3.8      Chloride 108      CO2 22 (*)     Glucose 87      BUN 18      Creatinine 1.2      Calcium 9.4      Total Protein 7.7      Albumin 3.7      Total Bilirubin 0.3      Alkaline Phosphatase 72      AST 21      ALT 13      eGFR 59 (*)     Anion Gap 11     B-TYPE NATRIURETIC PEPTIDE - Abnormal     (*)    URINALYSIS, REFLEX TO URINE CULTURE - Abnormal    Specimen UA Urine, Clean Catch      Color, UA Yellow      Appearance, UA Hazy (*)     pH, UA 6.0      Specific Gravity, UA 1.015      Protein, UA Trace (*)     Glucose, UA Negative      Ketones, UA Negative      Bilirubin (UA) Negative      Occult Blood UA Negative      Nitrite, UA Negative      Urobilinogen, UA Negative      Leukocytes, UA 2+ (*)     Narrative:     Specimen Source->Urine   URINALYSIS MICROSCOPIC - Abnormal    RBC, UA 4      WBC, UA 8 (*)     Bacteria Rare      Yeast, UA Occasional (*)     Hyaline Casts, UA 1       Microscopic Comment SEE COMMENT      Narrative:     Specimen Source->Urine   TROPONIN I    Troponin I 0.021     PROTIME-INR    Prothrombin Time 10.9      INR 1.0            Imaging Results              US Scrotum And Testicles (Final result)  Result time 11/13/24 19:04:57      Final result by Leanna Chen MD (11/13/24 19:04:57)                   Impression:      1. Mild bilateral heterogenous testicular echotexture with mild increased vascularity.  This is nonspecific but may be seen with orchitis.  2. No evidence of acute epididymitis.  3. No evidence of testicular torsion.      Electronically signed by: Leanna Chen MD  Date:    11/13/2024  Time:    19:04               Narrative:    EXAMINATION:  US SCROTUM AND TESTICLES    CLINICAL HISTORY:  Pain, unspecified    TECHNIQUE:  Sonography of the scrotum and testes.    COMPARISON:  09/20/2024.    FINDINGS:  Right Testicle:    *Size: 4.6 x 2.9 x 2.8 cm  *Appearance: Mild heterogenous echotexture with mild increased vascularity.  *Flow: Normal arterial and venous flow  *Epididymis: Normal.  *Hydrocele: None.  *Varicocele: None.  .    Left Testicle:    *Size: 4.2 x 3.7 x 2.3 cm  *Appearance: Mild heterogenous echotexture with mild increased vascularity.  *Flow: Normal arterial and venous flow  *Epididymis: Normal.  *Hydrocele: None.  *Varicocele: None.  .    Other findings: None.                                       X-Ray Chest 1 View (Final result)  Result time 11/13/24 17:52:12      Final result by Leanna Chen MD (11/13/24 17:52:12)                   Impression:      No acute cardiopulmonary process identified.      Electronically signed by: Leanna Chen MD  Date:    11/13/2024  Time:    17:52               Narrative:    EXAMINATION:  XR CHEST 1 VIEW    CLINICAL HISTORY:  shortness of breath;    TECHNIQUE:  Single frontal view of the chest was performed.    COMPARISON:  11/11/2024.    FINDINGS:  Cardiac silhouette is normal in size.  Postsurgical  "sternotomy changes are seen.  Lungs are symmetrically expanded.  Minimal scarring is seen in the right mid lung zone.  No evidence of new focal consolidative process, pneumothorax, or significant pleural effusion.  No acute osseous abnormality identified.                                       Medications   HYDROmorphone injection 1 mg (1 mg Intravenous Given 11/13/24 1745)   hydrALAZINE injection 10 mg (10 mg Intravenous Given 11/13/24 1942)   cefTRIAXone injection 1 g (1 g Intravenous Given 11/13/24 1942)     Medical Decision Making  Patient has had multiple interventions by Urology over the course of the last couple weeks currently with a Lee catheter in place secondary to urinary retention patient also has some prostate cancer issues as well.  Today the patient presents here with his blood pressure "and "out of control.  He is also concerned about some mild degree of swelling development of pain in the left testicle.  Ultrasonography of the of the bilateral testicles showed no evidence of any epididymitis or any significant amount of swelling of either 1 of the testicles.  I am concerned about the possibility bout of refractory and infectious etiology for this patient's symptoms.  Give the patient a g of Rocephin here in the emergency department and discharge him on Augmentin.  In addition to this the patient's blood pressure is 200/100 present going to go ahead and give him some hydralazine 10 mg IV and monitor his blood pressure as long as I get a reduction of approximately 30% of MA P the patient will be discharged home.    Blood pressure has improved to 188/99.  I feel the patient can safely be discharged home now patient is calling family member.    Amount and/or Complexity of Data Reviewed  Labs: ordered. Decision-making details documented in ED Course.  Radiology: ordered. Decision-making details documented in ED Course.  ECG/medicine tests: ordered and independent interpretation performed. " Decision-making details documented in ED Course.    Risk  Prescription drug management.            Scribe Attestation:   Scribe #1: I performed the above scribed service and the documentation accurately describes the services I performed. I attest to the accuracy of the note.                           This document was produced by a scribe under my direction and in my presence. I agree with the content of the note and have made any necessary edits.     Carrillo Mclean MD    11/13/2024 8:20 PM      Clinical Impression:  Final diagnoses:  [R06.02] Shortness of breath  [R52] Pain  [N30.00] Acute cystitis without hematuria (Primary)          ED Disposition Condition    Discharge Stable          ED Prescriptions       Medication Sig Dispense Start Date End Date Auth. Provider    ciprofloxacin HCl (CIPRO) 500 MG tablet Take 1 tablet (500 mg total) by mouth 2 (two) times daily. for 7 days 14 tablet 11/13/2024 11/20/2024 Carrillo Mclean MD    traMADoL (ULTRAM) 50 mg tablet Take 1 tablet (50 mg total) by mouth every 6 (six) hours as needed. 12 tablet 11/13/2024 11/23/2024 Carrillo Mclean MD          Follow-up Information       Follow up With Specialties Details Why Contact Info    Adela Diaz, DO Family Medicine Schedule an appointment as soon as possible for a visit in 3 days  [address] [facility] [place] [address]  [phone]               Carrillo Mclean MD  11/13/24 2020       Carrillo Mclean MD  11/13/24 [id number]

## 2024-11-14 ENCOUNTER — TELEPHONE (OUTPATIENT)
Dept: FAMILY MEDICINE | Facility: CLINIC | Age: 89
End: 2024-11-14
Payer: MEDICARE

## 2024-11-14 ENCOUNTER — HOSPITAL ENCOUNTER (OUTPATIENT)
Facility: HOSPITAL | Age: 89
Discharge: HOME | End: 2024-11-15
Attending: EMERGENCY MEDICINE | Admitting: INTERNAL MEDICINE
Payer: MEDICARE

## 2024-11-14 DIAGNOSIS — I16.0 HYPERTENSIVE URGENCY: ICD-10-CM

## 2024-11-14 DIAGNOSIS — I16.1 HYPERTENSIVE EMERGENCY: ICD-10-CM

## 2024-11-14 DIAGNOSIS — I10 ESSENTIAL HYPERTENSION: ICD-10-CM

## 2024-11-14 DIAGNOSIS — R07.9 CHEST PAIN: ICD-10-CM

## 2024-11-14 DIAGNOSIS — R06.02 SOB (SHORTNESS OF BREATH): ICD-10-CM

## 2024-11-14 LAB
ALBUMIN SERPL BCP-MCNC: 3.2 G/DL (ref 3.5–5.2)
ALP SERPL-CCNC: 63 U/L (ref 40–150)
ALT SERPL W/O P-5'-P-CCNC: 10 U/L (ref 10–44)
ANION GAP SERPL CALC-SCNC: 7 MMOL/L (ref 8–16)
AST SERPL-CCNC: 20 U/L (ref 10–40)
BASOPHILS # BLD AUTO: 0.03 K/UL (ref 0–0.2)
BASOPHILS NFR BLD: 0.5 % (ref 0–1.9)
BILIRUB SERPL-MCNC: 0.2 MG/DL (ref 0.1–1)
BNP SERPL-MCNC: 281 PG/ML (ref 0–99)
BUN SERPL-MCNC: 21 MG/DL (ref 8–23)
CALCIUM SERPL-MCNC: 8.6 MG/DL (ref 8.7–10.5)
CHLORIDE SERPL-SCNC: 111 MMOL/L (ref 95–110)
CO2 SERPL-SCNC: 21 MMOL/L (ref 23–29)
CREAT SERPL-MCNC: 1.3 MG/DL (ref 0.5–1.4)
DIFFERENTIAL METHOD BLD: ABNORMAL
EOSINOPHIL # BLD AUTO: 0.1 K/UL (ref 0–0.5)
EOSINOPHIL NFR BLD: 1.9 % (ref 0–8)
ERYTHROCYTE [DISTWIDTH] IN BLOOD BY AUTOMATED COUNT: 13.1 % (ref 11.5–14.5)
EST. GFR  (NO RACE VARIABLE): 53.2 ML/MIN/1.73 M^2
GLUCOSE SERPL-MCNC: 90 MG/DL (ref 70–110)
HCT VFR BLD AUTO: 29.4 % (ref 40–54)
HCV AB SERPL QL IA: NORMAL
HGB BLD-MCNC: 10.6 G/DL (ref 14–18)
HIV 1+2 AB+HIV1 P24 AG SERPL QL IA: NORMAL
IMM GRANULOCYTES # BLD AUTO: 0.01 K/UL (ref 0–0.04)
IMM GRANULOCYTES NFR BLD AUTO: 0.2 % (ref 0–0.5)
LYMPHOCYTES # BLD AUTO: 2.8 K/UL (ref 1–4.8)
LYMPHOCYTES NFR BLD: 49.1 % (ref 18–48)
MCH RBC QN AUTO: 31.8 PG (ref 27–31)
MCHC RBC AUTO-ENTMCNC: 36.1 G/DL (ref 32–36)
MCV RBC AUTO: 88 FL (ref 82–98)
MONOCYTES # BLD AUTO: 0.6 K/UL (ref 0.3–1)
MONOCYTES NFR BLD: 10.1 % (ref 4–15)
NEUTROPHILS # BLD AUTO: 2.2 K/UL (ref 1.8–7.7)
NEUTROPHILS NFR BLD: 38.2 % (ref 38–73)
NRBC BLD-RTO: 0 /100 WBC
OHS QRS DURATION: 130 MS
OHS QRS DURATION: 146 MS
OHS QTC CALCULATION: 455 MS
OHS QTC CALCULATION: 484 MS
PLATELET # BLD AUTO: 201 K/UL (ref 150–450)
PMV BLD AUTO: 9.3 FL (ref 9.2–12.9)
POTASSIUM SERPL-SCNC: 4.4 MMOL/L (ref 3.5–5.1)
PROT SERPL-MCNC: 6.8 G/DL (ref 6–8.4)
RBC # BLD AUTO: 3.33 M/UL (ref 4.6–6.2)
SODIUM SERPL-SCNC: 139 MMOL/L (ref 136–145)
TROPONIN I SERPL DL<=0.01 NG/ML-MCNC: 0.03 NG/ML (ref 0–0.03)
TROPONIN I SERPL DL<=0.01 NG/ML-MCNC: 0.03 NG/ML (ref 0–0.03)
WBC # BLD AUTO: 5.76 K/UL (ref 3.9–12.7)

## 2024-11-14 PROCEDURE — 84484 ASSAY OF TROPONIN QUANT: CPT | Performed by: PHYSICIAN ASSISTANT

## 2024-11-14 PROCEDURE — G0378 HOSPITAL OBSERVATION PER HR: HCPCS

## 2024-11-14 PROCEDURE — 25000003 PHARM REV CODE 250: Performed by: PHYSICIAN ASSISTANT

## 2024-11-14 PROCEDURE — 84484 ASSAY OF TROPONIN QUANT: CPT | Mod: 91 | Performed by: EMERGENCY MEDICINE

## 2024-11-14 PROCEDURE — 93005 ELECTROCARDIOGRAM TRACING: CPT

## 2024-11-14 PROCEDURE — 87389 HIV-1 AG W/HIV-1&-2 AB AG IA: CPT | Performed by: PHYSICIAN ASSISTANT

## 2024-11-14 PROCEDURE — 85025 COMPLETE CBC W/AUTO DIFF WBC: CPT | Performed by: EMERGENCY MEDICINE

## 2024-11-14 PROCEDURE — 80053 COMPREHEN METABOLIC PANEL: CPT | Performed by: EMERGENCY MEDICINE

## 2024-11-14 PROCEDURE — 86803 HEPATITIS C AB TEST: CPT | Performed by: PHYSICIAN ASSISTANT

## 2024-11-14 PROCEDURE — 93010 ELECTROCARDIOGRAM REPORT: CPT | Mod: ,,, | Performed by: INTERNAL MEDICINE

## 2024-11-14 PROCEDURE — 83880 ASSAY OF NATRIURETIC PEPTIDE: CPT | Performed by: EMERGENCY MEDICINE

## 2024-11-14 PROCEDURE — 99285 EMERGENCY DEPT VISIT HI MDM: CPT | Mod: 25

## 2024-11-14 RX ORDER — IBUPROFEN 200 MG
16 TABLET ORAL
Status: DISCONTINUED | OUTPATIENT
Start: 2024-11-14 | End: 2024-11-15 | Stop reason: HOSPADM

## 2024-11-14 RX ORDER — IPRATROPIUM BROMIDE AND ALBUTEROL SULFATE 2.5; .5 MG/3ML; MG/3ML
3 SOLUTION RESPIRATORY (INHALATION) EVERY 4 HOURS PRN
Status: DISCONTINUED | OUTPATIENT
Start: 2024-11-14 | End: 2024-11-15 | Stop reason: HOSPADM

## 2024-11-14 RX ORDER — TALC
6 POWDER (GRAM) TOPICAL NIGHTLY PRN
Status: DISCONTINUED | OUTPATIENT
Start: 2024-11-14 | End: 2024-11-15 | Stop reason: HOSPADM

## 2024-11-14 RX ORDER — CLONIDINE HYDROCHLORIDE 0.1 MG/1
0.1 TABLET ORAL ONCE
Status: COMPLETED | OUTPATIENT
Start: 2024-11-14 | End: 2024-11-14

## 2024-11-14 RX ORDER — BISACODYL 10 MG/1
10 SUPPOSITORY RECTAL DAILY PRN
Status: DISCONTINUED | OUTPATIENT
Start: 2024-11-14 | End: 2024-11-15 | Stop reason: HOSPADM

## 2024-11-14 RX ORDER — HYDRALAZINE HYDROCHLORIDE 25 MG/1
25 TABLET, FILM COATED ORAL ONCE
Status: COMPLETED | OUTPATIENT
Start: 2024-11-15 | End: 2024-11-14

## 2024-11-14 RX ORDER — GLUCAGON 1 MG
1 KIT INJECTION
Status: DISCONTINUED | OUTPATIENT
Start: 2024-11-14 | End: 2024-11-15 | Stop reason: HOSPADM

## 2024-11-14 RX ORDER — TAMSULOSIN HYDROCHLORIDE 0.4 MG/1
0.4 CAPSULE ORAL DAILY
Status: DISCONTINUED | OUTPATIENT
Start: 2024-11-15 | End: 2024-11-15 | Stop reason: HOSPADM

## 2024-11-14 RX ORDER — HEPARIN SODIUM 5000 [USP'U]/ML
5000 INJECTION, SOLUTION INTRAVENOUS; SUBCUTANEOUS EVERY 8 HOURS
Status: DISCONTINUED | OUTPATIENT
Start: 2024-11-15 | End: 2024-11-15 | Stop reason: HOSPADM

## 2024-11-14 RX ORDER — ISOSORBIDE MONONITRATE 60 MG/1
60 TABLET, EXTENDED RELEASE ORAL DAILY
Status: DISCONTINUED | OUTPATIENT
Start: 2024-11-15 | End: 2024-11-15

## 2024-11-14 RX ORDER — AMLODIPINE BESYLATE 10 MG/1
10 TABLET ORAL DAILY
Status: DISCONTINUED | OUTPATIENT
Start: 2024-11-15 | End: 2024-11-15 | Stop reason: HOSPADM

## 2024-11-14 RX ORDER — LISINOPRIL 20 MG/1
40 TABLET ORAL DAILY
Status: DISCONTINUED | OUTPATIENT
Start: 2024-11-15 | End: 2024-11-15 | Stop reason: HOSPADM

## 2024-11-14 RX ORDER — ACETAMINOPHEN 325 MG/1
650 TABLET ORAL EVERY 4 HOURS PRN
Status: DISCONTINUED | OUTPATIENT
Start: 2024-11-14 | End: 2024-11-15 | Stop reason: HOSPADM

## 2024-11-14 RX ORDER — PRAVASTATIN SODIUM 40 MG/1
40 TABLET ORAL DAILY
Status: DISCONTINUED | OUTPATIENT
Start: 2024-11-15 | End: 2024-11-15 | Stop reason: HOSPADM

## 2024-11-14 RX ORDER — INSULIN ASPART 100 [IU]/ML
0-5 INJECTION, SOLUTION INTRAVENOUS; SUBCUTANEOUS
Status: DISCONTINUED | OUTPATIENT
Start: 2024-11-14 | End: 2024-11-15 | Stop reason: HOSPADM

## 2024-11-14 RX ORDER — POLYETHYLENE GLYCOL 3350 17 G/17G
17 POWDER, FOR SOLUTION ORAL DAILY PRN
Status: DISCONTINUED | OUTPATIENT
Start: 2024-11-14 | End: 2024-11-15 | Stop reason: HOSPADM

## 2024-11-14 RX ORDER — IBUPROFEN 200 MG
24 TABLET ORAL
Status: DISCONTINUED | OUTPATIENT
Start: 2024-11-14 | End: 2024-11-15 | Stop reason: HOSPADM

## 2024-11-14 RX ADMIN — CLONIDINE HYDROCHLORIDE 0.1 MG: 0.1 TABLET ORAL at 06:11

## 2024-11-14 RX ADMIN — TRAZODONE HYDROCHLORIDE 150 MG: 50 TABLET ORAL at 11:11

## 2024-11-14 RX ADMIN — HYDRALAZINE HYDROCHLORIDE 25 MG: 25 TABLET ORAL at 11:11

## 2024-11-14 NOTE — ED TRIAGE NOTES
"Patient presents to the ED today with reports of SOB x3 days. Patient states "wet cough" Patient denies chest pain at this time HR 72. No obvious distress noted O2 sat 95% on RA on arrival. Patient also reporting currently being treated for UTI chronic seo in place on arrival reports pain at site  "

## 2024-11-14 NOTE — TELEPHONE ENCOUNTER
HH nurse informed that PCP recommends pt return to ER. Nurse Jazz states she is present with pt and will give pt PCP instruction.

## 2024-11-14 NOTE — TELEPHONE ENCOUNTER
Received call from  nurse stating pt was seen in ER yesterday. Pt states since discharge from ER he is having increased SOB. Pt reports he feels like he can barely catch his breath.  B/P 178/85, pulse 82, pulse ox 99%. Nurse reports that lung sounds are clear. Pt C/O penis pain due to seo at a scale of 10. Nurse informed PCP will be notified.

## 2024-11-14 NOTE — ED PROVIDER NOTES
Encounter Date: 11/14/2024       History     Chief Complaint   Patient presents with    Shortness of Breath     Endorses SOB since Sunday exacerbated with exertion. Seen in ED last night. Currently being treated for UTI. Pt arrives HTN      89-year-old male with history of coronary artery disease, hypertension, heart failure, type 2 DM who presents to the emergency department with chief complaint of shortness of breath and elevated blood pressure.  States that his blood pressure has been elevated at home for over a week.  His systolic blood pressure has been greater than 200, despite compliance with his blood pressure medications.  He does not know what blood pressure medication he takes.  He states that he feels short of breath.  His dyspnea is worse with exertion.  He also endorses orthopnea.  He denies any chest pain.  States that he coughs occasionally.  Denies congestion, sore throat, fever, nausea, vomiting.  He also complains of penile pain secondary to his Lee catheter.  His leg bag is not in the correct place upon arrival to the emergency department.  He has constant traction on his penis.  He has been counseled about the appropriateness a leg bag by Urology in the past.  He was offered a suprapubic catheter, although he declines.  States that his Lee is draining appropriately.  He denies worsening or alleviating factors.      Review of patient's allergies indicates:   Allergen Reactions    Penicillins Nausea And Vomiting     Pt reports he is not allergic to penicillin       Past Medical History:   Diagnosis Date    Acute coronary syndrome 06/24/2016    s/p 3V CABG 7/2016    Anemia     Coronary artery disease     Diastolic dysfunction     Gout, chronic     Heart failure     HTN (hypertension)     Hyperlipidemia     Myocardial infarction     Pneumonia due to other staphylococcus     Pressure ulcer     Renal manifestation of secondary diabetes mellitus     Type 2 diabetes mellitus     diet controlled     Urge incontinence of urine 2023     Past Surgical History:   Procedure Laterality Date    CATARACT EXTRACTION Bilateral     CATARACT EXTRACTION W/ ANTERIOR VITRECTOMY      CORONARY ARTERY BYPASS GRAFT  2016    PAIZ-LAD, SVG-Ramus, SVG-PDA    FLUOROSCOPIC URODYNAMIC STUDY N/A 2023    Procedure: URODYNAMIC STUDY, FLUOROSCOPIC;  Surgeon: Raji Mcmullen MD;  Location: [facility] OR;  Service: Urology;  Laterality: N/A;  RN PHONE PREOP WITH GRANDDAUGHTER ROGER 23    HEMORRHOID SURGERY      LASER ENUCLEATION OF PROSTATE N/A 2023    Procedure: ENUCLEATION, PROSTATE, USING LASER; cystolithalopaxy;  Surgeon: Raji Mcmullen MD;  Location: [facility] OR;  Service: Urology;  Laterality: N/A;  notify [facility] [phone] SPOKE TO NICK ON 2023 @ 10:37AM. CONFIRMATION NUMBER [id number]  FIORDALIZA POLLACK [phone] EMAILED CHANNING ON 6/15/2023@ 3:07PM-KEYON  RN PREOP 2023   T/S ON 2023  RN PREOP 2023 --     Family History   Problem Relation Name Age of Onset    Hypertension Mother      Heart disease Mother      Cancer Father          colon    Heart disease Sister      No Known Problems Sister      No Known Problems Sister      No Known Problems Sister      No Known Problems Sister      Heart disease Brother      No Known Problems Brother      No Known Problems Brother      No Known Problems Brother      No Known Problems Brother      No Known Problems Brother      No Known Problems Daughter      No Known Problems Daughter      No Known Problems Son      Amblyopia Neg Hx      Blindness Neg Hx      Cataracts Neg Hx      Diabetes Neg Hx      Glaucoma Neg Hx      Macular degeneration Neg Hx      Retinal detachment Neg Hx      Strabismus Neg Hx      Stroke Neg Hx      Thyroid disease Neg Hx       Social History     Tobacco Use    Smoking status: Former     Types: Cigars     Start date:      Quit date:      Years since quittin.8    Smokeless tobacco: Never    Tobacco comments:      Former smoker. Pt. Smoked 2 cigars daily.   Substance Use Topics    Alcohol use: No    Drug use: No     Review of Systems   Respiratory:  Positive for shortness of breath.    Genitourinary:  Positive for penile pain.       Physical Exam     Initial Vitals [11/14/24 1435]   BP Pulse Resp Temp SpO2   (!) 180/84 86 (!) 22 98.4 °F (36.9 °C) 96 %      MAP       --         Physical Exam    Vitals reviewed.  Constitutional: He appears well-developed and well-nourished. He is not diaphoretic. No distress.   HENT:   Head: Normocephalic and atraumatic. Mouth/Throat: Oropharynx is clear and moist.   Eyes: EOM are normal. Pupils are equal, round, and reactive to light.   Neck: Neck supple.   Normal range of motion.  Cardiovascular:  Normal rate, regular rhythm, normal heart sounds and intact distal pulses.     Exam reveals no gallop and no friction rub.       No murmur heard.  Pulmonary/Chest: He has no wheezes. He has no rhonchi. He has rales.   Abdominal: Abdomen is soft. Bowel sounds are normal. There is no abdominal tenderness.   Genitourinary:    Genitourinary Comments: Lee in place      Musculoskeletal:         General: Normal range of motion.      Cervical back: Normal range of motion and neck supple.     Neurological: He is alert and oriented to person, place, and time. He has normal strength. GCS score is 15. GCS eye subscore is 4. GCS verbal subscore is 5. GCS motor subscore is 6.   Skin: Skin is warm and dry. Capillary refill takes less than 2 seconds.   Psychiatric: He has a normal mood and affect. His behavior is normal. Judgment and thought content normal.         ED Course   Procedures  Labs Reviewed   CBC W/ AUTO DIFFERENTIAL - Abnormal       Result Value    WBC 5.76      RBC 3.33 (*)     Hemoglobin 10.6 (*)     Hematocrit 29.4 (*)     MCV 88      MCH 31.8 (*)     MCHC 36.1 (*)     RDW 13.1      Platelets 201      MPV 9.3      Immature Granulocytes 0.2      Gran # (ANC) 2.2      Immature Grans (Abs) 0.01       Lymph # 2.8      Mono # 0.6      Eos # 0.1      Baso # 0.03      nRBC 0      Gran % 38.2      Lymph % 49.1 (*)     Mono % 10.1      Eosinophil % 1.9      Basophil % 0.5      Differential Method Automated      Narrative:     Release to patient->Immediate   COMPREHENSIVE METABOLIC PANEL - Abnormal    Sodium 139      Potassium 4.4      Chloride 111 (*)     CO2 21 (*)     Glucose 90      BUN 21      Creatinine 1.3      Calcium 8.6 (*)     Total Protein 6.8      Albumin 3.2 (*)     Total Bilirubin 0.2      Alkaline Phosphatase 63      AST 20      ALT 10      eGFR 53.2 (*)     Anion Gap 7 (*)     Narrative:     Release to patient->Immediate   TROPONIN I - Abnormal    Troponin I 0.034 (*)     Narrative:     Release to patient->Immediate   B-TYPE NATRIURETIC PEPTIDE - Abnormal     (*)     Narrative:     Release to patient->Immediate   HEPATITIS C ANTIBODY    Hepatitis C Ab Non-reactive      Narrative:     Release to patient->Immediate   HIV 1 / 2 ANTIBODY    HIV 1/2 Ag/Ab Non-reactive      Narrative:     Release to patient->Immediate   TROPONIN I     EKG Readings: (Independently Interpreted)   Initial Reading: No STEMI. Rhythm: Normal Sinus Rhythm. Heart Rate: 65.     ECG Results              EKG 12-lead (Final result)        Collection Time Result Time QRS Duration OHS QTC Calculation    11/14/24 14:58:34 11/14/24 15:28:20 130 484                     Final result by Interface, Lab In [facility] (11/14/24 15:28:29)                   Narrative:    Test Reason : R06.02,    Vent. Rate :  65 BPM     Atrial Rate :  65 BPM     P-R Int : 154 ms          QRS Dur : 130 ms      QT Int : 466 ms       P-R-T Axes :   5 -43  20 degrees    QTcB Int : 484 ms    Normal sinus rhythm  Left anterior fascicular block  Right bundle branch block  Right ventricular hypertrophy  Nonspecific ST-t wave abnormality  Abnormal ECG  No previous ECGs available  Confirmed by Zechariah Reagan ([id number]) on 11/14/2024 3:28:17 PM    Referred By:             Confirmed By: Zechariah Reagan                                  Imaging Results              X-Ray Chest AP Portable (Final result)  Result time 11/14/24 19:26:42      Final result by Edvin Myres MD (11/14/24 19:26:42)                   Impression:      Grossly stable chronic findings in the body of the report, without radiographic acute intrathoracic process seen on this single view.      Electronically signed by: Edvin Myers MD  Date:    11/14/2024  Time:    19:26               Narrative:    EXAMINATION:  XR CHEST AP PORTABLE    CLINICAL HISTORY:  CHF;    TECHNIQUE:  Single frontal view of the chest was performed.    COMPARISON:  Chest radiograph 11/13/2024, CT abdomen and pelvis 10/29/2024; CTA chest 06/23/2016    FINDINGS:  Monitoring leads overlie the chest.  Patient is slightly rotated.    Sternotomy wires as before.  Cardiomediastinal silhouette is midline and stable.  Pulmonary vasculature and hilar contours are within normal limits.  Similar scattered bandlike opacities throughout the lungs consistent with platelike scarring versus atelectasis most prominent in the left lung base.  Right midlung zone calcified granuloma unchanged.  No consolidation, sizeable pleural effusion or pneumothorax identified.  No acute osseous process seen.  PA and lateral views can be obtained.                                    X-Rays:   Independently Interpreted Readings:   Chest X-Ray: Normal heart size.  No infiltrates.  No acute abnormalities.     Medications   albuterol-ipratropium 2.5 mg-0.5 mg/3 mL nebulizer solution 3 mL (has no administration in time range)   melatonin tablet 6 mg (has no administration in time range)   polyethylene glycol packet 17 g (has no administration in time range)   bisacodyL suppository 10 mg (has no administration in time range)   acetaminophen tablet 650 mg (has no administration in time range)   glucose chewable tablet 16 g (has no administration in time range)   glucose chewable tablet  24 g (has no administration in time range)   glucagon (human recombinant) injection 1 mg (has no administration in time range)   heparin (porcine) injection 5,000 Units (has no administration in time range)   insulin aspart U-100 pen 0-5 Units (has no administration in time range)   dextrose 10% bolus 125 mL 125 mL (has no administration in time range)   dextrose 10% bolus 250 mL 250 mL (has no administration in time range)   cloNIDine tablet 0.1 mg (0.1 mg Oral Given 11/14/24 1804)     Medical Decision Making  Emergent evaluation of a 89 y.o. male presenting to the emergency department complaining of shortness of breath, hypertension, penile pain. Patient is afebrile, hemodynamically stable, and non toxic appearing.     Will order lab, EKG, chest x-ray.      Differential diagnosis includes but isn't limited to hypertensive emergency, ACS, Heart failure exacerbation.    Patient has had nearly 10 ED visits in the last 2 months.  Many have been for issues with his Lee catheter.  He states that he has been having pain in his penis since the Lee catheter was placed.  It appears that he has not been using his leg bag appropriately.  Upon arrival to the ED, it is loosely tied around his lower leg near his calf.  There is constant traction on his penis, which is likely the cause of his pain.  We have counseled him extensively on appropriate use of his leg bag.  No severe metabolic or hematologic derangements.  Baseline anemia with hemoglobin of 10.6.  Kidney function near baseline.  Troponin elevated at 0.034.  Troponin was normal on yesterday's labs.  BNP elevated at 281.  Chest x-ray without significant abnormality.  I am concerned for hypertensive emergency.  The patient's blood pressure was 207 systolic upon arrival.  He was given a dose of clonidine.  His blood pressure is now 180/90.  Will place in observation with Hospital Medicine for management.  Patient is agreeable with this plan.  All questions  answered.  The patient's history, physical exam, and plan of care was discussed with and agreed upon with my supervising physician.       Amount and/or Complexity of Data Reviewed  Labs:  Decision-making details documented in ED Course.    Risk  Prescription drug management.               ED Course as of 11/14/24 1941   Thu Nov 14, 2024 1715 WBC: 5.76 [JM]   1716 Hemoglobin(!): 10.6 [JM]   1716 Hematocrit(!): 29.4 [JM]   1716 Platelet Count: 201 [JM]   1716 BUN: 21 [JM]   1716 Creatinine: 1.3 [JM]   1716 BNP(!): 281 [JM]   1716 Troponin I(!): 0.034 [JM]   1800 Patient with multiple recent visits for hypertension shortness of breath.  Suspect his hypertension is due to ongoing penile pain due to traction on his Lee catheter due to inability to wear and secure his leg bag properly.  The patient has been seen by Urology for this reason as an outpatient.  He refuses suprapubic catheter but again has difficulty maintaining his Lee catheter without traction.  Will provide a dose of clonidine.  Will re fit collection bag.  NSTEMI noted.  The patient was had an elevated troponin in the passive similar degree but recently has been negative.  Will trend during observation stay while controlling blood pressure.  Suspect that there is a component of having missing his lisinopril until 3 days ago [DS]      ED Course User Index  [DS] Sessions, Carlos Manuel ALEXANDER MD  [JM] Dafne Paiz PA-C                             Clinical Impression:  Final diagnoses:  [R06.02] SOB (shortness of breath)  [I16.0] Hypertensive urgency  [I16.1] Hypertensive emergency  [R07.9] Chest pain          ED Disposition Condition    Observation                 Dafne Paiz PA-C  11/14/24 1941

## 2024-11-14 NOTE — DISCHARGE INSTRUCTIONS
Plenty of clear liquids and monitor your blood pressure.  Take all your blood pressure medications as prescribed

## 2024-11-15 VITALS
BODY MASS INDEX: 20.9 KG/M2 | TEMPERATURE: 99 F | OXYGEN SATURATION: 99 % | WEIGHT: 130.06 LBS | SYSTOLIC BLOOD PRESSURE: 147 MMHG | HEART RATE: 90 BPM | HEIGHT: 66 IN | RESPIRATION RATE: 20 BRPM | DIASTOLIC BLOOD PRESSURE: 67 MMHG

## 2024-11-15 LAB
ANION GAP SERPL CALC-SCNC: 7 MMOL/L (ref 8–16)
ASCENDING AORTA: 3.26 CM
AV AREA BY CONTINUOUS VTI: 2.4 CM2
AV INDEX (PROSTH): 0.69
AV LVOT MEAN GRADIENT: 3 MMHG
AV LVOT PEAK GRADIENT: 6 MMHG
AV MEAN GRADIENT: 6.5 MMHG
AV PEAK GRADIENT: 11.6 MMHG
AV REGURGITATION PRESSURE HALF TIME: 574.26 MS
AV VALVE AREA BY VELOCITY RATIO: 2.4 CM²
AV VALVE AREA: 2.4 CM2
AV VELOCITY RATIO: 0.71
BASOPHILS # BLD AUTO: 0.04 K/UL (ref 0–0.2)
BASOPHILS NFR BLD: 0.7 % (ref 0–1.9)
BSA FOR ECHO PROCEDURE: 1.66 M2
BUN SERPL-MCNC: 24 MG/DL (ref 8–23)
CALCIUM SERPL-MCNC: 8.6 MG/DL (ref 8.7–10.5)
CHLORIDE SERPL-SCNC: 112 MMOL/L (ref 95–110)
CO2 SERPL-SCNC: 20 MMOL/L (ref 23–29)
CREAT SERPL-MCNC: 1.2 MG/DL (ref 0.5–1.4)
CV ECHO LV RWT: 0.58 CM
DIFFERENTIAL METHOD BLD: ABNORMAL
DOP CALC AO PEAK VEL: 1.7 M/S
DOP CALC AO VTI: 33 CM
DOP CALC LVOT AREA: 3.5 CM2
DOP CALC LVOT DIAMETER: 2.1 CM
DOP CALC LVOT PEAK VEL: 1.2 M/S
DOP CALC LVOT STROKE VOLUME: 78.9 CM3
DOP CALCLVOT PEAK VEL VTI: 22.8 CM
E WAVE DECELERATION TIME: 351.91 MS
E/A RATIO: 0.49
E/E' RATIO: 10.2 M/S
ECHO EF ESTIMATED: 55 %
ECHO LV POSTERIOR WALL: 1.1 CM (ref 0.6–1.1)
EJECTION FRACTION: 65 %
EOSINOPHIL # BLD AUTO: 0.2 K/UL (ref 0–0.5)
EOSINOPHIL NFR BLD: 2.8 % (ref 0–8)
ERYTHROCYTE [DISTWIDTH] IN BLOOD BY AUTOMATED COUNT: 13.2 % (ref 11.5–14.5)
EST. GFR  (NO RACE VARIABLE): 58.5 ML/MIN/1.73 M^2
ESTIMATED AVG GLUCOSE: 131 MG/DL (ref 68–131)
FRACTIONAL SHORTENING: 28.9 % (ref 28–44)
GLUCOSE SERPL-MCNC: 87 MG/DL (ref 70–110)
HBA1C MFR BLD: 6.2 % (ref 4–5.6)
HCT VFR BLD AUTO: 27.8 % (ref 40–54)
HGB BLD-MCNC: 9.5 G/DL (ref 14–18)
IMM GRANULOCYTES # BLD AUTO: 0.01 K/UL (ref 0–0.04)
IMM GRANULOCYTES NFR BLD AUTO: 0.2 % (ref 0–0.5)
INTERVENTRICULAR SEPTUM: 1.2 CM (ref 0.6–1.1)
LA MAJOR: 5.89 CM
LA MINOR: 6.26 CM
LA WIDTH: 4.52 CM
LEFT ATRIUM SIZE: 4.53 CM
LEFT ATRIUM VOLUME INDEX MOD: 66.7 ML/M2
LEFT ATRIUM VOLUME INDEX: 63.3 ML/M2
LEFT ATRIUM VOLUME MOD: 111.38 ML
LEFT ATRIUM VOLUME: 105.63 CM3
LEFT INTERNAL DIMENSION IN SYSTOLE: 2.7 CM (ref 2.1–4)
LEFT VENTRICLE DIASTOLIC VOLUME INDEX: 37.4 ML/M2
LEFT VENTRICLE DIASTOLIC VOLUME: 62.46 ML
LEFT VENTRICLE MASS INDEX: 86.1 G/M2
LEFT VENTRICLE SYSTOLIC VOLUME INDEX: 16.8 ML/M2
LEFT VENTRICLE SYSTOLIC VOLUME: 28.11 ML
LEFT VENTRICULAR INTERNAL DIMENSION IN DIASTOLE: 3.8 CM (ref 3.5–6)
LEFT VENTRICULAR MASS: 143.8 G
LV LATERAL E/E' RATIO: 8.5
LV SEPTAL E/E' RATIO: 12.75
LYMPHOCYTES # BLD AUTO: 3.2 K/UL (ref 1–4.8)
LYMPHOCYTES NFR BLD: 52.6 % (ref 18–48)
MAGNESIUM SERPL-MCNC: 2 MG/DL (ref 1.6–2.6)
MCH RBC QN AUTO: 31 PG (ref 27–31)
MCHC RBC AUTO-ENTMCNC: 34.2 G/DL (ref 32–36)
MCV RBC AUTO: 91 FL (ref 82–98)
MONOCYTES # BLD AUTO: 0.5 K/UL (ref 0.3–1)
MONOCYTES NFR BLD: 8.3 % (ref 4–15)
MV A" WAVE DURATION": 110.37 MS
MV PEAK A VEL: 1.05 M/S
MV PEAK E VEL: 0.51 M/S
NEUTROPHILS # BLD AUTO: 2.2 K/UL (ref 1.8–7.7)
NEUTROPHILS NFR BLD: 35.4 % (ref 38–73)
NRBC BLD-RTO: 0 /100 WBC
OHS CV RV/LV RATIO: 1.05 CM
PHOSPHATE SERPL-MCNC: 3.6 MG/DL (ref 2.7–4.5)
PISA TR MAX VEL: 2.38 M/S
PLATELET # BLD AUTO: 196 K/UL (ref 150–450)
PMV BLD AUTO: 9.8 FL (ref 9.2–12.9)
POCT GLUCOSE: 83 MG/DL (ref 70–110)
POCT GLUCOSE: 95 MG/DL (ref 70–110)
POTASSIUM SERPL-SCNC: 3.8 MMOL/L (ref 3.5–5.1)
PULM VEIN A" WAVE DURATION": 110.37 MS
PULM VEIN S/D RATIO: 1.28
PULMONIC VEIN PEAK A VELOCITY: 0.3 M/S
PV PEAK D VEL: 0.39 M/S
PV PEAK S VEL: 0.5 M/S
RA MAJOR: 4.55 CM
RA PRESSURE ESTIMATED: 3 MMHG
RA WIDTH: 4.29 CM
RBC # BLD AUTO: 3.06 M/UL (ref 4.6–6.2)
RIGHT VENTRICLE DIASTOLIC BASEL DIMENSION: 4 CM
RV TB RVSP: 5 MMHG
SINUS: 3.1 CM
SODIUM SERPL-SCNC: 139 MMOL/L (ref 136–145)
STJ: 3.03 CM
TDI LATERAL: 0.06 M/S
TDI SEPTAL: 0.04 M/S
TDI: 0.05 M/S
TR MAX PG: 23 MMHG
TRICUSPID ANNULAR PLANE SYSTOLIC EXCURSION: 0.84 CM
TROPONIN I SERPL DL<=0.01 NG/ML-MCNC: 0.03 NG/ML (ref 0–0.03)
TV PEAK GRADIENT: 23 MMHG
TV REST PULMONARY ARTERY PRESSURE: 26 MMHG
WBC # BLD AUTO: 6.12 K/UL (ref 3.9–12.7)
Z-SCORE OF LEFT VENTRICULAR DIMENSION IN END DIASTOLE: -2.06
Z-SCORE OF LEFT VENTRICULAR DIMENSION IN END SYSTOLE: -0.54

## 2024-11-15 PROCEDURE — 85025 COMPLETE CBC W/AUTO DIFF WBC: CPT | Performed by: PHYSICIAN ASSISTANT

## 2024-11-15 PROCEDURE — 80048 BASIC METABOLIC PNL TOTAL CA: CPT | Performed by: PHYSICIAN ASSISTANT

## 2024-11-15 PROCEDURE — 87106 FUNGI IDENTIFICATION YEAST: CPT | Performed by: PHYSICIAN ASSISTANT

## 2024-11-15 PROCEDURE — 36415 COLL VENOUS BLD VENIPUNCTURE: CPT | Performed by: PHYSICIAN ASSISTANT

## 2024-11-15 PROCEDURE — 87088 URINE BACTERIA CULTURE: CPT | Performed by: PHYSICIAN ASSISTANT

## 2024-11-15 PROCEDURE — 96374 THER/PROPH/DIAG INJ IV PUSH: CPT

## 2024-11-15 PROCEDURE — 87086 URINE CULTURE/COLONY COUNT: CPT | Performed by: PHYSICIAN ASSISTANT

## 2024-11-15 PROCEDURE — 83036 HEMOGLOBIN GLYCOSYLATED A1C: CPT | Performed by: PHYSICIAN ASSISTANT

## 2024-11-15 PROCEDURE — 63600175 PHARM REV CODE 636 W HCPCS: Performed by: INTERNAL MEDICINE

## 2024-11-15 PROCEDURE — 84100 ASSAY OF PHOSPHORUS: CPT | Performed by: PHYSICIAN ASSISTANT

## 2024-11-15 PROCEDURE — 25000003 PHARM REV CODE 250: Performed by: INTERNAL MEDICINE

## 2024-11-15 PROCEDURE — G0378 HOSPITAL OBSERVATION PER HR: HCPCS

## 2024-11-15 PROCEDURE — 83735 ASSAY OF MAGNESIUM: CPT | Performed by: PHYSICIAN ASSISTANT

## 2024-11-15 PROCEDURE — 84484 ASSAY OF TROPONIN QUANT: CPT | Performed by: PHYSICIAN ASSISTANT

## 2024-11-15 PROCEDURE — 25000003 PHARM REV CODE 250: Performed by: PHYSICIAN ASSISTANT

## 2024-11-15 RX ORDER — ISOSORBIDE MONONITRATE 60 MG/1
60 TABLET, EXTENDED RELEASE ORAL DAILY
Qty: 90 TABLET | Refills: 0 | Status: CANCELLED | OUTPATIENT
Start: 2024-11-15

## 2024-11-15 RX ORDER — ISOSORBIDE MONONITRATE 60 MG/1
60 TABLET, EXTENDED RELEASE ORAL DAILY
Qty: 90 TABLET | Refills: 0 | Status: SHIPPED | OUTPATIENT
Start: 2024-11-16 | End: 2025-11-16

## 2024-11-15 RX ORDER — HYDRALAZINE HYDROCHLORIDE 20 MG/ML
10 INJECTION INTRAMUSCULAR; INTRAVENOUS ONCE
Status: DISCONTINUED | OUTPATIENT
Start: 2024-11-15 | End: 2024-11-15

## 2024-11-15 RX ORDER — HYDRALAZINE HYDROCHLORIDE 20 MG/ML
10 INJECTION INTRAMUSCULAR; INTRAVENOUS ONCE
Status: COMPLETED | OUTPATIENT
Start: 2024-11-15 | End: 2024-11-15

## 2024-11-15 RX ORDER — LIDOCAINE HYDROCHLORIDE 20 MG/ML
JELLY TOPICAL
Qty: 30 ML | Refills: 0 | Status: SHIPPED | OUTPATIENT
Start: 2024-11-15

## 2024-11-15 RX ORDER — LIDOCAINE HYDROCHLORIDE 20 MG/ML
JELLY TOPICAL
Status: DISCONTINUED | OUTPATIENT
Start: 2024-11-15 | End: 2024-11-15 | Stop reason: HOSPADM

## 2024-11-15 RX ORDER — ISOSORBIDE MONONITRATE 120 MG/1
120 TABLET, EXTENDED RELEASE ORAL DAILY
Qty: 90 TABLET | Refills: 0 | Status: SHIPPED | OUTPATIENT
Start: 2024-11-16 | End: 2024-11-15

## 2024-11-15 RX ORDER — AMLODIPINE BESYLATE 10 MG/1
10 TABLET ORAL DAILY
Qty: 90 TABLET | Refills: 0 | Status: SHIPPED | OUTPATIENT
Start: 2024-11-15 | End: 2025-02-13

## 2024-11-15 RX ORDER — ISOSORBIDE MONONITRATE 60 MG/1
120 TABLET, EXTENDED RELEASE ORAL DAILY
Status: DISCONTINUED | OUTPATIENT
Start: 2024-11-16 | End: 2024-11-15 | Stop reason: HOSPADM

## 2024-11-15 RX ADMIN — ISOSORBIDE MONONITRATE 60 MG: 60 TABLET, EXTENDED RELEASE ORAL at 08:11

## 2024-11-15 RX ADMIN — LIDOCAINE HYDROCHLORIDE: 20 JELLY TOPICAL at 02:11

## 2024-11-15 RX ADMIN — PRAVASTATIN SODIUM 40 MG: 40 TABLET ORAL at 08:11

## 2024-11-15 RX ADMIN — HYDRALAZINE HYDROCHLORIDE 10 MG: 20 INJECTION, SOLUTION INTRAMUSCULAR; INTRAVENOUS at 10:11

## 2024-11-15 RX ADMIN — AMLODIPINE BESYLATE 10 MG: 10 TABLET ORAL at 08:11

## 2024-11-15 RX ADMIN — TAMSULOSIN HYDROCHLORIDE 0.4 MG: 0.4 CAPSULE ORAL at 08:11

## 2024-11-15 RX ADMIN — LISINOPRIL 40 MG: 20 TABLET ORAL at 08:11

## 2024-11-15 NOTE — PLAN OF CARE
Problem: Adult Inpatient Plan of Care  Goal: Plan of Care Review  Outcome: Met  Goal: Patient-Specific Goal (Individualized)  Outcome: Met  Goal: Absence of Hospital-Acquired Illness or Injury  Outcome: Met  Goal: Optimal Comfort and Wellbeing  Outcome: Met  Goal: Readiness for Transition of Care  Outcome: Met     Problem: Infection  Goal: Absence of Infection Signs and Symptoms  Outcome: Met     Problem: Diabetes Comorbidity  Goal: Blood Glucose Level Within Targeted Range  Outcome: Met     Problem: Fall Injury Risk  Goal: Absence of Fall and Fall-Related Injury  Outcome: Met     Problem: Hypertension Acute  Goal: Blood Pressure Within Desired Range  Outcome: Met

## 2024-11-15 NOTE — ASSESSMENT & PLAN NOTE
- suspect CP/SOB and elevated trop due to uncontrolled HTN and less likely ACS at this time  - trop 0.034>> trend  - follow up echo  - monitor tele

## 2024-11-15 NOTE — HPI
Destin Barber is a 89 y.o. male with a PMHx of HTN, prostate cancer, urinary retention with seo in place, T2DM, CAD s/p CABG who presents to [facility] for evaluation of shortness of breath. Patient is a poor historian. He reports brief episode of right sided chest pain occurred while in [employer] on Sunday. Pain resolved and no further episodes of chest pain since. He reports intermittent shortness of breath since Sunday, occurs both at rest and with exertion. Additionally, patient complains of pain to the tip of his penis due to his seo catheter tugging at this area. On arrival, he had a lot of traction on his penis and his Seo bag was pain down around his calf. Pain resolved after seo bag was properly placed on his thigh in the ED. He reports compliance with his home BP meds but he's unable to say which medications he's on. Denies fever, chills, LE swelling, N/V, abdominal pain, or syncope.     ED: hypertensive to /100, improved to SBP 160s s/p clonidine. No leukocytosis or significant electrolyte abnormalities. Trop 0.034, . EKG with nonspecific ST/T wave abnormalities. Given clonidine 0.1mg.   
Capillary refill less/equal to 2 seconds

## 2024-11-15 NOTE — PLAN OF CARE
"[facility] - Observation 11H  Discharge Assessment    Primary Care Provider: Adela Diaz,      Discharge Assessment (most recent)       BRIEF DISCHARGE ASSESSMENT - 11/15/24 1211          Discharge Planning    Assessment Type Discharge Planning Brief Assessment     Resource/Environmental Concerns none     Support Systems Spouse/significant other     Equipment Currently Used at Home none     Current Living Arrangements home     Patient/Family Anticipates Transition to home     Patient/Family Anticipated Services at Transition none     DME Needed Upon Discharge  none     Discharge Plan A Home     Discharge Plan B Home                   Pt is a 89 y.o. male admitted with HTN urgency and has a PMH of HTN and. Lee and T2DM. He lives with his wife in a single story house with no steps to enter. He is independent with his ADLs and iADLs and drives "some\." He will have transportation home. [facility] given to patient and/or family with understanding verbalized.   name and number and estimated discharge date written on white board in patient's room with request to call for any questions or concerns.  Will continue to follow for needs.  Discharge Plan A and Plan B have been determined by review of patient's clinical status, future medical and therapeutic needs, and coverage/benefits for post-acute care in coordination with multidisciplinary team members.  Conor Patricio, RN,BSN            "

## 2024-11-15 NOTE — ASSESSMENT & PLAN NOTE
Creatine stable for now. BMP reviewed- noted Estimated Creatinine Clearance: 33.4 mL/min (based on SCr of 1.3 mg/dL). according to latest data. Based on current GFR, CKD stage is stage 3 - GFR 30-59.  Monitor UOP and serial BMP and adjust therapy as needed. Renally dose meds. Avoid nephrotoxic medications and procedures.

## 2024-11-15 NOTE — ASSESSMENT & PLAN NOTE
- chronic issue, has seo in place & follows with urology   - counselled on proper use of seo bag  - US done yesterday with questionable orchitis  - UA w/ 8 WBCs, no reflex to cx  - ED prescribed Augmentin yesterday, however has hx recurrent pseudomonas UTIs in the past so doubt effective  - will add high risk urine cx  - hold on further abx for now pending Ucx results given penis pain resolved once seo properly adjsuted

## 2024-11-15 NOTE — PLAN OF CARE
Problem: Adult Inpatient Plan of Care  Goal: Plan of Care Review  Outcome: Progressing  Goal: Patient-Specific Goal (Individualized)  Outcome: Progressing  Goal: Absence of Hospital-Acquired Illness or Injury  Outcome: Progressing  Goal: Optimal Comfort and Wellbeing  Outcome: Progressing  Goal: Readiness for Transition of Care  Outcome: Progressing     Problem: Fall Injury Risk  Goal: Absence of Fall and Fall-Related Injury  Outcome: Progressing     Problem: Hypertension Acute  Goal: Blood Pressure Within Desired Range  Outcome: Progressing

## 2024-11-15 NOTE — HOSPITAL COURSE
Patient admitted for hypertensive urgency with mild trop elevation that downtrending to normal limits. TTE unremarkable. Unclear if compliant with home medication. Added amlodipine. BP improved to 140's after morning medications. Patient without chest pain, SOB, or JULIO. Has intermittent pain with seo that he believes to be due to the seo bag putting pressure on his penis. Seo bag exchanged and educated on how to properly fit seo bag to leg. Antibiotics held given lack of infectious symptoms and penis pain resolved once seo properly adjusted. Patient seen and evaluated on day of discharge. Pt deemed appropriate for discharge. Plan discussed with pt, who was agreeable and amenable; medications were discussed and reviewed, outpatient follow-up scheduled, ER precautions were given, all questions were answered to the pt's satisfaction, and patient was subsequently discharged.

## 2024-11-15 NOTE — H&P
[facility] - Emergency Dept  [facility] Medicine  History & Physical    Patient Name: Destin Barber  MRN: [id number]  Patient Class: OP- Observation  Admission Date: 11/14/2024  Attending Physician: Dyllan Nunez MD   Primary Care Provider: Adela Diaz DO         Patient information was obtained from patient, past medical records, and ER records.     Subjective:     Principal Problem:Hypertensive urgency    Chief Complaint:   Chief Complaint   Patient presents with    Shortness of Breath     Endorses SOB since Sunday exacerbated with exertion. Seen in ED last night. Currently being treated for UTI. Pt arrives HTN         HPI: Destin Barber is a 89 y.o. male with a PMHx of HTN, prostate cancer, urinary retention with seo in place, T2DM, CAD s/p CABG who presents to [facility] for evaluation of shortness of breath. Patient is a poor historian. He reports brief episode of right sided chest pain occurred while in [employer] on Sunday. Pain resolved and no further episodes of chest pain since. He reports intermittent shortness of breath since Sunday, occurs both at rest and with exertion. Additionally, patient complains of pain to the tip of his penis due to his seo catheter tugging at this area. On arrival, he had a lot of traction on his penis and his Seo bag was pain down around his calf. Pain resolved after seo bag was properly placed on his thigh in the ED. He reports compliance with his home BP meds but he's unable to say which medications he's on. Denies fever, chills, LE swelling, N/V, abdominal pain, or syncope.     ED: hypertensive to /100, improved to SBP 160s s/p clonidine. No leukocytosis or significant electrolyte abnormalities. Trop 0.034, . EKG with nonspecific ST/T wave abnormalities. Given clonidine 0.1mg.     Past Medical History:   Diagnosis Date    Acute coronary syndrome 06/24/2016    s/p 3V CABG 7/2016    Anemia     Coronary artery disease     Diastolic dysfunction     Gout,  chronic     Heart failure     HTN (hypertension)     Hyperlipidemia     Myocardial infarction     Pneumonia due to other staphylococcus     Pressure ulcer     Renal manifestation of secondary diabetes mellitus     Type 2 diabetes mellitus     diet controlled    Urge incontinence of urine 7/21/2023       Past Surgical History:   Procedure Laterality Date    CATARACT EXTRACTION Bilateral     CATARACT EXTRACTION W/ ANTERIOR VITRECTOMY      CORONARY ARTERY BYPASS GRAFT  07/06/2016    PAIZ-LAD, SVG-Ramus, SVG-PDA    FLUOROSCOPIC URODYNAMIC STUDY N/A 4/25/2023    Procedure: URODYNAMIC STUDY, FLUOROSCOPIC;  Surgeon: Raji Mcmullen MD;  Location: [facility] OR;  Service: Urology;  Laterality: N/A;  RN PHONE PREOP WITH GRANDDAUGHTER ROGER 4/21/23    HEMORRHOID SURGERY      LASER ENUCLEATION OF PROSTATE N/A 7/6/2023    Procedure: ENUCLEATION, PROSTATE, USING LASER; cystolithalopaxy;  Surgeon: Raji Mcmullen MD;  Location: [facility] OR;  Service: Urology;  Laterality: N/A;  notify [facility] [phone] SPOKE TO NICK ON 6/2/2023 @ 10:37AM. CONFIRMATION NUMBER [id number]  FIORDALIZA POLLACK [phone] EMAILED CHANNING ON 6/15/2023@ 3:07PM-KEYON  RN PREOP 5/30/2023   T/S ON 6/5/2023  RN PREOP 06/30/2023 --CARLOS       Review of patient's allergies indicates:   Allergen Reactions    Penicillins Nausea And Vomiting     Pt reports he is not allergic to penicillin         Current Facility-Administered Medications on File Prior to Encounter   Medication    LIDOcaine (PF) 10 mg/ml (1%) injection 10 mg     Current Outpatient Medications on File Prior to Encounter   Medication Sig    amLODIPine (NORVASC) 10 MG tablet Take 1 tablet (10 mg total) by mouth once daily.    blood sugar diagnostic Strp To check BG daily, to use with insurance preferred meter    blood-glucose meter kit To check BG daily, to use with insurance preferred meter    ciprofloxacin HCl (CIPRO) 500 MG tablet Take 1 tablet (500 mg total) by mouth 2 (two) times daily. for 7  days    cloNIDine (CATAPRES) 0.1 MG tablet     HYDROcodone-acetaminophen (NORCO) 5-325 mg per tablet Take 1 tablet by mouth every 6 (six) hours as needed.    isosorbide mononitrate (IMDUR) 60 MG 24 hr tablet TAKE 1 TABLET(60 MG) BY MOUTH EVERY DAY    lancets Misc To check BG daily, to use with insurance preferred meter    lisinopriL (PRINIVIL,ZESTRIL) 40 MG tablet Take 1 tablet (40 mg total) by mouth once daily.    metoprolol tartrate (LOPRESSOR) 25 MG tablet Take 1 tablet by mouth 2 (two) times daily.    miconazole (MICOTIN) 2 % cream Apply topically 2 (two) times daily. Apply to head of penis    [] polyethylene glycol (GLYCOLAX) 17 gram/dose powder Mix 1 capful (17 grams total) with fluids and drink by mouth once daily for 5 days    pravastatin (PRAVACHOL) 40 MG tablet Take 1 tablet (40 mg total) by mouth once daily.    senna-docusate 8.6-50 mg (SENOKOT-S) 8.6-50 mg per tablet Take 1 tablet by mouth 2 (two) times a day.    somatropin (NORDITROPIN FLEXPRO) 10 mg/1.5 mL (6.7 mg/mL) PnIj Inject into the skin.    tamsulosin (FLOMAX) 0.4 mg Cap Take 1 capsule (0.4 mg total) by mouth once daily.    traMADoL (ULTRAM) 50 mg tablet Take 1 tablet (50 mg total) by mouth every 6 (six) hours as needed.    traZODone (DESYREL) 150 MG tablet Take 1 tablet (150 mg total) by mouth nightly as needed for Insomnia.     Family History       Problem Relation (Age of Onset)    Cancer Father    Heart disease Mother, Sister, Brother    Hypertension Mother    No Known Problems Sister, Sister, Sister, Sister, Brother, Brother, Brother, Brother, Brother, Daughter, Daughter, Son          Tobacco Use    Smoking status: Former     Types: Cigars     Start date:      Quit date:      Years since quittin.8    Smokeless tobacco: Never    Tobacco comments:     Former smoker. Pt. Smoked 2 cigars daily.   Substance and Sexual Activity    Alcohol use: No    Drug use: No    Sexual activity: Not Currently     Partners: Female     Review  of Systems   Constitutional:  Negative for activity change, chills and fever.   HENT:  Negative for trouble swallowing.    Eyes:  Negative for photophobia and visual disturbance.   Respiratory:  Positive for shortness of breath. Negative for chest tightness and wheezing.    Cardiovascular:  Positive for chest pain. Negative for palpitations and leg swelling.   Gastrointestinal:  Negative for abdominal pain, constipation, diarrhea, nausea and vomiting.   Genitourinary:  Positive for penile pain (resolved after seo bag placed on thigh). Negative for dysuria, frequency, hematuria and urgency.   Musculoskeletal:  Negative for arthralgias, back pain and gait problem.   Skin:  Negative for color change and rash.   Neurological:  Negative for dizziness, syncope, weakness, light-headedness, numbness and headaches.   Psychiatric/Behavioral:  Negative for agitation and confusion. The patient is not nervous/anxious.      Objective:     Vital Signs (Most Recent):  Temp: 98.5 °F (36.9 °C) (11/14/24 2030)  Pulse: 84 (11/14/24 2146)  Resp: 19 (11/14/24 2146)  BP: (!) 168/94 (11/14/24 2146)  SpO2: 99 % (11/14/24 2146) Vital Signs (24h Range):  Temp:  [98.2 °F (36.8 °C)-98.5 °F (36.9 °C)] 98.5 °F (36.9 °C)  Pulse:  [58-86] 84  Resp:  [18-22] 19  SpO2:  [95 %-100 %] 99 %  BP: (161-207)/() 168/94     Weight: 59 kg (130 lb)  Body mass index is 20.98 kg/m².     Physical Exam  Vitals and nursing note reviewed.   Constitutional:       General: He is not in acute distress.     Appearance: He is well-developed.   HENT:      Head: Normocephalic and atraumatic.      Mouth/Throat:      Pharynx: No oropharyngeal exudate.   Eyes:      Conjunctiva/sclera: Conjunctivae normal.   Cardiovascular:      Rate and Rhythm: Normal rate and regular rhythm.      Heart sounds: Normal heart sounds.   Pulmonary:      Effort: Pulmonary effort is normal. No respiratory distress.      Breath sounds: Normal breath sounds. No wheezing.   Abdominal:       General: Bowel sounds are normal. There is no distension.      Palpations: Abdomen is soft.      Tenderness: There is no abdominal tenderness.   Genitourinary:     Comments: Chaperoned exam- no swelling or redness noted to penis. No drainage or bleeding to seo insertion site.   Musculoskeletal:         General: No tenderness. Normal range of motion.      Cervical back: Normal range of motion and neck supple.   Lymphadenopathy:      Cervical: No cervical adenopathy.   Skin:     General: Skin is warm and dry.      Capillary Refill: Capillary refill takes less than 2 seconds.      Findings: No rash.   Neurological:      Mental Status: He is alert and oriented to person, place, and time.      Cranial Nerves: No cranial nerve deficit.      Sensory: No sensory deficit.      Coordination: Coordination normal.   Psychiatric:         Behavior: Behavior normal.         Thought Content: Thought content normal.         Judgment: Judgment normal.                Significant Labs: All pertinent labs within the past 24 hours have been reviewed.  CBC:   Recent Labs   Lab 11/13/24  1726 11/14/24  1630   WBC 6.13 5.76   HGB 11.7* 10.6*   HCT 34.1* 29.4*    201     CMP:   Recent Labs   Lab 11/13/24  1726 11/14/24  1630    139   K 3.8 4.4    111*   CO2 22* 21*   GLU 87 90   BUN 18 21   CREATININE 1.2 1.3   CALCIUM 9.4 8.6*   PROT 7.7 6.8   ALBUMIN 3.7 3.2*   BILITOT 0.3 0.2   ALKPHOS 72 63   AST 21 20   ALT 13 10   ANIONGAP 11 7*       Significant Imaging: I have reviewed all pertinent imaging results/findings within the past 24 hours.  X-Ray Chest AP Portable  Narrative: EXAMINATION:  XR CHEST AP PORTABLE    CLINICAL HISTORY:  CHF;    TECHNIQUE:  Single frontal view of the chest was performed.    COMPARISON:  Chest radiograph 11/13/2024, CT abdomen and pelvis 10/29/2024; CTA chest 06/23/2016    FINDINGS:  Monitoring leads overlie the chest.  Patient is slightly rotated.    Sternotomy wires as before.   Cardiomediastinal silhouette is midline and stable.  Pulmonary vasculature and hilar contours are within normal limits.  Similar scattered bandlike opacities throughout the lungs consistent with platelike scarring versus atelectasis most prominent in the left lung base.  Right midlung zone calcified granuloma unchanged.  No consolidation, sizeable pleural effusion or pneumothorax identified.  No acute osseous process seen.  PA and lateral views can be obtained.  Impression: Grossly stable chronic findings in the body of the report, without radiographic acute intrathoracic process seen on this single view.    Electronically signed by: Edvin Myers MD  Date:    11/14/2024  Time:    19:26      Assessment/Plan:     * Hypertensive urgency  Essential hypertension  - uncontrolled BP in the setting of questionable med compliance  - Rx'd amlodipine but no recent fills- will restart here  - resume home lisinopril and imdur  - echo ordered  - PRN hydralazine if >190/110 and/or symptomatic  - monitor tele     Urinary retention  - chronic issue, has seo in place & follows with urology   - counselled on proper use of seo bag  - US done yesterday with questionable orchitis  - UA w/ 8 WBCs, no reflex to cx  - ED prescribed Augmentin yesterday, however has hx recurrent pseudomonas UTIs in the past so doubt effective  - will add high risk urine cx  - hold on further abx for now pending Ucx results given penis pain resolved once seo properly adjsuted    Prostate cancer  - follows with urology    Chronic kidney disease, stage 3a  Creatine stable for now. BMP reviewed- noted Estimated Creatinine Clearance: 33.4 mL/min (based on SCr of 1.3 mg/dL). according to latest data. Based on current GFR, CKD stage is stage 3 - GFR 30-59.  Monitor UOP and serial BMP and adjust therapy as needed. Renally dose meds. Avoid nephrotoxic medications and procedures.    Controlled type 2 diabetes mellitus with stage 3 chronic kidney disease, without  long-term current use of insulin  - not on home meds  - LDSSI, ACHS accuchecks    Lab Results   Component Value Date    HGBA1C 6.5 (H) 11/08/2024       Coronary artery disease involving coronary bypass graft of native heart without angina pectoris  - suspect CP/SOB and elevated trop due to uncontrolled HTN and less likely ACS at this time  - trop 0.034>> trend  - follow up echo  - monitor tele      Diastolic dysfunction  - appears compensated  - follow up echo  - strict I/Os, daily weights       VTE Risk Mitigation (From admission, onward)           Ordered     heparin (porcine) injection 5,000 Units  Every 8 hours         11/14/24 1917     IP VTE HIGH RISK PATIENT  Once         11/14/24 1917                         On 11/14/2024, patient should be placed in hospital observation services under my care in collaboration with Dr. Edvin Cannon.           Jodi Donnelly PA-C  Department of Hospital Medicine  [facility] - Emergency Dept

## 2024-11-15 NOTE — ASSESSMENT & PLAN NOTE
- chronic issue, has seo in place & follows with urology   - counselled on proper use of seo bag  - US done yesterday with questionable orchitis but UA noninfectious

## 2024-11-15 NOTE — ASSESSMENT & PLAN NOTE
Essential hypertension  - uncontrolled BP in the setting of questionable med compliance  - Rx'd amlodipine but no recent fills- will restart here  - resume home lisinopril and imdur  - echo ordered  - PRN hydralazine if >190/110 and/or symptomatic  - monitor tele

## 2024-11-15 NOTE — SUBJECTIVE & OBJECTIVE
Past Medical History:   Diagnosis Date    Acute coronary syndrome 06/24/2016    s/p 3V CABG 7/2016    Anemia     Coronary artery disease     Diastolic dysfunction     Gout, chronic     Heart failure     HTN (hypertension)     Hyperlipidemia     Myocardial infarction     Pneumonia due to other staphylococcus     Pressure ulcer     Renal manifestation of secondary diabetes mellitus     Type 2 diabetes mellitus     diet controlled    Urge incontinence of urine 7/21/2023       Past Surgical History:   Procedure Laterality Date    CATARACT EXTRACTION Bilateral     CATARACT EXTRACTION W/ ANTERIOR VITRECTOMY      CORONARY ARTERY BYPASS GRAFT  07/06/2016    PAIZ-LAD, SVG-Ramus, SVG-PDA    FLUOROSCOPIC URODYNAMIC STUDY N/A 4/25/2023    Procedure: URODYNAMIC STUDY, FLUOROSCOPIC;  Surgeon: Raji Mcmullen MD;  Location: [facility] OR;  Service: Urology;  Laterality: N/A;  RN PHONE PREOP WITH GRANDDAUGHTER ROGER 4/21/23    HEMORRHOID SURGERY      LASER ENUCLEATION OF PROSTATE N/A 7/6/2023    Procedure: ENUCLEATION, PROSTATE, USING LASER; cystolithalopaxy;  Surgeon: Raji Mcmullen MD;  Location: [facility] OR;  Service: Urology;  Laterality: N/A;  notify [employer] [phone] SPOKE TO NICK ON 6/2/2023 @ 10:37AM. CONFIRMATION NUMBER [id number] POLLACK [phone] EMAILED CHANNING ON 6/15/2023@ 3:07PM-KEYON  RN PREOP 5/30/2023   T/S ON 6/5/2023  RN PREOP 06/30/2023 --JM       Review of patient's allergies indicates:   Allergen Reactions    Penicillins Nausea And Vomiting     Pt reports he is not allergic to penicillin         Current Facility-Administered Medications on File Prior to Encounter   Medication    LIDOcaine (PF) 10 mg/ml (1%) injection 10 mg     Current Outpatient Medications on File Prior to Encounter   Medication Sig    amLODIPine (NORVASC) 10 MG tablet Take 1 tablet (10 mg total) by mouth once daily.    blood sugar diagnostic Strp To check BG daily, to use with insurance preferred meter    blood-glucose meter  kit To check BG daily, to use with insurance preferred meter    ciprofloxacin HCl (CIPRO) 500 MG tablet Take 1 tablet (500 mg total) by mouth 2 (two) times daily. for 7 days    cloNIDine (CATAPRES) 0.1 MG tablet     HYDROcodone-acetaminophen (NORCO) 5-325 mg per tablet Take 1 tablet by mouth every 6 (six) hours as needed.    isosorbide mononitrate (IMDUR) 60 MG 24 hr tablet TAKE 1 TABLET(60 MG) BY MOUTH EVERY DAY    lancets Misc To check BG daily, to use with insurance preferred meter    lisinopriL (PRINIVIL,ZESTRIL) 40 MG tablet Take 1 tablet (40 mg total) by mouth once daily.    metoprolol tartrate (LOPRESSOR) 25 MG tablet Take 1 tablet by mouth 2 (two) times daily.    miconazole (MICOTIN) 2 % cream Apply topically 2 (two) times daily. Apply to head of penis    [] polyethylene glycol (GLYCOLAX) 17 gram/dose powder Mix 1 capful (17 grams total) with fluids and drink by mouth once daily for 5 days    pravastatin (PRAVACHOL) 40 MG tablet Take 1 tablet (40 mg total) by mouth once daily.    senna-docusate 8.6-50 mg (SENOKOT-S) 8.6-50 mg per tablet Take 1 tablet by mouth 2 (two) times a day.    somatropin (NORDITROPIN FLEXPRO) 10 mg/1.5 mL (6.7 mg/mL) PnIj Inject into the skin.    tamsulosin (FLOMAX) 0.4 mg Cap Take 1 capsule (0.4 mg total) by mouth once daily.    traMADoL (ULTRAM) 50 mg tablet Take 1 tablet (50 mg total) by mouth every 6 (six) hours as needed.    traZODone (DESYREL) 150 MG tablet Take 1 tablet (150 mg total) by mouth nightly as needed for Insomnia.     Family History       Problem Relation (Age of Onset)    Cancer Father    Heart disease Mother, Sister, Brother    Hypertension Mother    No Known Problems Sister, Sister, Sister, Sister, Brother, Brother, Brother, Brother, Brother, Daughter, Daughter, Son          Tobacco Use    Smoking status: Former     Types: Cigars     Start date:      Quit date:      Years since quittin.8    Smokeless tobacco: Never    Tobacco comments:      Former smoker. Pt. Smoked 2 cigars daily.   Substance and Sexual Activity    Alcohol use: No    Drug use: No    Sexual activity: Not Currently     Partners: Female     Review of Systems   Constitutional:  Negative for activity change, chills and fever.   HENT:  Negative for trouble swallowing.    Eyes:  Negative for photophobia and visual disturbance.   Respiratory:  Positive for shortness of breath. Negative for chest tightness and wheezing.    Cardiovascular:  Positive for chest pain. Negative for palpitations and leg swelling.   Gastrointestinal:  Negative for abdominal pain, constipation, diarrhea, nausea and vomiting.   Genitourinary:  Positive for penile pain (resolved after seo bag placed on thigh). Negative for dysuria, frequency, hematuria and urgency.   Musculoskeletal:  Negative for arthralgias, back pain and gait problem.   Skin:  Negative for color change and rash.   Neurological:  Negative for dizziness, syncope, weakness, light-headedness, numbness and headaches.   Psychiatric/Behavioral:  Negative for agitation and confusion. The patient is not nervous/anxious.      Objective:     Vital Signs (Most Recent):  Temp: 98.5 °F (36.9 °C) (11/14/24 2030)  Pulse: 84 (11/14/24 2146)  Resp: 19 (11/14/24 2146)  BP: (!) 168/94 (11/14/24 2146)  SpO2: 99 % (11/14/24 2146) Vital Signs (24h Range):  Temp:  [98.2 °F (36.8 °C)-98.5 °F (36.9 °C)] 98.5 °F (36.9 °C)  Pulse:  [58-86] 84  Resp:  [18-22] 19  SpO2:  [95 %-100 %] 99 %  BP: (161-207)/() 168/94     Weight: 59 kg (130 lb)  Body mass index is 20.98 kg/m².     Physical Exam  Vitals and nursing note reviewed.   Constitutional:       General: He is not in acute distress.     Appearance: He is well-developed.   HENT:      Head: Normocephalic and atraumatic.      Mouth/Throat:      Pharynx: No oropharyngeal exudate.   Eyes:      Conjunctiva/sclera: Conjunctivae normal.   Cardiovascular:      Rate and Rhythm: Normal rate and regular rhythm.      Heart sounds:  Normal heart sounds.   Pulmonary:      Effort: Pulmonary effort is normal. No respiratory distress.      Breath sounds: Normal breath sounds. No wheezing.   Abdominal:      General: Bowel sounds are normal. There is no distension.      Palpations: Abdomen is soft.      Tenderness: There is no abdominal tenderness.   Genitourinary:     Comments: Chaperoned exam- no swelling or redness noted to penis. No drainage or bleeding to seo insertion site.   Musculoskeletal:         General: No tenderness. Normal range of motion.      Cervical back: Normal range of motion and neck supple.   Lymphadenopathy:      Cervical: No cervical adenopathy.   Skin:     General: Skin is warm and dry.      Capillary Refill: Capillary refill takes less than 2 seconds.      Findings: No rash.   Neurological:      Mental Status: He is alert and oriented to person, place, and time.      Cranial Nerves: No cranial nerve deficit.      Sensory: No sensory deficit.      Coordination: Coordination normal.   Psychiatric:         Behavior: Behavior normal.         Thought Content: Thought content normal.         Judgment: Judgment normal.                Significant Labs: All pertinent labs within the past 24 hours have been reviewed.  CBC:   Recent Labs   Lab 11/13/24  1726 11/14/24  1630   WBC 6.13 5.76   HGB 11.7* 10.6*   HCT 34.1* 29.4*    201     CMP:   Recent Labs   Lab 11/13/24  1726 11/14/24  1630    139   K 3.8 4.4    111*   CO2 22* 21*   GLU 87 90   BUN 18 21   CREATININE 1.2 1.3   CALCIUM 9.4 8.6*   PROT 7.7 6.8   ALBUMIN 3.7 3.2*   BILITOT 0.3 0.2   ALKPHOS 72 63   AST 21 20   ALT 13 10   ANIONGAP 11 7*       Significant Imaging: I have reviewed all pertinent imaging results/findings within the past 24 hours.  X-Ray Chest AP Portable  Narrative: EXAMINATION:  XR CHEST AP PORTABLE    CLINICAL HISTORY:  CHF;    TECHNIQUE:  Single frontal view of the chest was performed.    COMPARISON:  Chest radiograph 11/13/2024, CT  abdomen and pelvis 10/29/2024; CTA chest 06/23/2016    FINDINGS:  Monitoring leads overlie the chest.  Patient is slightly rotated.    Sternotomy wires as before.  Cardiomediastinal silhouette is midline and stable.  Pulmonary vasculature and hilar contours are within normal limits.  Similar scattered bandlike opacities throughout the lungs consistent with platelike scarring versus atelectasis most prominent in the left lung base.  Right midlung zone calcified granuloma unchanged.  No consolidation, sizeable pleural effusion or pneumothorax identified.  No acute osseous process seen.  PA and lateral views can be obtained.  Impression: Grossly stable chronic findings in the body of the report, without radiographic acute intrathoracic process seen on this single view.    Electronically signed by: Edvin Myers MD  Date:    11/14/2024  Time:    19:26

## 2024-11-15 NOTE — ASSESSMENT & PLAN NOTE
- not on home meds  - LDSSI, ACHS accuchecks    Lab Results   Component Value Date    HGBA1C 6.5 (H) 11/08/2024

## 2024-11-15 NOTE — PLAN OF CARE
[facility] - Observation 11H  Discharge Final Note    Primary Care Provider: Adela Diaz DO    Expected Discharge Date: 11/15/2024  Future Appointments   Date Time Provider Department Center   11/21/2024 10:00 AM Raji Mcmullen MD [facility]   12/4/2024 11:00 AM Joana Eastman NP [facility] [place]   1/10/2025  8:00 AM Adela Diaz DO [facility] FAM MED [place]     Pt discharged home with no services.   Conor Patricio RN,BSN        Final Discharge Note (most recent)       Final Note - 11/15/24 1409          Final Note    Assessment Type Final Discharge Note     Anticipated Discharge Disposition Home or Self Care     Hospital Resources/Appts/Education Provided Provided patient/caregiver with written discharge plan information;Provided education on problems/symptoms using teachback;Appointments scheduled and added to AVS        Post-Acute Status    Discharge Delays None known at this time                     Important Message from Medicare

## 2024-11-15 NOTE — ED NOTES
Telemetry Verification   Patient placed on Telemetry Box  Verified with War Room  Box # [id number]   Monitor Tech War room   Rate 78   Rhythm NSR

## 2024-11-16 LAB — BACTERIA UR CULT: ABNORMAL

## 2024-11-16 NOTE — DISCHARGE SUMMARY
[facility] - Observation [address] [place] [address] Medicine  Discharge Summary      Patient Name: Destin Barber  MRN: [id number]  MADI: [id number]  Patient Class: OP- Observation  Admission Date: 11/14/2024  Hospital Length of Stay: 0 days  Discharge Date and Time:  11/16/2024 7:20 AM  Attending Physician: No att. providers found   Discharging Provider: Dyllan Nunez MD  Primary Care Provider: Adela Diaz [facility] Medicine Team: [facility]  Dyllan Nunez MD  Primary Care Team: [facility]     HPI:   Destin Barber is a 89 y.o. male with a PMHx of HTN, prostate cancer, urinary retention with seo in place, T2DM, CAD s/p CABG who [place] [facility] [place] of shortness of breath. Patient is a poor historian. He reports brief episode of right sided chest pain occurred while in [facility] on Sunday. Pain resolved and no further episodes of chest pain since. He reports intermittent shortness of breath since Sunday, occurs both at rest and with exertion. Additionally, patient complains of pain to the tip of his penis due to his seo catheter tugging at this area. On arrival, he had a lot of traction on his penis and his Seo bag was pain down around his calf. Pain resolved after seo bag was properly placed on his thigh in the ED. He reports compliance with his home BP meds but he's unable to say which medications he's on. Denies fever, chills, LE swelling, N/V, abdominal pain, or syncope.     ED: hypertensive to /100, improved to SBP 160s s/p clonidine. No leukocytosis or significant electrolyte abnormalities. Trop 0.034, . EKG with nonspecific ST/T wave abnormalities. Given clonidine 0.1mg.     * No surgery found *      Hospital Course:   Patient admitted for hypertensive urgency with mild trop elevation that downtrending to normal limits. TTE unremarkable. Unclear if compliant with home medication. Added amlodipine. BP improved to 140's after morning medications. Patient without chest pain,  SOB, or JULIO. Has intermittent pain with seo that he believes to be due to the seo bag putting pressure on his penis. Seo and bag exchanged and educated on how to properly fit seo bag to leg. Antibiotics held given lack of infectious symptoms and penis pain resolved once seo properly adjusted. BP remained in 140's systolic. Patient seen and evaluated on day of discharge. Pt deemed appropriate for discharge. Plan discussed with pt, who was agreeable and amenable; medications were discussed and reviewed, outpatient follow-up scheduled, ER precautions were given, all questions were answered to the pt's satisfaction, and patient was subsequently discharged.     Goals of Care Treatment Preferences:  Code Status: Full Code    Living Will: Yes                 Consults:     Renal/  Urinary retention  - chronic issue, has seo in place & follows with urology   - counselled on proper use of seo bag  - US done yesterday with questionable orchitis  - UA w/ 8 WBCs, no reflex to cx  - ED prescribed Augmentin yesterday, however has hx recurrent pseudomonas UTIs in the past so doubt effective  - will add high risk urine cx  - hold on further abx for now pending Ucx results given penis pain resolved once seo properly adjsuted      Final Active Diagnoses:    Diagnosis Date Noted POA    PRINCIPAL PROBLEM:  Hypertensive urgency [I16.0] 11/14/2024 Yes    Prostate cancer [C61] 10/29/2024 Yes    Urinary retention [R33.9] 10/29/2024 Yes    Chronic kidney disease, stage 3a [N18.31] 04/05/2024 Yes    Controlled type 2 diabetes mellitus with stage 3 chronic kidney disease, without long-term current use of insulin [E11.22, N18.30] 02/03/2017 Yes    S/P CABG (coronary artery bypass graft) [Z95.1] 07/07/2016 Not Applicable    Coronary artery disease involving coronary bypass graft of native heart without angina pectoris [I25.810]  Yes    Essential hypertension [I10]  Yes    Diastolic dysfunction [I51.89]  Yes      Problems  Resolved During this Admission:       Discharged Condition: good    Disposition: Home or Self Care    Follow Up:    Patient Instructions:   No discharge procedures on file.    Significant Diagnostic Studies: N/A    Pending Diagnostic Studies:       None           Medications:  Reconciled Home Medications:      Medication List        START taking these medications      LIDOcaine HCL 2% 2 % jelly  Commonly known as: XYLOCAINE  Apply topically as needed (apply around glans penis as needed).            CHANGE how you take these medications      isosorbide mononitrate 60 MG 24 hr tablet  Commonly known as: IMDUR  Take 1 tablet (60 mg total) by mouth once daily.  What changed: when to take this            CONTINUE taking these medications      amLODIPine 10 MG tablet  Commonly known as: NORVASC  Take 1 tablet (10 mg total) by mouth once daily.     blood sugar diagnostic Strp  To check BG daily, to use with insurance preferred meter     blood-glucose meter kit  To check BG daily, to use with insurance preferred meter     cloNIDine 0.1 MG tablet  Commonly known as: CATAPRES     HYDROcodone-acetaminophen 5-325 mg per tablet  Commonly known as: NORCO  Take 1 tablet by mouth every 6 (six) hours as needed.     lancets Misc  To check BG daily, to use with insurance preferred meter     lisinopriL 40 MG tablet  Commonly known as: PRINIVIL,ZESTRIL  Take 1 tablet (40 mg total) by mouth once daily.     NORDITROPIN FLEXPRO 10 mg/1.5 mL (6.7 mg/mL) Pnij  Generic drug: somatropin  Inject into the skin.     pravastatin 40 MG tablet  Commonly known as: PRAVACHOL  Take 1 tablet (40 mg total) by mouth once daily.     senna-docusate 8.6-50 mg 8.6-50 mg per tablet  Commonly known as: SENOKOT-S  Take 1 tablet by mouth 2 (two) times a day.     tamsulosin 0.4 mg Cap  Commonly known as: FLOMAX  Take 1 capsule (0.4 mg total) by mouth once daily.     traMADoL 50 mg tablet  Commonly known as: ULTRAM  Take 1 tablet (50 mg total) by mouth every 6  (six) hours as needed.            STOP taking these medications      ciprofloxacin HCl 500 MG tablet  Commonly known as: CIPRO     CLEARLAX 17 gram/dose powder  Generic drug: polyethylene glycol     metoprolol tartrate 25 MG tablet  Commonly known as: LOPRESSOR     miconazole 2 % cream  Commonly known as: MICOTIN     traZODone 150 MG tablet  Commonly known as: DESYREL              Indwelling Lines/Drains at time of discharge:   Lines/Drains/Airways       Drain  Duration                  Urethral Catheter 11/14/24 0000 2 days                    Time spent on the discharge of patient: 35 minutes         Dyllan Nunez MD  Department of Hospital Medicine  [facility] - Observation 11H

## 2024-11-19 ENCOUNTER — PATIENT OUTREACH (OUTPATIENT)
Dept: ADMINISTRATIVE | Facility: HOSPITAL | Age: 89
End: 2024-11-19
Payer: MEDICARE

## 2024-11-19 NOTE — PROGRESS NOTES
Non-Compliant Blood Pressure Reading - spoke w/ patient, he reports that he has not been recording his readings on paper. The patient was informed thast I will follow up with him in 1 week for  updated readings. He was also instructed to take hus medications as prescribed and record all readings at least 2 hours after his medication. He verbalized understanding.      Adela Diaz, Mireya Guerrero, LPN - 11/11/2024   Patient states that he is taking all antihypertensives but did not take lisinopril yet. Continue all medications and monitor the BP in the afternoon on a log to review with nurse in a week

## 2024-11-20 ENCOUNTER — TELEPHONE (OUTPATIENT)
Dept: FAMILY MEDICINE | Facility: CLINIC | Age: 89
End: 2024-11-20
Payer: MEDICARE

## 2024-11-20 NOTE — TELEPHONE ENCOUNTER
----- Message from Med Assistant Gracia sent at 11/20/2024 12:41 PM CST -----  Type: Patient Call Back    Who called: [facility]  - [place]    What is the request in detail:pt. Has been to the hospital on 3 occasions for pain in his bladder area, blood pressure elevation & shortness of breathe.. she's asking if his appt. Can be moved up please to go over medication..      Can the clinic reply by [facility]?No    Would the patient rather a call back or a response via [facility]? Yes, call     Best call back number:[phone]

## 2024-11-20 NOTE — TELEPHONE ENCOUNTER
Call was placed to patient and notified of message left per home nurse in regard to patient wanting to review medications and changes due to recent ED visit. Patient was scheduled with a provider for 11-25-24. Confirmation and thank you verbalized. Return call was placed to [facility] home nurse and notified of contact made with patient and scheduling.

## 2024-11-21 ENCOUNTER — HOSPITAL ENCOUNTER (OUTPATIENT)
Dept: PREADMISSION TESTING | Facility: HOSPITAL | Age: 89
Discharge: HOME | End: 2024-11-21
Attending: STUDENT IN AN ORGANIZED HEALTH CARE EDUCATION/TRAINING PROGRAM
Payer: MEDICARE

## 2024-11-21 ENCOUNTER — OFFICE VISIT (OUTPATIENT)
Dept: UROLOGY | Facility: CLINIC | Age: 89
End: 2024-11-21
Payer: MEDICARE

## 2024-11-21 ENCOUNTER — ANESTHESIA EVENT (OUTPATIENT)
Dept: SURGERY | Facility: HOSPITAL | Age: 89
End: 2024-11-21
Payer: MEDICARE

## 2024-11-21 VITALS — WEIGHT: 130.06 LBS | BODY MASS INDEX: 20.99 KG/M2

## 2024-11-21 VITALS — HEIGHT: 66 IN | WEIGHT: 130 LBS | BODY MASS INDEX: 20.89 KG/M2

## 2024-11-21 DIAGNOSIS — R33.9 URINARY RETENTION: Primary | ICD-10-CM

## 2024-11-21 DIAGNOSIS — C61 PROSTATE CANCER: ICD-10-CM

## 2024-11-21 PROCEDURE — 87186 SC STD MICRODIL/AGAR DIL: CPT | Performed by: STUDENT IN AN ORGANIZED HEALTH CARE EDUCATION/TRAINING PROGRAM

## 2024-11-21 PROCEDURE — 99214 OFFICE O/P EST MOD 30 MIN: CPT | Mod: S$PBB,,, | Performed by: STUDENT IN AN ORGANIZED HEALTH CARE EDUCATION/TRAINING PROGRAM

## 2024-11-21 PROCEDURE — 99999 PR PBB SHADOW E&M-EST. PATIENT-LVL III: CPT | Mod: PBBFAC,,, | Performed by: STUDENT IN AN ORGANIZED HEALTH CARE EDUCATION/TRAINING PROGRAM

## 2024-11-21 PROCEDURE — 87077 CULTURE AEROBIC IDENTIFY: CPT | Performed by: STUDENT IN AN ORGANIZED HEALTH CARE EDUCATION/TRAINING PROGRAM

## 2024-11-21 PROCEDURE — 87088 URINE BACTERIA CULTURE: CPT | Performed by: STUDENT IN AN ORGANIZED HEALTH CARE EDUCATION/TRAINING PROGRAM

## 2024-11-21 PROCEDURE — G2211 COMPLEX E/M VISIT ADD ON: HCPCS | Mod: S$PBB,,, | Performed by: STUDENT IN AN ORGANIZED HEALTH CARE EDUCATION/TRAINING PROGRAM

## 2024-11-21 PROCEDURE — 99213 OFFICE O/P EST LOW 20 MIN: CPT | Mod: PBBFAC | Performed by: STUDENT IN AN ORGANIZED HEALTH CARE EDUCATION/TRAINING PROGRAM

## 2024-11-21 PROCEDURE — 87086 URINE CULTURE/COLONY COUNT: CPT | Performed by: STUDENT IN AN ORGANIZED HEALTH CARE EDUCATION/TRAINING PROGRAM

## 2024-11-21 RX ORDER — CIPROFLOXACIN 2 MG/ML
400 INJECTION, SOLUTION INTRAVENOUS
OUTPATIENT
Start: 2024-11-21

## 2024-11-21 NOTE — H&P (VIEW-ONLY)
Patient ID: Destin Barber is a 89 y.o. male.    Chief Complaint: prostate cancer/     HPI- Interval   89 y.o. who presents to the Urology clinic for evaluation of seo exchange, notes seo is very painful at the tip of the penis, would now like to consider SPT previously offered as he is not able to self catheterize himself.   Medically Necessary ROS documented in HPI    HPI 11/6/2024  89 y.o. who presents to the Urology clinic for evaluation of urinary retention, he notes seo related discomfort. Hx of HOLEP for LUTS, voiding well initially now with ANGELIQUE w/ development of UTIs for which seo is now in place. Recently treated for UTI. Patient has not been wearing seo secured to thigh, stat lock has become non adherent. Hx of prostate cancer on watchful waiting protocol.    Past Medical History  Active Ambulatory Problems     Diagnosis Date Noted    Essential hypertension     Pure hypercholesterolemia     Diastolic dysfunction     Anemia of chronic disease 06/23/2016    Coronary artery disease involving coronary bypass graft of native heart without angina pectoris     S/P CABG (coronary artery bypass graft) 07/07/2016    Chronic gout without tophus 07/13/2016    Hypokalemia 02/03/2017    Controlled type 2 diabetes mellitus with stage 3 chronic kidney disease, without long-term current use of insulin 02/03/2017    Aortic atherosclerosis 08/31/2020    BMI 23.0-23.9, adult 03/07/2023    History of syncope 03/07/2023    Elevated PSA 03/07/2023    BPH associated with nocturia 03/22/2023    Bilateral renal cysts 03/22/2023    Primary insomnia 06/15/2023    Chronic idiopathic constipation 06/15/2023    Frequency of micturition 07/21/2023    Urge incontinence of urine 07/21/2023    Stage 3b chronic kidney disease 03/22/2024    Chronic kidney disease, stage 3a 04/05/2024    Urinary retention 10/29/2024    Pseudomonas urinary tract infection 10/29/2024    Generalized abdominal pain 10/29/2024    Prostate cancer  10/29/2024    Constipation 10/29/2024    Hypertensive urgency 11/14/2024     Resolved Ambulatory Problems     Diagnosis Date Noted    Type 2 diabetes mellitus with complication     Hypertension, benign 01/31/2013    NSTEMI (non-ST elevated myocardial infarction) 06/23/2016    Pain of left upper extremity 06/23/2016    Coronary artery disease due to lipid rich plaque 06/24/2016    Hyperglycemia 07/07/2016    Chronic diastolic CHF (congestive heart failure), NYHA class 2 07/08/2016    Gait instability 07/12/2016    Sepsis 02/02/2017    Influenza A 02/03/2017    SOB (shortness of breath) 02/03/2017    Paresthesia 03/12/2018    Stage 3a chronic kidney disease 06/15/2023     Past Medical History:   Diagnosis Date    Acute coronary syndrome 06/24/2016    Anemia     Coronary artery disease     Gout, chronic     Heart failure     HTN (hypertension)     Hyperlipidemia     Myocardial infarction     Pneumonia due to other staphylococcus     Pressure ulcer     Renal manifestation of secondary diabetes mellitus     Type 2 diabetes mellitus          Past Surgical History  Past Surgical History:   Procedure Laterality Date    CATARACT EXTRACTION Bilateral     CATARACT EXTRACTION W/ ANTERIOR VITRECTOMY      CORONARY ARTERY BYPASS GRAFT  07/06/2016    PAIZ-LAD, SVG-Ramus, SVG-PDA    FLUOROSCOPIC URODYNAMIC STUDY N/A 4/25/2023    Procedure: URODYNAMIC STUDY, FLUOROSCOPIC;  Surgeon: Raji Mcmullen MD;  Location: [facility] OR;  Service: Urology;  Laterality: N/A;  RN PHONE PREOP WITH DUSTIN DURAN 4/21/23    HEMORRHOID SURGERY      LASER ENUCLEATION OF PROSTATE N/A 7/6/2023    Procedure: ENUCLEATION, PROSTATE, USING LASER; cystolithalopaxy;  Surgeon: Raji Mcmullen MD;  Location: [facility] OR;  Service: Urology;  Laterality: N/A;  notify [facility] [phone] SPOKE TO NICK ON 6/2/2023 @ 10:37AM. CONFIRMATION NUMBER [id number]  FIORDALIZA POLLACK [phone] EMAILED [place]ANNING ON 6/15/2023@ 3:07PM-KEYON  RN PREOP 5/30/2023   T/S ON  6/5/2023  RN PREOP 06/30/2023 --CARLOS       Social History       Medications    Current Outpatient Medications:     amLODIPine (NORVASC) 10 MG tablet, Take 1 tablet (10 mg total) by mouth once daily., Disp: 90 tablet, Rfl: 0    blood sugar diagnostic Strp, To check BG daily, to use with insurance preferred meter, Disp: 200 each, Rfl: 11    blood-glucose meter kit, To check BG daily, to use with insurance preferred meter, Disp: 1 each, Rfl: 0    cloNIDine (CATAPRES) 0.1 MG tablet, , Disp: , Rfl:     HYDROcodone-acetaminophen (NORCO) 5-325 mg per tablet, Take 1 tablet by mouth every 6 (six) hours as needed., Disp: 8 tablet, Rfl: 0    isosorbide mononitrate (IMDUR) 60 MG 24 hr tablet, Take 1 tablet (60 mg total) by mouth once daily., Disp: 90 tablet, Rfl: 0    lancets Misc, To check BG daily, to use with insurance preferred meter, Disp: 200 each, Rfl: 11    LIDOcaine HCL 2% (XYLOCAINE) 2 % jelly, Apply topically as needed (apply around glans penis as needed)., Disp: 30 mL, Rfl: 0    lisinopriL (PRINIVIL,ZESTRIL) 40 MG tablet, Take 1 tablet (40 mg total) by mouth once daily., Disp: 90 tablet, Rfl: 3    pravastatin (PRAVACHOL) 40 MG tablet, Take 1 tablet (40 mg total) by mouth once daily., Disp: 90 tablet, Rfl: 3    senna-docusate 8.6-50 mg (SENOKOT-S) 8.6-50 mg per tablet, Take 1 tablet by mouth 2 (two) times a day., Disp: 60 tablet, Rfl: 0    somatropin (NORDITROPIN FLEXPRO) 10 mg/1.5 mL (6.7 mg/mL) PnIj, Inject into the skin., Disp: , Rfl:     tamsulosin (FLOMAX) 0.4 mg Cap, Take 1 capsule (0.4 mg total) by mouth once daily., Disp: 30 capsule, Rfl: 11    traMADoL (ULTRAM) 50 mg tablet, Take 1 tablet (50 mg total) by mouth every 6 (six) hours as needed., Disp: 12 tablet, Rfl: 0  No current facility-administered medications for this visit.    Facility-Administered Medications Ordered in Other Visits:     LIDOcaine (PF) 10 mg/ml (1%) injection 10 mg, 1 mL, Intradermal, Once, Julia Fong MD    Allergies  Review of  patient's allergies indicates:   Allergen Reactions    Penicillins Nausea And Vomiting     Pt reports he is not allergic to penicillin         Patient's PMH, FH, Social hx, Medications, allergies reviewed and updated as pertinent to today's visit    Objective:      Physical Exam  Constitutional:       General: He is not in acute distress.     Appearance: He is well-developed. He is not ill-appearing, toxic-appearing or diaphoretic.   HENT:      Head: Normocephalic and atraumatic.      Mouth/Throat:      Mouth: Mucous membranes are moist.   Eyes:      Conjunctiva/sclera: Conjunctivae normal.   Pulmonary:      Effort: Pulmonary effort is normal. No respiratory distress.   Abdominal:      General: Abdomen is flat. There is no distension.      Palpations: Abdomen is soft. There is no mass.      Tenderness: There is no right CVA tenderness or left CVA tenderness.   Musculoskeletal:         General: No swelling or deformity.      Cervical back: Neck supple.   Skin:     Findings: No rash.   Neurological:      Mental Status: He is alert and oriented to person, place, and time.   Psychiatric:         Mood and Affect: Mood normal.         Thought Content: Thought content normal.         Judgment: Judgment normal.             Lab Results   Component Value Date    PSADIAG 6.2 (H) 11/08/2024    PSADIAG 4.5 (H) 03/26/2024        Assessment:       1. Urinary retention    2. Prostate cancer        Plan:         Prostate cancer  Low risk prostate cancer  PSA 6.2 from 4.5  Due to age, watchful waiting    Urinary retention  S/p HOLEP, voided for a time period, then started going into retention   Patient would like to transition to a suprapubic tube instead of seo catheter  Not able to intermittently self catheterize  Discussed placement of SPT intraoperatively. Discussed if any urethral narrowing will dilate at the time of the procedure. Risks/benefits reviewed.   Notified daughter and spouse of plan of care.       Bladder Cath      Date/Time: 11/21/2024    Pre-operative diagnosis: urinary retention  Post-operative diagnosis: as above  Consent Done: Not Needed  Indications: urinary retention  Local anesthesia used: no     Patient sedated: no  Preparation: Patient was prepped and draped in the usual sterile fashion.  Description of findings: uncomplicated seo removal and placement with 16 fr seo  Catheter insertion: 16fr  Complicated insertion: no  Altered anatomy: no  Bladder irrigation: no  Number of attempts: 1  Urine characteristics: clear and yellow  Complications: No  Estimated blood loss (mL): 0  Specimens: No    Patient tolerance: Patient tolerated the procedure well with no immediate complications    Visit today included increased complexity associated with the care of the episodic problem prostate cancer/ urinary retention addressed and managing the longitudinal care of the patient due to the serious and/or complex managed problem(s).

## 2024-11-21 NOTE — PROGRESS NOTES
Patient ID: Destin Barber is a 89 y.o. male.    Chief Complaint: prostate cancer/     HPI- Interval   89 y.o. who presents to the Urology clinic for evaluation of seo exchange, notes seo is very painful at the tip of the penis, would now like to consider SPT previously offered as he is not able to self catheterize himself.   Medically Necessary ROS documented in HPI    HPI 11/6/2024  89 y.o. who presents to the Urology clinic for evaluation of urinary retention, he notes seo related discomfort. Hx of HOLEP for LUTS, voiding well initially now with ANGELIQUE w/ development of UTIs for which seo is now in place. Recently treated for UTI. Patient has not been wearing seo secured to thigh, stat lock has become non adherent. Hx of prostate cancer on watchful waiting protocol.    Past Medical History  Active Ambulatory Problems     Diagnosis Date Noted    Essential hypertension     Pure hypercholesterolemia     Diastolic dysfunction     Anemia of chronic disease 06/23/2016    Coronary artery disease involving coronary bypass graft of native heart without angina pectoris     S/P CABG (coronary artery bypass graft) 07/07/2016    Chronic gout without tophus 07/13/2016    Hypokalemia 02/03/2017    Controlled type 2 diabetes mellitus with stage 3 chronic kidney disease, without long-term current use of insulin 02/03/2017    Aortic atherosclerosis 08/31/2020    BMI 23.0-23.9, adult 03/07/2023    History of syncope 03/07/2023    Elevated PSA 03/07/2023    BPH associated with nocturia 03/22/2023    Bilateral renal cysts 03/22/2023    Primary insomnia 06/15/2023    Chronic idiopathic constipation 06/15/2023    Frequency of micturition 07/21/2023    Urge incontinence of urine 07/21/2023    Stage 3b chronic kidney disease 03/22/2024    Chronic kidney disease, stage 3a 04/05/2024    Urinary retention 10/29/2024    Pseudomonas urinary tract infection 10/29/2024    Generalized abdominal pain 10/29/2024    Prostate cancer  10/29/2024    Constipation 10/29/2024    Hypertensive urgency 11/14/2024     Resolved Ambulatory Problems     Diagnosis Date Noted    Type 2 diabetes mellitus with complication     Hypertension, benign 01/31/2013    NSTEMI (non-ST elevated myocardial infarction) 06/23/2016    Pain of left upper extremity 06/23/2016    Coronary artery disease due to lipid rich plaque 06/24/2016    Hyperglycemia 07/07/2016    Chronic diastolic CHF (congestive heart failure), NYHA class 2 07/08/2016    Gait instability 07/12/2016    Sepsis 02/02/2017    Influenza A 02/03/2017    SOB (shortness of breath) 02/03/2017    Paresthesia 03/12/2018    Stage 3a chronic kidney disease 06/15/2023     Past Medical History:   Diagnosis Date    Acute coronary syndrome 06/24/2016    Anemia     Coronary artery disease     Gout, chronic     Heart failure     HTN (hypertension)     Hyperlipidemia     Myocardial infarction     Pneumonia due to other staphylococcus     Pressure ulcer     Renal manifestation of secondary diabetes mellitus     Type 2 diabetes mellitus          Past Surgical History  Past Surgical History:   Procedure Laterality Date    CATARACT EXTRACTION Bilateral     CATARACT EXTRACTION W/ ANTERIOR VITRECTOMY      CORONARY ARTERY BYPASS GRAFT  07/06/2016    PAIZ-LAD, SVG-Ramus, SVG-PDA    FLUOROSCOPIC URODYNAMIC STUDY N/A 4/25/2023    Procedure: URODYNAMIC STUDY, FLUOROSCOPIC;  Surgeon: Raji Mcmullen MD;  Location: [facility] OR;  Service: Urology;  Laterality: N/A;  RN PHONE PREOP WITH DUSTIN DURAN 4/21/23    HEMORRHOID SURGERY      LASER ENUCLEATION OF PROSTATE N/A 7/6/2023    Procedure: ENUCLEATION, PROSTATE, USING LASER; cystolithalopaxy;  Surgeon: Raji Mcmullen MD;  Location: [facility] OR;  Service: Urology;  Laterality: N/A;  notify [facility] [phone] SPOKE TO NICK ON 6/2/2023 @ 10:37AM. CONFIRMATION NUMBER [id number]  FIORDALIZA POLLACK [phone] EMAILED [place]ANNING ON 6/15/2023@ 3:07PM-KEYON  RN PREOP 5/30/2023   T/S ON  6/5/2023  RN PREOP 06/30/2023 --CARLOS       Social History       Medications    Current Outpatient Medications:     amLODIPine (NORVASC) 10 MG tablet, Take 1 tablet (10 mg total) by mouth once daily., Disp: 90 tablet, Rfl: 0    blood sugar diagnostic Strp, To check BG daily, to use with insurance preferred meter, Disp: 200 each, Rfl: 11    blood-glucose meter kit, To check BG daily, to use with insurance preferred meter, Disp: 1 each, Rfl: 0    cloNIDine (CATAPRES) 0.1 MG tablet, , Disp: , Rfl:     HYDROcodone-acetaminophen (NORCO) 5-325 mg per tablet, Take 1 tablet by mouth every 6 (six) hours as needed., Disp: 8 tablet, Rfl: 0    isosorbide mononitrate (IMDUR) 60 MG 24 hr tablet, Take 1 tablet (60 mg total) by mouth once daily., Disp: 90 tablet, Rfl: 0    lancets Misc, To check BG daily, to use with insurance preferred meter, Disp: 200 each, Rfl: 11    LIDOcaine HCL 2% (XYLOCAINE) 2 % jelly, Apply topically as needed (apply around glans penis as needed)., Disp: 30 mL, Rfl: 0    lisinopriL (PRINIVIL,ZESTRIL) 40 MG tablet, Take 1 tablet (40 mg total) by mouth once daily., Disp: 90 tablet, Rfl: 3    pravastatin (PRAVACHOL) 40 MG tablet, Take 1 tablet (40 mg total) by mouth once daily., Disp: 90 tablet, Rfl: 3    senna-docusate 8.6-50 mg (SENOKOT-S) 8.6-50 mg per tablet, Take 1 tablet by mouth 2 (two) times a day., Disp: 60 tablet, Rfl: 0    somatropin (NORDITROPIN FLEXPRO) 10 mg/1.5 mL (6.7 mg/mL) PnIj, Inject into the skin., Disp: , Rfl:     tamsulosin (FLOMAX) 0.4 mg Cap, Take 1 capsule (0.4 mg total) by mouth once daily., Disp: 30 capsule, Rfl: 11    traMADoL (ULTRAM) 50 mg tablet, Take 1 tablet (50 mg total) by mouth every 6 (six) hours as needed., Disp: 12 tablet, Rfl: 0  No current facility-administered medications for this visit.    Facility-Administered Medications Ordered in Other Visits:     LIDOcaine (PF) 10 mg/ml (1%) injection 10 mg, 1 mL, Intradermal, Once, Julia Fong MD    Allergies  Review of  patient's allergies indicates:   Allergen Reactions    Penicillins Nausea And Vomiting     Pt reports he is not allergic to penicillin         Patient's PMH, FH, Social hx, Medications, allergies reviewed and updated as pertinent to today's visit    Objective:      Physical Exam  Constitutional:       General: He is not in acute distress.     Appearance: He is well-developed. He is not ill-appearing, toxic-appearing or diaphoretic.   HENT:      Head: Normocephalic and atraumatic.      Mouth/Throat:      Mouth: Mucous membranes are moist.   Eyes:      Conjunctiva/sclera: Conjunctivae normal.   Pulmonary:      Effort: Pulmonary effort is normal. No respiratory distress.   Abdominal:      General: Abdomen is flat. There is no distension.      Palpations: Abdomen is soft. There is no mass.      Tenderness: There is no right CVA tenderness or left CVA tenderness.   Musculoskeletal:         General: No swelling or deformity.      Cervical back: Neck supple.   Skin:     Findings: No rash.   Neurological:      Mental Status: He is alert and oriented to person, place, and time.   Psychiatric:         Mood and Affect: Mood normal.         Thought Content: Thought content normal.         Judgment: Judgment normal.             Lab Results   Component Value Date    PSADIAG 6.2 (H) 11/08/2024    PSADIAG 4.5 (H) 03/26/2024        Assessment:       1. Urinary retention    2. Prostate cancer        Plan:         Prostate cancer  Low risk prostate cancer  PSA 6.2 from 4.5  Due to age, watchful waiting    Urinary retention  S/p HOLEP, voided for a time period, then started going into retention   Patient would like to transition to a suprapubic tube instead of seo catheter  Not able to intermittently self catheterize  Discussed placement of SPT intraoperatively. Discussed if any urethral narrowing will dilate at the time of the procedure. Risks/benefits reviewed.   Notified daughter and spouse of plan of care.       Bladder Cath      Date/Time: 11/21/2024    Pre-operative diagnosis: urinary retention  Post-operative diagnosis: as above  Consent Done: Not Needed  Indications: urinary retention  Local anesthesia used: no     Patient sedated: no  Preparation: Patient was prepped and draped in the usual sterile fashion.  Description of findings: uncomplicated seo removal and placement with 16 fr seo  Catheter insertion: 16fr  Complicated insertion: no  Altered anatomy: no  Bladder irrigation: no  Number of attempts: 1  Urine characteristics: clear and yellow  Complications: No  Estimated blood loss (mL): 0  Specimens: No    Patient tolerance: Patient tolerated the procedure well with no immediate complications    Visit today included increased complexity associated with the care of the episodic problem prostate cancer/ urinary retention addressed and managing the longitudinal care of the patient due to the serious and/or complex managed problem(s).

## 2024-11-22 ENCOUNTER — TELEPHONE (OUTPATIENT)
Dept: SURGERY | Facility: HOSPITAL | Age: 89
End: 2024-11-22
Payer: MEDICARE

## 2024-11-22 ENCOUNTER — TELEPHONE (OUTPATIENT)
Dept: UROLOGY | Facility: CLINIC | Age: 89
End: 2024-11-22
Payer: MEDICARE

## 2024-11-22 DIAGNOSIS — N13.8 BPH WITH OBSTRUCTION/LOWER URINARY TRACT SYMPTOMS: Primary | ICD-10-CM

## 2024-11-22 DIAGNOSIS — N40.1 BPH WITH OBSTRUCTION/LOWER URINARY TRACT SYMPTOMS: Primary | ICD-10-CM

## 2024-11-22 RX ORDER — NITROFURANTOIN 25; 75 MG/1; MG/1
100 CAPSULE ORAL EVERY 12 HOURS
Qty: 10 CAPSULE | Refills: 0 | Status: SHIPPED | OUTPATIENT
Start: 2024-11-22 | End: 2024-11-27

## 2024-11-23 LAB — BACTERIA UR CULT: ABNORMAL

## 2024-11-25 ENCOUNTER — HOSPITAL ENCOUNTER (OUTPATIENT)
Facility: HOSPITAL | Age: 89
Discharge: HOME | End: 2024-11-25
Attending: STUDENT IN AN ORGANIZED HEALTH CARE EDUCATION/TRAINING PROGRAM | Admitting: STUDENT IN AN ORGANIZED HEALTH CARE EDUCATION/TRAINING PROGRAM
Payer: MEDICARE

## 2024-11-25 ENCOUNTER — ANESTHESIA (OUTPATIENT)
Dept: SURGERY | Facility: HOSPITAL | Age: 89
End: 2024-11-25
Payer: MEDICARE

## 2024-11-25 VITALS
OXYGEN SATURATION: 95 % | WEIGHT: 130 LBS | HEART RATE: 59 BPM | RESPIRATION RATE: 20 BRPM | BODY MASS INDEX: 20.98 KG/M2 | TEMPERATURE: 98 F | DIASTOLIC BLOOD PRESSURE: 76 MMHG | SYSTOLIC BLOOD PRESSURE: 180 MMHG

## 2024-11-25 DIAGNOSIS — Z01.818 PREOPERATIVE TESTING: ICD-10-CM

## 2024-11-25 DIAGNOSIS — R33.9 URINARY RETENTION: Primary | ICD-10-CM

## 2024-11-25 LAB
ANION GAP SERPL CALC-SCNC: 8 MMOL/L (ref 8–16)
BASOPHILS # BLD AUTO: 0.04 K/UL (ref 0–0.2)
BASOPHILS NFR BLD: 0.6 % (ref 0–1.9)
BUN SERPL-MCNC: 29 MG/DL (ref 8–23)
CALCIUM SERPL-MCNC: 9 MG/DL (ref 8.7–10.5)
CHLORIDE SERPL-SCNC: 108 MMOL/L (ref 95–110)
CO2 SERPL-SCNC: 22 MMOL/L (ref 23–29)
CREAT SERPL-MCNC: 1.3 MG/DL (ref 0.5–1.4)
DIFFERENTIAL METHOD BLD: ABNORMAL
EOSINOPHIL # BLD AUTO: 0.2 K/UL (ref 0–0.5)
EOSINOPHIL NFR BLD: 2.7 % (ref 0–8)
ERYTHROCYTE [DISTWIDTH] IN BLOOD BY AUTOMATED COUNT: 12.7 % (ref 11.5–14.5)
EST. GFR  (NO RACE VARIABLE): 53 ML/MIN/1.73 M^2
GLUCOSE SERPL-MCNC: 101 MG/DL (ref 70–110)
HCT VFR BLD AUTO: 36.6 % (ref 40–54)
HGB BLD-MCNC: 12 G/DL (ref 14–18)
IMM GRANULOCYTES # BLD AUTO: 0.01 K/UL (ref 0–0.04)
IMM GRANULOCYTES NFR BLD AUTO: 0.1 % (ref 0–0.5)
LYMPHOCYTES # BLD AUTO: 3.4 K/UL (ref 1–4.8)
LYMPHOCYTES NFR BLD: 50.1 % (ref 18–48)
MCH RBC QN AUTO: 30.5 PG (ref 27–31)
MCHC RBC AUTO-ENTMCNC: 32.8 G/DL (ref 32–36)
MCV RBC AUTO: 93 FL (ref 82–98)
MONOCYTES # BLD AUTO: 0.6 K/UL (ref 0.3–1)
MONOCYTES NFR BLD: 8.7 % (ref 4–15)
NEUTROPHILS # BLD AUTO: 2.5 K/UL (ref 1.8–7.7)
NEUTROPHILS NFR BLD: 37.8 % (ref 38–73)
NRBC BLD-RTO: 0 /100 WBC
PLATELET # BLD AUTO: 209 K/UL (ref 150–450)
PMV BLD AUTO: 8.9 FL (ref 9.2–12.9)
POTASSIUM SERPL-SCNC: 4.3 MMOL/L (ref 3.5–5.1)
RBC # BLD AUTO: 3.93 M/UL (ref 4.6–6.2)
SODIUM SERPL-SCNC: 138 MMOL/L (ref 136–145)
WBC # BLD AUTO: 6.69 K/UL (ref 3.9–12.7)

## 2024-11-25 PROCEDURE — 71000016 HC POSTOP RECOV ADDL HR: Performed by: STUDENT IN AN ORGANIZED HEALTH CARE EDUCATION/TRAINING PROGRAM

## 2024-11-25 PROCEDURE — 25000003 PHARM REV CODE 250: Performed by: STUDENT IN AN ORGANIZED HEALTH CARE EDUCATION/TRAINING PROGRAM

## 2024-11-25 PROCEDURE — 37000008 HC ANESTHESIA 1ST 15 MINUTES: Performed by: STUDENT IN AN ORGANIZED HEALTH CARE EDUCATION/TRAINING PROGRAM

## 2024-11-25 PROCEDURE — 36415 COLL VENOUS BLD VENIPUNCTURE: CPT | Performed by: STUDENT IN AN ORGANIZED HEALTH CARE EDUCATION/TRAINING PROGRAM

## 2024-11-25 PROCEDURE — 71000015 HC POSTOP RECOV 1ST HR: Performed by: STUDENT IN AN ORGANIZED HEALTH CARE EDUCATION/TRAINING PROGRAM

## 2024-11-25 PROCEDURE — 51102 DRAIN BL W/CATH INSERTION: CPT | Mod: ,,, | Performed by: STUDENT IN AN ORGANIZED HEALTH CARE EDUCATION/TRAINING PROGRAM

## 2024-11-25 PROCEDURE — 71000033 HC RECOVERY, INTIAL HOUR: Performed by: STUDENT IN AN ORGANIZED HEALTH CARE EDUCATION/TRAINING PROGRAM

## 2024-11-25 PROCEDURE — 85025 COMPLETE CBC W/AUTO DIFF WBC: CPT | Performed by: STUDENT IN AN ORGANIZED HEALTH CARE EDUCATION/TRAINING PROGRAM

## 2024-11-25 PROCEDURE — 63600175 PHARM REV CODE 636 W HCPCS: Performed by: ANESTHESIOLOGY

## 2024-11-25 PROCEDURE — 37000009 HC ANESTHESIA EA ADD 15 MINS: Performed by: STUDENT IN AN ORGANIZED HEALTH CARE EDUCATION/TRAINING PROGRAM

## 2024-11-25 PROCEDURE — 63600175 PHARM REV CODE 636 W HCPCS: Performed by: STUDENT IN AN ORGANIZED HEALTH CARE EDUCATION/TRAINING PROGRAM

## 2024-11-25 PROCEDURE — 51040 INCISE & DRAIN BLADDER: CPT | Mod: ICN,,, | Performed by: STUDENT IN AN ORGANIZED HEALTH CARE EDUCATION/TRAINING PROGRAM

## 2024-11-25 PROCEDURE — C1894 INTRO/SHEATH, NON-LASER: HCPCS | Performed by: STUDENT IN AN ORGANIZED HEALTH CARE EDUCATION/TRAINING PROGRAM

## 2024-11-25 PROCEDURE — 36000704 HC OR TIME LEV I 1ST 15 MIN: Performed by: STUDENT IN AN ORGANIZED HEALTH CARE EDUCATION/TRAINING PROGRAM

## 2024-11-25 PROCEDURE — 36000705 HC OR TIME LEV I EA ADD 15 MIN: Performed by: STUDENT IN AN ORGANIZED HEALTH CARE EDUCATION/TRAINING PROGRAM

## 2024-11-25 PROCEDURE — 80048 BASIC METABOLIC PNL TOTAL CA: CPT | Performed by: STUDENT IN AN ORGANIZED HEALTH CARE EDUCATION/TRAINING PROGRAM

## 2024-11-25 PROCEDURE — 25000003 PHARM REV CODE 250: Performed by: ANESTHESIOLOGY

## 2024-11-25 RX ORDER — SODIUM CHLORIDE 0.9 % (FLUSH) 0.9 %
10 SYRINGE (ML) INJECTION ONCE
Status: DISCONTINUED | OUTPATIENT
Start: 2024-11-25 | End: 2024-11-25 | Stop reason: HOSPADM

## 2024-11-25 RX ORDER — GENTAMICIN SULFATE 80 MG/100ML
80 INJECTION, SOLUTION INTRAVENOUS
Status: COMPLETED | OUTPATIENT
Start: 2024-11-25 | End: 2024-11-25

## 2024-11-25 RX ORDER — PROPOFOL 10 MG/ML
VIAL (ML) INTRAVENOUS
Status: DISCONTINUED | OUTPATIENT
Start: 2024-11-25 | End: 2024-11-25

## 2024-11-25 RX ORDER — SODIUM CHLORIDE 0.9 % (FLUSH) 0.9 %
10 SYRINGE (ML) INJECTION
Status: DISCONTINUED | OUTPATIENT
Start: 2024-11-25 | End: 2024-11-25 | Stop reason: HOSPADM

## 2024-11-25 RX ORDER — ONDANSETRON HYDROCHLORIDE 2 MG/ML
INJECTION, SOLUTION INTRAVENOUS
Status: DISCONTINUED | OUTPATIENT
Start: 2024-11-25 | End: 2024-11-25

## 2024-11-25 RX ORDER — HYDROMORPHONE HYDROCHLORIDE 2 MG/ML
0.2 INJECTION, SOLUTION INTRAMUSCULAR; INTRAVENOUS; SUBCUTANEOUS EVERY 5 MIN PRN
Status: DISCONTINUED | OUTPATIENT
Start: 2024-11-25 | End: 2024-11-25 | Stop reason: HOSPADM

## 2024-11-25 RX ORDER — ACETAMINOPHEN 500 MG
1000 TABLET ORAL ONCE
Status: COMPLETED | OUTPATIENT
Start: 2024-11-25 | End: 2024-11-25

## 2024-11-25 RX ORDER — LIDOCAINE HYDROCHLORIDE 10 MG/ML
1 INJECTION, SOLUTION EPIDURAL; INFILTRATION; INTRACAUDAL; PERINEURAL ONCE
Status: DISCONTINUED | OUTPATIENT
Start: 2024-11-25 | End: 2024-11-25 | Stop reason: HOSPADM

## 2024-11-25 RX ORDER — LIDOCAINE HYDROCHLORIDE 20 MG/ML
INJECTION INTRAVENOUS
Status: DISCONTINUED | OUTPATIENT
Start: 2024-11-25 | End: 2024-11-25

## 2024-11-25 RX ORDER — CIPROFLOXACIN 2 MG/ML
400 INJECTION, SOLUTION INTRAVENOUS
Status: DISCONTINUED | OUTPATIENT
Start: 2024-11-25 | End: 2024-11-25

## 2024-11-25 RX ORDER — ACETAMINOPHEN 500 MG
500 TABLET ORAL EVERY 6 HOURS PRN
Qty: 20 TABLET | Refills: 0 | Status: SHIPPED | OUTPATIENT
Start: 2024-11-25 | End: 2024-11-30

## 2024-11-25 RX ORDER — LIDOCAINE HYDROCHLORIDE 20 MG/ML
JELLY TOPICAL
Status: DISCONTINUED | OUTPATIENT
Start: 2024-11-25 | End: 2024-11-25 | Stop reason: HOSPADM

## 2024-11-25 RX ORDER — ACETAMINOPHEN 500 MG
1000 TABLET ORAL
Status: COMPLETED | OUTPATIENT
Start: 2024-11-25 | End: 2024-11-25

## 2024-11-25 RX ORDER — BACITRACIN 500 [USP'U]/G
OINTMENT TOPICAL
Status: DISCONTINUED | OUTPATIENT
Start: 2024-11-25 | End: 2024-11-25 | Stop reason: HOSPADM

## 2024-11-25 RX ORDER — GLUCAGON 1 MG
1 KIT INJECTION
Status: DISCONTINUED | OUTPATIENT
Start: 2024-11-25 | End: 2024-11-25 | Stop reason: HOSPADM

## 2024-11-25 RX ADMIN — PROPOFOL 84.75 MCG/KG/MIN: 10 INJECTION, EMULSION INTRAVENOUS at 01:11

## 2024-11-25 RX ADMIN — GENTAMICIN SULFATE 80 MG: 80 INJECTION, SOLUTION INTRAVENOUS at 01:11

## 2024-11-25 RX ADMIN — ACETAMINOPHEN 1000 MG: 500 TABLET ORAL at 11:11

## 2024-11-25 RX ADMIN — ONDANSETRON 4 MG: 2 INJECTION, SOLUTION INTRAMUSCULAR; INTRAVENOUS at 02:11

## 2024-11-25 RX ADMIN — PROPOFOL 30 MG: 10 INJECTION, EMULSION INTRAVENOUS at 01:11

## 2024-11-25 RX ADMIN — LIDOCAINE HYDROCHLORIDE 40 MG: 20 INJECTION, SOLUTION INTRAVENOUS at 01:11

## 2024-11-25 RX ADMIN — PROPOFOL 10 MG: 10 INJECTION, EMULSION INTRAVENOUS at 01:11

## 2024-11-25 RX ADMIN — VANCOMYCIN HYDROCHLORIDE 1000 MG: 1 INJECTION, POWDER, LYOPHILIZED, FOR SOLUTION INTRAVENOUS at 01:11

## 2024-11-25 RX ADMIN — MEPIVACAINE HYDROCHLORIDE 15 ML: 15 INJECTION, SOLUTION EPIDURAL; INFILTRATION at 01:11

## 2024-11-25 RX ADMIN — SODIUM CHLORIDE: 0.9 INJECTION, SOLUTION INTRAVENOUS at 01:11

## 2024-11-25 RX ADMIN — ACETAMINOPHEN 1000 MG: 500 TABLET ORAL at 04:11

## 2024-11-25 NOTE — BRIEF OP NOTE
[facility] [place] - Surgery  Brief Operative Note    Surgery Date: 11/25/2024     Surgeons and Role:     * Raji Mcmullen MD - Primary    Assisting Surgeon: None    Pre-op Diagnosis:  Urinary retention [R33.9]    Post-op Diagnosis:  Post-Op Diagnosis Codes:     * Urinary retention [R33.9]    Procedure(s) (LRB):  Suprapubic tube placement, cystoscopy (N/A)    Anesthesia: Spinal    Operative Findings:   Cystoscopy without evidence of urethral stricture  20 fr seo suprapubic tube placed    Estimated Blood Loss: < 2cc         Specimens:   Specimen (24h ago, onward)      None              Discharge Note    OUTCOME: Patient tolerated treatment/procedure well without complication and is now ready for discharge.    DISPOSITION: Home or Self Care    FINAL DIAGNOSIS:  Urinary retention    FOLLOWUP: In clinic    DISCHARGE INSTRUCTIONS:    Discharge Procedure Orders   Diet general     Call MD for:  temperature >100.4     Call MD for:  persistent nausea and vomiting     Call MD for:  severe uncontrolled pain     Call MD for:  difficulty breathing, headache or visual disturbances     Remove dressing in 48 hours     Activity as tolerated

## 2024-11-25 NOTE — DISCHARGE INSTRUCTIONS
Okay to shower and remove dressing in 48 hours  Keep tube in place until 6 week follow up appointment      ACTIVITY LEVEL: If you have received sedation or an anesthetic, you may feel sleepy for several hours. Rest  until you are more awake. Gradually resume your normal activities.    DIET: You may resume your home diet. If nausea is present, increase your diet gradually with fluids and bland  foods.    Medications: Pain medication should be taken only if needed and as directed. If antibiotics are prescribed, the  medication should be taken until completed. You will be given an updated list of you medications.    Last dose Tyelnol 1,000 mg given at 4:26 PM    No driving, alcoholic beverages or signing legal documents for next 24  hours or while taking pain medication    CALL THE DOCTOR:  Fever over 101°F  Severe pain that doesnt go away with medication.  Upset stomach and vomiting that is persistent.  Problems urinating-unable to urinate or heavy bleeding (with or without clots)

## 2024-11-25 NOTE — ANESTHESIA PROCEDURE NOTES
Spinal    Diagnosis: recurrent UTI  Patient location during procedure: OR  Start time: 11/25/2024 1:31 PM  Timeout: 11/25/2024 1:30 PM  End time: 11/25/2024 1:33 PM    Staffing  Authorizing Provider: Polly Pinedo MD  Performing Provider: Polly Pinedo MD    Staffing  Performed by: Polly Pinedo MD  Authorized by: Polly Pinedo MD    Preanesthetic Checklist  Completed: patient identified, IV checked, site marked, risks and benefits discussed, surgical consent, monitors and equipment checked, pre-op evaluation and timeout performed  Spinal Block  Patient position: sitting  Prep: ChloraPrep  Patient monitoring: heart rate, cardiac monitor and continuous pulse ox  Approach: midline  Location: L4-5  Injection technique: single shot  CSF Fluid: clear free-flowing CSF  Needle  Needle type: pencil-tip   Needle gauge: 25 G  Needle length: 3.5 in  Additional Documentation: incremental injection, negative aspiration for CSF, negative aspiration for heme and no paresthesia on injection  Needle localization: anatomical landmarks  Assessment  Sensory level: T4   Dermatomal levels determined by pinch or prick  Ease of block: easy  Patient's tolerance of the procedure: comfortable throughout block and no complaints  Additional Notes  100 mcg epinephrine added to spinal.  Medications:    Medications: mepivacaine (CARBOCAINE) injection 15 mg/mL (1.5%) - Other   15 mL - 11/25/2024 1:33:00 PM

## 2024-11-25 NOTE — TRANSFER OF CARE
Anesthesia Transfer of Care Note    Patient: Destin Barber    Procedure(s) Performed: Procedure(s) (LRB):  Suprapubic tube placement, cystoscopy, possible urethral dilation (N/A)    Patient location: PACU    Anesthesia Type: general    Transport from OR: Transported from OR on room air with adequate spontaneous ventilation    Post pain: adequate analgesia    Post assessment: no apparent anesthetic complications    Post vital signs: stable    Level of consciousness: awake    Nausea/Vomiting: no nausea/vomiting    Complications: none    Transfer of care protocol was followed      Last vitals: Visit Vitals  BP (!) 98/53 (BP Location: Right arm, Patient Position: Lying)   Pulse 85   Temp 36.7 °C (98 °F) (Temporal)   Resp 15   Wt 59 kg (130 lb)   SpO2 100%   BMI 20.98 kg/m²

## 2024-11-25 NOTE — INTERVAL H&P NOTE
The patient has been examined and the H&P has been reviewed:    I concur with the findings and no changes have occurred since H&P was written.    Surgery risks, benefits and alternative options discussed and understood by patient/family.    ROS: Denies chest pain, shortness of breath, nausea, vomiting, dysuria, hematuria.       There are no hospital problems to display for this patient.

## 2024-11-25 NOTE — OP NOTE
Surgery Date: 11/25/2024      Surgeons and Role:     * Raji Mcmullen MD - Primary     Assisting Surgeon: None     Pre-op Diagnosis:  Urinary retention [R33.9]     Post-op Diagnosis:  Post-Op Diagnosis Codes:     * Urinary retention [R33.9]     Procedure(s) (LRB):  Suprapubic tube placement, cystoscopy, possible urethral dilation (N/A)     Anesthesia: Spinal     Operative Findings:   Cystoscopy without evidence of urethral stricture  20 fr seo suprapubic tube placed     Estimated Blood Loss: < 2cc         Specimens:   Specimen (24h ago, onward)        None              OPERATIVE DETAILS: Patient brought to the OR suite after spinal anesthesia performed to be satisfactory by staff. Patient placed in lithotomy position. Patient prepped and draped with iodine scrub. Lower abdomen was prepped including patient's genitals as usual. I performed a cystoscopy using a 22 fr cystourethroscope. No evidence of anterior or posterior narrowing of the urethra. Prior HOLEP defect noted, patent fossa. I then systematically evaluated the bladder, no evidence of bladder mass or lesion, bilateral ureteral orifices were noted. Bladder diverticulum noted. I then removed the scope and placed a curved lowsely retractor into the urethra. 2 finger breadths above the pubis in the midline area I planned to place a tube in the bladder suprapubically. I marked 2 fingerbreadths above the pubic symphysis in the midline. I incised the skin in the midline, down to elizabeth's fascia using an 11 blade to assist with passage of the lowsley retraction through the anterior abdominal wall on tension. I grasped the seo with the retractor and brought it out through the meatus. I used the cystoscope  through the urethra to pull the seo back into position within the bladder and ensured the balloon of the suprapubic tube was inflated with 10 cc of sterile water in the bladder. I looked at the suprapubic tube site to ensure it was not bleeding.  The  suprapubic tube was secured with silk 2-0  nylon at the skin level.  The catheter was irrigated and the irrigant   remained clear. Antibiotic ointment placed at the skin site, dressing applied with gauze and medipore tape. Patient was taken to the recovery area. He tolerated the procedure without any immediate complications.

## 2024-11-25 NOTE — ANESTHESIA PREPROCEDURE EVALUATION
11/25/2024  Destin Barber is a 89 y.o., male.  Past Medical History:   Diagnosis Date    Acute coronary syndrome 06/24/2016    s/p 3V CABG 7/2016    Anemia     Coronary artery disease     Diastolic dysfunction     Gout, chronic     Heart failure     HTN (hypertension)     Hyperlipidemia     Myocardial infarction     Pneumonia due to other staphylococcus     Pressure ulcer     Renal manifestation of secondary diabetes mellitus     Type 2 diabetes mellitus     diet controlled    Urge incontinence of urine 7/21/2023         Pre-op Assessment          Review of Systems  Cardiovascular:     Hypertension  Past MI CAD   CABG/stent   Angina, at rest              Coronary Artery Disease:          Hx of Myocardial Infarction                  Hypertension         Pulmonary:  Pneumonia    Shortness of breath              Pulmonary Infection:  Pneumonia.     Renal/:  Chronic Renal Disease        Kidney Function/Disease             Endocrine:  Diabetes, type 2    Diabetes                          Physical Exam  General: Well nourished    Airway:  Mallampati: I   Mouth Opening: Normal  Tongue: Normal  Neck ROM: Normal ROM    Dental:  Edentulous    Chest/Lungs:  Clear to auscultation    Heart:  Rate: Normal  Rhythm: Regular Rhythm  Sounds: Normal      Lab Results   Component Value Date    WBC 6.69 11/25/2024    HGB 12.0 (L) 11/25/2024    HCT 36.6 (L) 11/25/2024    MCV 93 11/25/2024     11/25/2024         Chemistry        Component Value Date/Time     11/25/2024 1135    K 4.3 11/25/2024 1135     11/25/2024 1135    CO2 22 (L) 11/25/2024 1135    BUN 29 (H) 11/25/2024 1135    CREATININE 1.3 11/25/2024 1135     11/25/2024 1135        Component Value Date/Time    CALCIUM 9.0 11/25/2024 1135    ALKPHOS 63 11/14/2024 1630    AST 20 11/14/2024 1630    ALT 10 11/14/2024 1630    BILITOT 0.2 11/14/2024 1630     ESTGFRAFRICA >60 03/30/2022 0925    EGFRNONAA >60 03/30/2022 0925            Anesthesia Plan  Type of Anesthesia, risks & benefits discussed:    Anesthesia Type: Gen Natural Airway, MAC, Spinal, Gen Supraglottic Airway, Gen ETT  Intra-op Monitoring Plan: Standard ASA Monitors  Induction:  IV  Informed Consent: Informed consent signed with the Patient and all parties understand the risks and agree with anesthesia plan.  All questions answered.   ASA Score: 3    Ready For Surgery From Anesthesia Perspective.     .

## 2024-11-26 ENCOUNTER — PATIENT OUTREACH (OUTPATIENT)
Dept: ADMINISTRATIVE | Facility: HOSPITAL | Age: 89
End: 2024-11-26
Payer: MEDICARE

## 2024-11-26 NOTE — PROGRESS NOTES
Non-Compliant Blood Pressure Reading - spoke w/ patient, he reports that he only wrote down a few, but he is not at home. He is requesting a call back in 1 week to obtain updated blood pressure readings.    bp  Due: Today Received: Yesterday  Mireya Rodriguez, Mireya Kimbrough LPN  11/19/2024 - spoke w/ patient, he reports that he has not been recording his readings on paper. 1 week remind me sent to obtain updated readings.      Adela Diaz, DO  Mireya Rodriguez LPN - 11/11/2024  Patient states that he is taking all antihypertensives but did not take lisinopril yet. Continue all medications and monitor the BP in the afternoon on a log to review with nurse in a week

## 2024-11-26 NOTE — ANESTHESIA POSTPROCEDURE EVALUATION
Anesthesia Post Evaluation    Patient: Destin Barber    Procedure(s) Performed: Procedure(s) (LRB):  Suprapubic tube placement, cystoscopy (N/A)    Final Anesthesia Type: general      Patient location during evaluation: PACU  Patient participation: Yes- Able to Participate  Level of consciousness: awake and alert, oriented and awake  Post-procedure vital signs: reviewed and stable  Airway patency: patent    PONV status at discharge: No PONV  Anesthetic complications: no      Cardiovascular status: blood pressure returned to baseline  Respiratory status: unassisted, spontaneous ventilation and room air  Hydration status: euvolemic  Follow-up not needed.              Vitals Value Taken Time   /76 11/25/24 1623   Temp 36.4 °C (97.6 °F) 11/25/24 1623   Pulse 59 11/25/24 1623   Resp 20 11/25/24 1623   SpO2 95 % 11/25/24 1623         Event Time   Out of Recovery 15:11:26         Pain/Ami Score: Pain Rating Prior to Med Admin: 5 (11/25/2024  4:23 PM)  Pain Rating Post Med Admin: 5 (11/25/2024  4:33 PM)  Ami Score: 10 (11/25/2024  4:33 PM)  Modified Ami Score: 19 (11/25/2024  4:33 PM)

## 2024-11-27 ENCOUNTER — TELEPHONE (OUTPATIENT)
Dept: UROLOGY | Facility: CLINIC | Age: 89
End: 2024-11-27
Payer: MEDICARE

## 2024-11-27 NOTE — TELEPHONE ENCOUNTER
Post op courtesy call  Patient's penile pain has resolved with SPT placement  No reported issues  Clarified to HH that first SPT change is per MD in 6 weeks post op to allow tract to mature

## 2024-12-03 ENCOUNTER — PATIENT OUTREACH (OUTPATIENT)
Dept: ADMINISTRATIVE | Facility: HOSPITAL | Age: 89
End: 2024-12-03
Payer: MEDICARE

## 2024-12-03 ENCOUNTER — TELEPHONE (OUTPATIENT)
Dept: UROLOGY | Facility: CLINIC | Age: 89
End: 2024-12-03
Payer: MEDICARE

## 2024-12-03 NOTE — TELEPHONE ENCOUNTER
Received call from [facility] patient is being seen there.  Patient is c/o penile swelling, abdominal pain 8/10 Patient is s/p super pubic placement. On call doctor notified and informed.  AMINTA ROBLERO

## 2024-12-03 NOTE — PROGRESS NOTES
Non-Compliant Blood Pressure Reading - I'm unable to obtain any readings at this time. The patient voiced complaints of being bent over in pain and having penile swelling since yesterday. The patient reports that he took his pain medication with minimal relief. His pain score today is 8 out of 10. A call was placed to Dr. Mcmullen's office, I spoke w/ Nurse Leona. Per her instructions, the patient should go to [facility] to be evaluated. Dr. Santamaria is on call at the hospital this afternoon. The patient was notified of the nurse's response and recommendations. He verbalized understanding.        Mireya Rodriguez LPN [place], Tahira M, LPN   11/26/2024 - spoke w/ patient, he reports that he only wrote down a few, but he is not at home. He is requesting a call back in 1 week to obtain updated blood pressure readings.    bp  Due: Today Received: Yesterday  Mireya Rodriguez LPN [place]ster, Tahira M, LPN  11/19/2024 - spoke w/ patient, he reports that he has not been recording his readings on paper. 1 week remind me sent to obtain updated readings.      Adela Diaz, Mireya Guerrero LPN - 11/11/2024  Patient states that he is taking all antihypertensives but did not take lisinopril yet. Continue all medications and monitor the BP in the afternoon on a log to review with nurse in a week

## 2024-12-04 ENCOUNTER — OFFICE VISIT (OUTPATIENT)
Dept: NEPHROLOGY | Facility: CLINIC | Age: 89
End: 2024-12-04
Payer: MEDICARE

## 2024-12-04 DIAGNOSIS — E11.22 CONTROLLED TYPE 2 DIABETES MELLITUS WITH STAGE 3 CHRONIC KIDNEY DISEASE, WITHOUT LONG-TERM CURRENT USE OF INSULIN: ICD-10-CM

## 2024-12-04 DIAGNOSIS — Z87.39 HISTORY OF GOUT: ICD-10-CM

## 2024-12-04 DIAGNOSIS — D63.8 ANEMIA OF CHRONIC DISEASE: ICD-10-CM

## 2024-12-04 DIAGNOSIS — I10 ESSENTIAL HYPERTENSION: ICD-10-CM

## 2024-12-04 DIAGNOSIS — I51.89 DIASTOLIC DYSFUNCTION: ICD-10-CM

## 2024-12-04 DIAGNOSIS — R80.9 PROTEINURIA, UNSPECIFIED TYPE: ICD-10-CM

## 2024-12-04 DIAGNOSIS — N18.30 CONTROLLED TYPE 2 DIABETES MELLITUS WITH STAGE 3 CHRONIC KIDNEY DISEASE, WITHOUT LONG-TERM CURRENT USE OF INSULIN: ICD-10-CM

## 2024-12-04 DIAGNOSIS — N18.31 STAGE 3A CHRONIC KIDNEY DISEASE: Primary | ICD-10-CM

## 2024-12-04 PROCEDURE — 99211 OFF/OP EST MAY X REQ PHY/QHP: CPT | Mod: PBBFAC,PO | Performed by: NURSE PRACTITIONER

## 2024-12-04 PROCEDURE — 99999 PR PBB SHADOW E&M-EST. PATIENT-LVL I: CPT | Mod: PBBFAC,,, | Performed by: NURSE PRACTITIONER

## 2024-12-04 NOTE — PROGRESS NOTES
"Subjective:       Patient ID: Destin Barber is a 89 y.o.  male who presents for new evaluation of CKD 3a.    HPI     Mr. Destin Barber is an 89 year old male with T2DM, HTN, hypercholesterolemia, diastolic dysfunction ,anemia, CAD s/p CABG, h/o gout, BPH following with Urology that presents today for evaluation of CKD 3a per PCP referral. He reports history of smoking but quit 30-40 years ago. He has a history of a gout flare "a while" ago. He drinks 4-5 water bottles per day. He has had HTN for many years. Unsure of when he was diagnosed with T2DM. No known family history of kidney disease. No prior nephrologists.   The patient denies taking NSAIDs, herbal supplements, or new antibiotics, recreational drugs, recent episode of dehydration, diarrhea, nausea or vomiting, acute illness, hospitalization or exposure to IV radiocontrast.     Significant family hx includes: No known kidney disease    Last renal US: 6/15/23 , reviewed.  FINDINGS:  Right kidney measures 9.4 cm, resistive index 0.72.     Left kidney measures 10.1 cm, resistive index 0.69.     There is a left upper pole cyst measuring 1.5 cm.  Bladder volume is 509 cc prevoid, 359 cc postvoid.  Prostate measures 5.4 x 5.2 x 6.1 cm.  There are bladder diverticula bilaterally.     Impression:     Left upper pole renal cyst.  The kidneys are otherwise normal.     Large bladder diverticulum and prostatomegaly with a large bladder volumes suggesting bladder outlet obstruction from prostatomegaly.     Update 12/4/24:  - Presents for follow-up of CKD. Cr 1.3   - BP in office 178/76 - has not taken any medications  - reports pain in lower abdomen s/p SPC placement   - BP runs sometimes high,  when low at home     Review of Systems   Respiratory:  Negative for shortness of breath.    Cardiovascular:  Negative for chest pain and leg swelling.   Gastrointestinal:  Positive for abdominal pain. Negative for diarrhea, nausea and vomiting.   Genitourinary:  " "Negative for difficulty urinating, dysuria and hematuria.   Musculoskeletal:  Positive for arthralgias.   All other systems reviewed and are negative.      Objective:       There were no vitals taken for this visit. /76  Physical Exam  Vitals reviewed.   Constitutional:       General: He is not in acute distress.     Appearance: Normal appearance.      Comments: Thin, elderly male   HENT:      Head: Normocephalic and atraumatic.   Eyes:      General: No scleral icterus.     Comments: Corneal opacification   Cardiovascular:      Rate and Rhythm: Normal rate.   Pulmonary:      Effort: Pulmonary effort is normal. No respiratory distress.   Musculoskeletal:      Right lower leg: No edema.      Left lower leg: No edema.   Skin:     General: Skin is warm and dry.   Neurological:      Mental Status: He is alert.      Comments: Alert, speech clear, answers questions appropriately    Psychiatric:         Mood and Affect: Mood normal.         Behavior: Behavior normal.           Lab Results   Component Value Date    CREATININE 1.3 11/25/2024     No results found for: "UTPCR"  Lab Results   Component Value Date     11/25/2024    K 4.3 11/25/2024    CO2 22 (L) 11/25/2024     11/25/2024     Lab Results   Component Value Date    CALCIUM 9.0 11/25/2024    PHOS 3.6 11/15/2024     Lab Results   Component Value Date    HGB 12.0 (L) 11/25/2024    WBC 6.69 11/25/2024    HCT 36.6 (L) 11/25/2024      Lab Results   Component Value Date    HGBA1C 6.2 (H) 11/15/2024     11/25/2024    BUN 29 (H) 11/25/2024     Lab Results   Component Value Date    LDLCALC 67.6 11/08/2024         Assessment:       1. Stage 3a chronic kidney disease    2. Essential hypertension    3. Controlled type 2 diabetes mellitus with stage 3 chronic kidney disease, without long-term current use of insulin    4. History of gout    5. Anemia of chronic disease    6. Diastolic dysfunction    7. Proteinuria, unspecified type          Plan:   CKD " stage 3a c eGFR 53 mL/min -  - baseline sCr ~1.2-1.4 with intermittent rises to 1.5-1.6. 1.8 in 2022 in setting of chest pain/ admission for ischemic w/u which was unremarkable  - CKD clinically related to age-related changes, long history of HTN, T2DM (controlled), and vascular disease  - Encouraged continued good hydration. Avoid NSAIDs (takes meloxicam sparingly).  - Low risk of progression to ESRD.   Kidney Failure Risk Equation (Tangri)  Kidney Failure Risk at 2 years: 0.2%  Kidney Failure Risk at 5 years: 0.6%    Lab Results   Component Value Date    MICALBCREAT 64.4 (H) 07/01/2024    CREATININE 1.3 11/25/2024        UPCR Albuminuric. Continue ACEi. UPCR not done. Check for next visit.    Acid-base WNL   Secondary hyperparathyroidism Ca and phos okay. Monitor PTH and Vit D.   Anemia Hgb within CKD range. Monitor.    DM Controlled. Caution on metformin.    Lipid Management On statin - defer to cardiology   ESRD planning Defer     HTN - Labile on current regimen: Lisinopril 40, imdur 60, amlodipine 10  - He reports pain today and has not yet taken his medications    H/o gout - no recent flares. Was on allopurinol but is no longer taking. Monitor uric acid.     Diastolic dysfunction - appears euvolemic    All questions patient had were answered.  Asked if further questions. None. F/u in clinic 6 months  with labs and urine prior to next visit or sooner if needed.  ER for emergency concerns.    Summary of Plan:  - Reports pain near SPC site. No other symptoms. He is to follow-up with nurse today. Continue with Urology.  - RTC 6 months with labs for monitoring

## 2024-12-04 NOTE — PATIENT INSTRUCTIONS
Take morning medications before your next appointment.     Check your blood pressure daily after you take your medications, and bring the blood pressure log to your next appointment.

## 2024-12-05 ENCOUNTER — HOSPITAL ENCOUNTER (EMERGENCY)
Facility: HOSPITAL | Age: 89
Discharge: HOME | End: 2024-12-05
Attending: EMERGENCY MEDICINE
Payer: MEDICARE

## 2024-12-05 VITALS
RESPIRATION RATE: 21 BRPM | OXYGEN SATURATION: 95 % | WEIGHT: 130 LBS | HEIGHT: 64 IN | SYSTOLIC BLOOD PRESSURE: 186 MMHG | BODY MASS INDEX: 22.2 KG/M2 | DIASTOLIC BLOOD PRESSURE: 99 MMHG | TEMPERATURE: 98 F | HEART RATE: 107 BPM

## 2024-12-05 DIAGNOSIS — T83.9XXA PROBLEM WITH FOLEY CATHETER, INITIAL ENCOUNTER: Primary | ICD-10-CM

## 2024-12-05 DIAGNOSIS — R10.2 PELVIC PAIN IN MALE: ICD-10-CM

## 2024-12-05 PROCEDURE — 99283 EMERGENCY DEPT VISIT LOW MDM: CPT

## 2024-12-05 PROCEDURE — 51102 DRAIN BL W/CATH INSERTION: CPT

## 2024-12-05 RX ORDER — HYDROCODONE BITARTRATE AND ACETAMINOPHEN 5; 325 MG/1; MG/1
1 TABLET ORAL EVERY 6 HOURS PRN
Qty: 8 TABLET | Refills: 0 | Status: SHIPPED | OUTPATIENT
Start: 2024-12-05

## 2024-12-05 NOTE — ED PROVIDER NOTES
"Encounter Date: 12/5/2024       History     Chief Complaint   Patient presents with    Seo Issue     Pt to ED from home with c/o seo leg bag issue. Pt states leg bag has a hole in it and is leaking down his leg. Pt denies other complaints     HPI    89-year-old male past medical history of CABG, heart failure, hypertension, hyperlipidemia, diabetes, CKD, BPH following with Urology status post suprapubic catheter placement who presents with Seo catheter bag leaking.  Patient reports his bag has been leaking for the last 2 days.  He reports noticing no blood in his urine, but does state he still has some discomfort around the catheter site.  Reports taking some pain medication at home which has helped a little bit.  He reports having follow-up with urology in the next few weeks.  He denies any fevers or chills, denies any back pain, lightheaded dizziness, or any further complaints.    Review of patient's allergies indicates:   Allergen Reactions    Penicillins Nausea And Vomiting     Pt reports he is not allergic to penicillin       Past Medical History:   Diagnosis Date    Acute coronary syndrome 06/24/2016    s/p 3V CABG 7/2016    Anemia     Coronary artery disease     Diastolic dysfunction     Gout, chronic     Heart failure     HTN (hypertension)     Hyperlipidemia     Myocardial infarction     Pneumonia due to other staphylococcus     Pressure ulcer     Renal manifestation of secondary diabetes mellitus     Type 2 diabetes mellitus     diet controlled    Urge incontinence of urine 7/21/2023     Past Surgical History:   Procedure Laterality Date    BLADDER ASPIRATION N/A 11/25/2024    Procedure: Suprapubic tube placement, cystoscopy;  Surgeon: Raji Mcmullen MD;  Location: [facility];  Service: Urology;  Laterality: N/A;  minimal anesthesia. Need the [facility] " [facility]" Suprapubic tube kit available. Curved jaeyos device,  PHONE PREOP 11/21/24---CBC, BMP IN AM    CATARACT EXTRACTION Bilateral     " CATARACT EXTRACTION W/ ANTERIOR VITRECTOMY      CORONARY ARTERY BYPASS GRAFT  2016    PAIZ-LAD, SVG-Ramus, SVG-PDA    FLUOROSCOPIC URODYNAMIC STUDY N/A 2023    Procedure: URODYNAMIC STUDY, FLUOROSCOPIC;  Surgeon: Raji Mcmullen MD;  Location: [facility] OR;  Service: Urology;  Laterality: N/A;  RN PHONE PREOP WITH GRANDDAUGHTER ROGER 23    HEMORRHOID SURGERY      LASER ENUCLEATION OF PROSTATE N/A 2023    Procedure: ENUCLEATION, PROSTATE, USING LASER; cystolithalopaxy;  Surgeon: Raji Mcmullen MD;  Location: [facility] OR;  Service: Urology;  Laterality: N/A;  [facility] [phone] SPOKE TO NICK ON 2023 @ 10:37AM. CONFIRMATION NUMBER [id number]-PZKEYON SNELL JOSE MARIAKATIANA [phone] EMAILED CHANNING ON 6/15/2023@ 3:07PM-KEYON  RN PREOP 2023   T/S ON 2023  RN PREOP 2023 --JM     Family History   Problem Relation Name Age of Onset    Hypertension Mother      Heart disease Mother      Cancer Father          colon    Heart disease Sister      No Known Problems Sister      No Known Problems Sister      No Known Problems Sister      No Known Problems Sister      Heart disease Brother      No Known Problems Brother      No Known Problems Brother      No Known Problems Brother      No Known Problems Brother      No Known Problems Brother      No Known Problems Daughter      No Known Problems Daughter      No Known Problems Son      Amblyopia Neg Hx      Blindness Neg Hx      Cataracts Neg Hx      Diabetes Neg Hx      Glaucoma Neg Hx      Macular degeneration Neg Hx      Retinal detachment Neg Hx      Strabismus Neg Hx      Stroke Neg Hx      Thyroid disease Neg Hx       Social History     Tobacco Use    Smoking status: Former     Types: Cigars     Start date:      Quit date:      Years since quittin.9    Smokeless tobacco: Never    Tobacco comments:     Former smoker. Pt. Smoked 2 cigars daily.   Substance Use Topics    Alcohol use: No    Drug use: No     Review of Systems    Constitutional: Negative.    HENT: Negative.     Eyes: Negative.    Respiratory: Negative.     Cardiovascular: Negative.    Gastrointestinal:  Positive for abdominal pain (around suprapubic ostomy site).   Genitourinary: Negative.    Musculoskeletal: Negative.    Skin: Negative.    Neurological: Negative.        Physical Exam     Initial Vitals [12/05/24 0916]   BP Pulse Resp Temp SpO2   (!) 186/99 107 (!) 21 97.9 °F (36.6 °C) 95 %      MAP       --         Physical Exam    Nursing note and vitals reviewed.  Constitutional: He appears well-developed and well-nourished. He is not diaphoretic. No distress.   HENT:   Head: Normocephalic and atraumatic.   Eyes: Pupils are equal, round, and reactive to light. Right eye exhibits no discharge. Left eye exhibits no discharge.   Neck: No tracheal deviation present.   Normal range of motion.  Cardiovascular:  Normal rate and regular rhythm.           Pulmonary/Chest: No stridor. No respiratory distress.   Abdominal: Abdomen is soft. Bowel sounds are normal. He exhibits no distension. There is abdominal tenderness (mild around suprapubic ostomy site, without discharge/drainage noted, clear urine in seo bag, no surrounding erythema/edema noted). There is no rebound and no guarding.   Musculoskeletal:         General: No tenderness or edema. Normal range of motion.      Cervical back: Normal range of motion.     Neurological: He is alert and oriented to person, place, and time.   Skin: Skin is warm and dry.         ED Course   Procedures  Labs Reviewed - No data to display       Imaging Results    None          Medications - No data to display  Medical Decision Making                  MDM:      89-year-old male past medical history of CABG, heart failure, hypertension, hyperlipidemia, diabetes, CKD, BPH following with Urology status post suprapubic catheter placement who presents with Seo catheter bag leaking.  Differential Diagnosis includes:  Catheter malfunction,  obstruction, cellulitis, complication.  Physical exam as noted above.  ED workup not indicated.  Patient's Lee catheter bag was exchanged at bedside, additional script for pain medication prescribed today to assist with symptom control until he is able to see his urologist.  It does not appear to be infected there appears to be no further complicating finding.  He appears well and will be stable for discharge home. Do not suspect any additional surgical or medical emergency. Discussed diagnosis and further treatment with patient, including f/u.  Return precautions given and all questions answered.  Patient in understanding of plan.  Pt discharged to home improved and stable.        Note was created using voice recognition software. Note may have occasional typographical or grammatical errors, garbled syntax, and other bizarre constructions that may not have been identified and edited despite good robbi initial review prior to signing.                             Clinical Impression:  Final diagnoses:  [T83.9XXA] Problem with Lee catheter, initial encounter (Primary)          ED Disposition Condition    Discharge Stable          ED Prescriptions       Medication Sig Dispense Start Date End Date Auth. Provider    HYDROcodone-acetaminophen (NORCO) 5-325 mg per tablet Take 1 tablet by mouth every 6 (six) hours as needed for Pain. 8 tablet 12/5/2024 -- Jamari Villeda MD          Follow-up Information       Follow up With Specialties Details Why Contact Info    [facility] - Emergency Dept Emergency Medicine Go to  If symptoms worsen [address] [facility] [place] [address] [phone]    Adela Diaz, DO Family Medicine Go in 1 week As needed [address] [facility]  [place] [address]  [phone]               Jamari Villeda MD  12/07/24 4209

## 2024-12-05 NOTE — ED TRIAGE NOTES
Pt presents to ed with leg bag issue. Pt states was leaking, pt has no other complaints at this time.

## 2024-12-10 ENCOUNTER — EXTERNAL HOME HEALTH (OUTPATIENT)
Dept: HOME HEALTH SERVICES | Facility: HOSPITAL | Age: 89
End: 2024-12-10
Payer: MEDICARE

## 2024-12-31 ENCOUNTER — DOCUMENT SCAN (OUTPATIENT)
Dept: HOME HEALTH SERVICES | Facility: HOSPITAL | Age: 89
End: 2024-12-31
Payer: MEDICARE

## 2025-01-07 ENCOUNTER — DOCUMENT SCAN (OUTPATIENT)
Dept: HOME HEALTH SERVICES | Facility: HOSPITAL | Age: 89
End: 2025-01-07
Payer: MEDICARE

## 2025-01-08 ENCOUNTER — OFFICE VISIT (OUTPATIENT)
Dept: UROLOGY | Facility: CLINIC | Age: 89
End: 2025-01-08
Payer: MEDICARE

## 2025-01-08 DIAGNOSIS — C61 PROSTATE CANCER: ICD-10-CM

## 2025-01-08 DIAGNOSIS — R33.9 URINARY RETENTION: Primary | ICD-10-CM

## 2025-01-08 PROCEDURE — 99212 OFFICE O/P EST SF 10 MIN: CPT | Mod: PBBFAC | Performed by: STUDENT IN AN ORGANIZED HEALTH CARE EDUCATION/TRAINING PROGRAM

## 2025-01-08 PROCEDURE — 51705 CHANGE OF BLADDER TUBE: CPT | Mod: PBBFAC | Performed by: STUDENT IN AN ORGANIZED HEALTH CARE EDUCATION/TRAINING PROGRAM

## 2025-01-08 PROCEDURE — 99999 PR PBB SHADOW E&M-EST. PATIENT-LVL II: CPT | Mod: PBBFAC,,, | Performed by: STUDENT IN AN ORGANIZED HEALTH CARE EDUCATION/TRAINING PROGRAM

## 2025-01-08 PROCEDURE — 51700 IRRIGATION OF BLADDER: CPT | Mod: PBBFAC | Performed by: STUDENT IN AN ORGANIZED HEALTH CARE EDUCATION/TRAINING PROGRAM

## 2025-01-08 NOTE — PROGRESS NOTES
Patient ID: Destin Barber is a 89 y.o. male.    Chief Complaint: FU  Referral: No referring provider defined for this encounter.     HPI  89 y.o. who presents to the Urology clinic for evaluation of hx of urinary retention. Hx of HOLEP, incidental prostate cancer detected. Patient now s/p SPT for urinary retention due to issues with seo. He is satisfied with SPT in place. Denies issues with catheter. Denies unexplained weight loss, gross hematuria, flank pain, new back tenderness, falls.     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    Hypokalemia 02/03/2017    Controlled type 2 diabetes mellitus with stage 3 chronic kidney disease, without long-term current use of insulin 02/03/2017    Aortic atherosclerosis 08/31/2020    BMI 23.0-23.9, adult 03/07/2023    History of syncope 03/07/2023    Elevated PSA 03/07/2023    BPH associated with nocturia 03/22/2023    Bilateral renal cysts 03/22/2023    Primary insomnia 06/15/2023    Chronic idiopathic constipation 06/15/2023    Frequency of micturition 07/21/2023    Urge incontinence of urine 07/21/2023    Stage 3b chronic kidney disease 03/22/2024    Chronic kidney disease, stage 3a 04/05/2024    Urinary retention 10/29/2024    Pseudomonas urinary tract infection 10/29/2024    Generalized abdominal pain 10/29/2024    Prostate cancer 10/29/2024    Constipation 10/29/2024    Hypertensive urgency 11/14/2024     Resolved Ambulatory Problems     Diagnosis Date Noted    Type 2 diabetes mellitus with complication     Hypertension, benign 01/31/2013    NSTEMI (non-ST elevated myocardial  "infarction) 06/23/2016    Pain of left upper extremity 06/23/2016    Coronary artery disease due to lipid rich plaque 06/24/2016    Hyperglycemia 07/07/2016    Chronic diastolic CHF (congestive heart failure), NYHA class 2 07/08/2016    Gait instability 07/12/2016    Sepsis 02/02/2017    Influenza A 02/03/2017    SOB (shortness of breath) 02/03/2017    Paresthesia 03/12/2018    Stage 3a chronic kidney disease 06/15/2023     Past Medical History:   Diagnosis Date    Acute coronary syndrome 06/24/2016    Anemia     Coronary artery disease     Gout, chronic     Heart failure     HTN (hypertension)     Hyperlipidemia     Myocardial infarction     Pneumonia due to other staphylococcus     Pressure ulcer     Renal manifestation of secondary diabetes mellitus     Type 2 diabetes mellitus          Past Surgical History  Past Surgical History:   Procedure Laterality Date    BLADDER ASPIRATION N/A 11/25/2024    Procedure: Suprapubic tube placement, cystoscopy;  Surgeon: Raji Mcmullen MD;  Location: [facility] OR;  Service: Urology;  Laterality: N/A;  minimal anesthesia. Need the [facility] " [facility]" Suprapubic tube kit available. Curved [email or web address] device,  PHONE PREOP 11/21/24---CBC, BMP IN AM    CATARACT EXTRACTION Bilateral     CATARACT EXTRACTION W/ ANTERIOR VITRECTOMY      CORONARY ARTERY BYPASS GRAFT  07/06/2016    PAIZ-LAD, SVG-Ramus, SVG-PDA    FLUOROSCOPIC URODYNAMIC STUDY N/A 4/25/2023    Procedure: URODYNAMIC STUDY, FLUOROSCOPIC;  Surgeon: Raji Mcmullen MD;  Location: [facility] OR;  Service: Urology;  Laterality: N/A;  RN PHONE PREOP WITH GRANDDAUGHTER ROGER 4/21/23    HEMORRHOID SURGERY      LASER ENUCLEATION OF PROSTATE N/A 7/6/2023    Procedure: ENUCLEATION, PROSTATE, USING LASER; cystolithalopaxy;  Surgeon: Raji Mcmullen MD;  Location: [facility] OR;  Service: Urology;  Laterality: N/A;  [facility] [phone] SPOKE TO NICK ON 6/2/2023 @ 10:37AM. CONFIRMATION NUMBER [id number]  FIORDALIZA POLLACK" [phone] EMAILED CHANNING ON 6/15/2023@ 3:07PM-LO  RN PREOP 5/30/2023   T/S ON 6/5/2023  RN PREOP 06/30/2023 --CARLOS       Social History       Medications    Current Outpatient Medications:     amLODIPine (NORVASC) 10 MG tablet, Take 1 tablet (10 mg total) by mouth once daily., Disp: 90 tablet, Rfl: 0    blood sugar diagnostic Strp, To check BG daily, to use with insurance preferred meter, Disp: 200 each, Rfl: 11    blood-glucose meter kit, To check BG daily, to use with insurance preferred meter, Disp: 1 each, Rfl: 0    cloNIDine (CATAPRES) 0.1 MG tablet, , Disp: , Rfl:     HYDROcodone-acetaminophen (NORCO) 5-325 mg per tablet, Take 1 tablet by mouth every 6 (six) hours as needed for Pain., Disp: 8 tablet, Rfl: 0    isosorbide mononitrate (IMDUR) 60 MG 24 hr tablet, Take 1 tablet (60 mg total) by mouth once daily., Disp: 90 tablet, Rfl: 0    lancets Misc, To check BG daily, to use with insurance preferred meter, Disp: 200 each, Rfl: 11    LIDOcaine HCL 2% (XYLOCAINE) 2 % jelly, Apply topically as needed (apply around glans penis as needed)., Disp: 30 mL, Rfl: 0    lisinopriL (PRINIVIL,ZESTRIL) 40 MG tablet, Take 1 tablet (40 mg total) by mouth once daily., Disp: 90 tablet, Rfl: 3    pravastatin (PRAVACHOL) 40 MG tablet, Take 1 tablet (40 mg total) by mouth once daily., Disp: 90 tablet, Rfl: 3    somatropin (NORDITROPIN FLEXPRO) 10 mg/1.5 mL (6.7 mg/mL) PnIj, Inject into the skin., Disp: , Rfl:   No current facility-administered medications for this visit.    Facility-Administered Medications Ordered in Other Visits:     LIDOcaine (PF) 10 mg/ml (1%) injection 10 mg, 1 mL, Intradermal, Once, Julia Fong MD    Allergies  Review of patient's allergies indicates:   Allergen Reactions    Penicillins Nausea And Vomiting     Pt reports he is not allergic to penicillin         Patient's PMH, FH, Social hx, Medications, allergies reviewed and updated as pertinent to today's visit    Objective:      Physical  Exam  Constitutional:       General: He is not in acute distress.     Appearance: He is well-developed. He is not ill-appearing, toxic-appearing or diaphoretic.   HENT:      Head: Normocephalic and atraumatic.      Mouth/Throat:      Mouth: Mucous membranes are moist.   Eyes:      Conjunctiva/sclera: Conjunctivae normal.   Pulmonary:      Effort: Pulmonary effort is normal. No respiratory distress.   Abdominal:      General: Abdomen is flat. There is no distension.      Palpations: Abdomen is soft. There is no mass.      Tenderness: There is no right CVA tenderness or left CVA tenderness.   Genitourinary:     Comments: SPT in place with granulation tissue surrounding tract.   Musculoskeletal:         General: No swelling or deformity.      Cervical back: Neck supple.   Skin:     Findings: No rash.   Neurological:      Mental Status: He is alert and oriented to person, place, and time.      Gait: Gait normal.   Psychiatric:         Mood and Affect: Mood normal.         Thought Content: Thought content normal.         Judgment: Judgment normal.             Lab Results   Component Value Date    PSADIAG 6.2 (H) 11/08/2024    PSADIAG 4.5 (H) 03/26/2024        Assessment:       1. Urinary retention    2. Prostate cancer        Plan:         Prostate cancer  Watchful waiting  PSA q 6 m      SPT  MD changed SPT, 20 fr  RTC 6 weeks or HH RN can change catheter       Bladder Cath     Date/Time: 01/08/2025    Pre-operative diagnosis:urinary retention  Post-operative diagnosis: as above  Consent Done: Not Needed  Indications: urinary retention  Local anesthesia used: no     Patient sedated: no  Preparation: Patient was prepped and draped in the usual sterile fashion.  Description of findings: prior SPT removed after deflatting balloon. Skin cleansed, new 20 fr seo placed into bladder, irrigated with 180 cc to confirm placement, secured with 10 cc of sterile water  Complicated insertion: no  Altered anatomy: no  Number of  attempts: 1  Urine characteristics: clear and yellow  Complications: No  Estimated blood loss (mL): 0  Specimens: No    Patient tolerance: Patient tolerated the procedure well with no immediate complications    Visit today included increased complexity associated with the care of the episodic problem urinary retention/ prostate cancer addressed and managing the longitudinal care of the patient due to the serious and/or complex managed problem(s) .

## 2025-01-14 ENCOUNTER — OFFICE VISIT (OUTPATIENT)
Dept: FAMILY MEDICINE | Facility: CLINIC | Age: 89
End: 2025-01-14
Payer: MEDICARE

## 2025-01-14 VITALS
OXYGEN SATURATION: 98 % | SYSTOLIC BLOOD PRESSURE: 138 MMHG | DIASTOLIC BLOOD PRESSURE: 70 MMHG | TEMPERATURE: 98 F | WEIGHT: 142.31 LBS | HEIGHT: 64 IN | HEART RATE: 69 BPM | BODY MASS INDEX: 24.3 KG/M2

## 2025-01-14 DIAGNOSIS — R80.9 CONTROLLED TYPE 2 DIABETES MELLITUS WITH MICROALBUMINURIA, WITHOUT LONG-TERM CURRENT USE OF INSULIN: ICD-10-CM

## 2025-01-14 DIAGNOSIS — J06.9 UPPER RESPIRATORY TRACT INFECTION, UNSPECIFIED TYPE: ICD-10-CM

## 2025-01-14 DIAGNOSIS — E11.29 CONTROLLED TYPE 2 DIABETES MELLITUS WITH MICROALBUMINURIA, WITHOUT LONG-TERM CURRENT USE OF INSULIN: ICD-10-CM

## 2025-01-14 DIAGNOSIS — I10 ESSENTIAL HYPERTENSION: ICD-10-CM

## 2025-01-14 DIAGNOSIS — N39.0 RECURRENT UTI: Primary | ICD-10-CM

## 2025-01-14 DIAGNOSIS — Z91.89: ICD-10-CM

## 2025-01-14 PROCEDURE — 99214 OFFICE O/P EST MOD 30 MIN: CPT | Mod: PBBFAC,PO

## 2025-01-14 PROCEDURE — G2211 COMPLEX E/M VISIT ADD ON: HCPCS | Mod: S$PBB,,,

## 2025-01-14 PROCEDURE — 99214 OFFICE O/P EST MOD 30 MIN: CPT | Mod: S$PBB,,,

## 2025-01-14 PROCEDURE — 99999 PR PBB SHADOW E&M-EST. PATIENT-LVL IV: CPT | Mod: PBBFAC,,,

## 2025-01-14 RX ORDER — DOXYCYCLINE 100 MG/1
100 CAPSULE ORAL EVERY 12 HOURS
Qty: 14 CAPSULE | Refills: 0 | Status: SHIPPED | OUTPATIENT
Start: 2025-01-14 | End: 2025-01-21

## 2025-01-14 RX ORDER — MUPIROCIN 20 MG/G
OINTMENT TOPICAL 2 TIMES DAILY
Qty: 15 G | Refills: 2 | Status: SHIPPED | OUTPATIENT
Start: 2025-01-14 | End: 2025-01-21

## 2025-01-14 RX ORDER — BENZONATATE 100 MG/1
100 CAPSULE ORAL 3 TIMES DAILY PRN
Qty: 30 CAPSULE | Refills: 0 | Status: SHIPPED | OUTPATIENT
Start: 2025-01-14 | End: 2025-01-24

## 2025-01-14 RX ORDER — DEXTROMETHORPHAN HYDROBROMIDE, GUAIFENESIN 5; 100 MG/5ML; MG/5ML
650 LIQUID ORAL EVERY 8 HOURS
Qty: 30 TABLET | Refills: 0 | Status: SHIPPED | OUTPATIENT
Start: 2025-01-14 | End: 2025-01-24

## 2025-01-14 NOTE — PROGRESS NOTES
HPI     Destin Barber is a 89 y.o. male with multiple medical diagnoses as listed in the medical history and problem list that presents for follow up on chronic conditions. PCP Dr. Diaz with last visit in this clinic on 11/11/24.     Chief Complaint   Patient presents with    Follow-up       HPI    Peter patient here for follow up on chronic conditions. Had suprapubic catheter placed on 11/21/24. States that it is painful at insertion site. Otherwise not having any issues with it, draining well. Has had cough for the last week. Non productive. Denies fevers, chest pain, shortness of breath.     Assessment & Plan     1. Recurrent UTI  2. At risk for infection at catheter site    Patient with mild abdominal pain at insertion site of suprapubic catheter. Noted with erythema to surrounding skin and purulent drainage from site, which has a foul odor. Will obtain UA today. Denies fevers. Will treat catheter insertion site infection with doxycycline/mupirocin BID x 7 days. Follow up with clinic for any worsening symptoms, or if symptoms fail to improve.     ED precautions given.     - Urinalysis, Reflex to Urine Culture Urine, Clean Catch; Future  - doxycycline (VIBRAMYCIN) 100 MG Cap; Take 1 capsule (100 mg total) by mouth every 12 (twelve) hours. for 7 days  Dispense: 14 capsule; Refill: 0  - mupirocin (BACTROBAN) 2 % ointment; Apply topically 2 (two) times daily. for 7 days  Dispense: 15 g; Refill: 2  - acetaminophen (TYLENOL) 650 MG TbSR; Take 1 tablet (650 mg total) by mouth every 8 (eight) hours. for 10 days  Dispense: 30 tablet; Refill: 0    3. Upper respiratory tract infection, unspecified type    AFVSS in clinic today. Denies shortness of breath, chest pain. Lungs CTA bilaterally.   -Mild symptoms, most likely viral etiology.  -based on clinical findings today, does not warrant Abx treatment at this time. D/w pt about the potential risks of inappropriate Abx use and that if patient's Sx worsens, we will  re-evaluate to assess the need to change the treatment plan.     - benzonatate (TESSALON) 100 MG capsule; Take 1 capsule (100 mg total) by mouth 3 (three) times daily as needed for Cough.  Dispense: 30 capsule; Refill: 0    4. Essential hypertension    Stable on amlodipine and lisinopril daily. The current medical regimen is effective;  continue present plan and medications.  BP Readings from Last 3 Encounters:   01/14/25 138/70   12/05/24 (!) 186/99   11/25/24 (!) 180/76     -continue current medication regimen  -DASH diet, regular cardiovascular exercises, portion control  -weight loss    5. Controlled type 2 diabetes mellitus with microalbuminuria, without long-term current use of insulin    Stable, diet controlled. Last A1c was 6.2 in November 2024. Checks BG at home, reports that it is usually in the 100's. The current medical regimen is effective;  continue present plan and medications.  Avoid concentrated sweets, such as sugary drinks and foods. Continue exercising/healthy, active lifestyle.           Discussed DDx, condition, and treatment.   Education sent to patient portal/included in after visit summary.  ED precautions given.   Notify provider if symptoms do not resolve or increase in severity.   Patient verbalizes understanding and agrees with plan of care.  --------------------------------------------      Health Maintenance:  Health Maintenance         Date Due Completion Date    Shingles Vaccine (1 of 2) Never done ---    RSV Vaccine (Age 60+ and Pregnant patients) (1 - 1-dose 75+ series) Never done ---    Influenza Vaccine (1) 09/01/2024 12/2/2023    COVID-19 Vaccine (7 - 2024-25 season) 09/01/2024 12/4/2021    Hemoglobin A1c 05/15/2025 11/15/2024    Diabetes Urine Screening 07/01/2025 7/1/2024    Diabetic Eye Exam 07/15/2025 7/15/2024    Lipid Panel 11/08/2025 11/8/2024    TETANUS VACCINE 04/20/2028 4/20/2018            Discussed the importance of overdue vaccines which were offered during this  encounter. Patient declined overdue vaccines at this time and Advised patient on the importance of completing overdue health maintenance items    Follow Up:  Follow up in about 3 months (around 4/14/2025).    Exam     Review of Systems:  (as noted above)  Review of Systems   Constitutional:  Negative for activity change, appetite change, fatigue and fever.   HENT:  Negative for trouble swallowing.    Respiratory:  Negative for shortness of breath.    Cardiovascular:  Negative for chest pain.   Gastrointestinal:  Positive for abdominal pain (at catheter insertion site). Negative for blood in stool.       Physical Exam:   Physical Exam  Constitutional:       General: He is not in acute distress.     Appearance: Normal appearance. He is not ill-appearing.   HENT:      Head: Normocephalic and atraumatic.      Right Ear: Tympanic membrane normal.      Left Ear: Tympanic membrane normal.      Nose: Nose normal.      Mouth/Throat:      Mouth: Mucous membranes are moist.      Pharynx: No oropharyngeal exudate or posterior oropharyngeal erythema.   Cardiovascular:      Rate and Rhythm: Normal rate and regular rhythm.      Pulses: Normal pulses.      Heart sounds: Normal heart sounds. No murmur heard.  Pulmonary:      Effort: Pulmonary effort is normal. No respiratory distress.      Breath sounds: Normal breath sounds. No wheezing.   Abdominal:      General: Abdomen is flat. Bowel sounds are normal. There is no distension.      Palpations: Abdomen is soft.      Tenderness: There is abdominal tenderness. There is no guarding.   Musculoskeletal:      Cervical back: Normal range of motion. No rigidity.   Lymphadenopathy:      Cervical: No cervical adenopathy.   Skin:     General: Skin is warm and dry.      Capillary Refill: Capillary refill takes less than 2 seconds.      Findings: Erythema (purulent drainage) present.             Comments: Suprapubic catheter insertion site   Neurological:      General: No focal deficit  "present.      Mental Status: He is alert and oriented to person, place, and time.   Psychiatric:         Mood and Affect: Mood normal.         Behavior: Behavior normal.       Vitals:    01/14/25 0838 01/14/25 0848   BP: 138/70    BP Location: Right arm    Patient Position: Sitting    Pulse: 69    Temp: 98 °F (36.7 °C)    TempSrc: Oral    SpO2: (!) 70% 98%   Weight: 64.5 kg (142 lb 4.9 oz)    Height: 5' 4" (1.626 m)       Body mass index is 24.43 kg/m².        History     Past Medical History:  Past Medical History:   Diagnosis Date    Acute coronary syndrome 06/24/2016    s/p 3V CABG 7/2016    Anemia     Coronary artery disease     Diastolic dysfunction     Gout, chronic     Heart failure     HTN (hypertension)     Hyperlipidemia     Myocardial infarction     Pneumonia due to other staphylococcus     Pressure ulcer     Renal manifestation of secondary diabetes mellitus     Type 2 diabetes mellitus     diet controlled    Urge incontinence of urine 7/21/2023       Past Surgical History:  Past Surgical History:   Procedure Laterality Date    BLADDER ASPIRATION N/A 11/25/2024    Procedure: Suprapubic tube placement, cystoscopy;  Surgeon: Raji Mcmullen MD;  Location: [facility] OR;  Service: Urology;  Laterality: N/A;  minimal anesthesia. Need the [place] " [facility]" Suprapubic tube kit available. [employer] device,  PHONE PREOP 11/21/24---CBC, BMP IN AM    CATARACT EXTRACTION Bilateral     CATARACT EXTRACTION W/ ANTERIOR VITRECTOMY      CORONARY ARTERY BYPASS GRAFT  07/06/2016    PAIZ-LAD, SVG-Ramus, SVG-PDA    FLUOROSCOPIC URODYNAMIC STUDY N/A 4/25/2023    Procedure: URODYNAMIC STUDY, FLUOROSCOPIC;  Surgeon: Raji Mcmullen MD;  Location: [facility] OR;  Service: Urology;  Laterality: N/A;  RN PHONE PREOP WITH GRANDDAUGHTER ROGER 4/21/23    HEMORRHOID SURGERY      LASER ENUCLEATION OF PROSTATE N/A 7/6/2023    Procedure: ENUCLEATION, PROSTATE, USING LASER; cystolithalopaxy;  Surgeon: Raji Mcmullen MD;  " Location: [facility] OR;  Service: Urology;  Laterality: N/A;  [facility] [phone] SPOKE TO NICK ON 2023 @ 10:37AM. CONFIRMATION NUMBER [id number]  FIORDALIZA POLLACK [phone] EMAILED CHANNING ON 6/15/2023@ 3:07PM-KEYON  RN PREOP 2023   T/S ON 2023  RN PREOP 2023 --CARLOS       Social History:  Social History     Socioeconomic History    Marital status:     Number of children: 3    Highest education level: 6th grade   Tobacco Use    Smoking status: Former     Types: Cigars     Start date:      Quit date:      Years since quittin.0    Smokeless tobacco: Never    Tobacco comments:     Former smoker. Pt. Smoked 2 cigars daily.   Substance and Sexual Activity    Alcohol use: No    Drug use: No    Sexual activity: Not Currently     Partners: Female     Social Drivers of Health     Financial Resource Strain: Low Risk  (3/22/2023)    Overall Financial Resource Strain (CARDIA)     Difficulty of Paying Living Expenses: Not hard at all   Food Insecurity: No Food Insecurity (3/22/2023)    Hunger Vital Sign     Worried About Running Out of Food in the Last Year: Never true     Ran Out of Food in the Last Year: Never true   Transportation Needs: No Transportation Needs (3/22/2023)    PRAPARE - Transportation     Lack of Transportation (Medical): No     Lack of Transportation (Non-Medical): No   Physical Activity: Sufficiently Active (3/22/2023)    Exercise Vital Sign     Days of Exercise per Week: 5 days     Minutes of Exercise per Session: 30 min   Stress: No Stress Concern Present (3/22/2023)    [place] [place] [employer] - Occupational Stress Questionnaire     Feeling of Stress : Not at all   Housing Stability: Unknown (3/22/2023)    Housing Stability Vital Sign     Unable to Pay for Housing in the Last Year: No     Unstable Housing in the Last Year: No       Family History:  Family History   Problem Relation Name Age of Onset    Hypertension Mother      Heart disease  Mother      Cancer Father          colon    Heart disease Sister      No Known Problems Sister      No Known Problems Sister      No Known Problems Sister      No Known Problems Sister      Heart disease Brother      No Known Problems Brother      No Known Problems Brother      No Known Problems Brother      No Known Problems Brother      No Known Problems Brother      No Known Problems Daughter      No Known Problems Daughter      No Known Problems Son      Amblyopia Neg Hx      Blindness Neg Hx      Cataracts Neg Hx      Diabetes Neg Hx      Glaucoma Neg Hx      Macular degeneration Neg Hx      Retinal detachment Neg Hx      Strabismus Neg Hx      Stroke Neg Hx      Thyroid disease Neg Hx         Allergies and Medications: (updated and reviewed)  Review of patient's allergies indicates:   Allergen Reactions    Penicillins Nausea And Vomiting     Pt reports he is not allergic to penicillin       Current Outpatient Medications   Medication Sig Dispense Refill    amLODIPine (NORVASC) 10 MG tablet Take 1 tablet (10 mg total) by mouth once daily. 90 tablet 0    blood sugar diagnostic Strp To check BG daily, to use with insurance preferred meter 200 each 11    blood-glucose meter kit To check BG daily, to use with insurance preferred meter 1 each 0    cloNIDine (CATAPRES) 0.1 MG tablet       HYDROcodone-acetaminophen (NORCO) 5-325 mg per tablet Take 1 tablet by mouth every 6 (six) hours as needed for Pain. 8 tablet 0    isosorbide mononitrate (IMDUR) 60 MG 24 hr tablet Take 1 tablet (60 mg total) by mouth once daily. 90 tablet 0    lancets Misc To check BG daily, to use with insurance preferred meter 200 each 11    LIDOcaine HCL 2% (XYLOCAINE) 2 % jelly Apply topically as needed (apply around glans penis as needed). 30 mL 0    lisinopriL (PRINIVIL,ZESTRIL) 40 MG tablet Take 1 tablet (40 mg total) by mouth once daily. 90 tablet 3    pravastatin (PRAVACHOL) 40 MG tablet Take 1 tablet (40 mg total) by mouth once daily. 90  tablet 3    somatropin (NORDITROPIN FLEXPRO) 10 mg/1.5 mL (6.7 mg/mL) PnIj Inject into the skin.      acetaminophen (TYLENOL) 650 MG TbSR Take 1 tablet (650 mg total) by mouth every 8 (eight) hours. for 10 days 30 tablet 0    benzonatate (TESSALON) 100 MG capsule Take 1 capsule (100 mg total) by mouth 3 (three) times daily as needed for Cough. 30 capsule 0    doxycycline (VIBRAMYCIN) 100 MG Cap Take 1 capsule (100 mg total) by mouth every 12 (twelve) hours. for 7 days 14 capsule 0    mupirocin (BACTROBAN) 2 % ointment Apply topically 2 (two) times daily. for 7 days 15 g 2     No current facility-administered medications for this visit.     Facility-Administered Medications Ordered in Other Visits   Medication Dose Route Frequency Provider Last Rate Last Admin    LIDOcaine (PF) 10 mg/ml (1%) injection 10 mg  1 mL Intradermal Once Julia Fong MD           Patient Care Team:  Adela Diaz DO as PCP - General (Family Medicine)  Harry Velazco MD         - The patient is given an After Visit Summary that lists all medications with directions, allergies, education, orders placed during this encounter and follow-up instructions.      - I have reviewed the patient's medical information including past medical, family, and social history sections including the medications and allergies.      - We discussed the patient's current medications.     This note was created by combination of typed  and MModal dictation.  Transcription errors may be present.  If there are any questions, please contact me.                 CAROL Navarrete

## 2025-01-17 DIAGNOSIS — N30.01 ACUTE CYSTITIS WITH HEMATURIA: Primary | ICD-10-CM

## 2025-01-17 RX ORDER — CEFDINIR 300 MG/1
300 CAPSULE ORAL EVERY 12 HOURS
Qty: 10 CAPSULE | Refills: 0 | Status: SHIPPED | OUTPATIENT
Start: 2025-01-17 | End: 2025-01-22

## 2025-01-17 NOTE — PROGRESS NOTES
Urine culture showed >100,000 Klebsiella pneumoniae. Patient with PCN allergy but has taken Cefdinir in the past without issue. Will start him on Cefdinir 300mg BID x 5 days. Will have staff notify him by phone.

## 2025-01-24 ENCOUNTER — DOCUMENT SCAN (OUTPATIENT)
Dept: HOME HEALTH SERVICES | Facility: HOSPITAL | Age: 89
End: 2025-01-24
Payer: MEDICARE

## 2025-02-05 DIAGNOSIS — I25.810 CORONARY ARTERY DISEASE INVOLVING CORONARY BYPASS GRAFT OF NATIVE HEART WITHOUT ANGINA PECTORIS: ICD-10-CM

## 2025-02-05 DIAGNOSIS — E78.5 DYSLIPIDEMIA ASSOCIATED WITH TYPE 2 DIABETES MELLITUS: ICD-10-CM

## 2025-02-05 DIAGNOSIS — E11.69 DYSLIPIDEMIA ASSOCIATED WITH TYPE 2 DIABETES MELLITUS: ICD-10-CM

## 2025-02-05 DIAGNOSIS — I70.0 AORTIC ATHEROSCLEROSIS: ICD-10-CM

## 2025-02-05 DIAGNOSIS — I10 ESSENTIAL HYPERTENSION: ICD-10-CM

## 2025-02-05 RX ORDER — AMLODIPINE BESYLATE 10 MG/1
10 TABLET ORAL DAILY
Qty: 90 TABLET | Refills: 3 | Status: SHIPPED | OUTPATIENT
Start: 2025-02-05

## 2025-02-05 RX ORDER — PRAVASTATIN SODIUM 40 MG/1
40 TABLET ORAL DAILY
Qty: 90 TABLET | Refills: 3 | Status: SHIPPED | OUTPATIENT
Start: 2025-02-05

## 2025-02-05 RX ORDER — LISINOPRIL 40 MG/1
40 TABLET ORAL DAILY
Qty: 90 TABLET | Refills: 3 | Status: SHIPPED | OUTPATIENT
Start: 2025-02-05 | End: 2026-02-05

## 2025-02-05 NOTE — TELEPHONE ENCOUNTER
No care due was identified.  Health [facility] Embedded Care Due Messages. Reference number: [id number].   2/05/2025 10:45:25 AM CST

## 2025-02-06 ENCOUNTER — TELEPHONE (OUTPATIENT)
Dept: FAMILY MEDICINE | Facility: CLINIC | Age: 89
End: 2025-02-06
Payer: MEDICARE

## 2025-02-06 NOTE — TELEPHONE ENCOUNTER
----- Message from CAROL Navarrete sent at 1/17/2025 11:43 AM CST -----  Please let him know his urine sample showed that he has a UTI. I'm going to send in another type of antibiotic called Cefdinir in to his pharmacy. It is one tablet twice a day for 5 days. He should finish taking both antibiotics that I gave him and please encourage him to keep his Urology appointment in February. Thanks so much!

## 2025-02-12 ENCOUNTER — TELEPHONE (OUTPATIENT)
Dept: FAMILY MEDICINE | Facility: CLINIC | Age: 89
End: 2025-02-12
Payer: MEDICARE

## 2025-02-12 DIAGNOSIS — N30.01 ACUTE CYSTITIS WITH HEMATURIA: Primary | ICD-10-CM

## 2025-02-12 DIAGNOSIS — F51.04 PSYCHOPHYSIOLOGICAL INSOMNIA: ICD-10-CM

## 2025-02-12 NOTE — TELEPHONE ENCOUNTER
----- Message from Leslie sent at 2/12/2025 10:41 AM CST -----  Type: Patient Call Back    Who called: wife     What is the request in detail: stated that pt medication cefdinir  was not sent to pharmacy. Please call    Can the clinic reply by [facility]? No     Would the patient rather a call back or a response via [facility]?  call    Best call back number: [phone]    Additional Information:

## 2025-02-12 NOTE — TELEPHONE ENCOUNTER
Spoke with patients wife, states that  picked up medications but the pharmacy had no antibiotics for him. Also states that cefdinir and trazadone is needed. Trazadone was discontinued but they were unaware. States he needs trazadone for sleep, he hasn't slept in a week.

## 2025-02-13 RX ORDER — CEFDINIR 300 MG/1
300 CAPSULE ORAL 2 TIMES DAILY
Qty: 10 CAPSULE | Refills: 0 | Status: SHIPPED | OUTPATIENT
Start: 2025-02-13 | End: 2025-02-18

## 2025-02-13 RX ORDER — TRAZODONE HYDROCHLORIDE 150 MG/1
150 TABLET ORAL NIGHTLY
Qty: 30 TABLET | Refills: 2 | Status: SHIPPED | OUTPATIENT
Start: 2025-02-13 | End: 2025-05-14

## 2025-02-13 RX ORDER — TRAZODONE HYDROCHLORIDE 150 MG/1
150 TABLET ORAL NIGHTLY
Qty: 30 TABLET | Refills: 2 | Status: SHIPPED | OUTPATIENT
Start: 2025-02-13 | End: 2025-02-13

## 2025-02-14 ENCOUNTER — EXTERNAL HOME HEALTH (OUTPATIENT)
Dept: HOME HEALTH SERVICES | Facility: HOSPITAL | Age: 89
End: 2025-02-14
Payer: MEDICARE

## 2025-02-14 NOTE — TELEPHONE ENCOUNTER
Spoke with wife to inform prescription was put in. Held a slot to be override for 3 month follow up with PCP.

## 2025-03-10 ENCOUNTER — OFFICE VISIT (OUTPATIENT)
Dept: UROLOGY | Facility: CLINIC | Age: 89
End: 2025-03-10
Payer: MEDICARE

## 2025-03-10 VITALS — BODY MASS INDEX: 24.84 KG/M2 | WEIGHT: 144.75 LBS

## 2025-03-10 DIAGNOSIS — C61 PROSTATE CANCER: ICD-10-CM

## 2025-03-10 DIAGNOSIS — R33.9 URINARY RETENTION: Primary | ICD-10-CM

## 2025-03-10 PROCEDURE — 87088 URINE BACTERIA CULTURE: CPT | Performed by: STUDENT IN AN ORGANIZED HEALTH CARE EDUCATION/TRAINING PROGRAM

## 2025-03-10 PROCEDURE — 99214 OFFICE O/P EST MOD 30 MIN: CPT | Mod: S$PBB,,, | Performed by: STUDENT IN AN ORGANIZED HEALTH CARE EDUCATION/TRAINING PROGRAM

## 2025-03-10 PROCEDURE — 87086 URINE CULTURE/COLONY COUNT: CPT | Performed by: STUDENT IN AN ORGANIZED HEALTH CARE EDUCATION/TRAINING PROGRAM

## 2025-03-10 PROCEDURE — 87186 SC STD MICRODIL/AGAR DIL: CPT | Performed by: STUDENT IN AN ORGANIZED HEALTH CARE EDUCATION/TRAINING PROGRAM

## 2025-03-10 PROCEDURE — G2211 COMPLEX E/M VISIT ADD ON: HCPCS | Mod: S$PBB,,, | Performed by: STUDENT IN AN ORGANIZED HEALTH CARE EDUCATION/TRAINING PROGRAM

## 2025-03-10 PROCEDURE — 99999 PR PBB SHADOW E&M-EST. PATIENT-LVL III: CPT | Mod: PBBFAC,,, | Performed by: STUDENT IN AN ORGANIZED HEALTH CARE EDUCATION/TRAINING PROGRAM

## 2025-03-10 PROCEDURE — 99213 OFFICE O/P EST LOW 20 MIN: CPT | Mod: PBBFAC | Performed by: STUDENT IN AN ORGANIZED HEALTH CARE EDUCATION/TRAINING PROGRAM

## 2025-03-10 NOTE — PROGRESS NOTES
Patient ID: Destin Barber is a 89 y.o. male.    Chief Complaint: retention/ prostate cancer  Referral: No referring provider defined for this encounter.     HPI  89 y.o. who presents to the Urology clinic for evaluation of prostate cancer/ urinary retention - SPT dependent. Patient denies unexplained weight loss. He is getting around without issue with the SPT. Low risk prostate cancer incidentally detected during HOLEP procedure, elected watchful waiting.   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    Hypokalemia 02/03/2017    Controlled type 2 diabetes mellitus with stage 3 chronic kidney disease, without long-term current use of insulin 02/03/2017    Aortic atherosclerosis 08/31/2020    BMI 23.0-23.9, adult 03/07/2023    History of syncope 03/07/2023    Elevated PSA 03/07/2023    BPH associated with nocturia 03/22/2023    Bilateral renal cysts 03/22/2023    Primary insomnia 06/15/2023    Chronic idiopathic constipation 06/15/2023    Frequency of micturition 07/21/2023    Urge incontinence of urine 07/21/2023    Stage 3b chronic kidney disease 03/22/2024    Chronic kidney disease, stage 3a 04/05/2024    Urinary retention 10/29/2024    Pseudomonas urinary tract infection 10/29/2024    Generalized abdominal pain 10/29/2024    Prostate cancer 10/29/2024    Constipation 10/29/2024    Hypertensive urgency 11/14/2024     Resolved Ambulatory Problems     Diagnosis Date Noted    Type 2 diabetes mellitus with complication     Hypertension, benign 01/31/2013    NSTEMI (non-ST elevated myocardial infarction) 06/23/2016    Pain of  "left upper extremity 06/23/2016    Coronary artery disease due to lipid rich plaque 06/24/2016    Hyperglycemia 07/07/2016    Chronic diastolic CHF (congestive heart failure), NYHA class 2 07/08/2016    Gait instability 07/12/2016    Sepsis 02/02/2017    Influenza A 02/03/2017    SOB (shortness of breath) 02/03/2017    Paresthesia 03/12/2018    Stage 3a chronic kidney disease 06/15/2023     Past Medical History:   Diagnosis Date    Acute coronary syndrome 06/24/2016    Anemia     Coronary artery disease     Gout, chronic     Heart failure     HTN (hypertension)     Hyperlipidemia     Myocardial infarction     Pneumonia due to other staphylococcus     Pressure ulcer     Renal manifestation of secondary diabetes mellitus     Type 2 diabetes mellitus          Past Surgical History  Past Surgical History:   Procedure Laterality Date    BLADDER ASPIRATION N/A 11/25/2024    Procedure: Suprapubic tube placement, cystoscopy;  Surgeon: Raji Mcmullen MD;  Location: [facility] OR;  Service: Urology;  Laterality: N/A;  minimal anesthesia. Need the [facility] " [facility]" Suprapubic tube kit available. [employer] device,  PHONE PREOP 11/21/24---CBC, BMP IN AM    CATARACT EXTRACTION Bilateral     CATARACT EXTRACTION W/ ANTERIOR VITRECTOMY      CORONARY ARTERY BYPASS GRAFT  07/06/2016    PAIZ-LAD, SVG-Ramus, SVG-PDA    FLUOROSCOPIC URODYNAMIC STUDY N/A 4/25/2023    Procedure: URODYNAMIC STUDY, FLUOROSCOPIC;  Surgeon: Raji Mcmullen MD;  Location: [facility] OR;  Service: Urology;  Laterality: N/A;  RN PHONE PREOP WITH GRANDDAUGHTER ROGER 4/21/23    HEMORRHOID SURGERY      LASER ENUCLEATION OF PROSTATE N/A 7/6/2023    Procedure: ENUCLEATION, PROSTATE, USING LASER; cystolithalopaxy;  Surgeon: Raji Mcmullen MD;  Location: [facility] OR;  Service: Urology;  Laterality: N/A;  notify [facility] [phone] SPOKE TO NICK ON 6/2/2023 @ 10:37AM. CONFIRMATION NUMBER [id number]  FIORDALIZA POLLACK [phone] EMAILED CHANNING ON " 6/15/2023@ 3:07PM-LO  RN PREOP 5/30/2023   T/S ON 6/5/2023  RN PREOP 06/30/2023 --CARLOS       Social History       Medications  Current Medications[1]    Allergies  Review of patient's allergies indicates:   Allergen Reactions    Penicillins Nausea And Vomiting     Pt reports he is not allergic to penicillin         Patient's PMH, FH, Social hx, Medications, allergies reviewed and updated as pertinent to today's visit    Objective:      Physical Exam  Constitutional:       General: He is not in acute distress.     Appearance: He is well-developed. He is not ill-appearing, toxic-appearing or diaphoretic.   HENT:      Head: Normocephalic and atraumatic.      Mouth/Throat:      Mouth: Mucous membranes are moist.   Eyes:      Conjunctiva/sclera: Conjunctivae normal.   Pulmonary:      Effort: Pulmonary effort is normal. No respiratory distress.   Abdominal:      General: Abdomen is flat. There is no distension.      Palpations: Abdomen is soft. There is no mass.      Tenderness: There is no right CVA tenderness or left CVA tenderness.      Comments: SPT w/ 20 fr seo, yellow urine   Musculoskeletal:         General: No swelling or deformity.      Cervical back: Neck supple.   Skin:     Findings: No rash.   Neurological:      Mental Status: He is alert and oriented to person, place, and time.      Gait: Gait normal.   Psychiatric:         Mood and Affect: Mood normal.         Thought Content: Thought content normal.         Judgment: Judgment normal.             Lab Results   Component Value Date    PSADIAG 6.2 (H) 11/08/2024    PSADIAG 4.5 (H) 03/26/2024        Assessment:       1. Urinary retention    2. Prostate cancer        Plan:         Prostate cancer  Watchful waiting, stable  PSA due in 2 months      Urinary retention    Bladder Cath     Date/Time: 03/10/2025    Pre-operative diagnosis:urinary retention  Post-operative diagnosis: as above  Consent Done: Not Needed  Indications: urinary retention, SPT change  Local  anesthesia used: no     Patient sedated: no  Preparation: Patient was prepped and draped in the usual sterile fashion.  Description of findings: 20 fr SPT removed, new SPT placed after cleansing skin with betadine  Catheter insertion: 20fr  Complicated insertion: no  Altered anatomy: no  Bladder irrigation: yes  Number of attempts: 1  Urine characteristics: clear and yellow  Complications: No  Estimated blood loss (mL): 0  Specimens: No    Patient tolerance: Patient tolerated the procedure well with no immediate complications      Visit today included increased complexity associated with the care of the episodic problem as above  addressed and managing the longitudinal care of the patient due to the serious and/or complex managed problem(s) RTC 6 weeks    Home health orders placed for RN to change SPT monthly.         [1]   Current Outpatient Medications:     amLODIPine (NORVASC) 10 MG tablet, Take 1 tablet (10 mg total) by mouth once daily., Disp: 90 tablet, Rfl: 3    blood sugar diagnostic Strp, To check BG daily, to use with insurance preferred meter, Disp: 200 each, Rfl: 11    blood-glucose meter kit, To check BG daily, to use with insurance preferred meter, Disp: 1 each, Rfl: 0    cloNIDine (CATAPRES) 0.1 MG tablet, , Disp: , Rfl:     HYDROcodone-acetaminophen (NORCO) 5-325 mg per tablet, Take 1 tablet by mouth every 6 (six) hours as needed for Pain., Disp: 8 tablet, Rfl: 0    isosorbide mononitrate (IMDUR) 60 MG 24 hr tablet, Take 1 tablet (60 mg total) by mouth once daily., Disp: 90 tablet, Rfl: 0    lancets Misc, To check BG daily, to use with insurance preferred meter, Disp: 200 each, Rfl: 11    LIDOcaine HCL 2% (XYLOCAINE) 2 % jelly, Apply topically as needed (apply around glans penis as needed)., Disp: 30 mL, Rfl: 0    lisinopriL (PRINIVIL,ZESTRIL) 40 MG tablet, Take 1 tablet (40 mg total) by mouth once daily., Disp: 90 tablet, Rfl: 3    pravastatin (PRAVACHOL) 40 MG tablet, Take 1 tablet (40 mg total) by  mouth once daily., Disp: 90 tablet, Rfl: 3    somatropin (NORDITROPIN FLEXPRO) 10 mg/1.5 mL (6.7 mg/mL) PnIj, Inject into the skin., Disp: , Rfl:     traZODone (DESYREL) 150 MG tablet, Take 1 tablet (150 mg total) by mouth every evening., Disp: 30 tablet, Rfl: 2  No current facility-administered medications for this visit.    Facility-Administered Medications Ordered in Other Visits:     LIDOcaine (PF) 10 mg/ml (1%) injection 10 mg, 1 mL, Intradermal, Once, Julia Fong MD

## 2025-03-11 ENCOUNTER — TELEPHONE (OUTPATIENT)
Dept: UROLOGY | Facility: CLINIC | Age: 89
End: 2025-03-11
Payer: MEDICARE

## 2025-03-11 ENCOUNTER — PATIENT MESSAGE (OUTPATIENT)
Dept: UROLOGY | Facility: CLINIC | Age: 89
End: 2025-03-11
Payer: MEDICARE

## 2025-03-11 NOTE — TELEPHONE ENCOUNTER
Message sent via [employer] explaining appt and new orders to  agency.    Solomon SALINAS RN      ----- Message from Raji Mcmullen MD sent at 3/10/2025  1:40 PM CDT -----  Regarding: psa  Please schedule psa in 2 months for patientFax home health orders for nurse to change SPT monthly

## 2025-03-12 ENCOUNTER — TELEPHONE (OUTPATIENT)
Dept: UROLOGY | Facility: CLINIC | Age: 89
End: 2025-03-12
Payer: MEDICARE

## 2025-03-12 ENCOUNTER — RESULTS FOLLOW-UP (OUTPATIENT)
Dept: UROLOGY | Facility: CLINIC | Age: 89
End: 2025-03-12

## 2025-03-12 DIAGNOSIS — R82.71 BACTERIA IN URINE: Primary | ICD-10-CM

## 2025-03-12 LAB — BACTERIA UR CULT: ABNORMAL

## 2025-03-12 RX ORDER — CIPROFLOXACIN 250 MG/1
250 TABLET, FILM COATED ORAL EVERY 12 HOURS
Qty: 10 TABLET | Refills: 0 | Status: SHIPPED | OUTPATIENT
Start: 2025-03-12 | End: 2025-03-17

## 2025-03-12 NOTE — PROGRESS NOTES
Please alert patient abx sent to pharmacy  Please also ensure home health orders have been faxed to resume SPT exchanges, thanks

## 2025-04-08 ENCOUNTER — EXTERNAL HOME HEALTH (OUTPATIENT)
Dept: HOME HEALTH SERVICES | Facility: HOSPITAL | Age: 89
End: 2025-04-08
Payer: MEDICARE

## 2025-04-21 ENCOUNTER — OFFICE VISIT (OUTPATIENT)
Dept: UROLOGY | Facility: CLINIC | Age: 89
End: 2025-04-21
Payer: MEDICARE

## 2025-04-21 VITALS — HEIGHT: 64 IN | WEIGHT: 144.63 LBS | BODY MASS INDEX: 24.69 KG/M2

## 2025-04-21 DIAGNOSIS — Z43.5 ENCOUNTER FOR CARE OR REPLACEMENT OF SUPRAPUBIC TUBE: ICD-10-CM

## 2025-04-21 DIAGNOSIS — C61 PROSTATE CANCER: ICD-10-CM

## 2025-04-21 DIAGNOSIS — R82.71 BACTERIA IN URINE: Primary | ICD-10-CM

## 2025-04-21 PROCEDURE — 87086 URINE CULTURE/COLONY COUNT: CPT | Performed by: STUDENT IN AN ORGANIZED HEALTH CARE EDUCATION/TRAINING PROGRAM

## 2025-04-21 PROCEDURE — 99214 OFFICE O/P EST MOD 30 MIN: CPT | Mod: S$PBB,,, | Performed by: STUDENT IN AN ORGANIZED HEALTH CARE EDUCATION/TRAINING PROGRAM

## 2025-04-21 PROCEDURE — G2211 COMPLEX E/M VISIT ADD ON: HCPCS | Mod: S$PBB,,, | Performed by: STUDENT IN AN ORGANIZED HEALTH CARE EDUCATION/TRAINING PROGRAM

## 2025-04-21 PROCEDURE — 99213 OFFICE O/P EST LOW 20 MIN: CPT | Mod: PBBFAC | Performed by: STUDENT IN AN ORGANIZED HEALTH CARE EDUCATION/TRAINING PROGRAM

## 2025-04-21 PROCEDURE — 99999 PR PBB SHADOW E&M-EST. PATIENT-LVL III: CPT | Mod: PBBFAC,,, | Performed by: STUDENT IN AN ORGANIZED HEALTH CARE EDUCATION/TRAINING PROGRAM

## 2025-04-21 RX ORDER — CEFDINIR 300 MG/1
300 CAPSULE ORAL EVERY 12 HOURS
Qty: 20 CAPSULE | Refills: 0 | Status: SHIPPED | OUTPATIENT
Start: 2025-04-21 | End: 2025-05-01

## 2025-04-21 NOTE — PROGRESS NOTES
Patient ID: Destin Barber is a 89 y.o. male.    Chief Complaint: retention/ prostate cancer  Referral: No referring provider defined for this encounter.     HPI Interval  Patient notes home health exchanged SPT 1 week ago, the bag has not been draining any urine he has been voiding per urethra per patient report.         HPI  3/10/2025  89 y.o. who presents to the Urology clinic for evaluation of prostate cancer/ urinary retention - SPT dependent. Patient denies unexplained weight loss. He is getting around without issue with the SPT. Low risk prostate cancer incidentally detected during HOLEP procedure, elected watchful waiting.   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    Hypokalemia 02/03/2017    Controlled type 2 diabetes mellitus with stage 3 chronic kidney disease, without long-term current use of insulin 02/03/2017    Aortic atherosclerosis 08/31/2020    BMI 23.0-23.9, adult 03/07/2023    History of syncope 03/07/2023    Elevated PSA 03/07/2023    BPH associated with nocturia 03/22/2023    Bilateral renal cysts 03/22/2023    Primary insomnia 06/15/2023    Chronic idiopathic constipation 06/15/2023    Frequency of micturition 07/21/2023    Urge incontinence of urine 07/21/2023    Stage 3b chronic kidney disease 03/22/2024    Chronic kidney disease, stage 3a 04/05/2024    Urinary retention 10/29/2024    Pseudomonas urinary tract infection 10/29/2024    Generalized abdominal pain 10/29/2024    Prostate cancer 10/29/2024    Constipation 10/29/2024    Hypertensive urgency 11/14/2024     Resolved Ambulatory Problems      "Diagnosis Date Noted    Type 2 diabetes mellitus with complication     Hypertension, benign 01/31/2013    NSTEMI (non-ST elevated myocardial infarction) 06/23/2016    Pain of left upper extremity 06/23/2016    Coronary artery disease due to lipid rich plaque 06/24/2016    Hyperglycemia 07/07/2016    Chronic diastolic CHF (congestive heart failure), NYHA class 2 07/08/2016    Gait instability 07/12/2016    Sepsis 02/02/2017    Influenza A 02/03/2017    SOB (shortness of breath) 02/03/2017    Paresthesia 03/12/2018    Stage 3a chronic kidney disease 06/15/2023     Past Medical History:   Diagnosis Date    Acute coronary syndrome 06/24/2016    Anemia     Coronary artery disease     Gout, chronic     Heart failure     HTN (hypertension)     Hyperlipidemia     Myocardial infarction     Pneumonia due to other staphylococcus     Pressure ulcer     Renal manifestation of secondary diabetes mellitus     Type 2 diabetes mellitus          Past Surgical History  Past Surgical History:   Procedure Laterality Date    BLADDER ASPIRATION N/A 11/25/2024    Procedure: Suprapubic tube placement, cystoscopy;  Surgeon: Raji Mcmullen MD;  Location: [facility] OR;  Service: Urology;  Laterality: N/A;  minimal anesthesia. Need the [facility] " [facility]" Suprapubic tube kit available. TELOS device,  PHONE PREOP 11/21/24---CBC, BMP IN AM    CATARACT EXTRACTION Bilateral     CATARACT EXTRACTION W/ ANTERIOR VITRECTOMY      CORONARY ARTERY BYPASS GRAFT  07/06/2016    PAIZ-LAD, SVG-Ramus, SVG-PDA    FLUOROSCOPIC URODYNAMIC STUDY N/A 4/25/2023    Procedure: URODYNAMIC STUDY, FLUOROSCOPIC;  Surgeon: Raji Mcmullen MD;  Location: [facility] OR;  Service: Urology;  Laterality: N/A;  RN PHONE PREOP WITH GRANDDAUGHTER ROGER 4/21/23    HEMORRHOID SURGERY      LASER ENUCLEATION OF PROSTATE N/A 7/6/2023    Procedure: ENUCLEATION, PROSTATE, USING LASER; cystolithalopaxy;  Surgeon: Raji Mcmullen MD;  Location: [facility] OR;  Service: Urology;  " Laterality: N/A;  notify [facility] [phone] SPOKE TO NICK ON 6/2/2023 @ 10:37AM. CONFIRMATION NUMBER [id number]  FIORDALIZA POLLACK [phone] EMAILED CHANNING ON 6/15/2023@ 3:07PM-KEYON  RN PREOP 5/30/2023   T/S ON 6/5/2023  RN PREOP 06/30/2023 --JM       Social History       Medications  Current Medications[1]    Allergies  Review of patient's allergies indicates:   Allergen Reactions    Penicillins Nausea And Vomiting     Pt reports he is not allergic to penicillin         Patient's PMH, FH, Social hx, Medications, allergies reviewed and updated as pertinent to today's visit    Objective:      Physical Exam  Constitutional:       General: He is not in acute distress.     Appearance: He is well-developed. He is not ill-appearing, toxic-appearing or diaphoretic.   HENT:      Head: Normocephalic and atraumatic.      Mouth/Throat:      Mouth: Mucous membranes are moist.   Eyes:      Conjunctiva/sclera: Conjunctivae normal.   Pulmonary:      Effort: Pulmonary effort is normal. No respiratory distress.   Abdominal:      General: Abdomen is flat. There is no distension.      Palpations: Abdomen is soft. There is no mass.      Tenderness: There is no right CVA tenderness or left CVA tenderness.      Comments: SPT w/ 20 fr seo,scant urine in tubing   Musculoskeletal:         General: No swelling or deformity.      Cervical back: Neck supple.   Skin:     Findings: No rash.   Neurological:      Mental Status: He is alert and oriented to person, place, and time.      Gait: Gait normal.   Psychiatric:         Mood and Affect: Mood normal.         Thought Content: Thought content normal.         Judgment: Judgment normal.             Lab Results   Component Value Date    PSADIAG 6.2 (H) 11/08/2024    PSADIAG 4.5 (H) 03/26/2024        Assessment:       1. Bacteria in urine    2. Prostate cancer    3. Encounter for care or replacement of suprapubic tube        Plan:         Prostate cancer  Watchful waiting, stable  PSA  due     Suprapubic tube, malpositioned  Attempted to irrigate patient's suprapubic tube, notably malpositioned, tube removed  Patient voided for urine culture due to suspected UTI/ colonization  Cefdinir rx provided  RTC 48 hours to reassess suprapubic tract      Visit today included increased complexity associated with the care of the episodic problem as above  addressed and managing the longitudinal care of the patient due to the serious and/or complex managed problem(s) RTC as above    Home health orders placed for RN to change SPT monthly.         [1]   Current Outpatient Medications:     amLODIPine (NORVASC) 10 MG tablet, Take 1 tablet (10 mg total) by mouth once daily., Disp: 90 tablet, Rfl: 3    blood sugar diagnostic Strp, To check BG daily, to use with insurance preferred meter, Disp: 200 each, Rfl: 11    blood-glucose meter kit, To check BG daily, to use with insurance preferred meter, Disp: 1 each, Rfl: 0    cloNIDine (CATAPRES) 0.1 MG tablet, , Disp: , Rfl:     HYDROcodone-acetaminophen (NORCO) 5-325 mg per tablet, Take 1 tablet by mouth every 6 (six) hours as needed for Pain., Disp: 8 tablet, Rfl: 0    isosorbide mononitrate (IMDUR) 60 MG 24 hr tablet, Take 1 tablet (60 mg total) by mouth once daily., Disp: 90 tablet, Rfl: 0    lancets Misc, To check BG daily, to use with insurance preferred meter, Disp: 200 each, Rfl: 11    LIDOcaine HCL 2% (XYLOCAINE) 2 % jelly, Apply topically as needed (apply around glans penis as needed)., Disp: 30 mL, Rfl: 0    lisinopriL (PRINIVIL,ZESTRIL) 40 MG tablet, Take 1 tablet (40 mg total) by mouth once daily., Disp: 90 tablet, Rfl: 3    pravastatin (PRAVACHOL) 40 MG tablet, Take 1 tablet (40 mg total) by mouth once daily., Disp: 90 tablet, Rfl: 3    somatropin (NORDITROPIN FLEXPRO) 10 mg/1.5 mL (6.7 mg/mL) PnIj, Inject into the skin., Disp: , Rfl:     traZODone (DESYREL) 150 MG tablet, Take 1 tablet (150 mg total) by mouth every evening., Disp: 30 tablet, Rfl: 2     cefdinir (OMNICEF) 300 MG capsule, Take 1 capsule (300 mg total) by mouth every 12 (twelve) hours. for 10 days, Disp: 20 capsule, Rfl: 0  No current facility-administered medications for this visit.    Facility-Administered Medications Ordered in Other Visits:     LIDOcaine (PF) 10 mg/ml (1%) injection 10 mg, 1 mL, Intradermal, Once, Julia Fong MD

## 2025-04-23 ENCOUNTER — OFFICE VISIT (OUTPATIENT)
Dept: UROLOGY | Facility: CLINIC | Age: 89
End: 2025-04-23
Payer: MEDICARE

## 2025-04-23 VITALS — WEIGHT: 144.5 LBS | BODY MASS INDEX: 24.67 KG/M2 | HEIGHT: 64 IN

## 2025-04-23 DIAGNOSIS — Z87.898 HISTORY OF URINARY RETENTION: ICD-10-CM

## 2025-04-23 DIAGNOSIS — C61 PROSTATE CANCER: Primary | ICD-10-CM

## 2025-04-23 LAB — BACTERIA UR CULT: ABNORMAL

## 2025-04-23 PROCEDURE — 99213 OFFICE O/P EST LOW 20 MIN: CPT | Mod: PBBFAC | Performed by: STUDENT IN AN ORGANIZED HEALTH CARE EDUCATION/TRAINING PROGRAM

## 2025-04-23 PROCEDURE — 99212 OFFICE O/P EST SF 10 MIN: CPT | Mod: S$PBB,,, | Performed by: STUDENT IN AN ORGANIZED HEALTH CARE EDUCATION/TRAINING PROGRAM

## 2025-04-23 PROCEDURE — 99999 PR PBB SHADOW E&M-EST. PATIENT-LVL III: CPT | Mod: PBBFAC,,, | Performed by: STUDENT IN AN ORGANIZED HEALTH CARE EDUCATION/TRAINING PROGRAM

## 2025-04-23 RX ORDER — CLONIDINE HYDROCHLORIDE 0.1 MG/1
0.1 TABLET ORAL
Status: CANCELLED | OUTPATIENT
Start: 2025-04-23

## 2025-04-23 NOTE — TELEPHONE ENCOUNTER
----- Message from Sherita sent at 4/23/2025 10:41 AM CDT -----  Regarding: Medication Refill  Good Morning, Pt came in requesting refill on these 2 medications clonidine 0.1 MG and Oxybutynin 5MG. Pt would like receive a  phone call on this matter. Pt is currently completely out of both pills.

## 2025-04-23 NOTE — TELEPHONE ENCOUNTER
No care due was identified.  [facility] Embedded Care Due Messages. Reference number: [id number].   4/23/2025 4:06:24 PM CDT

## 2025-04-23 NOTE — PROGRESS NOTES
Patient ID: Destin Barber is a 89 y.o. male.    Chief Complaint: retention/ prostate cancer  Referral: No referring provider defined for this encounter.         HPI Interval    Patient presents for SPT site check and PVR         HPI  4/21/25  Patient notes home health exchanged SPT 1 week ago, the bag has not been draining any urine he has been voiding per urethra per patient report.         HPI  3/10/2025  89 y.o. who presents to the Urology clinic for evaluation of prostate cancer/ urinary retention - SPT dependent. Patient denies unexplained weight loss. He is getting around without issue with the SPT. Low risk prostate cancer incidentally detected during HOLEP procedure, elected watchful waiting.   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    Hypokalemia 02/03/2017    Controlled type 2 diabetes mellitus with stage 3 chronic kidney disease, without long-term current use of insulin 02/03/2017    Aortic atherosclerosis 08/31/2020    BMI 23.0-23.9, adult 03/07/2023    History of syncope 03/07/2023    Elevated PSA 03/07/2023    BPH associated with nocturia 03/22/2023    Bilateral renal cysts 03/22/2023    Primary insomnia 06/15/2023    Chronic idiopathic constipation 06/15/2023    Frequency of micturition 07/21/2023    Urge incontinence of urine 07/21/2023    Stage 3b chronic kidney disease 03/22/2024    Chronic kidney disease, stage 3a 04/05/2024    Urinary retention 10/29/2024    Pseudomonas urinary tract infection 10/29/2024    Generalized abdominal pain 10/29/2024    Prostate cancer 10/29/2024    Constipation 10/29/2024  "   Hypertensive urgency 11/14/2024     Resolved Ambulatory Problems     Diagnosis Date Noted    Type 2 diabetes mellitus with complication     Hypertension, benign 01/31/2013    NSTEMI (non-ST elevated myocardial infarction) 06/23/2016    Pain of left upper extremity 06/23/2016    Coronary artery disease due to lipid rich plaque 06/24/2016    Hyperglycemia 07/07/2016    Chronic diastolic CHF (congestive heart failure), NYHA class 2 07/08/2016    Gait instability 07/12/2016    Sepsis 02/02/2017    Influenza A 02/03/2017    SOB (shortness of breath) 02/03/2017    Paresthesia 03/12/2018    Stage 3a chronic kidney disease 06/15/2023     Past Medical History:   Diagnosis Date    Acute coronary syndrome 06/24/2016    Anemia     Coronary artery disease     Gout, chronic     Heart failure     HTN (hypertension)     Hyperlipidemia     Myocardial infarction     Pneumonia due to other staphylococcus     Pressure ulcer     Renal manifestation of secondary diabetes mellitus     Type 2 diabetes mellitus          Past Surgical History  Past Surgical History:   Procedure Laterality Date    BLADDER ASPIRATION N/A 11/25/2024    Procedure: Suprapubic tube placement, cystoscopy;  Surgeon: Raji Mcmullen MD;  Location: [facility] OR;  Service: Urology;  Laterality: N/A;  minimal anesthesia. Need the [facility]" Suprapubic tube kit available. [employer] device,  PHONE PREOP 11/21/24---CBC, BMP IN AM    CATARACT EXTRACTION Bilateral     CATARACT EXTRACTION W/ ANTERIOR VITRECTOMY      CORONARY ARTERY BYPASS GRAFT  07/06/2016    PAIZ-LAD, SVG-Ramus, SVG-PDA    FLUOROSCOPIC URODYNAMIC STUDY N/A 4/25/2023    Procedure: URODYNAMIC STUDY, FLUOROSCOPIC;  Surgeon: Raji Mcmullen MD;  Location: [facility] OR;  Service: Urology;  Laterality: N/A;  RN PHONE PREOP WITH GRANDDAUGHTER ROGER 4/21/23    HEMORRHOID SURGERY      LASER ENUCLEATION OF PROSTATE N/A 7/6/2023    Procedure: ENUCLEATION, PROSTATE, USING LASER; cystolithalopaxy;  " Surgeon: Raji Mcmullen MD;  Location: [facility];  Service: Urology;  Laterality: N/A;  notify radha PORTER [phone] SPOKE TO NICK ON 6/2/2023 @ 10:37AM. CONFIRMATION NUMBER [id number]  FIORDALIZA POLLACK [phone] EMAILED CHANNING ON 6/15/2023@ 3:07PM-KEYON  RN PREOP 5/30/2023   T/S ON 6/5/2023  RN PREOP 06/30/2023 --CARLOS       Social History       Medications  Current Medications[1]    Allergies  Review of patient's allergies indicates:   Allergen Reactions    Penicillins Nausea And Vomiting     Pt reports he is not allergic to penicillin         Patient's PMH, FH, Social hx, Medications, allergies reviewed and updated as pertinent to today's visit    Objective:      Physical Exam  Constitutional:       General: He is not in acute distress.     Appearance: He is well-developed. He is not ill-appearing, toxic-appearing or diaphoretic.   HENT:      Head: Normocephalic and atraumatic.      Mouth/Throat:      Mouth: Mucous membranes are moist.   Eyes:      Conjunctiva/sclera: Conjunctivae normal.   Pulmonary:      Effort: Pulmonary effort is normal. No respiratory distress.   Abdominal:      General: Abdomen is flat. There is no distension.      Palpations: Abdomen is soft. There is no mass.      Tenderness: There is no right CVA tenderness or left CVA tenderness.      Comments: SPT site w/ granulation tissue, dry   Musculoskeletal:         General: No swelling or deformity.      Cervical back: Neck supple.   Skin:     Findings: No rash.   Neurological:      Mental Status: He is alert and oriented to person, place, and time.      Gait: Gait normal.   Psychiatric:         Mood and Affect: Mood normal.         Thought Content: Thought content normal.         Judgment: Judgment normal.             Lab Results   Component Value Date    PSADIAG 6.2 (H) 11/08/2024    PSADIAG 4.5 (H) 03/26/2024        Assessment:       1. Prostate cancer    2. History of urinary retention          Plan:         Prostate cancer  Watchful  waiting, stable  PSA due       Suprapubic tube, malpositioned  SPT site healed, dry  PVR 0cc    RTC1 month w/ PSA     Visit today included increased complexity associated with the care of the episodic problem as above  addressed and managing the longitudinal care of the patient due to the serious and/or complex managed problem(s) RTC as above         [1]   Current Outpatient Medications:     amLODIPine (NORVASC) 10 MG tablet, Take 1 tablet (10 mg total) by mouth once daily., Disp: 90 tablet, Rfl: 3    blood sugar diagnostic Strp, To check BG daily, to use with insurance preferred meter, Disp: 200 each, Rfl: 11    blood-glucose meter kit, To check BG daily, to use with insurance preferred meter, Disp: 1 each, Rfl: 0    cefdinir (OMNICEF) 300 MG capsule, Take 1 capsule (300 mg total) by mouth every 12 (twelve) hours. for 10 days, Disp: 20 capsule, Rfl: 0    cloNIDine (CATAPRES) 0.1 MG tablet, , Disp: , Rfl:     HYDROcodone-acetaminophen (NORCO) 5-325 mg per tablet, Take 1 tablet by mouth every 6 (six) hours as needed for Pain., Disp: 8 tablet, Rfl: 0    isosorbide mononitrate (IMDUR) 60 MG 24 hr tablet, Take 1 tablet (60 mg total) by mouth once daily., Disp: 90 tablet, Rfl: 0    lancets Misc, To check BG daily, to use with insurance preferred meter, Disp: 200 each, Rfl: 11    LIDOcaine HCL 2% (XYLOCAINE) 2 % jelly, Apply topically as needed (apply around glans penis as needed)., Disp: 30 mL, Rfl: 0    lisinopriL (PRINIVIL,ZESTRIL) 40 MG tablet, Take 1 tablet (40 mg total) by mouth once daily., Disp: 90 tablet, Rfl: 3    pravastatin (PRAVACHOL) 40 MG tablet, Take 1 tablet (40 mg total) by mouth once daily., Disp: 90 tablet, Rfl: 3    somatropin (NORDITROPIN FLEXPRO) 10 mg/1.5 mL (6.7 mg/mL) PnIj, Inject into the skin., Disp: , Rfl:     traZODone (DESYREL) 150 MG tablet, Take 1 tablet (150 mg total) by mouth every evening., Disp: 30 tablet, Rfl: 2  No current facility-administered medications for this  visit.    Facility-Administered Medications Ordered in Other Visits:     LIDOcaine (PF) 10 mg/ml (1%) injection 10 mg, 1 mL, Intradermal, Once, Julia Fong MD

## 2025-04-25 NOTE — TELEPHONE ENCOUNTER
Patient 's  wife will call back on Monday to verify dosing and frequency for clonidine. She is at work until 9 pm tonight (4/25/25) . She stated no one is with patient to read bottle .

## 2025-04-30 ENCOUNTER — TELEPHONE (OUTPATIENT)
Dept: FAMILY MEDICINE | Facility: CLINIC | Age: 89
End: 2025-04-30
Payer: MEDICARE

## 2025-04-30 NOTE — TELEPHONE ENCOUNTER
----- Message from Aixa sent at 4/30/2025  9:26 AM CDT -----  Regarding: pharmacy medication request  Good morning, Pt says he went in to pharmacy to get meds says he has been out for 2 weeks, and was told that it was still not in. Needs assistance with this matter. Good Phone number confirmed for patient is his wife cell number at [phone], Thank you.

## 2025-05-12 ENCOUNTER — OFFICE VISIT (OUTPATIENT)
Dept: FAMILY MEDICINE | Facility: CLINIC | Age: 89
End: 2025-05-12
Payer: MEDICARE

## 2025-05-12 ENCOUNTER — LAB VISIT (OUTPATIENT)
Dept: LAB | Facility: HOSPITAL | Age: 89
End: 2025-05-12
Attending: STUDENT IN AN ORGANIZED HEALTH CARE EDUCATION/TRAINING PROGRAM
Payer: MEDICARE

## 2025-05-12 VITALS
WEIGHT: 148.5 LBS | RESPIRATION RATE: 18 BRPM | HEIGHT: 64 IN | TEMPERATURE: 98 F | OXYGEN SATURATION: 98 % | BODY MASS INDEX: 25.35 KG/M2 | HEART RATE: 90 BPM | SYSTOLIC BLOOD PRESSURE: 160 MMHG | DIASTOLIC BLOOD PRESSURE: 60 MMHG

## 2025-05-12 DIAGNOSIS — C61 PROSTATE CANCER: ICD-10-CM

## 2025-05-12 DIAGNOSIS — I25.810 CORONARY ARTERY DISEASE INVOLVING CORONARY BYPASS GRAFT OF NATIVE HEART WITHOUT ANGINA PECTORIS: ICD-10-CM

## 2025-05-12 DIAGNOSIS — E78.5 DYSLIPIDEMIA ASSOCIATED WITH TYPE 2 DIABETES MELLITUS: ICD-10-CM

## 2025-05-12 DIAGNOSIS — N18.32 STAGE 3B CHRONIC KIDNEY DISEASE: ICD-10-CM

## 2025-05-12 DIAGNOSIS — R80.9 CONTROLLED TYPE 2 DIABETES MELLITUS WITH MICROALBUMINURIA, WITHOUT LONG-TERM CURRENT USE OF INSULIN: ICD-10-CM

## 2025-05-12 DIAGNOSIS — I10 ESSENTIAL HYPERTENSION: ICD-10-CM

## 2025-05-12 DIAGNOSIS — I70.0 AORTIC ATHEROSCLEROSIS: ICD-10-CM

## 2025-05-12 DIAGNOSIS — I49.3 PVC (PREMATURE VENTRICULAR CONTRACTION): Primary | ICD-10-CM

## 2025-05-12 DIAGNOSIS — E11.29 CONTROLLED TYPE 2 DIABETES MELLITUS WITH MICROALBUMINURIA, WITHOUT LONG-TERM CURRENT USE OF INSULIN: ICD-10-CM

## 2025-05-12 DIAGNOSIS — M1A.00X0 IDIOPATHIC CHRONIC GOUT WITHOUT TOPHUS, UNSPECIFIED SITE: ICD-10-CM

## 2025-05-12 DIAGNOSIS — E11.69 DYSLIPIDEMIA ASSOCIATED WITH TYPE 2 DIABETES MELLITUS: ICD-10-CM

## 2025-05-12 LAB
OHS QRS DURATION: 134 MS
OHS QTC CALCULATION: 494 MS
PSA SERPL-MCNC: 5.86 NG/ML

## 2025-05-12 PROCEDURE — 99999 PR PBB SHADOW E&M-EST. PATIENT-LVL IV: CPT | Mod: PBBFAC,,, | Performed by: FAMILY MEDICINE

## 2025-05-12 PROCEDURE — 93005 ELECTROCARDIOGRAM TRACING: CPT | Mod: PBBFAC,PO | Performed by: INTERNAL MEDICINE

## 2025-05-12 PROCEDURE — 99214 OFFICE O/P EST MOD 30 MIN: CPT | Mod: PBBFAC,PO,25 | Performed by: FAMILY MEDICINE

## 2025-05-12 PROCEDURE — 84153 ASSAY OF PSA TOTAL: CPT

## 2025-05-12 PROCEDURE — 36415 COLL VENOUS BLD VENIPUNCTURE: CPT | Mod: PO

## 2025-05-12 PROCEDURE — 99214 OFFICE O/P EST MOD 30 MIN: CPT | Mod: S$PBB,,, | Performed by: FAMILY MEDICINE

## 2025-05-12 PROCEDURE — G2211 COMPLEX E/M VISIT ADD ON: HCPCS | Mod: S$PBB,,, | Performed by: FAMILY MEDICINE

## 2025-05-12 PROCEDURE — 93010 ELECTROCARDIOGRAM REPORT: CPT | Mod: S$PBB,,, | Performed by: INTERNAL MEDICINE

## 2025-05-12 RX ORDER — TAMSULOSIN HYDROCHLORIDE 0.4 MG/1
CAPSULE ORAL
COMMUNITY
Start: 2025-03-12

## 2025-05-12 RX ORDER — LANCETS
EACH MISCELLANEOUS
Qty: 200 EACH | Refills: 11 | Status: SHIPPED | OUTPATIENT
Start: 2025-05-12

## 2025-05-12 RX ORDER — INSULIN PUMP SYRINGE, 3 ML
EACH MISCELLANEOUS
Qty: 1 EACH | Refills: 0 | Status: SHIPPED | OUTPATIENT
Start: 2025-05-12

## 2025-05-12 NOTE — PROGRESS NOTES
Assessment & Plan:    PVC (premature ventricular contraction)  -     EKG 12-lead    Irregularly irregular rhythm appreciated on exam. EKG showing overall NSR with PVCs with a chronic bifascicular block.   Medications patient is actually taking at home is unknown. Spoke with his granddaughter, who will check his list, and call back with updated list of medications.   Ambulatory referral/consult to [facility]; Future; Expected date: 05/19/2025  Concern about adequate supervision that would ensure compliance with his medications. He would benefit from the [facility].    Essential hypertension  -     Comprehensive Metabolic Panel; Future; Expected date: 05/12/2025    See above.    Coronary artery disease involving coronary bypass graft of native heart without angina pectoris  -     Lipid Panel; Future; Expected date: 05/12/2025    Aortic atherosclerosis  -     Lipid Panel; Future; Expected date: 05/12/2025    Dyslipidemia associated with type 2 diabetes mellitus  -     Lipid Panel; Future; Expected date: 05/12/2025    Controlled type 2 diabetes mellitus with microalbuminuria, without long-term current use of insulin  -     Hemoglobin A1C; Future; Expected date: 05/12/2025  -     blood-glucose meter kit; To check BG daily, to use with insurance preferred meter  Dispense: 1 each; Refill: 0  -     lancets Misc; To check BG daily, to use with insurance preferred meter  Dispense: 200 each; Refill: 11  -     blood sugar diagnostic Strp; To check BG daily, to use with insurance preferred meter  Dispense: 200 each; Refill: 11  -     Ambulatory referral/consult to [facility]; Future; Expected date: 05/19/2025    Stage 3b chronic kidney disease  -     Comprehensive Metabolic Panel; Future; Expected date: 05/12/2025  -     CBC Auto Differential; Future; Expected date: 05/12/2025    Idiopathic chronic gout without tophus, unspecified site  -     Uric Acid; Future; Expected date:  05/12/2025    See above.   Fasting labs to be scheduled.  Glucometer and supplies sent to local pharmacy. Advised to monitor the BG every other day. His daughter can help him with finger sticks.       Follow-up: Follow up in about 3 months (around 8/12/2025).  ______________________________________________________________________    Chief Complaint  Chief Complaint   Patient presents with    Health Maintenance       HPI  Destin Barber is a 89 y.o. male with medical diagnoses as listed in the medical history and problem list that presents to the office to follow up on his chronic conditions. Patient states that one of his nighttime medications was causing him to feel weak last night, so he did not take his antihypertensives this morning. Patient does not have a knowledge of his medications. He called his granddaughter, who was unable to provide assistance.     Health Maintenance         Date Due Completion Date    Shingles Vaccine (1 of 2) Never done ---    RSV Vaccine (Age 60+ and Pregnant patients) (1 - 1-dose 75+ series) Never done ---    COVID-19 Vaccine (7 - 2024-25 season) 09/01/2024 12/4/2021    Hemoglobin A1c 05/15/2025 11/15/2024    Diabetic Eye Exam 07/15/2025 7/15/2024    Diabetes Urine Screening 07/01/2025 7/1/2024    Influenza Vaccine (Season Ended) 09/01/2025 12/2/2023    Lipid Panel 11/08/2025 11/8/2024    TETANUS VACCINE 04/20/2028 4/20/2018              PAST MEDICAL HISTORY:  Past Medical History:   Diagnosis Date    Acute coronary syndrome 06/24/2016    s/p 3V CABG 7/2016    Anemia     Coronary artery disease     Diastolic dysfunction     Gout, chronic     Heart failure     HTN (hypertension)     Hyperlipidemia     Myocardial infarction     Pneumonia due to other staphylococcus     Pressure ulcer     Renal manifestation of secondary diabetes mellitus     Type 2 diabetes mellitus     diet controlled    Urge incontinence of urine 7/21/2023       PAST SURGICAL HISTORY:  Past Surgical History:  "  Procedure Laterality Date    BLADDER ASPIRATION N/A 11/25/2024    Procedure: Suprapubic tube placement, cystoscopy;  Surgeon: Raji Mcmullen MD;  Location: [facility] OR;  Service: Urology;  Laterality: N/A;  minimal anesthesia. Need the [facility]" Suprapubic tube kit available. Curved Cute Attack device,  PHONE PREOP 11/21/24---CBC, BMP IN AM    CATARACT EXTRACTION Bilateral     CATARACT EXTRACTION W/ ANTERIOR VITRECTOMY      CORONARY ARTERY BYPASS GRAFT  07/06/2016    PAIZ-LAD, SVG-Ramus, SVG-PDA    FLUOROSCOPIC URODYNAMIC STUDY N/A 4/25/2023    Procedure: URODYNAMIC STUDY, FLUOROSCOPIC;  Surgeon: Raji Mcmullen MD;  Location: [facility] OR;  Service: Urology;  Laterality: N/A;  RN PHONE PREOP WITH GRANDDAUGHTER ROGER 4/21/23    HEMORRHOID SURGERY      LASER ENUCLEATION OF PROSTATE N/A 7/6/2023    Procedure: ENUCLEATION, PROSTATE, USING LASER; cystolithalopaxy;  Surgeon: Raji Mcmullen MD;  Location: [facility] OR;  Service: Urology;  Laterality: N/A;  notify [facility] [phone] SPOKE TO NICK ON 6/2/2023 @ 10:37AM. CONFIRMATION NUMBER [id number] POLLACK [phone] EMAILED CHANNING ON 6/15/2023@ 3:07PM-LO  RN PREOP 5/30/2023   T/S ON 6/5/2023  RN PREOP 06/30/2023 --       SOCIAL HISTORY:  Social History[1]    FAMILY HISTORY:  Family History   Problem Relation Name Age of Onset    Hypertension Mother      Heart disease Mother      Cancer Father          colon    Heart disease Sister      No Known Problems Sister      No Known Problems Sister      No Known Problems Sister      No Known Problems Sister      Heart disease Brother      No Known Problems Brother      No Known Problems Brother      No Known Problems Brother      No Known Problems Brother      No Known Problems Brother      No Known Problems Daughter      No Known Problems Daughter      No Known Problems Son      Amblyopia Neg Hx      Blindness Neg Hx      Cataracts Neg Hx      Diabetes Neg Hx      Glaucoma Neg Hx      Macular " "degeneration Neg Hx      Retinal detachment Neg Hx      Strabismus Neg Hx      Stroke Neg Hx      Thyroid disease Neg Hx         ALLERGIES AND MEDICATIONS: updated and reviewed.  Review of patient's allergies indicates:   Allergen Reactions    Penicillins Nausea And Vomiting     Pt reports he is not allergic to penicillin       Current Medications[2]      ROS  Review of Systems   Neurological:  Positive for weakness.           Physical Exam  Vitals:    25 1329   BP: (!) 160/60   Patient Position: Sitting   Pulse: 90   Resp: 18   Temp: 98.3 °F (36.8 °C)   TempSrc: Oral   SpO2: 98%   Weight: 67.4 kg (148 lb 7.7 oz)   Height: 5' 4" (1.626 m)    Body mass index is 25.49 kg/m².  Weight: 67.4 kg (148 lb 7.7 oz)   Height: 5' 4" (162.6 cm)   Physical Exam  Constitutional:       General: He is not in acute distress.     Appearance: Normal appearance.   HENT:      Head: Normocephalic and atraumatic.   Cardiovascular:      Rate and Rhythm: Rhythm irregularly irregular.   Pulmonary:      Effort: Pulmonary effort is normal. No respiratory distress.      Breath sounds: Normal breath sounds.   Neurological:      Mental Status: He is alert. Mental status is at baseline.   Psychiatric:         Mood and Affect: Mood normal.         Behavior: Behavior normal.                  [1]   Social History  Socioeconomic History    Marital status:     Number of children: 3    Highest education level: 6th grade   Tobacco Use    Smoking status: Former     Types: Cigars     Start date:      Quit date: 2003     Years since quittin.3    Smokeless tobacco: Never    Tobacco comments:     Former smoker. Pt. Smoked 2 cigars daily.   Substance and Sexual Activity    Alcohol use: No    Drug use: No    Sexual activity: Not Currently     Partners: Female     Social Drivers of Health     Financial Resource Strain: Low Risk  (3/22/2023)    Overall Financial Resource Strain (CARDIA)     Difficulty of Paying Living Expenses: Not hard at " all   Food Insecurity: No Food Insecurity (3/22/2023)    Hunger Vital Sign     Worried About Running Out of Food in the Last Year: Never true     Ran Out of Food in the Last Year: Never true   Transportation Needs: No Transportation Needs (3/22/2023)    PRAPARE - Transportation     Lack of Transportation (Medical): No     Lack of Transportation (Non-Medical): No   Physical Activity: Sufficiently Active (3/22/2023)    Exercise Vital Sign     Days of Exercise per Week: 5 days     Minutes of Exercise per Session: 30 min   Stress: No Stress Concern Present (3/22/2023)    [employer] [place] [employer] - Occupational Stress Questionnaire     Feeling of Stress : Not at all   Housing Stability: Unknown (3/22/2023)    Housing Stability Vital Sign     Unable to Pay for Housing in the Last Year: No     Unstable Housing in the Last Year: No   [2]   Current Outpatient Medications   Medication Sig Dispense Refill    amLODIPine (NORVASC) 10 MG tablet Take 1 tablet (10 mg total) by mouth once daily. 90 tablet 3    blood sugar diagnostic Strp To check BG daily, to use with insurance preferred meter 200 each 11    blood-glucose meter kit To check BG daily, to use with insurance preferred meter 1 each 0    cloNIDine (CATAPRES) 0.1 MG tablet       HYDROcodone-acetaminophen (NORCO) 5-325 mg per tablet Take 1 tablet by mouth every 6 (six) hours as needed for Pain. 8 tablet 0    isosorbide mononitrate (IMDUR) 60 MG 24 hr tablet Take 1 tablet (60 mg total) by mouth once daily. 90 tablet 0    lancets Misc To check BG daily, to use with insurance preferred meter 200 each 11    LIDOcaine HCL 2% (XYLOCAINE) 2 % jelly Apply topically as needed (apply around glans penis as needed). 30 mL 0    lisinopriL (PRINIVIL,ZESTRIL) 40 MG tablet Take 1 tablet (40 mg total) by mouth once daily. 90 tablet 3    pravastatin (PRAVACHOL) 40 MG tablet Take 1 tablet (40 mg total) by mouth once daily. 90 tablet 3    somatropin (NORDITROPIN FLEXPRO) 10  mg/1.5 mL (6.7 mg/mL) PnIj Inject into the skin.      tamsulosin (FLOMAX) 0.4 mg Cap Take by mouth.      traZODone (DESYREL) 150 MG tablet Take 1 tablet (150 mg total) by mouth every evening. 30 tablet 2    blood sugar diagnostic Strp To check BG daily, to use with insurance preferred meter 200 each 11    blood-glucose meter kit To check BG daily, to use with insurance preferred meter 1 each 0    lancets Misc To check BG daily, to use with insurance preferred meter 200 each 11     No current facility-administered medications for this visit.     Facility-Administered Medications Ordered in Other Visits   Medication Dose Route Frequency Provider Last Rate Last Admin    LIDOcaine (PF) 10 mg/ml (1%) injection 10 mg  1 mL Intradermal Once Julia Fong MD

## 2025-05-12 NOTE — PATIENT INSTRUCTIONS
Please call us with an updated list of your medications.     A glucose meter and supplies were sent to your pharmacy. Check the blood sugar every other day to ensure you are at goal, which is .     We will get you scheduled for fasting blood work sometime this week.

## 2025-05-14 ENCOUNTER — TELEPHONE (OUTPATIENT)
Dept: HOME HEALTH SERVICES | Facility: CLINIC | Age: 89
End: 2025-05-14
Payer: MEDICARE

## 2025-05-14 NOTE — TELEPHONE ENCOUNTER
Contacted pt spouse Valentino to schedule an appointment regarding a referral placed for a medical home visit with a nurse practitioner.    Patient has been scheduled

## 2025-05-15 ENCOUNTER — PATIENT OUTREACH (OUTPATIENT)
Dept: ADMINISTRATIVE | Facility: HOSPITAL | Age: 89
End: 2025-05-15
Payer: MEDICARE

## 2025-05-16 ENCOUNTER — LAB VISIT (OUTPATIENT)
Dept: LAB | Facility: HOSPITAL | Age: 89
End: 2025-05-16
Attending: FAMILY MEDICINE
Payer: MEDICARE

## 2025-05-16 DIAGNOSIS — I10 ESSENTIAL HYPERTENSION: ICD-10-CM

## 2025-05-16 DIAGNOSIS — M1A.00X0 IDIOPATHIC CHRONIC GOUT WITHOUT TOPHUS, UNSPECIFIED SITE: ICD-10-CM

## 2025-05-16 DIAGNOSIS — I25.810 CORONARY ARTERY DISEASE INVOLVING CORONARY BYPASS GRAFT OF NATIVE HEART WITHOUT ANGINA PECTORIS: ICD-10-CM

## 2025-05-16 DIAGNOSIS — E11.29 CONTROLLED TYPE 2 DIABETES MELLITUS WITH MICROALBUMINURIA, WITHOUT LONG-TERM CURRENT USE OF INSULIN: ICD-10-CM

## 2025-05-16 DIAGNOSIS — E78.5 DYSLIPIDEMIA ASSOCIATED WITH TYPE 2 DIABETES MELLITUS: ICD-10-CM

## 2025-05-16 DIAGNOSIS — N18.32 STAGE 3B CHRONIC KIDNEY DISEASE: ICD-10-CM

## 2025-05-16 DIAGNOSIS — E11.69 DYSLIPIDEMIA ASSOCIATED WITH TYPE 2 DIABETES MELLITUS: ICD-10-CM

## 2025-05-16 DIAGNOSIS — I70.0 AORTIC ATHEROSCLEROSIS: ICD-10-CM

## 2025-05-16 DIAGNOSIS — R80.9 CONTROLLED TYPE 2 DIABETES MELLITUS WITH MICROALBUMINURIA, WITHOUT LONG-TERM CURRENT USE OF INSULIN: ICD-10-CM

## 2025-05-16 LAB
ABSOLUTE EOSINOPHIL (OHS): 0.21 K/UL
ABSOLUTE MONOCYTE (OHS): 0.54 K/UL (ref 0.3–1)
ABSOLUTE NEUTROPHIL COUNT (OHS): 2.36 K/UL (ref 1.8–7.7)
ALBUMIN SERPL BCP-MCNC: 3.4 G/DL (ref 3.5–5.2)
ALP SERPL-CCNC: 79 UNIT/L (ref 40–150)
ALT SERPL W/O P-5'-P-CCNC: 8 UNIT/L (ref 10–44)
ANION GAP (OHS): 8 MMOL/L (ref 8–16)
AST SERPL-CCNC: 17 UNIT/L (ref 11–45)
BASOPHILS # BLD AUTO: 0.03 K/UL
BASOPHILS NFR BLD AUTO: 0.5 %
BILIRUB SERPL-MCNC: 0.3 MG/DL (ref 0.1–1)
BUN SERPL-MCNC: 21 MG/DL (ref 8–23)
CALCIUM SERPL-MCNC: 8.8 MG/DL (ref 8.7–10.5)
CHLORIDE SERPL-SCNC: 110 MMOL/L (ref 95–110)
CHOLEST SERPL-MCNC: 125 MG/DL (ref 120–199)
CHOLEST/HDLC SERPL: 3.7 {RATIO} (ref 2–5)
CO2 SERPL-SCNC: 22 MMOL/L (ref 23–29)
CREAT SERPL-MCNC: 1.3 MG/DL (ref 0.5–1.4)
EAG (OHS): 134 MG/DL (ref 68–131)
ERYTHROCYTE [DISTWIDTH] IN BLOOD BY AUTOMATED COUNT: 13.2 % (ref 11.5–14.5)
GFR SERPLBLD CREATININE-BSD FMLA CKD-EPI: 53 ML/MIN/1.73/M2
GLUCOSE SERPL-MCNC: 97 MG/DL (ref 70–110)
HBA1C MFR BLD: 6.3 % (ref 4–5.6)
HCT VFR BLD AUTO: 30.5 % (ref 40–54)
HDLC SERPL-MCNC: 34 MG/DL (ref 40–75)
HDLC SERPL: 27.2 % (ref 20–50)
HGB BLD-MCNC: 10.5 GM/DL (ref 14–18)
IMM GRANULOCYTES # BLD AUTO: 0.02 K/UL (ref 0–0.04)
IMM GRANULOCYTES NFR BLD AUTO: 0.3 % (ref 0–0.5)
LDLC SERPL CALC-MCNC: 72.8 MG/DL (ref 63–159)
LYMPHOCYTES # BLD AUTO: 2.72 K/UL (ref 1–4.8)
MCH RBC QN AUTO: 31.7 PG (ref 27–31)
MCHC RBC AUTO-ENTMCNC: 34.4 G/DL (ref 32–36)
MCV RBC AUTO: 92 FL (ref 82–98)
NONHDLC SERPL-MCNC: 91 MG/DL
NUCLEATED RBC (/100WBC) (OHS): 0 /100 WBC
PLATELET # BLD AUTO: 194 K/UL (ref 150–450)
PMV BLD AUTO: 9.9 FL (ref 9.2–12.9)
POTASSIUM SERPL-SCNC: 4 MMOL/L (ref 3.5–5.1)
PROT SERPL-MCNC: 6.7 GM/DL (ref 6–8.4)
RBC # BLD AUTO: 3.31 M/UL (ref 4.6–6.2)
RELATIVE EOSINOPHIL (OHS): 3.6 %
RELATIVE LYMPHOCYTE (OHS): 46.3 % (ref 18–48)
RELATIVE MONOCYTE (OHS): 9.2 % (ref 4–15)
RELATIVE NEUTROPHIL (OHS): 40.1 % (ref 38–73)
SODIUM SERPL-SCNC: 140 MMOL/L (ref 136–145)
TRIGL SERPL-MCNC: 91 MG/DL (ref 30–150)
URATE SERPL-MCNC: 6.7 MG/DL (ref 3.4–7)
WBC # BLD AUTO: 5.88 K/UL (ref 3.9–12.7)

## 2025-05-16 PROCEDURE — 83036 HEMOGLOBIN GLYCOSYLATED A1C: CPT

## 2025-05-16 PROCEDURE — 85025 COMPLETE CBC W/AUTO DIFF WBC: CPT

## 2025-05-16 PROCEDURE — 84550 ASSAY OF BLOOD/URIC ACID: CPT

## 2025-05-16 PROCEDURE — 80053 COMPREHEN METABOLIC PANEL: CPT

## 2025-05-16 PROCEDURE — 80061 LIPID PANEL: CPT

## 2025-05-16 PROCEDURE — 36415 COLL VENOUS BLD VENIPUNCTURE: CPT | Mod: PO

## 2025-05-19 ENCOUNTER — RESULTS FOLLOW-UP (OUTPATIENT)
Dept: FAMILY MEDICINE | Facility: CLINIC | Age: 89
End: 2025-05-19

## 2025-05-19 ENCOUNTER — TELEPHONE (OUTPATIENT)
Dept: FAMILY MEDICINE | Facility: CLINIC | Age: 89
End: 2025-05-19
Payer: MEDICARE

## 2025-05-19 NOTE — TELEPHONE ENCOUNTER
Spoke with patient. Patient verbalized understanding. Patient has no other questions or concerns at this time.    Stable.

## 2025-05-19 NOTE — PROGRESS NOTES
Please contact the patient and let him know that his labs are stable and do not require any change in management.

## 2025-05-19 NOTE — TELEPHONE ENCOUNTER
----- Message from Adela Diaz DO sent at 5/19/2025  7:28 AM CDT -----  Please contact the patient and let him know that his labs are stable and do not require any change in management.   ----- Message -----  From: Lab, Background User  Sent: 5/16/2025   5:32 PM CDT  To: Adela Diaz DO

## 2025-05-23 ENCOUNTER — OFFICE VISIT (OUTPATIENT)
Dept: UROLOGY | Facility: CLINIC | Age: 89
End: 2025-05-23
Payer: MEDICARE

## 2025-05-23 VITALS — WEIGHT: 142.88 LBS | BODY MASS INDEX: 24.52 KG/M2

## 2025-05-23 DIAGNOSIS — N31.9 NEUROMUSCULAR DYSFUNCTION OF BLADDER: ICD-10-CM

## 2025-05-23 DIAGNOSIS — C61 PROSTATE CANCER: ICD-10-CM

## 2025-05-23 DIAGNOSIS — R33.9 URINARY RETENTION: Primary | ICD-10-CM

## 2025-05-23 PROCEDURE — 99213 OFFICE O/P EST LOW 20 MIN: CPT | Mod: PBBFAC | Performed by: STUDENT IN AN ORGANIZED HEALTH CARE EDUCATION/TRAINING PROGRAM

## 2025-05-23 PROCEDURE — 99999 PR PBB SHADOW E&M-EST. PATIENT-LVL III: CPT | Mod: PBBFAC,,, | Performed by: STUDENT IN AN ORGANIZED HEALTH CARE EDUCATION/TRAINING PROGRAM

## 2025-06-06 ENCOUNTER — TELEPHONE (OUTPATIENT)
Dept: FAMILY MEDICINE | Facility: CLINIC | Age: 89
End: 2025-06-06
Payer: MEDICARE

## 2025-06-06 NOTE — TELEPHONE ENCOUNTER
----- Message from Caryn sent at 6/6/2025  9:27 AM CDT -----  Please call pt back at [phone] he needs a refill on his Metformin ER 500mg 24 hr tabs (take 1 tab by mouth daily with breakfast) He is Completely out of this medication . [facility] [phone]

## 2025-06-10 ENCOUNTER — CARE AT HOME (OUTPATIENT)
Dept: HOME HEALTH SERVICES | Facility: CLINIC | Age: 89
End: 2025-06-10
Payer: MEDICARE

## 2025-06-10 ENCOUNTER — TELEPHONE (OUTPATIENT)
Dept: HOME HEALTH SERVICES | Facility: CLINIC | Age: 89
End: 2025-06-10

## 2025-06-10 DIAGNOSIS — R35.1 BPH ASSOCIATED WITH NOCTURIA: ICD-10-CM

## 2025-06-10 DIAGNOSIS — I25.810 CORONARY ARTERY DISEASE INVOLVING CORONARY BYPASS GRAFT OF NATIVE HEART WITHOUT ANGINA PECTORIS: Primary | ICD-10-CM

## 2025-06-10 DIAGNOSIS — E11.22 CONTROLLED TYPE 2 DIABETES MELLITUS WITH STAGE 3 CHRONIC KIDNEY DISEASE, WITHOUT LONG-TERM CURRENT USE OF INSULIN: ICD-10-CM

## 2025-06-10 DIAGNOSIS — N18.30 CONTROLLED TYPE 2 DIABETES MELLITUS WITH STAGE 3 CHRONIC KIDNEY DISEASE, WITHOUT LONG-TERM CURRENT USE OF INSULIN: ICD-10-CM

## 2025-06-10 DIAGNOSIS — N40.1 BPH ASSOCIATED WITH NOCTURIA: ICD-10-CM

## 2025-06-10 DIAGNOSIS — I51.89 DIASTOLIC DYSFUNCTION: ICD-10-CM

## 2025-06-10 DIAGNOSIS — E11.29 CONTROLLED TYPE 2 DIABETES MELLITUS WITH MICROALBUMINURIA, WITHOUT LONG-TERM CURRENT USE OF INSULIN: ICD-10-CM

## 2025-06-10 DIAGNOSIS — R80.9 CONTROLLED TYPE 2 DIABETES MELLITUS WITH MICROALBUMINURIA, WITHOUT LONG-TERM CURRENT USE OF INSULIN: ICD-10-CM

## 2025-06-10 DIAGNOSIS — I10 ESSENTIAL HYPERTENSION: ICD-10-CM

## 2025-06-10 PROCEDURE — 99350 HOME/RES VST EST HIGH MDM 60: CPT | Mod: S$GLB,,, | Performed by: NURSE PRACTITIONER

## 2025-06-10 NOTE — TELEPHONE ENCOUNTER
Attempted to contact patient spouse to confirm home visit with [facility]. The provider was looking to arrive between 9:00a-10:30a but only if confirmed.     Left voice message for call back

## 2025-06-11 VITALS
HEART RATE: 108 BPM | RESPIRATION RATE: 16 BRPM | DIASTOLIC BLOOD PRESSURE: 78 MMHG | OXYGEN SATURATION: 97 % | SYSTOLIC BLOOD PRESSURE: 128 MMHG | TEMPERATURE: 99 F

## 2025-06-11 RX ORDER — METFORMIN HYDROCHLORIDE 500 MG/1
500 TABLET, EXTENDED RELEASE ORAL
Qty: 90 TABLET | Refills: 3 | Status: SHIPPED | OUTPATIENT
Start: 2025-06-11 | End: 2026-06-11

## 2025-06-11 RX ORDER — AMLODIPINE BESYLATE 10 MG/1
10 TABLET ORAL DAILY
Qty: 90 TABLET | Refills: 3 | Status: SHIPPED | OUTPATIENT
Start: 2025-06-11

## 2025-06-11 NOTE — ASSESSMENT & PLAN NOTE
BP at goal today  Only Lisinopril noted in home.  Granddaughter reports he needs to refill Norvasc-does take it  Continue current regimen  Will refill Norvasc  Monitor

## 2025-06-11 NOTE — ASSESSMENT & PLAN NOTE
Lab Results   Component Value Date    HGBA1C 6.3 (H) 05/16/2025    Most recent A1c at goal  Metformin in pill box  ADA diet  Continue current plan

## 2025-06-11 NOTE — PROGRESS NOTES
[facility]  Medical Chronic Care Home Visit    Encounter Provider: Araceli Bowser   PCP: Adela Diaz, DO  Consult Requested By: Dr. Adela Diaz    HISTORY OF PRESENT ILLNESS      Patient ID: Destin Barber is a 89 y.o. male is being seen in the home due to physical debility that presents a taxing effort to leave the home, to mitigate high risk of hospital readmission and/or due to the limited availability of reliable or safe options for transportation to the point of access to the provider. Prior to treatment on this visit the chart was reviewed and patient verbal consent was obtained.    Chronic medical conditions synopsis:    Pt is being seen in his home today. AAOx3. NAD   Reports he is ok today. He denies any problems today  Reports he takes his medication from a pill planner his granddaughter fills. He was referred by his PCP for eval. He is a poor historian and unsure what meds are in his planner. Pill identifier used to ID. He has metformin in planner-not on list. Reports he has been a diabetic for awhile      DECISION MAKING TODAY       Assessment & Plan:  1. Coronary artery disease involving coronary bypass graft of native heart without angina pectoris  Assessment & Plan:  Imdur in place  HR elevated 108-118 during visit  Denies CP, palpitations or SOB  No recent cardiology visit-last 6/24  Will message cardiology office for assistance with appt      2. Essential hypertension  Assessment & Plan:  BP at goal today  Only Lisinopril noted in home.  Granddaughter reports he needs to refill Norvasc-does take it  Continue current regimen  Will refill Norvasc  Monitor    Orders:  -     Ambulatory referral/consult to [facility] - [facility]  -     amLODIPine (NORVASC) 10 MG tablet; Take 1 tablet (10 mg total) by mouth once daily.  Dispense: 90 tablet; Refill: 3    3. Controlled type 2 diabetes mellitus with microalbuminuria, without long-term current use of insulin  -     Ambulatory  referral/consult to [facility] - [facility]  -     metFORMIN (GLUCOPHAGE-XR) 500 MG ER 24hr tablet; Take 1 tablet (500 mg total) by mouth daily with breakfast.  Dispense: 90 tablet; Refill: 3    4. BPH associated with nocturia  Assessment & Plan:  Chronic  Flomax in place  Followed by urology      5. Diastolic dysfunction  Overview:  Results for orders placed during the hospital encounter of 11/14/24    Echo    Interpretation Summary    Left Ventricle: The left ventricle is normal in size. Ventricular mass is normal. Mildly increased wall thickness. Mild septal thickening. There is concentric remodeling. There is normal systolic function with a visually estimated ejection fraction of 60 - 65%. Ejection fraction is approximately 65%. There is indeterminate diastolic function.    Right Ventricle: Normal right ventricular cavity size. Wall thickness is normal. Systolic function is normal.    Left Atrium: Left atrium is severely dilated.    Aortic Valve: There is moderate aortic valve sclerosis.    Mitral Valve: There is mild bileaflet sclerosis.    Tricuspid Valve: There is mild regurgitation.    Pulmonary Artery: The estimated pulmonary artery systolic pressure is 26 mmHg.    IVC/SVC: Normal venous pressure at 3 mmHg.      Assessment & Plan:  Euvolemic today  Monitor      6. Controlled type 2 diabetes mellitus with stage 3 chronic kidney disease, without long-term current use of insulin  Assessment & Plan:  Lab Results   Component Value Date    HGBA1C 6.3 (H) 05/16/2025    Most recent A1c at goal  Metformin in pill box  ADA diet  Continue current plan         Called his granddaughter and reviewed meds.  She reports Norvasc refill needed. She will check his bottles and call the office back to notify for any other refills needed    Cardiology assisted with scheduling appt      ENVIRONMENT OF CARE      Family and/or Caregiver present at visit?  No  Name of Caregiver:   History provided by: patient    4Ms for  Medical Decision-Making in Older Adults    Last Completed EAWV: 2022    MEDICATIONS:  High Risk Medications:  Total Active Medications: 0  This patient does not have an active medication from one of the medication groupers.    MOBILITY:  Activities of Daily Livin/2/2022     8:44 AM   Activities of Daily Living   Ambulation Assistance Required   Ambulation Assistance Cane   Dressing Independent   Transfers Independent   Toileting Continent of bladder;Continent of bowel   Feeding Independent   Cleaning home/Chores Independent   Telephone use Independent   Shopping Independent   Paying bills Independent   Taking meds Independent     Fall Risk:      2025     1:00 PM 2025     1:40 PM 2025    11:15 AM   Fall Risk Assessment - Outpatient   Mobility Status Ambulatory Ambulatory Ambulatory   Number of falls 0 0 0   Identified as fall risk False False False     Disability Status:      2024    11:27 AM   Disability Status   Are you deaf or do you have serious difficulty hearing? N   Are you blind or do you have serious difficulty seeing, even when wearing glasses? N   Because of a physical, mental, or emotional condition, do you have serious difficulty concentrating, remembering, or making decisions? N   Do you have serious difficulty walking or climbing stairs? N   Do you have difficulty dressing or bathing? N   Because of a physical, mental, or emotional condition, do you have difficulty doing errands alone such as visiting a doctor's office or shopping? N     Nutrition Screenin/2/2022     8:43 AM   Nutrition Screening   Has food intake declined over the past three months due to loss of appetite, digestive problems, chewing or swallowing difficulties? No decrease in food intake   Involuntary weight loss during the last 3 months? No weight loss   Mobility? Goes out   Has the patient suffered psychological stress or acute disease in the past three months? No   Neuropsychological  problems? No psychological problems   Body Mass Index (BMI)?  BMI 23 or greater   Screening Score 14    Screening Score: 0-7 Malnourished, 8-11 At Risk, 12-14 Normal  Get Up and Go:      2022     8:40 AM 2020    10:17 AM   Get Up and Go   Trial 1 -- 10 seconds   Trial 2  10 seconds   Trial 3  10 seconds   Average  10     Whisper Test:      2022     8:41 AM   Whisper Test   Whisper Test Abnormal           MENTATION:   Has Dementia Dx: No  Has Anxiety Dx: No    Depression Patient Health Questionnaire:      2025     8:38 AM   Depression Patient Health Questionnaire   Over the last two weeks how often have you been bothered by little interest or pleasure in doing things Not at all   Over the last two weeks how often have you been bothered by feeling down, depressed or hopeless Not at all   PHQ-2 Total Score 0     Cognitive Function Screenin/2/2022     8:43 AM   Cognitive Function Screening   Clock Drawing Test 1    Mini-Cog 3 Minute Recall 0   Cognitive Function Screening 1       Data saved with a previous flowsheet row definition     Cognitive Function Screening Total - Less than 4 = Abnormal,  Greater than or equal to 4 = Normal        WHAT MATTERS MOST:  Advance Care Planning   ACP Status:   Patient has had an ACP conversation  Living Will: Yes  Power of : Yes  [place]: No            Is patient hospice appropriate: No  (If needed, use PPS <30 or FAST score >7)  Was referral to hospice placed: No    Impression upon entering the home:  Physical Dwelling: single family home   Appearance of home environment: cleaniness: clean  Functional Status: independent  Mobility: ambulatory  Nutritional access: adequate intake and access  Home Health: No, and does not need it at this time   DME/Supplies: none     Diagnostic tests reviewed/disposition: No diagnosic tests pending after this hospitalization.  Disease/illness education: CAD  Establishment or re-establishment of referral orders for  community resources: No other necessary community resources.   Discussion with other health care providers: PCP messaging.   Does patient have a PCP at [facility]? Yes   Repatriation plan with PCP? follow-up with PCP within 90d   Does patient have an ostomy (ileostomy, colostomy, suprapubic catheter, nephrostomy tube, tracheostomy, PEG tube, pleurex catheter, cholecystostomy, etc)? No  Were BPAs reviewed? Yes    Social History     Socioeconomic History    Marital status:     Number of children: 3    Highest education level: 6th grade   Tobacco Use    Smoking status: Former     Types: Cigars     Start date:      Quit date:      Years since quittin.4    Smokeless tobacco: Never    Tobacco comments:     Former smoker. Pt. Smoked 2 cigars daily.   Substance and Sexual Activity    Alcohol use: No    Drug use: No    Sexual activity: Not Currently     Partners: Female     Social Drivers of Health     Financial Resource Strain: Low Risk  (3/22/2023)    Overall Financial Resource Strain (CARDIA)     Difficulty of Paying Living Expenses: Not hard at all   Food Insecurity: No Food Insecurity (3/22/2023)    Hunger Vital Sign     Worried About Running Out of Food in the Last Year: Never true     Ran Out of Food in the Last Year: Never true   Transportation Needs: No Transportation Needs (3/22/2023)    PRAPARE - Transportation     Lack of Transportation (Medical): No     Lack of Transportation (Non-Medical): No   Physical Activity: Sufficiently Active (3/22/2023)    Exercise Vital Sign     Days of Exercise per Week: 5 days     Minutes of Exercise per Session: 30 min   Stress: No Stress Concern Present (3/22/2023)    [employer] - Occupational Stress Questionnaire     Feeling of Stress : Not at all   Housing Stability: Unknown (3/22/2023)    Housing Stability Vital Sign     Unable to Pay for Housing in the Last Year: No     Unstable Housing in the Last Year: No         OBJECTIVE:     Vital  Signs:  Vitals:    06/10/25 0949   BP: 128/78   Pulse: 108   Resp: 16   Temp: 98.5 °F (36.9 °C)       Review of Systems   Constitutional:  Negative for activity change, fatigue and fever.   HENT: Negative.     Eyes: Negative.    Respiratory:  Negative for chest tightness.    Cardiovascular: Negative.  Negative for leg swelling.   Gastrointestinal: Negative.    Genitourinary: Negative.    Musculoskeletal: Negative.    Skin: Negative.    Neurological: Negative.    Hematological: Negative.    Psychiatric/Behavioral: Negative.  Negative for agitation.    All other systems reviewed and are negative.      Physical Exam:  Physical Exam  Vitals reviewed.   Constitutional:       General: He is not in acute distress.     Appearance: He is well-developed.   HENT:      Head: Normocephalic and atraumatic.      Nose: Nose normal.      Mouth/Throat:      Mouth: Mucous membranes are dry.   Eyes:      Pupils: Pupils are equal, round, and reactive to light.   Cardiovascular:      Rate and Rhythm: Tachycardia present. Rhythm irregular.      Heart sounds: Normal heart sounds.   Pulmonary:      Effort: Pulmonary effort is normal.      Breath sounds: Normal breath sounds.   Abdominal:      General: Bowel sounds are normal.      Palpations: Abdomen is soft.   Musculoskeletal:         General: Normal range of motion.      Cervical back: Normal range of motion and neck supple.   Skin:     General: Skin is warm and dry.   Neurological:      Mental Status: He is alert. Mental status is at baseline.      Cranial Nerves: No cranial nerve deficit.   Psychiatric:         Behavior: Behavior normal.       INSTRUCTIONS FOR PATIENT:     Scheduled Follow-up, Appts Reviewed with Modifications if Needed: Yes  Future Appointments   Date Time Provider Department Center   8/14/2025  9:00 AM Adela Diaz DO [facility]   8/27/2025  9:00 AM Raji Mcmullen MD [facility]       Current Medications[1]    Medication  Reconciliation:  Were medications changed during this appointment? No  Were medications in the home? No bottles, pill organizer present  Is the patient taking the medications as directed? No  Does the patient/caregiver understand the medications and changes? Yes  Does updated med list accurately reflects meds patient is currently taking? Yes    Signature: Araceli Bowser NP         [1]   Current Outpatient Medications:     isosorbide mononitrate (IMDUR) 60 MG 24 hr tablet, Take 1 tablet (60 mg total) by mouth once daily., Disp: 90 tablet, Rfl: 0    lisinopriL (PRINIVIL,ZESTRIL) 40 MG tablet, Take 1 tablet (40 mg total) by mouth once daily., Disp: 90 tablet, Rfl: 3    pravastatin (PRAVACHOL) 40 MG tablet, Take 1 tablet (40 mg total) by mouth once daily., Disp: 90 tablet, Rfl: 3    tamsulosin (FLOMAX) 0.4 mg Cap, Take by mouth., Disp: , Rfl:     amLODIPine (NORVASC) 10 MG tablet, Take 1 tablet (10 mg total) by mouth once daily., Disp: 90 tablet, Rfl: 3    blood sugar diagnostic Strp, To check BG daily, to use with insurance preferred meter, Disp: 200 each, Rfl: 11    blood sugar diagnostic Strp, To check BG daily, to use with insurance preferred meter, Disp: 200 each, Rfl: 11    blood-glucose meter kit, To check BG daily, to use with insurance preferred meter, Disp: 1 each, Rfl: 0    blood-glucose meter kit, To check BG daily, to use with insurance preferred meter, Disp: 1 each, Rfl: 0    lancets Misc, To check BG daily, to use with insurance preferred meter, Disp: 200 each, Rfl: 11    lancets Misc, To check BG daily, to use with insurance preferred meter, Disp: 200 each, Rfl: 11    LIDOcaine HCL 2% (XYLOCAINE) 2 % jelly, Apply topically as needed (apply around glans penis as needed)., Disp: 30 mL, Rfl: 0    metFORMIN (GLUCOPHAGE-XR) 500 MG ER 24hr tablet, Take 1 tablet (500 mg total) by mouth daily with breakfast., Disp: 90 tablet, Rfl: 3    somatropin (NORDITROPIN FLEXPRO) 10 mg/1.5 mL (6.7 mg/mL) PnIj, Inject  into the skin., Disp: , Rfl:     traZODone (DESYREL) 150 MG tablet, Take 1 tablet (150 mg total) by mouth every evening., Disp: 30 tablet, Rfl: 2  No current facility-administered medications for this visit.    Facility-Administered Medications Ordered in Other Visits:     LIDOcaine (PF) 10 mg/ml (1%) injection 10 mg, 1 mL, Intradermal, Once, Julia Fong MD

## 2025-06-11 NOTE — ASSESSMENT & PLAN NOTE
Imdur in place  HR elevated 108-118 during visit  Denies CP, palpitations or SOB  No recent cardiology visit-last 6/24  Will message cardiology office for assistance with appt

## 2025-06-20 ENCOUNTER — OFFICE VISIT (OUTPATIENT)
Dept: CARDIOLOGY | Facility: CLINIC | Age: 89
End: 2025-06-20
Payer: MEDICARE

## 2025-06-20 VITALS
DIASTOLIC BLOOD PRESSURE: 76 MMHG | BODY MASS INDEX: 24.39 KG/M2 | HEIGHT: 64 IN | HEART RATE: 90 BPM | WEIGHT: 142.88 LBS | SYSTOLIC BLOOD PRESSURE: 154 MMHG

## 2025-06-20 DIAGNOSIS — Z87.898 HISTORY OF SYNCOPE: ICD-10-CM

## 2025-06-20 DIAGNOSIS — I51.89 DIASTOLIC DYSFUNCTION: ICD-10-CM

## 2025-06-20 DIAGNOSIS — I10 ESSENTIAL HYPERTENSION: ICD-10-CM

## 2025-06-20 DIAGNOSIS — E78.00 PURE HYPERCHOLESTEROLEMIA: ICD-10-CM

## 2025-06-20 DIAGNOSIS — Z95.1 S/P CABG (CORONARY ARTERY BYPASS GRAFT): Primary | ICD-10-CM

## 2025-06-20 PROCEDURE — 99213 OFFICE O/P EST LOW 20 MIN: CPT | Mod: PBBFAC,PO | Performed by: INTERNAL MEDICINE

## 2025-06-20 PROCEDURE — 99999 PR PBB SHADOW E&M-EST. PATIENT-LVL III: CPT | Mod: PBBFAC,,, | Performed by: INTERNAL MEDICINE

## 2025-06-20 NOTE — PROGRESS NOTES
Subjective   Patient ID:  Destin Barber is a 89 y.o. male who presents for follow-up of No chief complaint on file.      HPI      # CAD/ACS s/p 3V CABG 7/2016.  Appears his LCx/OM was not bypassed due to poor available vein conduit.  No LCx ischemia noted on MPI.  # HTN, uncontrolled in the setting of med noncompliance  # HLP  # DM  # abnl EKG        CAD/ACS s/p CABG 7/7/16: LIMA-LAD, SVG-Ramus, SVG-PDA (Dr. Hallman).  LCx/OM branch not bypassed due to poor quality venous conduit available     Stress test 9/22/22    Abnormal myocardial perfusion scan.    There are two significant perfusion abnormalities.    Perfusion Abnormality #1 - There is a moderate to severe intensity, moderate sized, mostly fixed perfusion abnormality with some reversibilty in the basal to mid inferior wall(s).    Perfusion Abnormality #2 - There is a small to moderate sized, mild to moderate intensity, reversible perfusion abnormality that is consistent with ischemia in the lateral wall(s).    There are no other significant perfusion abnormalities.    The gated perfusion images showed an ejection fraction of 79% at rest.    There is normal wall motion at rest and post stress.    The EKG portion of this study is negative for ischemia.    The patient reported no chest pain during the stress test.    There were no arrhythmias during stress.     Echo 11/15/24    Left Ventricle: The left ventricle is normal in size. Ventricular mass is normal. Mildly increased wall thickness. Mild septal thickening. There is concentric remodeling. There is normal systolic function with a visually estimated ejection fraction of 60 - 65%. Ejection fraction is approximately 65%. There is indeterminate diastolic function.    Right Ventricle: Normal right ventricular cavity size. Wall thickness is normal. Systolic function is normal.    Left Atrium: Left atrium is severely dilated.    Aortic Valve: There is moderate aortic valve sclerosis.    Mitral Valve: There is  "mild bileaflet sclerosis.    Tricuspid Valve: There is mild regurgitation.    Pulmonary Artery: The estimated pulmonary artery systolic pressure is 26 mmHg.    IVC/SVC: Normal venous pressure at 3 mmHg.     Cath 6/24/16  LVEDP: 7mmHg  LVEF: 55%  Wall Motion: normal  Dominance: Right  LM: normal  LAD: ost/prox eccentric 70-80%, prox 50%, excellent mid LAD target.  Ramus: prox 50-70%, mid 90%, bifurcating distal vessel (both excellent targets)  LCx: prox 95% at bifurcation of OM1.  OM1 ost/prox 80%, bifurcating vessel, excellent target.  RCA: dominant, anterior takeoff with "blandon's crook" prox vessel.Prox 70%, mid 70%. Excellent PDA target.  Hemostasis:  R Radial band  Impression:  NSTEMI  4V CAD, normal LV fxn  R radial vasband for hemostasis     Carotid US 7/6/16  RIGHT SIDE:   1 - 39 % right ICA stenosis.   Heterogeneous plaque in the right internal carotid artery.   Antegrade flow in the right vertebral artery.   LEFT SIDE:  1 - 39% left ICA stenosis.   Homogeneous plaque in the left internal carotid artery.   Antegrade flow in the left vertebral artery.      10/17/17 Overall doing well. Denies CP or SOB  EKG NSR TWI V1 and V2 - similar to prior EKG  Suffering with an URI     EKG 3/30/22 - done in ER - not in EPIC     4/29/22 Denies CP or SOB  Had a recent brief syncopal spell 5 days ago - details unclear- poor historian  BP elevated  Echo and holter for recent syncope  Not interested in repeat stress test  Continue Rx for CAD, HTN, HLD, DD     7/14/22 Reports JULIO and pain across lower rib cage with radiation to left shoulder for the last week  EKG sinus tachycardia 102 RBBB/LAD  BP elevated  Denies further syncope  Poor historian   Add imdur for CP  Echo, lexiscan myoview and holter for CP, JULIO, recent syncope  Needs to go to the ER for worsening CP  Continue Rx for CAD, HTN, HLD, DD     9/16/22 Testing not done. BP poorly controlled  Having CP that radiates centrally from neck down to his abdomen  Some JULIO   "  Increase lisinopril 40 qd and imdur 60 qd  Echo, lexiscan myoview and holter for CP, JULIO, recent syncope  Needs to go to the ER for worsening CP  Continue Rx for CAD, HTN, HLD, DD     10/18/22 Only gets CP is he gets upset. Denies SOB  BP up some - better controlled by home readings  Discussed abnormal stress test if fixed inferior defect and lateral ischemia - this is consistent with known un-revascularized Cx territory. Discussed repeat [facility] - he prefers to treat CAD medically    Continue Rx for CAD, HTN, HLD, DD  OV 3 months     6/1/23 Needs clearance for prostate surgery  Reports mild stable exertional angina and JULIO  BP controlled by outside readings  Cleared for prostate surgery at moderate cardiac risk. Ok to hold ASA 5 days before  Continue to request medical Rx for CAD    Continue Rx for CAD, HTN, HLD, DD  OV 3 months     1/4/24 BP has been running high. Mild stable JULIO and angina  EKG NSR RBBB/LAFB  Add hydralazine 50 bid for HTN  OV 3 months with BP check  Continue to request medical Rx for CAD    Continue Rx for CAD, HTN, HLD, DD     6/21/24 Reports right shoulder pain with radiation to right arm. Denies central CP or SOB  BP mostly controlled by outsider eadings  EKG NSR RBBB/LAD  Suspect right shoulder and arm pain are musculoskeletal - trial with Mobic - further management per PCP  Continue to request medical Rx for CAD    Continue Rx for CAD, HTN, HLD, DD  OV 6 months     Admitted [facility] 11/15/24  Patient admitted for hypertensive urgency with mild trop elevation that downtrending to normal limits. TTE unremarkable. Unclear if compliant with home medication. Added amlodipine. BP improved to 140's after morning medications. Patient without chest pain, SOB, or JULIO. Has intermittent pain with seo that he believes to be due to the seo bag putting pressure on his penis. Seo and bag exchanged and educated on how to properly fit seo bag to leg. Antibiotics held given lack of infectious symptoms and  penis pain resolved once seo properly adjusted. BP remained in 140's systolic. Patient seen and evaluated on day of discharge. Pt deemed appropriate for discharge. Plan discussed with pt, who was agreeable and amenable; medications were discussed and reviewed, outpatient follow-up scheduled, ER precautions were given, all questions were answered to the pt's satisfaction, and patient was subsequently discharged.     6/20/25 Denies CP or SOB. Still works at his [employer] business  BP controlled by outside readings       Review of Systems   Constitutional: Negative for decreased appetite.   HENT:  Negative for ear discharge.    Eyes:  Negative for blurred vision.   Endocrine: Negative for polyphagia.   Skin:  Negative for nail changes.   Genitourinary:  Negative for bladder incontinence.   Neurological:  Negative for aphonia.   Psychiatric/Behavioral:  Negative for hallucinations.    Allergic/Immunologic: Negative for hives.          Objective     Physical Exam  Constitutional:       Appearance: He is well-developed.   HENT:      Head: Normocephalic and atraumatic.   Eyes:      Conjunctiva/sclera: Conjunctivae normal.      Pupils: Pupils are equal, round, and reactive to light.   Cardiovascular:      Rate and Rhythm: Normal rate.      Pulses: Intact distal pulses.      Heart sounds: Normal heart sounds.   Pulmonary:      Effort: Pulmonary effort is normal.      Breath sounds: Normal breath sounds.   Abdominal:      General: Bowel sounds are normal.      Palpations: Abdomen is soft.   Musculoskeletal:         General: Normal range of motion.      Cervical back: Normal range of motion and neck supple.   Skin:     General: Skin is warm and dry.   Neurological:      Mental Status: He is alert and oriented to person, place, and time.            Assessment and Plan     1. S/P CABG (coronary artery bypass graft)    2. Diastolic dysfunction    3. Pure hypercholesterolemia    4. Essential hypertension    5. History of syncope         Plan:    Ef normal on recent echo     Continue Rx for CAD, HTN, HLD, DD  OV 6 months    Advance Care Planning     Date: 06/20/2025  Patient did not wish or was not able to name a surrogate decision maker or provide an Advance Care Plan.

## 2025-07-16 DIAGNOSIS — E11.29 CONTROLLED TYPE 2 DIABETES MELLITUS WITH MICROALBUMINURIA, WITHOUT LONG-TERM CURRENT USE OF INSULIN: ICD-10-CM

## 2025-07-16 DIAGNOSIS — R80.9 CONTROLLED TYPE 2 DIABETES MELLITUS WITH MICROALBUMINURIA, WITHOUT LONG-TERM CURRENT USE OF INSULIN: ICD-10-CM

## 2025-07-16 RX ORDER — METFORMIN HYDROCHLORIDE 500 MG/1
500 TABLET, EXTENDED RELEASE ORAL
Qty: 90 TABLET | Refills: 3 | Status: SHIPPED | OUTPATIENT
Start: 2025-07-16 | End: 2026-07-16

## 2025-07-16 NOTE — TELEPHONE ENCOUNTER
----- Message from Caryn sent at 7/16/2025 12:25 PM CDT -----  Please call pt back at  [phone] he need refill on medication Metformin ER 500mg 24hr tabs (1 tab daily with breakfast) #90 . He uses [facility] [phone]. Pt is completely out of this medication

## 2025-07-16 NOTE — TELEPHONE ENCOUNTER
Refill Routing Note   Medication(s) are not appropriate for processing by [facility] for the following reason(s):        No active prescription written by provider    [facility] action(s):  Defer               Appointments  past 12m or future 3m with PCP    Date Provider   Last Visit   5/12/2025 Adela Diaz, DO   Next Visit   8/14/2025 Adela Diaz, DO   ED visits in past 90 days: 0        Note composed:1:03 PM 07/16/2025

## 2025-07-16 NOTE — TELEPHONE ENCOUNTER
No care due was identified.  [facility] Embedded Care Due Messages. Reference number: [id number].   7/16/2025 1:01:41 PM CDT

## 2025-08-15 ENCOUNTER — OFFICE VISIT (OUTPATIENT)
Dept: FAMILY MEDICINE | Facility: CLINIC | Age: 89
End: 2025-08-15
Payer: MEDICARE

## 2025-08-15 VITALS
HEART RATE: 98 BPM | SYSTOLIC BLOOD PRESSURE: 130 MMHG | TEMPERATURE: 98 F | OXYGEN SATURATION: 99 % | BODY MASS INDEX: 24.82 KG/M2 | HEIGHT: 64 IN | WEIGHT: 145.38 LBS | RESPIRATION RATE: 18 BRPM | DIASTOLIC BLOOD PRESSURE: 68 MMHG

## 2025-08-15 DIAGNOSIS — E11.69 DYSLIPIDEMIA ASSOCIATED WITH TYPE 2 DIABETES MELLITUS: ICD-10-CM

## 2025-08-15 DIAGNOSIS — E11.29 CONTROLLED TYPE 2 DIABETES MELLITUS WITH MICROALBUMINURIA, WITHOUT LONG-TERM CURRENT USE OF INSULIN: ICD-10-CM

## 2025-08-15 DIAGNOSIS — R80.9 CONTROLLED TYPE 2 DIABETES MELLITUS WITH MICROALBUMINURIA, WITHOUT LONG-TERM CURRENT USE OF INSULIN: ICD-10-CM

## 2025-08-15 DIAGNOSIS — I25.810 CORONARY ARTERY DISEASE INVOLVING CORONARY BYPASS GRAFT OF NATIVE HEART WITHOUT ANGINA PECTORIS: ICD-10-CM

## 2025-08-15 DIAGNOSIS — I70.0 AORTIC ATHEROSCLEROSIS: ICD-10-CM

## 2025-08-15 DIAGNOSIS — H81.10 BENIGN PAROXYSMAL POSITIONAL VERTIGO, UNSPECIFIED LATERALITY: Primary | ICD-10-CM

## 2025-08-15 DIAGNOSIS — E78.5 DYSLIPIDEMIA ASSOCIATED WITH TYPE 2 DIABETES MELLITUS: ICD-10-CM

## 2025-08-15 DIAGNOSIS — M1A.00X0 IDIOPATHIC CHRONIC GOUT WITHOUT TOPHUS, UNSPECIFIED SITE: ICD-10-CM

## 2025-08-15 DIAGNOSIS — N18.31 STAGE 3A CHRONIC KIDNEY DISEASE: ICD-10-CM

## 2025-08-15 DIAGNOSIS — F51.04 PSYCHOPHYSIOLOGICAL INSOMNIA: ICD-10-CM

## 2025-08-15 PROCEDURE — 99999 PR PBB SHADOW E&M-EST. PATIENT-LVL V: CPT | Mod: PBBFAC,,, | Performed by: FAMILY MEDICINE

## 2025-08-15 PROCEDURE — 99215 OFFICE O/P EST HI 40 MIN: CPT | Mod: PBBFAC,PO | Performed by: FAMILY MEDICINE

## 2025-08-15 RX ORDER — TRAZODONE HYDROCHLORIDE 150 MG/1
150 TABLET ORAL NIGHTLY
Qty: 90 TABLET | Refills: 3 | Status: SHIPPED | OUTPATIENT
Start: 2025-08-15 | End: 2026-08-15

## 2025-08-15 RX ORDER — MECLIZINE HCL 12.5 MG 12.5 MG/1
12.5 TABLET ORAL DAILY PRN
Qty: 30 TABLET | Refills: 0 | Status: SHIPPED | OUTPATIENT
Start: 2025-08-15

## 2025-08-15 RX ORDER — ISOSORBIDE MONONITRATE 60 MG/1
60 TABLET, EXTENDED RELEASE ORAL DAILY
Qty: 90 TABLET | Refills: 3 | Status: SHIPPED | OUTPATIENT
Start: 2025-08-15 | End: 2026-08-15

## 2025-08-18 DIAGNOSIS — H81.10 BENIGN PAROXYSMAL VERTIGO, UNSPECIFIED LATERALITY: Primary | ICD-10-CM

## 2025-08-19 ENCOUNTER — CLINICAL SUPPORT (OUTPATIENT)
Dept: REHABILITATION | Facility: HOSPITAL | Age: 89
End: 2025-08-19
Attending: FAMILY MEDICINE
Payer: MEDICARE

## 2025-08-19 DIAGNOSIS — H81.10 BENIGN PAROXYSMAL VERTIGO, UNSPECIFIED LATERALITY: Primary | ICD-10-CM

## 2025-08-19 PROCEDURE — 97165 OT EVAL LOW COMPLEX 30 MIN: CPT | Mod: PN

## 2025-08-19 PROCEDURE — 97530 THERAPEUTIC ACTIVITIES: CPT | Mod: PN

## 2025-08-19 PROCEDURE — 95992 CANALITH REPOSITIONING PROC: CPT | Mod: PN

## 2025-08-21 ENCOUNTER — CLINICAL SUPPORT (OUTPATIENT)
Dept: REHABILITATION | Facility: HOSPITAL | Age: 89
End: 2025-08-21
Attending: FAMILY MEDICINE
Payer: MEDICARE

## 2025-08-21 DIAGNOSIS — H81.10 BENIGN PAROXYSMAL VERTIGO, UNSPECIFIED LATERALITY: Primary | ICD-10-CM

## 2025-08-21 PROCEDURE — 97530 THERAPEUTIC ACTIVITIES: CPT | Mod: PN

## 2025-08-21 PROCEDURE — 95992 CANALITH REPOSITIONING PROC: CPT | Mod: PN

## 2025-08-25 ENCOUNTER — DOCUMENTATION ONLY (OUTPATIENT)
Dept: REHABILITATION | Facility: HOSPITAL | Age: 89
End: 2025-08-25
Payer: MEDICARE

## 2025-08-27 ENCOUNTER — OFFICE VISIT (OUTPATIENT)
Dept: UROLOGY | Facility: CLINIC | Age: 89
End: 2025-08-27
Payer: MEDICARE

## 2025-08-27 VITALS — BODY MASS INDEX: 24.24 KG/M2 | WEIGHT: 141.19 LBS

## 2025-08-27 DIAGNOSIS — C61 PROSTATE CANCER: Primary | ICD-10-CM

## 2025-08-27 DIAGNOSIS — R33.9 INCOMPLETE BLADDER EMPTYING: ICD-10-CM

## 2025-08-27 PROCEDURE — 99214 OFFICE O/P EST MOD 30 MIN: CPT | Mod: S$PBB,,, | Performed by: STUDENT IN AN ORGANIZED HEALTH CARE EDUCATION/TRAINING PROGRAM

## 2025-09-05 ENCOUNTER — OFFICE VISIT (OUTPATIENT)
Dept: PODIATRY | Facility: CLINIC | Age: 89
End: 2025-09-05
Payer: MEDICARE

## 2025-09-05 VITALS — HEIGHT: 64 IN | BODY MASS INDEX: 24.1 KG/M2 | WEIGHT: 141.13 LBS

## 2025-09-05 DIAGNOSIS — M79.671 FOOT PAIN, BILATERAL: ICD-10-CM

## 2025-09-05 DIAGNOSIS — E11.42 TYPE 2 DIABETES MELLITUS WITH DIABETIC POLYNEUROPATHY, UNSPECIFIED WHETHER LONG TERM INSULIN USE: ICD-10-CM

## 2025-09-05 DIAGNOSIS — M79.672 FOOT PAIN, BILATERAL: ICD-10-CM

## 2025-09-05 DIAGNOSIS — B35.1 ONYCHOMYCOSIS DUE TO DERMATOPHYTE: Primary | ICD-10-CM

## 2025-09-05 PROCEDURE — 99999 PR PBB SHADOW E&M-EST. PATIENT-LVL III: CPT | Mod: PBBFAC,,, | Performed by: PODIATRIST

## 2025-09-05 PROCEDURE — 99213 OFFICE O/P EST LOW 20 MIN: CPT | Mod: PBBFAC,PO | Performed by: PODIATRIST

## 2025-09-05 RX ORDER — LIDOCAINE AND PRILOCAINE 25; 25 MG/G; MG/G
CREAM TOPICAL
Qty: 30 G | Refills: 3 | Status: SHIPPED | OUTPATIENT
Start: 2025-09-05

## 2025-09-05 RX ORDER — CICLOPIROX 80 MG/ML
SOLUTION TOPICAL NIGHTLY
Qty: 6.6 ML | Refills: 11 | Status: SHIPPED | OUTPATIENT
Start: 2025-09-05

## (undated) DEVICE — CATH IV INTROCAN 18G X 1 1/4

## (undated) DEVICE — Device

## (undated) DEVICE — SYR ONLY LUER LOCK 20CC

## (undated) DEVICE — SUPPORT ULNA NERVE PROTECTOR

## (undated) DEVICE — SPONGE COTTON TRAY 4X4IN

## (undated) DEVICE — DRAPE STERI LONG

## (undated) DEVICE — MAT QUICK 40X30 FLOOR FLUID LF

## (undated) DEVICE — SYR 50CC LL

## (undated) DEVICE — SUT SILK 0 BLK BR FSL 18 IN

## (undated) DEVICE — SYR 50ML CATH TIP

## (undated) DEVICE — CATH ABD 7FR

## (undated) DEVICE — SEE MEDLINE ITEM 157181

## (undated) DEVICE — BAG DRAIN ANTI REFLUX 2000ML

## (undated) DEVICE — SOL WATER STERILE IRR 500ML

## (undated) DEVICE — BLANKET UPPER BODY 78.7X29.9IN

## (undated) DEVICE — TUBING SUC UNIV W/CONN 12FT

## (undated) DEVICE — SOL NACL IRR 3000ML

## (undated) DEVICE — SEE MEDLINE ITEM 157110

## (undated) DEVICE — INTRODUCER CATH SUP-FOL 16F

## (undated) DEVICE — GLOVE SURGICAL LATEX SZ 6.5

## (undated) DEVICE — UNDERGLOVE BIOGEL PI SZ 6.5 LF

## (undated) DEVICE — COVER SNAP KAP 26IN

## (undated) DEVICE — BLADE SURG CARBON STEEL SZ11

## (undated) DEVICE — GLOVE BIOGEL PI MICRO SZ 7

## (undated) DEVICE — GOWN B1 X-LG X-LONG

## (undated) DEVICE — GLOVE SURG BIOGEL LATEX SZ 7.5

## (undated) DEVICE — GAUZE SPONGE 4X4 12PLY

## (undated) DEVICE — TUBING FOR LABORIE PUMP

## (undated) DEVICE — ELECTRODE NEOTRODE II

## (undated) DEVICE — SOL NS 1000CC

## (undated) DEVICE — CANISTER SUCTION RIGID 2000CC

## (undated) DEVICE — SEE L#95700

## (undated) DEVICE — CATH URETERAL LASER 7.1FR 40CM

## (undated) DEVICE — CONTAINER GRAD TRIANG IN/OUT

## (undated) DEVICE — SOL BETADINE 5%

## (undated) DEVICE — SOL NACL IRR 1000ML BTL

## (undated) DEVICE — CANISTER SUCTION 2 LTR

## (undated) DEVICE — FIBER MOSES 550 DFL

## (undated) DEVICE — GLOVE SENSICARE PI MICRO 6.5

## (undated) DEVICE — CONTAINER SPECIMEN STRL 4OZ

## (undated) DEVICE — STRAP CATH ELASTIC HOOK&LOOP

## (undated) DEVICE — SEE L#152161

## (undated) DEVICE — TUBING ARTHRO IRR 4-LEAD

## (undated) DEVICE — TAPE ADH MEDIPORE 4 X 10YDS

## (undated) DEVICE — SOL IRRI STRL WATER 1000ML

## (undated) DEVICE — CATH BLADDER/URETERAL 7FR

## (undated) DEVICE — DRESSING MEPILEX SACR 16X20CM

## (undated) DEVICE — GLOVE, SURG,LATEX FREE SZ 7

## (undated) DEVICE — ELECTRODE REM PLYHSV RETURN 9